# Patient Record
Sex: FEMALE | Race: WHITE | NOT HISPANIC OR LATINO | Employment: OTHER | URBAN - METROPOLITAN AREA
[De-identification: names, ages, dates, MRNs, and addresses within clinical notes are randomized per-mention and may not be internally consistent; named-entity substitution may affect disease eponyms.]

---

## 2020-01-23 ENCOUNTER — OFFICE VISIT (OUTPATIENT)
Dept: GYNECOLOGIC ONCOLOGY | Facility: CLINIC | Age: 45
End: 2020-01-23

## 2020-01-23 ENCOUNTER — TELEPHONE (OUTPATIENT)
Dept: GYNECOLOGIC ONCOLOGY | Facility: CLINIC | Age: 45
End: 2020-01-23

## 2020-01-23 VITALS
SYSTOLIC BLOOD PRESSURE: 140 MMHG | WEIGHT: 213 LBS | TEMPERATURE: 97.3 F | HEIGHT: 63 IN | DIASTOLIC BLOOD PRESSURE: 64 MMHG | HEART RATE: 93 BPM | RESPIRATION RATE: 18 BRPM | BODY MASS INDEX: 37.74 KG/M2

## 2020-01-23 DIAGNOSIS — G47.9 SLEEP DISTURBANCE: ICD-10-CM

## 2020-01-23 DIAGNOSIS — C53.8 MALIGNANT NEOPLASM OF OVERLAPPING SITES OF CERVIX (HCC): ICD-10-CM

## 2020-01-23 DIAGNOSIS — G89.3 CANCER RELATED PAIN: ICD-10-CM

## 2020-01-23 DIAGNOSIS — C54.1 ENDOMETRIAL CANCER (HCC): Primary | ICD-10-CM

## 2020-01-23 DIAGNOSIS — F43.0 ACUTE STRESS REACTION WITH PREDOMINATELY EMOTIONAL DISTURBANCE: ICD-10-CM

## 2020-01-23 PROCEDURE — 88305 TISSUE EXAM BY PATHOLOGIST: CPT | Performed by: OBSTETRICS & GYNECOLOGY

## 2020-01-23 PROCEDURE — 99205 OFFICE O/P NEW HI 60 MIN: CPT | Performed by: OBSTETRICS & GYNECOLOGY

## 2020-01-23 RX ORDER — OXYCODONE HYDROCHLORIDE 5 MG/1
5 TABLET ORAL EVERY 6 HOURS PRN
Qty: 30 TABLET | Refills: 0 | Status: SHIPPED | OUTPATIENT
Start: 2020-01-23 | End: 2020-02-11 | Stop reason: HOSPADM

## 2020-01-23 RX ORDER — LISINOPRIL 5 MG/1
5 TABLET ORAL DAILY
COMMUNITY
End: 2020-02-24
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
## 2020-01-23 NOTE — TELEPHONE ENCOUNTER
"I called to obtain records from [facility] if they have them.     I then called DrBert \"Naima\" at [phone] and requested EMB and cervical biopsy results. They are being faxed asap.   "

## 2020-01-24 ENCOUNTER — TELEPHONE (OUTPATIENT)
Dept: GYNECOLOGIC ONCOLOGY | Facility: CLINIC | Age: 45
End: 2020-01-24

## 2020-01-24 NOTE — TELEPHONE ENCOUNTER
Patient left  stating her and her sister were worried about her blood levels. Her sister works at a lab and vivek her blood this morning and her hemoglobin is low.

## 2020-01-24 NOTE — TELEPHONE ENCOUNTER
I called patient back. She states her hemoglobin is 7.9, she is asymptomatic. She felt worse when it was 8.6. I gave her fall precautions, advised her if she became fatigued, weak, short of breath, had episodes of syncope she should go to the ED for possible transfusion. She needs to continue her iron tablets. I discussed her case with Dr. Rubio and this was the plan.

## 2020-01-25 ENCOUNTER — OFFICE VISIT (OUTPATIENT)
Dept: FAMILY MEDICINE CLINIC | Facility: CLINIC | Age: 45
End: 2020-01-25

## 2020-01-25 ENCOUNTER — TELEPHONE (OUTPATIENT)
Dept: FAMILY MEDICINE CLINIC | Facility: CLINIC | Age: 45
End: 2020-01-25

## 2020-01-25 VITALS
WEIGHT: 213 LBS | SYSTOLIC BLOOD PRESSURE: 124 MMHG | OXYGEN SATURATION: 100 % | DIASTOLIC BLOOD PRESSURE: 72 MMHG | HEART RATE: 97 BPM | HEIGHT: 63 IN | BODY MASS INDEX: 37.74 KG/M2

## 2020-01-25 DIAGNOSIS — R01.1 HEART MURMUR: ICD-10-CM

## 2020-01-25 DIAGNOSIS — N30.01 ACUTE CYSTITIS WITH HEMATURIA: Primary | ICD-10-CM

## 2020-01-25 DIAGNOSIS — I10 ESSENTIAL HYPERTENSION: ICD-10-CM

## 2020-01-25 DIAGNOSIS — Z82.49 FAMILY HISTORY OF CARDIAC DISORDER IN MOTHER: ICD-10-CM

## 2020-01-25 DIAGNOSIS — E11.42 TYPE 2 DIABETES MELLITUS WITH DIABETIC POLYNEUROPATHY, WITH LONG-TERM CURRENT USE OF INSULIN (HCC): ICD-10-CM

## 2020-01-25 DIAGNOSIS — F33.1 MODERATE EPISODE OF RECURRENT MAJOR DEPRESSIVE DISORDER (HCC): ICD-10-CM

## 2020-01-25 DIAGNOSIS — F41.9 ANXIETY: ICD-10-CM

## 2020-01-25 DIAGNOSIS — R30.0 DYSURIA: ICD-10-CM

## 2020-01-25 DIAGNOSIS — Z79.4 TYPE 2 DIABETES MELLITUS WITH DIABETIC POLYNEUROPATHY, WITH LONG-TERM CURRENT USE OF INSULIN (HCC): ICD-10-CM

## 2020-01-25 DIAGNOSIS — Z76.89 ENCOUNTER TO ESTABLISH CARE: Primary | ICD-10-CM

## 2020-01-25 DIAGNOSIS — E55.9 VITAMIN D DEFICIENCY: ICD-10-CM

## 2020-01-25 DIAGNOSIS — C53.8 MALIGNANT NEOPLASM OF OVERLAPPING SITES OF CERVIX (HCC): ICD-10-CM

## 2020-01-25 DIAGNOSIS — Z00.00 PHYSICAL EXAM, ANNUAL: ICD-10-CM

## 2020-01-25 DIAGNOSIS — D50.9 IRON DEFICIENCY ANEMIA, UNSPECIFIED IRON DEFICIENCY ANEMIA TYPE: ICD-10-CM

## 2020-01-25 LAB
BILIRUB BLD-MCNC: NEGATIVE MG/DL
CLARITY, POC: CLEAR
COLOR UR: ABNORMAL
GLUCOSE UR STRIP-MCNC: NEGATIVE MG/DL
KETONES UR QL: NEGATIVE
LEUKOCYTE EST, POC: ABNORMAL
NITRITE UR-MCNC: NEGATIVE MG/ML
PH UR: 6 [PH] (ref 5–8)
PROT UR STRIP-MCNC: ABNORMAL MG/DL
RBC # UR STRIP: ABNORMAL /UL
SP GR UR: 1.02 (ref 1–1.03)
UROBILINOGEN UR QL: ABNORMAL

## 2020-01-25 PROCEDURE — 93000 ELECTROCARDIOGRAM COMPLETE: CPT | Performed by: PHYSICIAN ASSISTANT

## 2020-01-25 PROCEDURE — 81003 URINALYSIS AUTO W/O SCOPE: CPT | Performed by: PHYSICIAN ASSISTANT

## 2020-01-25 PROCEDURE — 99204 OFFICE O/P NEW MOD 45 MIN: CPT | Performed by: PHYSICIAN ASSISTANT

## 2020-01-25 RX ORDER — NEBIVOLOL 5 MG/1
5 TABLET ORAL DAILY
COMMUNITY
End: 2020-02-24

## 2020-01-25 RX ORDER — HYDROXYZINE PAMOATE 25 MG/1
25 CAPSULE ORAL 3 TIMES DAILY PRN
Qty: 30 CAPSULE | Refills: 1 | Status: ON HOLD | OUTPATIENT
Start: 2020-01-25 | End: 2020-03-18 | Stop reason: SINTOL

## 2020-01-25 RX ORDER — LORAZEPAM 0.5 MG/1
0.5 TABLET ORAL EVERY 8 HOURS PRN
Status: ON HOLD | COMMUNITY
End: 2020-02-10

## 2020-01-25 RX ORDER — INSULIN GLARGINE 100 [IU]/ML
40 INJECTION, SOLUTION SUBCUTANEOUS NIGHTLY
COMMUNITY
End: 2020-02-24

## 2020-01-25 RX ORDER — FERROUS SULFATE 325(65) MG
325 TABLET ORAL DAILY
COMMUNITY
End: 2020-02-24

## 2020-01-25 RX ORDER — LORAZEPAM 0.5 MG/1
TABLET ORAL
COMMUNITY
End: 2020-01-25

## 2020-01-25 RX ORDER — PREGABALIN 100 MG/1
100 CAPSULE ORAL 3 TIMES DAILY
COMMUNITY
End: 2020-02-24 | Stop reason: SDUPTHER

## 2020-01-25 RX ORDER — FLASH GLUCOSE SENSOR
KIT MISCELLANEOUS
COMMUNITY
End: 2020-02-07 | Stop reason: SDUPTHER

## 2020-01-25 RX ORDER — CIPROFLOXACIN 500 MG/1
500 TABLET, FILM COATED ORAL 2 TIMES DAILY
Qty: 10 TABLET | Refills: 0 | Status: SHIPPED | OUTPATIENT
Start: 2020-01-25 | End: 2020-01-30

## 2020-01-25 NOTE — PATIENT INSTRUCTIONS
Return to the lab Monday through Friday between 8 am and 4:30 pm.  Must be fasting (black coffee and water only, at least 8 hours of fasting prior to blood work).

## 2020-01-25 NOTE — TELEPHONE ENCOUNTER
----- Message from Tania Angeles PA-C sent at 1/25/2020 12:31 PM EST -----  Please call patient and let her know that I have sent in prescription for cipro to treat her UTI.  Take medication twice a day for 5 days.  Avoid taking antibiotic within 4 hours of iron.  Drink lots of water.

## 2020-01-25 NOTE — PROGRESS NOTES
Chief Complaint   Patient presents with   • Establish Care   • Cervical Cancer     Recently dx    • Urinary Frequency       HPI     Jeana Chung is a 44 y.o. female who presents to [facility].  Patient recently moved to [place] from [place] to be near her family given her recent diagnosis with cervical cancer.  She is established with oncology, Dr. Rubio and has upcoming PET scan this week.  Patient presents for management of diabetes type 2, hypertension, obesity, depression, anxiety, PTSD and anemia.  She has pending appointment with counselor.  Has tried various SSRI/SNRI medications and felt they made her feel worse.  She denies suicidal ideation.  She requests prescription for Ativan, discussed that our office is unable to fill this because it is controlled.  She is okay with prescription for vistaril.  She is taking lyrica 100 mg TID for peripheral neuropathy.  She is going to speak with Dr. Rubio about managing this, if not our office is able to do it.  Dr. Rubio has given her medication for cancer-related pain.  Patient reports that her last hemoglobin AIC was around 7%.  She is wearing Michelle and is taking 50 units of lantus at night, 15 units of humalog TID at meals.  She has had bilateral hallux amputations secondary to diabetes, neuropathy and necrotizing fasciitis.  She is ambulating well and has no issues with gait instability.  Her blood pressure is stable and well-controlled today.  Family history of heart disease.  She reports recent hemoglobin test showing 7.9.  She is asymptomatic.  Taking iron tablets.  No recent blood work at [facility] or within 3 months.    She also reports new onset urinary frequency and pain that is waking her up at night.  Started 2 days ago.  Denies fever, chills or back pain associated with this.  Allergic to bactrim and PCN.        Chief Complaint   Patient presents with   • Establish Care   • Cervical Cancer     Recently dx    • Urinary Frequency        Past Medical History:   Diagnosis Date   • Diabetes (CMS/HCC)    • Necrotizing fasciitis (CMS/HCC)    • PCOS (polycystic ovarian syndrome)    • Sepsis (CMS/HCC)        Past Surgical History:   Procedure Laterality Date   • TOE AMPUTATION Bilateral        Family History   Problem Relation Age of Onset   • Fibroids Mother    • Diabetes type II Mother    • Hypertension Mother    • Heart attack Mother    • Fibroids Sister         PCOS   • Diabetes type II Sister    • Dementia Maternal Grandmother    • Stroke Maternal Grandmother        Social History     Socioeconomic History   • Marital status:      Spouse name: Not on file   • Number of children: 1   • Years of education: Not on file   • Highest education level: Not on file   Tobacco Use   • Smoking status: Former Smoker     Types: Cigarettes   • Smokeless tobacco: Never Used   • Tobacco comment: social   Substance and Sexual Activity   • Alcohol use: Yes     Frequency: Monthly or less     Drinks per session: 1 or 2   • Drug use: Never   • Sexual activity: Not Currently       Allergies   Allergen Reactions   • Bactrim [Sulfamethoxazole-Trimethoprim] Anaphylaxis   • Penicillins Hives       ROS    Review of Systems   Constitutional: Positive for appetite change, chills, fatigue and unexpected weight gain. Negative for activity change, diaphoresis, fever and unexpected weight loss.   HENT: Negative for congestion, dental problem, ear pain, hearing loss, nosebleeds, sinus pressure, sore throat and trouble swallowing.    Eyes: Negative for blurred vision, pain, redness and visual disturbance.   Respiratory: Positive for cough and shortness of breath (with exertion). Negative for apnea, chest tightness and wheezing.    Cardiovascular: Negative for chest pain, palpitations and leg swelling.   Gastrointestinal: Positive for abdominal pain, constipation and nausea. Negative for abdominal distention, anal bleeding, blood in stool, diarrhea, vomiting, GERD and  "indigestion.   Endocrine: Negative for cold intolerance, heat intolerance, polydipsia, polyphagia and polyuria.   Genitourinary: Positive for menstrual problem, pelvic pain, vaginal bleeding and vaginal discharge. Negative for decreased urine volume, difficulty urinating, dysuria, frequency, hematuria, urgency and urinary incontinence.   Musculoskeletal: Positive for arthralgias. Negative for gait problem, joint swelling and bursitis.   Skin: Negative for dry skin, rash, skin lesions and bruise.   Neurological: Positive for weakness and numbness. Negative for dizziness, tremors, seizures, syncope, speech difficulty, light-headedness, headache, memory problem and confusion.   Hematological: Does not bruise/bleed easily.   Psychiatric/Behavioral: Positive for agitation, decreased concentration, sleep disturbance, depressed mood and stress. Negative for behavioral problems, hallucinations and suicidal ideas. The patient is nervous/anxious.        Vitals:    01/25/20 0944   BP: 124/72   Pulse: 97   SpO2: 100%   Weight: 96.6 kg (213 lb)   Height: 160 cm (63\")     Body mass index is 37.73 kg/m².    Current Outpatient Medications on File Prior to Visit   Medication Sig Dispense Refill   • Continuous Blood Gluc Sensor ([employer] CAMMY SENSOR SYSTEM) Every 10 (Ten) Days.     • ferrous sulfate 325 (65 FE) MG tablet Iron (ferrous sulfate) 325 mg (65 mg iron) tablet   Take 1 tablet every day by oral route.     • insulin glargine (LANTUS) 100 UNIT/ML injection Lantus U-100 Insulin 100 unit/mL subcutaneous solution   INJECT 5 UNITS SUBCUTANEOUSLY DAILY USE PREVIOUS DOSE IF DIFFERENT.     • lisinopril (PRINIVIL,ZESTRIL) 5 MG tablet Take 5 mg by mouth Daily.     • LORazepam (ATIVAN) 0.5 MG tablet Take 0.5 mg by mouth Every 8 (Eight) Hours As Needed.     • MULTIPLE VITAMIN PO Multiple Vitamin     • nebivolol (BYSTOLIC) 5 MG tablet Bystolic 5 mg tablet     • Omeprazole (PRILOSEC PO) Take  by mouth.     • oxyCODONE (ROXICODONE) 5 MG " immediate release tablet Take 1 tablet by mouth Every 6 (Six) Hours As Needed for Severe Pain . 30 tablet 0   • pregabalin (LYRICA) 100 MG capsule 3 (Three) Times a Day.     • [DISCONTINUED] insulin lispro (HUMALOG) 100 UNIT/ML injection Humalog U-100 Insulin 100 unit/mL subcutaneous solution     • [DISCONTINUED] LORazepam (ATIVAN) 0.5 MG tablet lorazepam 0.5 mg tablet       No current facility-administered medications on file prior to visit.        Results for orders placed or performed in visit on 01/25/20   POC Urinalysis Dipstick, Automated   Result Value Ref Range    Color Vicki Yellow, Straw, Dark Yellow, Vicki    Clarity, UA Clear Clear    Specific Gravity  1.020 1.005 - 1.030    pH, Urine 6.0 5.0 - 8.0    Leukocytes Moderate (2+) (A) Negative    Nitrite, UA Negative Negative    Protein, POC Trace (A) Negative mg/dL    Glucose, UA Negative Negative, 1000 mg/dL (3+) mg/dL    Ketones, UA Negative Negative    Urobilinogen, UA 1 E.U./dL  (A) Normal    Bilirubin Negative Negative    Blood, UA 3+ (A) Negative         ECG 12 Lead  Date/Time: 1/25/2020 10:30 AM  Performed by: Tania Angeles PA-C  Authorized by: Tania Angeles PA-C   Previous ECG: no previous ECG available  Rhythm: sinus rhythm    Clinical impression: abnormal EKG  Comments: Possible left atrial and ventricle hypertrophy             PE    Physical Exam   Constitutional: Vital signs are normal. She appears well-developed and well-nourished. She is active and cooperative. She does not appear ill. No distress. She is obese.  HENT:   Head: Normocephalic and atraumatic.   Eyes: EOM are normal.   Neck: Normal range of motion.   Cardiovascular: Normal rate and regular rhythm.   Murmur heard.  Pulmonary/Chest: Effort normal and breath sounds normal.   Musculoskeletal: Normal range of motion. She exhibits no edema.   Neurological: She is alert.   Skin: Skin is warm. She is not diaphoretic. No erythema.   Psychiatric: She has a normal mood and  affect. Her speech is normal and behavior is normal. Judgment and thought content normal. She is not actively hallucinating. She is attentive.   Vitals reviewed.      A/P    Jeana was seen today for establish care, cervical cancer and urinary frequency.    Diagnoses and all orders for this visit:    Encounter to establish care    Malignant neoplasm of overlapping sites of cervix (CMS/HCC)  Recently diagnosed.  Pending PET scan.  Established with and followed by oncology, Dr. Rubio.    Essential hypertension  -     ECG 12 Lead  Stable, well-controlled.  Compliant on medications.    Heart murmur  -     Adult Transthoracic Echo Complete W/ Cont if Necessary Per Protocol; Future  -     ECG 12 Lead  Heart murmur appreciated on exam.  Mitral vs. Aortic, will order echocardiogram and ECG.    Family history of cardiac disorder in mother  -     Adult Transthoracic Echo Complete W/ Cont if Necessary Per Protocol; Future  -     ECG 12 Lead    Iron deficiency anemia, unspecified iron deficiency anemia type  -     CBC Auto Differential; Future  -     Iron; Future  -     Ferritin; Future  -     Reticulocytes; Future  -     Vitamin B12; Future  Patient reports recent blood work, hemoglobin, showing anemia with value of 7.9.  She is asymptomatic.  Will get blood work for baseline.  She is taking iron supplements daily.    Type 2 diabetes mellitus with diabetic polyneuropathy, with long-term current use of insulin (CMS/HCC)  -     Hemoglobin A1c; Future  -     Microalbumin / Creatinine Urine Ratio - Urine, Clean Catch; Future  -     Vitamin B12; Future  -     insulin lispro (HUMALOG) 100 UNIT/ML injection; Inject 15 Units under the skin into the appropriate area as directed 3 (Three) Times a Day Before Meals.  States that last hemoglobin AIC was approximately 7 months ago.  Value was in the 7% region.  Taking lantus 50 units nightly.  humalog 15 units TID at meals.  Monitoring with Michelle.  Has had bilateral hallux amputations.   Has peripheral neuropathy that she takes lyrica for.      Moderate episode of recurrent major depressive disorder (CMS/HCC)  Has tried SSRI and SNRI medications and failed, makes her depression worse.  She denies any suicidal ideation.  Does not want referral to psychiatry at this time.  Has appointment with counselor next week.    Anxiety  -     hydrOXYzine pamoate (VISTARIL) 25 MG capsule; Take 1 capsule by mouth 3 (Three) Times a Day As Needed for Itching.  Requests refill on ativan.  Discussed that our office can not manage this for her and she is okay with prescription for vistaril.    Dysuria  -     POC Urinalysis Dipstick, Automated  -     Urine Culture - Urine, Urine, Random Void; Future  Urinalysis shows leukocytes and hematuria.  Will send for culture.  Allergic to bactrim and PCN.  Will send in prescription for cipro.    Physical exam, annual  -     Comprehensive Metabolic Panel; Future  -     TSH Rfx On Abnormal To Free T4; Future  -     Lipid Panel; Future  Return in 4 weeks for APE.  Will order labs to complete prior to appointment.    Vitamin D deficiency  -     Vitamin D 25 Hydroxy; Future         Plan of care reviewed with patient at the conclusion of today's visit. Education was provided regarding diagnosis, management and any prescribed or recommended OTC medications.  Patient verbalizes understanding of and agreement with management plan.    Return in about 4 weeks (around 2/22/2020) for Annual physical.     Tania Angeles PA-C

## 2020-01-25 NOTE — TELEPHONE ENCOUNTER
Spoke with patient regarding UTI and abx sent in. Also explained that her UA was sent out for a culture. The patient verbalized understanding and did not have any further questions at this time.

## 2020-01-27 ENCOUNTER — HOSPITAL ENCOUNTER (EMERGENCY)
Facility: HOSPITAL | Age: 45
Discharge: HOME | End: 2020-01-27
Attending: EMERGENCY MEDICINE | Admitting: EMERGENCY MEDICINE

## 2020-01-27 ENCOUNTER — APPOINTMENT (OUTPATIENT)
Dept: CT IMAGING | Facility: HOSPITAL | Age: 45
End: 2020-01-27

## 2020-01-27 ENCOUNTER — TELEPHONE (OUTPATIENT)
Dept: GYNECOLOGIC ONCOLOGY | Facility: CLINIC | Age: 45
End: 2020-01-27

## 2020-01-27 VITALS
DIASTOLIC BLOOD PRESSURE: 62 MMHG | WEIGHT: 213 LBS | OXYGEN SATURATION: 93 % | SYSTOLIC BLOOD PRESSURE: 108 MMHG | TEMPERATURE: 98.3 F | HEIGHT: 63 IN | RESPIRATION RATE: 18 BRPM | HEART RATE: 105 BPM | BODY MASS INDEX: 37.74 KG/M2

## 2020-01-27 DIAGNOSIS — R73.9 HYPERGLYCEMIA: Primary | ICD-10-CM

## 2020-01-27 DIAGNOSIS — R10.30 LOWER ABDOMINAL PAIN, UNSPECIFIED: ICD-10-CM

## 2020-01-27 DIAGNOSIS — E86.0 DEHYDRATION: ICD-10-CM

## 2020-01-27 LAB
ALBUMIN SERPL-MCNC: 3.2 G/DL (ref 3.5–5.2)
ALBUMIN/GLOB SERPL: 0.7 G/DL
ALP SERPL-CCNC: 112 U/L (ref 39–117)
ALT SERPL W P-5'-P-CCNC: 5 U/L (ref 1–33)
ANION GAP SERPL CALCULATED.3IONS-SCNC: 11 MMOL/L (ref 5–15)
AST SERPL-CCNC: 17 U/L (ref 1–32)
B-HCG UR QL: NEGATIVE
BACTERIA UR QL AUTO: ABNORMAL /HPF
BASOPHILS # BLD AUTO: 0.03 10*3/MM3 (ref 0–0.2)
BASOPHILS NFR BLD AUTO: 0.3 % (ref 0–1.5)
BILIRUB SERPL-MCNC: 0.4 MG/DL (ref 0.2–1.2)
BILIRUB UR QL STRIP: NEGATIVE
BUN BLD-MCNC: 25 MG/DL (ref 6–20)
BUN/CREAT SERPL: 24 (ref 7–25)
CALCIUM SPEC-SCNC: 8.8 MG/DL (ref 8.6–10.5)
CHLORIDE SERPL-SCNC: 104 MMOL/L (ref 98–107)
CLARITY UR: CLEAR
CO2 SERPL-SCNC: 19 MMOL/L (ref 22–29)
COLOR UR: YELLOW
CREAT BLD-MCNC: 1.04 MG/DL (ref 0.57–1)
DEPRECATED RDW RBC AUTO: 53.1 FL (ref 37–54)
EOSINOPHIL # BLD AUTO: 0.19 10*3/MM3 (ref 0–0.4)
EOSINOPHIL NFR BLD AUTO: 1.7 % (ref 0.3–6.2)
ERYTHROCYTE [DISTWIDTH] IN BLOOD BY AUTOMATED COUNT: 16 % (ref 12.3–15.4)
GFR SERPL CREATININE-BSD FRML MDRD: 57 ML/MIN/1.73
GLOBULIN UR ELPH-MCNC: 4.8 GM/DL
GLUCOSE BLD-MCNC: 311 MG/DL (ref 65–99)
GLUCOSE BLDC GLUCOMTR-MCNC: 245 MG/DL (ref 70–130)
GLUCOSE BLDC GLUCOMTR-MCNC: 251 MG/DL (ref 70–130)
GLUCOSE BLDC GLUCOMTR-MCNC: 272 MG/DL (ref 70–130)
GLUCOSE UR STRIP-MCNC: NEGATIVE MG/DL
HCT VFR BLD AUTO: 29.3 % (ref 34–46.6)
HGB BLD-MCNC: 8.7 G/DL (ref 12–15.9)
HGB UR QL STRIP.AUTO: ABNORMAL
HOLD SPECIMEN: NORMAL
HOLD SPECIMEN: NORMAL
HYALINE CASTS UR QL AUTO: ABNORMAL /LPF
IMM GRANULOCYTES # BLD AUTO: 0.15 10*3/MM3 (ref 0–0.05)
IMM GRANULOCYTES NFR BLD AUTO: 1.3 % (ref 0–0.5)
INTERNAL NEGATIVE CONTROL: NEGATIVE
INTERNAL POSITIVE CONTROL: POSITIVE
KETONES UR QL STRIP: NEGATIVE
LEUKOCYTE ESTERASE UR QL STRIP.AUTO: ABNORMAL
LIPASE SERPL-CCNC: 59 U/L (ref 13–60)
LYMPHOCYTES # BLD AUTO: 0.99 10*3/MM3 (ref 0.7–3.1)
LYMPHOCYTES NFR BLD AUTO: 8.6 % (ref 19.6–45.3)
Lab: NORMAL
MCH RBC QN AUTO: 27.2 PG (ref 26.6–33)
MCHC RBC AUTO-ENTMCNC: 29.7 G/DL (ref 31.5–35.7)
MCV RBC AUTO: 91.6 FL (ref 79–97)
MONOCYTES # BLD AUTO: 0.53 10*3/MM3 (ref 0.1–0.9)
MONOCYTES NFR BLD AUTO: 4.6 % (ref 5–12)
NEUTROPHILS # BLD AUTO: 9.62 10*3/MM3 (ref 1.7–7)
NEUTROPHILS NFR BLD AUTO: 83.5 % (ref 42.7–76)
NITRITE UR QL STRIP: NEGATIVE
NRBC BLD AUTO-RTO: 0 /100 WBC (ref 0–0.2)
PH UR STRIP.AUTO: 5.5 [PH] (ref 5–8)
PLATELET # BLD AUTO: 208 10*3/MM3 (ref 140–450)
PMV BLD AUTO: 10.6 FL (ref 6–12)
POTASSIUM BLD-SCNC: 5.6 MMOL/L (ref 3.5–5.2)
PROT SERPL-MCNC: 8 G/DL (ref 6–8.5)
PROT UR QL STRIP: NEGATIVE
RBC # BLD AUTO: 3.2 10*6/MM3 (ref 3.77–5.28)
RBC # UR: ABNORMAL /HPF
REF LAB TEST METHOD: ABNORMAL
SODIUM BLD-SCNC: 134 MMOL/L (ref 136–145)
SP GR UR STRIP: 1.02 (ref 1–1.03)
SQUAMOUS #/AREA URNS HPF: ABNORMAL /HPF
UROBILINOGEN UR QL STRIP: ABNORMAL
WBC NRBC COR # BLD: 11.51 10*3/MM3 (ref 3.4–10.8)
WBC UR QL AUTO: ABNORMAL /HPF
WHOLE BLOOD HOLD SPECIMEN: NORMAL
WHOLE BLOOD HOLD SPECIMEN: NORMAL

## 2020-01-27 PROCEDURE — 81001 URINALYSIS AUTO W/SCOPE: CPT | Performed by: EMERGENCY MEDICINE

## 2020-01-27 PROCEDURE — 96375 TX/PRO/DX INJ NEW DRUG ADDON: CPT

## 2020-01-27 PROCEDURE — 99284 EMERGENCY DEPT VISIT MOD MDM: CPT

## 2020-01-27 PROCEDURE — 85025 COMPLETE CBC W/AUTO DIFF WBC: CPT | Performed by: EMERGENCY MEDICINE

## 2020-01-27 PROCEDURE — 82962 GLUCOSE BLOOD TEST: CPT

## 2020-01-27 PROCEDURE — 81025 URINE PREGNANCY TEST: CPT | Performed by: EMERGENCY MEDICINE

## 2020-01-27 PROCEDURE — 25010000002 MORPHINE PER 10 MG: Performed by: EMERGENCY MEDICINE

## 2020-01-27 PROCEDURE — 25010000002 ONDANSETRON PER 1 MG: Performed by: EMERGENCY MEDICINE

## 2020-01-27 PROCEDURE — 96374 THER/PROPH/DIAG INJ IV PUSH: CPT

## 2020-01-27 PROCEDURE — 74176 CT ABD & PELVIS W/O CONTRAST: CPT

## 2020-01-27 PROCEDURE — 83690 ASSAY OF LIPASE: CPT | Performed by: EMERGENCY MEDICINE

## 2020-01-27 PROCEDURE — 96376 TX/PRO/DX INJ SAME DRUG ADON: CPT

## 2020-01-27 PROCEDURE — 80053 COMPREHEN METABOLIC PANEL: CPT | Performed by: EMERGENCY MEDICINE

## 2020-01-27 PROCEDURE — 63710000001 INSULIN REGULAR HUMAN PER 5 UNITS: Performed by: EMERGENCY MEDICINE

## 2020-01-27 RX ORDER — MORPHINE SULFATE 2 MG/ML
2 INJECTION, SOLUTION INTRAMUSCULAR; INTRAVENOUS ONCE
Status: COMPLETED | OUTPATIENT
Start: 2020-01-27 | End: 2020-01-27

## 2020-01-27 RX ORDER — ONDANSETRON 2 MG/ML
4 INJECTION INTRAMUSCULAR; INTRAVENOUS ONCE
Status: COMPLETED | OUTPATIENT
Start: 2020-01-27 | End: 2020-01-27

## 2020-01-27 RX ORDER — SODIUM CHLORIDE 0.9 % (FLUSH) 0.9 %
10 SYRINGE (ML) INJECTION AS NEEDED
Status: DISCONTINUED | OUTPATIENT
Start: 2020-01-27 | End: 2020-01-27 | Stop reason: HOSPADM

## 2020-01-27 RX ORDER — ONDANSETRON 2 MG/ML
4 INJECTION INTRAMUSCULAR; INTRAVENOUS ONCE
Status: DISCONTINUED | OUTPATIENT
Start: 2020-01-27 | End: 2020-01-27

## 2020-01-27 RX ORDER — ONDANSETRON 4 MG/1
4 TABLET, ORALLY DISINTEGRATING ORAL EVERY 6 HOURS PRN
Qty: 20 TABLET | Refills: 0 | Status: SHIPPED | OUTPATIENT
Start: 2020-01-27 | End: 2020-02-01

## 2020-01-27 RX ADMIN — MORPHINE SULFATE 2 MG: 2 INJECTION, SOLUTION INTRAMUSCULAR; INTRAVENOUS at 05:46

## 2020-01-27 RX ADMIN — INSULIN HUMAN 5 UNITS: 100 INJECTION, SOLUTION PARENTERAL at 06:29

## 2020-01-27 RX ADMIN — MORPHINE SULFATE 2 MG: 2 INJECTION, SOLUTION INTRAMUSCULAR; INTRAVENOUS at 04:29

## 2020-01-27 RX ADMIN — ONDANSETRON 4 MG: 2 INJECTION INTRAMUSCULAR; INTRAVENOUS at 04:05

## 2020-01-27 RX ADMIN — SODIUM CHLORIDE 2000 ML: 9 INJECTION, SOLUTION INTRAVENOUS at 04:05

## 2020-01-27 NOTE — ED PROVIDER NOTES
Subjective   Ms. Jeana Chung is a 44 y.o female presenting to the emergency department with complaints of abdominal pain. She was recently diagnosed with cervical cancer and has not received treatment yet. Her abdominal pain began a couple of hours ago and is located in her lower abdomen, radiating to her pelvic region and back. She states her pain is constant and severe and she took pain medication today but denies relief. Her blood sugar was 70 and she drank apple juice, leading to abdominal cramping and watery diarrhea. She complains of abdominal bloating, nausea, vomiting, vaginal bleeding, and urinary frequency. She denies dysuria. She was diagnosed with a urinary tract infection 2 weeks ago and was prescribed antibiotics. That round of Keflex did not resolve her infection and she is currently taking Cipro. She has a history of diabetes and she confirms her blood sugar has been fairly normal until tonight when it was low. There are no other acute symptoms at this time.       History provided by:  Patient  Abdominal Pain   Pain location:  Suprapubic  Pain quality: bloating and cramping    Pain radiation: pelvic region, back.  Pain severity:  Severe  Onset quality:  Sudden  Duration:  2 hours  Timing:  Constant  Progression:  Worsening  Chronicity:  New  Relieved by:  Nothing  Ineffective treatments: pain medication.  Associated symptoms: diarrhea, nausea, vaginal bleeding and vomiting    Associated symptoms: no dysuria    Vaginal bleeding:     Progression:  Improving      Review of Systems   Gastrointestinal: Positive for abdominal pain, diarrhea, nausea and vomiting.   Genitourinary: Positive for frequency, pelvic pain and vaginal bleeding. Negative for dysuria.   Musculoskeletal: Positive for back pain.   All other systems reviewed and are negative.      Past Medical History:   Diagnosis Date   • Cervical cancer (CMS/HCC)    • Diabetes (CMS/HCC)    • Hypertension    • Necrotizing fasciitis (CMS/HCC)    •  PCOS (polycystic ovarian syndrome)    • Sepsis (CMS/HCC)        Allergies   Allergen Reactions   • Bactrim [Sulfamethoxazole-Trimethoprim] Anaphylaxis   • Penicillins Hives       Past Surgical History:   Procedure Laterality Date   • TOE AMPUTATION Bilateral        Family History   Problem Relation Age of Onset   • Fibroids Mother    • Diabetes type II Mother    • Hypertension Mother    • Heart attack Mother    • Fibroids Sister         PCOS   • Diabetes type II Sister    • Dementia Maternal Grandmother    • Stroke Maternal Grandmother        Social History     Socioeconomic History   • Marital status:      Spouse name: Not on file   • Number of children: 1   • Years of education: Not on file   • Highest education level: Not on file   Tobacco Use   • Smoking status: Former Smoker     Types: Cigarettes   • Smokeless tobacco: Never Used   • Tobacco comment: social   Substance and Sexual Activity   • Alcohol use: Yes     Frequency: Monthly or less     Drinks per session: 1 or 2   • Drug use: Never   • Sexual activity: Not Currently         Objective   Physical Exam   Constitutional: She is oriented to person, place, and time. She appears well-developed and well-nourished. No distress.   HENT:   Head: Normocephalic and atraumatic.   Eyes: Conjunctivae are normal. No scleral icterus.   Neck: Normal range of motion.   Cardiovascular: Normal rate, regular rhythm and normal heart sounds.   No murmur heard.  Pulmonary/Chest: Effort normal and breath sounds normal. No respiratory distress. She has no wheezes.   Abdominal: Soft. There is tenderness.   Mild diffuse abdominal tenderness, no focal or severe area of tenderness. No flank tenderness to percussion   Musculoskeletal: Normal range of motion. She exhibits no edema, tenderness or deformity.   Neurological: She is alert and oriented to person, place, and time.   Skin: Skin is warm and dry. She is not diaphoretic.   Psychiatric: She has a normal mood and affect. Her  behavior is normal.   Nursing note and vitals reviewed.      Procedures         ED Course     Recent Results (from the past 24 hour(s))   Comprehensive Metabolic Panel    Collection Time: 01/27/20  2:41 AM   Result Value Ref Range    Glucose 311 (H) 65 - 99 mg/dL    BUN 25 (H) 6 - 20 mg/dL    Creatinine 1.04 (H) 0.57 - 1.00 mg/dL    Sodium 134 (L) 136 - 145 mmol/L    Potassium 5.6 (H) 3.5 - 5.2 mmol/L    Chloride 104 98 - 107 mmol/L    CO2 19.0 (L) 22.0 - 29.0 mmol/L    Calcium 8.8 8.6 - 10.5 mg/dL    Total Protein 8.0 6.0 - 8.5 g/dL    Albumin 3.20 (L) 3.50 - 5.20 g/dL    ALT (SGPT) 5 1 - 33 U/L    AST (SGOT) 17 1 - 32 U/L    Alkaline Phosphatase 112 39 - 117 U/L    Total Bilirubin 0.4 0.2 - 1.2 mg/dL    eGFR Non African Amer 57 (L) >60 mL/min/1.73    Globulin 4.8 gm/dL    A/G Ratio 0.7 g/dL    BUN/Creatinine Ratio 24.0 7.0 - 25.0    Anion Gap 11.0 5.0 - 15.0 mmol/L   Lipase    Collection Time: 01/27/20  2:41 AM   Result Value Ref Range    Lipase 59 13 - 60 U/L   Light Blue Top    Collection Time: 01/27/20  2:41 AM   Result Value Ref Range    Extra Tube hold for add-on    Green Top (Gel)    Collection Time: 01/27/20  2:41 AM   Result Value Ref Range    Extra Tube Hold for add-ons.    Lavender Top    Collection Time: 01/27/20  2:41 AM   Result Value Ref Range    Extra Tube hold for add-on    Gold Top - SST    Collection Time: 01/27/20  2:41 AM   Result Value Ref Range    Extra Tube Hold for add-ons.    CBC Auto Differential    Collection Time: 01/27/20  2:41 AM   Result Value Ref Range    WBC 11.51 (H) 3.40 - 10.80 10*3/mm3    RBC 3.20 (L) 3.77 - 5.28 10*6/mm3    Hemoglobin 8.7 (L) 12.0 - 15.9 g/dL    Hematocrit 29.3 (L) 34.0 - 46.6 %    MCV 91.6 79.0 - 97.0 fL    MCH 27.2 26.6 - 33.0 pg    MCHC 29.7 (L) 31.5 - 35.7 g/dL    RDW 16.0 (H) 12.3 - 15.4 %    RDW-SD 53.1 37.0 - 54.0 fl    MPV 10.6 6.0 - 12.0 fL    Platelets 208 140 - 450 10*3/mm3    Neutrophil % 83.5 (H) 42.7 - 76.0 %    Lymphocyte % 8.6 (L) 19.6 - 45.3 %     Monocyte % 4.6 (L) 5.0 - 12.0 %    Eosinophil % 1.7 0.3 - 6.2 %    Basophil % 0.3 0.0 - 1.5 %    Immature Grans % 1.3 (H) 0.0 - 0.5 %    Neutrophils, Absolute 9.62 (H) 1.70 - 7.00 10*3/mm3    Lymphocytes, Absolute 0.99 0.70 - 3.10 10*3/mm3    Monocytes, Absolute 0.53 0.10 - 0.90 10*3/mm3    Eosinophils, Absolute 0.19 0.00 - 0.40 10*3/mm3    Basophils, Absolute 0.03 0.00 - 0.20 10*3/mm3    Immature Grans, Absolute 0.15 (H) 0.00 - 0.05 10*3/mm3    nRBC 0.0 0.0 - 0.2 /100 WBC   Urinalysis With Microscopic If Indicated (No Culture) - Urine, Clean Catch    Collection Time: 01/27/20  4:09 AM   Result Value Ref Range    Color, UA Yellow Yellow, Straw    Appearance, UA Clear Clear    pH, UA 5.5 5.0 - 8.0    Specific Gravity, UA 1.019 1.001 - 1.030    Glucose, UA Negative Negative    Ketones, UA Negative Negative    Bilirubin, UA Negative Negative    Blood, UA Moderate (2+) (A) Negative    Protein, UA Negative Negative    Leuk Esterase, UA Small (1+) (A) Negative    Nitrite, UA Negative Negative    Urobilinogen, UA 1.0 E.U./dL 0.2 - 1.0 E.U./dL   Urinalysis, Microscopic Only - Urine, Clean Catch    Collection Time: 01/27/20  4:09 AM   Result Value Ref Range    RBC, UA 21-30 (A) None Seen, 0-2 /HPF    WBC, UA 6-12 (A) None Seen, 0-2 /HPF    Bacteria, UA None Seen None Seen, Trace /HPF    Squamous Epithelial Cells, UA 0-2 None Seen, 0-2 /HPF    Hyaline Casts, UA 0-6 0 - 6 /LPF    Methodology Automated Microscopy    POCT pregnancy, urine    Collection Time: 01/27/20  4:13 AM   Result Value Ref Range    HCG, Urine, QL Negative Negative    Lot Number [id number]     Internal Positive Control Positive     Internal Negative Control Negative    POC Glucose Once    Collection Time: 01/27/20  5:52 AM   Result Value Ref Range    Glucose 272 (H) 70 - 130 mg/dL   POC Glucose Once    Collection Time: 01/27/20  6:28 AM   Result Value Ref Range    Glucose 251 (H) 70 - 130 mg/dL   POC Glucose Once    Collection Time: 01/27/20  7:18 AM    Result Value Ref Range    Glucose 245 (H) 70 - 130 mg/dL     Note: In addition to lab results from this visit, the labs listed above may include labs taken at another facility or during a different encounter within the last 24 hours. Please correlate lab times with ED admission and discharge times for further clarification of the services performed during this visit.    CT Abdomen Pelvis Without Contrast   Final Result   Markedly enlarged uterus. Mild splenomegaly. No acute or inflammatory changes in the abdomen or pelvis. No evidence of colitis.               Signer Name: Salvador Vivas MD    Signed: 1/27/2020 4:40 AM    Workstation Name: [id number]     [facility]        Vitals:    01/27/20 0630 01/27/20 0645 01/27/20 0646 01/27/20 0700   BP: 131/64 125/66 131/64 108/62   BP Location:   Right arm    Patient Position:   Sitting    Pulse:  102 101 105   Resp:   18    Temp:       TempSrc:       SpO2:  92% 96% 93%   Weight:       Height:         Medications   sodium chloride 0.9 % flush 10 mL (has no administration in time range)   sodium chloride 0.9 % flush 10 mL (has no administration in time range)   sodium chloride 0.9 % bolus 2,000 mL (0 mL Intravenous Stopped 1/27/20 0628)   ondansetron (ZOFRAN) injection 4 mg (4 mg Intravenous Given 1/27/20 0405)   morphine injection 2 mg (2 mg Intravenous Given 1/27/20 0429)   morphine injection 2 mg (2 mg Intravenous Given 1/27/20 0546)   insulin regular (humuLIN R,novoLIN R) injection 5 Units (5 Units Intravenous Given 1/27/20 0629)     ECG/EMG Results (last 24 hours)     ** No results found for the last 24 hours. **        No orders to display                       [place] Coma Scale Score: 15                            MDM  Number of Diagnoses or Management Options  Dehydration: new and requires workup  Hyperglycemia: new and requires workup  Lower abdominal pain, unspecified: new and requires workup  Diagnosis management comments: The patient  presents with the complaint of lower abdominal pain.    The patient has a known history of cervical cancer and is currently receiving evaluation by Dr. Guido.  She is scheduled to initiate chemotherapy but has not at this given point.    She has been prescribed oxycodone for treatment of her pain, but this was not controlling her pain which led to the current presentation.    CT scan of the abdomen pelvis shows a large uterus but otherwise no acute abnormalities.  Lab evaluation shows elevated blood sugar level and signs of dehydration.    The patient was given 2 L of IV fluid and insulin here in the ER and the blood sugar was improving.    I discussed admission for the elevated blood sugar and dehydration.  The patient reports that her blood sugar has been primarily well controlled recently.  She feels confident that she can hydrate herself well at home and desires to go home.    Upon final evaluation she reports her pain has dramatically improved.       Amount and/or Complexity of Data Reviewed  Clinical lab tests: ordered and reviewed  Tests in the radiology section of CPT®: ordered and reviewed  Obtain history from someone other than the patient: yes  Review and summarize past medical records: yes  Independent visualization of images, tracings, or specimens: yes        Final diagnoses:   Hyperglycemia   Dehydration   Lower abdominal pain, unspecified       Documentation assistance provided by fiona Campbell.  Information recorded by the fiona was done at my direction and has been verified and validated by me.     Sasha Campbell  01/27/20 4894       Michelle Mathew MD  01/27/20 5406

## 2020-01-27 NOTE — DISCHARGE INSTRUCTIONS
Keep follow-up with Dr. Tolentino as scheduled.    Take Kevin prescribed oxycodone as needed to help with pain.    Take Zofran as needed for nausea.    Rest and drink plenty of fluids and blood sugar frequently.

## 2020-01-27 NOTE — PROGRESS NOTES
I have reviewed the notes, assessments, and/or procedures performed by BENSON Macedo, I concur with her/his documentation of Jeana Chung.

## 2020-01-28 ENCOUNTER — TELEPHONE (OUTPATIENT)
Dept: GYNECOLOGIC ONCOLOGY | Facility: CLINIC | Age: 45
End: 2020-01-28

## 2020-01-28 DIAGNOSIS — C54.1 ENDOMETRIAL CANCER (HCC): Primary | ICD-10-CM

## 2020-01-28 LAB
CYTO UR: NORMAL
LAB AP CASE REPORT: NORMAL
LAB AP CLINICAL INFORMATION: NORMAL
PATH REPORT.FINAL DX SPEC: NORMAL
PATH REPORT.GROSS SPEC: NORMAL

## 2020-01-28 NOTE — TELEPHONE ENCOUNTER
I called patient and notified her of pathology report.  Endometrial cancer.  We will cancel PET/CT for tomorrow.  CT scan of chest abdomen and pelvis was ordered.  I am going to look at her radiation oncology referral and appointment and see if they want to wait to see her until after possible hysterectomy.  Given that the histology is change, hysterectomy would be a potential option.  This would have to be a laparotomy due to the large size of the uterus.  I told patient we would have her re-present to discuss CT images and plan.  She was appreciative.

## 2020-01-29 ENCOUNTER — TELEPHONE (OUTPATIENT)
Dept: ONCOLOGY | Facility: CLINIC | Age: 45
End: 2020-01-29

## 2020-01-29 ENCOUNTER — HOSPITAL ENCOUNTER (OUTPATIENT)
Dept: PET IMAGING | Facility: HOSPITAL | Age: 45
End: 2020-01-29

## 2020-01-29 ENCOUNTER — APPOINTMENT (OUTPATIENT)
Dept: PET IMAGING | Facility: HOSPITAL | Age: 45
End: 2020-01-29

## 2020-01-29 NOTE — TELEPHONE ENCOUNTER
Spoke with patient and assured of schedule changes. She will go to CT tomorrow and notified that RAD ONC visit was cancelled.  Will await call from Dr. Rubio RE: CT results.

## 2020-01-29 NOTE — TELEPHONE ENCOUNTER
Pt wants to be sure she is set up correctly for CT tomorrow morning.  She stated that Dr. Rubio called her yesterday, but said there have been some changes since then and appointments were being reworked.  I attempted to warm transfer, but office staff must have been with patients.  Please call her asap     [phone]

## 2020-01-30 ENCOUNTER — LAB (OUTPATIENT)
Dept: LAB | Facility: HOSPITAL | Age: 45
End: 2020-01-30

## 2020-01-30 ENCOUNTER — HOSPITAL ENCOUNTER (OUTPATIENT)
Dept: CT IMAGING | Facility: HOSPITAL | Age: 45
Discharge: HOME | End: 2020-01-30
Admitting: OBSTETRICS & GYNECOLOGY

## 2020-01-30 DIAGNOSIS — C54.1 ENDOMETRIAL CANCER (HCC): ICD-10-CM

## 2020-01-30 DIAGNOSIS — Z00.00 PHYSICAL EXAM, ANNUAL: ICD-10-CM

## 2020-01-30 DIAGNOSIS — E11.42 TYPE 2 DIABETES MELLITUS WITH DIABETIC POLYNEUROPATHY, WITH LONG-TERM CURRENT USE OF INSULIN (HCC): ICD-10-CM

## 2020-01-30 DIAGNOSIS — E55.9 VITAMIN D DEFICIENCY: ICD-10-CM

## 2020-01-30 DIAGNOSIS — D50.9 IRON DEFICIENCY ANEMIA, UNSPECIFIED IRON DEFICIENCY ANEMIA TYPE: ICD-10-CM

## 2020-01-30 DIAGNOSIS — Z79.4 TYPE 2 DIABETES MELLITUS WITH DIABETIC POLYNEUROPATHY, WITH LONG-TERM CURRENT USE OF INSULIN (HCC): ICD-10-CM

## 2020-01-30 LAB
25(OH)D3 SERPL-MCNC: 9.1 NG/ML (ref 30–100)
ALBUMIN SERPL-MCNC: 3.1 G/DL (ref 3.5–5.2)
ALBUMIN UR-MCNC: 29.8 MG/DL
ALBUMIN/GLOB SERPL: 0.7 G/DL
ALP SERPL-CCNC: 97 U/L (ref 39–117)
ALT SERPL W P-5'-P-CCNC: 6 U/L (ref 1–33)
ANION GAP SERPL CALCULATED.3IONS-SCNC: 12.8 MMOL/L (ref 5–15)
AST SERPL-CCNC: 19 U/L (ref 1–32)
BASOPHILS # BLD AUTO: 0.02 10*3/MM3 (ref 0–0.2)
BASOPHILS NFR BLD AUTO: 0.2 % (ref 0–1.5)
BILIRUB SERPL-MCNC: 0.4 MG/DL (ref 0.2–1.2)
BUN BLD-MCNC: 23 MG/DL (ref 6–20)
BUN/CREAT SERPL: 21.7 (ref 7–25)
CALCIUM SPEC-SCNC: 9 MG/DL (ref 8.6–10.5)
CHLORIDE SERPL-SCNC: 101 MMOL/L (ref 98–107)
CHOLEST SERPL-MCNC: 89 MG/DL (ref 0–200)
CO2 SERPL-SCNC: 20.2 MMOL/L (ref 22–29)
CREAT BLD-MCNC: 1.06 MG/DL (ref 0.57–1)
CREAT UR-MCNC: 113.8 MG/DL
DEPRECATED RDW RBC AUTO: 49.3 FL (ref 37–54)
EOSINOPHIL # BLD AUTO: 0.23 10*3/MM3 (ref 0–0.4)
EOSINOPHIL NFR BLD AUTO: 2.6 % (ref 0.3–6.2)
ERYTHROCYTE [DISTWIDTH] IN BLOOD BY AUTOMATED COUNT: 15.2 % (ref 12.3–15.4)
FERRITIN SERPL-MCNC: 41.3 NG/ML (ref 13–150)
GFR SERPL CREATININE-BSD FRML MDRD: 56 ML/MIN/1.73
GLOBULIN UR ELPH-MCNC: 4.6 GM/DL
GLUCOSE BLD-MCNC: 226 MG/DL (ref 65–99)
HBA1C MFR BLD: 7.1 % (ref 4.8–5.6)
HCT VFR BLD AUTO: 25.8 % (ref 34–46.6)
HDLC SERPL-MCNC: 30 MG/DL (ref 40–60)
HGB BLD-MCNC: 7.9 G/DL (ref 12–15.9)
IMM GRANULOCYTES # BLD AUTO: 0.1 10*3/MM3 (ref 0–0.05)
IMM GRANULOCYTES NFR BLD AUTO: 1.1 % (ref 0–0.5)
IRON 24H UR-MRATE: 24 MCG/DL (ref 37–145)
LDLC SERPL CALC-MCNC: 44 MG/DL (ref 0–100)
LDLC/HDLC SERPL: 1.46 {RATIO}
LYMPHOCYTES # BLD AUTO: 1.15 10*3/MM3 (ref 0.7–3.1)
LYMPHOCYTES NFR BLD AUTO: 13.2 % (ref 19.6–45.3)
MCH RBC QN AUTO: 27.4 PG (ref 26.6–33)
MCHC RBC AUTO-ENTMCNC: 30.6 G/DL (ref 31.5–35.7)
MCV RBC AUTO: 89.6 FL (ref 79–97)
MICROALBUMIN/CREAT UR: 261.9 MG/G
MONOCYTES # BLD AUTO: 0.41 10*3/MM3 (ref 0.1–0.9)
MONOCYTES NFR BLD AUTO: 4.7 % (ref 5–12)
NEUTROPHILS # BLD AUTO: 6.8 10*3/MM3 (ref 1.7–7)
NEUTROPHILS NFR BLD AUTO: 78.2 % (ref 42.7–76)
NRBC BLD AUTO-RTO: 0 /100 WBC (ref 0–0.2)
PLATELET # BLD AUTO: 213 10*3/MM3 (ref 140–450)
PMV BLD AUTO: 10.7 FL (ref 6–12)
POTASSIUM BLD-SCNC: 5 MMOL/L (ref 3.5–5.2)
PROT SERPL-MCNC: 7.7 G/DL (ref 6–8.5)
RBC # BLD AUTO: 2.88 10*6/MM3 (ref 3.77–5.28)
RETICS # AUTO: 0.08 10*6/MM3 (ref 0.02–0.13)
RETICS/RBC NFR AUTO: 2.72 % (ref 0.7–1.9)
SODIUM BLD-SCNC: 134 MMOL/L (ref 136–145)
TRIGL SERPL-MCNC: 76 MG/DL (ref 0–150)
TSH SERPL DL<=0.05 MIU/L-ACNC: 2.18 UIU/ML (ref 0.27–4.2)
VIT B12 BLD-MCNC: 1841 PG/ML (ref 211–946)
VLDLC SERPL-MCNC: 15.2 MG/DL (ref 5–40)
WBC NRBC COR # BLD: 8.71 10*3/MM3 (ref 3.4–10.8)

## 2020-01-30 PROCEDURE — 85025 COMPLETE CBC W/AUTO DIFF WBC: CPT

## 2020-01-30 PROCEDURE — 80061 LIPID PANEL: CPT

## 2020-01-30 PROCEDURE — 83540 ASSAY OF IRON: CPT

## 2020-01-30 PROCEDURE — 82607 VITAMIN B-12: CPT

## 2020-01-30 PROCEDURE — 85045 AUTOMATED RETICULOCYTE COUNT: CPT

## 2020-01-30 PROCEDURE — 74177 CT ABD & PELVIS W/CONTRAST: CPT

## 2020-01-30 PROCEDURE — 36415 COLL VENOUS BLD VENIPUNCTURE: CPT

## 2020-01-30 PROCEDURE — 82570 ASSAY OF URINE CREATININE: CPT

## 2020-01-30 PROCEDURE — 80053 COMPREHEN METABOLIC PANEL: CPT

## 2020-01-30 PROCEDURE — 71260 CT THORAX DX C+: CPT

## 2020-01-30 PROCEDURE — 83036 HEMOGLOBIN GLYCOSYLATED A1C: CPT

## 2020-01-30 PROCEDURE — 82728 ASSAY OF FERRITIN: CPT

## 2020-01-30 PROCEDURE — 82043 UR ALBUMIN QUANTITATIVE: CPT

## 2020-01-30 PROCEDURE — 82306 VITAMIN D 25 HYDROXY: CPT

## 2020-01-30 PROCEDURE — 84443 ASSAY THYROID STIM HORMONE: CPT

## 2020-01-30 PROCEDURE — 25010000002 IOPAMIDOL 61 % SOLUTION: Performed by: OBSTETRICS & GYNECOLOGY

## 2020-01-30 RX ADMIN — BARIUM SULFATE 450 ML: 21 SUSPENSION ORAL at 10:00

## 2020-01-30 RX ADMIN — IOPAMIDOL 95 ML: 612 INJECTION, SOLUTION INTRAVENOUS at 11:10

## 2020-01-31 ENCOUNTER — TELEPHONE (OUTPATIENT)
Dept: FAMILY MEDICINE CLINIC | Facility: CLINIC | Age: 45
End: 2020-01-31

## 2020-01-31 ENCOUNTER — TELEPHONE (OUTPATIENT)
Dept: GYNECOLOGIC ONCOLOGY | Facility: CLINIC | Age: 45
End: 2020-01-31

## 2020-01-31 DIAGNOSIS — E55.9 VITAMIN D DEFICIENCY: Primary | ICD-10-CM

## 2020-01-31 NOTE — TELEPHONE ENCOUNTER
Spoke with patient regarding recent labs.  Hemoglobin AIC is 7.1%.  She reports taking her long-acting insulin infrequently, occasionally skips nights because she has woken up with low sugars.  Currently taking 50 units of lantus when she takes it.  She also reports taking short-acting insulin 15 units prior to each meal.  Checking fasting blood sugar, but not using sliding scale.  Unable to eat most mornings due to nausea.    She is anemic secondary to endometrial cancer.  Will defer to oncology for management.    Very low vitamin D, will send in weekly supplement.

## 2020-01-31 NOTE — TELEPHONE ENCOUNTER
I called patient to discuss CT scan results.  Left VM that there was no metastatic disease.  Stated we would plan on surgical intervention and that I would see her back in the office to discuss that in more detail as we have not had a discussion regarding risks and benefits.      Will plan on surgical intervention with exploratory laparotomy, total abdominal hysterectomy, bilateral salpingo-oophorectomy, lymph node dissection.  Patient will need to do bowel prep prior to surgery due to the extent of disease and inability to exclude intestinal involvement on imaging although there is nothing obvious seen.

## 2020-02-03 ENCOUNTER — OFFICE VISIT (OUTPATIENT)
Dept: GYNECOLOGIC ONCOLOGY | Facility: CLINIC | Age: 45
End: 2020-02-03

## 2020-02-03 VITALS
BODY MASS INDEX: 35.78 KG/M2 | RESPIRATION RATE: 10 BRPM | HEART RATE: 95 BPM | DIASTOLIC BLOOD PRESSURE: 59 MMHG | SYSTOLIC BLOOD PRESSURE: 108 MMHG | OXYGEN SATURATION: 96 % | WEIGHT: 202 LBS

## 2020-02-03 DIAGNOSIS — C54.1 ENDOMETRIAL CANCER (HCC): Primary | ICD-10-CM

## 2020-02-03 PROCEDURE — 99213 OFFICE O/P EST LOW 20 MIN: CPT | Performed by: OBSTETRICS & GYNECOLOGY

## 2020-02-03 RX ORDER — ACETAMINOPHEN 325 MG/1
650 TABLET ORAL
Status: CANCELLED | OUTPATIENT
Start: 2020-02-03

## 2020-02-03 RX ORDER — METRONIDAZOLE 500 MG/1
TABLET ORAL
Qty: 3 TABLET | Refills: 0 | Status: SHIPPED | OUTPATIENT
Start: 2020-02-03 | End: 2020-02-11 | Stop reason: HOSPADM

## 2020-02-03 RX ORDER — BISACODYL 5 MG/1
TABLET, DELAYED RELEASE ORAL
Qty: 4 TABLET | Refills: 0 | Status: SHIPPED | OUTPATIENT
Start: 2020-02-03 | End: 2020-02-11 | Stop reason: HOSPADM

## 2020-02-03 RX ORDER — NEOMYCIN SULFATE 500 MG/1
TABLET ORAL
Qty: 6 TABLET | Refills: 0 | Status: SHIPPED | OUTPATIENT
Start: 2020-02-03 | End: 2020-02-11 | Stop reason: HOSPADM

## 2020-02-03 RX ORDER — ONDANSETRON 4 MG/1
4 TABLET, FILM COATED ORAL EVERY 8 HOURS PRN
Qty: 30 TABLET | Refills: 5 | Status: SHIPPED | OUTPATIENT
Start: 2020-02-03 | End: 2020-02-11 | Stop reason: HOSPADM

## 2020-02-03 NOTE — PATIENT INSTRUCTIONS
Inpatient Surgery Instructions          Jeana Chung  [id number]  1975    SURGEON:  Celine Rubio MD    Surgery Coordinator: Brenda Turner  If you have any questions before or after surgery please call.  [phone]         Appointment    1. You have pre-admission testing (PAT) appointment on 02/07/2020  at 12:00 pm.  You will need to be at hospital registration 10 minutes before that time The registration department is located in the long hallway between the [facility] and [address] [place] [address].If your PAT appointment is on Sunday, please enter through the Emergency Department.     2.  Your surgery has been scheduled on 02/10/2020.  You will need to be at the [address] [place] [address] second floor surgery registration on that day at 10:30 am.    The Day(s) Before Surgery    · Plan to have someone take you home from the hospital.    · Do not use any products that contain nicotine or tobacco, such as cigarettes and e-cigarettes. These can delay healing after surgery. If you need help quitting, ask your health care provider.    ·  Do not take vitamins or aspirin one week before surgery ( if applicable).  On the morning of your surgery, you may take you prescription medications with a sip of water, unless told otherwise by your provider.  Bring them with you to the hospital (Diabetic patient should bring insulin if instructed by the managing physician). If you are taking a blood thinner ( Eliquis, Coumadin, Xarelto, Lovenox, Asprin, Heparin, etc.) please have the provider that manages this instruct you on when to stop taking prior to surgery.     · If you are feeling sick, have a fever or cough and have seen your PCP let our office know 48 hours prior to surgery. It may be subject to rescheduling.     You will need to purchase 4 bottles of gatorade.  2 bottles are for the bowel prep. The other 2 you will need to drink 4 hours prior to  surgery. These need to be drank well before surgery.  For example your arrival time is 10:30 am, please have gatorade drank by 8:30 am. If you show up drinking gatorade it could delay or cancel your surgery.       Eating and drinking restrictions              Follow instructions from your health care provider about eating and drinking, which may include:          No solid food while doing the bowel prep. Broths, jello, popsicles may be ingested.     ·  NO MILK, CREAM, OR ORANGE JUICE.  You may have sips of clear fluids up until two-three hours prior to your arrival to the hospital on the morning of surgery      What happens after the procedure?  · You will be given pain medicine as needed.  · Your blood pressure, heart rate, breathing rate, and blood oxygen level will be monitored until the medicines you were given have worn off.  · You will need to stay in the hospital to recover for one to two days.  · You may have a liquid diet at first. You will most likely return to your usual diet the day after surgery.  · You will still have the urinary catheter in place. It will likely be removed the day after surgery.  · You may have to wear compression stockings. These stockings help to prevent blood clots and reduce swelling in your legs.  · You will be encouraged to walk as soon as possible. You will also use a device or do breathing exercises to keep your lungs clear.  · You may need to use a maxi-pad for vaginal discharge/bleeding.      Post Surgical Care Instructions.     • You may be discharged from surgery with an abdominal binder, this is given to you for your comfort only. You do not need to wear it unless you feel that it helps with discomfort after your surgery.   • You will have a large abdominal incision, this will sometimes have glue or staples. If you have staples a one week appointment will be made to have them removed during a nurse visit. Sutures will dissolve on their own and glue will wear off over  time. Please do not pick the glue.   • Keep incisions clean and dry. Do not use antibacterial washes or ointments on your incisions.   • Small amount of clear or pink tinged drainage from incisions is normal.  • You may have light vaginal bleeding and spotting up to 6 weeks after surgery.  • You will need to continue a bowel regimen after surgery to prevent constipation or bowel obstruction. Take your stool softener as prescribed. If you do not produce a bowel movent in 24 hours after surgery take a laxative ( milk of magnesia or miralax). Narcotics cause constipation, please take them as directed, try alternating ibuprofen and tylenol before taking the narcotic ( oxycodone, hydrocodone, etc.).                        Bowel Prep Instructions                 What supplies do I need to prepare in advance?   Obtain the following supplies at your local pharmacy:   · Ondansetron 4 mg tablet- take one tablet an hour prior to bowel prep.  • 4 Dulcolax (bisacodyl) (laxative tablets - each tablet contains 5 mg of bisacodyl (do not get Dulcolax stool softener).   • One bottle of Miralax® (8.3 ounces or 238 grams each)   • Two bottles of clear liquid (32 ounces each): Gatorade        At 9 am take 4 Dulcolax tablets.      Mix entire bottle of Miralax with both bottles of Gatorade, place in the refrigerator to chill.   • Between 12 and 1 pm, drink one 8-ounce glass of the Miralax/Gatorade solution and continue drinking one 8 ounce glass every 15 minutes thereafter until half the mixture (64 ounces) is gone. Set a timer for every 15 minutes to keep pace.   • Stay near a toilet, as you will have diarrhea.               Colon cleansing tips:   1. Stay near a toilet! You will have diarrhea, which can be quite sudden. This is normal.   2. Try drinking the solution with a straw. It may be easier to tolerate.   3. Rarely, people may have nausea or vomiting with the prep. If this occurs, give yourself a 30 -minute break, rinse your mouth  or brush your teeth, then continue drinking the prep solution.   4. You may have bowel cramps until the stool has flushed from your colon (this may take 2 to 4 hours and sometimes much longer).      Anal skin irritation or a flare of hemorrhoid inflammation may occur. If this happens, treat it with over-the-counter-remedies, such as hydrocortisone cream, baby wipes, Vaseline or TUCKS ( witch hazel)                                Bowel Prep Medications.      Dr. Celine Rubio         METRONIDAZOLE 500 MG    To start the day before surgery:   Take 1 tablet by mouth  at 2 pm, 1 at 3 pm, and 1 at 10 pm.      NEOMYCIN 500 MG    To begin the day before surgery:  Take (2) 500 mg  tabs at 2:00pm, take (2) 500 mg tabs at 3:00pm, and take (2) 500 mg tabs at 10:00pm.      ONDANSETRON 4 MG      Take one tablet by mouth at 1 pm and then every 6 hours as needed for nausea.            If you have any questions concerning directions for the bowel prep please call our office at [phone]
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
## 2020-02-07 ENCOUNTER — APPOINTMENT (OUTPATIENT)
Dept: CARDIOLOGY | Facility: HOSPITAL | Age: 45
End: 2020-02-07

## 2020-02-07 ENCOUNTER — TELEPHONE (OUTPATIENT)
Dept: FAMILY MEDICINE CLINIC | Facility: CLINIC | Age: 45
End: 2020-02-07

## 2020-02-07 ENCOUNTER — APPOINTMENT (OUTPATIENT)
Dept: PREADMISSION TESTING | Facility: HOSPITAL | Age: 45
End: 2020-02-07

## 2020-02-07 ENCOUNTER — HOSPITAL ENCOUNTER (OUTPATIENT)
Dept: GENERAL RADIOLOGY | Facility: HOSPITAL | Age: 45
Discharge: HOME | End: 2020-02-07
Admitting: OBSTETRICS & GYNECOLOGY

## 2020-02-07 VITALS — BODY MASS INDEX: 35.31 KG/M2 | WEIGHT: 199.3 LBS | HEIGHT: 63 IN

## 2020-02-07 DIAGNOSIS — C54.1 ENDOMETRIAL CANCER (HCC): ICD-10-CM

## 2020-02-07 DIAGNOSIS — E11.42 TYPE 2 DIABETES MELLITUS WITH DIABETIC POLYNEUROPATHY, WITH LONG-TERM CURRENT USE OF INSULIN (HCC): Primary | ICD-10-CM

## 2020-02-07 DIAGNOSIS — Z79.4 TYPE 2 DIABETES MELLITUS WITH DIABETIC POLYNEUROPATHY, WITH LONG-TERM CURRENT USE OF INSULIN (HCC): Primary | ICD-10-CM

## 2020-02-07 LAB
ABO GROUP BLD: NORMAL
ALBUMIN SERPL-MCNC: 3.4 G/DL (ref 3.5–5.2)
ALBUMIN/GLOB SERPL: 0.8 G/DL
ALP SERPL-CCNC: 107 U/L (ref 39–117)
ALT SERPL W P-5'-P-CCNC: 8 U/L (ref 1–33)
ANION GAP SERPL CALCULATED.3IONS-SCNC: 11 MMOL/L (ref 5–15)
AST SERPL-CCNC: 18 U/L (ref 1–32)
BASOPHILS # BLD AUTO: 0.04 10*3/MM3 (ref 0–0.2)
BASOPHILS NFR BLD AUTO: 0.5 % (ref 0–1.5)
BILIRUB SERPL-MCNC: 0.6 MG/DL (ref 0.2–1.2)
BLD GP AB SCN SERPL QL: NEGATIVE
BUN BLD-MCNC: 13 MG/DL (ref 6–20)
BUN/CREAT SERPL: 11 (ref 7–25)
CALCIUM SPEC-SCNC: 8.9 MG/DL (ref 8.6–10.5)
CHLORIDE SERPL-SCNC: 101 MMOL/L (ref 98–107)
CO2 SERPL-SCNC: 23 MMOL/L (ref 22–29)
CREAT BLD-MCNC: 1.18 MG/DL (ref 0.57–1)
DEPRECATED RDW RBC AUTO: 48.8 FL (ref 37–54)
EOSINOPHIL # BLD AUTO: 0.25 10*3/MM3 (ref 0–0.4)
EOSINOPHIL NFR BLD AUTO: 3.1 % (ref 0.3–6.2)
ERYTHROCYTE [DISTWIDTH] IN BLOOD BY AUTOMATED COUNT: 14.7 % (ref 12.3–15.4)
GFR SERPL CREATININE-BSD FRML MDRD: 49 ML/MIN/1.73
GLOBULIN UR ELPH-MCNC: 4.4 GM/DL
GLUCOSE BLD-MCNC: 252 MG/DL (ref 65–99)
HBA1C MFR BLD: 7.1 % (ref 4.8–5.6)
HCG INTACT+B SERPL-ACNC: <0.5 MIU/ML
HCT VFR BLD AUTO: 27.9 % (ref 34–46.6)
HGB BLD-MCNC: 8.5 G/DL (ref 12–15.9)
IMM GRANULOCYTES # BLD AUTO: 0.04 10*3/MM3 (ref 0–0.05)
IMM GRANULOCYTES NFR BLD AUTO: 0.5 % (ref 0–0.5)
LYMPHOCYTES # BLD AUTO: 1.02 10*3/MM3 (ref 0.7–3.1)
LYMPHOCYTES NFR BLD AUTO: 12.8 % (ref 19.6–45.3)
MCH RBC QN AUTO: 27.5 PG (ref 26.6–33)
MCHC RBC AUTO-ENTMCNC: 30.5 G/DL (ref 31.5–35.7)
MCV RBC AUTO: 90.3 FL (ref 79–97)
MONOCYTES # BLD AUTO: 0.41 10*3/MM3 (ref 0.1–0.9)
MONOCYTES NFR BLD AUTO: 5.1 % (ref 5–12)
NEUTROPHILS # BLD AUTO: 6.24 10*3/MM3 (ref 1.7–7)
NEUTROPHILS NFR BLD AUTO: 78 % (ref 42.7–76)
NRBC BLD AUTO-RTO: 0 /100 WBC (ref 0–0.2)
PLATELET # BLD AUTO: 220 10*3/MM3 (ref 140–450)
PMV BLD AUTO: 10.6 FL (ref 6–12)
POTASSIUM BLD-SCNC: 4.4 MMOL/L (ref 3.5–5.2)
PROT SERPL-MCNC: 7.8 G/DL (ref 6–8.5)
RBC # BLD AUTO: 3.09 10*6/MM3 (ref 3.77–5.28)
RH BLD: POSITIVE
SODIUM BLD-SCNC: 135 MMOL/L (ref 136–145)
T&S EXPIRATION DATE: NORMAL
WBC NRBC COR # BLD: 8 10*3/MM3 (ref 3.4–10.8)

## 2020-02-07 PROCEDURE — 86923 COMPATIBILITY TEST ELECTRIC: CPT

## 2020-02-07 PROCEDURE — 86900 BLOOD TYPING SEROLOGIC ABO: CPT | Performed by: OBSTETRICS & GYNECOLOGY

## 2020-02-07 PROCEDURE — 84702 CHORIONIC GONADOTROPIN TEST: CPT | Performed by: OBSTETRICS & GYNECOLOGY

## 2020-02-07 PROCEDURE — 85025 COMPLETE CBC W/AUTO DIFF WBC: CPT | Performed by: OBSTETRICS & GYNECOLOGY

## 2020-02-07 PROCEDURE — 80053 COMPREHEN METABOLIC PANEL: CPT | Performed by: OBSTETRICS & GYNECOLOGY

## 2020-02-07 PROCEDURE — 83036 HEMOGLOBIN GLYCOSYLATED A1C: CPT | Performed by: OBSTETRICS & GYNECOLOGY

## 2020-02-07 PROCEDURE — 86901 BLOOD TYPING SEROLOGIC RH(D): CPT | Performed by: OBSTETRICS & GYNECOLOGY

## 2020-02-07 PROCEDURE — 36415 COLL VENOUS BLD VENIPUNCTURE: CPT

## 2020-02-07 PROCEDURE — 71046 X-RAY EXAM CHEST 2 VIEWS: CPT

## 2020-02-07 PROCEDURE — 86850 RBC ANTIBODY SCREEN: CPT | Performed by: OBSTETRICS & GYNECOLOGY

## 2020-02-07 RX ORDER — FLASH GLUCOSE SENSOR
KIT MISCELLANEOUS
Qty: 1 EACH | Refills: 1 | Status: SHIPPED | OUTPATIENT
Start: 2020-02-07 | End: 2020-02-12 | Stop reason: SDUPTHER

## 2020-02-07 NOTE — TELEPHONE ENCOUNTER
SENG(PHARMACIST) CALLED IN TO ASK IF ON THE Continuous Blood Gluc Sensor (FREESTYLE CAMMY SENSOR SYSTEM) IF THE PT COULD HAVE THE  14DY KIT PLEASE ADVISE      CB NUMBER: [phone]  [facility]: [facility]  FAX# [phone]

## 2020-02-07 NOTE — PAT
EKG IN CHART FROM 2/25/2020    PATIENT SENT TO RADIOLOGY FOR CXRAY.    Patient to apply Chlorhexadine wipes  to surgical area (as instructed) the night before procedure and the AM of procedure. Wipes provided.    Blood bank bracelet applied to patient during Pre Admission Testing visit.  Patient instructed not to remove from arm until after procedure and they are discharged from the hospital.  Explained to patient that they may shower and get the bracelet wet, but not to immerse under water for longer periods (bathing, swimming, hand dishwashing, etc).  Patient verbalized understanding.

## 2020-02-07 NOTE — TELEPHONE ENCOUNTER
PT CALLED IN REQUESTING ANOTHER Continuous Blood Gluc Sensor (FREESTYLE CAMMY SENSOR SYSTEM BE SENT INTO THE PHARM PT STATES YOU MAY LEAVE A MESSAGE ON VM      PT CB NUMBER: [phone]   [facility]: [facility]  FAX# [phone]

## 2020-02-10 ENCOUNTER — HOSPITAL ENCOUNTER (INPATIENT)
Facility: HOSPITAL | Age: 45
LOS: 1 days | Discharge: HOME | End: 2020-02-11
Attending: OBSTETRICS & GYNECOLOGY | Admitting: OBSTETRICS & GYNECOLOGY

## 2020-02-10 ENCOUNTER — ANESTHESIA (OUTPATIENT)
Dept: PERIOP | Facility: HOSPITAL | Age: 45
End: 2020-02-10

## 2020-02-10 ENCOUNTER — ANESTHESIA EVENT (OUTPATIENT)
Dept: PERIOP | Facility: HOSPITAL | Age: 45
End: 2020-02-10

## 2020-02-10 DIAGNOSIS — C54.1 ENDOMETRIAL CANCER (HCC): ICD-10-CM

## 2020-02-10 LAB
ABO GROUP BLD: NORMAL
B-HCG UR QL: NEGATIVE
GLUCOSE BLDC GLUCOMTR-MCNC: 164 MG/DL (ref 70–130)
GLUCOSE BLDC GLUCOMTR-MCNC: 269 MG/DL (ref 70–130)
GLUCOSE BLDC GLUCOMTR-MCNC: 320 MG/DL (ref 70–130)
INTERNAL NEGATIVE CONTROL: NEGATIVE
INTERNAL POSITIVE CONTROL: POSITIVE
Lab: NORMAL
RH BLD: POSITIVE

## 2020-02-10 PROCEDURE — 25010000002 HYDROMORPHONE PER 4 MG: Performed by: NURSE ANESTHETIST, CERTIFIED REGISTERED

## 2020-02-10 PROCEDURE — 88342 IMHCHEM/IMCYTCHM 1ST ANTB: CPT | Performed by: OBSTETRICS & GYNECOLOGY

## 2020-02-10 PROCEDURE — 81025 URINE PREGNANCY TEST: CPT | Performed by: OBSTETRICS & GYNECOLOGY

## 2020-02-10 PROCEDURE — 25010000002 FENTANYL CITRATE (PF) 100 MCG/2ML SOLUTION: Performed by: NURSE ANESTHETIST, CERTIFIED REGISTERED

## 2020-02-10 PROCEDURE — 25010000002 ONDANSETRON PER 1 MG: Performed by: NURSE ANESTHETIST, CERTIFIED REGISTERED

## 2020-02-10 PROCEDURE — 88331 PATH CONSLTJ SURG 1 BLK 1SPC: CPT | Performed by: PATHOLOGY

## 2020-02-10 PROCEDURE — 0UT70ZZ RESECTION OF BILATERAL FALLOPIAN TUBES, OPEN APPROACH: ICD-10-PCS | Performed by: OBSTETRICS & GYNECOLOGY

## 2020-02-10 PROCEDURE — 25010000002 GENTAMICIN PER 80 MG: Performed by: OBSTETRICS & GYNECOLOGY

## 2020-02-10 PROCEDURE — 88341 IMHCHEM/IMCYTCHM EA ADD ANTB: CPT | Performed by: OBSTETRICS & GYNECOLOGY

## 2020-02-10 PROCEDURE — 88307 TISSUE EXAM BY PATHOLOGIST: CPT | Performed by: OBSTETRICS & GYNECOLOGY

## 2020-02-10 PROCEDURE — 25010000002 PROPOFOL 10 MG/ML EMULSION: Performed by: NURSE ANESTHETIST, CERTIFIED REGISTERED

## 2020-02-10 PROCEDURE — 63710000001 INSULIN LISPRO (HUMAN) PER 5 UNITS: Performed by: OBSTETRICS & GYNECOLOGY

## 2020-02-10 PROCEDURE — 58960 EXPLORATION OF ABDOMEN: CPT | Performed by: OBSTETRICS & GYNECOLOGY

## 2020-02-10 PROCEDURE — 94799 UNLISTED PULMONARY SVC/PX: CPT

## 2020-02-10 PROCEDURE — 0UT90ZZ RESECTION OF UTERUS, OPEN APPROACH: ICD-10-PCS | Performed by: OBSTETRICS & GYNECOLOGY

## 2020-02-10 PROCEDURE — 25010000002 ONDANSETRON PER 1 MG: Performed by: OBSTETRICS & GYNECOLOGY

## 2020-02-10 PROCEDURE — 25010000002 BUPRENORPHINE PER 0.1 MG: Performed by: ANESTHESIOLOGY

## 2020-02-10 PROCEDURE — 82962 GLUCOSE BLOOD TEST: CPT

## 2020-02-10 PROCEDURE — 25010000002 DEXAMETHASONE SODIUM PHOSPHATE 10 MG/ML SOLUTION: Performed by: ANESTHESIOLOGY

## 2020-02-10 PROCEDURE — 86901 BLOOD TYPING SEROLOGIC RH(D): CPT

## 2020-02-10 PROCEDURE — 88360 TUMOR IMMUNOHISTOCHEM/MANUAL: CPT | Performed by: OBSTETRICS & GYNECOLOGY

## 2020-02-10 PROCEDURE — 25010000002 NEOSTIGMINE 10 MG/10ML SOLUTION 10 ML VIAL: Performed by: NURSE ANESTHETIST, CERTIFIED REGISTERED

## 2020-02-10 PROCEDURE — 25010000002 DEXAMETHASONE SODIUM PHOSPHATE 10 MG/ML SOLUTION: Performed by: NURSE ANESTHETIST, CERTIFIED REGISTERED

## 2020-02-10 PROCEDURE — 86900 BLOOD TYPING SEROLOGIC ABO: CPT

## 2020-02-10 PROCEDURE — 88112 CYTOPATH CELL ENHANCE TECH: CPT | Performed by: OBSTETRICS & GYNECOLOGY

## 2020-02-10 PROCEDURE — 88305 TISSUE EXAM BY PATHOLOGIST: CPT | Performed by: OBSTETRICS & GYNECOLOGY

## 2020-02-10 PROCEDURE — 25010000002 HYDROMORPHONE PER 4 MG: Performed by: OBSTETRICS & GYNECOLOGY

## 2020-02-10 PROCEDURE — 58150 TOTAL HYSTERECTOMY: CPT | Performed by: OBSTETRICS & GYNECOLOGY

## 2020-02-10 PROCEDURE — 0UT20ZZ RESECTION OF BILATERAL OVARIES, OPEN APPROACH: ICD-10-PCS | Performed by: OBSTETRICS & GYNECOLOGY

## 2020-02-10 PROCEDURE — 0DBU0ZZ EXCISION OF OMENTUM, OPEN APPROACH: ICD-10-PCS | Performed by: OBSTETRICS & GYNECOLOGY

## 2020-02-10 RX ORDER — MEPERIDINE HYDROCHLORIDE 25 MG/ML
12.5 INJECTION INTRAMUSCULAR; INTRAVENOUS; SUBCUTANEOUS
Status: DISCONTINUED | OUTPATIENT
Start: 2020-02-10 | End: 2020-02-10 | Stop reason: HOSPADM

## 2020-02-10 RX ORDER — OXYCODONE HYDROCHLORIDE AND ACETAMINOPHEN 5; 325 MG/1; MG/1
1 TABLET ORAL ONCE AS NEEDED
Status: DISCONTINUED | OUTPATIENT
Start: 2020-02-10 | End: 2020-02-10 | Stop reason: HOSPADM

## 2020-02-10 RX ORDER — BUPRENORPHINE HYDROCHLORIDE 0.32 MG/ML
INJECTION INTRAMUSCULAR; INTRAVENOUS
Status: COMPLETED | OUTPATIENT
Start: 2020-02-10 | End: 2020-02-10

## 2020-02-10 RX ORDER — ACETAMINOPHEN 325 MG/1
650 TABLET ORAL
Status: COMPLETED | OUTPATIENT
Start: 2020-02-10 | End: 2020-02-10

## 2020-02-10 RX ORDER — ONDANSETRON 2 MG/ML
4 INJECTION INTRAMUSCULAR; INTRAVENOUS EVERY 6 HOURS PRN
Status: DISCONTINUED | OUTPATIENT
Start: 2020-02-10 | End: 2020-02-11 | Stop reason: HOSPADM

## 2020-02-10 RX ORDER — PROMETHAZINE HYDROCHLORIDE 25 MG/1
25 SUPPOSITORY RECTAL ONCE AS NEEDED
Status: DISCONTINUED | OUTPATIENT
Start: 2020-02-10 | End: 2020-02-10 | Stop reason: HOSPADM

## 2020-02-10 RX ORDER — FENTANYL CITRATE 50 UG/ML
INJECTION, SOLUTION INTRAMUSCULAR; INTRAVENOUS AS NEEDED
Status: DISCONTINUED | OUTPATIENT
Start: 2020-02-10 | End: 2020-02-10 | Stop reason: SURG

## 2020-02-10 RX ORDER — ONDANSETRON 4 MG/1
4 TABLET, FILM COATED ORAL EVERY 6 HOURS PRN
Status: DISCONTINUED | OUTPATIENT
Start: 2020-02-10 | End: 2020-02-11 | Stop reason: HOSPADM

## 2020-02-10 RX ORDER — OXYCODONE HYDROCHLORIDE 5 MG/1
5 TABLET ORAL EVERY 4 HOURS PRN
Status: DISCONTINUED | OUTPATIENT
Start: 2020-02-10 | End: 2020-02-11 | Stop reason: HOSPADM

## 2020-02-10 RX ORDER — ACETAMINOPHEN 325 MG/1
650 TABLET ORAL EVERY 6 HOURS SCHEDULED
Status: DISCONTINUED | OUTPATIENT
Start: 2020-02-10 | End: 2020-02-11 | Stop reason: HOSPADM

## 2020-02-10 RX ORDER — HYDROMORPHONE HYDROCHLORIDE 1 MG/ML
0.5 INJECTION, SOLUTION INTRAMUSCULAR; INTRAVENOUS; SUBCUTANEOUS
Status: DISCONTINUED | OUTPATIENT
Start: 2020-02-10 | End: 2020-02-10 | Stop reason: HOSPADM

## 2020-02-10 RX ORDER — ONDANSETRON 2 MG/ML
4 INJECTION INTRAMUSCULAR; INTRAVENOUS ONCE AS NEEDED
Status: DISCONTINUED | OUTPATIENT
Start: 2020-02-10 | End: 2020-02-10 | Stop reason: HOSPADM

## 2020-02-10 RX ORDER — HYDROMORPHONE HYDROCHLORIDE 1 MG/ML
0.5 INJECTION, SOLUTION INTRAMUSCULAR; INTRAVENOUS; SUBCUTANEOUS
Status: DISCONTINUED | OUTPATIENT
Start: 2020-02-10 | End: 2020-02-11 | Stop reason: HOSPADM

## 2020-02-10 RX ORDER — BUPIVACAINE HYDROCHLORIDE 2.5 MG/ML
INJECTION, SOLUTION EPIDURAL; INFILTRATION; INTRACAUDAL
Status: COMPLETED | OUTPATIENT
Start: 2020-02-10 | End: 2020-02-10

## 2020-02-10 RX ORDER — ACETAMINOPHEN 325 MG/1
650 TABLET ORAL ONCE AS NEEDED
Status: DISCONTINUED | OUTPATIENT
Start: 2020-02-10 | End: 2020-02-10 | Stop reason: HOSPADM

## 2020-02-10 RX ORDER — PROMETHAZINE HYDROCHLORIDE 25 MG/1
25 TABLET ORAL ONCE AS NEEDED
Status: DISCONTINUED | OUTPATIENT
Start: 2020-02-10 | End: 2020-02-10 | Stop reason: HOSPADM

## 2020-02-10 RX ORDER — SODIUM CHLORIDE, SODIUM LACTATE, POTASSIUM CHLORIDE, CALCIUM CHLORIDE 600; 310; 30; 20 MG/100ML; MG/100ML; MG/100ML; MG/100ML
75 INJECTION, SOLUTION INTRAVENOUS CONTINUOUS
Status: DISCONTINUED | OUTPATIENT
Start: 2020-02-10 | End: 2020-02-11 | Stop reason: HOSPADM

## 2020-02-10 RX ORDER — HYDROXYZINE HYDROCHLORIDE 25 MG/1
25 TABLET, FILM COATED ORAL 3 TIMES DAILY PRN
Status: DISCONTINUED | OUTPATIENT
Start: 2020-02-10 | End: 2020-02-11 | Stop reason: HOSPADM

## 2020-02-10 RX ORDER — IPRATROPIUM BROMIDE AND ALBUTEROL SULFATE 2.5; .5 MG/3ML; MG/3ML
3 SOLUTION RESPIRATORY (INHALATION) ONCE AS NEEDED
Status: DISCONTINUED | OUTPATIENT
Start: 2020-02-10 | End: 2020-02-10 | Stop reason: HOSPADM

## 2020-02-10 RX ORDER — FENTANYL CITRATE 50 UG/ML
50 INJECTION, SOLUTION INTRAMUSCULAR; INTRAVENOUS
Status: DISCONTINUED | OUTPATIENT
Start: 2020-02-10 | End: 2020-02-10 | Stop reason: HOSPADM

## 2020-02-10 RX ORDER — NALOXONE HCL 0.4 MG/ML
0.1 VIAL (ML) INJECTION
Status: DISCONTINUED | OUTPATIENT
Start: 2020-02-10 | End: 2020-02-11 | Stop reason: HOSPADM

## 2020-02-10 RX ORDER — FAMOTIDINE 20 MG/1
20 TABLET, FILM COATED ORAL
Status: COMPLETED | OUTPATIENT
Start: 2020-02-10 | End: 2020-02-10

## 2020-02-10 RX ORDER — LIDOCAINE HYDROCHLORIDE 10 MG/ML
INJECTION, SOLUTION EPIDURAL; INFILTRATION; INTRACAUDAL; PERINEURAL AS NEEDED
Status: DISCONTINUED | OUTPATIENT
Start: 2020-02-10 | End: 2020-02-10 | Stop reason: SURG

## 2020-02-10 RX ORDER — GLYCOPYRROLATE 0.2 MG/ML
INJECTION INTRAMUSCULAR; INTRAVENOUS AS NEEDED
Status: DISCONTINUED | OUTPATIENT
Start: 2020-02-10 | End: 2020-02-10 | Stop reason: SURG

## 2020-02-10 RX ORDER — ACETAMINOPHEN 650 MG/1
650 SUPPOSITORY RECTAL ONCE AS NEEDED
Status: DISCONTINUED | OUTPATIENT
Start: 2020-02-10 | End: 2020-02-10 | Stop reason: HOSPADM

## 2020-02-10 RX ORDER — PROPOFOL 10 MG/ML
VIAL (ML) INTRAVENOUS AS NEEDED
Status: DISCONTINUED | OUTPATIENT
Start: 2020-02-10 | End: 2020-02-10 | Stop reason: SURG

## 2020-02-10 RX ORDER — PROMETHAZINE HYDROCHLORIDE 25 MG/ML
6.25 INJECTION, SOLUTION INTRAMUSCULAR; INTRAVENOUS ONCE AS NEEDED
Status: DISCONTINUED | OUTPATIENT
Start: 2020-02-10 | End: 2020-02-10 | Stop reason: HOSPADM

## 2020-02-10 RX ORDER — DEXTROSE MONOHYDRATE 25 G/50ML
25 INJECTION, SOLUTION INTRAVENOUS
Status: DISCONTINUED | OUTPATIENT
Start: 2020-02-10 | End: 2020-02-11 | Stop reason: HOSPADM

## 2020-02-10 RX ORDER — OXYCODONE AND ACETAMINOPHEN 7.5; 325 MG/1; MG/1
2 TABLET ORAL EVERY 4 HOURS PRN
Status: DISCONTINUED | OUTPATIENT
Start: 2020-02-10 | End: 2020-02-10 | Stop reason: HOSPADM

## 2020-02-10 RX ORDER — SIMETHICONE 80 MG
80 TABLET,CHEWABLE ORAL 4 TIMES DAILY PRN
Status: DISCONTINUED | OUTPATIENT
Start: 2020-02-10 | End: 2020-02-11 | Stop reason: HOSPADM

## 2020-02-10 RX ORDER — NEBIVOLOL 5 MG/1
5 TABLET ORAL DAILY
Status: DISCONTINUED | OUTPATIENT
Start: 2020-02-11 | End: 2020-02-11 | Stop reason: HOSPADM

## 2020-02-10 RX ORDER — ROCURONIUM BROMIDE 10 MG/ML
INJECTION, SOLUTION INTRAVENOUS AS NEEDED
Status: DISCONTINUED | OUTPATIENT
Start: 2020-02-10 | End: 2020-02-10 | Stop reason: SURG

## 2020-02-10 RX ORDER — DEXAMETHASONE SODIUM PHOSPHATE 10 MG/ML
INJECTION, SOLUTION INTRAMUSCULAR; INTRAVENOUS
Status: COMPLETED | OUTPATIENT
Start: 2020-02-10 | End: 2020-02-10

## 2020-02-10 RX ORDER — MAGNESIUM HYDROXIDE 1200 MG/15ML
LIQUID ORAL AS NEEDED
Status: DISCONTINUED | OUTPATIENT
Start: 2020-02-10 | End: 2020-02-10 | Stop reason: HOSPADM

## 2020-02-10 RX ORDER — OMEPRAZOLE 20 MG/1
20 CAPSULE, DELAYED RELEASE ORAL DAILY
Status: DISCONTINUED | OUTPATIENT
Start: 2020-02-10 | End: 2020-02-11 | Stop reason: HOSPADM

## 2020-02-10 RX ORDER — IBUPROFEN 600 MG/1
600 TABLET ORAL EVERY 6 HOURS PRN
Status: DISCONTINUED | OUTPATIENT
Start: 2020-02-10 | End: 2020-02-11 | Stop reason: HOSPADM

## 2020-02-10 RX ORDER — DEXAMETHASONE SODIUM PHOSPHATE 10 MG/ML
INJECTION, SOLUTION INTRAMUSCULAR; INTRAVENOUS AS NEEDED
Status: DISCONTINUED | OUTPATIENT
Start: 2020-02-10 | End: 2020-02-10 | Stop reason: SURG

## 2020-02-10 RX ORDER — LIDOCAINE HYDROCHLORIDE 10 MG/ML
0.5 INJECTION, SOLUTION EPIDURAL; INFILTRATION; INTRACAUDAL; PERINEURAL ONCE AS NEEDED
Status: COMPLETED | OUTPATIENT
Start: 2020-02-10 | End: 2020-02-10

## 2020-02-10 RX ORDER — PREGABALIN 100 MG/1
100 CAPSULE ORAL EVERY 8 HOURS SCHEDULED
Status: DISCONTINUED | OUTPATIENT
Start: 2020-02-10 | End: 2020-02-11 | Stop reason: HOSPADM

## 2020-02-10 RX ORDER — NEOSTIGMINE METHYLSULFATE 1 MG/ML
INJECTION, SOLUTION INTRAVENOUS AS NEEDED
Status: DISCONTINUED | OUTPATIENT
Start: 2020-02-10 | End: 2020-02-10 | Stop reason: SURG

## 2020-02-10 RX ORDER — CLINDAMYCIN PHOSPHATE 900 MG/50ML
900 INJECTION INTRAVENOUS ONCE
Status: COMPLETED | OUTPATIENT
Start: 2020-02-10 | End: 2020-02-10

## 2020-02-10 RX ORDER — NICOTINE POLACRILEX 4 MG
15 LOZENGE BUCCAL
Status: DISCONTINUED | OUTPATIENT
Start: 2020-02-10 | End: 2020-02-11 | Stop reason: HOSPADM

## 2020-02-10 RX ORDER — ONDANSETRON 2 MG/ML
INJECTION INTRAMUSCULAR; INTRAVENOUS AS NEEDED
Status: DISCONTINUED | OUTPATIENT
Start: 2020-02-10 | End: 2020-02-10 | Stop reason: SURG

## 2020-02-10 RX ORDER — SODIUM CHLORIDE, SODIUM LACTATE, POTASSIUM CHLORIDE, CALCIUM CHLORIDE 600; 310; 30; 20 MG/100ML; MG/100ML; MG/100ML; MG/100ML
9 INJECTION, SOLUTION INTRAVENOUS CONTINUOUS PRN
Status: DISCONTINUED | OUTPATIENT
Start: 2020-02-10 | End: 2020-02-10 | Stop reason: HOSPADM

## 2020-02-10 RX ADMIN — ACETAMINOPHEN 650 MG: 325 TABLET ORAL at 11:12

## 2020-02-10 RX ADMIN — OXYCODONE HYDROCHLORIDE 5 MG: 5 TABLET ORAL at 21:50

## 2020-02-10 RX ADMIN — CLINDAMYCIN PHOSPHATE 900 MG: 18 INJECTION, SOLUTION INTRAVENOUS at 13:02

## 2020-02-10 RX ADMIN — PROPOFOL 200 MG: 10 INJECTION, EMULSION INTRAVENOUS at 13:09

## 2020-02-10 RX ADMIN — GENTAMICIN SULFATE 200 MG: 40 INJECTION, SOLUTION INTRAMUSCULAR; INTRAVENOUS at 13:23

## 2020-02-10 RX ADMIN — ACETAMINOPHEN 650 MG: 325 TABLET, FILM COATED ORAL at 17:27

## 2020-02-10 RX ADMIN — HYDROMORPHONE HYDROCHLORIDE 0.5 MG: 1 INJECTION, SOLUTION INTRAMUSCULAR; INTRAVENOUS; SUBCUTANEOUS at 18:47

## 2020-02-10 RX ADMIN — LIDOCAINE HYDROCHLORIDE 0.5 ML: 10 INJECTION, SOLUTION EPIDURAL; INFILTRATION; INTRACAUDAL; PERINEURAL at 11:12

## 2020-02-10 RX ADMIN — DEXAMETHASONE SODIUM PHOSPHATE 8 MG: 10 INJECTION INTRAMUSCULAR; INTRAVENOUS at 13:30

## 2020-02-10 RX ADMIN — NEOSTIGMINE METHYLSULFATE 3 MG: 1 INJECTION, SOLUTION INTRAVENOUS at 16:00

## 2020-02-10 RX ADMIN — EPHEDRINE SULFATE 10 MG: 50 INJECTION INTRAMUSCULAR; INTRAVENOUS; SUBCUTANEOUS at 15:13

## 2020-02-10 RX ADMIN — DEXAMETHASONE SODIUM PHOSPHATE 4 MG: 10 INJECTION INTRAMUSCULAR; INTRAVENOUS at 13:15

## 2020-02-10 RX ADMIN — ROCURONIUM BROMIDE 50 MG: 10 INJECTION INTRAVENOUS at 13:09

## 2020-02-10 RX ADMIN — HYDROMORPHONE HYDROCHLORIDE 0.5 MG: 1 INJECTION, SOLUTION INTRAMUSCULAR; INTRAVENOUS; SUBCUTANEOUS at 16:25

## 2020-02-10 RX ADMIN — LIDOCAINE HYDROCHLORIDE 50 MG: 10 INJECTION, SOLUTION EPIDURAL; INFILTRATION; INTRACAUDAL; PERINEURAL at 13:09

## 2020-02-10 RX ADMIN — SODIUM CHLORIDE, POTASSIUM CHLORIDE, SODIUM LACTATE AND CALCIUM CHLORIDE 9 ML/HR: 600; 310; 30; 20 INJECTION, SOLUTION INTRAVENOUS at 11:12

## 2020-02-10 RX ADMIN — PREGABALIN 100 MG: 100 CAPSULE ORAL at 21:40

## 2020-02-10 RX ADMIN — SODIUM CHLORIDE, POTASSIUM CHLORIDE, SODIUM LACTATE AND CALCIUM CHLORIDE 75 ML/HR: 600; 310; 30; 20 INJECTION, SOLUTION INTRAVENOUS at 17:28

## 2020-02-10 RX ADMIN — ROCURONIUM BROMIDE 20 MG: 10 INJECTION INTRAVENOUS at 13:49

## 2020-02-10 RX ADMIN — FAMOTIDINE 20 MG: 20 TABLET ORAL at 11:12

## 2020-02-10 RX ADMIN — EPHEDRINE SULFATE 10 MG: 50 INJECTION INTRAMUSCULAR; INTRAVENOUS; SUBCUTANEOUS at 13:59

## 2020-02-10 RX ADMIN — ONDANSETRON 4 MG: 2 INJECTION INTRAMUSCULAR; INTRAVENOUS at 17:27

## 2020-02-10 RX ADMIN — SODIUM CHLORIDE, POTASSIUM CHLORIDE, SODIUM LACTATE AND CALCIUM CHLORIDE: 600; 310; 30; 20 INJECTION, SOLUTION INTRAVENOUS at 13:57

## 2020-02-10 RX ADMIN — BUPRENORPHINE HYDROCHLORIDE 0.3 MG: 0.32 INJECTION INTRAMUSCULAR; INTRAVENOUS at 13:15

## 2020-02-10 RX ADMIN — GLYCOPYRROLATE 0.4 MG: 0.2 INJECTION, SOLUTION INTRAMUSCULAR; INTRAVENOUS at 16:00

## 2020-02-10 RX ADMIN — PROPOFOL 25 MCG/KG/MIN: 10 INJECTION, EMULSION INTRAVENOUS at 13:19

## 2020-02-10 RX ADMIN — ONDANSETRON 4 MG: 2 INJECTION INTRAMUSCULAR; INTRAVENOUS at 16:00

## 2020-02-10 RX ADMIN — FENTANYL CITRATE 100 MCG: 50 INJECTION, SOLUTION INTRAMUSCULAR; INTRAVENOUS at 13:09

## 2020-02-10 RX ADMIN — BUPIVACAINE HYDROCHLORIDE 60 ML: 2.5 INJECTION, SOLUTION EPIDURAL; INFILTRATION; INTRACAUDAL; PERINEURAL at 13:15

## 2020-02-10 RX ADMIN — ROCURONIUM BROMIDE 10 MG: 10 INJECTION INTRAVENOUS at 14:23

## 2020-02-10 RX ADMIN — INSULIN LISPRO 7 UNITS: 100 INJECTION, SOLUTION INTRAVENOUS; SUBCUTANEOUS at 21:50

## 2020-02-10 NOTE — ANESTHESIA POSTPROCEDURE EVALUATION
Patient: Jeana Chung    Procedure Summary     Date:  02/10/20 [facility] / [facility]:   [facility]    Anesthesia Start:  1302 Anesthesia Stop:      Procedure:  EXPLORATORY LAPAROTOMY, TOTAL ABDOMINAL HYSTERECTOMY, BILATERAL SALPINGO-OOPHORECTOMY WITH STAGING, LYMPH NODE DISSECTION (N/A Abdomen) Diagnosis:       Endometrial cancer (CMS/HCC)      (Endometrial cancer (CMS/HCC) [C54.1])    Surgeon:  Celine Rubio MD Provider:  Mikie Ma MD    Anesthesia Type:  general with block ASA Status:  3          Anesthesia Type: general with block    Vitals  No vitals data found for the desired time range.          Post Anesthesia Care and Evaluation    Patient location during evaluation: PACU  Patient participation: complete - patient participated  Level of consciousness: awake  Pain score: 0  Pain management: adequate  Airway patency: patent  Anesthetic complications: No anesthetic complications  PONV Status: none  Cardiovascular status: acceptable and stable  Respiratory status: nasal cannula, unassisted, acceptable and spontaneous ventilation  Hydration status: acceptable

## 2020-02-10 NOTE — ANESTHESIA PREPROCEDURE EVALUATION
Anesthesia Evaluation     Patient summary reviewed and Nursing notes reviewed   NPO Solid Status: > 8 hours  NPO Liquid Status: > 2 hours           Airway   Mallampati: I  TM distance: >3 FB  Neck ROM: full  No difficulty expected  Dental    (+) edentulous    Pulmonary    (+) pneumonia (CXR NAD now ) resolved , a smoker (2000) Former,   (-) COPD, asthma, shortness of breath, recent URI  Cardiovascular     ECG reviewed  Patient on routine beta blocker    (+) hypertension, valvular problems/murmurs murmur,   (-) past MI, CAD, dysrhythmias    ROS comment: ECG SR Possible left atrial and ventricle hypertrophy    Neuro/Psych  (+) numbness, psychiatric history Depression and Anxiety,     (-) seizures, CVA  GI/Hepatic/Renal/Endo    (+)  GERD,  liver disease (spelnomegaly ), diabetes mellitus (BSL >200 A1C >7 ) type 2 poorly controlled,   (-) no renal disease    Musculoskeletal     (+) back pain,       ROS comment: SP necrotising fasciitis in feet  Abdominal    Substance History      OB/GYN      Comment: PCOS       Other      history of cancer    ROS/Med Hx Other: Oxycodone   HCT 28   A1C >7   Creat 1.2                Anesthesia Plan    ASA 3     general with block   (TAPblocks , Propofol infusion,  Opiate sparing  )  intravenous induction     Anesthetic plan, all risks, benefits, and alternatives have been provided, discussed and informed consent has been obtained with: patient.    Plan discussed with CRNA.

## 2020-02-10 NOTE — ANESTHESIA PROCEDURE NOTES
Peripheral Block      Patient reassessed immediately prior to procedure    Patient location during procedure: OR  Start time: 2/10/2020 1:10 PM  Stop time: 2/10/2020 1:15 PM  Reason for block: at surgeon's request and post-op pain management  Performed by  Anesthesiologist: Mikie Ma MD  Preanesthetic Checklist  Completed: patient identified, site marked, surgical consent, pre-op evaluation, timeout performed, IV checked, risks and benefits discussed and monitors and equipment checked  Prep:  Pt Position: supine  Sterile barriers:cap, gloves, sterile barriers and mask  Prep: ChloraPrep  Patient monitoring: blood pressure monitoring, continuous pulse oximetry and EKG  Procedure  Sedation:yes  Performed under: general  Guidance:ultrasound guided  Images:still images obtained, printed/placed on chart    Laterality:Bilateral  Block Type:TAP  Injection Technique:single-shot  Needle Type:short-bevel and echogenic  Needle Gauge:20 G  Resistance on Injection: none    Medications Used: buprenorphine (BUPRENEX) injection, 0.3 mg  dexamethasone sodium phosphate injection, 4 mg  bupivacaine PF (MARCAINE) 0.25 % injection, 60 mL      Medications  Comment:Block Injection:  LA dose divided between Right and Left block        Post Assessment  Injection Assessment: negative aspiration for heme, incremental injection and no paresthesia on injection  Patient Tolerance:comfortable throughout block  Complications:no  Additional Notes      Under Ultrasound guidance, a BBraun 4inch 360 degree needle was advanced with Normal Saline hydro dissection of tissue.  The Internal Oblique and Transversus Abdominus muscles where visualized.  At or before the aponeurosis of Internal Oblique, local anesthetic spread was visualized in the Transversus Abdominus Plane. Injection was made incrementally with aspiration every 5 mls.  There was no  intravascular injection,  injection pressure was normal, there was no neural injection, and the  procedure was completed without difficulty.  Thank You.

## 2020-02-10 NOTE — INTERVAL H&P NOTE
Pre-Op H&P (See Recent Office Note Attached for Full H&P)    Review of Systems:  General ROS:  no fever, chills, rashes, No change since last office visit  Cardiovascular ROS: no chest pain or dyspnea on exertion  Respiratory ROS: no cough, shortness of breath, or wheezing    Meds:    No current facility-administered medications on file prior to encounter.      Current Outpatient Medications on File Prior to Encounter   Medication Sig Dispense Refill   • bisacodyl (DULCOLAX) 5 MG EC tablet Take 4 tablets at 9 am by mouth the day before surgery. 4 tablet 0   • cholecalciferol (VITAMIN D3) 1.25 MG (21933 UT) capsule Take 1 capsule by mouth 1 (One) Time Per Week. (Patient taking differently: Take 50,000 Units by mouth 1 (One) Time Per Week. SATURDAY) 12 capsule 3   • ferrous sulfate 325 (65 FE) MG tablet Take 325 mg by mouth Daily.     • hydrOXYzine pamoate (VISTARIL) 25 MG capsule Take 1 capsule by mouth 3 (Three) Times a Day As Needed for Itching. 30 capsule 1   • insulin glargine (LANTUS) 100 UNIT/ML injection Inject 40 Units under the skin into the appropriate area as directed Every Night.     • insulin lispro (HUMALOG) 100 UNIT/ML injection Inject 15 Units under the skin into the appropriate area as directed 3 (Three) Times a Day Before Meals. (Patient taking differently: Inject  under the skin into the appropriate area as directed 3 (Three) Times a Day Before Meals. SLIDING SCALE) 13.5 mL 5   • lisinopril (PRINIVIL,ZESTRIL) 5 MG tablet Take 5 mg by mouth Daily.     • metroNIDAZOLE (FLAGYL) 500 MG tablet To start the day before surgery:   Take 1 tablet by mouth at 2 pm, 1 tablet at 3 pm, and 1 tablet  at 10 pm 3 tablet 0   • MULTIPLE VITAMIN PO Multiple Vitamin     • nebivolol (BYSTOLIC) 5 MG tablet Take 5 mg by mouth Daily.     • neomycin (MYCIFRADIN) 500 MG tablet To begin the day before surgery:  Take (2) 500 mg  tabs at 2:00pm, take (2) 500 mg tabs at 3:00pm, and take (2) 500 mg tabs at 10:00pm. 6 tablet 0   •  "Omeprazole (PRILOSEC PO) Take 20 mg by mouth Daily.     • ondansetron (ZOFRAN) 4 MG tablet Take 1 tablet by mouth Every 8 (Eight) Hours As Needed for Nausea or Vomiting. 30 tablet 5   • oxyCODONE (ROXICODONE) 5 MG immediate release tablet Take 1 tablet by mouth Every 6 (Six) Hours As Needed for Severe Pain . 30 tablet 0   • polyethylene glycol (MIRALAX) powder powder Mix entire bottle of miralax with 2 (32 ounce) bottle of gatorade. Chill. Start drinking at 12 pm and finish by 1 pm. 1 each 0   • pregabalin (LYRICA) 100 MG capsule Take 100 mg by mouth 3 (Three) Times a Day.     • [DISCONTINUED] LORazepam (ATIVAN) 0.5 MG tablet Take 0.5 mg by mouth Every 8 (Eight) Hours As Needed for Anxiety.         Vital Signs:  /61 (BP Location: Right arm, Patient Position: Lying)   Pulse 80   Temp 96.9 °F (36.1 °C) (Tympanic)   Resp 18   Ht 158.8 cm (62.5\")   Wt 90.3 kg (199 lb)   LMP  (LMP Unknown)   SpO2 100%   BMI 35.82 kg/m²     Physical Exam:    CV:  S1S2 regular rate and rhythm, no murmur               Resp:  Clear to auscultation; respirations regular, even and unlabored    Results Review:     Lab Results   Component Value Date    WBC 8.00 02/07/2020    HGB 8.5 (L) 02/07/2020    HCT 27.9 (L) 02/07/2020    MCV 90.3 02/07/2020     02/07/2020    NEUTROABS 6.24 02/07/2020    GLUCOSE 252 (H) 02/07/2020    BUN 13 02/07/2020    CREATININE 1.18 (H) 02/07/2020    EGFRIFNONA 49 (L) 02/07/2020     (L) 02/07/2020    K 4.4 02/07/2020     02/07/2020    CO2 23.0 02/07/2020    CALCIUM 8.9 02/07/2020    ALBUMIN 3.40 (L) 02/07/2020    AST 18 02/07/2020    ALT 8 02/07/2020    BILITOT 0.6 02/07/2020        I reviewed the patient's new clinical results.    Cancer Staging (if applicable)  Cancer Patient: _x_ yes __no __unknown; If yes, clinical stage T:__ N:__M:__, stage group or __N/A    Assessment/Plan:    This is a 44 y.o. woman who presents with abdominal pelvic pain and cramping, newly diagnosed endometrial " cancer, and nausea.    Endometrial cancer  CT images were reviewed with patient and her mother.  This is at least a clinical stage II (T2 N0 M0) endometrioid adenocarcinoma.  Although the microscopic description for the pathology notes some papillary architecture, no grade is noted.  This is obviously a locally aggressive cancer.  We discussed the patient will need adjuvant treatment of some nature and that this could be radiation only or could be a combination of chemotherapy plus radiation.  Patient understands that final decisions regarding treatment plan will depend on final pathology report.  They understand that due to the very large size of the uterus and GI symptoms with change in bowel function that there is a possibility of intestinal involvement with the cancer.    Patient was consented for exploratory laparotomy, total abdominal hysterectomy, bilateral salpingo-oophorectomy, debulking/lymph node dissection, possible intestinal resection.      Risks and benefits of surgery were discussed.  This included, but was not limited to, infection and bleeding like when the skin is cut; damage to surrounding structures; and incisional complications.  Risk of DVT was addressed for major surgeries.  Standard of care efforts to minimize these risks were reviewed.  Typical hospital stay and recovery were discussed as well as post-procedure precautions.  Surgical implications of chronic illnesses on recovery and surgical outcome were reviewed.     Pain medication regimen for postoperative care was discussed.  Typical regimen and avoidance of narcotics was discussed.  Patient was educated that other factors, such as existing narcotic use, can impact postoperative pain management.      Risks and benefits of lymph node dissection were further discussed.  This included lymphocyst, hematoma, lymphedema, vascular injury, and nerve injury.  We also discussed risks of intestinal resection such as anastomotic leak and that this  was a life-threatening complication should occur.    Yvette Briceño, APRN  2/10/2020   11:58 AM    I saw and evaluated the patient. I agree with the findings and the plan of care as documented in the note.    Celine Rubio MD  02/10/20  12:51 PM

## 2020-02-10 NOTE — OP NOTE
Subjective     Date of Service:  02/10/20  Time of Service:  4:51 PM    Surgical Staff: Surgeon(s) and Role:     * Celine Rubio MD - Primary     * Earnestine Renteria MD - Resident - Assisting   Additional Staff:    Pre-operative diagnosis(es): Pre-Op Diagnosis Codes:     * Endometrial cancer (CMS/HCC) [C54.1]   Post-operative diagnosis(es): Post-Op Diagnosis Codes:     * Endometrial cancer (CMS/HCC) [C54.1]   Procedure(s): Procedure(s):  EXPLORATORY LAPAROTOMY, TOTAL ABDOMINAL HYSTERECTOMY, BILATERAL SALPINGO-OOPHORECTOMY WITH OPTIMAL  STAGING (R-0), OMENTECTOMY     Antibiotics: gentamycin (Garamycin) and clindamycin ordered on call to OR     Anesthesia: Type: General with Block  ASA:  III     Objective      Operative findings:  At the time of surgery, there is a massively enlarged uterus.  This extended to almost the level of the umbilicus.  There is a right ovarian mass which on frozen section contained malignancy.  The tumor at the uterus ruptured on the field.  There is a nodule at the right paracolic gutter thought to be incarcerated adipose tissue.  Liver was smooth and small.  Spleen was large and mobile.  There was no palpable adenopathy or visible omental disease.  There is trace ascites encountered at the initiation of the procedure.  At the completion of the surgery, no gross residual tumor remained.   Specimens removed: ID Type Source Tests Collected by Time   A (Not marked as sent) : PELVIC WASHINGS FOR CYTOLOGY Body Fluid Pelvis NON-GYNECOLOGIC CYTOLOGY Celine Rubio MD 2/10/2020 1341   B (Not marked as sent) :  Tissue Uterus with Cervix TISSUE PATHOLOGY EXAM Celine Rubio MD 2/10/2020 1446   C (Not marked as sent) : Vaginal Stitch New Margin Tissue Vagina TISSUE PATHOLOGY EXAM Celine Rubio MD 2/10/2020 1449   D (Not marked as sent) : RIGHT TUBE AND OVARY FOR FROZEN Tissue Ovary, Right with Fallopian Tube TISSUE PATHOLOGY EXAM Celine Rubio MD 2/10/2020 1456   E (Not marked as  sent) : RIGHT PARACOLIC GUTTER Tissue Peritoneum TISSUE PATHOLOGY EXAM Celine Rubio MD 2/10/2020 1504   F (Not marked as sent) :  Tissue Ovary, Left with Fallopian Tube TISSUE PATHOLOGY EXAM Celine Rubio MD 2/10/2020 1506   G (Not marked as sent) :  Tissue Omentum TISSUE PATHOLOGY EXAM Celine Rubio MD 2/10/2020 1514   H (Not marked as sent) : RIGHT UTERINE VESSEL Tissue Uterus TISSUE PATHOLOGY EXAM Celine Rubio MD 2/10/2020 1517      Fluid Intake:    Output:   I/O this shift:  In: 1000 [I.V.:1000]  Out: 100 [Blood:100]     Blood products used: No   Drains: Urethral Catheter Silicone 16 Fr. (Active)   Site Assessment Clean 2/10/2020  4:40 PM   Collection Container Standard drainage bag 2/10/2020  4:40 PM   Securement Method Securing device 2/10/2020  4:40 PM      Implant Information: Nothing was implanted during the procedure   Complications: No immediate   Intraoperative consult(s):    Condition: stable   Disposition: to PACU and then admit to  medical - surgical floor       Indications:    Patient is a pleasant 44-year-old woman who was noted to have cancer at the cervix.  On biopsy, this was an endometrial cancer.  Plan was made for surgical resection after obtaining a CT scanning of chest abdomen and pelvis.  Risks benefits were discussed.  Consent was signed and on chart.    Procedure:    After obtaining informed consent, patient was taken to the operating room and underwent general endotracheal anesthesia after patient and site verification.  Regional block was performed by anesthesia team.  Feet were placed in Maged stirrups.  Abdomen, perineum, and vagina prepped and draped in the usual sterile fashion.  Reyna catheter was anchored.  Vertical midline incision was made from the pubic symphysis to the lateral aspect of the umbilicus.  Abdomen was entered without difficulty.  The above findings were noted.  Bookwalter self-retaining retractor was placed.  Moist laparotomy sponges were  used to pack the viscera away from the pelvis.  Uterus was partially everted from the abdomen in order to facilitate exposure.  Utero-ovarian ligaments were isolated from surrounding structures and pedicles were created using right angle clamps, Ace harmonic, and 0 Vicryl ties.  Round ligaments were divided in a similar fashion.  Peritoneum at the anterior posterior leaves of the broad ligaments were divided using Bovie electrocautery.  Bladder flap was developed.  Uterine vessels were isolated from surrounding structures.  Pedicles were created using right angle clamps, Ace robotic, and 0 Vicryl suture.  Working in alternating fashion, straight clamps, Ace harmonic, and 0 Vicryl suture were used to create pedicles until level distal to the cervix was reached.  On the right, tumor was encountered at the area of the cardinal ligament.  Every effort was made to contain tumor as much as possible.  When the cervix was reached, renal pedicle clamps were placed distal to the cervix in an effort to contain tumor.  Greater curved clamps were then placed distal to the renal pedicle clamps and the specimen was divided from the patient.  Tumor was overall well contained.  Angles were secured with 0 Vicryl suture and the suture was clamped and retained.  However, on inspection of the vagina, there appeared to be tumor at the cut aspect of the vagina.  Vagina was freed from tumor manually and the finding of tumor at the cut aspect of the vagina still persisted.  Field was irrigated and aspirated in an effort to remove as much tumor as possible.  Additional vaginal margin was obtained.  This was facilitated using Allis clamps and Ace harmonic.  Specimen was taken to a separate area and marking suture was placed at the new cut margin.  Angles were resecured using 0 Vicryl suture making sure to incorporate the vaginal mucosa.  Vaginal cuff was closed with 0 Vicryl suture in a running locking fashion.  Additional hemostasis was  achieved with Bovie electrocautery.  Attention was turned to the adnexa.  Ureters were identified.  Infundibulopelvic ligament pedicles were created using right angle clamps, Ace harmonic, and 0 Vicryl ties.  The right ovary was suspicious for tumor involvement and this was sent for frozen section with the above results.  While waiting for the frozen section, infracolic omentectomy was performed.  This was facilitated with Bovie electrocautery and Ace harmonic.  Good hemostasis was noted.  Intestines were serially inspected.  No intestinal tumor implants were identified.  Right paracolic gutter had a firm nodule which was resected with Bovie electrocautery.  This was more consistent with incarcerated and calcified fat than tumor.  Specimen was sent for permanent pathology.  Right uterine vessels were re-resected as there is concerned that there was residual tumor in this area.  Ureter was identified.  Vessels were isolated from surrounding structures using Bovie electrocautery.  Right angle clamp, Ace harmonic, and 0 Vicryl suture were used to resecure the pedicle.  Excellent hemostasis was noted.  Surgical field was copiously irrigated, aspirated, and made hemostatic.  Retained sutures were divided.  All laparotomy sponges, instruments, and retractors were removed from the abdominal cavity.  Fascial defect to the left side of the umbilicus was identified.  This was reapproximated using 0 PDS in a simple running stitch.  The remainder of the fascial incision was reapproximated using #1 looped PDS in a bulk closure.  All suprafascial tissue was irrigated, aspirated, and made hemostatic.  Subcutaneous adipose tissue was reapproximated with 2-0 Vicryl in a simple running stitch.  Skin was closed with dissolving subdermal staples.  Glue was placed at the skin.  Patient was taken to the recovery room in good condition.  There are no immediate complications.  All counts were correct.    Celine Rubio,  MD  02/10/20  4:51 PM

## 2020-02-10 NOTE — ANESTHESIA PROCEDURE NOTES
Airway  Urgency: elective    Date/Time: 2/10/2020 1:09 PM  End Time:2/10/2020 1:14 PM  Airway not difficult    General Information and Staff    Patient location during procedure: OR  CRNA: Lux Shannon CRNA    Indications and Patient Condition  Indications for airway management: airway protection    Preoxygenated: yes  MILS not maintained throughout  Mask difficulty assessment: 1 - vent by mask    Final Airway Details  Final airway type: endotracheal airway      Successful airway: ETT  Cuffed: yes   Successful intubation technique: direct laryngoscopy  Endotracheal tube insertion site: oral  Blade: Yocasta  Blade size: 3  ETT size (mm): 7.0  Cormack-Lehane Classification: grade I - full view of glottis  Placement verified by: chest auscultation and capnometry   Measured from: lips  ETT/EBT  to lips (cm): 21  Number of attempts at approach: 1    Additional Comments  Negative epigastric sounds, Breath sound equal bilaterally with symmetric chest rise and fall

## 2020-02-11 ENCOUNTER — APPOINTMENT (OUTPATIENT)
Dept: CARDIOLOGY | Facility: HOSPITAL | Age: 45
End: 2020-02-11

## 2020-02-11 VITALS
WEIGHT: 199 LBS | TEMPERATURE: 97.4 F | HEIGHT: 63 IN | OXYGEN SATURATION: 96 % | RESPIRATION RATE: 16 BRPM | SYSTOLIC BLOOD PRESSURE: 134 MMHG | HEART RATE: 88 BPM | BODY MASS INDEX: 35.26 KG/M2 | DIASTOLIC BLOOD PRESSURE: 61 MMHG

## 2020-02-11 LAB
ABO + RH BLD: NORMAL
ABO + RH BLD: NORMAL
ANION GAP SERPL CALCULATED.3IONS-SCNC: 11 MMOL/L (ref 5–15)
BASOPHILS # BLD AUTO: 0 10*3/MM3 (ref 0–0.2)
BASOPHILS NFR BLD AUTO: 0 % (ref 0–1.5)
BH BB BLOOD EXPIRATION DATE: NORMAL
BH BB BLOOD EXPIRATION DATE: NORMAL
BH BB BLOOD TYPE BARCODE: 6200
BH BB BLOOD TYPE BARCODE: 6200
BH BB DISPENSE STATUS: NORMAL
BH BB DISPENSE STATUS: NORMAL
BH BB PRODUCT CODE: NORMAL
BH BB PRODUCT CODE: NORMAL
BH BB UNIT NUMBER: NORMAL
BH BB UNIT NUMBER: NORMAL
BUN BLD-MCNC: 19 MG/DL (ref 6–20)
BUN/CREAT SERPL: 16 (ref 7–25)
CALCIUM SPEC-SCNC: 8.6 MG/DL (ref 8.6–10.5)
CHLORIDE SERPL-SCNC: 100 MMOL/L (ref 98–107)
CO2 SERPL-SCNC: 20 MMOL/L (ref 22–29)
CREAT BLD-MCNC: 1.19 MG/DL (ref 0.57–1)
DEPRECATED RDW RBC AUTO: 46.9 FL (ref 37–54)
EOSINOPHIL # BLD AUTO: 0 10*3/MM3 (ref 0–0.4)
EOSINOPHIL NFR BLD AUTO: 0 % (ref 0.3–6.2)
ERYTHROCYTE [DISTWIDTH] IN BLOOD BY AUTOMATED COUNT: 14.5 % (ref 12.3–15.4)
GFR SERPL CREATININE-BSD FRML MDRD: 49 ML/MIN/1.73
GLUCOSE BLD-MCNC: 320 MG/DL (ref 65–99)
GLUCOSE BLDC GLUCOMTR-MCNC: 234 MG/DL (ref 70–130)
GLUCOSE BLDC GLUCOMTR-MCNC: 237 MG/DL (ref 70–130)
GLUCOSE BLDC GLUCOMTR-MCNC: 305 MG/DL (ref 70–130)
HCT VFR BLD AUTO: 26.8 % (ref 34–46.6)
HGB BLD-MCNC: 8 G/DL (ref 12–15.9)
IMM GRANULOCYTES # BLD AUTO: 0.03 10*3/MM3 (ref 0–0.05)
IMM GRANULOCYTES NFR BLD AUTO: 0.4 % (ref 0–0.5)
LYMPHOCYTES # BLD AUTO: 0.4 10*3/MM3 (ref 0.7–3.1)
LYMPHOCYTES NFR BLD AUTO: 5.4 % (ref 19.6–45.3)
MCH RBC QN AUTO: 26.4 PG (ref 26.6–33)
MCHC RBC AUTO-ENTMCNC: 29.9 G/DL (ref 31.5–35.7)
MCV RBC AUTO: 88.4 FL (ref 79–97)
MONOCYTES # BLD AUTO: 0.3 10*3/MM3 (ref 0.1–0.9)
MONOCYTES NFR BLD AUTO: 4.1 % (ref 5–12)
NEUTROPHILS # BLD AUTO: 6.63 10*3/MM3 (ref 1.7–7)
NEUTROPHILS NFR BLD AUTO: 90.1 % (ref 42.7–76)
NRBC BLD AUTO-RTO: 0 /100 WBC (ref 0–0.2)
PLATELET # BLD AUTO: 219 10*3/MM3 (ref 140–450)
PMV BLD AUTO: 11.1 FL (ref 6–12)
POTASSIUM BLD-SCNC: 4.9 MMOL/L (ref 3.5–5.2)
RBC # BLD AUTO: 3.03 10*6/MM3 (ref 3.77–5.28)
SODIUM BLD-SCNC: 131 MMOL/L (ref 136–145)
UNIT  ABO: NORMAL
UNIT  ABO: NORMAL
UNIT  RH: NORMAL
UNIT  RH: NORMAL
WBC NRBC COR # BLD: 7.36 10*3/MM3 (ref 3.4–10.8)

## 2020-02-11 PROCEDURE — 85025 COMPLETE CBC W/AUTO DIFF WBC: CPT | Performed by: OBSTETRICS & GYNECOLOGY

## 2020-02-11 PROCEDURE — 63710000001 INSULIN LISPRO (HUMAN) PER 5 UNITS: Performed by: OBSTETRICS & GYNECOLOGY

## 2020-02-11 PROCEDURE — 25010000002 ENOXAPARIN PER 10 MG: Performed by: OBSTETRICS & GYNECOLOGY

## 2020-02-11 PROCEDURE — 80048 BASIC METABOLIC PNL TOTAL CA: CPT | Performed by: OBSTETRICS & GYNECOLOGY

## 2020-02-11 PROCEDURE — 82962 GLUCOSE BLOOD TEST: CPT

## 2020-02-11 PROCEDURE — 63710000001 INSULIN DETEMIR PER 5 UNITS: Performed by: OBSTETRICS & GYNECOLOGY

## 2020-02-11 RX ORDER — DOCUSATE SODIUM 250 MG
250 CAPSULE ORAL 2 TIMES DAILY
Qty: 60 CAPSULE | Refills: 5 | Status: SHIPPED | OUTPATIENT
Start: 2020-02-11 | End: 2021-12-06

## 2020-02-11 RX ORDER — ONDANSETRON 4 MG/1
4 TABLET, FILM COATED ORAL EVERY 6 HOURS PRN
Qty: 15 TABLET | Refills: 1 | Status: SHIPPED | OUTPATIENT
Start: 2020-02-11 | End: 2020-02-11

## 2020-02-11 RX ORDER — DOCUSATE SODIUM 250 MG
250 CAPSULE ORAL 2 TIMES DAILY
Qty: 60 CAPSULE | Refills: 5 | Status: SHIPPED | OUTPATIENT
Start: 2020-02-11 | End: 2020-02-11 | Stop reason: SDUPTHER

## 2020-02-11 RX ORDER — OXYCODONE HYDROCHLORIDE 5 MG/1
5 TABLET ORAL EVERY 4 HOURS PRN
Qty: 20 TABLET | Refills: 0 | Status: SHIPPED | OUTPATIENT
Start: 2020-02-11 | End: 2020-02-20

## 2020-02-11 RX ORDER — ONDANSETRON 4 MG/1
4 TABLET, FILM COATED ORAL EVERY 6 HOURS PRN
Qty: 15 TABLET | Refills: 1 | Status: SHIPPED | OUTPATIENT
Start: 2020-02-11 | End: 2021-12-06

## 2020-02-11 RX ORDER — ACETAMINOPHEN 325 MG/1
650 TABLET ORAL EVERY 6 HOURS SCHEDULED
Qty: 60 TABLET | Refills: 0 | Status: SHIPPED | OUTPATIENT
Start: 2020-02-12 | End: 2021-12-06

## 2020-02-11 RX ORDER — ACETAMINOPHEN 325 MG/1
650 TABLET ORAL EVERY 6 HOURS SCHEDULED
Qty: 60 TABLET | Refills: 0 | Status: SHIPPED | OUTPATIENT
Start: 2020-02-12 | End: 2020-02-11

## 2020-02-11 RX ADMIN — ENOXAPARIN SODIUM 40 MG: 40 INJECTION SUBCUTANEOUS at 08:42

## 2020-02-11 RX ADMIN — INSULIN LISPRO 7 UNITS: 100 INJECTION, SOLUTION INTRAVENOUS; SUBCUTANEOUS at 08:41

## 2020-02-11 RX ADMIN — ACETAMINOPHEN 650 MG: 325 TABLET, FILM COATED ORAL at 06:10

## 2020-02-11 RX ADMIN — ACETAMINOPHEN 650 MG: 325 TABLET, FILM COATED ORAL at 17:27

## 2020-02-11 RX ADMIN — PREGABALIN 100 MG: 100 CAPSULE ORAL at 14:03

## 2020-02-11 RX ADMIN — IBUPROFEN 600 MG: 600 TABLET, FILM COATED ORAL at 10:50

## 2020-02-11 RX ADMIN — ACETAMINOPHEN 650 MG: 325 TABLET, FILM COATED ORAL at 00:02

## 2020-02-11 RX ADMIN — ACETAMINOPHEN 650 MG: 325 TABLET, FILM COATED ORAL at 12:16

## 2020-02-11 RX ADMIN — INSULIN LISPRO 4 UNITS: 100 INJECTION, SOLUTION INTRAVENOUS; SUBCUTANEOUS at 17:27

## 2020-02-11 RX ADMIN — SODIUM CHLORIDE, POTASSIUM CHLORIDE, SODIUM LACTATE AND CALCIUM CHLORIDE 75 ML/HR: 600; 310; 30; 20 INJECTION, SOLUTION INTRAVENOUS at 04:14

## 2020-02-11 RX ADMIN — OXYCODONE HYDROCHLORIDE 5 MG: 5 TABLET ORAL at 16:14

## 2020-02-11 RX ADMIN — INSULIN DETEMIR 10 UNITS: 100 INJECTION, SOLUTION SUBCUTANEOUS at 08:41

## 2020-02-11 RX ADMIN — PREGABALIN 100 MG: 100 CAPSULE ORAL at 06:10

## 2020-02-11 RX ADMIN — INSULIN LISPRO 4 UNITS: 100 INJECTION, SOLUTION INTRAVENOUS; SUBCUTANEOUS at 12:14

## 2020-02-11 RX ADMIN — OMEPRAZOLE 20 MG: 20 CAPSULE, DELAYED RELEASE ORAL at 08:42

## 2020-02-11 RX ADMIN — OXYCODONE HYDROCHLORIDE 5 MG: 5 TABLET ORAL at 04:14

## 2020-02-11 NOTE — PROGRESS NOTES
Malnutrition Severity Assessment    Patient Name:  Jeana Chung  YOB: 1975  MRN: [id number]  Admit Date:  2/10/2020    Patient meets criteria for : Severe Malnutrition      Malnutrition Severity Assessment  Malnutrition Type: Acute Disease or Injury - Related Malnutrition     Malnutrition Type (last 8 hours)      Malnutrition Severity Assessment     Row Name 02/11/20 1234       Malnutrition Severity Assessment    Malnutrition Type  Acute Disease or Injury - Related Malnutrition    Row Name 02/11/20 1234       Insufficient Energy Intake     Insufficient Energy Intake   < or equal to 50% of est. energy requirement for > or equal to 5d)    Row Name 02/11/20 1234       Unintentional Weight Loss     Unintentional Weight Loss   Weight loss greater than 5% in one month 7%/1 month    Row Name 02/11/20 1234       Criteria Met (Must meet criteria for severity in at least 2 of these categories: M Wasting, Fat Loss, Fluid, Secondary Signs, Wt. Status, Intake)    Patient meets criteria for   Severe Malnutrition          Electronically signed by:  Vianey Phelps MS RD/[facility]  02/11/20 12:36 PM     I saw and evaluated the patient. I agree with the findings and the plan of care as documented in the note.    Celine Rubio MD  02/11/20  10:20 PM

## 2020-02-11 NOTE — PROGRESS NOTES
Discharge Planning Assessment  [facility]     Patient Name: Jeana Chung  MRN: [id number]  Today's Date: 2/11/2020    Admit Date: 2/10/2020    Discharge Needs Assessment     Row Name 02/11/20 1154       Living Environment    Lives With  spouse;parent(s)    Name(s) of Who Lives With Patient  Lives in [place] with spouse Porfirio Chung. Staying with Rosa sequeira in [place].    Current Living Arrangements  home/apartment/condo    Primary Care Provided by  self    Provides Primary Care For  no one, unable/limited ability to care for self    Family Caregiver if Needed  parent(s)    Family Caregiver Names  Mother, Rosa Booth and spouse Porfirio Chung    Quality of Family Relationships  supportive;involved;helpful    Able to Return to Prior Arrangements  yes    Living Arrangement Comments  Currently living with mother in [place] in house with 3 steps at entrance       Resource/Environmental Concerns    Resource/Environmental Concerns  none    Transportation Concerns  car, none       Transition Planning    Patient/Family Anticipates Transition to  home    Patient/Family Anticipated Services at Transition  none    Transportation Anticipated  family or friend will provide       Discharge Needs Assessment    Readmission Within the Last 30 Days  no previous admission in last 30 days    Concerns to be Addressed  denies needs/concerns at this time    Equipment Currently Used at Home  glucometer;shower chair;wheelchair    Anticipated Changes Related to Illness  none    Equipment Needed After Discharge  none    Provided post acute provider list?  N/A        Discharge Plan     Row Name 02/11/20 1158       Plan    Plan  Plan is home with mother     Patient/Family in Agreement with Plan  yes    Plan Comments  Met with patient in the room for initial discharge planning. Lives with spouse in [place].  Came to stay with mother in [place] for surgery and follow up.  Will have any Chemo or  radiation scheduled locally.   Her PCP is Tania Angeles.  She has a glucometer, shower chair and wheelchair.  Denies needs at this time.  Following for discharge needs.     Final Discharge Disposition Code  01 - home or self-care        Destination      Coordination has not been started for this encounter.      Durable Medical Equipment      Coordination has not been started for this encounter.      Dialysis/Infusion      Coordination has not been started for this encounter.      Home Medical Care      Coordination has not been started for this encounter.      Therapy      Coordination has not been started for this encounter.      Community Resources      Coordination has not been started for this encounter.          Demographic Summary     Row Name 02/11/20 1153       General Information    Admission Type  inpatient    Arrived From  operating room    Referral Source  admission list    Reason for Consult  discharge planning    Preferred Language  English        Functional Status     Row Name 02/11/20 1153       Functional Status    Usual Activity Tolerance  poor    Current Activity Tolerance  moderate       Functional Status, IADL    Medications  independent    Meal Preparation  independent    Housekeeping  independent    Laundry  independent    Shopping  independent        Psychosocial    No documentation.       Abuse/Neglect    No documentation.       Legal    No documentation.       Substance Abuse    No documentation.       Patient Forms    No documentation.           Chastity Pendleton RN

## 2020-02-11 NOTE — CONSULTS
Clinical Nutrition   Reason For Visit: Identified at risk by screening criteria, MST score 2+, Unintentional weight loss    Patient Name: Jeana Chung  YOB: 1975  MRN: [id number]  Date of Encounter: 02/11/20 12:19 PM  Admission date: 2/10/2020      Nutrition Assessment     Admission Problem List:  Endometrial cancer, stage III  S/p exploratory lap, REGINALDO, BSO, omenectomy (2/10)  KASI/CKD      PMH: She  has a past medical history of Anemia, Anxiety and depression, Back pain, Bronchitis, Diabetes (CMS/HCC), Endometrial cancer (CMS/HCC), GERD (gastroesophageal reflux disease), Glaucoma, Hypertension, Influenza, MRSA (methicillin resistant staph aureus) culture positive, Necrotizing fasciitis (CMS/HCC), PCOS (polycystic ovarian syndrome), PNA (pneumonia), PTSD (post-traumatic stress disorder), Sepsis (CMS/HCC), and Subconjunctival hemorrhage.   PSxH: She  has a past surgical history that includes Eye surgery (Left); Toe amputation (Left, 06/2019); Toe amputation (Right, 2018); and exploratory laparotomy, total abdominal hysterectomy salpingo oophorectomy (N/A, 2/10/2020).        Reported/Observed/Food/Nutrition Related History     Pt reports that she is feeling better today.  Pt states that she has had a poor appetite/intake for the past 2 months secondary to increased fullness/bloating, has been consuming <50% of her usual PO intake for at least the past 3 weeks. Reports that her appetite is better today and that she feels hungry. States that she tolerated solid foods this AM, ate the eggs, no N/V Reports that she is a brittle diabetic. States that she does not tolerate fluid milk, but does tolerate foods made with milk/dairy. Reports that she may go home later today.     Pt provided some food preferences:  Likes: hot tea, iced tea  Dislikes/cannot tolerate: milk    Anthropometrics   Height: 62.5 in  Weight: 199 lb per standing scale (2/7)  BMI: 35.8  BMI classification: Obese Class II: 35-39.9kg/m2      UBW: 213 lb per pt  Weight change: Pt reports that she has lost ~14 lb/7% unintentionally over the past 1 month.    Date Weight (kg) Weight (lbs) Weight Method   2/10/2020 90.266 kg 199 lb -   2/7/2020 90.4 kg 199 lb 4.7 oz Standing scale   2/3/2020 91.627 kg 202 lb -   1/27/2020 96.616 kg 213 lb Stated   1/25/2020 96.616 kg 213 lb -   1/23/2020 96.616 kg 213 lb -   1/14/2020= 213 lb per Care Everywhere    Labs reviewed   Labs reviewed: Yes    Results from last 7 days   Lab Units 02/11/20  0329 02/07/20  1145   SODIUM mmol/L 131* 135*   POTASSIUM mmol/L 4.9 4.4   CHLORIDE mmol/L 100 101   CO2 mmol/L 20.0* 23.0   BUN mg/dL 19 13   CREATININE mg/dL 1.19* 1.18*   GLUCOSE mg/dL 320* 252*   CALCIUM mg/dL 8.6 8.9     Results from last 7 days   Lab Units 02/11/20  0329 02/07/20  1145   WBC 10*3/mm3 7.36 8.00   ALBUMIN g/dL  --  3.40*     Results from last 7 days   Lab Units 02/11/20  0807 02/10/20  2143 02/10/20  1817 02/10/20  1110   GLUCOSE mg/dL 305* 320* 269* 164*     Lab Results   Lab Value Date/Time    HGBA1C 7.10 (H) 02/07/2020 1145    HGBA1C 7.10 (H) 01/30/2020 0954     Medications reviewed   Medications reviewed: Yes  Pertinent: tylenol, insulin  PRN: dilaudid, zofran, oxycodone      Current Nutrition Prescription   PO: GI soft      Evaluation of Received Nutrient/Fluid Intake-PO:  Insufficient data       Nutrition Diagnosis   2/11  Problem Malnutrition - severe acute   Etiology Inadequate oral intake   Signs/Symptoms 7% unintentional wt loss over the past 1 month and <50% of usual PO intake for at least the past 3 weeks       Intervention   Intervention: Follow treatment progress, Care plan reviewed, Interview for preferences, Menu adjusted, Encourage intake   -Will add consistent carbohydrate restriction to pt's diet order  -Food preferences noted and sent to diet office      Goal:   General: Nutrition support treatment  PO: Establish PO       Monitoring/Evaluation:       Monitoring/Evaluation: Per protocol,  PO intake, Pertinent labs, Weight, Symptoms       Will Continue to follow per protocol  Vianey Phelps MS RD/LD CNSC  Time Spent: 30 minutes

## 2020-02-11 NOTE — PROGRESS NOTES
Gynecologic Oncology   Daily Progress Note    Chief Complaint: Postop    Subjective   Patient did well overnight.  She reports her pain is much better than prior to surgery.  Her pain is controlled.  She is not having nausea or emesis.  She is tolerating a clear liquid diet.  Reyna is still in place.  She had one bowel movement overnight.  She is using her incentive spirometer.  No concerns this morning.    Updated ROS is remarkable for abdominal soreness.    Objective   Temp:  [96.9 °F (36.1 °C)-98.4 °F (36.9 °C)] 98.4 °F (36.9 °C)  Heart Rate:  [80-91] 83  Resp:  [16-18] 18  BP: ()/(42-71) 94/50  Vitals:    02/11/20 0441   BP: 94/50   Pulse: 83   Resp: 18   Temp: 98.4 °F (36.9 °C)   SpO2: 94%     I/O last 3 completed shifts:  In: 1000 [I.V.:1000]  Out: 100 [Blood:100]     GENERAL: Alert, well-appearing female in no apparent distress.    CARDIOVASCULAR: Normal rate, regular rhythm, no murmurs, rubs, or gallops.    RESPIRATORY: Clear to auscultation bilaterally, normal respiratory effort  GASTROINTESTINAL:  Soft, appropriately tender, non-distended, no rebound or guarding.  Positive bowel sounds.  Incision c/d/i.  GENITOURINARY: Reyna in place.  SKIN:  Warm, dry, well-perfused.    PSYCHIATRIC: AO x3, with appropriate affect, normal thought processes  EXREMITIES: Symmetric. No peripheral edema.    Lab Results   Component Value Date    WBC 7.36 02/11/2020    HGB 8.0 (L) 02/11/2020    HCT 26.8 (L) 02/11/2020    MCV 88.4 02/11/2020     02/11/2020    NEUTROABS 6.63 02/11/2020    GLUCOSE 320 (H) 02/11/2020    BUN 19 02/11/2020    CREATININE 1.19 (H) 02/11/2020    EGFRIFNONA 49 (L) 02/11/2020     (L) 02/11/2020    K 4.9 02/11/2020     02/11/2020    CO2 20.0 (L) 02/11/2020    CALCIUM 8.6 02/11/2020       Assessment/Plan   Jeana Chung is a 44 y.o. female POD1 s/p exploratory laparotomy, total abdominal hysterectomy, bilateral salpingo-oophorectomy, and omtectectomy.    1.  Post-operative  care  -Routine care: encourage ambulation, IS use, advance diet as tolerated, saline lock IV, bowel regimen, VTE prophylaxis  -Status post TAP blocks in the OR  -Continue p.o. pain medication  -JOEY Reyna    2.  Stage III endometrial cancer  -Final pathology pending    3.  Type 2 diabetes  -Home regimen: Lantus 40 units at night with sliding scale with meals  -Fingerstick range overnight to 269-320  -We will give 10 units of Lantus this morning and resume home Lantus tonight    4.  KASI versus CKD  -Etiology could be secondary to diabetes versus large uterine mass  -Creatinine (2/7) 1.18, today 1.19    5.  Hypertension  -Hold lisinopril continue bystolic    6.  Disposition  -Continue inpatient admission         Earnestine Renteria MD  02/11/20  6:58 AM    Severe malnutrition per nutrition.     Patient feeling well, d/c home if meeting goals.    I saw and evaluated the patient this am. I agree with the findings and the plan of care as documented in the note.    Celine Rubio MD  02/11/20  10:23 PM

## 2020-02-11 NOTE — PLAN OF CARE
Problem: Patient Care Overview  Goal: Plan of Care Review  Outcome: Ongoing (interventions implemented as appropriate)  Flowsheets  Taken 2/11/2020 0249  Progress: improving  Outcome Summary: VSS.  UOP adequate.  IVFs ongoing.  Tolerating po medications for pain.  FSBS with SSI.  Abdominal binder in place. No orders to remove catheter at this time.  Taken 2/10/2020 2000  Plan of Care Reviewed With: patient;family

## 2020-02-12 ENCOUNTER — TELEPHONE (OUTPATIENT)
Dept: NUTRITION | Facility: HOSPITAL | Age: 45
End: 2020-02-12

## 2020-02-12 ENCOUNTER — READMISSION MANAGEMENT (OUTPATIENT)
Dept: CALL CENTER | Facility: HOSPITAL | Age: 45
End: 2020-02-12

## 2020-02-12 ENCOUNTER — TELEPHONE (OUTPATIENT)
Dept: FAMILY MEDICINE CLINIC | Facility: CLINIC | Age: 45
End: 2020-02-12

## 2020-02-12 RX ORDER — FLASH GLUCOSE SENSOR
KIT MISCELLANEOUS
Qty: 1 EACH | Refills: 1 | Status: SHIPPED | OUTPATIENT
Start: 2020-02-12 | End: 2020-03-10 | Stop reason: SDUPTHER

## 2020-02-12 NOTE — OUTREACH NOTE
Prep Survey      Responses   Facility patient discharged from?  [facility]   Is patient eligible?  Yes   Discharge diagnosis  endometrial cancer,  hysterectomy   Does the patient have one of the following disease processes/diagnoses(primary or secondary)?  General Surgery   Does the patient have Home health ordered?  No   Is there a DME ordered?  No   Comments regarding appointments  see AVS   Medication alerts for this patient  eliquis   Prep survey completed?  Yes          Leatha Wiggins RN

## 2020-02-12 NOTE — PROGRESS NOTES
Case Management Discharge Note      Final Note: Home    Provided post acute provider list?: N/A    Destination      No service has been selected for the patient.      Durable Medical Equipment      No service has been selected for the patient.      Dialysis/Infusion      No service has been selected for the patient.      Home Medical Care      No service has been selected for the patient.      Therapy      No service has been selected for the patient.      Community Resources      No service has been selected for the patient.             Final Discharge Disposition Code: 01 - home or self-care

## 2020-02-12 NOTE — DISCHARGE SUMMARY
Gynecologic Oncology   [facility]   Discharge Summary    Date of Admission: 2/10/2020    Date of Discharge: 2/11/2020    Admission Diagnoses:  Endometrial cancer    Discharge Diagnoses: Endometrial Cancer, severe acute malnutrition     Hospital Course:  Jeana Chung is a 44 y.o. female with history of abnormal uterine bleeding and had underwent a ultrasound that showed uterine and cervical masses.  She had a cervical biopsy that was insufficient amount.  CT of abdomen pelvis showed an enlarged uterus with multiple masses present as well as a 4 cm cystic ovary.  She had a repeat cervical biopsy in our clinic 1/23/2020 that showed infiltrating endometrioid adenocarcinoma.      On 2/10/2020 she was admitted and underwent exploratory laparotomy, total abdominal hysterectomy, bilateral salpingo-oophorectomy, omentectomy.  Please refer to dictated operative note for further details.  Post-operatively she did well.  Her diet was advanced.  Nonsurgical medical illnesses were appropriately managed during her hospital stay.  Her POD1 labs showed H/H 8/26.8.  Chemistries were remarkable for creatinine of 1/19 (1.18 on 2/.7/2020).  By POD1 she was tolerating a general diet, voiding spontaneously, having her pain controlled with oral medications, and otherwise meeting criteria for discharge and she was discharged home in stable condition.  Nutrition evaluation determined severe acute malnutrition.    Discharge Medications:     Discharge Medications      New Medications      Instructions Start Date   acetaminophen 325 MG tablet  Commonly known as:  TYLENOL   650 mg, Oral, Every 6 Hours Scheduled   Start Date:  February 12, 2020     apixaban 5 MG tablet tablet  Commonly known as:  ELIQUIS   10 mg, Oral, Daily      docusate sodium 250 MG capsule  Commonly known as:  COLACE   250 mg, Oral, 2 Times Daily         Changes to Medications      Instructions Start Date   cholecalciferol 1.25 MG (09987 UT)  capsule  Commonly known as:  VITAMIN D3  What changed:  additional instructions   50,000 Units, Oral, Weekly      insulin lispro 100 UNIT/ML injection  Commonly known as:  HUMALOG  What changed:    · how much to take  · additional instructions   15 Units, Subcutaneous, 3 Times Daily Before Meals      ondansetron 4 MG tablet  Commonly known as:  ZOFRAN  What changed:  when to take this   4 mg, Oral, Every 6 Hours PRN      oxyCODONE 5 MG immediate release tablet  Commonly known as:  ROXICODONE  What changed:    · when to take this  · reasons to take this   5 mg, Oral, Every 4 Hours PRN         Continue These Medications      Instructions Start Date   BYSTOLIC 5 MG tablet  Generic drug:  nebivolol   5 mg, Oral, Daily      ferrous sulfate 325 (65 FE) MG tablet   325 mg, Oral, Daily      FREESTYLE CAMMY SENSOR SYSTEM   Does not apply, Every 10 Days      hydrOXYzine pamoate 25 MG capsule  Commonly known as:  VISTARIL   25 mg, Oral, 3 Times Daily PRN      LANTUS 100 UNIT/ML injection  Generic drug:  insulin glargine   40 Units, Subcutaneous, Nightly      lisinopril 5 MG tablet  Commonly known as:  PRINIVIL,ZESTRIL   5 mg, Oral, Daily      MULTIPLE VITAMIN PO   Multiple Vitamin      polyethylene glycol powder powder  Commonly known as:  MIRALAX   Mix entire bottle of miralax with 2 (32 ounce) bottle of gatorade. Chill. Start drinking at 12 pm and finish by 1 pm.      pregabalin 100 MG capsule  Commonly known as:  LYRICA   100 mg, Oral, 3 Times Daily      PRILOSEC PO   20 mg, Oral, Daily         Stop These Medications    bisacodyl 5 MG EC tablet  Commonly known as:  DULCOLAX     metroNIDAZOLE 500 MG tablet  Commonly known as:  FLAGYL     neomycin 500 MG tablet  Commonly known as:  MYCIFRADIN            Discharge Instructions:  She was instructed to call or return to medical attention for fever, severe pain, persistent nausea and vomiting, questions regarding medications, concerns regarding her incisions, excessive vaginal  bleeding or discharge, or for any other acute concerns.  She was also instructed not to drive while taking narcotic pain medications, to abide by pelvic rest, avoid tub baths, and to avoid heavy lifting for at least 6 weeks.  Patient was educated regarding constipation prevention.      Condition at Discharge: Stable    Discharge Destination: Home    Results pending at time of discharge: Final surgical pathology     Follow Up: 2 weeks with Dr. Rubio    Electronically Signed by: Earnestine Renteria MD  Date: 2/11/2020      Time:  7:41 PM    I saw and evaluated the patient. I agree with the findings and the plan of care as documented in the note.    Celine Rubio MD  02/11/20  10:17 PM

## 2020-02-12 NOTE — PLAN OF CARE
Problem: Patient Care Overview  Goal: Plan of Care Review  Outcome: Outcome(s) achieved  Flowsheets (Taken 2/11/2020 2017)  Progress: improving  Plan of Care Reviewed With: patient  Outcome Summary: VSS, resident and physican saw pt, discharge pt per order

## 2020-02-12 NOTE — TELEPHONE ENCOUNTER
Pt says the sensor for her Freestyle Michelle was called into [employer] as a 10 day. Pt is needing it to be for the 14 day sensor or [facility] will not refill it for her.

## 2020-02-12 NOTE — TELEPHONE ENCOUNTER
Franci from [facility] in [place] called to request for an alternative prescription to be written for the patient, Jeana Chung. Franci stated that they received a prescription for the patient for the Continuous Blood Gluc Sensor (FREESTYLE CAMMY SENSOR SYSTEM) 10-day supply. Franci stated that the 10-day supply of this sensor system has been discontinued, so she requested for the 14-day supply to be prescribed to the patient.    If there are any questions or concerns please call Franci from [facility] in [place] at [phone].

## 2020-02-13 ENCOUNTER — READMISSION MANAGEMENT (OUTPATIENT)
Dept: CALL CENTER | Facility: HOSPITAL | Age: 45
End: 2020-02-13

## 2020-02-13 ENCOUNTER — TELEPHONE (OUTPATIENT)
Dept: ONCOLOGY | Facility: CLINIC | Age: 45
End: 2020-02-13

## 2020-02-13 ENCOUNTER — TELEPHONE (OUTPATIENT)
Dept: FAMILY MEDICINE CLINIC | Facility: CLINIC | Age: 45
End: 2020-02-13

## 2020-02-13 NOTE — PROGRESS NOTES
Oncology Nutrition Screening    Patient Name:  Jeana Chung  YOB: 1975  MRN: [id number]  Date:  02/13/20  Physician:  Dr. Rubio    Type of Cancer Treatment:   Surgery: exp lap / REGINALDO / BSO / Omentectomy (2/10/20)    Patient Active Problem List   Diagnosis   • Acute stress reaction with predominately emotional disturbance   • Sleep disturbance   • Cancer related pain   • Moderate episode of recurrent major depressive disorder (CMS/HCC)   • Anxiety   • Iron deficiency anemia   • Type 2 diabetes mellitus with diabetic polyneuropathy, with long-term current use of insulin (CMS/HCC)   • Essential hypertension   • Heart murmur   • Family history of cardiac disorder in mother   • Endometrial cancer (CMS/HCC)       Current Outpatient Medications   Medication Sig Dispense Refill   • acetaminophen (TYLENOL) 325 MG tablet Take 2 tablets by mouth Every 6 (Six) Hours. 60 tablet 0   • apixaban (ELIQUIS) 5 MG tablet tablet Take 2 tablets by mouth Daily for 21 days. 42 tablet 0   • cholecalciferol (VITAMIN D3) 1.25 MG (72666 UT) capsule Take 1 capsule by mouth 1 (One) Time Per Week. (Patient taking differently: Take 50,000 Units by mouth 1 (One) Time Per Week. SATURDAY) 12 capsule 3   • Continuous Blood Gluc Sensor ([employer]YLE CAMMY SENSOR SYSTEM) Every 14 (Fourteen) Days. 1 each 1   • docusate sodium (COLACE) 250 MG capsule Take 1 capsule by mouth 2 (Two) Times a Day. 60 capsule 5   • ferrous sulfate 325 (65 FE) MG tablet Take 325 mg by mouth Daily.     • hydrOXYzine pamoate (VISTARIL) 25 MG capsule Take 1 capsule by mouth 3 (Three) Times a Day As Needed for Itching. 30 capsule 1   • insulin glargine (LANTUS) 100 UNIT/ML injection Inject 40 Units under the skin into the appropriate area as directed Every Night.     • insulin lispro (HUMALOG) 100 UNIT/ML injection Inject 15 Units under the skin into the appropriate area as directed 3 (Three) Times a Day Before Meals. (Patient taking differently: Inject   under the skin into the appropriate area as directed 3 (Three) Times a Day Before Meals. SLIDING SCALE) 13.5 mL 5   • lisinopril (PRINIVIL,ZESTRIL) 5 MG tablet Take 5 mg by mouth Daily.     • MULTIPLE VITAMIN PO Multiple Vitamin     • nebivolol (BYSTOLIC) 5 MG tablet Take 5 mg by mouth Daily.     • Omeprazole (PRILOSEC PO) Take 20 mg by mouth Daily.     • ondansetron (ZOFRAN) 4 MG tablet Take 1 tablet by mouth Every 6 (Six) Hours As Needed for Nausea or Vomiting. 15 tablet 1   • oxyCODONE (ROXICODONE) 5 MG immediate release tablet Take 1 tablet by mouth Every 4 (Four) Hours As Needed for Moderate Pain  for up to 9 days. 20 tablet 0   • polyethylene glycol (MIRALAX) powder powder Mix entire bottle of miralax with 2 (32 ounce) bottle of gatorade. Chill. Start drinking at 12 pm and finish by 1 pm. 1 each 0   • pregabalin (LYRICA) 100 MG capsule Take 100 mg by mouth 3 (Three) Times a Day.       No current facility-administered medications for this visit.        Glycemic Risk:   IDDM    Weight:   Height: 62.5 inches  Weight: 199 lbs.  Usual Body Weight: ~199 lbs.   BMI: 35.8  Obese  Weight - patient reports gaining ~8# with bloating but then lost weight due to decreased oral intake and nutritional impact symptoms.    Oral Food Intake:  Special Diet Restrictions: Diabetes    Hydration Status:   How many 8 ounce glass of water of fluid do you drink per day?  Specific amount not assessed at this time.    Enteral Feeding:   n/a    Nutrition Symptoms:   Altered Apetite  Nausea  Constipation    Activity:   Not assessed at this time.     reports that she quit smoking about 20 years ago. Her smoking use included cigarettes. She has never used smokeless tobacco. She reports that she drank alcohol. She reports that she does not use drugs.    Evaluation of Nutritional Risk:   Patient has been identified at severe disease related malnutrition due to diagnosis, treatment, weight loss, decreased oral intake and nutrition impact  symptoms.Spoke with patient via phone call as nutrition follow up.  Patient was seen by inpatient RD at [facility] during her hospitalization for surgery.  Patient reports her appetite has greatly improved and her nausea has resolved since surgery.      Briefly discussed her history of insulin dependent diabetes.  Encouraged her to monitor her blood sugars closely and to adjust her insulin as prescribed.  Also discussed the importance of good nutrition for healing after surgery focusing on adequate calorie, protein, nutrient and fluid intake.  Advised her to be consuming smaller more frequent meals/snacks throughout the day to aid with potential nausea management.  Emphasized the importance of protein and its role in the diet; reviewed high protein foods; and recommended she have a protein source at each meal/snack.  Also emphasized the importance of hydration; reviewed good hydrating fluid options; and recommended she drink at least 64 ounces daily.      Answered her questions and she voiced understanding of information discussed.  Will send written diet materials via email with RD's contact information.  Encouraged her to call with questions.  Will plan to follow up with patient once treatment plan has been established.    Electronically signed by:  Starr Pratt RD  2:46 PM

## 2020-02-13 NOTE — TELEPHONE ENCOUNTER
"Patient called in regards to the following,    \"Pt says the sensor for her Freestyle Michelle [facility] as a [facility] day. Pt is needing it to be for the 14 day sensor or [facility] will not refill it for her. \"  Patient states that she called [facility] and they are statingthey have nothing from the PCP for this refill. Yesterday at [address] made a note that RX had been corrected. Making this encounter to advise of patients call and concern. Thank you .    "

## 2020-02-13 NOTE — OUTREACH NOTE
General Surgery Week 1 Survey      Responses   Facility patient discharged from?  [facility]   Does the patient have one of the following disease processes/diagnoses(primary or secondary)?  General Surgery   Is there a successful TCM telephone encounter documented?  No   Week 1 attempt successful?  Yes   Call start time  1555   Rescheduled  Rescheduled-pt requested   Call end time  1556   Person spoke with today (if not patient) and relationship  spouse, Porfirio Owen RN

## 2020-02-13 NOTE — TELEPHONE ENCOUNTER
Spoke with the patient's pharmacy and verified that they did receive the new RX> Informed the patient of this, she verbalized a good understanding

## 2020-02-13 NOTE — TELEPHONE ENCOUNTER
Patient called she had surgery on Monday and Dr. Rubio wants to see her in 2 weeks please call to get this scheduled.

## 2020-02-15 ENCOUNTER — HOSPITAL ENCOUNTER (EMERGENCY)
Facility: HOSPITAL | Age: 45
Discharge: HOME | End: 2020-02-16
Attending: EMERGENCY MEDICINE | Admitting: EMERGENCY MEDICINE

## 2020-02-15 ENCOUNTER — NURSE TRIAGE (OUTPATIENT)
Dept: CALL CENTER | Facility: HOSPITAL | Age: 45
End: 2020-02-15

## 2020-02-15 DIAGNOSIS — T81.30XA ABDOMINAL WOUND DEHISCENCE, INITIAL ENCOUNTER: Primary | ICD-10-CM

## 2020-02-15 LAB
HOLD SPECIMEN: NORMAL
HOLD SPECIMEN: NORMAL
WHOLE BLOOD HOLD SPECIMEN: NORMAL
WHOLE BLOOD HOLD SPECIMEN: NORMAL

## 2020-02-15 PROCEDURE — 99284 EMERGENCY DEPT VISIT MOD MDM: CPT

## 2020-02-15 RX ORDER — SODIUM CHLORIDE 0.9 % (FLUSH) 0.9 %
10 SYRINGE (ML) INJECTION AS NEEDED
Status: DISCONTINUED | OUTPATIENT
Start: 2020-02-15 | End: 2020-02-16 | Stop reason: HOSPADM

## 2020-02-16 VITALS
SYSTOLIC BLOOD PRESSURE: 124 MMHG | RESPIRATION RATE: 15 BRPM | HEART RATE: 94 BPM | DIASTOLIC BLOOD PRESSURE: 71 MMHG | BODY MASS INDEX: 36.62 KG/M2 | HEIGHT: 62 IN | TEMPERATURE: 99 F | OXYGEN SATURATION: 99 % | WEIGHT: 199 LBS

## 2020-02-16 NOTE — ED PROVIDER NOTES
Subjective   Ms. Jeana Chung is a 44 y.o female presenting to the emergency department with complaints of a post-operation problem. She received a hysterectomy on Monday, 5 days ago, performed by Dr. Rubio, gynecologic surgeon. She initially had skin glue closing the incision but her incision split open this evening. After noticing the dehiscence, she covered it with a sanitary pad and came to the emergency department. She denies abdominal pain, fever, chills, cough, abnormal urinary symptoms, and abnormal bowel symptoms. She is not currently on antibiotics. There are no other acute symptoms at this time.      History provided by:  Patient  Other   Location:  Abdomen  Severity:  Moderate  Onset quality:  Sudden  Timing:  Constant  Progression:  Worsening  Chronicity:  New  Context:  Dehiscence to hysterectomy site  Relieved by:  None tried  Worsened by:  Nothing  Ineffective treatments:  None tried  Associated symptoms: no abdominal pain, no cough and no fever        Review of Systems   Constitutional: Negative for chills and fever.   Respiratory: Negative for cough.    Gastrointestinal: Negative for abdominal pain.        Normal bowel movements   Genitourinary:        Normal urinary symptoms   Skin:        Dehiscence to abdomen   All other systems reviewed and are negative.      Past Medical History:   Diagnosis Date   • Anemia    • Anxiety and depression    • Back pain    • Bronchitis    • Diabetes (CMS/HCC)     DX 4-5 YRS AGO, CHECKS BS 4X/DAY, LAST A1C 7.1%   • Endometrial cancer (CMS/HCC)     STAGE II   • GERD (gastroesophageal reflux disease)    • Glaucoma    • Hypertension    • Influenza    • MRSA (methicillin resistant staph aureus) culture positive     S/P NECROTIZING FASCITIS IN BILATERAL FEET   • Necrotizing fasciitis (CMS/HCC)    • PCOS (polycystic ovarian syndrome)    • PNA (pneumonia)    • PTSD (post-traumatic stress disorder)    • Sepsis (CMS/HCC)    • Subconjunctival hemorrhage        Allergies    Allergen Reactions   • Bactrim [Sulfamethoxazole-Trimethoprim] Anaphylaxis   • Penicillins Hives   • Promethazine Mental Status Change       Past Surgical History:   Procedure Laterality Date   • EXPLORATORY LAPAROTOMY, TOTAL ABDOMINAL HYSTERECTOMY SALPINGO OOPHORECTOMY N/A 2/10/2020    Procedure: EXPLORATORY LAPAROTOMY, TOTAL ABDOMINAL HYSTERECTOMY, BILATERAL SALPINGO-OOPHORECTOMY WITH OPTIMAL  STAGING (R-0), OMENTECTOMY;  Surgeon: Celine Rubio MD;  Location: [facility];  Service: Gynecology Oncology;  Laterality: N/A;   • EYE SURGERY Left     BLOOD VESSEL IN EYE REPAIR   • TOE AMPUTATION Left 2019    big toe - unhealing sore   • TOE AMPUTATION Right     big toe and second toe - Necrotizing fasciitis and MRSA        Family History   Problem Relation Age of Onset   • Fibroids Mother    • Diabetes type II Mother    • Hypertension Mother    • Heart attack Mother    • Fibroids Sister         PCOS   • Diabetes type II Sister    • Dementia Maternal Grandmother    • Stroke Maternal Grandmother        Social History     Socioeconomic History   • Marital status:      Spouse name: Not on file   • Number of children: 1   • Years of education: Not on file   • Highest education level: Not on file   Tobacco Use   • Smoking status: Former Smoker     Types: Cigarettes     Last attempt to quit: 2000     Years since quittin.1   • Smokeless tobacco: Never Used   • Tobacco comment: social MAYBE 1 CIG/DAY   Substance and Sexual Activity   • Alcohol use: Not Currently     Frequency: Monthly or less     Drinks per session: 1 or 2   • Drug use: Never   • Sexual activity: Not Currently         Objective   Physical Exam   Constitutional: She is oriented to person, place, and time. She appears well-developed and well-nourished. No distress.   HENT:   Head: Normocephalic and atraumatic.   Eyes: Conjunctivae are normal. No scleral icterus.   Neck: Normal range of motion.   Cardiovascular: Normal rate, regular rhythm  and normal heart sounds.   No murmur heard.  Pulmonary/Chest: Effort normal and breath sounds normal. No respiratory distress. She has no wheezes.   Abdominal: Soft. There is no tenderness. There is no rebound and no guarding.   Musculoskeletal: She exhibits no deformity.   Neurological: She is alert and oriented to person, place, and time.   Skin: Skin is warm and dry. She is not diaphoretic.   Midline abdominal incision, mostly appears to be healing well. Inferior to the umbilicus, there is a wound dehiscence approximately 5 cm in length without evidence of a foreign body or contamination.   Psychiatric: She has a normal mood and affect. Her behavior is normal.   Nursing note and vitals reviewed.      Wound Care  Date/Time: 2/16/2020 12:08 AM  Performed by: Abrahan Perry MD  Authorized by: Abrahan Perry MD     Consent:     Consent obtained:  Verbal    Consent given by:  Patient  Pre-procedure details:     Preparation: Patient was prepped and draped in usual sterile fashion    Procedure details:     Indications: open wounds      Wound exploration location: abdomen      Wound exploration location comment:  Midline low abdominal wound dehiscence with vertical measurement 5 cm    Wound age (days):  <1    Wound surface area (sq cm):  10  Dressing:     Packing/drain action: new packing      Packing material: 1 inch Kerlix soaked in sterile normal saline.    Dressing applied:  4x4  Comments:      I have applied 1/2 inch Steri-Strips throughout the remainder of the intact newly forming scar tissue.             ED Course  ED Course as of Feb 16 0020   Sat Feb 15, 2020   2315 Paged Dr. Rubio, gynecologic surgeon. -MS    [HV]   2643 Placed a 1/2 inch steri strip above and below the dehiscence. -MS    [HV]   Sun Feb 16, 2020   0014 I spoke with Dr. Rubio, the patient's surgeon.  Case discussed in detail.  I showed her a picture of the wound dehiscence.  We discussed the plan for care.    I thoroughly inspected the  "wound probing the wound to its margins with a sterile Q-tip.  No disruption of the underlying fascia could be found.  I then applied wet-to-dry dressing.    [MS]      ED Course User Index  [HV] Sasha Campbell  [MS] Abrahan Perry MD     Recent Results (from the past 24 hour(s))   Light Blue Top    Collection Time: 02/15/20 10:16 PM   Result Value Ref Range    Extra Tube hold for add-on    Green Top (Gel)    Collection Time: 02/15/20 10:16 PM   Result Value Ref Range    Extra Tube Hold for add-ons.    Lavender Top    Collection Time: 02/15/20 10:16 PM   Result Value Ref Range    Extra Tube hold for add-on    Gold Top - SST    Collection Time: 02/15/20 10:16 PM   Result Value Ref Range    Extra Tube Hold for add-ons.      Note: In addition to lab results from this visit, the labs listed above may include labs taken at another facility or during a different encounter within the last 24 hours. Please correlate lab times with ED admission and discharge times for further clarification of the services performed during this visit.    No orders to display     Vitals:    02/15/20 2206 02/15/20 2230 02/15/20 2300 02/16/20 0000   BP: 131/64 113/69 103/56 124/71   BP Location: Left arm      Patient Position: Sitting      Pulse: 104 96 97 94   Resp: 15      Temp: 99 °F (37.2 °C)      TempSrc: Oral      SpO2: 94% 99% 97% 99%   Weight: 90.3 kg (199 lb)      Height: 157.5 cm (62\")        Medications   sodium chloride 0.9 % flush 10 mL (has no administration in time range)     ECG/EMG Results (last 24 hours)     ** No results found for the last 24 hours. **        No orders to display                                                  MDM    Final diagnoses:   Abdominal wound dehiscence, initial encounter       Documentation assistance provided by fiona Campbell.  Information recorded by the scribchago was done at my direction and has been verified and validated by me.     Sasha Campbell  02/15/20 2987       Abrahan Perry, " MD  02/16/20 0020

## 2020-02-16 NOTE — DISCHARGE INSTRUCTIONS
Reapply wet-to-dry dressing as I have demonstrated, twice per day until follow-up.    If you develop fever, drainage, spreading redness-please return soon as possible.    Do your best to avoid straining your abdominal musculature.    Please review the medications you are supposed to be taking according to prior physician recommendations. I have not changed your home medications during this visit. If your discharge instructions indicate that I have changed your home medications, this is not the case, and you should disregard. If you have any questions about the medication you should be taking at home, please call your physician.

## 2020-02-16 NOTE — TELEPHONE ENCOUNTER
"    Reason for Disposition  • Incision gaping open and < 2 days (48 hours) since wound re-opened    Additional Information  • Negative: Major abdominal surgical incision and wound gaping open with visible internal organs  • Negative: Sounds like a life-threatening emergency to the triager  • Negative: Bleeding from incision and won't stop after 10 minutes of direct pressure  • Negative: Widespread rash and bright red, sunburn-like  • Negative: Severe pain in the incision    Answer Assessment - Initial Assessment Questions  1. SYMPTOM: \"What's the main symptom you're concerned about?\" (e.g., redness, pain, drainage)      Incision near her belly button has opened 1 and 1/2 inches.   2. ONSET: \"When did Incision opening.   start?\"      This afternoon.   3. SURGERY: \"What surgery was performed?\"      Exploratory Laparotomy , total abdominal hysterectomy, bilateral salpingo-oophorectomy, Omentectomy.    4. DATE of SURGERY: \"When was surgery performed?\"       02/10/2020  5. INCISION SITE: \"Where is the incision located?\"       Near belly button on abdomen.   6. REDNESS: \"Is there any redness at the incision site?\" If yes, ask: \"How wide across is the redness?\" (Inches, centimeters)       No redness.   7. PAIN: \"Is there any pain?\" If so, ask: \"How bad is it?\"  (Scale 1-10; or mild, moderate, severe)      No pain.   8. BLEEDING: \"Is there any bleeding?\" If so, ask: \"How much?\" and \"Where?\"      No bleeding.   9. DRAINAGE: \"Is there any drainage from the incision site?\" If yes, ask: \"What color and how much?\" (e.g., red, cloudy, pus; drops, teaspoon)      Unknown   10. FEVER: \"Do you have a fever?\" If so, ask: \"What is your temperature, how was it measured, and when did it start?\"        No fever.   11. OTHER SYMPTOMS: \"Do you have any other symptoms?\" (e.g., shaking chills, weakness, rash elsewhere on body)        No other symptoms.    Protocols used: POST-OP INCISION SYMPTOMS AND QUESTIONS-ADULT-OH      "

## 2020-02-17 ENCOUNTER — READMISSION MANAGEMENT (OUTPATIENT)
Dept: CALL CENTER | Facility: HOSPITAL | Age: 45
End: 2020-02-17

## 2020-02-17 ENCOUNTER — HOSPITAL ENCOUNTER (OUTPATIENT)
Dept: PHYSICAL THERAPY | Facility: HOSPITAL | Age: 45
Setting detail: THERAPIES SERIES
Discharge: HOME | End: 2020-02-17

## 2020-02-17 ENCOUNTER — APPOINTMENT (OUTPATIENT)
Dept: LAB | Facility: HOSPITAL | Age: 45
End: 2020-02-17

## 2020-02-17 ENCOUNTER — TELEPHONE (OUTPATIENT)
Dept: ONCOLOGY | Facility: CLINIC | Age: 45
End: 2020-02-17

## 2020-02-17 DIAGNOSIS — T81.31XD POSTOPERATIVE WOUND DEHISCENCE, SUBSEQUENT ENCOUNTER: Primary | ICD-10-CM

## 2020-02-17 DIAGNOSIS — S31.109D OPEN WOUND OF ABDOMEN, SUBSEQUENT ENCOUNTER: ICD-10-CM

## 2020-02-17 PROCEDURE — 97162 PT EVAL MOD COMPLEX 30 MIN: CPT

## 2020-02-17 PROCEDURE — 87070 CULTURE OTHR SPECIMN AEROBIC: CPT | Performed by: OBSTETRICS & GYNECOLOGY

## 2020-02-17 PROCEDURE — 87075 CULTR BACTERIA EXCEPT BLOOD: CPT | Performed by: OBSTETRICS & GYNECOLOGY

## 2020-02-17 PROCEDURE — 87205 SMEAR GRAM STAIN: CPT | Performed by: OBSTETRICS & GYNECOLOGY

## 2020-02-17 NOTE — TELEPHONE ENCOUNTER
Patient was in ER this weekend because her incision opened.  ER doc talked to Dr. Rubio and she wanted the patient to call first thing this morning to get an appointment this afternoon.  Unable to warm transfer the call.    Patient call back # [phone]

## 2020-02-17 NOTE — OUTREACH NOTE
General Surgery Week 1 Survey      Responses   Facility patient discharged from?  [facility]   Does the patient have one of the following disease processes/diagnoses(primary or secondary)?  General Surgery   Is there a successful TCM telephone encounter documented?  No   Week 1 attempt successful?  Yes   Call start time  1256   Rescheduled  Rescheduled-pt requested   Call end time  1259   Discharge diagnosis  endometrial cancer,  hysterectomy   Is patient permission given to speak with other caregiver?  Yes   List who call center can speak with  mother    Person spoke with today (if not patient) and relationship  mother    Psychosocial issues?  No   Comments  Pt and mother report that the pt's wound has dihisced and she is currently at [facility] wound care clinic, unable to talk currently   Week 1 call completed?  Yes   Wrap up additional comments  unable to answer questions at this time          Peggy Sepulveda RN

## 2020-02-17 NOTE — THERAPY EVALUATION
Outpatient Rehabilitation - Wound/Debridement Initial Eval  [facility]     Patient Name: Jeana Chung  : 1975  MRN: [id number]  Today's Date: 2020                  Admit Date: 2020    Visit Dx:    ICD-10-CM ICD-9-CM   1. Postoperative wound dehiscence, subsequent encounter T81.31XD V58.89     998.32   2. Open wound of abdomen, subsequent encounter S31.109D V58.89     879.2       Patient Active Problem List   Diagnosis   • Acute stress reaction with predominately emotional disturbance   • Sleep disturbance   • Cancer related pain   • Moderate episode of recurrent major depressive disorder (CMS/HCC)   • Anxiety   • Iron deficiency anemia   • Type 2 diabetes mellitus with diabetic polyneuropathy, with long-term current use of insulin (CMS/HCC)   • Essential hypertension   • Heart murmur   • Family history of cardiac disorder in mother   • Endometrial cancer (CMS/HCC)        Past Medical History:   Diagnosis Date   • Anemia    • Anxiety and depression    • Back pain    • Bronchitis    • Diabetes (CMS/HCC)     DX 4-5 YRS AGO, CHECKS BS 4X/DAY, LAST A1C 7.1%   • Endometrial cancer (CMS/HCC)     STAGE II   • GERD (gastroesophageal reflux disease)    • Glaucoma    • Hypertension    • Influenza    • MRSA (methicillin resistant staph aureus) culture positive     S/P NECROTIZING FASCITIS IN BILATERAL FEET   • Necrotizing fasciitis (CMS/HCC)    • PCOS (polycystic ovarian syndrome)    • PNA (pneumonia)    • PTSD (post-traumatic stress disorder)    • Sepsis (CMS/HCC)    • Subconjunctival hemorrhage         Past Surgical History:   Procedure Laterality Date   • EXPLORATORY LAPAROTOMY, TOTAL ABDOMINAL HYSTERECTOMY SALPINGO OOPHORECTOMY N/A 2/10/2020    Procedure: EXPLORATORY LAPAROTOMY, TOTAL ABDOMINAL HYSTERECTOMY, BILATERAL SALPINGO-OOPHORECTOMY WITH OPTIMAL  STAGING (R-0), OMENTECTOMY;  Surgeon: Celine Rubio MD;  Location: [facility];  Service: Gynecology Oncology;  Laterality: N/A;   • EYE  SURGERY Left     BLOOD VESSEL IN EYE REPAIR   • TOE AMPUTATION Left 06/2019    big toe - unhealing sore   • TOE AMPUTATION Right 2018    big toe and second toe - Necrotizing fasciitis and MRSA        Patient History     Row Name 02/17/20 1300             History    Chief Complaint  Ulcer, wound or other skin conditions  -      Date Current Problem(s) Began  02/15/20  -      Brief Description of Current Complaint  Pt is s/p ex-lap, REGINALDO, BSO by Dr. Rubio on 2/10/20 at [facility], discharged home, noticed wound dehiscence on 2/15/20 and presented to ED, no referred for wound care.  -      Patient/Caregiver Goals  Heal wound;Return to prior level of function  -      Patient's Rating of General Health  Good  -      Occupation/sports/leisure activities  Previously worked as  nurse in [place].  Currently staying with family in [place]  -      Patient seeing anyone else for problem(s)?  Celine Rubio MD  -      How has patient tried to help current problem?  Pt only placed saline-moist conform roll, 4x4 gauze, and abd pad over wound since ED visit.  Has not tried to shower.  -         Pain     Pain Location  Abdomen  -      Pain at Present  4  -      Difficulties with ADL's?  Currently on lifting restrictions from abd surgery  -         Fall Risk Assessment    Any falls in the past year:  No  -         Services    Are you currently receiving Home Health services  No  -         Daily Activities    Primary Language  English  -      Barriers to learning  None  -      Pt Participated in POC and Goals  Yes  -        User Key  (r) = Recorded By, (t) = Taken By, (c) = Cosigned By    Initials Name Provider Type    Leidy Millard, PT Physical Therapist          EVALUATION  PT Ortho     Row Name 02/17/20 1300       Subjective Comments    Subjective Comments  Pt states very little drainage from wound, no odor or purulent drainage noted, denies fever.  States she used to work as a nurse in , and also has  personal experience with having wounds.  -       Precautions and Contraindications    Precautions  ABD binder  -       Subjective Pain    Able to rate subjective pain?  yes  -JM    Pre-Treatment Pain Level  4  -JM    Post-Treatment Pain Level  4  -JM       Transfers    Sit-Stand Starke (Transfers)  independent  -JM    Stand-Sit Starke (Transfers)  independent  -    Comment (Transfers)  reclined on stretcher  -       Gait/Stairs Assessment/Training    Starke Level (Gait)  independent  -      User Key  (r) = Recorded By, (t) = Taken By, (c) = Cosigned By    Initials Name Provider Type    Leidy Millard, PT Physical Therapist        LDA Wound     Row Name 02/17/20 1300             Wound 02/17/20 1300 midline abdomen Other (comment)    Wound - Properties Group Date first assessed: 02/17/20  - Time first assessed: 1300  - Orientation: midline  - Location: abdomen  - Primary Wound Type: Other  -JM, dehisced surgical incision  Additional Comments: dehisced abd incision  -JM    Wound Image  Images linked: 2  -JM      Dressing Appearance  intact;dry;moist drainage  -JM      Closure  Adhesive closure strips  -      Base  clean;moist;red;yellow;subcutaneous;other (see comments) internal sutures visible  -JM      Red (%), Wound Tissue Color  95  -JM      Yellow (%), Wound Tissue Color  5  -JM      Periwound  intact;dry;redness slight gaping of inferior incision  -      Periwound Temperature  warm  -      Periwound Skin Turgor  soft  -      Edges  open  -JM      Wound Length (cm)  5.5 cm  -      Wound Width (cm)  3 cm  -      Wound Depth (cm)  2.2 cm  -      Drainage Characteristics/Odor  serosanguineous  -      Drainage Amount  scant  -JM      Care, Wound  irrigated with;sterile normal saline culture swabs obtained  -      Dressing Care, Wound  dressing applied;silver impregnated;hydrofiber;gauze;border dressing opticel  ribbon, 4x4 gauze, mepilex border  -       Periwound Care, Wound  dry periwound area maintained  -        User Key  (r) = Recorded By, (t) = Taken By, (c) = Cosigned By    Initials Name Provider Type    Leidy Millard PT Physical Therapist              Therapy Education     Row Name 02/17/20 1300             Therapy Education    Education Details  Keep dressing dry/intact, change in 2-3 days or PRN if wet or disrupted.  Discussed possible use of wound vac.  -      Given  Bandaging/dressing change  -      Program  New  -      How Provided  Verbal;Demonstration  -      Provided to  Patient;Other (comment) mother  -      Level of Understanding  Verbalized;Teach back education performed  -        User Key  (r) = Recorded By, (t) = Taken By, (c) = Cosigned By    Initials Name Provider Type    Leidy Millard PT Physical Therapist          Recommendation and Plan  PT Assessment/Plan     Row Name 02/17/20 1300          PT Assessment    Functional Limitations  Other (comment);Performance in self-care ADL wound management limitation  -     Impairments  Integumentary integrity;Pain  -     Assessment Comments  Pt presenting with dehisced abdominal incision s/p REGINALDO 2/10/20.  Wound bed clean and red with adipose tissue, no slough or necrotic tissue, no S&S of infection.  Patient would benefit from NPWT/wound vac to promote granulation and assist with wound closure.  Culture swabs obtained this tx and sent to lab per VO from MD.  Will plan to initiate wound vac once insurance approval obtained.  -     Rehab Potential  Good  -     Patient/caregiver participated in establishment of treatment plan and goals  Yes  -     Patient would benefit from skilled therapy intervention  Yes  -        PT Plan    PT Frequency  2x/week;3x/week  -     Predicted Duration of Therapy Intervention (Therapy Eval)  12 visits  -     Planned CPT's?  PT EVAL MOD COMPLELITY: 49113;PT NEG PRESS WOUND TO 50 SQCM 3: 94116;PT YSESENIA DEBRIDE OPEN WOUND UP TO 20  CM: 17281;PT NONSELECT DEBRIDE 15 MIN: 15655;PT SELF CARE/MGMT/TRAIN 15 MIN: 79166  -     Physical Therapy Interventions (Optional Details)  bandaging;patient/family education;wound care  -     PT Plan Comments  home vac order initiated, MD order faxed to office  -       User Key  (r) = Recorded By, (t) = Taken By, (c) = Cosigned By    Initials Name Provider Type    Leidy Millard, PT Physical Therapist            Goals  PT OP Goals     Row Name 02/17/20 1300          PT Short Term Goals    STG 1  Patient and/ or caregiver able to verbalize signs and symptoms of infection.  -     STG 2  Decrease wound dimensions by 50% as evidence of wound closure.  -     STG 3  Patient and/ or caregiver independent with home VAC use and care, including canister changes and dressing / seal management.  -        Long Term Goals    LTG 1  Patient and/ or caregiver independent with clean dressing changes.  -     LTG 2  Decrease wound dimensions by 90% as evidence of wound closure.  -        Time Calculation    PT Goal Re-Cert Due Date  05/17/20  -       User Key  (r) = Recorded By, (t) = Taken By, (c) = Cosigned By    Initials Name Provider Type    Leidy Millard, PT Physical Therapist          Time Calculation: Start Time: 1300  Therapy Charges for Today     Code Description Service Date Service Provider Modifiers Qty    32028012103 HC PT EVAL MOD COMPLEXITY 4 2/17/2020 Leidy Gilliam, PT GP 1                Leidy Gilliam, PT  2/17/2020

## 2020-02-18 ENCOUNTER — READMISSION MANAGEMENT (OUTPATIENT)
Dept: CALL CENTER | Facility: HOSPITAL | Age: 45
End: 2020-02-18

## 2020-02-18 ENCOUNTER — TELEPHONE (OUTPATIENT)
Dept: SOCIAL WORK | Facility: HOSPITAL | Age: 45
End: 2020-02-18

## 2020-02-18 NOTE — TELEPHONE ENCOUNTER
SW called pt to address her recent distress screening.  Pt states that things were going very well until over this past weekend, her incision reopened and she had to be seen in the emergency department immediately.  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  She is living with her parents and they are paying for her COBRA insurance.  Pt is concerned about the financial stressors related to her diagnosis and treatment.  SW provided support to pt and informed her of the possible financial resources that may be available to her.  SW encouraged pt to call with any future needs or concerns.  SW will plan to meet with pt at her first date of infusion.  SW available for ongoing support and resource needs.

## 2020-02-18 NOTE — OUTREACH NOTE
General Surgery Week 1 Survey      Responses   Facility patient discharged from?  [place]   Does the patient have one of the following disease processes/diagnoses(primary or secondary)?  General Surgery   Is there a successful TCM telephone encounter documented?  No   Week 1 attempt successful?  Yes   Call start time  1114   Call end time  1118   Discharge diagnosis  endometrial cancer,  hysterectomy   Meds reviewed with patient/caregiver?  Yes   Is the patient having any side effects they believe may be caused by any medication additions or changes?  No   Does the patient have all medications related to this admission filled (includes all antibiotics, pain medications, etc.)  Yes   Is the patient taking all medications as directed (includes completed medication regime)?  Yes   Does the patient have a follow up appointment scheduled with their surgeon?  Yes [2/26/20]   Has the patient kept scheduled appointments due by today?  Yes   Comments  Pt is visiting the wound care nurses r/t wound dehiscence. Pt may have wound vac placed.   Psychosocial issues?  No   Did the patient receive a copy of their discharge instructions?  Yes   Nursing interventions  Reviewed instructions with patient   What is the patient's perception of their health status since discharge?  New symptoms unrelated to diagnosis [Incision dehiscenced about 2 inches. She followed up in ED on 2/15/20.]   Nursing interventions  Nurse provided patient education   Is the patient /caregiver able to teach back basic post-op care?  Lifting as instructed by MD in discharge instructions, Keep incision areas clean,dry and protected, No tub bath, swimming, or hot tub until instructed by MD, Take showers only when approved by MD-sponge bathe until then, Drive as instructed by MD in discharge instructions   Is the patient/caregiver able to teach back signs and symptoms of incisional infection?  Increased redness, swelling or pain at the incisonal site, Increased  drainage or bleeding, Pus or odor from incision, Fever   Is the patient/caregiver able to teach back steps to recovery at home?  Set small, achievable goals for return to baseline health, Rest and rebuild strength, gradually increase activity, Eat a well-balance diet   If the patient is a current smoker, are they able to teach back resources for cessation?  -- [Nonsmoker]   Is the patient/caregiver able to teach back the hierarchy of who to call/visit for symptoms/problems? PCP, Specialist, Home health nurse, Urgent Care, ED, 911  Yes   Week 1 call completed?  Yes          Haritha Marc RN

## 2020-02-19 ENCOUNTER — TELEPHONE (OUTPATIENT)
Dept: GYNECOLOGIC ONCOLOGY | Facility: CLINIC | Age: 45
End: 2020-02-19

## 2020-02-19 NOTE — TELEPHONE ENCOUNTER
I called patient and we discussed pathology report.  There are no surprises on the pathology report and given that there is right ovarian involvement, this is a stage III cancer.  This was necessitate combined carboplatin and at AUC of 6+ paclitaxel 175 mg/m² IV q. 21 days x 6.  I would anticipate radiation following chemotherapy or vaginal brachytherapy at the time of chemotherapy.  Final radiation plan will be coordinated with radiation team.

## 2020-02-20 LAB
BACTERIA SPEC AEROBE CULT: NORMAL
GRAM STN SPEC: NORMAL
GRAM STN SPEC: NORMAL

## 2020-02-21 ENCOUNTER — HOSPITAL ENCOUNTER (OUTPATIENT)
Dept: PHYSICAL THERAPY | Facility: HOSPITAL | Age: 45
Setting detail: THERAPIES SERIES
Discharge: HOME | End: 2020-02-21

## 2020-02-21 DIAGNOSIS — S31.109D OPEN WOUND OF ABDOMEN, SUBSEQUENT ENCOUNTER: ICD-10-CM

## 2020-02-21 DIAGNOSIS — T81.31XD POSTOPERATIVE WOUND DEHISCENCE, SUBSEQUENT ENCOUNTER: Primary | ICD-10-CM

## 2020-02-21 PROCEDURE — 97606 NEG PRS WND THER DME>50 SQCM: CPT

## 2020-02-21 NOTE — THERAPY WOUND CARE TREATMENT
Outpatient Rehabilitation - Wound/Debridement Treatment Note   [facility]     Patient Name: Jeana Chung  : 1975  MRN: [id number]  Today's Date: 2020                 Admit Date: 2020    Visit Dx:    ICD-10-CM ICD-9-CM   1. Postoperative wound dehiscence, subsequent encounter T81.31XD V58.89     998.32   2. Open wound of abdomen, subsequent encounter S31.109D V58.89     879.2       Patient Active Problem List   Diagnosis   • Acute stress reaction with predominately emotional disturbance   • Sleep disturbance   • Cancer related pain   • Moderate episode of recurrent major depressive disorder (CMS/HCC)   • Anxiety   • Iron deficiency anemia   • Type 2 diabetes mellitus with diabetic polyneuropathy, with long-term current use of insulin (CMS/HCC)   • Essential hypertension   • Heart murmur   • Family history of cardiac disorder in mother   • Endometrial cancer (CMS/HCC)        Past Medical History:   Diagnosis Date   • Anemia    • Anxiety and depression    • Back pain    • Bronchitis    • Diabetes (CMS/HCC)     DX 4-5 YRS AGO, CHECKS BS 4X/DAY, LAST A1C 7.1%   • Endometrial cancer (CMS/HCC)     STAGE II   • GERD (gastroesophageal reflux disease)    • Glaucoma    • Hypertension    • Influenza    • MRSA (methicillin resistant staph aureus) culture positive     S/P NECROTIZING FASCITIS IN BILATERAL FEET   • Necrotizing fasciitis (CMS/HCC)    • PCOS (polycystic ovarian syndrome)    • PNA (pneumonia)    • PTSD (post-traumatic stress disorder)    • Sepsis (CMS/HCC)    • Subconjunctival hemorrhage         Past Surgical History:   Procedure Laterality Date   • EXPLORATORY LAPAROTOMY, TOTAL ABDOMINAL HYSTERECTOMY SALPINGO OOPHORECTOMY N/A 2/10/2020    Procedure: EXPLORATORY LAPAROTOMY, TOTAL ABDOMINAL HYSTERECTOMY, BILATERAL SALPINGO-OOPHORECTOMY WITH OPTIMAL  STAGING (R-0), OMENTECTOMY;  Surgeon: Celine Rubio MD;  Location: [facility];  Service: Gynecology Oncology;  Laterality: N/A;   • EYE  SURGERY Left     BLOOD VESSEL IN EYE REPAIR   • TOE AMPUTATION Left 06/2019    big toe - unhealing sore   • TOE AMPUTATION Right 2018    big toe and second toe - Necrotizing fasciitis and MRSA          EVALUATION  PT Ortho     Row Name 02/21/20 1030       Subjective Comments    Subjective Comments  Pt with moderate c/o pain to abdominal wound  -MF       Precautions and Contraindications    Precautions  ABD binder  -MF       Subjective Pain    Able to rate subjective pain?  yes  -MF    Pre-Treatment Pain Level  4  -MF    Post-Treatment Pain Level  4  -MF       Transfers    Sit-Stand [place] (Transfers)  independent  -MF    Stand-Sit [place] (Transfers)  independent  -MF    Comment (Transfers)  supine on stretcher.   -MF       Gait/Stairs Assessment/Training    [place] Level (Gait)  independent  -MF      User Key  (r) = Recorded By, (t) = Taken By, (c) = Cosigned By    Initials Name Provider Type    Vinny Colindres, PT Physical Therapist          LDA Wound     Row Name 02/21/20 1030             Wound 02/17/20 1300 midline abdomen Other (comment)    Wound - Properties Group Date first assessed: 02/17/20  - Time first assessed: 1300  -JM Orientation: midline  - Location: abdomen  - Primary Wound Type: Other  -JM, dehisced surgical incision  Additional Comments: dehisced abd incision  -JM    Dressing Appearance  intact;dry;moist drainage  -      Closure  Adhesive closure strips strips removed prior to wound vac placement.   -MF      Base  clean;moist;red;yellow;subcutaneous;other (see comments)  -MF      Periwound  intact;dry;redness  -      Periwound Temperature  warm  -      Periwound Skin Turgor  soft  -MF      Edges  open  -      Drainage Characteristics/Odor  serosanguineous  -      Drainage Amount  scant  -MF      Care, Wound  irrigated with;sterile normal saline;negative pressure wound therapy  -      Periwound Care, Wound  dry periwound area maintained  -         NPWT  (Negative Pressure Wound Therapy) 02/21/20 1030 midline abdomen    NPWT (Negative Pressure Wound Therapy) - Properties Group Placement Date: 02/21/20  -MF Placement Time: 1030  -MF Location: midline abdomen  -MF    Therapy Setting  continuous therapy  -MF      Dressing  foam, black osmel  -MF      Pressure Setting  125 mmHg  -MF      Sponges Inserted  2 2 black foam   -MF      Sponges Removed  other (see comments) opticel Ag   -MF      Finger sweep complete  Yes  -MF        User Key  (r) = Recorded By, (t) = Taken By, (c) = Cosigned By    Initials Name Provider Type    Vinny Colindres, PT Physical Therapist    Leidy Millard, PT Physical Therapist            WOUND DEBRIDEMENT                       Recommendation and Plan  PT Assessment/Plan     Row Name 02/21/20 1030          PT Assessment    Functional Limitations  Other (comment);Performance in self-care ADL wound care  -     Impairments  Integumentary integrity;Pain  -     Assessment Comments  PT placed wound vac today with osmel to wound base to help improve granulation and increase healing potential.  PT educated pt on home management of wound vac and pt able to verb understanding / management of dressing / canister changes at home.  Pt will benefit from wound vac to help manage exudate and improve healing potential.   -        PT Plan    PT Frequency  2x/week;3x/week  -     Physical Therapy Interventions (Optional Details)  wound care;patient/family education  -     PT Plan Comments  home wound vac changes 2-3 x/week   -       User Key  (r) = Recorded By, (t) = Taken By, (c) = Cosigned By    Initials Name Provider Type    Vinny Colindres, PT Physical Therapist          Goals  PT OP Goals     Row Name 02/21/20 1030          Time Calculation    PT Goal Re-Cert Due Date  05/17/20  -       User Key  (r) = Recorded By, (t) = Taken By, (c) = Cosigned By    Initials Name Provider Type    Vinny Colindres, PT Physical Therapist           PT Goal Re-Cert Due Date: 05/17/20            Time Calculation: Start Time: 1030  Therapy Charges for Today     Code Description Service Date Service Provider Modifiers Qty    60979341752 HC PT NEG PRESS WOUND OVER 50SQCM DME3 2/21/2020 Vinny Lobato, PT  1                  Vinny Lobato, PT  2/21/2020

## 2020-02-22 LAB — BACTERIA SPEC ANAEROBE CULT: NORMAL

## 2020-02-24 ENCOUNTER — READMISSION MANAGEMENT (OUTPATIENT)
Dept: CALL CENTER | Facility: HOSPITAL | Age: 45
End: 2020-02-24

## 2020-02-24 ENCOUNTER — OFFICE VISIT (OUTPATIENT)
Dept: FAMILY MEDICINE CLINIC | Facility: CLINIC | Age: 45
End: 2020-02-24

## 2020-02-24 ENCOUNTER — HOSPITAL ENCOUNTER (OUTPATIENT)
Dept: PHYSICAL THERAPY | Facility: HOSPITAL | Age: 45
Setting detail: THERAPIES SERIES
Discharge: HOME | End: 2020-02-24

## 2020-02-24 VITALS
HEART RATE: 98 BPM | OXYGEN SATURATION: 98 % | WEIGHT: 193.4 LBS | SYSTOLIC BLOOD PRESSURE: 134 MMHG | BODY MASS INDEX: 35.59 KG/M2 | HEIGHT: 62 IN | DIASTOLIC BLOOD PRESSURE: 76 MMHG

## 2020-02-24 DIAGNOSIS — G62.9 NEUROPATHY: Primary | ICD-10-CM

## 2020-02-24 DIAGNOSIS — K21.9 GASTROESOPHAGEAL REFLUX DISEASE, ESOPHAGITIS PRESENCE NOT SPECIFIED: ICD-10-CM

## 2020-02-24 DIAGNOSIS — S31.109D OPEN WOUND OF ABDOMEN, SUBSEQUENT ENCOUNTER: ICD-10-CM

## 2020-02-24 DIAGNOSIS — Z79.4 TYPE 2 DIABETES MELLITUS WITH DIABETIC POLYNEUROPATHY, WITH LONG-TERM CURRENT USE OF INSULIN (HCC): ICD-10-CM

## 2020-02-24 DIAGNOSIS — G62.9 NEUROPATHY: ICD-10-CM

## 2020-02-24 DIAGNOSIS — S98.111A AMPUTATION OF RIGHT GREAT TOE (HCC): ICD-10-CM

## 2020-02-24 DIAGNOSIS — N18.30 STAGE 3 CHRONIC KIDNEY DISEASE (HCC): ICD-10-CM

## 2020-02-24 DIAGNOSIS — R01.1 HEART MURMUR: ICD-10-CM

## 2020-02-24 DIAGNOSIS — D50.9 IRON DEFICIENCY ANEMIA, UNSPECIFIED IRON DEFICIENCY ANEMIA TYPE: ICD-10-CM

## 2020-02-24 DIAGNOSIS — C54.1 ENDOMETRIAL CANCER (HCC): ICD-10-CM

## 2020-02-24 DIAGNOSIS — S98.112A AMPUTATION OF LEFT GREAT TOE (HCC): ICD-10-CM

## 2020-02-24 DIAGNOSIS — E11.42 TYPE 2 DIABETES MELLITUS WITH DIABETIC POLYNEUROPATHY, WITH LONG-TERM CURRENT USE OF INSULIN (HCC): ICD-10-CM

## 2020-02-24 DIAGNOSIS — T81.31XD POSTOPERATIVE WOUND DEHISCENCE, SUBSEQUENT ENCOUNTER: Primary | ICD-10-CM

## 2020-02-24 DIAGNOSIS — Z23 NEED FOR PNEUMOCOCCAL VACCINE: ICD-10-CM

## 2020-02-24 DIAGNOSIS — Z00.00 PHYSICAL EXAM, ANNUAL: Primary | ICD-10-CM

## 2020-02-24 DIAGNOSIS — I10 ESSENTIAL HYPERTENSION: ICD-10-CM

## 2020-02-24 LAB
LAB AP CASE REPORT: NORMAL
PATH REPORT.FINAL DX SPEC: NORMAL

## 2020-02-24 PROCEDURE — 99396 PREV VISIT EST AGE 40-64: CPT | Performed by: PHYSICIAN ASSISTANT

## 2020-02-24 PROCEDURE — 90471 IMMUNIZATION ADMIN: CPT | Performed by: PHYSICIAN ASSISTANT

## 2020-02-24 PROCEDURE — 97605 NEG PRS WND THER DME<=50SQCM: CPT

## 2020-02-24 PROCEDURE — 90732 PPSV23 VACC 2 YRS+ SUBQ/IM: CPT | Performed by: PHYSICIAN ASSISTANT

## 2020-02-24 RX ORDER — PREGABALIN 100 MG/1
100 CAPSULE ORAL 3 TIMES DAILY
Qty: 90 CAPSULE | Refills: 2 | Status: ON HOLD | OUTPATIENT
Start: 2020-02-24 | End: 2020-03-18

## 2020-02-24 RX ORDER — LISINOPRIL 2.5 MG/1
2.5 TABLET ORAL DAILY
COMMUNITY
End: 2020-03-02

## 2020-02-24 RX ORDER — OMEPRAZOLE 20 MG/1
20 CAPSULE, DELAYED RELEASE ORAL DAILY
Qty: 90 CAPSULE | Refills: 1 | Status: SHIPPED | OUTPATIENT
Start: 2020-02-24

## 2020-02-24 RX ORDER — INSULIN GLARGINE 100 [IU]/ML
40 INJECTION, SOLUTION SUBCUTANEOUS NIGHTLY
Qty: 4 PEN | Refills: 2 | Status: SHIPPED | OUTPATIENT
Start: 2020-02-24 | End: 2021-12-06

## 2020-02-24 NOTE — PROGRESS NOTES
Patient Care Team:  Tania Angeles PA-C as PCP - General (Physician Assistant)  Celine Rubio MD as Consulting Physician (Gynecologic Oncology)  Miri Lorenzo CNM as Midwife (Certified Nurse Midwife)     Chief complaint: Patient is in today for a physical     Jeana Jaki Chung is a 44 y.o. female who presents for her yearly physical exam.     HPI     Review of Systems     History  Past Medical History:   Diagnosis Date   • Anemia    • Anxiety and depression    • Back pain    • Bronchitis    • Diabetes (CMS/HCC)     DX 4-5 YRS AGO, CHECKS BS 4X/DAY, LAST A1C 7.1%   • Endometrial cancer (CMS/HCC)     STAGE II   • GERD (gastroesophageal reflux disease)    • Glaucoma    • Hypertension    • Influenza    • MRSA (methicillin resistant staph aureus) culture positive     S/P NECROTIZING FASCITIS IN BILATERAL FEET   • Necrotizing fasciitis (CMS/HCC)    • PCOS (polycystic ovarian syndrome)    • PNA (pneumonia)    • PTSD (post-traumatic stress disorder)    • Sepsis (CMS/HCC)    • Subconjunctival hemorrhage       Past Surgical History:   Procedure Laterality Date   • EXPLORATORY LAPAROTOMY, TOTAL ABDOMINAL HYSTERECTOMY SALPINGO OOPHORECTOMY N/A 2/10/2020    Procedure: EXPLORATORY LAPAROTOMY, TOTAL ABDOMINAL HYSTERECTOMY, BILATERAL SALPINGO-OOPHORECTOMY WITH OPTIMAL  STAGING (R-0), OMENTECTOMY;  Surgeon: Celine Rubio MD;  Location: [facility];  Service: Gynecology Oncology;  Laterality: N/A;   • EYE SURGERY Left     BLOOD VESSEL IN EYE REPAIR   • TOE AMPUTATION Left 06/2019    big toe - unhealing sore   • TOE AMPUTATION Right 2018    big toe and second toe - Necrotizing fasciitis and MRSA       Allergies   Allergen Reactions   • Bactrim [Sulfamethoxazole-Trimethoprim] Anaphylaxis   • Penicillins Hives   • Promethazine Mental Status Change      Family History   Problem Relation Age of Onset   • Fibroids Mother    • Diabetes type II Mother    • Hypertension Mother    • Heart attack Mother    • Fibroids  Sister         PCOS   • Diabetes type II Sister    • Dementia Maternal Grandmother    • Stroke Maternal Grandmother       Social History     Socioeconomic History   • Marital status:      Spouse name: Not on file   • Number of children: 1   • Years of education: Not on file   • Highest education level: Not on file   Tobacco Use   • Smoking status: Former Smoker     Types: Cigarettes     Last attempt to quit: 2000     Years since quittin.1   • Smokeless tobacco: Never Used   • Tobacco comment: social MAYBE 1 CIG/DAY   Substance and Sexual Activity   • Alcohol use: Not Currently     Frequency: Monthly or less     Drinks per session: 1 or 2   • Drug use: Never   • Sexual activity: Not Currently      Current Outpatient Medications on File Prior to Visit   Medication Sig Dispense Refill   • acetaminophen (TYLENOL) 325 MG tablet Take 2 tablets by mouth Every 6 (Six) Hours. 60 tablet 0   • apixaban (ELIQUIS) 5 MG tablet tablet Take 5 mg by mouth 2 (Two) Times a Day.     • cholecalciferol (VITAMIN D3) 1.25 MG (43643 UT) capsule Take 1 capsule by mouth 1 (One) Time Per Week. (Patient taking differently: Take 50,000 Units by mouth 1 (One) Time Per Week. SATURDAY) 12 capsule 3   • Continuous Blood Gluc Sensor (FREESTYLE CAMMY SENSOR SYSTEM) Every 14 (Fourteen) Days. 1 each 1   • docusate sodium (COLACE) 250 MG capsule Take 1 capsule by mouth 2 (Two) Times a Day. 60 capsule 5   • ferrous sulfate 325 (65 FE) MG tablet Take 325 mg by mouth Daily.     • hydrOXYzine pamoate (VISTARIL) 25 MG capsule Take 1 capsule by mouth 3 (Three) Times a Day As Needed for Itching. 30 capsule 1   • insulin glargine (LANTUS) 100 UNIT/ML injection Inject 40 Units under the skin into the appropriate area as directed Every Night.     • insulin lispro (HUMALOG) 100 UNIT/ML injection Inject 15 Units under the skin into the appropriate area as directed 3 (Three) Times a Day Before Meals. (Patient taking differently: Inject  under the skin  into the appropriate area as directed 3 (Three) Times a Day Before Meals. SLIDING SCALE) 13.5 mL 5   • lisinopril (PRINIVIL,ZESTRIL) 2.5 MG tablet Take 2.5 mg by mouth Daily.     • MULTIPLE VITAMIN PO Multiple Vitamin     • Omeprazole (PRILOSEC PO) Take 20 mg by mouth Daily.     • ondansetron (ZOFRAN) 4 MG tablet Take 1 tablet by mouth Every 6 (Six) Hours As Needed for Nausea or Vomiting. 15 tablet 1   • polyethylene glycol (MIRALAX) powder powder Mix entire bottle of miralax with 2 (32 ounce) bottle of gatorade. Chill. Start drinking at 12 pm and finish by 1 pm. 1 each 0   • pregabalin (LYRICA) 100 MG capsule Take 100 mg by mouth 3 (Three) Times a Day.     • [DISCONTINUED] apixaban (ELIQUIS) 5 MG tablet tablet Take 2 tablets by mouth Daily for 21 days. 42 tablet 0   • [DISCONTINUED] lisinopril (PRINIVIL,ZESTRIL) 5 MG tablet Take 5 mg by mouth Daily.     • [DISCONTINUED] nebivolol (BYSTOLIC) 5 MG tablet Take 5 mg by mouth Daily.       No current facility-administered medications on file prior to visit.        Results for orders placed or performed during the hospital encounter of 02/17/20   Wound Culture - Wound, Abdominal Wall   Result Value Ref Range    Wound Culture Rare Normal Skin Yvette     Gram Stain Rare (1+) WBCs seen     Gram Stain No organisms seen    Anaerobic Culture - Swab, Abdominal Wall   Result Value Ref Range    Anaerobic Culture No anaerobes isolated at 5 days        Health Maintenance   Topic Date Due   • ANNUAL PHYSICAL  04/24/1976   • TDAP/TD VACCINES (1 - Tdap) 04/24/1984   • PNEUMOCOCCAL VACCINE (19-64 MEDIUM RISK) (1 of 1 - PPSV23) 04/24/1992   • DIABETIC FOOT EXAM  01/22/2020   • PAP SMEAR  01/22/2020   • DIABETIC EYE EXAM  01/22/2020   • HEMOGLOBIN A1C  08/07/2020   • URINE MICROALBUMIN  01/30/2021   • INFLUENZA VACCINE  Completed       Immunizations  Td/Tdap(Booster Q 10 yrs):  {Immunizations:20350}  Flu (Yearly):  {Immunizations:04996}  Pneumovax (1 yr after Prevnar):   "{Immunizations:05598}  Extfhgc19 (1 yr after Pneumo):  {Immunizations:45001}  Hep B:  {Immunizations:23661}  Shringrix:  {Shingrix:99810}}     There is no immunization history on file for this patient.      Diabetes:  {Yes/No:02844}   Eye Exam: {Diabetes:52813}   Foot Exam:  {Diabetes:31928}  Obesity Counseling:  {Diabetes:89870}  Hemoglobin A1C   Date Value Ref Range Status   2020 7.10 (H) 4.80 - 5.60 % Final     Microalbumin, Urine   Date Value Ref Range Status   2020 29.8 mg/dL Final       Patient's Body mass index is 35.37 kg/m². BMI is {BMI range:93369}.      Colorectal Screening:  {Addtional Screenin}  Pap:  {Addtional Screenin}  Mammogram:  {Addtional Screenin}  PSA(Over age 50):  {Addtional Screenin}  US Aorta (For male smokers, age 65):  {Addtional Screenin}  CT for Smoker (Age 55-75, 30pk yr):  {Addtional Screenin}  Bone Density/DEXA:  {Addtional Screenin}  Hep C ( 1557-9969):  {Addtional Screenin}      Results for orders placed or performed during the hospital encounter of 20   Wound Culture - Wound, Abdominal Wall   Result Value Ref Range    Wound Culture Rare Normal Skin Yvette     Gram Stain Rare (1+) WBCs seen     Gram Stain No organisms seen    Anaerobic Culture - Swab, Abdominal Wall   Result Value Ref Range    Anaerobic Culture No anaerobes isolated at 5 days             Vitals:    20 1216   BP: 134/76   BP Location: Left arm   Patient Position: Sitting   Cuff Size: Adult   Pulse: 98   SpO2: 98%   Weight: 87.7 kg (193 lb 6.4 oz)   Height: 157.5 cm (62\")       Body mass index is 35.37 kg/m².    Physical Exam        Counseling provided on {counseling provided:61332}.    There are no diagnoses linked to this encounter.   Tania Angeles PA-C   2020   12:53 PM         "

## 2020-02-24 NOTE — THERAPY WOUND CARE TREATMENT
Outpatient Rehabilitation - Wound/Debridement Treatment Note  [facility]     Patient Name: Jeana Chung  : 1975  MRN: [id number]  Today's Date: 2020                 Admit Date: 2020    Visit Dx:    ICD-10-CM ICD-9-CM   1. Postoperative wound dehiscence, subsequent encounter T81.31XD V58.89     998.32   2. Open wound of abdomen, subsequent encounter S31.109D V58.89     879.2       Patient Active Problem List   Diagnosis   • Acute stress reaction with predominately emotional disturbance   • Sleep disturbance   • Cancer related pain   • Moderate episode of recurrent major depressive disorder (CMS/HCC)   • Anxiety   • Iron deficiency anemia   • Type 2 diabetes mellitus with diabetic polyneuropathy, with long-term current use of insulin (CMS/HCC)   • Essential hypertension   • Heart murmur   • Family history of cardiac disorder in mother   • Endometrial cancer (CMS/HCC)   • Stage 3 chronic kidney disease (CMS/HCC)   • Gastroesophageal reflux disease   • Amputation of left great toe (CMS/HCC)   • Amputation of right great toe (CMS/HCC)   • Neuropathy        Past Medical History:   Diagnosis Date   • Anemia    • Anxiety and depression    • Back pain    • Bronchitis    • Diabetes (CMS/HCC)     DX 4-5 YRS AGO, CHECKS BS 4X/DAY, LAST A1C 7.1%   • Endometrial cancer (CMS/HCC)     STAGE II   • GERD (gastroesophageal reflux disease)    • Glaucoma    • Hypertension    • Influenza    • MRSA (methicillin resistant staph aureus) culture positive     S/P NECROTIZING FASCITIS IN BILATERAL FEET   • Necrotizing fasciitis (CMS/HCC)    • PCOS (polycystic ovarian syndrome)    • PNA (pneumonia)    • PTSD (post-traumatic stress disorder)    • Sepsis (CMS/HCC)    • Stage 3 chronic kidney disease (CMS/HCC) 2020   • Subconjunctival hemorrhage         Past Surgical History:   Procedure Laterality Date   • EXPLORATORY LAPAROTOMY, TOTAL ABDOMINAL HYSTERECTOMY SALPINGO OOPHORECTOMY N/A 2/10/2020    Procedure:  EXPLORATORY LAPAROTOMY, TOTAL ABDOMINAL HYSTERECTOMY, BILATERAL SALPINGO-OOPHORECTOMY WITH OPTIMAL  STAGING (R-0), OMENTECTOMY;  Surgeon: Celine Rubio MD;  Location: [facility];  Service: Gynecology Oncology;  Laterality: N/A;   • EYE SURGERY Left     BLOOD VESSEL IN EYE REPAIR   • TOE AMPUTATION Left 06/2019    big toe - unhealing sore   • TOE AMPUTATION Right 2018    big toe and second toe - Necrotizing fasciitis and MRSA          EVALUATION  PT Ortho     Row Name 02/24/20 1015       Subjective Comments    Subjective Comments  Pt with mild c/o burning to incisional line / wound  -MF       Subjective Pain    Able to rate subjective pain?  yes  -MF    Pre-Treatment Pain Level  4  -MF    Post-Treatment Pain Level  4  -MF       Transfers    Sit-Stand [place] (Transfers)  independent  -MF    Stand-Sit [place] (Transfers)  independent  -MF    Comment (Transfers)  supine on stretcher  -MF       Gait/Stairs Assessment/Training    [place] Level (Gait)  independent  -MF      User Key  (r) = Recorded By, (t) = Taken By, (c) = Cosigned By    Initials Name Provider Type     Vinny Lobato, PT Physical Therapist          [facility] Wound     Row Name 02/24/20 1015             Wound 02/17/20 1300 midline abdomen Other (comment)    Wound - Properties Group Date first assessed: 02/17/20  - Time first assessed: 1300  -JM Orientation: midline  - Location: abdomen  - Primary Wound Type: Other  -JM, dehisced surgical incision  Additional Comments: dehisced abd incision  -    Dressing Appearance  intact;dry;moist drainage  -MF      Closure  -- closure strips removed with vac dressing removal  -MF      Base  clean;moist;red;yellow;subcutaneous;other (see comments)  -MF      Periwound  intact;dry;redness  -      Periwound Temperature  warm  -      Periwound Skin Turgor  soft  -MF      Edges  open  -MF      Drainage Characteristics/Odor  serosanguineous  -MF      Drainage Amount  scant  -MF      Care, Wound   irrigated with;sterile normal saline;negative pressure wound therapy  -MF      Periwound Care, Wound  dry periwound area maintained  -         NPWT (Negative Pressure Wound Therapy) 02/21/20 1030 midline abdomen    NPWT (Negative Pressure Wound Therapy) - Properties Group Placement Date: 02/21/20  -MF Placement Time: 1030  -MF Location: midline abdomen  -MF    Therapy Setting  continuous therapy  -MF      Dressing  foam, black osmel  -MF      Pressure Setting  125 mmHg  -MF      Sponges Inserted  2 2 black   -MF      Sponges Removed  2 black   -MF      Finger sweep complete  Yes  -MF        User Key  (r) = Recorded By, (t) = Taken By, (c) = Cosigned By    Initials Name Provider Type    Vinny Colindres, PT Physical Therapist    Leidy Millard, PT Physical Therapist            WOUND DEBRIDEMENT                       Recommendation and Plan  PT Assessment/Plan     Row Name 02/24/20 1015          PT Assessment    Functional Limitations  Other (comment);Performance in self-care ADL wound care  -     Impairments  Integumentary integrity;Pain  -     Assessment Comments  wound vac helping to manage exudate and improve healing potential.  PT will cont with wound vac with osmel to base of wound to help decrease inflammation and bioburden.   -        PT Plan    PT Frequency  2x/week;3x/week  -     Physical Therapy Interventions (Optional Details)  wound care;patient/family education  -     PT Plan Comments  wound vac  -       User Key  (r) = Recorded By, (t) = Taken By, (c) = Cosigned By    Initials Name Provider Type    Vinny Colindres, PT Physical Therapist          Goals  PT OP Goals     Row Name 02/24/20 1015          Time Calculation    PT Goal Re-Cert Due Date  05/17/20  -       User Key  (r) = Recorded By, (t) = Taken By, (c) = Cosigned By    Initials Name Provider Type    Vinny Colindres, PT Physical Therapist          PT Goal Re-Cert Due Date: 05/17/20            Time  Calculation: Start Time: 1015  Therapy Charges for Today     Code Description Service Date Service Provider Modifiers Qty    81933606528 HC PT NEG PRESS WOUND TO 50SQCM DME2 2/24/2020 Vinny Lobato, PT GP 1                  Vinny Lobato, PT  2/24/2020

## 2020-02-24 NOTE — OUTREACH NOTE
General Surgery Week 2 Survey      Responses   Facility patient discharged from?  [facility]   Does the patient have one of the following disease processes/diagnoses(primary or secondary)?  General Surgery   Week 2 attempt successful?  Yes   Call start time  1214   Call end time  1217   Discharge diagnosis  endometrial cancer,  hysterectomy   Meds reviewed with patient/caregiver?  Yes   Is the patient taking all medications as directed (includes completed medication regime)?  Yes   Has the patient kept scheduled appointments due by today?  Yes   Comments  Pt has a wound vac for wound dehiscenced. PCP appt 2/24/20   Home health comments  Pt is inquiring at PCP office on 2/24/20 if she can get home health for wound care. She's unable to drive and has appts every other day.   What is the patient's perception of their health status since discharge?  Improving [Pt's had some hypotension, dizziness, and pain. ]   Nursing interventions  Nurse provided patient education   Is the patient/caregiver able to teach back signs and symptoms of incisional infection?  Pus or odor from incision [Incision looks good]   Week 2 call completed?  Yes   Wrap up additional comments  Pt was at PCP office at time of call. She had end call.           Haritha Marc RN

## 2020-02-24 NOTE — PATIENT INSTRUCTIONS
"Stop lantus.  Start basaglar 40 units nightly.  Increase 2 units every 3 days until fasting blood sugar is between .    The [place] Study book    General Health Maintenance:  -Yearly dermatology exam  -Yearly eye exam if you are diabetic, otherwise every 2 years for patients without diabetes  -Dental exams/cleanings at least twice a year  -Staying current on your required colonoscopy, starts at age 50 unless there are other indications prior  -Regular pap smears (at least every 1-3 years until age 65)  -Yearly pelvic and breast exams  -Yearly mammograms starting at age 40    Vaccines:  -Talk to pharmacist about shingrix shot  -Obtain flu shot when available  -Health department is recommending Hepatitis A vaccine    The largest impact you can have on your own health is getting the proper nutrition, exercise and maintaining an overall healthy body weight.  Proper nutrition involves eating lots of vegetables, fruit, minimal lean meat and avoiding processed foods and limiting refined sugars, carbohydrates and starches (\"the white stuff\").  Drinking at least 8 cups of water daily.  Walking at least 30 minutes a day and if possible, increasing this to a more cardiovascular aerobic exercise.  Maintaining a healthy body weight.      If you smoke or use tobacco products, quitting is extremely important and I am happy to discuss options with you regarding how to do this and what's available to assist you.    If you consume alcohol, limit your alcohol intake to 2 drinks a day for men and 1 drink a day for woman.    It is also important to make sure to keep regular appointments and have all general health maintenance items completed in a timely manner.      Thank you for entrusting me with your care.  It is a pleasure and honor to participate in your health.        "

## 2020-02-24 NOTE — PROGRESS NOTES
Patient Care Team:  Tania Angeles PA-C as PCP - General (Physician Assistant)  Celine Rubio MD as Consulting Physician (Gynecologic Oncology)  Miri Lorenzo CNM as Midwife (Certified Nurse Midwife)     Chief complaint: Patient is in today for a physical     Jeana Jaki Chung is a 44 y.o. female who presents for her yearly physical exam.     Patient is a pleasant 44-year-old white female who presents for routine annual physical exam.  Patient recently underwent complete hysterectomy by Dr. Rubio and was staged with endometrial cancer stage III.  She has an appointment tomorrow to determine when she will be starting chemotherapy and radiation.  She reports that she is doing well overall and was seen by wound care today for abdominal incision dehiscence.  She is monitoring her blood sugars closely and they have been elevated recently.  She is currently taking Lantus 40 units nightly and we discussed increasing this dose until her fasting blood sugar is between 90 and 120 in the mornings.  She is taking Humalog 15 units 3 times a day at meals.  She has kidney dysfunction and I have recommended avoiding oral medications at this point for her diabetes.  She also has upcoming chemotherapy and I want to avoid GLP-1 injectables that may cause additional nausea and vomiting.  Recent hemoglobin AIC was 7.1%.  She has bilateral amputation of big toe with worsening dry skin along right healed incision.  Will refer to podiatry to establish care.  She has ongoing anemia, not taking iron supplements.  She is asymptomatic.  Assume this will improve now that she is not having vaginal bleeding.  She is on eliquis 5 mg BID for blood clot prevention, will discontinue after 21 days post-op.  Avoiding NSAIDs currently and should continue given CKD.  Blood pressure is stable and well-controlled today without medication.  Has had dizziness and hypotension at home.  Discontinue lisinopril 5 mg and bystolic 5 mg  daily.  Reviewed recent labs with patient.  Prescribed vitamin D supplement.  Will hold on echocardiogram until further out.  She will discuss mammogram with Dr. Romo.       Review of Systems   Constitutional: Positive for fatigue. Negative for chills, diaphoresis and fever.   HENT: Negative for congestion, ear pain, hearing loss, postnasal drip, rhinorrhea and sore throat.    Eyes: Negative for blurred vision and pain.   Respiratory: Negative for cough, shortness of breath and wheezing.    Cardiovascular: Negative for chest pain and leg swelling.   Gastrointestinal: Positive for abdominal pain. Negative for blood in stool, constipation, diarrhea, nausea, vomiting and indigestion.   Endocrine: Negative for polyuria.   Genitourinary: Negative for dysuria, flank pain and hematuria.   Musculoskeletal: Negative for arthralgias, gait problem and myalgias.   Skin: Positive for dry skin and skin lesions. Negative for rash.   Neurological: Positive for numbness. Negative for dizziness, weakness and headache.   Psychiatric/Behavioral: Positive for stress. Negative for agitation, behavioral problems, self-injury, sleep disturbance, suicidal ideas and depressed mood. The patient is not nervous/anxious.         History  Past Medical History:   Diagnosis Date   • Anemia    • Anxiety and depression    • Back pain    • Bronchitis    • Diabetes (CMS/HCC)     DX 4-5 YRS AGO, CHECKS BS 4X/DAY, LAST A1C 7.1%   • Endometrial cancer (CMS/HCC)     STAGE II   • GERD (gastroesophageal reflux disease)    • Glaucoma    • Hypertension    • Influenza    • MRSA (methicillin resistant staph aureus) culture positive     S/P NECROTIZING FASCITIS IN BILATERAL FEET   • Necrotizing fasciitis (CMS/HCC)    • PCOS (polycystic ovarian syndrome)    • PNA (pneumonia)    • PTSD (post-traumatic stress disorder)    • Sepsis (CMS/HCC)    • Stage 3 chronic kidney disease (CMS/HCC) 2/24/2020   • Subconjunctival hemorrhage       Past Surgical History:    Procedure Laterality Date   • EXPLORATORY LAPAROTOMY, TOTAL ABDOMINAL HYSTERECTOMY SALPINGO OOPHORECTOMY N/A 2/10/2020    Procedure: EXPLORATORY LAPAROTOMY, TOTAL ABDOMINAL HYSTERECTOMY, BILATERAL SALPINGO-OOPHORECTOMY WITH OPTIMAL  STAGING (R-0), OMENTECTOMY;  Surgeon: Celine Rubio MD;  Location: [facility];  Service: Gynecology Oncology;  Laterality: N/A;   • EYE SURGERY Left     BLOOD VESSEL IN EYE REPAIR   • TOE AMPUTATION Left 2019    big toe - unhealing sore   • TOE AMPUTATION Right 2018    big toe and second toe - Necrotizing fasciitis and MRSA       Allergies   Allergen Reactions   • Bactrim [Sulfamethoxazole-Trimethoprim] Anaphylaxis   • Penicillins Hives   • Promethazine Mental Status Change      Family History   Problem Relation Age of Onset   • Fibroids Mother    • Diabetes type II Mother    • Hypertension Mother    • Heart attack Mother    • Fibroids Sister         PCOS   • Diabetes type II Sister    • Dementia Maternal Grandmother    • Stroke Maternal Grandmother       Social History     Socioeconomic History   • Marital status:      Spouse name: Not on file   • Number of children: 1   • Years of education: Not on file   • Highest education level: Not on file   Tobacco Use   • Smoking status: Former Smoker     Types: Cigarettes     Last attempt to quit: 2000     Years since quittin.1   • Smokeless tobacco: Never Used   • Tobacco comment: social MAYBE 1 CIG/DAY   Substance and Sexual Activity   • Alcohol use: Not Currently     Frequency: Monthly or less     Drinks per session: 1 or 2   • Drug use: Never   • Sexual activity: Not Currently      Current Outpatient Medications on File Prior to Visit   Medication Sig Dispense Refill   • acetaminophen (TYLENOL) 325 MG tablet Take 2 tablets by mouth Every 6 (Six) Hours. 60 tablet 0   • apixaban (ELIQUIS) 5 MG tablet tablet Take 5 mg by mouth 2 (Two) Times a Day.     • cholecalciferol (VITAMIN D3) 1.25 MG (61681 UT) capsule Take 1 capsule  by mouth 1 (One) Time Per Week. (Patient taking differently: Take 50,000 Units by mouth 1 (One) Time Per Week. SATURDAY) 12 capsule 3   • Continuous Blood Gluc Sensor (FREESTYLE CAMMY SENSOR SYSTEM) Every 14 (Fourteen) Days. 1 each 1   • docusate sodium (COLACE) 250 MG capsule Take 1 capsule by mouth 2 (Two) Times a Day. 60 capsule 5   • hydrOXYzine pamoate (VISTARIL) 25 MG capsule Take 1 capsule by mouth 3 (Three) Times a Day As Needed for Itching. 30 capsule 1   • lisinopril (PRINIVIL,ZESTRIL) 2.5 MG tablet Take 2.5 mg by mouth Daily.     • MULTIPLE VITAMIN PO Multiple Vitamin     • ondansetron (ZOFRAN) 4 MG tablet Take 1 tablet by mouth Every 6 (Six) Hours As Needed for Nausea or Vomiting. 15 tablet 1   • polyethylene glycol (MIRALAX) powder powder Mix entire bottle of miralax with 2 (32 ounce) bottle of gatorade. Chill. Start drinking at 12 pm and finish by 1 pm. 1 each 0   • pregabalin (LYRICA) 100 MG capsule Take 100 mg by mouth 3 (Three) Times a Day.     • [DISCONTINUED] apixaban (ELIQUIS) 5 MG tablet tablet Take 2 tablets by mouth Daily for 21 days. 42 tablet 0   • [DISCONTINUED] ferrous sulfate 325 (65 FE) MG tablet Take 325 mg by mouth Daily.     • [DISCONTINUED] insulin glargine (LANTUS) 100 UNIT/ML injection Inject 40 Units under the skin into the appropriate area as directed Every Night.     • [DISCONTINUED] insulin lispro (HUMALOG) 100 UNIT/ML injection Inject 15 Units under the skin into the appropriate area as directed 3 (Three) Times a Day Before Meals. (Patient taking differently: Inject  under the skin into the appropriate area as directed 3 (Three) Times a Day Before Meals. SLIDING SCALE) 13.5 mL 5   • [DISCONTINUED] lisinopril (PRINIVIL,ZESTRIL) 5 MG tablet Take 5 mg by mouth Daily.     • [DISCONTINUED] nebivolol (BYSTOLIC) 5 MG tablet Take 5 mg by mouth Daily.     • [DISCONTINUED] Omeprazole (PRILOSEC PO) Take 20 mg by mouth Daily.       No current facility-administered medications on file  prior to visit.        Results for orders placed or performed during the hospital encounter of 20   Wound Culture - Wound, Abdominal Wall   Result Value Ref Range    Wound Culture Rare Normal Skin Yvette     Gram Stain Rare (1+) WBCs seen     Gram Stain No organisms seen    Anaerobic Culture - Swab, Abdominal Wall   Result Value Ref Range    Anaerobic Culture No anaerobes isolated at 5 days        Health Maintenance   Topic Date Due   • TDAP/TD VACCINES (1 - Tdap) 1984   • PNEUMOCOCCAL VACCINE (19-64 MEDIUM RISK) (1 of 1 - PPSV23) 1992   • MAMMOGRAM  2020   • HEMOGLOBIN A1C  2020   • URINE MICROALBUMIN  2021   • DIABETIC FOOT EXAM  2021   • DIABETIC EYE EXAM  2021   • ANNUAL PHYSICAL  2021   • INFLUENZA VACCINE  Completed   • PAP SMEAR  Discontinued       Immunizations  Td/Tdap(Booster Q 10 yrs):  N/A  Flu (Yearly):  Completed  Pneumovax (1 yr after Prevnar):  Prescribed  [id number] (1 yr after Pneumo):  N/A  Hep B:  Completed  Shringrix:  N/A}   There is no immunization history for the selected administration types on file for this patient.      Diabetes:  Yes   Eye Exam: Complete   Foot Exam:  Complete, referred to podiatry  Obesity Counseling:  Complete  Hemoglobin A1C   Date Value Ref Range Status   2020 7.10 (H) 4.80 - 5.60 % Final     Microalbumin, Urine   Date Value Ref Range Status   2020 29.8 mg/dL Final       Patient's Body mass index is 35.37 kg/m². BMI is above normal parameters. Recommendations include: exercise counseling and nutrition counseling.      Colorectal Screening:  N/A  Pap:  full hysterectomy  Mammogram:  pending order with oncology/gynecology  PSA(Over age 50):  N/A  US Aorta (For male smokers, age 65):  N/A  CT for Smoker (Age 55-75, 30pk yr):  N/A  Bone Density/DEXA:  N/A  Hep C ( 7004-7618):  N/A      Results for orders placed or performed during the hospital encounter of 20   Wound Culture - Wound, Abdominal Wall  "  Result Value Ref Range    Wound Culture Rare Normal Skin Yvette     Gram Stain Rare (1+) WBCs seen     Gram Stain No organisms seen    Anaerobic Culture - Swab, Abdominal Wall   Result Value Ref Range    Anaerobic Culture No anaerobes isolated at 5 days             Vitals:    02/24/20 1216   BP: 134/76   BP Location: Left arm   Patient Position: Sitting   Cuff Size: Adult   Pulse: 98   SpO2: 98%   Weight: 87.7 kg (193 lb 6.4 oz)   Height: 157.5 cm (62\")       Body mass index is 35.37 kg/m².    Physical Exam   Constitutional: She is oriented to person, place, and time. Vital signs are normal. She appears well-developed and well-nourished. She is active and cooperative. She does not appear ill. No distress. She is obese.  HENT:   Head: Normocephalic and atraumatic.   Right Ear: Hearing, tympanic membrane, external ear and ear canal normal.   Left Ear: Hearing, tympanic membrane, external ear and ear canal normal.   Nose: Nose normal. Right sinus exhibits no maxillary sinus tenderness and no frontal sinus tenderness. Left sinus exhibits no maxillary sinus tenderness and no frontal sinus tenderness.   Mouth/Throat: Uvula is midline, oropharynx is clear and moist and mucous membranes are normal.   Eyes: Conjunctivae, EOM and lids are normal.   Neck: Trachea normal, normal range of motion and phonation normal. No thyroid mass and no thyromegaly present.   Cardiovascular: Normal rate, regular rhythm and normal heart sounds.   Pulmonary/Chest: Effort normal and breath sounds normal.   Abdominal: Normal appearance. There is no rigidity and no CVA tenderness.   Musculoskeletal: Normal range of motion. She exhibits no edema.    Diabetic foot exam performed: bilateral amputation of big toes.  Skin Integrity  -  She has callous right foot..  Lymphadenopathy:     She has no cervical adenopathy.        Right cervical: No superficial cervical adenopathy present.       Left cervical: No superficial cervical adenopathy present. "   Neurological: She is alert and oriented to person, place, and time. She has normal strength and normal reflexes. Coordination and gait normal.   CN grossly intact   Skin: Skin is warm and intact. No rash noted. She is not diaphoretic. No cyanosis or erythema. Nails show no clubbing.   Psychiatric: She has a normal mood and affect. Her speech is normal and behavior is normal. Judgment and thought content normal. She is not actively hallucinating. Cognition and memory are normal. She is attentive.   Vitals reviewed.          Current outpatient and discharge medications have been reconciled for the patient.  Reviewed by: Tania Angeles PA-C    Counseling provided on diet and nutrition, exercise, weight management, supplements, mental health concerns, hypertension, diabetes and anxiety.    Jeana was seen today for annual exam.    Diagnoses and all orders for this visit:    Physical exam, annual  PE is unremarkable  Preventative labs reviewed  Full hysterectomy  Mammogram - pending oncology order  Dentist and Ophthalmologist are up-to-date  Dermatologist - no concerning moles or lesions  Flu vaccine completed, will prescribe pneumonia 23 today    Essential hypertension  Has had hypotension and dizziness in the last few days.  Remain off of bystolic.  Monitor at home, consider adding lisinopril 2.5 mg daily.    Heart murmur  Not appreciated on exam today.  May be related to anemia previously.  Okay to hold on echocardiogram until after treatment for cancer.    Type 2 diabetes mellitus with diabetic polyneuropathy, with long-term current use of insulin (CMS/[facility])  -     Insulin Glargine (BASAGLAR KWIKPEN) 100 UNIT/ML injection pen; Inject 40 Units under the skin into the appropriate area as directed Every Night.  -     insulin lispro (HUMALOG) 100 UNIT/ML injection; Inject 15 Units under the skin into the appropriate area as directed 3 (Three) Times a Day Before Meals.  -     Ambulatory Referral to  Podiatry  Recent hemoglobin AIC was 7.1%.  Repeat in 3 months.  Switch from lantus to basaglar.  Avoid oral medications due to CKD and upcoming chemotherapy.  Avoid GLP-1 due to possible addition of nausea and diarrhea.  Continue humalog.  Refer to podiatry for feet.    Stage 3 chronic kidney disease (CMS/HCC)  Avoid NSAIDs.  Drink lots of water.  Repeat in 3 months.    Neuropathy  -     Ambulatory Referral to Podiatry  Taking lyrica 100 mg TID.  Will order vinny, ZAY and shaggy.  Recently had pain management secondary to complete hysterectomy for endometrial cancer.    Amputation of left great toe (CMS/HCC)  -     Ambulatory Referral to Podiatry    Amputation of right great toe (CMS/HCC)  -     Ambulatory Referral to Podiatry    Endometrial cancer (CMS/HCC)  Stage 3.  Recent surgery, recovering well.  Abdominal wound dehiscence, has wound vac and followed by wound care.  Follow-up tomorrow with Dr. Romo regarding chemotherapy and radiation treatment.    Iron deficiency anemia, unspecified iron deficiency anemia type  Expect this to improve now that she is not having any vaginal bleeding.  Asymptomatic.  Not taking iron supplements.  Repeat in 3 months.    Gastroesophageal reflux disease, esophagitis presence not specified  -     omeprazole (PrilOSEC) 20 MG capsule; Take 1 capsule by mouth Daily.  Stable, well-controlled on omeprazole.    Need for pneumococcal vaccine  -     Pneumococcal polysaccharide vaccine 23-valent >= 1yo subcutaneous/IM (PPSV23)       Tania Angeles PA-C   2/24/2020   1:31 PM

## 2020-02-24 NOTE — PROGRESS NOTES
Jeana Chung  [id number]  1975    Reason for Visit: Advanced endometrial cancer, discussion of treatment plan, postoperative evaluation, anxiety related to health, hypotension on diuretic    History of Present Illness:  Patient is a very pleasant 44 y.o. woman who presents for a post operative evaluation status post oratory laparotomy, total abdominal hysterectomy, bilateral salpingo-oophorectomy, with optimal staging (R-0), omentectomy performed on 2/10/2020.  Patient is accompanied by her mother.    Surgery and hospital course were uncomplicated.  Patient presented to the emergency department 2/15/2020 after her wound opened up on postop day 6.  Wound was probed and fascia was intact.  The wound was packed with wet-to-dry dressing.  Patient was seen in wound care on 2/21/2020 and a wound vacuum was placed.  She continues to follow with wound care and saw them today.    Today, patient has many concerns.  She reports that she has decreased appetite and does not eat much.  She is thinking about starting protein shakes.  She is drinking and adequate amount of fluids. She reports increased fatigue as well as emotional lability and notes that she will find herself tearful often during the day.  In addition she reports her fingersticks have been uncontrolled.  Her blood pressure is also been low and she has stopped taking most of her blood pressure medications.  This morning her blood pressure was 80/50 and she felt dizzy and weak.  Her pain is overall well controlled.  She denies any vaginal bleeding or discharge.  She has normal bowel and bladder function.     Past Medical History, Past Surgical History, Social History, Family History have been reviewed and are without significant changes except as mentioned.    Review of Systems   All other systems were reviewed and are negative except as mentioned above.    Medications:  The current medication list was reviewed in the EMR    ALLERGIES:    Allergies  "  Allergen Reactions   • Bactrim [Sulfamethoxazole-Trimethoprim] Anaphylaxis   • Penicillins Hives   • Promethazine Mental Status Change     /59   Pulse 101   Temp 96.2 °F (35.7 °C) (Temporal)   Resp 18   Ht 157.5 cm (62\")   Wt 85.7 kg (189 lb)   LMP  (LMP Unknown)   SpO2 97%   BMI 34.57 kg/m²      Physical Exam  Constitutional:  Patient is a pleasant woman in no acute distress.  Gastrointestinal: Abdomen is soft and appropriately tender.  There is no mass palpated.  There is no rebound or guarding.  Incision is clean, dry and intact and wound vacuum is in place.  Extremities:  Bilateral lower extremities are non-tender.  Gynecologic:External genitalia are free from lesion. On speculum examination, the vaginal cuff was intact and no lesions were appreciated.  On bimanual examination, no fullness was appreciated.  Uterus, cervix and adnexa were absent.  There was no significant tenderness.       PATHOLOGY:  1. UTERUS, HYSTERECTOMY:  Endometrioid carcinoma.  Lymphovascular invasion identified.  See CAP protocol.  2. CERVIX, RESECTION:  Endometrioid carcinoma involving cervical stroma.  Vaginal margin negative for malignancy.   3. OVARY AND FALLOPIAN TUBE, RIGHT SALPINGO-OOPHORECTOMY:  Endometrioid carcinoma involving ovary and fallopian tube.  4. RIGHT PARACOLIC GUTTER, BIOPSY:  Benign soft tissue with heterotopic ossification.   Negative for malignancy.   5. OVARY AND FALLOPIAN TUBE, LEFT SALPINGO-OOPHORECTOMY:  Benign ovary and fallopian tube and fragment of soft tissue with fat necrosis and dystrophic calcifications.   Negative for malignancy.   6. OMENTUM, EXCISION:  Mature adipose tissue consistent with omentum.  Negative for malignancy.   7. \"RIGHT UTERINE VESSEL\", EXCISION:  Fibrovascular and adipose tissue with calcification and acute inflammation.  Negative for malignancy.     ASSESSMENT/PLAN:  Jeana Chung returns for a post-operative evaluation today.    Encounter Diagnoses   Name " Primary?   • Endometrial cancer (CMS/HCC) Yes   • Acute stress reaction with predominately emotional disturbance    • Post-operative state    • Type 2 diabetes mellitus with diabetic polyneuropathy, with long-term current use of insulin (CMS/HCC)    • Neuropathy    • Hypotension due to drugs          Stage IIIA endometrial cancer  -Reviewed NCCN guidelines and recommended chemotherapy and brachytherapy  -She will need combined carboplatin and at AUC of 6+ paclitaxel 175 mg/m² IV q. 21 days x 6 cycles.  We reviewed the inherent side effects of Carboplatin/Paclitaxel chemotherapy, including but not limited to: bone marrow suppression, peripheral neuropathy, fatigue, alopecia, constipation, and less commonly nausea/vomiting, allergic reaction, extravasation.   -Explained that she will see Radiation Oncology and they will further explain vaginal brachytherapy, but that she can complete this simultaneously.  -Reviewed IV access including peripheral IV, PICC, PAC.  Patient interested in PICC.    Emotional distress, anxiety about health  -Patient and patient's mother both upset during today's interview  -Recommended and encouraged seeing a counselor to help cope  -Patient previously took antidepressants, however would have suicidal ideations.  Had been on Ativan and primary care doctor stated they would prefer not to prescribe Ativan.  Rx for ativan sent.     Would dehiscence  -Wound vacuum, management per wound care  -Dr. Rubio reviewed images and chart.  There is no evidence of infection.  Can proceed with chemotherapy as soon as precertification, PICC complete    Comorbidities  -Obesity: complicates all aspects of her care  -DM II: discussed that she will require sliding scale insulin during her pretreatment steroids.  H/O diabetic neuropathy.  -Hypertension: encouraged holding antihypertensives while she continues to be hypotensive      Overall, the patient is very pleased with her care.  I recommended continuation  of post operative precautions as discussed.     She is to follow up for chemotherapy teaching.    Patient was seen and examined with Dr. Renteria,  resident, who performed portions of the examination and documentation for this patient's care under my direct supervision.  I agree with the above documentation and plan.    Celine Rubio MD  02/27/20  8:39 PM

## 2020-02-25 ENCOUNTER — MDT ASSESSMENT (OUTPATIENT)
Dept: ONCOLOGY | Facility: CLINIC | Age: 45
End: 2020-02-25

## 2020-02-26 ENCOUNTER — HOSPITAL ENCOUNTER (OUTPATIENT)
Dept: PHYSICAL THERAPY | Facility: HOSPITAL | Age: 45
Setting detail: THERAPIES SERIES
Discharge: HOME | End: 2020-02-26

## 2020-02-26 ENCOUNTER — OFFICE VISIT (OUTPATIENT)
Dept: GYNECOLOGIC ONCOLOGY | Facility: CLINIC | Age: 45
End: 2020-02-26

## 2020-02-26 VITALS
DIASTOLIC BLOOD PRESSURE: 59 MMHG | WEIGHT: 189 LBS | HEART RATE: 101 BPM | RESPIRATION RATE: 18 BRPM | SYSTOLIC BLOOD PRESSURE: 116 MMHG | BODY MASS INDEX: 34.78 KG/M2 | TEMPERATURE: 96.2 F | HEIGHT: 62 IN | OXYGEN SATURATION: 97 %

## 2020-02-26 DIAGNOSIS — S31.109D OPEN WOUND OF ABDOMEN, SUBSEQUENT ENCOUNTER: ICD-10-CM

## 2020-02-26 DIAGNOSIS — Z79.4 TYPE 2 DIABETES MELLITUS WITH DIABETIC POLYNEUROPATHY, WITH LONG-TERM CURRENT USE OF INSULIN (HCC): ICD-10-CM

## 2020-02-26 DIAGNOSIS — E11.42 TYPE 2 DIABETES MELLITUS WITH DIABETIC POLYNEUROPATHY, WITH LONG-TERM CURRENT USE OF INSULIN (HCC): ICD-10-CM

## 2020-02-26 DIAGNOSIS — T81.31XD POSTOPERATIVE WOUND DEHISCENCE, SUBSEQUENT ENCOUNTER: Primary | ICD-10-CM

## 2020-02-26 DIAGNOSIS — Z98.890 POST-OPERATIVE STATE: ICD-10-CM

## 2020-02-26 DIAGNOSIS — I95.2 HYPOTENSION DUE TO DRUGS: ICD-10-CM

## 2020-02-26 DIAGNOSIS — C54.1 ENDOMETRIAL CANCER (HCC): Primary | ICD-10-CM

## 2020-02-26 DIAGNOSIS — G62.9 NEUROPATHY: ICD-10-CM

## 2020-02-26 DIAGNOSIS — F43.0 ACUTE STRESS REACTION WITH PREDOMINATELY EMOTIONAL DISTURBANCE: ICD-10-CM

## 2020-02-26 PROCEDURE — 99214 OFFICE O/P EST MOD 30 MIN: CPT | Performed by: OBSTETRICS & GYNECOLOGY

## 2020-02-26 PROCEDURE — 97597 DBRDMT OPN WND 1ST 20 CM/<: CPT

## 2020-02-26 PROCEDURE — 97605 NEG PRS WND THER DME<=50SQCM: CPT

## 2020-02-26 RX ORDER — LORAZEPAM 1 MG/1
1 TABLET ORAL EVERY 6 HOURS PRN
Qty: 30 TABLET | Refills: 3 | Status: SHIPPED | OUTPATIENT
Start: 2020-02-26 | End: 2020-02-26 | Stop reason: SDUPTHER

## 2020-02-26 RX ORDER — LORAZEPAM 1 MG/1
1 TABLET ORAL EVERY 6 HOURS PRN
Qty: 30 TABLET | Refills: 3 | Status: SHIPPED | OUTPATIENT
Start: 2020-02-26 | End: 2021-12-06

## 2020-02-26 NOTE — THERAPY WOUND CARE TREATMENT
Outpatient Rehabilitation - Wound/Debridement Treatment Note  [facility]     Patient Name: Jeana Chung  : 1975  MRN: [id number]  Today's Date: 2020                 Admit Date: 2020    Visit Dx:    ICD-10-CM ICD-9-CM   1. Postoperative wound dehiscence, subsequent encounter T81.31XD V58.89     998.32   2. Open wound of abdomen, subsequent encounter S31.109D V58.89     879.2       Patient Active Problem List   Diagnosis   • Acute stress reaction with predominately emotional disturbance   • Sleep disturbance   • Cancer related pain   • Moderate episode of recurrent major depressive disorder (CMS/HCC)   • Anxiety   • Iron deficiency anemia   • Type 2 diabetes mellitus with diabetic polyneuropathy, with long-term current use of insulin (CMS/HCC)   • Essential hypertension   • Heart murmur   • Family history of cardiac disorder in mother   • Endometrial cancer (CMS/HCC)   • Stage 3 chronic kidney disease (CMS/HCC)   • Gastroesophageal reflux disease   • Amputation of left great toe (CMS/HCC)   • Amputation of right great toe (CMS/HCC)   • Neuropathy        Past Medical History:   Diagnosis Date   • Anemia    • Anxiety and depression    • Back pain    • Bronchitis    • Diabetes (CMS/HCC)     DX 4-5 YRS AGO, CHECKS BS 4X/DAY, LAST A1C 7.1%   • Endometrial cancer (CMS/HCC)     STAGE II   • GERD (gastroesophageal reflux disease)    • Glaucoma    • Hypertension    • Influenza    • MRSA (methicillin resistant staph aureus) culture positive     S/P NECROTIZING FASCITIS IN BILATERAL FEET   • Necrotizing fasciitis (CMS/HCC)    • PCOS (polycystic ovarian syndrome)    • PNA (pneumonia)    • PTSD (post-traumatic stress disorder)    • Sepsis (CMS/HCC)    • Stage 3 chronic kidney disease (CMS/HCC) 2020   • Subconjunctival hemorrhage         Past Surgical History:   Procedure Laterality Date   • EXPLORATORY LAPAROTOMY, TOTAL ABDOMINAL HYSTERECTOMY SALPINGO OOPHORECTOMY N/A 2/10/2020    Procedure:  EXPLORATORY LAPAROTOMY, TOTAL ABDOMINAL HYSTERECTOMY, BILATERAL SALPINGO-OOPHORECTOMY WITH OPTIMAL  STAGING (R-0), OMENTECTOMY;  Surgeon: Celine Rubio MD;  Location: [facility];  Service: Gynecology Oncology;  Laterality: N/A;   • EYE SURGERY Left     BLOOD VESSEL IN EYE REPAIR   • TOE AMPUTATION Left 06/2019    big toe - unhealing sore   • TOE AMPUTATION Right 2018    big toe and second toe - Necrotizing fasciitis and MRSA          EVALUATION  PT Ortho     Row Name 02/26/20 1430       Subjective Comments    Subjective Comments  Pt with moderate c/o pain and nausea . minimal appetite.   -MF       Subjective Pain    Able to rate subjective pain?  yes  -MF    Pre-Treatment Pain Level  4  -MF    Post-Treatment Pain Level  4  -MF       Transfers    Sit-Stand [place] (Transfers)  independent  -MF    Stand-Sit [place] (Transfers)  independent  -MF    Comment (Transfers)  supine on stretcher for tx.   -MF       Gait/Stairs Assessment/Training    [place] Level (Gait)  independent  -MF      User Key  (r) = Recorded By, (t) = Taken By, (c) = Cosigned By    Initials Name Provider Type     Vinny Lobato, PT Physical Therapist          LDA Wound     Row Name 02/26/20 1430             Wound 02/17/20 1300 midline abdomen Other (comment)    Wound - Properties Group Date first assessed: 02/17/20  - Time first assessed: 1300  -JM Orientation: midline  - Location: abdomen  - Primary Wound Type: Other  -JM, dehisced surgical incision  Additional Comments: dehisced abd incision  -JM    Wound Image  Images linked: 1  -MF      Dressing Appearance  intact;dry;moist drainage  -MF      Base  clean;moist;red;yellow;subcutaneous;other (see comments)  -MF      Periwound  intact;dry;redness  -      Periwound Temperature  warm  -      Periwound Skin Turgor  soft  -MF      Edges  open  -MF      Wound Length (cm)  (S) 11.5 cm increase in wound size due to dehisced distal portion   -      Wound Width (cm)  2.5  cm  -MF      Wound Depth (cm)  2.2 cm  -MF      Drainage Characteristics/Odor  serosanguineous  -MF      Drainage Amount  small  -MF      Care, Wound  irrigated with;sterile normal saline;debrided;negative pressure wound therapy  -MF      Periwound Care, Wound  dry periwound area maintained;barrier ointment applied;barrier film applied  -MF      Wound Output (mL)  100 canister changed  -MF         NPWT (Negative Pressure Wound Therapy) 02/21/20 1030 midline abdomen    NPWT (Negative Pressure Wound Therapy) - Properties Group Placement Date: 02/21/20  -MF Placement Time: 1030  -MF Location: midline abdomen  -MF    Therapy Setting  continuous therapy  -MF      Dressing  foam, black;other (see comments) osmel  -MF      Pressure Setting  125 mmHg  -MF      Sponges Inserted  1 1 black 2 osmel  -MF      Sponges Removed  2  -MF      Finger sweep complete  Yes  -MF        User Key  (r) = Recorded By, (t) = Taken By, (c) = Cosigned By    Initials Name Provider Type    Vinny Colindres, PT Physical Therapist    Leidy Millard, PT Physical Therapist            WOUND DEBRIDEMENT  Total area of Debridement: ~3cm2  Debridement Site 1  Location- Site 1: midline incision  Selective Debridement- Site 1: Wound Surface <20cmsq  Instruments- Site 1: tweezers  Excised Tissue Description- Site 1: minimum, slough  Bleeding- Site 1: none             Therapy Education     Row Name 02/26/20 1430             Therapy Education    Education Details  reinforced increased nutrition needs.   -MF      Given  Bandaging/dressing change  -MF      Program  Reinforced;New  -MF      How Provided  Verbal;Demonstration  -MF      Provided to  Patient  -MF      Level of Understanding  Verbalized;Teach back education performed  -MF        User Key  (r) = Recorded By, (t) = Taken By, (c) = Cosigned By    Initials Name Provider Type    Vinny Colindres, PT Physical Therapist          Recommendation and Plan  PT Assessment/Plan     Row Name  02/26/20 1430          PT Assessment    Functional Limitations  Other (comment);Performance in self-care ADL wound care  -     Impairments  Integumentary integrity;Pain  -     Assessment Comments  Pt with moderate dehiscence of inferior portion of incisional line noted today with dressing removal.  PT added osmel and foam to wound base to help better manage exudate to lower portion of wound, but also to allow better granulation to wound base.  PT encouraged pt to increase protein and hydration to help improve healing potential as pt has very limited appetite at this time due to nausea.   -        PT Plan    PT Frequency  2x/week;3x/week  -     Physical Therapy Interventions (Optional Details)  wound care;patient/family education  -     PT Plan Comments  wound vac, education, dressing management.   -       User Key  (r) = Recorded By, (t) = Taken By, (c) = Cosigned By    Initials Name Provider Type    Vinny Colindres, PT Physical Therapist          Goals  PT OP Goals     Row Name 02/26/20 1430          Time Calculation    PT Goal Re-Cert Due Date  05/17/20  -       User Key  (r) = Recorded By, (t) = Taken By, (c) = Cosigned By    Initials Name Provider Type    Vinny Colindres, PT Physical Therapist          PT Goal Re-Cert Due Date: 05/17/20            Time Calculation: Start Time: 1430  Therapy Charges for Today     Code Description Service Date Service Provider Modifiers Qty    28958020100 HC PT NEG PRESS WOUND TO 50SQCM DME2 2/26/2020 Vinny Lobato, PT GP 1    52671658201 HC YESSENIA DEBRIDE OPEN WOUND UP TO 20CM 2/26/2020 Vinny Lobato, PT GP 1                  Vinny Lobato, PT  2/26/2020

## 2020-02-27 PROBLEM — I95.2 HYPOTENSION DUE TO DRUGS: Status: ACTIVE | Noted: 2020-02-27

## 2020-02-27 RX ORDER — FAMOTIDINE 10 MG/ML
20 INJECTION, SOLUTION INTRAVENOUS AS NEEDED
Status: CANCELLED | OUTPATIENT
Start: 2020-03-10

## 2020-02-27 RX ORDER — FAMOTIDINE 10 MG/ML
20 INJECTION, SOLUTION INTRAVENOUS ONCE
Status: CANCELLED | OUTPATIENT
Start: 2020-03-10

## 2020-02-27 RX ORDER — SODIUM CHLORIDE 9 MG/ML
250 INJECTION, SOLUTION INTRAVENOUS ONCE
Status: CANCELLED | OUTPATIENT
Start: 2020-03-10

## 2020-02-27 RX ORDER — PALONOSETRON 0.05 MG/ML
0.25 INJECTION, SOLUTION INTRAVENOUS ONCE
Status: CANCELLED | OUTPATIENT
Start: 2020-03-10

## 2020-02-27 RX ORDER — DIPHENHYDRAMINE HYDROCHLORIDE 50 MG/ML
50 INJECTION INTRAMUSCULAR; INTRAVENOUS AS NEEDED
Status: CANCELLED | OUTPATIENT
Start: 2020-03-10

## 2020-02-28 ENCOUNTER — HOSPITAL ENCOUNTER (OUTPATIENT)
Dept: PHYSICAL THERAPY | Facility: HOSPITAL | Age: 45
Setting detail: THERAPIES SERIES
Discharge: HOME | End: 2020-02-28

## 2020-02-28 DIAGNOSIS — T81.31XD POSTOPERATIVE WOUND DEHISCENCE, SUBSEQUENT ENCOUNTER: Primary | ICD-10-CM

## 2020-02-28 DIAGNOSIS — S31.109D OPEN WOUND OF ABDOMEN, SUBSEQUENT ENCOUNTER: ICD-10-CM

## 2020-02-28 PROCEDURE — 97605 NEG PRS WND THER DME<=50SQCM: CPT

## 2020-02-28 NOTE — THERAPY WOUND CARE TREATMENT
Outpatient Rehabilitation - Wound/Debridement Treatment Note  [facility]     Patient Name: Jeana Chung  : 1975  MRN: [id number]  Today's Date: 2020                 Admit Date: 2020    Visit Dx:    ICD-10-CM ICD-9-CM   1. Postoperative wound dehiscence, subsequent encounter T81.31XD V58.89     998.32   2. Open wound of abdomen, subsequent encounter S31.109D V58.89     879.2       Patient Active Problem List   Diagnosis   • Acute stress reaction with predominately emotional disturbance   • Sleep disturbance   • Cancer related pain   • Moderate episode of recurrent major depressive disorder (CMS/HCC)   • Anxiety   • Iron deficiency anemia   • Type 2 diabetes mellitus with diabetic polyneuropathy, with long-term current use of insulin (CMS/HCC)   • Essential hypertension   • Heart murmur   • Family history of cardiac disorder in mother   • Endometrial cancer (CMS/HCC)   • Stage 3 chronic kidney disease (CMS/HCC)   • Gastroesophageal reflux disease   • Amputation of left great toe (CMS/HCC)   • Amputation of right great toe (CMS/HCC)   • Neuropathy   • Hypotension due to drugs        Past Medical History:   Diagnosis Date   • Anemia    • Anxiety and depression    • Back pain    • Bronchitis    • Diabetes (CMS/HCC)     DX 4-5 YRS AGO, CHECKS BS 4X/DAY, LAST A1C 7.1%   • Endometrial cancer (CMS/HCC)     STAGE II   • GERD (gastroesophageal reflux disease)    • Glaucoma    • Hypertension    • Influenza    • MRSA (methicillin resistant staph aureus) culture positive     S/P NECROTIZING FASCITIS IN BILATERAL FEET   • Necrotizing fasciitis (CMS/HCC)    • PCOS (polycystic ovarian syndrome)    • PNA (pneumonia)    • PTSD (post-traumatic stress disorder)    • Sepsis (CMS/HCC)    • Stage 3 chronic kidney disease (CMS/HCC) 2020   • Subconjunctival hemorrhage         Past Surgical History:   Procedure Laterality Date   • EXPLORATORY LAPAROTOMY, TOTAL ABDOMINAL HYSTERECTOMY SALPINGO OOPHORECTOMY  N/A 2/10/2020    Procedure: EXPLORATORY LAPAROTOMY, TOTAL ABDOMINAL HYSTERECTOMY, BILATERAL SALPINGO-OOPHORECTOMY WITH OPTIMAL  STAGING (R-0), OMENTECTOMY;  Surgeon: Celine Rubio MD;  Location: [facility];  Service: Gynecology Oncology;  Laterality: N/A;   • EYE SURGERY Left     BLOOD VESSEL IN EYE REPAIR   • TOE AMPUTATION Left 06/2019    big toe - unhealing sore   • TOE AMPUTATION Right 2018    big toe and second toe - Necrotizing fasciitis and MRSA          EVALUATION  PT Ortho     Row Name 02/28/20 0945       Subjective Comments    Subjective Comments  Pt with decreased c/o pain with tx today.   -MF       Subjective Pain    Able to rate subjective pain?  yes  -MF    Pre-Treatment Pain Level  2  -MF    Post-Treatment Pain Level  2  -MF       Transfers    Sit-Stand [place] (Transfers)  independent  -MF    Stand-Sit [place] (Transfers)  independent  -MF    Comment (Transfers)  supine on stretcher  -MF       Gait/Stairs Assessment/Training    [place] Level (Gait)  independent  -MF      User Key  (r) = Recorded By, (t) = Taken By, (c) = Cosigned By    Initials Name Provider Type     Vinny Lobato, PT Physical Therapist          LDA Wound     Row Name 02/28/20 0945             Wound 02/17/20 1300 midline abdomen Other (comment)    Wound - Properties Group Date first assessed: 02/17/20  - Time first assessed: 1300  -JM Orientation: midline  - Location: abdomen  - Primary Wound Type: Other  -JM, dehisced surgical incision  Additional Comments: dehisced abd incision  -JM    Dressing Appearance  intact;dry;moist drainage  -MF      Base  clean;moist;red;yellow;subcutaneous;other (see comments)  -MF      Periwound  intact;dry;redness  -      Periwound Temperature  warm  -      Periwound Skin Turgor  soft  -MF      Edges  open  -MF      Drainage Characteristics/Odor  serosanguineous  -MF      Drainage Amount  small  -MF      Care, Wound  irrigated with;sterile normal saline;negative  pressure wound therapy  -MF      Periwound Care, Wound  dry periwound area maintained  -      Wound Output (mL)  50  -MF         NPWT (Negative Pressure Wound Therapy) 02/21/20 1030 midline abdomen    NPWT (Negative Pressure Wound Therapy) - Properties Group Placement Date: 02/21/20  -MF Placement Time: 1030  -MF Location: midline abdomen  -MF    Therapy Setting  continuous therapy  -MF      Dressing  foam, black;other (see comments) osmel   -MF      Pressure Setting  125 mmHg  -MF      Sponges Inserted  1 black   -MF      Sponges Removed  1 black  -MF      Finger sweep complete  Yes  -MF        User Key  (r) = Recorded By, (t) = Taken By, (c) = Cosigned By    Initials Name Provider Type    Vinny Colindres, PT Physical Therapist    Leidy Millard, PT Physical Therapist            WOUND DEBRIDEMENT                       Recommendation and Plan  PT Assessment/Plan     Row Name 02/28/20 0945          PT Assessment    Functional Limitations  Other (comment);Performance in self-care ADL wound care  -     Impairments  Integumentary integrity;Pain  -MF     Assessment Comments  Wound bed with better red beefy color today.  PT will cont with wound vac and osmel to help further increase granulation and improve healing potential.   -MF        PT Plan    PT Frequency  2x/week;3x/week  -MF     Physical Therapy Interventions (Optional Details)  wound care;patient/family education  -     PT Plan Comments  wound vac, education, dressing management.   -MF       User Key  (r) = Recorded By, (t) = Taken By, (c) = Cosigned By    Initials Name Provider Type    Vinny Colindres, PT Physical Therapist          Goals  PT OP Goals     Row Name 02/28/20 0945          Time Calculation    PT Goal Re-Cert Due Date  05/17/20  -MF       User Key  (r) = Recorded By, (t) = Taken By, (c) = Cosigned By    Initials Name Provider Type    Vinny Colindres, PT Physical Therapist          PT Goal Re-Cert Due Date: 05/17/20             Time Calculation: Start Time: 0945  Therapy Charges for Today     Code Description Service Date Service Provider Modifiers Qty    21789370248 HC PT NEG PRESS WOUND TO 50SQCM DME2 2/28/2020 Vinny Lobato, PT GP 1                  Vinny Lobato, PT  2/28/2020

## 2020-03-02 ENCOUNTER — TELEPHONE (OUTPATIENT)
Dept: FAMILY MEDICINE CLINIC | Facility: CLINIC | Age: 45
End: 2020-03-02

## 2020-03-02 ENCOUNTER — HOSPITAL ENCOUNTER (OUTPATIENT)
Dept: PHYSICAL THERAPY | Facility: HOSPITAL | Age: 45
Setting detail: THERAPIES SERIES
Discharge: HOME | End: 2020-03-02

## 2020-03-02 ENCOUNTER — READMISSION MANAGEMENT (OUTPATIENT)
Dept: CALL CENTER | Facility: HOSPITAL | Age: 45
End: 2020-03-02

## 2020-03-02 ENCOUNTER — TELEPHONE (OUTPATIENT)
Dept: GYNECOLOGIC ONCOLOGY | Facility: CLINIC | Age: 45
End: 2020-03-02

## 2020-03-02 DIAGNOSIS — T81.31XD POSTOPERATIVE WOUND DEHISCENCE, SUBSEQUENT ENCOUNTER: Primary | ICD-10-CM

## 2020-03-02 DIAGNOSIS — S31.109D OPEN WOUND OF ABDOMEN, SUBSEQUENT ENCOUNTER: ICD-10-CM

## 2020-03-02 PROCEDURE — 97605 NEG PRS WND THER DME<=50SQCM: CPT

## 2020-03-02 NOTE — TELEPHONE ENCOUNTER
I called patient and discussed tumor board from 2/28/2020 recommendations.  We will proceed with combined carboplatin at AUC of 6+ paclitaxel at 175 mg/m² IV q. 21 days x 6 cycles followed by whole pelvic radiation with vaginal brachytherapy.  Patient verbalized understanding of the plan.  She is agreeable to proceed.

## 2020-03-02 NOTE — THERAPY WOUND CARE TREATMENT
Outpatient Rehabilitation - Wound/Debridement Treatment Note  [facility]     Patient Name: Jeana Chung  : 1975  MRN: [id number]  Today's Date: 3/2/2020                 Admit Date: 3/2/2020    Visit Dx:    ICD-10-CM ICD-9-CM   1. Postoperative wound dehiscence, subsequent encounter T81.31XD V58.89     998.32   2. Open wound of abdomen, subsequent encounter S31.109D V58.89     879.2       Patient Active Problem List   Diagnosis   • Acute stress reaction with predominately emotional disturbance   • Sleep disturbance   • Cancer related pain   • Moderate episode of recurrent major depressive disorder (CMS/HCC)   • Anxiety   • Iron deficiency anemia   • Type 2 diabetes mellitus with diabetic polyneuropathy, with long-term current use of insulin (CMS/HCC)   • Essential hypertension   • Heart murmur   • Family history of cardiac disorder in mother   • Endometrial cancer (CMS/HCC)   • Stage 3 chronic kidney disease (CMS/HCC)   • Gastroesophageal reflux disease   • Amputation of left great toe (CMS/HCC)   • Amputation of right great toe (CMS/HCC)   • Neuropathy   • Hypotension due to drugs        Past Medical History:   Diagnosis Date   • Anemia    • Anxiety and depression    • Back pain    • Bronchitis    • Diabetes (CMS/HCC)     DX 4-5 YRS AGO, CHECKS BS 4X/DAY, LAST A1C 7.1%   • Endometrial cancer (CMS/HCC)     STAGE II   • GERD (gastroesophageal reflux disease)    • Glaucoma    • Hypertension    • Influenza    • MRSA (methicillin resistant staph aureus) culture positive     S/P NECROTIZING FASCITIS IN BILATERAL FEET   • Necrotizing fasciitis (CMS/HCC)    • PCOS (polycystic ovarian syndrome)    • PNA (pneumonia)    • PTSD (post-traumatic stress disorder)    • Sepsis (CMS/HCC)    • Stage 3 chronic kidney disease (CMS/HCC) 2020   • Subconjunctival hemorrhage         Past Surgical History:   Procedure Laterality Date   • EXPLORATORY LAPAROTOMY, TOTAL ABDOMINAL HYSTERECTOMY SALPINGO OOPHORECTOMY N/A  2/10/2020    Procedure: EXPLORATORY LAPAROTOMY, TOTAL ABDOMINAL HYSTERECTOMY, BILATERAL SALPINGO-OOPHORECTOMY WITH OPTIMAL  STAGING (R-0), OMENTECTOMY;  Surgeon: Celine Rubio MD;  [facility]: [facility];  Service: Gynecology Oncology;  Laterality: N/A;   • EYE SURGERY Left     BLOOD VESSEL IN EYE REPAIR   • TOE AMPUTATION Left 06/2019    big toe - unhealing sore   • TOE AMPUTATION Right 2018    big toe and second toe - Necrotizing fasciitis and MRSA          EVALUATION  PT Ortho     Row Name 03/02/20 1345       Subjective Comments    Subjective Comments  Pt with no increase in pain noted  -MF       Subjective Pain    Able to rate subjective pain?  yes  -MF    Pre-Treatment Pain Level  2  -MF    Post-Treatment Pain Level  2  -MF       Transfers    Sit-Stand [place] (Transfers)  independent  -MF    Stand-Sit [place] (Transfers)  independent  -MF    Comment (Transfers)  supine on stretcher for tx  -MF       Gait/Stairs Assessment/Training    [place] Level (Gait)  independent  -MF      User Key  (r) = Recorded By, (t) = Taken By, (c) = Cosigned By    Initials Name Provider Type    MF Vinny Lobato, PT Physical Therapist          [facility] Wound     Row Name 03/02/20 1345             Wound 02/17/20 1300 midline abdomen Other (comment)    Wound - Properties Group Date first assessed: 02/17/20  - Time first assessed: 1300  -JM Orientation: midline  - Location: abdomen  - Primary Wound Type: Other  -JM, dehisced surgical incision  Additional Comments: dehisced abd incision  -JM    Dressing Appearance  intact;dry;moist drainage  -MF      Base  clean;moist;red;yellow;subcutaneous;other (see comments)  -MF      Periwound  intact;dry;redness  -      Periwound Temperature  warm  -      Periwound Skin Turgor  soft  -MF      Edges  open  -MF      Wound Length (cm)  11.5 cm  -MF      Wound Width (cm)  2.5 cm  -MF      Wound Depth (cm)  2.2 cm  -MF      Drainage Characteristics/Odor  serosanguineous  -MF       Drainage Amount  small  -MF      Care, Wound  irrigated with;sterile normal saline;negative pressure wound therapy  -MF      Periwound Care, Wound  dry periwound area maintained  -MF      Wound Output (mL)  200 canister changed  -MF         NPWT (Negative Pressure Wound Therapy) 02/21/20 1030 midline abdomen    NPWT (Negative Pressure Wound Therapy) - Properties Group Placement Date: 02/21/20  -MF Placement Time: 1030  -MF Location: midline abdomen  -MF    Therapy Setting  continuous therapy  -MF      Dressing  foam, black;other (see comments) osmel   -MF      Pressure Setting  125 mmHg  -MF      Sponges Inserted  1 1 black   -MF      Sponges Removed  1 1 black   -MF      Finger sweep complete  Yes  -MF        User Key  (r) = Recorded By, (t) = Taken By, (c) = Cosigned By    Initials Name Provider Type    Vinny Colindres, PT Physical Therapist    Leidy Millard, PT Physical Therapist            WOUND DEBRIDEMENT                   Therapy Education     Row Name 03/02/20 2662             Therapy Education    Given  Bandaging/dressing change  -MF      Program  Reinforced  -MF      How Provided  Verbal;Demonstration  -MF      Provided to  Patient  -MF      Level of Understanding  Verbalized;Teach back education performed  -MF        User Key  (r) = Recorded By, (t) = Taken By, (c) = Cosigned By    Initials Name Provider Type    Vinny Colindres, PT Physical Therapist          Recommendation and Plan  PT Assessment/Plan     Row Name 03/02/20 7116          PT Assessment    Functional Limitations  Other (comment);Performance in self-care ADL wound care  -     Impairments  Integumentary integrity;Pain  -MF     Assessment Comments  wound cont to have minimal amount of exudate with no s/s of infection.  Pt noting also improved protein intake to help with wound healing.  PT will cont with wound vac changes 3x/week   -MF        PT Plan    PT Frequency  2x/week;3x/week  -     Physical Therapy  Interventions (Optional Details)  wound care;patient/family education  -     PT Plan Comments  cont with wound vac management and dressing changes 2-3 x/week.   -       User Key  (r) = Recorded By, (t) = Taken By, (c) = Cosigned By    Initials Name Provider Type    Vinny Colindres, PT Physical Therapist          Goals  PT OP Goals     Row Name 03/02/20 1345          Time Calculation    PT Goal Re-Cert Due Date  05/17/20  -       User Key  (r) = Recorded By, (t) = Taken By, (c) = Cosigned By    Initials Name Provider Type    Vinny Colindres, PT Physical Therapist          PT Goal Re-Cert Due Date: 05/17/20            Time Calculation: Start Time: 1345  Therapy Charges for Today     Code Description Service Date Service Provider Modifiers Qty    [id number]  PT NEG PRESS WOUND TO 50SQCM DME2 3/2/2020 Vinny Lobato, PT GP 1                  Vinny Lobato, PT  3/2/2020

## 2020-03-02 NOTE — TELEPHONE ENCOUNTER
Spoke with patient.  She is feeling better today.  Discontinue all blood pressure medications.  Discussed management of diabetes, she will call if she has any questions.

## 2020-03-02 NOTE — OUTREACH NOTE
General Surgery Week 3 Survey      Responses   Facility patient discharged from?  [facility]   Does the patient have one of the following disease processes/diagnoses(primary or secondary)?  General Surgery   Week 3 attempt successful?  Yes   Call start time  1554   Call end time  1557   Discharge diagnosis  endometrial cancer,  hysterectomy   Meds reviewed with patient/caregiver?  Yes   Is the patient taking all medications as directed (includes completed medication regime)?  Yes   Has the patient kept scheduled appointments due by today?  Yes   Comments  Pt has a wound vac for wound dehiscenced. PCP appt 2/24/20   What is the patient's perception of their health status since discharge?  Improving   Nursing interventions  Nurse provided patient education   Is the patient /caregiver able to teach back basic post-op care?  Lifting as instructed by MD in discharge instructions, Keep incision areas clean,dry and protected   Is the patient/caregiver able to teach back signs and symptoms of incisional infection?  Fever, Increased drainage or bleeding   Is the patient/caregiver able to teach back steps to recovery at home?  Eat a well-balance diet, Weigh daily   Week 3 call completed?  Yes   Wrap up additional comments  Pt is a nurse.          Haritha Marc RN

## 2020-03-04 ENCOUNTER — APPOINTMENT (OUTPATIENT)
Dept: PHYSICAL THERAPY | Facility: HOSPITAL | Age: 45
End: 2020-03-04

## 2020-03-05 ENCOUNTER — HOSPITAL ENCOUNTER (OUTPATIENT)
Dept: PHYSICAL THERAPY | Facility: HOSPITAL | Age: 45
Setting detail: THERAPIES SERIES
Discharge: HOME | End: 2020-03-05

## 2020-03-05 DIAGNOSIS — T81.31XD POSTOPERATIVE WOUND DEHISCENCE, SUBSEQUENT ENCOUNTER: Primary | ICD-10-CM

## 2020-03-05 DIAGNOSIS — S31.109D OPEN WOUND OF ABDOMEN, SUBSEQUENT ENCOUNTER: ICD-10-CM

## 2020-03-05 PROCEDURE — 97605 NEG PRS WND THER DME<=50SQCM: CPT | Performed by: PHYSICAL THERAPIST

## 2020-03-05 PROCEDURE — 97597 DBRDMT OPN WND 1ST 20 CM/<: CPT | Performed by: PHYSICAL THERAPIST

## 2020-03-05 NOTE — THERAPY WOUND CARE TREATMENT
Outpatient Rehabilitation - Wound/Debridement Treatment Note  [facility]     Patient Name: Jeana Chung  : 1975  MRN: [id number]  Today's Date: 3/5/2020                 Admit Date: 3/5/2020    Visit Dx:    ICD-10-CM ICD-9-CM   1. Postoperative wound dehiscence, subsequent encounter T81.31XD V58.89     998.32   2. Open wound of abdomen, subsequent encounter S31.109D V58.89     879.2       Patient Active Problem List   Diagnosis   • Acute stress reaction with predominately emotional disturbance   • Sleep disturbance   • Cancer related pain   • Moderate episode of recurrent major depressive disorder (CMS/HCC)   • Anxiety   • Iron deficiency anemia   • Type 2 diabetes mellitus with diabetic polyneuropathy, with long-term current use of insulin (CMS/HCC)   • Essential hypertension   • Heart murmur   • Family history of cardiac disorder in mother   • Endometrial cancer (CMS/HCC)   • Stage 3 chronic kidney disease (CMS/HCC)   • Gastroesophageal reflux disease   • Amputation of left great toe (CMS/HCC)   • Amputation of right great toe (CMS/HCC)   • Neuropathy   • Hypotension due to drugs        Past Medical History:   Diagnosis Date   • Anemia    • Anxiety and depression    • Back pain    • Bronchitis    • Diabetes (CMS/HCC)     DX 4-5 YRS AGO, CHECKS BS 4X/DAY, LAST A1C 7.1%   • Endometrial cancer (CMS/HCC)     STAGE II   • GERD (gastroesophageal reflux disease)    • Glaucoma    • Hypertension    • Influenza    • MRSA (methicillin resistant staph aureus) culture positive     S/P NECROTIZING FASCITIS IN BILATERAL FEET   • Necrotizing fasciitis (CMS/HCC)    • PCOS (polycystic ovarian syndrome)    • PNA (pneumonia)    • PTSD (post-traumatic stress disorder)    • Sepsis (CMS/HCC)    • Stage 3 chronic kidney disease (CMS/HCC) 2020   • Subconjunctival hemorrhage         Past Surgical History:   Procedure Laterality Date   • EXPLORATORY LAPAROTOMY, TOTAL ABDOMINAL HYSTERECTOMY SALPINGO OOPHORECTOMY N/A  2/10/2020    Procedure: EXPLORATORY LAPAROTOMY, TOTAL ABDOMINAL HYSTERECTOMY, BILATERAL SALPINGO-OOPHORECTOMY WITH OPTIMAL  STAGING (R-0), OMENTECTOMY;  Surgeon: Celine Rubio MD;  [facility];  Service: Gynecology Oncology;  Laterality: N/A;   • EYE SURGERY Left     BLOOD VESSEL IN EYE REPAIR   • TOE AMPUTATION Left 06/2019    big toe - unhealing sore   • TOE AMPUTATION Right 2018    big toe and second toe - Necrotizing fasciitis and MRSA          PT Ortho     Row Name 03/05/20 0845       Subjective Comments    Subjective Comments  Pt reports some burninng pain. overall doing ok. expects to start chemo soon  -MW       Subjective Pain    Able to rate subjective pain?  yes  -MW    Pre-Treatment Pain Level  2  -MW    Post-Treatment Pain Level  2  -MW       Transfers    Sit-Stand [place] (Transfers)  independent  -MW    Stand-Sit [place] (Transfers)  independent  -MW    Comment (Transfers)  supine on stretcher for tx  -MW       Gait/Stairs Assessment/Training    [place] Level (Gait)  independent  -MW      User Key  (r) = Recorded By, (t) = Taken By, (c) = Cosigned By    Initials Name Provider Type    Yvette Dubois, PT Physical Therapist          [facility] Wound     Row Name 03/05/20 0845             Wound 02/17/20 1300 midline abdomen Other (comment)    Wound - Properties Group Date first assessed: 02/17/20  - Time first assessed: 1300  -JM Orientation: midline  -JM Location: abdomen  -JM Primary Wound Type: Other  -JM, dehisced surgical incision  Additional Comments: dehisced abd incision  -JM    Dressing Appearance  intact;dry;moist drainage  -MW      Base  clean;moist;red;yellow;subcutaneous;other (see comments) white plastic clip   -MW      Periwound  intact;dry;pink  -MW      Periwound Temperature  warm  -MW      Periwound Skin Turgor  soft  -MW      Edges  open  -MW      Drainage Characteristics/Odor  serosanguineous  -MW      Drainage Amount  small  -MW      Care, Wound  cleansed  with;wound cleanser;debrided  -MW      Dressing Care, Wound  collagen;foam;transparent film NPWT / VAC   -MW      Periwound Care, Wound  cleansed with pH balanced cleanser;dry periwound area maintained;topical treatment applied;barrier film applied No sting, stoma paste at umbilicus, drape   -MW      Wound Output (mL)  75 canister changed   -MW         NPWT (Negative Pressure Wound Therapy) 02/21/20 1030 midline abdomen    NPWT (Negative Pressure Wound Therapy) - Properties Group Placement Date: 02/21/20  - Placement Time: 1030  -MF Location: midline abdomen  -MF    Therapy Setting  continuous therapy  -MW      Dressing  foam, black;transparent dressing;other (see comments) [place]   -MW      Pressure Setting  125 mmHg  -MW      Sponges Inserted  1;other (see comments) 1 black, [place] to line wound   -MW      Sponges Removed  1 black   -MW      Finger sweep complete  Yes  -MW        User Key  (r) = Recorded By, (t) = Taken By, (c) = Cosigned By    Initials Name Provider Type    MF Vinny Lobato, PT Physical Therapist    MW Yvette Ribeiro, PT Physical Therapist    Leidy Millard, PT Physical Therapist            WOUND DEBRIDEMENT  Total area of Debridement: ~3 cm2  Debridement Site 1  Location- Site 1: midline incision  Selective Debridement- Site 1: Wound Surface <20cmsq  Instruments- Site 1: tweezers, #15, scapel  Excised Tissue Description- Site 1: minimum, slough, other (comment)(necortic fascia )  Bleeding- Site 1: none                 Recommendation and Plan  PT Assessment/Plan     Row Name 03/05/20 0845          PT Assessment    Functional Limitations  Other (comment);Performance in self-care ADL wound care  -MW     Impairments  Integumentary integrity;Pain  -MW     Assessment Comments  ABD wound continues to granulate especially along the side walls. Debrided proximal bit of necrotic fascia and distally scant slough. Cont [place] and NPWT to promote granulation formation and wound closure.    -MW     Rehab Potential  Good  -MW     Patient/caregiver participated in establishment of treatment plan and goals  Yes  -MW     Patient would benefit from skilled therapy intervention  Yes  -MW        PT Plan    PT Frequency  2x/week;3x/week  -MW     Physical Therapy Interventions (Optional Details)  wound care;patient/family education  -MW     PT Plan Comments  VAC changes, debridement prn;   -MW       User Key  (r) = Recorded By, (t) = Taken By, (c) = Cosigned By    Initials Name Provider Type    MW Yvette Ribeiro, PT Physical Therapist                   Time Calculation: Start Time: 0845  Therapy Charges for Today     Code Description Service Date Service Provider Modifiers Qty    85955216975  YESSENIA DEBRIDE OPEN WOUND UP TO 20CM 3/5/2020 Yvette Ribeiro, PT GP 1    [id number]  PT NEG PRESS WOUND TO 50SQCM DME2 3/5/2020 Yvette Ribeiro, PT GP 1                  Yvette Ribeiro, PT  3/5/2020

## 2020-03-06 ENCOUNTER — OFFICE VISIT (OUTPATIENT)
Dept: RADIATION ONCOLOGY | Facility: HOSPITAL | Age: 45
End: 2020-03-06

## 2020-03-06 ENCOUNTER — HOSPITAL ENCOUNTER (OUTPATIENT)
Dept: RADIATION ONCOLOGY | Facility: HOSPITAL | Age: 45
Setting detail: RADIATION/ONCOLOGY SERIES
Discharge: HOME | End: 2020-03-06

## 2020-03-06 VITALS
RESPIRATION RATE: 20 BRPM | OXYGEN SATURATION: 97 % | WEIGHT: 194.2 LBS | DIASTOLIC BLOOD PRESSURE: 59 MMHG | TEMPERATURE: 97 F | BODY MASS INDEX: 35.52 KG/M2 | HEART RATE: 104 BPM | SYSTOLIC BLOOD PRESSURE: 117 MMHG

## 2020-03-06 DIAGNOSIS — C54.1 ENDOMETRIAL CANCER (HCC): Primary | ICD-10-CM

## 2020-03-06 NOTE — PROGRESS NOTES
CONSULTATION NOTE      :                                                          1975  DATE OF CONSULTATION:                       3/14/2020   REQUESTING PHYSICIAN:                   Celine Rubio MD  REASON FOR CONSULTATION:           Evaluation and discussion management options following surgery for endometrial cancer         BRIEF HISTORY:  The patient is a very pleasant 44 y.o. female  who presented with abnormal uterine bleeding and found on ultrasound to have uterine and cervical masses.  Biopsy of the cervix was insufficient and CT of abdomen pelvis revealed an enlarged uterus with multiple masses present as well as a 4 cm cystic ovary.  Repeat biopsy revealed infiltrating endometrioid adenocarcinoma.  On 2020 she underwent exploratory laparotomy, total abdominal hysterectomy, bilateral salpingo-oophorectomy and omentectomy for advanced endometrial carcinoma.  Final pathology revealed endometrioid carcinoma with lymphovascular invasion.  The endometrioid carcinoma involve the cervical stroma and the vagina was negative.  Carcinoma involved the right ovary and fallopian tube in the left was negative.  The omentum was negative.  Number was FIGO grade 3 with invasion of over 50% of the myometrium.  The right parametrium was involved.  The operative note indicates the tumor at the uterus ruptured on the field during surgery.  Postoperatively she had wound dehiscence and is receiving wound care treatment through physical therapy.  She is here to discuss postoperative radiation.    Allergies   Allergen Reactions   • Bactrim [Sulfamethoxazole-Trimethoprim] Anaphylaxis   • Penicillins Hives   • Promethazine Mental Status Change       Social History     Socioeconomic History   • Marital status:      Spouse name: Not on file   • Number of children: 1   • Years of education: Not on file   • Highest education level: Not on file   Tobacco Use   • Smoking status: Former Smoker     Types: Cigarettes      Last attempt to quit: 2000     Years since quittin.2   • Smokeless tobacco: Never Used   • Tobacco comment: social MAYBE 1 CIG/DAY   Substance and Sexual Activity   • Alcohol use: Not Currently     Frequency: Monthly or less     Drinks per session: 1 or 2   • Drug use: Never   • Sexual activity: Not Currently       Past Medical History:   Diagnosis Date   • Anemia    • Anxiety and depression    • Back pain    • Bronchitis    • Diabetes (CMS/HCC)     DX 4-5 YRS AGO, CHECKS BS 4X/DAY, LAST A1C 7.1%   • Endometrial cancer (CMS/HCC)     STAGE II   • GERD (gastroesophageal reflux disease)    • Hypertension    • MRSA (methicillin resistant staph aureus) culture positive     S/P NECROTIZING FASCITIS IN BILATERAL FEET   • Necrotizing fasciitis (CMS/HCC)    • PCOS (polycystic ovarian syndrome)    • PTSD (post-traumatic stress disorder)    • Sepsis (CMS/HCC)    • Stage 3 chronic kidney disease (CMS/HCC) 2020   • Subconjunctival hemorrhage        family history includes Dementia in her maternal grandmother; Diabetes type II in her mother and sister; Fibroids in her mother and sister; Heart attack in her mother; Hypertension in her mother; Stroke in her maternal grandmother.     Past Surgical History:   Procedure Laterality Date   • EXPLORATORY LAPAROTOMY, TOTAL ABDOMINAL HYSTERECTOMY SALPINGO OOPHORECTOMY N/A 2/10/2020    Procedure: EXPLORATORY LAPAROTOMY, TOTAL ABDOMINAL HYSTERECTOMY, BILATERAL SALPINGO-OOPHORECTOMY WITH OPTIMAL  STAGING (R-0), OMENTECTOMY;  Surgeon: Celine Rubio MD;  Location: [facility];  Service: Gynecology Oncology;  Laterality: N/A;   • EYE SURGERY Left     BLOOD VESSEL IN EYE REPAIR   • TOE AMPUTATION Left 2019    big toe - unhealing sore   • TOE AMPUTATION Right 2018    big toe and second toe - Necrotizing fasciitis and MRSA         Review of Systems   Constitutional: Positive for appetite change, fatigue and unexpected weight change.   Eyes: Negative.    Respiratory: Negative.     Cardiovascular: Positive for leg swelling.   Gastrointestinal: Positive for constipation.   Endocrine: Negative.    Genitourinary: Negative.     Musculoskeletal: Positive for neck pain.   Skin: Positive for wound (pt has wound vac on abdominal incision).   Neurological: Negative.    Hematological: Negative.    Psychiatric/Behavioral: Positive for depression. The patient is nervous/anxious.            Objective   VITAL SIGNS:   Vitals:    03/06/20 0854   BP: 117/59   Pulse: 104   Resp: 20   Temp: 97 °F (36.1 °C)   SpO2: 97%   Weight: 88.1 kg (194 lb 3.2 oz)   PainSc:   5   PainLoc: Abdomen        No data recorded        Physical Exam   Constitutional: She appears well-developed and well-nourished.   Neck: Neck supple.   Cardiovascular: Regular rhythm.   Patient is tachycardic.   Pulmonary/Chest: Effort normal.   Patient is tachypneic.   Abdominal:   Dressings are dry and intact over wound on abdomen.   Nursing note and vitals reviewed.  The pelvic exam reveals normal external genitalia.  The vaginal cuff is well healing and she has no suspicious masses or lesions.         The following portions of the patient's history were reviewed and updated as appropriate: allergies, current medications, past family history, past medical history, past social history, past surgical history and problem list.    Assessment       The patient is a 44 y.o. female  who on 2/11/2020  underwent exploratory laparotomy, total abdominal hysterectomy, bilateral salpingo-oophorectomy and omentectomy for advanced endometrial carcinoma.  Final pathology revealed endometrioid carcinoma with lymphovascular invasion.  The endometrioid carcinoma involved the cervical stroma and the vagina was negative.  Carcinoma involved the right ovary and fallopian tube in the left was negative.  The omentum was negative.  Number was FIGO grade 3 with invasion of over 50% of the myometrium.  The right parametrium was involved.  The operative note indicates the  tumor at the uterus ruptured on the field during surgery. Postoperatively she had wound dehiscence and is receiving wound care treatment through physical therapy.     RECOMMENDATIONS: The patient's case was presented at our tumor conference.  In addition chemotherapy whole pelvis radiotherapy and vaginal brachytherapy were recommended as well.  The pros and cons, risks and benefits of treatment were discussed with Ms. Chung and informed consent obtained.  Radiotherapy will decrease the risk of local recurrence but does not increase survival.  We will coordinate timing with Dr. Rubio and chemotherapy.  Thank you very much for letting me participate in the care of this very nice patient.  Return for Radiation Treatment/planning.  Diagnoses and all orders for this visit:    Endometrial cancer (CMS/[facility])         Azul Coyle MD

## 2020-03-07 ENCOUNTER — HOSPITAL ENCOUNTER (OUTPATIENT)
Dept: PHYSICAL THERAPY | Facility: HOSPITAL | Age: 45
Setting detail: THERAPIES SERIES
Discharge: HOME | End: 2020-03-07

## 2020-03-07 DIAGNOSIS — T81.31XD POSTOPERATIVE WOUND DEHISCENCE, SUBSEQUENT ENCOUNTER: Primary | ICD-10-CM

## 2020-03-07 DIAGNOSIS — S31.109D OPEN WOUND OF ABDOMEN, SUBSEQUENT ENCOUNTER: ICD-10-CM

## 2020-03-07 PROCEDURE — 97605 NEG PRS WND THER DME<=50SQCM: CPT

## 2020-03-07 PROCEDURE — 97597 DBRDMT OPN WND 1ST 20 CM/<: CPT

## 2020-03-07 NOTE — THERAPY WOUND CARE TREATMENT
Outpatient Rehabilitation - Wound/Debridement Treatment Note  [facility]     Patient Name: Jeana Chung  : 1975  MRN: [id number]  Today's Date: 3/7/2020             External label applied and verified.      Admit Date: 3/7/2020    Visit Dx:    ICD-10-CM ICD-9-CM   1. Postoperative wound dehiscence, subsequent encounter T81.31XD V58.89     998.32   2. Open wound of abdomen, subsequent encounter S31.109D V58.89     879.2       Patient Active Problem List   Diagnosis   • Acute stress reaction with predominately emotional disturbance   • Sleep disturbance   • Cancer related pain   • Moderate episode of recurrent major depressive disorder (CMS/HCC)   • Anxiety   • Iron deficiency anemia   • Type 2 diabetes mellitus with diabetic polyneuropathy, with long-term current use of insulin (CMS/HCC)   • Essential hypertension   • Heart murmur   • Family history of cardiac disorder in mother   • Endometrial cancer (CMS/HCC)   • Stage 3 chronic kidney disease (CMS/HCC)   • Gastroesophageal reflux disease   • Amputation of left great toe (CMS/HCC)   • Amputation of right great toe (CMS/HCC)   • Neuropathy   • Hypotension due to drugs        Past Medical History:   Diagnosis Date   • Anemia    • Anxiety and depression    • Back pain    • Bronchitis    • Diabetes (CMS/HCC)     DX 4-5 YRS AGO, CHECKS BS 4X/DAY, LAST A1C 7.1%   • Endometrial cancer (CMS/HCC)     STAGE II   • GERD (gastroesophageal reflux disease)    • Hypertension    • MRSA (methicillin resistant staph aureus) culture positive     S/P NECROTIZING FASCITIS IN BILATERAL FEET   • Necrotizing fasciitis (CMS/HCC)    • PCOS (polycystic ovarian syndrome)    • PTSD (post-traumatic stress disorder)    • Sepsis (CMS/HCC)    • Stage 3 chronic kidney disease (CMS/HCC) 2020   • Subconjunctival hemorrhage         Past Surgical History:   Procedure Laterality Date   • EXPLORATORY LAPAROTOMY, TOTAL ABDOMINAL HYSTERECTOMY SALPINGO OOPHORECTOMY N/A 2/10/2020     Procedure: EXPLORATORY LAPAROTOMY, TOTAL ABDOMINAL HYSTERECTOMY, BILATERAL SALPINGO-OOPHORECTOMY WITH OPTIMAL  STAGING (R-0), OMENTECTOMY;  Surgeon: Celine Rubio MD;  Location: [facility];  Service: Gynecology Oncology;  Laterality: N/A;   • EYE SURGERY Left     BLOOD VESSEL IN EYE REPAIR   • TOE AMPUTATION Left 06/2019    big toe - unhealing sore   • TOE AMPUTATION Right 2018    big toe and second toe - Necrotizing fasciitis and MRSA          EVALUATION  PT Ortho     Row Name 03/07/20 1345       Subjective Comments    Subjective Comments  No complaints or changes since last tx  -MC       Subjective Pain    Able to rate subjective pain?  yes  -MC    Pre-Treatment Pain Level  0  -MC    Post-Treatment Pain Level  1  -MC       Transfers    Sit-Stand [place] (Transfers)  independent  -MC    Stand-Sit [place] (Transfers)  independent  -MC    Comment (Transfers)  supine on stretcher for tx  -MC       Gait/Stairs Assessment/Training    [place] Level (Gait)  independent  -MC      User Key  (r) = Recorded By, (t) = Taken By, (c) = Cosigned By    Initials Name Provider Type    Mary Kay Flores PT Physical Therapist          LDA Wound     Row Name 03/07/20 1345             Wound 02/17/20 1300 midline abdomen Other (comment)    Wound - Properties Group Date first assessed: 02/17/20  - Time first assessed: 1300  -JM Orientation: midline  -JM Location: abdomen  -JM Primary Wound Type: Other  -JM, dehisced surgical incision  Additional Comments: dehisced abd incision  -JM    Dressing Appearance  intact;dry;moist drainage  -MC      Base  clean;moist;red;yellow;subcutaneous;other (see comments) white plastic clip slid out of wound today  -MC      Periwound  intact;dry;pink  -MC      Periwound Temperature  warm  -      Periwound Skin Turgor  soft  -MC      Edges  open  -MC      Drainage Characteristics/Odor  serosanguineous  -MC      Drainage Amount  small  -MC      Care, Wound  cleansed with;wound  cleanser;debrided;negative pressure wound therapy  -      Dressing Care, Wound  dressing changed vac  -      Periwound Care, Wound  cleansed with pH balanced cleanser;barrier film applied;other (see comments) NoSting, stomapaste, drape border  -      Wound Output (mL)  50  -MC         NPWT (Negative Pressure Wound Therapy) 02/21/20 1030 midline abdomen    NPWT (Negative Pressure Wound Therapy) - Properties Group Placement Date: 02/21/20  -MF Placement Time: 1030  -MF Location: midline abdomen  -MF    Therapy Setting  continuous therapy  -MC      Dressing  foam, black;gauze, antimicrobial  -MC      Pressure Setting  125 mmHg  -MC      Sponges Inserted  1;other (see comments) 2 sorbact, 1 black  -MC      Sponges Removed  1 1 black  -MC      Finger sweep complete  Yes  -MC        User Key  (r) = Recorded By, (t) = Taken By, (c) = Cosigned By    Initials Name Provider Type    Vinny Colindres, PT Physical Therapist    Mary Kay Flores, PT Physical Therapist    Leidy Millard, PT Physical Therapist            WOUND DEBRIDEMENT  Total area of Debridement: 2 cm2  Debridement Site 1  Location- Site 1: midline incision  Selective Debridement- Site 1: Wound Surface <20cmsq  Instruments- Site 1: tweezers  Excised Tissue Description- Site 1: minimum, slough  Bleeding- Site 1: none             Therapy Education     Row Name 03/07/20 5198             Therapy Education    Education Details  Continue with current POC  -MC      Given  Bandaging/dressing change  -MC      Program  Reinforced  -MC      How Provided  Verbal;Demonstration  -MC      Provided to  Patient  -MC      Level of Understanding  Verbalized;Teach back education performed  -MC        User Key  (r) = Recorded By, (t) = Taken By, (c) = Cosigned By    Initials Name Provider Type    Mary Kay Flores, PT Physical Therapist          Recommendation and Plan  PT Assessment/Plan     Row Name 03/07/20 8575          PT Assessment    Functional  Limitations  Other (comment);Performance in self-care ADL wound mgmt  -MC     Impairments  Integumentary integrity;Pain  -MC     Assessment Comments  Pt continues to progress with the current POC. Area is over 95% granulated, but continues to have some depth in the central areas of the inferior and superior portions. Packed lightly with cutimed sorbact to promote wicking of drainage from depth of wound. Will assess response next treatment. Anticipate continued progress with the current POC.  -     Rehab Potential  Good  -     Patient/caregiver participated in establishment of treatment plan and goals  Yes  -     Patient would benefit from skilled therapy intervention  Yes  -MC        PT Plan    PT Frequency  2x/week;3x/week  -     Physical Therapy Interventions (Optional Details)  patient/family education;wound care  -     PT Plan Comments  debridement prn, NPWT  -       User Key  (r) = Recorded By, (t) = Taken By, (c) = Cosigned By    Initials Name Provider Type    Mary Kay Flores, PT Physical Therapist          Goals  PT OP Goals     Row Name 03/07/20 1345          Time Calculation    PT Goal Re-Cert Due Date  05/17/20  -       User Key  (r) = Recorded By, (t) = Taken By, (c) = Cosigned By    Initials Name Provider Type    Mary Kay Flores, PT Physical Therapist          PT Goal Re-Cert Due Date: 05/17/20            Time Calculation: Start Time: 1345  Therapy Charges for Today     Code Description Service Date Service Provider Modifiers Qty    69575018512 HC YESSENIA DEBRIDE OPEN WOUND UP TO 20CM 3/7/2020 Mary Kay Bell, PT GP 1    55216734494 HC PT NEG PRESS WOUND TO 50SQCM DME2 3/7/2020 Mary Kay Bell, PT GP 1                  Mary Kay Bell PT  3/7/2020

## 2020-03-09 ENCOUNTER — LAB (OUTPATIENT)
Dept: LAB | Facility: HOSPITAL | Age: 45
End: 2020-03-09

## 2020-03-09 ENCOUNTER — OFFICE VISIT (OUTPATIENT)
Dept: GYNECOLOGIC ONCOLOGY | Facility: CLINIC | Age: 45
End: 2020-03-09

## 2020-03-09 ENCOUNTER — APPOINTMENT (OUTPATIENT)
Dept: INFUSION THERAPY | Facility: HOSPITAL | Age: 45
End: 2020-03-09

## 2020-03-09 ENCOUNTER — HOSPITAL ENCOUNTER (OUTPATIENT)
Dept: PHYSICAL THERAPY | Facility: HOSPITAL | Age: 45
Setting detail: THERAPIES SERIES
Discharge: HOME | End: 2020-03-09

## 2020-03-09 DIAGNOSIS — T81.31XD POSTOPERATIVE WOUND DEHISCENCE, SUBSEQUENT ENCOUNTER: Primary | ICD-10-CM

## 2020-03-09 DIAGNOSIS — L65.8 ALOPECIA DUE TO CYTOTOXIC DRUG: ICD-10-CM

## 2020-03-09 DIAGNOSIS — C54.1 ENDOMETRIAL CANCER (HCC): Primary | ICD-10-CM

## 2020-03-09 DIAGNOSIS — S31.109D OPEN WOUND OF ABDOMEN, SUBSEQUENT ENCOUNTER: ICD-10-CM

## 2020-03-09 DIAGNOSIS — C54.1 ENDOMETRIAL CANCER (HCC): ICD-10-CM

## 2020-03-09 DIAGNOSIS — T45.1X5A ALOPECIA DUE TO CYTOTOXIC DRUG: ICD-10-CM

## 2020-03-09 DIAGNOSIS — G62.9 NEUROPATHY: ICD-10-CM

## 2020-03-09 DIAGNOSIS — G89.18 POST-OP PAIN: ICD-10-CM

## 2020-03-09 LAB
ALBUMIN SERPL-MCNC: 3.3 G/DL (ref 3.5–5.2)
ALBUMIN/GLOB SERPL: 0.7 G/DL
ALP SERPL-CCNC: 84 U/L (ref 39–117)
ALT SERPL W P-5'-P-CCNC: 7 U/L (ref 1–33)
ANION GAP SERPL CALCULATED.3IONS-SCNC: 10 MMOL/L (ref 5–15)
AST SERPL-CCNC: 16 U/L (ref 1–32)
BILIRUB SERPL-MCNC: 0.3 MG/DL (ref 0.2–1.2)
BUN BLD-MCNC: 21 MG/DL (ref 6–20)
BUN/CREAT SERPL: 25.9 (ref 7–25)
CALCIUM SPEC-SCNC: 9.4 MG/DL (ref 8.6–10.5)
CHLORIDE SERPL-SCNC: 102 MMOL/L (ref 98–107)
CO2 SERPL-SCNC: 24 MMOL/L (ref 22–29)
CREAT BLD-MCNC: 0.81 MG/DL (ref 0.57–1)
ERYTHROCYTE [DISTWIDTH] IN BLOOD BY AUTOMATED COUNT: 16 % (ref 12.3–15.4)
GFR SERPL CREATININE-BSD FRML MDRD: 76 ML/MIN/1.73
GLOBULIN UR ELPH-MCNC: 4.9 GM/DL
GLUCOSE BLD-MCNC: 222 MG/DL (ref 65–99)
HCT VFR BLD AUTO: 25.5 % (ref 34–46.6)
HGB BLD-MCNC: 8.1 G/DL (ref 12–15.9)
LYMPHOCYTES # BLD AUTO: 1.3 10*3/MM3 (ref 0.7–3.1)
LYMPHOCYTES NFR BLD AUTO: 25.8 % (ref 19.6–45.3)
MCH RBC QN AUTO: 24.7 PG (ref 26.6–33)
MCHC RBC AUTO-ENTMCNC: 31.6 G/DL (ref 31.5–35.7)
MCV RBC AUTO: 78 FL (ref 79–97)
MONOCYTES # BLD AUTO: 0.5 10*3/MM3 (ref 0.1–0.9)
MONOCYTES NFR BLD AUTO: 9.8 % (ref 5–12)
NEUTROPHILS # BLD AUTO: 3.3 10*3/MM3 (ref 1.7–7)
NEUTROPHILS NFR BLD AUTO: 64.4 % (ref 42.7–76)
PLATELET # BLD AUTO: 210 10*3/MM3 (ref 140–450)
PMV BLD AUTO: 6.8 FL (ref 6–12)
POTASSIUM BLD-SCNC: 4.1 MMOL/L (ref 3.5–5.2)
PROT SERPL-MCNC: 8.2 G/DL (ref 6–8.5)
RBC # BLD AUTO: 3.27 10*6/MM3 (ref 3.77–5.28)
SODIUM BLD-SCNC: 136 MMOL/L (ref 136–145)
WBC NRBC COR # BLD: 5.2 10*3/MM3 (ref 3.4–10.8)

## 2020-03-09 PROCEDURE — 80053 COMPREHEN METABOLIC PANEL: CPT

## 2020-03-09 PROCEDURE — 99215 OFFICE O/P EST HI 40 MIN: CPT | Performed by: NURSE PRACTITIONER

## 2020-03-09 PROCEDURE — 97605 NEG PRS WND THER DME<=50SQCM: CPT

## 2020-03-09 PROCEDURE — 85025 COMPLETE CBC W/AUTO DIFF WBC: CPT

## 2020-03-09 PROCEDURE — 36415 COLL VENOUS BLD VENIPUNCTURE: CPT

## 2020-03-09 RX ORDER — PROMETHAZINE HYDROCHLORIDE 25 MG/1
25 TABLET ORAL EVERY 6 HOURS PRN
Qty: 30 TABLET | Refills: 5 | Status: SHIPPED | OUTPATIENT
Start: 2020-03-09 | End: 2021-12-06

## 2020-03-09 RX ORDER — LORATADINE 10 MG/1
TABLET ORAL
Qty: 12 TABLET | Refills: 1 | Status: ON HOLD | OUTPATIENT
Start: 2020-03-09 | End: 2020-03-18 | Stop reason: HOSPADM

## 2020-03-09 RX ORDER — ONDANSETRON 8 MG/1
8 TABLET, ORALLY DISINTEGRATING ORAL EVERY 8 HOURS PRN
Qty: 30 TABLET | Refills: 5 | Status: SHIPPED | OUTPATIENT
Start: 2020-03-09 | End: 2021-12-06

## 2020-03-09 RX ORDER — TRAMADOL HYDROCHLORIDE 50 MG/1
50 TABLET ORAL EVERY 6 HOURS PRN
Qty: 30 TABLET | Refills: 0 | Status: SHIPPED | OUTPATIENT
Start: 2020-03-09 | End: 2020-03-23 | Stop reason: HOSPADM

## 2020-03-09 RX ORDER — GABAPENTIN 300 MG/1
300 CAPSULE ORAL 3 TIMES DAILY
Qty: 90 CAPSULE | Refills: 3 | Status: SHIPPED | OUTPATIENT
Start: 2020-03-09 | End: 2020-04-30 | Stop reason: SDUPTHER

## 2020-03-09 RX ORDER — DEXAMETHASONE 4 MG/1
TABLET ORAL
Qty: 11 TABLET | Refills: 5 | Status: SHIPPED | OUTPATIENT
Start: 2020-03-09 | End: 2020-06-17 | Stop reason: SDUPTHER

## 2020-03-09 NOTE — PROGRESS NOTES
CHEMOTHERAPY PREPARATION    Jeana Chung  [id number]  1975    Chief Complaint: Treatment preparation and needs assessment    History of present illness:  Jeana Chung is a 44 y.o. year old female who is here today for chemotherapy preparation and needs assessment. The patient has been diagnosed with Stage IIIA endometrial cancer (right ovarian involvement) and is scheduled to begin treatment with carboplatin at AUC 6 plus paclitaxel 175 mg/m2 IV q21 days. Plan is for 6 cycles. She is accompanied by her  and her mother. She normally lives in [place], but will be staying in [place] with family to go through treatment.  She c/o low abdominal and pelvic pain, rates 6/10. She feels this is ongoing post-op discomfort. Tylenol helps some, but not completely. She experienced a superficial wound separation on 2/15/2020, fascia intact. She is now on a wound vac and seeing PT wound care every other day.     Oncology History:       Endometrial cancer (CMS/HCC)    12/14/2019 Imaging     Presented to ED in [place] due to progressively heavy vaginal bleeding and severe back pain. Ultrasound revealed uterine and cervical masses.      1/2020 Biopsy     ED follow-up with gynecologist in [place], told she likely has cervical cancer. Insufficient tissue on attempted cervical biopsy. Patient moved to [place], [place] to be closer to mother and sister for work-up and treatment.       1/21/2020 Imaging     Gyn evaluation at [facility]. Repeat TVUS showed cervical mass, right adnexal mass, and large uterine fibroid vs mass. Referred to Gyn Oncology      1/23/2020 Initial Diagnosis     Endometrial cancer (CMS/HCC)  Cervical biopsy positive for infiltrating endometrioid adenocarcinoma      1/30/2020 Imaging     CT chest, abdomen, pelvis showed enlarged uterus with multiple masses and 4 cm cystic/solid mass at right ovary. No metastatic disease noted in abdomen or chest.      2/10/2020 Surgery     Exploratory  laparotomy, total abdominal hysterectomy, bilateral salpingo-oophorectomy, with optimal debulking (R=0), and omentectomy.    Pelvic washing cytology positive for malignant cells, consistent with adenocarcinoma. Final pathology showed large grade 3 endometrioid tumor with lymphvascular invasion at the uterus. Cervical stromal involvement, vaginal margin negative. Right tube and ovary involved. Left tube and ovary negative, omentum negative. MSI normal. [id number], Stage IIIA grade 3        3/9/2020 -  Chemotherapy     OP UTERINE PACLitaxel / CARBOplatin (Q21D)           Past Medical History:   Diagnosis Date   • Anemia    • Anxiety and depression    • Back pain    • Bronchitis    • Diabetes (CMS/HCC)     DX 4-5 YRS AGO, CHECKS BS 4X/DAY, LAST A1C 7.1%   • Endometrial cancer (CMS/HCC)     STAGE II   • GERD (gastroesophageal reflux disease)    • Hypertension    • MRSA (methicillin resistant staph aureus) culture positive     S/P NECROTIZING FASCITIS IN BILATERAL FEET   • Necrotizing fasciitis (CMS/HCC)    • PCOS (polycystic ovarian syndrome)    • PTSD (post-traumatic stress disorder)    • Sepsis (CMS/HCC)    • Stage 3 chronic kidney disease (CMS/HCC) 2/24/2020   • Subconjunctival hemorrhage        Past Surgical History:   Procedure Laterality Date   • EXPLORATORY LAPAROTOMY, TOTAL ABDOMINAL HYSTERECTOMY SALPINGO OOPHORECTOMY N/A 2/10/2020    Procedure: EXPLORATORY LAPAROTOMY, TOTAL ABDOMINAL HYSTERECTOMY, BILATERAL SALPINGO-OOPHORECTOMY WITH OPTIMAL  STAGING (R-0), OMENTECTOMY;  Surgeon: Celine Rubio MD;  Location: [facility];  Service: Gynecology Oncology;  Laterality: N/A;   • EYE SURGERY Left     BLOOD VESSEL IN EYE REPAIR   • TOE AMPUTATION Left 06/2019    big toe - unhealing sore   • TOE AMPUTATION Right 2018    big toe and second toe - Necrotizing fasciitis and MRSA        MEDICATIONS: The current medication list was reviewed and reconciled.     Allergies:  is allergic to bactrim  [sulfamethoxazole-trimethoprim]; penicillins; and promethazine.    Family History   Problem Relation Age of Onset   • Fibroids Mother    • Diabetes type II Mother    • Hypertension Mother    • Heart attack Mother    • Fibroids Sister         PCOS   • Diabetes type II Sister    • Dementia Maternal Grandmother    • Stroke Maternal Grandmother          Review of Systems   Constitutional: Positive for fatigue. Negative for fever and unexpected weight change.   HENT: Negative for congestion, hearing loss, sore throat and trouble swallowing.    Eyes: Negative for visual disturbance.   Respiratory: Negative for cough, shortness of breath and wheezing.    Cardiovascular: Negative for chest pain and leg swelling.   Gastrointestinal: Positive for abdominal pain (low, wound vac in place) and constipation. Negative for abdominal distention, diarrhea, nausea and vomiting.   Endocrine: Negative.    Genitourinary: Positive for pelvic pain (post-op soreness).   Musculoskeletal: Negative for arthralgias, back pain and gait problem.   Skin: Negative.    Allergic/Immunologic: Negative.    Neurological: Positive for numbness (baseline diabetic neuropathy). Negative for dizziness, weakness and headaches.   Hematological: Negative for adenopathy. Does not bruise/bleed easily.   Psychiatric/Behavioral: The patient is nervous/anxious.    All other systems reviewed and are negative.      Physical Exam  Vital Signs: /55   Pulse 107   Temp 97.6 °F (36.4 °C)   Resp 18   Wt 88.9 kg (196 lb)   LMP  (LMP Unknown)   SpO2 98%   BMI 35.85 kg/m²   Vitals:    03/09/20 1105   PainSc:   6   PainLoc: Abdomen  Comment: lower abdomen/pelvis           General Appearance:  alert, cooperative, no apparent distress and appears stated age   Neurologic/Psychiatric: A&O x 3, gait steady, appropriate affect   HEENT:  Normocephalic, without obvious abnormality, mucous membranes moist   Lungs:   Clear to auscultation bilaterally; respirations regular,  even, and unlabored bilaterally   Heart:  Regular rate and rhythm, no murmurs appreciated   Extremities: Normal, atraumatic; no clubbing, cyanosis, or edema    Skin: No rashes, lesions, or abnormal coloration noted     ECOG Performance Status: (1) Restricted in Physically Strenuous Activity, Ambulatory & Able to Do Work of Light Nature          NEEDS ASSESSMENTS    Genetics  The patient's new diagnosis and family history have been reviewed for genetic counseling needs. A genetic referral is recommended.     Molecular Testing  Further molecular testing on tumor is indicated. Caris testing to be ordered.    Psychosocial  The patient has completed a PHQ-9 Depression Screening and the Distress Thermometer (DT) today.   PHQ-9 Total Score: 7. PHQ-9 results show 5-9 (Mild Depression). The patient scored their distress today as 5 on a scale of 0-10 with 0 being no distress and 10 being extreme distress.   Problems marked by the patient as being an issue for them within the last week include emotional problems and physical problems.   Results were reviewed along with psychosocial resources offered by our cancer center. Our oncology social worker will be flagged for a DT score of 4 or above, and a same day call will be made for a score of 9 or 10. A mental health referral is recommended at this time. The patient is accepting of a referral to [facility]. This referral was previously made by MD. Patient scheduled for new patient visit with Angelica on 3/12/2020.   Copies of patient's questionnaires will be scanned into EMR for details and further reference.    Barriers to care  A barriers form was also completed by the patient today. We discussed services offered by our facility to help her have adequate access to care. The patient was given the name and card for our Oncology Social Worker, Diane Dyer. Based upon barriers assessment today, the patient will not require a follow-up call from the  to further  "discuss needs.   A copy of the barriers form will also be scanned into EMR for details and further reference.     VAD Assessment  The patient and I discussed planned intervenous chemotherapy as well as other IV treatments that are often needed throughout the course of treatment. These may include, but are not limited to blood transfusions, antibiotics, and IV hydration. The vasculature does appear to be adequate for multiple peripheral IVs throughout their treatment course. Discussed risks and benefits of VADs. The patient would not like to pursue Port-A-Cath insertion prior to initiation of treatment.     Advance Care Planning   The patient and I discussed advanced care planning, \"Conversations that Matter\".   This service was offered, free of charge, for development of advance directives with a certified ACP facilitator.  The patient does not have an up-to-date advanced directive. This document is not on file with our office. The patient is not interested in an appointment with one of our facilitators to create or update their advanced directives.         Palliative Care  The patient and I discussed palliative care services. Palliative care is not the same as Hospice care. This is specialized medical care for people living with serious illness with the goal of improving quality of life for the patient and their family. [facility] has partnered with [facility] to offer our patients outpatient palliative care early along with their treatment to assist in coordination of care, symptom management, pain management, and medical decision making.  Oncology criteria for palliative care referral is met at this time. The patient is not interested in a palliative care consultation.     Additional Referral needs  None  May need to consider dietician involvement or endocrinology referral if trouble managing diabetes thorough treatment. Patient agrees.       CHEMOTHERAPY EDUCATION    Booklets Given: Chemotherapy and " You [x]  Eating Hints [x]    Sexuality/Fertility Books [x]      Chemotherapy/Biotherapy Education Sheets: (list all that apply)  nausea management, acid reflux management, Cancer resourse contacts information, skin and mouth care, vaccination information and wig information                                                                                                                                                                 Chemotherapy Regimen:   Treatment Plans     Name Type Plan dates Plan Provider         Active    OP UTERINE PACLitaxel / CARBOplatin (Q21D) ONCOLOGY TREATMENT  3/3/2020 - Present Celine Rubio MD                    TOPICS EDUCATION PROVIDED COMMENTS   ANEMIA:  role of RBC, cause, s/s, ways to manage, role of transfusion [x]    THROMBOCYTOPENIA:  role of platelet, cause, s/s, ways to prevent bleeding, things to avoid, when to seek help [x]    NEUTROPENIA:  role of WBC, cause, infection precautions, s/s of infection, when to call MD [x] Discussed day 10 stas labs each cycle.    NUTRITION & APPETITE CHANGES:  importance of maintaining healthy diet & weight, ways to manage to improve intake, dietary consult, exercise regimen [x] Reviewed additional dietary details regarding her known insulin-dependent DM   DIARRHEA:  causes, s/s of dehydration, ways to manage, dietary changes, when to call MD []    CONSTIPATION:  causes, ways to manage, dietary changes, when to call MD [x] Patient has baseline constipation, currently using colace BID. Encouraged to add Miralax daily unless bowels get too soft. Keep milk of magnesia at home to use PRN.    NAUSEA & VOMITING:  cause, use of antiemetics, dietary changes, when to call MD [x] Reviewed home antiemetics and how to alternate   MOUTH SORES:  causes, oral care, ways to manage []    ALOPECIA:  cause, ways to manage, resources [x] Cranial prosthesis order and community resources given   INFERTILITY & SEXUALITY:  causes, fertility preservation options,  sexuality changes, ways to manage, importance of birth control [x]    NERVOUS SYSTEM CHANGES:  causes, s/s, neuropathies, cognitive changes, ways to manage [x] Patient has baseline peripheral neuropathy r/t diabetes. She is currently on Lyrica BID, but feels this is not helpful. She desires to go back to gabapentin. Discussed importance of notifying us if neuropathy worsens on treatment.    PAIN:  causes, ways to manage [x] Patient has ongoing post-op pain somewhat related to wound complication and wound vac. Tylenol only somewhat helpful. Will prescribe Ultram now. We reviewed medication instructions, risks, benefits, and potential side effects.    SKIN & NAIL CHANGES:  cause, s/s, ways to manage []    ORGAN TOXICITIES:  cause, s/s, need for diagnostic tests, labs, when to notify MD [x] Risk for renal toxicity with this regimen, importance of adequate daily hydration.    SURVIVORSHIP:  distress, distress assessment, secondary malignancies, early/late effects, follow-up, social issues, social support [x]    HOME CARE:  use of spill kits, storing of PO chemo, how to manage bodily fluids [x]    MISCELLANEOUS:  drug interactions, administration, vesicant, et [x] Patient is s/p toe amputation x2 r/t diabetes and poor wound healing. Continue QOD visits with wound care for management of wound vac. Notify office of any new wounds or problems.        Assessment and Plan:    Diagnoses and all orders for this visit:    Endometrial cancer (CMS/[facility])  -     Ambulatory Referral to Genetics  -     Prosthetic Cranial  -     ondansetron ODT (ZOFRAN-ODT) 8 MG disintegrating tablet; Take 1 tablet by mouth Every 8 (Eight) Hours As Needed for Nausea or Vomiting.  -     promethazine (PHENERGAN) 25 MG tablet; Take 1 tablet by mouth Every 6 (Six) Hours As Needed for Nausea or Vomiting.  -     loratadine (CLARITIN) 10 MG tablet; Take 1 tab the night before chemo then take 1 tab the morning of chemo.  -     dexamethasone (DECADRON) 4 MG  tablet; Take 5 tabs the night before chemo then take 2 tabs in the morning daily on days 2, 3 & 4.  Take with food.  -     traMADol (ULTRAM) 50 MG tablet; Take 1 tablet by mouth Every 6 (Six) Hours As Needed for Moderate Pain .  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    Neuropathy  -     gabapentin (NEURONTIN) 300 MG capsule; Take 1 capsule by mouth 3 (Three) Times a Day.        This was a 60 minute face-to-face visit with 45 minutes spent in  counseling and coordination of care as documented above.   The patient and I have reviewed their new cancer diagnosis and scheduled treatment plan. Needs assessment was completed including genetics, psychosocial needs, barriers to care, VAD evaluation, advanced care planning, and palliative care services. Referrals have been ordered as appropriate based upon our evaluation and patient desires.     Chemotherapy teaching was also completed today as documented above. Adequate time was given to answer all questions to her satisfaction. Patient and family are aware of their care team members and contact information if they have questions or problems throughout the treatment course. Needs assessments and education has been completed. The patient is adequately prepared to begin treatment as scheduled.     We addressed her current pain. I will prescribe Ultram now due to ongoing wound care and related discomfort. We reviewed medication instructions, risks, benefits, and potential side effects.     We also discussed her history of diabetic neuropathy and medical management. She is currently on Lyrica TID, does not feel this is helpful. I will change patient to gabapentin 300 mg TID. We discussed how to slowly wean down Lyrica and change over to gabapentin. We  reviewed medication instructions, risks, benefits, and potential side effects. She v/u.     Continue visits with PT wound care as scheduled.     Pain assessment was performed today as a part of patient’s care. For patients with pain related to surgery, gynecologic malignancy or cancer treatment, the plan is as noted in the assessment/plan.  For patients with pain not related to these issues, they are to seek any further needed care from a more appropriate provider, such as PCP.    Patient screened positive for depression based on a PHQ-9 score of 7 on 3/17/2020. Follow-up recommendations include: Referral to Mental Health specialist previously ordered, patient scheduled for new patient visit with [facility] Quiroz on 3/12/2020.        SYLVIA Sales

## 2020-03-10 ENCOUNTER — DOCUMENTATION (OUTPATIENT)
Dept: SOCIAL WORK | Facility: HOSPITAL | Age: 45
End: 2020-03-10

## 2020-03-10 ENCOUNTER — EDUCATION (OUTPATIENT)
Dept: ONCOLOGY | Facility: HOSPITAL | Age: 45
End: 2020-03-10

## 2020-03-10 ENCOUNTER — HOSPITAL ENCOUNTER (OUTPATIENT)
Dept: ONCOLOGY | Facility: HOSPITAL | Age: 45
Setting detail: INFUSION SERIES
Discharge: HOME | End: 2020-03-10

## 2020-03-10 ENCOUNTER — DOCUMENTATION (OUTPATIENT)
Dept: NUTRITION | Facility: HOSPITAL | Age: 45
End: 2020-03-10

## 2020-03-10 ENCOUNTER — TELEPHONE (OUTPATIENT)
Dept: FAMILY MEDICINE CLINIC | Facility: CLINIC | Age: 45
End: 2020-03-10

## 2020-03-10 VITALS
HEIGHT: 62 IN | TEMPERATURE: 98.2 F | HEART RATE: 102 BPM | WEIGHT: 195 LBS | BODY MASS INDEX: 35.88 KG/M2 | SYSTOLIC BLOOD PRESSURE: 159 MMHG | DIASTOLIC BLOOD PRESSURE: 74 MMHG | RESPIRATION RATE: 16 BRPM

## 2020-03-10 DIAGNOSIS — E11.42 TYPE 2 DIABETES MELLITUS WITH DIABETIC POLYNEUROPATHY, WITH LONG-TERM CURRENT USE OF INSULIN (HCC): Primary | ICD-10-CM

## 2020-03-10 DIAGNOSIS — Z79.4 TYPE 2 DIABETES MELLITUS WITH DIABETIC POLYNEUROPATHY, WITH LONG-TERM CURRENT USE OF INSULIN (HCC): ICD-10-CM

## 2020-03-10 DIAGNOSIS — C54.1 ENDOMETRIAL CANCER (HCC): Primary | ICD-10-CM

## 2020-03-10 DIAGNOSIS — E11.42 TYPE 2 DIABETES MELLITUS WITH DIABETIC POLYNEUROPATHY, WITH LONG-TERM CURRENT USE OF INSULIN (HCC): ICD-10-CM

## 2020-03-10 DIAGNOSIS — Z79.4 TYPE 2 DIABETES MELLITUS WITH DIABETIC POLYNEUROPATHY, WITH LONG-TERM CURRENT USE OF INSULIN (HCC): Primary | ICD-10-CM

## 2020-03-10 DIAGNOSIS — D50.9 IRON DEFICIENCY ANEMIA, UNSPECIFIED IRON DEFICIENCY ANEMIA TYPE: ICD-10-CM

## 2020-03-10 LAB
ABO GROUP BLD: NORMAL
BLD GP AB SCN SERPL QL: NEGATIVE
GLUCOSE BLDC GLUCOMTR-MCNC: 322 MG/DL (ref 70–130)
GLUCOSE BLDC GLUCOMTR-MCNC: 332 MG/DL (ref 70–130)
GLUCOSE BLDC GLUCOMTR-MCNC: 373 MG/DL (ref 70–130)
GLUCOSE BLDC GLUCOMTR-MCNC: 416 MG/DL (ref 70–130)
GLUCOSE BLDC GLUCOMTR-MCNC: 449 MG/DL (ref 70–130)
RH BLD: POSITIVE
T&S EXPIRATION DATE: NORMAL

## 2020-03-10 PROCEDURE — 63710000001 INSULIN LISPRO (HUMAN) PER 5 UNITS: Performed by: NURSE PRACTITIONER

## 2020-03-10 PROCEDURE — 25010000002 PACLITAXEL PER 30 MG: Performed by: OBSTETRICS & GYNECOLOGY

## 2020-03-10 PROCEDURE — 96413 CHEMO IV INFUSION 1 HR: CPT

## 2020-03-10 PROCEDURE — 86850 RBC ANTIBODY SCREEN: CPT | Performed by: NURSE PRACTITIONER

## 2020-03-10 PROCEDURE — 86900 BLOOD TYPING SEROLOGIC ABO: CPT | Performed by: NURSE PRACTITIONER

## 2020-03-10 PROCEDURE — 86900 BLOOD TYPING SEROLOGIC ABO: CPT

## 2020-03-10 PROCEDURE — 96367 TX/PROPH/DG ADDL SEQ IV INF: CPT

## 2020-03-10 PROCEDURE — 86901 BLOOD TYPING SEROLOGIC RH(D): CPT | Performed by: NURSE PRACTITIONER

## 2020-03-10 PROCEDURE — 96417 CHEMO IV INFUS EACH ADDL SEQ: CPT

## 2020-03-10 PROCEDURE — 82962 GLUCOSE BLOOD TEST: CPT

## 2020-03-10 PROCEDURE — 25010000002 PALONOSETRON 0.25 MG/5ML SOLUTION PREFILLED SYRINGE: Performed by: OBSTETRICS & GYNECOLOGY

## 2020-03-10 PROCEDURE — 25010000002 DEXAMETHASONE PER 1 MG: Performed by: OBSTETRICS & GYNECOLOGY

## 2020-03-10 PROCEDURE — 96415 CHEMO IV INFUSION ADDL HR: CPT

## 2020-03-10 PROCEDURE — P9016 RBC LEUKOCYTES REDUCED: HCPCS

## 2020-03-10 PROCEDURE — 25010000002 CARBOPLATIN PER 50 MG: Performed by: OBSTETRICS & GYNECOLOGY

## 2020-03-10 PROCEDURE — 86923 COMPATIBILITY TEST ELECTRIC: CPT

## 2020-03-10 PROCEDURE — 96372 THER/PROPH/DIAG INJ SC/IM: CPT

## 2020-03-10 PROCEDURE — 25010000002 DIPHENHYDRAMINE PER 50 MG: Performed by: OBSTETRICS & GYNECOLOGY

## 2020-03-10 PROCEDURE — 25010000002 FOSAPREPITANT PER 1 MG: Performed by: OBSTETRICS & GYNECOLOGY

## 2020-03-10 PROCEDURE — 36430 TRANSFUSION BLD/BLD COMPNT: CPT

## 2020-03-10 PROCEDURE — 96375 TX/PRO/DX INJ NEW DRUG ADDON: CPT

## 2020-03-10 RX ORDER — PALONOSETRON 0.05 MG/ML
0.25 INJECTION, SOLUTION INTRAVENOUS ONCE
Status: COMPLETED | OUTPATIENT
Start: 2020-03-10 | End: 2020-03-10

## 2020-03-10 RX ORDER — DIPHENHYDRAMINE HCL 25 MG
25 CAPSULE ORAL ONCE
Status: DISCONTINUED | OUTPATIENT
Start: 2020-03-10 | End: 2020-03-11 | Stop reason: HOSPADM

## 2020-03-10 RX ORDER — DIPHENHYDRAMINE HYDROCHLORIDE 50 MG/ML
50 INJECTION INTRAMUSCULAR; INTRAVENOUS AS NEEDED
Status: DISCONTINUED | OUTPATIENT
Start: 2020-03-10 | End: 2020-03-11 | Stop reason: HOSPADM

## 2020-03-10 RX ORDER — FAMOTIDINE 10 MG/ML
20 INJECTION, SOLUTION INTRAVENOUS AS NEEDED
Status: DISCONTINUED | OUTPATIENT
Start: 2020-03-10 | End: 2020-03-11 | Stop reason: HOSPADM

## 2020-03-10 RX ORDER — DIPHENHYDRAMINE HCL 25 MG
25 TABLET ORAL ONCE
Status: CANCELLED | OUTPATIENT
Start: 2020-03-10 | End: 2020-03-10

## 2020-03-10 RX ORDER — ACETAMINOPHEN 325 MG/1
650 TABLET ORAL ONCE
Status: CANCELLED | OUTPATIENT
Start: 2020-03-10 | End: 2020-03-10

## 2020-03-10 RX ORDER — FAMOTIDINE 10 MG/ML
20 INJECTION, SOLUTION INTRAVENOUS ONCE
Status: COMPLETED | OUTPATIENT
Start: 2020-03-10 | End: 2020-03-10

## 2020-03-10 RX ORDER — SODIUM CHLORIDE 9 MG/ML
250 INJECTION, SOLUTION INTRAVENOUS AS NEEDED
Status: DISCONTINUED | OUTPATIENT
Start: 2020-03-10 | End: 2020-03-11 | Stop reason: HOSPADM

## 2020-03-10 RX ORDER — ACETAMINOPHEN 325 MG/1
650 TABLET ORAL ONCE
Status: COMPLETED | OUTPATIENT
Start: 2020-03-10 | End: 2020-03-10

## 2020-03-10 RX ORDER — SODIUM CHLORIDE 9 MG/ML
250 INJECTION, SOLUTION INTRAVENOUS ONCE
Status: COMPLETED | OUTPATIENT
Start: 2020-03-10 | End: 2020-03-10

## 2020-03-10 RX ORDER — SODIUM CHLORIDE 9 MG/ML
250 INJECTION, SOLUTION INTRAVENOUS AS NEEDED
Status: CANCELLED | OUTPATIENT
Start: 2020-03-10

## 2020-03-10 RX ADMIN — SODIUM CHLORIDE 330 MG: 9 INJECTION, SOLUTION INTRAVENOUS at 10:31

## 2020-03-10 RX ADMIN — ACETAMINOPHEN 650 MG: 325 TABLET, FILM COATED ORAL at 13:56

## 2020-03-10 RX ADMIN — INSULIN LISPRO 15 UNITS: 100 INJECTION, SOLUTION INTRAVENOUS; SUBCUTANEOUS at 10:04

## 2020-03-10 RX ADMIN — CARBOPLATIN 880 MG: 10 INJECTION, SOLUTION INTRAVENOUS at 13:43

## 2020-03-10 RX ADMIN — PALONOSETRON 0.25 MG: 0.25 INJECTION, SOLUTION INTRAVENOUS at 09:33

## 2020-03-10 RX ADMIN — DEXAMETHASONE SODIUM PHOSPHATE 20 MG: 4 INJECTION, SOLUTION INTRAMUSCULAR; INTRAVENOUS at 09:37

## 2020-03-10 RX ADMIN — SODIUM CHLORIDE 150 MG: 9 INJECTION, SOLUTION INTRAVENOUS at 09:59

## 2020-03-10 RX ADMIN — SODIUM CHLORIDE 250 ML: 9 INJECTION, SOLUTION INTRAVENOUS at 09:30

## 2020-03-10 RX ADMIN — INSULIN LISPRO 12 UNITS: 100 INJECTION, SOLUTION INTRAVENOUS; SUBCUTANEOUS at 13:35

## 2020-03-10 RX ADMIN — DIPHENHYDRAMINE HYDROCHLORIDE 50 MG: 50 INJECTION INTRAMUSCULAR; INTRAVENOUS at 09:37

## 2020-03-10 RX ADMIN — FAMOTIDINE 20 MG: 10 INJECTION INTRAVENOUS at 09:37

## 2020-03-10 RX ADMIN — INSULIN LISPRO 15 UNITS: 100 INJECTION, SOLUTION INTRAVENOUS; SUBCUTANEOUS at 11:46

## 2020-03-10 NOTE — PROGRESS NOTES
Per communication order: Instructed pt. to monitor home glucose, to continue home insulin, and to F/U with PCP for blood glucose control while on treatment. Pt. verbalized understanding.

## 2020-03-10 NOTE — PROGRESS NOTES
ERIK and MSW student met with pt during her first infusion to provide support and resources, and also to address her recent distress screening.  Pt is living with her parents while she is going through treatment.  Pt  and step-daughter live in [place].  Pt decided to come stay with her parents and receive care here because they are available to help take care of her.  Pt reports that she has not worked for over a year due to a foot injury prior to her cancer diagnosis.  ERIK provided support and informed pt of the role of oncology social work.  Pt has concerns about her medical bills.  She reports that her parents are helping pay her COBRA payments.  She reports that they are helping meet her daily needs.  ERIK provided contact information and will continue to provide support and resources as needed.

## 2020-03-10 NOTE — TELEPHONE ENCOUNTER
Pt called and stated that since she is on chemotherapy and she takes 5 steroid pills.  Her next chemotherapy is not for 21 days.  Her blood sugar won't fall below 300.  The patient was given 12-15 UNITS of Insulin and the numbers still won't go down.    Pt callback: [phone]     Please advise.

## 2020-03-10 NOTE — PLAN OF CARE
Outpatient Infusion • [address] [facility] • [address] • [place] [id number] • [phone]      CHEMOTHERAPY EDUCATION SHEET    NAME:  Jeana Chung      : 1975           DATE: 03/10/20    Booklets Given: Chemotherapy and You []  Eating Hints []    Sexuality/Fertility Books []     Chemotherapy/Biotherapy Education Sheets: (list all that apply)  Carboplatin, Paclitaxel                                                                                                                                                                Chemotherapy Regimen:  Carboplatin IV and Paclitaxel IV on day 1 of 21 day cycle    TOPICS EDUCATION PROVIDED EDUCATION REINFORCED COMMENTS   ANEMIA:  role of RBC, cause, s/s, ways to manage, role of transfusion [] [x] Explained need for routine lab monitoring, possible treatment   THROMBOCYTOPENIA:  role of platelet, cause, s/s, ways to prevent bleeding, things to avoid, when to seek help [] [x] Explained need for routine lab monitoring, possible treatment   NEUTROPENIA:  role of WBC, cause, infection precautions, s/s of infection, when to call MD [] [x] Explained need for routine lab monitoring, to call for fever >100.4   NUTRITION & APPETITE CHANGES:  importance of maintaining healthy diet & weight, ways to manage to improve intake, dietary consult, exercise regimen [] [x] Discussed maintaining healthy diet with adequate fluid intake   DIARRHEA:  causes, s/s of dehydration, ways to manage, dietary changes, when to call MD [] [x] Discussed occurrence, OTC treatment   CONSTIPATION:  causes, ways to manage, dietary changes, when to call MD [] [x] Discussed occurrence, treatment options, when to call provider   NAUSEA & VOMITING:  cause, use of antiemetics, dietary changes, when to call MD [] [x] Explained role of pre-medications, ondansetron on hand   MOUTH SORES:  causes, oral care, ways to manage [x] [] Discussed using salt/soda rinse, to call for magic mouthwash   ALOPECIA:   cause, ways to manage, resources [x] [] Explained timing, patient has wig    INFERTILITY & SEXUALITY:  causes, fertility preservation options, sexuality changes, ways to manage, importance of birth control [] [x] Reinforced possible changes, s/p REGINALDO/BSO   NERVOUS SYSTEM CHANGES:  causes, s/s, neuropathies, cognitive changes, ways to manage [] [x] Discussed neuropathy, patient has experienced in the past   PAIN:  causes, ways to manage [] [x] Reinforced use of OTC pain management???   SKIN & NAIL CHANGES:  cause, s/s, ways to manage [] [x] Discussed possibility, management   ORGAN TOXICITIES:  cause, s/s, need for diagnostic tests, labs, when to notify MD [] [x] Explained lab monitoring, signs to watch for   SURVIVORSHIP:  distress, distress assessment, secondary malignancies, early/late effects, follow-up, social issues, social support [x] [] Discussed available resources through the clinic   HOME CARE:  use of spill kits, storing of PO chemo, how to manage bodily fluids [] [x] Reinforced bodily fluid management   MISCELLANEOUS:  drug interactions, administration, vesicant, et [x] [] Discussed regimen administration and timing     Referrals:      Notes: The aforementioned material was discussed with the patient. All pertinent questions were answered.    Rico Khan PharmD  Pharmacy Resident   3/10/2020  11:20

## 2020-03-10 NOTE — PROGRESS NOTES
"Onc Nutrition     Patient:  Jeana Chung  YOB: 1975    Diagnosis: stage IIIa endometrial cancer  Surgery: exp lap / REGINALDO / BSO / optimal debulking / omentectomy (2/10/20)  Chemotherapy: Carbo / Taxol - every 21 days x 6 cycles  Radiation: plan pending    Weight: 195# - fluctuating between 189-199#  Usual Body Weight: 199#  Nutrition Symptoms: hyperglycemia, constipation    Met with patient during her initial chemotherapy infusion appointment.  She states her appetite has been good and denies nausea.  She does complain of constipation and is going to start Miralax per [facility] recommendations.  She also reports her blood sugars have been elevated and her primary care physician has adjusted her insulin.    Briefly discussed her history of insulin dependent diabetes.  Provided written diet material \"Blood Glucose Management\".  Instructed her to be eating meals / snacks at regular times, to be choosing complex carbohydrates, and to monitor her blood sugars closely especially on days she is taking steroids.    Discussed the importance of good nutrition during her treatment course focusing on adequate calorie, protein, nutrient and fluid intake.  Advised her to be consuming smaller more frequent meals/snacks throughout the day to aid with potential nausea management.  Emphasized the importance of protein and its role in the diet; reviewed high protein foods; and recommended she have a protein source at each meal/snack.  Offered high protein snack ideas for her to try at home.  Also emphasized the importance of hydration; reviewed good hydrating fluid options; and recommended she drink at least 64 ounces daily.  Discussed oral liquid nutritional supplements and their role in the diet - she reports she is drinking ~1 per day but does not remember the name - encouraged her to drink those as needed.    Answered her questions and she voiced understanding of information discussed.  RD's contact information " provided and encouraged to call with questions.  Will monitor as needed during her treatment course.    Starr Pratt, CRISTI  03/10/20

## 2020-03-11 ENCOUNTER — HOSPITAL ENCOUNTER (OUTPATIENT)
Dept: PHYSICAL THERAPY | Facility: HOSPITAL | Age: 45
Setting detail: THERAPIES SERIES
Discharge: HOME | End: 2020-03-11

## 2020-03-11 ENCOUNTER — READMISSION MANAGEMENT (OUTPATIENT)
Dept: CALL CENTER | Facility: HOSPITAL | Age: 45
End: 2020-03-11

## 2020-03-11 DIAGNOSIS — T81.31XD POSTOPERATIVE WOUND DEHISCENCE, SUBSEQUENT ENCOUNTER: Primary | ICD-10-CM

## 2020-03-11 DIAGNOSIS — S31.109D OPEN WOUND OF ABDOMEN, SUBSEQUENT ENCOUNTER: ICD-10-CM

## 2020-03-11 LAB
ABO + RH BLD: NORMAL
BH BB BLOOD EXPIRATION DATE: NORMAL
BH BB BLOOD TYPE BARCODE: 6200
BH BB DISPENSE STATUS: NORMAL
BH BB PRODUCT CODE: NORMAL
BH BB UNIT NUMBER: NORMAL
UNIT  ABO: NORMAL
UNIT  RH: NORMAL

## 2020-03-11 PROCEDURE — 97605 NEG PRS WND THER DME<=50SQCM: CPT

## 2020-03-11 PROCEDURE — 97597 DBRDMT OPN WND 1ST 20 CM/<: CPT

## 2020-03-11 RX ORDER — FLASH GLUCOSE SENSOR
KIT MISCELLANEOUS
Qty: 1 EACH | Refills: 1 | Status: SHIPPED | OUTPATIENT
Start: 2020-03-11 | End: 2022-03-15 | Stop reason: ALTCHOICE

## 2020-03-11 NOTE — OUTREACH NOTE
General Surgery Week 4 Survey      Responses   [facility] patient discharged from?  [place]   Does the patient have one of the following disease processes/diagnoses(primary or secondary)?  General Surgery   Week 4 attempt successful?  Yes   Call start time  1521   Rescheduled  Rescheduled-pt requested [pt just finished infusion tx, requested to be called back tomorrow, 3/12/20]   Call end time  1524   Person spoke with today (if not patient) and relationship  mother           Nany Owen RN

## 2020-03-11 NOTE — TELEPHONE ENCOUNTER
Spoke with patient.  Advised to add basaglar this morning if sugars are still elevated.  Start with 10 units and check sugars, wait an hour.  Add additional 10 units of basaglar if still elevated.  She is on steroids for the next 4 days and may need more long acting insulin due to this.  She is asymptomatic and last sugar reading was in 300s.     Referral placed for Dr. Rommel Hess.  Placed it stat given patient's situation with chemotherapy.

## 2020-03-11 NOTE — THERAPY WOUND CARE TREATMENT
Outpatient Rehabilitation - Wound/Debridement Treatment Note  [facility]     Patient Name: Jeana Chung  : 1975  MRN: [id number]  Today's Date: 3/11/2020             External label applied and verified.        Admit Date: 3/11/2020    Visit Dx:    ICD-10-CM ICD-9-CM   1. Postoperative wound dehiscence, subsequent encounter T81.31XD V58.89     998.32   2. Open wound of abdomen, subsequent encounter S31.109D V58.89     879.2       Patient Active Problem List   Diagnosis   • Acute stress reaction with predominately emotional disturbance   • Sleep disturbance   • Cancer related pain   • Moderate episode of recurrent major depressive disorder (CMS/HCC)   • Anxiety   • Iron deficiency anemia   • Type 2 diabetes mellitus with diabetic polyneuropathy, with long-term current use of insulin (CMS/HCC)   • Essential hypertension   • Heart murmur   • Family history of cardiac disorder in mother   • Endometrial cancer (CMS/HCC)   • Stage 3 chronic kidney disease (CMS/HCC)   • Gastroesophageal reflux disease   • Amputation of left great toe (CMS/HCC)   • Amputation of right great toe (CMS/HCC)   • Neuropathy   • Hypotension due to drugs        Past Medical History:   Diagnosis Date   • Anemia    • Anxiety and depression    • Back pain    • Bronchitis    • Diabetes (CMS/HCC)     DX 4-5 YRS AGO, CHECKS BS 4X/DAY, LAST A1C 7.1%   • Endometrial cancer (CMS/HCC)     STAGE II   • GERD (gastroesophageal reflux disease)    • Hypertension    • MRSA (methicillin resistant staph aureus) culture positive     S/P NECROTIZING FASCITIS IN BILATERAL FEET   • Necrotizing fasciitis (CMS/HCC)    • PCOS (polycystic ovarian syndrome)    • PTSD (post-traumatic stress disorder)    • Sepsis (CMS/HCC)    • Stage 3 chronic kidney disease (CMS/HCC) 2020   • Subconjunctival hemorrhage         Past Surgical History:   Procedure Laterality Date   • EXPLORATORY LAPAROTOMY, TOTAL ABDOMINAL HYSTERECTOMY SALPINGO OOPHORECTOMY N/A  2/10/2020    Procedure: EXPLORATORY LAPAROTOMY, TOTAL ABDOMINAL HYSTERECTOMY, BILATERAL SALPINGO-OOPHORECTOMY WITH OPTIMAL  STAGING (R-0), OMENTECTOMY;  Surgeon: Celine Rubio MD;  [facility];  Service: Gynecology Oncology;  Laterality: N/A;   • EYE SURGERY Left     BLOOD VESSEL IN EYE REPAIR   • TOE AMPUTATION Left 06/2019    big toe - unhealing sore   • TOE AMPUTATION Right 2018    big toe and second toe - Necrotizing fasciitis and MRSA          EVALUATION  PT Ortho     Row Name 03/11/20 1400       Subjective Comments    Subjective Comments  Pt reports her chemo treatment went well, but she has also taken steriods, and is having some side effects, including difficulty managing her blood sugar. Pt has been started on long-acting insulin to try to address this. No complaints/changes about the wound vac, apart from wanting to place the cord on the opposite side.  -       Subjective Pain    Able to rate subjective pain?  yes  -MC    Pre-Treatment Pain Level  0  -MC    Post-Treatment Pain Level  0  -       Transfers    Sit-Stand [place] (Transfers)  independent  -MC    Stand-Sit [place] (Transfers)  independent  -MC    Comment (Transfers)  supine on stretcher for tx  -       Gait/Stairs Assessment/Training    [place] Level (Gait)  independent  -      User Key  (r) = Recorded By, (t) = Taken By, (c) = Cosigned By    Initials Name Provider Type    Mary Kay Flores PT Physical Therapist          [facility] Wound     Row Name 03/11/20 1400             Wound 02/17/20 1300 midline abdomen Other (comment)    Wound - Properties Group Date first assessed: 02/17/20  - Time first assessed: 1300  -JM Orientation: midline  -JM Location: abdomen  -JM Primary Wound Type: Other  -JM, dehisced surgical incision  Additional Comments: dehisced abd incision  -JM    Wound Image  Images linked: 1  -      Dressing Appearance  intact;dry;moist drainage  -      Base   clean;moist;red;yellow;subcutaneous;other (see comments) scant yellow/subQ noted. Increased granulation  -      Periwound  intact;dry;pink  -      Periwound Temperature  warm  -      Periwound Skin Turgor  soft  -      Edges  open  -      Wound Length (cm)  8.7 cm  -      Wound Width (cm)  1.5 cm  -      Wound Depth (cm)  2.1 cm inferior area  -      Drainage Characteristics/Odor  serosanguineous;yellow  -      Drainage Amount  small  -      Care, Wound  cleansed with;wound cleanser;debrided;negative pressure wound therapy  -MC      Dressing Care, Wound  dressing changed vac  -      Periwound Care, Wound  cleansed with pH balanced cleanser;barrier film applied;other (see comments) NoSting, stomapaste, drape border  -      Wound Output (mL)  50  -MC         NPWT (Negative Pressure Wound Therapy) 02/21/20 1030 midline abdomen    NPWT (Negative Pressure Wound Therapy) - Properties Group Placement Date: 02/21/20  - Placement Time: 1030  - Location: midline abdomen  -    Therapy Setting  continuous therapy  -      Dressing  foam, black;gauze, antimicrobial  -      Pressure Setting  125 mmHg  -MC      Sponges Inserted  1;other (see comments) 2 osmel, 1 cutimed, 1 black  -MC      Sponges Removed  1;other (see comments) 1 cutimed, 1 black  -MC      Finger sweep complete  Yes  -        User Key  (r) = Recorded By, (t) = Taken By, (c) = Cosigned By    Initials Name Provider Type    Vinny Colindres, PT Physical Therapist    Mary Kay Flores, PT Physical Therapist    Leidy Millard, PT Physical Therapist            WOUND DEBRIDEMENT  Total area of Debridement: 2 cm2  Debridement Site 1  Location- Site 1: midline incision  Selective Debridement- Site 1: Wound Surface <20cmsq  Instruments- Site 1: tweezers  Excised Tissue Description- Site 1: minimum, slough  Bleeding- Site 1: none             Therapy Education     Row Name 03/11/20 1400             Therapy Education     Education Details  Continue with current POC  -      Given  Bandaging/dressing change  -      Program  Reinforced  -      How Provided  Verbal;Demonstration  -      Provided to  Patient  -      Level of Understanding  Verbalized;Teach back education performed  -        User Key  (r) = Recorded By, (t) = Taken By, (c) = Cosigned By    Initials Name Provider Type    Mary Kay Flores, PT Physical Therapist          Recommendation and Plan  PT Assessment/Plan     Row Name 03/11/20 1400          PT Assessment    Functional Limitations  Other (comment);Performance in self-care ADL wound mgmt  -     Impairments  Integumentary integrity;Pain  -     Assessment Comments  Pt with improved wound measurements since last assessment. Pt with increasing granulation and only scant slough/subQ tissue visible today. Pt appears to be improving with the current POC.  -     Rehab Potential  Good  -     Patient/caregiver participated in establishment of treatment plan and goals  Yes  -     Patient would benefit from skilled therapy intervention  Yes  -MC        PT Plan    PT Frequency  2x/week;3x/week  -     Physical Therapy Interventions (Optional Details)  patient/family education;wound care  -     PT Plan Comments  debridement prn, NPWT  -       User Key  (r) = Recorded By, (t) = Taken By, (c) = Cosigned By    Initials Name Provider Type    Mary Kay Flores, PT Physical Therapist          Goals  PT OP Goals     Row Name 03/11/20 1400          Time Calculation    PT Goal Re-Cert Due Date  05/17/20  -       User Key  (r) = Recorded By, (t) = Taken By, (c) = Cosigned By    Initials Name Provider Type     Mary Kay Bell, PT Physical Therapist          PT Goal Re-Cert Due Date: 05/17/20            Time Calculation: Start Time: 1400  Therapy Charges for Today     Code Description Service Date Service Provider Modifiers Qty    [id number] [facility] DEBRIDE OPEN WOUND UP TO 20CM 3/11/2020 Ray  Mary Kay DIETRICH, PT GP 1    [id number]  PT NEG PRESS WOUND TO 50SQCM DME2 3/11/2020 Mary Kay Bell, PT GP 1                  Mary Kay Bell, PT  3/11/2020

## 2020-03-12 ENCOUNTER — OFFICE VISIT (OUTPATIENT)
Dept: PSYCHIATRY | Facility: CLINIC | Age: 45
End: 2020-03-12

## 2020-03-12 DIAGNOSIS — F43.12 CHRONIC POST-TRAUMATIC STRESS DISORDER (PTSD): Primary | ICD-10-CM

## 2020-03-12 PROCEDURE — 90792 PSYCH DIAG EVAL W/MED SRVCS: CPT | Performed by: NURSE PRACTITIONER
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
## 2020-03-13 ENCOUNTER — HOSPITAL ENCOUNTER (OUTPATIENT)
Dept: PHYSICAL THERAPY | Facility: HOSPITAL | Age: 45
Setting detail: THERAPIES SERIES
Discharge: HOME | End: 2020-03-13

## 2020-03-13 DIAGNOSIS — T81.31XD POSTOPERATIVE WOUND DEHISCENCE, SUBSEQUENT ENCOUNTER: Primary | ICD-10-CM

## 2020-03-13 DIAGNOSIS — S31.109D OPEN WOUND OF ABDOMEN, SUBSEQUENT ENCOUNTER: ICD-10-CM

## 2020-03-13 PROCEDURE — 97597 DBRDMT OPN WND 1ST 20 CM/<: CPT

## 2020-03-13 PROCEDURE — 97605 NEG PRS WND THER DME<=50SQCM: CPT

## 2020-03-13 NOTE — THERAPY WOUND CARE TREATMENT
Outpatient Rehabilitation - Wound/Debridement Treatment Note  [facility]     Patient Name: Jeana Chung  : 1975  MRN: [id number]  Today's Date: 3/13/2020                 Admit Date: 3/13/2020    Visit Dx:    ICD-10-CM ICD-9-CM   1. Postoperative wound dehiscence, subsequent encounter T81.31XD V58.89     998.32   2. Open wound of abdomen, subsequent encounter S31.109D V58.89     879.2       Patient Active Problem List   Diagnosis   • Acute stress reaction with predominately emotional disturbance   • Sleep disturbance   • Cancer related pain   • Moderate episode of recurrent major depressive disorder (CMS/HCC)   • Anxiety   • Iron deficiency anemia   • Type 2 diabetes mellitus with diabetic polyneuropathy, with long-term current use of insulin (CMS/HCC)   • Essential hypertension   • Heart murmur   • Family history of cardiac disorder in mother   • Endometrial cancer (CMS/HCC)   • Stage 3 chronic kidney disease (CMS/HCC)   • Gastroesophageal reflux disease   • Amputation of left great toe (CMS/HCC)   • Amputation of right great toe (CMS/HCC)   • Neuropathy   • Hypotension due to drugs        Past Medical History:   Diagnosis Date   • Anemia    • Anxiety and depression    • Back pain    • Bronchitis    • Diabetes (CMS/HCC)     DX 4-5 YRS AGO, CHECKS BS 4X/DAY, LAST A1C 7.1%   • Endometrial cancer (CMS/HCC)     STAGE II   • GERD (gastroesophageal reflux disease)    • Hypertension    • MRSA (methicillin resistant staph aureus) culture positive     S/P NECROTIZING FASCITIS IN BILATERAL FEET   • Necrotizing fasciitis (CMS/HCC)    • PCOS (polycystic ovarian syndrome)    • PTSD (post-traumatic stress disorder)    • Sepsis (CMS/HCC)    • Stage 3 chronic kidney disease (CMS/HCC) 2020   • Subconjunctival hemorrhage         Past Surgical History:   Procedure Laterality Date   • EXPLORATORY LAPAROTOMY, TOTAL ABDOMINAL HYSTERECTOMY SALPINGO OOPHORECTOMY N/A 2/10/2020    Procedure: EXPLORATORY LAPAROTOMY,  "TOTAL ABDOMINAL HYSTERECTOMY, BILATERAL SALPINGO-OOPHORECTOMY WITH OPTIMAL  STAGING (R-0), OMENTECTOMY;  Surgeon: Celine Rubio MD;  Location: [facility];  Service: Gynecology Oncology;  Laterality: N/A;   • EYE SURGERY Left     BLOOD VESSEL IN EYE REPAIR   • TOE AMPUTATION Left 06/2019    big toe - unhealing sore   • TOE AMPUTATION Right 2018    big toe and second toe - Necrotizing fasciitis and MRSA          EVALUATION  PT Ortho     Row Name 03/13/20 1100       Subjective Comments    Subjective Comments  \"I feel like an old lady.\"  States she is feeling the effects of recent chemo.  -JM       Subjective Pain    Able to rate subjective pain?  yes  -JM    Pre-Treatment Pain Level  8  -JM    Post-Treatment Pain Level  8  -JM    Subjective Pain Comment  generalized pain  -JM       Transfers    Sit-Stand [place] (Transfers)  independent  -JM    Stand-Sit [place] (Transfers)  independent  -JM    Comment (Transfers)  supine on stretcher for tx  -JM       Gait/Stairs Assessment/Training    [place] Level (Gait)  independent  -      User Key  (r) = Recorded By, (t) = Taken By, (c) = Cosigned By    Initials Name Provider Type    Leidy Millard, PT Physical Therapist          LDA Wound     Row Name 03/13/20 1100             Wound 02/17/20 1300 midline abdomen Other (comment)    Wound - Properties Group Date first assessed: 02/17/20  - Time first assessed: 1300  -JM Orientation: midline  - Location: abdomen  - Primary Wound Type: Other  -JM, dehisced surgical incision  Additional Comments: dehisced abd incision  -JM    Wound Image  Images linked: 1  -JM      Dressing Appearance  intact;dry;moist drainage  -      Base  clean;moist;red;yellow;subcutaneous;other (see comments) inf asepct with white tissue, white suture visible  -      Periwound  intact;dry;pink;excoriated;yeast 3 small satellite areas inferiorly  -      Periwound Temperature  warm  -      Periwound Skin Turgor  soft  -  "     Edges  open  -      Drainage Characteristics/Odor  serosanguineous;yellow;tan tan creamy under drape and pooled inferior aspect  -JM      Drainage Amount  small  -JM      Care, Wound  cleansed with;wound cleanser;debrided;negative pressure wound therapy  -      Dressing Care, Wound  dressing changed vac  -JM      Periwound Care, Wound  cleansed with pH balanced cleanser;dry periwound area maintained;barrier film applied no-sting, drape border, interdry ag to inferior aspect  -JM      Wound Output (mL)  75 changed canister  -JM         NPWT (Negative Pressure Wound Therapy) 02/21/20 1030 midline abdomen    NPWT (Negative Pressure Wound Therapy) - Properties Group Placement Date: 02/21/20  - Placement Time: 1030  - Location: midline abdomen  -    Therapy Setting  continuous therapy  -      Dressing  foam, black;gauze, antimicrobial  -JM      Pressure Setting  125 mmHg  -JM      Sponges Inserted  1;other (see comments) 2 osmel, 1 sorbact, 1 black  -JM      Sponges Removed  1;other (see comments) 1 black,1 sorbact  -JM      Finger sweep complete  Yes  -JM        User Key  (r) = Recorded By, (t) = Taken By, (c) = Cosigned By    Initials Name Provider Type    MF Vinny Lobato, PT Physical Therapist    JM Leidy Gilliam, PT Physical Therapist            WOUND DEBRIDEMENT  Total area of Debridement: 2cmsq  Debridement Site 1  Location- Site 1: midline incision  Selective Debridement- Site 1: Wound Surface <20cmsq  Instruments- Site 1: tweezers  Excised Tissue Description- Site 1: minimum, slough  Bleeding- Site 1: none             Therapy Education     Row Name 03/13/20 1100             Therapy Education    Education Details  Use interdry on small inferior satellite areas, change PRN for drainage.  Recommended obtaining nystatin powder  -JM      Given  Bandaging/dressing change  -      Program  Reinforced  -JM      How Provided  Verbal;Demonstration  -JM      Provided to  Patient  -      Level  of Understanding  Verbalized;Teach back education performed  -        User Key  (r) = Recorded By, (t) = Taken By, (c) = Cosigned By    Initials Name Provider Type    Leidy Millard, PT Physical Therapist          Recommendation and Plan  PT Assessment/Plan     Row Name 03/13/20 1100          PT Assessment    Functional Limitations  Other (comment);Performance in self-care ADL wound mgmt  -     Impairments  Integumentary integrity;Pain  -     Assessment Comments  ABD wound granulating, fully granulated superiorly, with skin bridge forming centrally.  Inferior aspect still with white sutures and fibrous tissue in depth, will continue to benefit from osmel and NPWT.  Pt with new periwound excoriation, ?yeast with drainage pooled under drape.  PT tried to reduce amount of drape on intact skin, and added interdry Ag to small satellite areas.  Pt to obtain nystatin powder for use with vac to reduce yeast.  -        PT Plan    PT Frequency  2x/week;3x/week  -     Physical Therapy Interventions (Optional Details)  patient/family education;wound care  -     PT Plan Comments  debridement, NPWT  -       User Key  (r) = Recorded By, (t) = Taken By, (c) = Cosigned By    Initials Name Provider Type    Leidy Millard, PT Physical Therapist          Goals  PT OP Goals     Row Name 03/13/20 1100          Time Calculation    PT Goal Re-Cert Due Date  05/17/20  -       User Key  (r) = Recorded By, (t) = Taken By, (c) = Cosigned By    Initials Name Provider Type    Leidy Millard, PT Physical Therapist          PT Goal Re-Cert Due Date: 05/17/20            Time Calculation: Start Time: 1100  Therapy Charges for Today     Code Description Service Date Service Provider Modifiers Qty    36443692065 HC YESSENIA DEBRIDE OPEN WOUND UP TO 20CM 3/13/2020 Leidy Gilliam, PT GP 1    [id number] HC PT NEG PRESS WOUND TO 50SQCM DME2 3/13/2020 Leidy Gilliam, PT GP 1                  Leidy Gilliam  PT  3/13/2020

## 2020-03-16 ENCOUNTER — READMISSION MANAGEMENT (OUTPATIENT)
Dept: CALL CENTER | Facility: HOSPITAL | Age: 45
End: 2020-03-16

## 2020-03-16 NOTE — OUTREACH NOTE
General Surgery Week 4 Survey      Responses   [facility] patient discharged from?  [facility]   Does the patient have one of the following disease processes/diagnoses(primary or secondary)?  General Surgery   Week 4 attempt successful?  Yes   Call start time  1800   Call end time  1804   Discharge diagnosis  endometrial cancer,  hysterectomy   Medication alerts for this patient  eliquis   Has the patient kept scheduled appointments due by today?  Yes   Comments  Pt has a wound vac for wound dehiscenced. PCP appt 2/24/20   Is the patient still receiving Home Health Services?  N/A   Psychosocial issues?  No   Comments  Still with Wound vac- draining less drainage   What is the patient's perception of their health status since discharge?  Improving   Nursing interventions  Nurse provided patient education   Is the patient/caregiver able to teach back steps to recovery at home?  Eat a well-balance diet, Weigh daily   Is the patient/caregiver able to teach back the hierarchy of who to call/visit for symptoms/problems? PCP, Specialist, Home health nurse, Urgent Care, ED, 911  Yes   Week 4 call completed?  Yes          David Dorsey RN

## 2020-03-17 ENCOUNTER — HOSPITAL ENCOUNTER (INPATIENT)
Facility: HOSPITAL | Age: 45
LOS: 5 days | Discharge: HOME HEALTH | End: 2020-03-23
Attending: EMERGENCY MEDICINE | Admitting: INTERNAL MEDICINE

## 2020-03-17 ENCOUNTER — HOSPITAL ENCOUNTER (OUTPATIENT)
Dept: PHYSICAL THERAPY | Facility: HOSPITAL | Age: 45
Setting detail: THERAPIES SERIES
Discharge: HOME | End: 2020-03-17

## 2020-03-17 ENCOUNTER — APPOINTMENT (OUTPATIENT)
Dept: GENERAL RADIOLOGY | Facility: HOSPITAL | Age: 45
End: 2020-03-17

## 2020-03-17 ENCOUNTER — TELEPHONE (OUTPATIENT)
Dept: GYNECOLOGIC ONCOLOGY | Facility: CLINIC | Age: 45
End: 2020-03-17

## 2020-03-17 VITALS
BODY MASS INDEX: 35.85 KG/M2 | RESPIRATION RATE: 18 BRPM | HEART RATE: 107 BPM | OXYGEN SATURATION: 98 % | DIASTOLIC BLOOD PRESSURE: 55 MMHG | TEMPERATURE: 97.6 F | WEIGHT: 196 LBS | SYSTOLIC BLOOD PRESSURE: 112 MMHG

## 2020-03-17 DIAGNOSIS — K35.80 ACUTE APPENDICITIS, UNSPECIFIED ACUTE APPENDICITIS TYPE: Primary | ICD-10-CM

## 2020-03-17 DIAGNOSIS — R50.9 FEVER, UNSPECIFIED FEVER CAUSE: ICD-10-CM

## 2020-03-17 DIAGNOSIS — S90.426A BLISTER OF TOE, UNSPECIFIED LATERALITY, INITIAL ENCOUNTER: Primary | ICD-10-CM

## 2020-03-17 DIAGNOSIS — S91.301A OPEN WOUND OF RIGHT FOOT, INITIAL ENCOUNTER: ICD-10-CM

## 2020-03-17 DIAGNOSIS — T81.31XD POSTOPERATIVE WOUND DEHISCENCE, SUBSEQUENT ENCOUNTER: Primary | ICD-10-CM

## 2020-03-17 DIAGNOSIS — C54.1 ENDOMETRIAL CANCER (HCC): ICD-10-CM

## 2020-03-17 DIAGNOSIS — E11.42 TYPE 2 DIABETES MELLITUS WITH DIABETIC POLYNEUROPATHY, WITH LONG-TERM CURRENT USE OF INSULIN (HCC): ICD-10-CM

## 2020-03-17 DIAGNOSIS — R10.31 RIGHT LOWER QUADRANT ABDOMINAL PAIN: ICD-10-CM

## 2020-03-17 DIAGNOSIS — Z79.4 TYPE 2 DIABETES MELLITUS WITH DIABETIC POLYNEUROPATHY, WITH LONG-TERM CURRENT USE OF INSULIN (HCC): ICD-10-CM

## 2020-03-17 DIAGNOSIS — S31.109D OPEN WOUND OF ABDOMEN, SUBSEQUENT ENCOUNTER: ICD-10-CM

## 2020-03-17 DIAGNOSIS — K35.80 APPENDICITIS, ACUTE: ICD-10-CM

## 2020-03-17 LAB
ALBUMIN SERPL-MCNC: 3 G/DL (ref 3.5–5.2)
ALBUMIN/GLOB SERPL: 0.8 G/DL
ALP SERPL-CCNC: 49 U/L (ref 39–117)
ALT SERPL W P-5'-P-CCNC: 9 U/L (ref 1–33)
ANION GAP SERPL CALCULATED.3IONS-SCNC: 10 MMOL/L (ref 5–15)
AST SERPL-CCNC: 12 U/L (ref 1–32)
BASOPHILS # BLD AUTO: 0 10*3/MM3 (ref 0–0.2)
BASOPHILS NFR BLD AUTO: 0 % (ref 0–1.5)
BILIRUB SERPL-MCNC: 0.7 MG/DL (ref 0.2–1.2)
BUN BLD-MCNC: 23 MG/DL (ref 6–20)
BUN/CREAT SERPL: 23.5 (ref 7–25)
CALCIUM SPEC-SCNC: 8.5 MG/DL (ref 8.6–10.5)
CHLORIDE SERPL-SCNC: 95 MMOL/L (ref 98–107)
CO2 SERPL-SCNC: 24 MMOL/L (ref 22–29)
CREAT BLD-MCNC: 0.98 MG/DL (ref 0.57–1)
D-LACTATE SERPL-SCNC: 1.6 MMOL/L (ref 0.5–2)
DEPRECATED RDW RBC AUTO: 41.5 FL (ref 37–54)
EOSINOPHIL # BLD AUTO: 0.03 10*3/MM3 (ref 0–0.4)
EOSINOPHIL NFR BLD AUTO: 3.5 % (ref 0.3–6.2)
ERYTHROCYTE [DISTWIDTH] IN BLOOD BY AUTOMATED COUNT: 14.1 % (ref 12.3–15.4)
GFR SERPL CREATININE-BSD FRML MDRD: 61 ML/MIN/1.73
GLOBULIN UR ELPH-MCNC: 3.7 GM/DL
GLUCOSE BLD-MCNC: 241 MG/DL (ref 65–99)
HCT VFR BLD AUTO: 26.5 % (ref 34–46.6)
HGB BLD-MCNC: 8.3 G/DL (ref 12–15.9)
IMM GRANULOCYTES # BLD AUTO: 0.01 10*3/MM3 (ref 0–0.05)
IMM GRANULOCYTES NFR BLD AUTO: 1.2 % (ref 0–0.5)
LIPASE SERPL-CCNC: 43 U/L (ref 13–60)
LYMPHOCYTES # BLD AUTO: 0.53 10*3/MM3 (ref 0.7–3.1)
LYMPHOCYTES NFR BLD AUTO: 62.4 % (ref 19.6–45.3)
MCH RBC QN AUTO: 25.2 PG (ref 26.6–33)
MCHC RBC AUTO-ENTMCNC: 31.3 G/DL (ref 31.5–35.7)
MCV RBC AUTO: 80.5 FL (ref 79–97)
MONOCYTES # BLD AUTO: 0.06 10*3/MM3 (ref 0.1–0.9)
MONOCYTES NFR BLD AUTO: 7.1 % (ref 5–12)
NEUTROPHILS # BLD AUTO: 0.22 10*3/MM3 (ref 1.7–7)
NEUTROPHILS NFR BLD AUTO: 25.8 % (ref 42.7–76)
NRBC BLD AUTO-RTO: 0 /100 WBC (ref 0–0.2)
PLAT MORPH BLD: NORMAL
PLATELET # BLD AUTO: 73 10*3/MM3 (ref 140–450)
PMV BLD AUTO: 11.4 FL (ref 6–12)
POTASSIUM BLD-SCNC: 4.3 MMOL/L (ref 3.5–5.2)
PROT SERPL-MCNC: 6.7 G/DL (ref 6–8.5)
RBC # BLD AUTO: 3.29 10*6/MM3 (ref 3.77–5.28)
RBC MORPH BLD: NORMAL
SODIUM BLD-SCNC: 129 MMOL/L (ref 136–145)
WBC MORPH BLD: NORMAL
WBC NRBC COR # BLD: 0.85 10*3/MM3 (ref 3.4–10.8)

## 2020-03-17 PROCEDURE — 81001 URINALYSIS AUTO W/SCOPE: CPT | Performed by: PHYSICIAN ASSISTANT

## 2020-03-17 PROCEDURE — 85025 COMPLETE CBC W/AUTO DIFF WBC: CPT | Performed by: PHYSICIAN ASSISTANT

## 2020-03-17 PROCEDURE — 83690 ASSAY OF LIPASE: CPT | Performed by: PHYSICIAN ASSISTANT

## 2020-03-17 PROCEDURE — 25010000002 MORPHINE PER 10 MG: Performed by: EMERGENCY MEDICINE

## 2020-03-17 PROCEDURE — 85007 BL SMEAR W/DIFF WBC COUNT: CPT | Performed by: PHYSICIAN ASSISTANT

## 2020-03-17 PROCEDURE — 97605 NEG PRS WND THER DME<=50SQCM: CPT

## 2020-03-17 PROCEDURE — 83605 ASSAY OF LACTIC ACID: CPT | Performed by: PHYSICIAN ASSISTANT

## 2020-03-17 PROCEDURE — 80053 COMPREHEN METABOLIC PANEL: CPT | Performed by: PHYSICIAN ASSISTANT

## 2020-03-17 PROCEDURE — 97164 PT RE-EVAL EST PLAN CARE: CPT

## 2020-03-17 PROCEDURE — 97597 DBRDMT OPN WND 1ST 20 CM/<: CPT

## 2020-03-17 PROCEDURE — 99284 EMERGENCY DEPT VISIT MOD MDM: CPT

## 2020-03-17 PROCEDURE — 71045 X-RAY EXAM CHEST 1 VIEW: CPT

## 2020-03-17 RX ORDER — MORPHINE SULFATE 4 MG/ML
4 INJECTION, SOLUTION INTRAMUSCULAR; INTRAVENOUS ONCE
Status: COMPLETED | OUTPATIENT
Start: 2020-03-17 | End: 2020-03-17

## 2020-03-17 RX ORDER — SODIUM CHLORIDE 0.9 % (FLUSH) 0.9 %
10 SYRINGE (ML) INJECTION AS NEEDED
Status: DISCONTINUED | OUTPATIENT
Start: 2020-03-17 | End: 2020-03-23 | Stop reason: HOSPADM

## 2020-03-17 RX ADMIN — SODIUM CHLORIDE 1000 ML: 9 INJECTION, SOLUTION INTRAVENOUS at 23:00

## 2020-03-17 RX ADMIN — MORPHINE SULFATE 4 MG: 4 INJECTION, SOLUTION INTRAMUSCULAR; INTRAVENOUS at 23:39

## 2020-03-17 NOTE — THERAPY RE-EVALUATION
Outpatient Rehabilitation - Wound/Debridement Re-Eval  [facility]     Patient Name: Jeana Chung  : 1975  MRN: [id number]  Today's Date: 3/17/2020              External label applied and verified.    R foot      Admit Date: 3/17/2020    Visit Dx:    ICD-10-CM ICD-9-CM   1. Postoperative wound dehiscence, subsequent encounter T81.31XD V58.89     998.32   2. Open wound of abdomen, subsequent encounter S31.109D V58.89     879.2   3. Open wound of right foot, initial encounter S91.301A 892.0       Patient Active Problem List   Diagnosis   • Acute stress reaction with predominately emotional disturbance   • Sleep disturbance   • Cancer related pain   • Moderate episode of recurrent major depressive disorder (CMS/HCC)   • Anxiety   • Iron deficiency anemia   • Type 2 diabetes mellitus with diabetic polyneuropathy, with long-term current use of insulin (CMS/HCC)   • Essential hypertension   • Heart murmur   • Family history of cardiac disorder in mother   • Endometrial cancer (CMS/HCC)   • Stage 3 chronic kidney disease (CMS/HCC)   • Gastroesophageal reflux disease   • Amputation of left great toe (CMS/HCC)   • Amputation of right great toe (CMS/HCC)   • Neuropathy   • Hypotension due to drugs        Past Medical History:   Diagnosis Date   • Anemia    • Anxiety and depression    • Back pain    • Bronchitis    • Diabetes (CMS/HCC)     DX 4-5 YRS AGO, CHECKS BS 4X/DAY, LAST A1C 7.1%   • Endometrial cancer (CMS/HCC)     STAGE II   • GERD (gastroesophageal reflux disease)    • Hypertension    • MRSA (methicillin resistant staph aureus) culture positive     S/P NECROTIZING FASCITIS IN BILATERAL FEET   • Necrotizing fasciitis (CMS/HCC)    • PCOS (polycystic ovarian syndrome)    • PTSD (post-traumatic stress disorder)    • Sepsis (CMS/HCC)    • Stage 3 chronic kidney disease (CMS/HCC) 2020   • Subconjunctival hemorrhage         Past Surgical History:   Procedure Laterality Date   • EXPLORATORY  LAPAROTOMY, TOTAL ABDOMINAL HYSTERECTOMY SALPINGO OOPHORECTOMY N/A 2/10/2020    Procedure: EXPLORATORY LAPAROTOMY, TOTAL ABDOMINAL HYSTERECTOMY, BILATERAL SALPINGO-OOPHORECTOMY WITH OPTIMAL  STAGING (R-0), OMENTECTOMY;  Surgeon: Celine Rubio MD;  Location: [facility];  Service: Gynecology Oncology;  Laterality: N/A;   • EYE SURGERY Left     BLOOD VESSEL IN EYE REPAIR   • TOE AMPUTATION Left 06/2019    big toe - unhealing sore   • TOE AMPUTATION Right 2018    big toe and second toe - Necrotizing fasciitis and MRSA        Patient History     Row Name 03/17/20 1430             History    Chief Complaint  Ulcer, wound or other skin conditions  -MC      Brief Description of Current Complaint  Pt reports being up on her feet during the day of her last appointment, and by the time she checked her feet that evening, she had a new blister to the R foot. She has an order from her MD to assess the area.  -      Patient/Caregiver Goals  Heal wound  -MC      How has patient tried to help current problem?  Pt covered the area with aquacell, optifoam, and tape left over from previous wound care.  -         Daily Activities    Primary Language  English  -MC      Are you able to read  Yes  -MC      Are you able to write  Yes  -MC      How does patient learn best?  Listening;Demonstration  -      Teaching needs identified  Management of Condition  -MC      Patient is concerned about/has problems with  Other (comment) wound mgmt  -      Barriers to learning  None  -MC      Pt Participated in POC and Goals  Yes  -MC         Safety    Are you being hurt, hit, or frightened by anyone at home or in your life?  No  -MC      Are you being neglected by a caregiver  No  -MC        User Key  (r) = Recorded By, (t) = Taken By, (c) = Cosigned By    Initials Name Provider Type    Mary Kay Flores PT Physical Therapist          EVALUATION  PT Ortho     Row Name 03/17/20 1430       Subjective Comments    Subjective Comments  Pt  still with fatigue and aches from her recent chemo treatment. Reports some pain in her RLQ, thinks she slept on the vac tubing. Pt bought lotrimin antifungal powder in lieu of nystatin.  -       Subjective Pain    Able to rate subjective pain?  yes  -MC    Pre-Treatment Pain Level  6  -MC    Post-Treatment Pain Level  6  -MC    Subjective Pain Comment  generalized, worse in RLQ  -MC       Transfers    Sit-Stand [place] (Transfers)  independent  -    Stand-Sit [place] (Transfers)  independent  -    Comment (Transfers)  supine on stretcher for tx  -       Gait/Stairs Assessment/Training    [place] Level (Gait)  independent  -      User Key  (r) = Recorded By, (t) = Taken By, (c) = Cosigned By    Initials Name Provider Type    Mary Kay Flores PT Physical Therapist        [facility] Wound     Row Name 03/17/20 1430             Wound 03/17/20 1430 Right medial foot Diabetic Ulcer    Wound - Properties Group Date first assessed: 03/17/20  - Time first assessed: 1430  - Present on Hospital Admission: N  - Side: Right  - Orientation: medial  - Location: foot  - Primary Wound Type: Diabetic ulc  -    Wound Image  Images linked: 1  -      Dressing Appearance  intact;moist drainage  -      Base  clean;moist;pink;red;non-granulating  -      Periwound  intact;moist;pale white;pink  -      Periwound Temperature  cool  -      Periwound Skin Turgor  soft  -      Edges  irregular;open  -      Wound Length (cm)  1.7 cm  -      Wound Width (cm)  1.3 cm  -      Wound Depth (cm)  0.1 cm  -      Drainage Characteristics/Odor  serous  -      Drainage Amount  small  -      Care, Wound  cleansed with;wound cleanser;debrided  -      Dressing Care, Wound  dressing applied;collagen;silver impregnated;low-adherent;foam osmel, mepilex Ag, primafix tape  -      Periwound Care, Wound  cleansed with pH balanced cleanser;dry periwound area maintained  -         Wound 02/17/20 1300  midline abdomen Other (comment)    Wound - Properties Group Date first assessed: 02/17/20  - Time first assessed: 1300  -JM Orientation: midline  - Location: abdomen  - Primary Wound Type: Other  -JM, dehisced surgical incision  Additional Comments: dehisced abd incision  -    Dressing Appearance  intact;dry;moist drainage  -      Base  clean;moist;red;yellow;subcutaneous;other (see comments) piece of white suture came out with previous foam  -      Periwound  intact;dry;pink;excoriated;yeast inferior satellite areas, yeast-like rash  -      Periwound Temperature  warm  -      Periwound Skin Turgor  soft  -      Edges  open  -      Drainage Characteristics/Odor  serosanguineous  -      Drainage Amount  small  -      Care, Wound  cleansed with;wound cleanser;debrided;negative pressure wound therapy  -      Dressing Care, Wound  dressing changed vac  -      Periwound Care, Wound  cleansed with pH balanced cleanser;topical treatment applied;barrier film applied;other (see comments) NoSting/lotrimin crusting x2, stomapaste, drape border  -      Wound Output (mL)  50  -MC         NPWT (Negative Pressure Wound Therapy) 02/21/20 1030 midline abdomen    NPWT (Negative Pressure Wound Therapy) - Properties Group Placement Date: 02/21/20  - Placement Time: 1030  - Location: midline abdomen  -    Therapy Setting  continuous therapy  -      Dressing  foam, black;gauze, antimicrobial  -      Pressure Setting  125 mmHg  -      Sponges Inserted  1;other (see comments) 2 osmel, 1 cutimed, 1 black  -MC      Sponges Removed  1;other (see comments) 1 sorbact, 1 black  -MC      Finger sweep complete  Yes  -        User Key  (r) = Recorded By, (t) = Taken By, (c) = Cosigned By    Initials Name Provider Type    Vinny Colindres, PT Physical Therapist    MC Mary Kay Bell, PT Physical Therapist    Leidy Millard, PT Physical Therapist            WOUND DEBRIDEMENT  Total area of  Debridement: 7 cm2  Debridement Site 1  Location- Site 1: midline incision  Selective Debridement- Site 1: Wound Surface <20cmsq  Instruments- Site 1: tweezers, #15, scapel  Excised Tissue Description- Site 1: minimum, slough  Bleeding- Site 1: none   Debridement Site 2  Location- Site 2: R foot  Selective Debridement- Site 2: Wound Surface <20cmsq  Instruments- Site 2: #15, scapel, tweezers, scissors  Excised Tissue Description- Site 2: maximum, other (comment)(overlying/surrounding callus)  Bleeding- Site 2: none         Therapy Education     Row Name 03/17/20 1430             Therapy Education    Education Details  Continue with interdry as needed. Will request nystatin powder Rx if lotrimin OTC powder does not help inferior yeast/excoriation improve. Dress R foot ulcer every 2-3 days or after every shower with wound cleanser/gauze, osmel/mepilex Ag/primafix tape.  -MC      Given  Bandaging/dressing change  -MC      Program  Progressed;Reinforced  -MC      How Provided  Verbal;Demonstration  -MC      Provided to  Patient  -MC      Level of Understanding  Verbalized;Teach back education performed  -MC        User Key  (r) = Recorded By, (t) = Taken By, (c) = Cosigned By    Initials Name Provider Type    Mary Kay Flores PT Physical Therapist          Recommendation and Plan  PT Assessment/Plan     Row Name 03/17/20 1436          PT Assessment    Functional Limitations  Other (comment);Performance in self-care ADL wound mgmt  -MC     Impairments  Integumentary integrity;Pain  -MC     Assessment Comments  Pt presents with new diabetic ulcer to the R medial foot that developed as a blister after a day of being on her feet. After debridement of extensive callus, the wound is clean, moist, and pink, but non-granulating. Abdominal wound still with periwound yeast. PT added pt's OTC antifgunal powder to the area to attempt to address this. Pt will continue to benefit from skilled PT wound care for NPWT to the  abdomen and debridement/dressings management to the R foot diabetic ulcer.   -     Rehab Potential  Good  -     Patient would benefit from skilled therapy intervention  Yes  -        PT Plan    PT Frequency  2x/week;3x/week  -     Predicted Duration of Therapy Intervention (Therapy Eval)  16 visits  -     Planned CPT's?  PT RE-EVAL: 91675;PT SELF CARE/MGMT/TRAIN 15 MIN: 09174;PT NONSELECT DEBRIDE 15 MIN: 41161;PT YESSENIA DEBRIDE OPEN WOUND UP TO 20 CM: 16182;PT YESSENIA DEBRIDE OPEN WOUND EA ADD 20 CM: 08927  -     Physical Therapy Interventions (Optional Details)  patient/family education;wound care  -     PT Plan Comments  debridement/NPWT to abd, debridement prn/dressings to R foot  -       User Key  (r) = Recorded By, (t) = Taken By, (c) = Cosigned By    Initials Name Provider Type    Mary Kay Flores, PT Physical Therapist            Goals  PT OP Goals     Row Name 03/17/20 1430          PT Short Term Goals    STG 1  Patient and/ or caregiver able to verbalize signs and symptoms of infection.  -     STG 1 Progress  Met  -     STG 2  Decrease abdominal wound dimensions by 50% as evidence of wound closure.  -     STG 2 Progress  Ongoing  -     STG 3  Patient and/ or caregiver independent with home VAC use and care, including canister changes and dressing / seal management.  -     STG 3 Progress  Met  -        Long Term Goals    LTG 1  Patient and/ or caregiver independent with clean dressing changes.  -     LTG 1 Progress  Ongoing  -     LTG 2  Decrease wound dimensions by 90% as evidence of wound closure.  -     LTG 2 Progress  Ongoing  -     LTG 3  Pt will demonstrate 75% reduction in wound area to R foot to indicate healing progress.  -     LTG 3 Progress  New  -        Time Calculation    PT Goal Re-Cert Due Date  06/15/20  -       User Key  (r) = Recorded By, (t) = Taken By, (c) = Cosigned By    Initials Name Provider Type    Mary Kay Flores PT Physical Therapist           Time Calculation: Start Time: 1430  Therapy Charges for Today     Code Description Service Date Service Provider Modifiers Qty    25675210299 HC YESSENIA DEBRIDE OPEN WOUND UP TO 20CM 3/17/2020 Mary Kay Bell, PT GP 1    32309582085 HC PT MARINO-CHA ESTABLISHED PLAN 2 3/17/2020 Mary Kay Bell, PT GP 1    43221651090 HC PT NEG PRESS WOUND TO 50SQCM DME2 3/17/2020 Mary Kay Bell, PT GP 1                Mary Kay Bell, PT  3/17/2020

## 2020-03-17 NOTE — TELEPHONE ENCOUNTER
Spoke with pt on the phone.  She has blister on her toe she would like evaluated by wound care while she is there today.  Orders obtained.  Pt verbalized understanding.

## 2020-03-18 ENCOUNTER — APPOINTMENT (OUTPATIENT)
Dept: CT IMAGING | Facility: HOSPITAL | Age: 45
End: 2020-03-18

## 2020-03-18 PROBLEM — S91.301A OPEN WOUND OF RIGHT FOOT: Status: ACTIVE | Noted: 2020-03-18

## 2020-03-18 PROBLEM — E87.1 HYPONATREMIA: Status: ACTIVE | Noted: 2020-03-18

## 2020-03-18 PROBLEM — D61.818 PANCYTOPENIA: Status: ACTIVE | Noted: 2020-03-18

## 2020-03-18 PROBLEM — D70.9 NEUTROPENIA (HCC): Status: ACTIVE | Noted: 2020-03-18

## 2020-03-18 PROBLEM — K35.80 ACUTE APPENDICITIS: Status: ACTIVE | Noted: 2020-03-18

## 2020-03-18 LAB
ANION GAP SERPL CALCULATED.3IONS-SCNC: 10 MMOL/L (ref 5–15)
APTT PPP: 44.4 SECONDS (ref 24–37)
BACTERIA UR QL AUTO: ABNORMAL /HPF
BASOPHILS # BLD MANUAL: 0 10*3/MM3 (ref 0–0.2)
BASOPHILS NFR BLD AUTO: 0 % (ref 0–1.5)
BILIRUB UR QL STRIP: NEGATIVE
BUN BLD-MCNC: 18 MG/DL (ref 6–20)
BUN/CREAT SERPL: 23.4 (ref 7–25)
CALCIUM SPEC-SCNC: 8.2 MG/DL (ref 8.6–10.5)
CHLORIDE SERPL-SCNC: 98 MMOL/L (ref 98–107)
CLARITY UR: CLEAR
CO2 SERPL-SCNC: 22 MMOL/L (ref 22–29)
COLOR UR: YELLOW
CREAT BLD-MCNC: 0.77 MG/DL (ref 0.57–1)
DEPRECATED RDW RBC AUTO: 41.9 FL (ref 37–54)
EOSINOPHIL # BLD MANUAL: 0.02 10*3/MM3 (ref 0–0.4)
EOSINOPHIL NFR BLD MANUAL: 2 % (ref 0.3–6.2)
ERYTHROCYTE [DISTWIDTH] IN BLOOD BY AUTOMATED COUNT: 14.2 % (ref 12.3–15.4)
GFR SERPL CREATININE-BSD FRML MDRD: 81 ML/MIN/1.73
GLUCOSE BLD-MCNC: 170 MG/DL (ref 65–99)
GLUCOSE BLDC GLUCOMTR-MCNC: 118 MG/DL (ref 70–130)
GLUCOSE BLDC GLUCOMTR-MCNC: 124 MG/DL (ref 70–130)
GLUCOSE BLDC GLUCOMTR-MCNC: 133 MG/DL (ref 70–130)
GLUCOSE BLDC GLUCOMTR-MCNC: 166 MG/DL (ref 70–130)
GLUCOSE UR STRIP-MCNC: ABNORMAL MG/DL
HCT VFR BLD AUTO: 25.4 % (ref 34–46.6)
HGB BLD-MCNC: 7.9 G/DL (ref 12–15.9)
HGB UR QL STRIP.AUTO: NEGATIVE
HYALINE CASTS UR QL AUTO: ABNORMAL /LPF
HYPOCHROMIA BLD QL: ABNORMAL
INR PPP: 1.11 (ref 0.85–1.16)
KETONES UR QL STRIP: NEGATIVE
LEUKOCYTE ESTERASE UR QL STRIP.AUTO: ABNORMAL
LYMPHOCYTES # BLD MANUAL: 0.52 10*3/MM3 (ref 0.7–3.1)
LYMPHOCYTES NFR BLD MANUAL: 6 % (ref 5–12)
LYMPHOCYTES NFR BLD MANUAL: 69 % (ref 19.6–45.3)
MCH RBC QN AUTO: 25.5 PG (ref 26.6–33)
MCHC RBC AUTO-ENTMCNC: 31.1 G/DL (ref 31.5–35.7)
MCV RBC AUTO: 81.9 FL (ref 79–97)
MONOCYTES # BLD AUTO: 0.05 10*3/MM3 (ref 0.1–0.9)
NEUTROPHILS # BLD AUTO: 0.13 10*3/MM3 (ref 1.7–7)
NEUTROPHILS NFR BLD MANUAL: 17 % (ref 42.7–76)
NITRITE UR QL STRIP: NEGATIVE
OVALOCYTES BLD QL SMEAR: ABNORMAL
PH UR STRIP.AUTO: <=5 [PH] (ref 5–8)
PLAT MORPH BLD: NORMAL
PLATELET # BLD AUTO: 77 10*3/MM3 (ref 140–450)
PMV BLD AUTO: 12.7 FL (ref 6–12)
POTASSIUM BLD-SCNC: 3.8 MMOL/L (ref 3.5–5.2)
PROT UR QL STRIP: NEGATIVE
PROTHROMBIN TIME: 14.1 SECONDS (ref 11.5–14)
RBC # BLD AUTO: 3.1 10*6/MM3 (ref 3.77–5.28)
RBC # UR: ABNORMAL /HPF
REF LAB TEST METHOD: ABNORMAL
SMUDGE CELLS BLD QL SMEAR: ABNORMAL
SODIUM BLD-SCNC: 130 MMOL/L (ref 136–145)
SP GR UR STRIP: 1.02 (ref 1–1.03)
SQUAMOUS #/AREA URNS HPF: ABNORMAL /HPF
UROBILINOGEN UR QL STRIP: ABNORMAL
VARIANT LYMPHS NFR BLD MANUAL: 6 % (ref 0–5)
WBC NRBC COR # BLD: 0.76 10*3/MM3 (ref 3.4–10.8)
WBC UR QL AUTO: ABNORMAL /HPF

## 2020-03-18 PROCEDURE — 25010000002 ONDANSETRON PER 1 MG: Performed by: PHYSICIAN ASSISTANT

## 2020-03-18 PROCEDURE — 85730 THROMBOPLASTIN TIME PARTIAL: CPT | Performed by: PHYSICIAN ASSISTANT

## 2020-03-18 PROCEDURE — 25010000002 PIPERACILLIN SOD-TAZOBACTAM PER 1 G: Performed by: PHYSICIAN ASSISTANT

## 2020-03-18 PROCEDURE — 25010000002 FILGRASTIM PER 480 MCG: Performed by: SURGERY

## 2020-03-18 PROCEDURE — 80048 BASIC METABOLIC PNL TOTAL CA: CPT | Performed by: PHYSICIAN ASSISTANT

## 2020-03-18 PROCEDURE — 63710000001 INSULIN LISPRO (HUMAN) PER 5 UNITS: Performed by: PHYSICIAN ASSISTANT

## 2020-03-18 PROCEDURE — 25010000002 MORPHINE PER 10 MG

## 2020-03-18 PROCEDURE — 87040 BLOOD CULTURE FOR BACTERIA: CPT | Performed by: FAMILY MEDICINE

## 2020-03-18 PROCEDURE — 99223 1ST HOSP IP/OBS HIGH 75: CPT | Performed by: FAMILY MEDICINE

## 2020-03-18 PROCEDURE — 25010000002 MORPHINE PER 10 MG: Performed by: FAMILY MEDICINE

## 2020-03-18 PROCEDURE — 74176 CT ABD & PELVIS W/O CONTRAST: CPT

## 2020-03-18 PROCEDURE — 85610 PROTHROMBIN TIME: CPT | Performed by: PHYSICIAN ASSISTANT

## 2020-03-18 PROCEDURE — 85025 COMPLETE CBC W/AUTO DIFF WBC: CPT | Performed by: PHYSICIAN ASSISTANT

## 2020-03-18 PROCEDURE — 25010000002 FILGRASTIM PER 480 MCG: Performed by: OBSTETRICS & GYNECOLOGY

## 2020-03-18 PROCEDURE — 25010000002 PIPERACILLIN SOD-TAZOBACTAM PER 1 G: Performed by: SURGERY

## 2020-03-18 PROCEDURE — 82962 GLUCOSE BLOOD TEST: CPT

## 2020-03-18 PROCEDURE — 99254 IP/OBS CNSLTJ NEW/EST MOD 60: CPT | Performed by: OBSTETRICS & GYNECOLOGY

## 2020-03-18 RX ORDER — DOCUSATE SODIUM 100 MG/1
200 CAPSULE, LIQUID FILLED ORAL 2 TIMES DAILY
Status: DISCONTINUED | OUTPATIENT
Start: 2020-03-18 | End: 2020-03-23 | Stop reason: HOSPADM

## 2020-03-18 RX ORDER — TRAMADOL HYDROCHLORIDE 50 MG/1
50 TABLET ORAL EVERY 6 HOURS PRN
Status: DISCONTINUED | OUTPATIENT
Start: 2020-03-18 | End: 2020-03-23 | Stop reason: HOSPADM

## 2020-03-18 RX ORDER — ONDANSETRON 2 MG/ML
4 INJECTION INTRAMUSCULAR; INTRAVENOUS EVERY 4 HOURS PRN
Status: DISCONTINUED | OUTPATIENT
Start: 2020-03-18 | End: 2020-03-18 | Stop reason: SDUPTHER

## 2020-03-18 RX ORDER — DEXTROSE MONOHYDRATE 25 G/50ML
25 INJECTION, SOLUTION INTRAVENOUS
Status: DISCONTINUED | OUTPATIENT
Start: 2020-03-18 | End: 2020-03-23 | Stop reason: HOSPADM

## 2020-03-18 RX ORDER — SODIUM CHLORIDE 9 MG/ML
75 INJECTION, SOLUTION INTRAVENOUS CONTINUOUS
Status: DISCONTINUED | OUTPATIENT
Start: 2020-03-18 | End: 2020-03-23 | Stop reason: HOSPADM

## 2020-03-18 RX ORDER — LORAZEPAM 1 MG/1
1 TABLET ORAL EVERY 6 HOURS PRN
Status: DISCONTINUED | OUTPATIENT
Start: 2020-03-18 | End: 2020-03-23 | Stop reason: HOSPADM

## 2020-03-18 RX ORDER — MORPHINE SULFATE 2 MG/ML
INJECTION, SOLUTION INTRAMUSCULAR; INTRAVENOUS
Status: COMPLETED
Start: 2020-03-18 | End: 2020-03-18

## 2020-03-18 RX ORDER — NICOTINE POLACRILEX 4 MG
15 LOZENGE BUCCAL
Status: DISCONTINUED | OUTPATIENT
Start: 2020-03-18 | End: 2020-03-23 | Stop reason: HOSPADM

## 2020-03-18 RX ORDER — PANTOPRAZOLE SODIUM 40 MG/1
40 TABLET, DELAYED RELEASE ORAL EVERY MORNING
Status: DISCONTINUED | OUTPATIENT
Start: 2020-03-18 | End: 2020-03-23 | Stop reason: HOSPADM

## 2020-03-18 RX ORDER — SODIUM CHLORIDE 0.9 % (FLUSH) 0.9 %
10 SYRINGE (ML) INJECTION EVERY 12 HOURS SCHEDULED
Status: DISCONTINUED | OUTPATIENT
Start: 2020-03-18 | End: 2020-03-23 | Stop reason: HOSPADM

## 2020-03-18 RX ORDER — HYDROXYZINE HYDROCHLORIDE 25 MG/1
25 TABLET, FILM COATED ORAL 3 TIMES DAILY PRN
Status: DISCONTINUED | OUTPATIENT
Start: 2020-03-18 | End: 2020-03-23 | Stop reason: HOSPADM

## 2020-03-18 RX ORDER — SODIUM CHLORIDE, SODIUM LACTATE, POTASSIUM CHLORIDE, CALCIUM CHLORIDE 600; 310; 30; 20 MG/100ML; MG/100ML; MG/100ML; MG/100ML
125 INJECTION, SOLUTION INTRAVENOUS CONTINUOUS
Status: DISCONTINUED | OUTPATIENT
Start: 2020-03-18 | End: 2020-03-18

## 2020-03-18 RX ORDER — ACETAMINOPHEN 325 MG/1
650 TABLET ORAL EVERY 6 HOURS SCHEDULED
Status: DISCONTINUED | OUTPATIENT
Start: 2020-03-18 | End: 2020-03-23 | Stop reason: HOSPADM

## 2020-03-18 RX ORDER — CHOLECALCIFEROL (VITAMIN D3) 125 MCG
5 CAPSULE ORAL NIGHTLY PRN
Status: DISCONTINUED | OUTPATIENT
Start: 2020-03-18 | End: 2020-03-23 | Stop reason: HOSPADM

## 2020-03-18 RX ORDER — SODIUM CHLORIDE 0.9 % (FLUSH) 0.9 %
10 SYRINGE (ML) INJECTION AS NEEDED
Status: DISCONTINUED | OUTPATIENT
Start: 2020-03-18 | End: 2020-03-23 | Stop reason: HOSPADM

## 2020-03-18 RX ORDER — ONDANSETRON 4 MG/1
4 TABLET, FILM COATED ORAL EVERY 6 HOURS PRN
Status: DISCONTINUED | OUTPATIENT
Start: 2020-03-18 | End: 2020-03-23 | Stop reason: HOSPADM

## 2020-03-18 RX ORDER — MORPHINE SULFATE 2 MG/ML
2 INJECTION, SOLUTION INTRAMUSCULAR; INTRAVENOUS
Status: DISCONTINUED | OUTPATIENT
Start: 2020-03-18 | End: 2020-03-18

## 2020-03-18 RX ORDER — ONDANSETRON 2 MG/ML
4 INJECTION INTRAMUSCULAR; INTRAVENOUS EVERY 6 HOURS PRN
Status: DISCONTINUED | OUTPATIENT
Start: 2020-03-18 | End: 2020-03-23 | Stop reason: HOSPADM

## 2020-03-18 RX ORDER — PREGABALIN 50 MG/1
100 CAPSULE ORAL 3 TIMES DAILY
Status: DISCONTINUED | OUTPATIENT
Start: 2020-03-18 | End: 2020-03-18

## 2020-03-18 RX ORDER — MORPHINE SULFATE 4 MG/ML
3 INJECTION, SOLUTION INTRAMUSCULAR; INTRAVENOUS
Status: DISCONTINUED | OUTPATIENT
Start: 2020-03-18 | End: 2020-03-23 | Stop reason: HOSPADM

## 2020-03-18 RX ORDER — CETIRIZINE HYDROCHLORIDE 10 MG/1
10 TABLET ORAL DAILY
Status: DISCONTINUED | OUTPATIENT
Start: 2020-03-18 | End: 2020-03-23 | Stop reason: HOSPADM

## 2020-03-18 RX ORDER — GABAPENTIN 300 MG/1
300 CAPSULE ORAL 3 TIMES DAILY
Status: DISCONTINUED | OUTPATIENT
Start: 2020-03-18 | End: 2020-03-23 | Stop reason: HOSPADM

## 2020-03-18 RX ADMIN — MORPHINE SULFATE 2 MG: 2 INJECTION, SOLUTION INTRAMUSCULAR; INTRAVENOUS at 02:40

## 2020-03-18 RX ADMIN — MORPHINE SULFATE 3 MG: 4 INJECTION, SOLUTION INTRAMUSCULAR; INTRAVENOUS at 04:50

## 2020-03-18 RX ADMIN — ACETAMINOPHEN 650 MG: 325 TABLET, FILM COATED ORAL at 23:21

## 2020-03-18 RX ADMIN — INSULIN LISPRO 2 UNITS: 100 INJECTION, SOLUTION INTRAVENOUS; SUBCUTANEOUS at 09:06

## 2020-03-18 RX ADMIN — FILGRASTIM 480 MCG: 480 INJECTION, SOLUTION INTRAVENOUS; SUBCUTANEOUS at 04:29

## 2020-03-18 RX ADMIN — TRAMADOL HYDROCHLORIDE 50 MG: 50 TABLET, FILM COATED ORAL at 20:59

## 2020-03-18 RX ADMIN — ACETAMINOPHEN 650 MG: 325 TABLET, FILM COATED ORAL at 17:10

## 2020-03-18 RX ADMIN — DOCUSATE SODIUM 200 MG: 100 CAPSULE, LIQUID FILLED ORAL at 09:05

## 2020-03-18 RX ADMIN — MORPHINE SULFATE 3 MG: 4 INJECTION, SOLUTION INTRAMUSCULAR; INTRAVENOUS at 23:21

## 2020-03-18 RX ADMIN — SODIUM CHLORIDE 100 ML/HR: 9 INJECTION, SOLUTION INTRAVENOUS at 21:10

## 2020-03-18 RX ADMIN — CETIRIZINE HYDROCHLORIDE 10 MG: 10 TABLET, FILM COATED ORAL at 09:05

## 2020-03-18 RX ADMIN — ONDANSETRON 4 MG: 2 INJECTION INTRAMUSCULAR; INTRAVENOUS at 12:19

## 2020-03-18 RX ADMIN — ONDANSETRON 4 MG: 2 INJECTION INTRAMUSCULAR; INTRAVENOUS at 23:22

## 2020-03-18 RX ADMIN — TAZOBACTAM SODIUM AND PIPERACILLIN SODIUM 3.38 G: 375; 3 INJECTION, SOLUTION INTRAVENOUS at 17:10

## 2020-03-18 RX ADMIN — GABAPENTIN 300 MG: 300 CAPSULE ORAL at 17:10

## 2020-03-18 RX ADMIN — ACETAMINOPHEN 650 MG: 325 TABLET, FILM COATED ORAL at 12:14

## 2020-03-18 RX ADMIN — SODIUM CHLORIDE, PRESERVATIVE FREE 10 ML: 5 INJECTION INTRAVENOUS at 09:05

## 2020-03-18 RX ADMIN — GABAPENTIN 300 MG: 300 CAPSULE ORAL at 20:59

## 2020-03-18 RX ADMIN — MORPHINE SULFATE 3 MG: 4 INJECTION, SOLUTION INTRAMUSCULAR; INTRAVENOUS at 17:19

## 2020-03-18 RX ADMIN — SODIUM CHLORIDE 100 ML/HR: 9 INJECTION, SOLUTION INTRAVENOUS at 04:30

## 2020-03-18 RX ADMIN — TAZOBACTAM SODIUM AND PIPERACILLIN SODIUM 4.5 G: 500; 4 INJECTION, SOLUTION INTRAVENOUS at 03:31

## 2020-03-18 RX ADMIN — TAZOBACTAM SODIUM AND PIPERACILLIN SODIUM 3.38 G: 375; 3 INJECTION, SOLUTION INTRAVENOUS at 09:05

## 2020-03-18 RX ADMIN — GABAPENTIN 300 MG: 300 CAPSULE ORAL at 09:05

## 2020-03-18 RX ADMIN — DOCUSATE SODIUM 200 MG: 100 CAPSULE, LIQUID FILLED ORAL at 20:59

## 2020-03-18 RX ADMIN — MORPHINE SULFATE 3 MG: 4 INJECTION, SOLUTION INTRAMUSCULAR; INTRAVENOUS at 09:06

## 2020-03-18 RX ADMIN — FILGRASTIM 480 MCG: 480 INJECTION, SOLUTION INTRAVENOUS; SUBCUTANEOUS at 17:10

## 2020-03-18 RX ADMIN — PREGABALIN 100 MG: 50 CAPSULE ORAL at 09:05

## 2020-03-18 RX ADMIN — SODIUM CHLORIDE, POTASSIUM CHLORIDE, SODIUM LACTATE AND CALCIUM CHLORIDE 125 ML/HR: 600; 310; 30; 20 INJECTION, SOLUTION INTRAVENOUS at 02:40

## 2020-03-18 RX ADMIN — MORPHINE SULFATE 3 MG: 4 INJECTION, SOLUTION INTRAMUSCULAR; INTRAVENOUS at 12:19

## 2020-03-18 NOTE — CONSULTS
General Surgery Consultation Note    Date of Service: 3/18/2020  Jeana Chung  [id number]  1975      Referring Provider: Lakhwinder Mix MD    Location of Consult: ER (epic down unable to dictate/enter or notes/orders)     Reason for Consultation: Abdominal pain       History of Present Illness:  I am seeing, Jeana Chung, in consultation for Lakhwinder Mix MD regarding abdominal pain.  44-year-old lady presents with worsening sharp abdominal pain which is now localized in the right lower quadrant, 8 out of 10 in intensity.  Recent hysterectomy for endometrial cancer by Dr. Celine Tolentino, midline wound VAC in place.  She admits to fever, nausea, abdominal pain, and change in bowel habits.  There are no other significant modifying factors nor associated symptoms.  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    Problem List Items Addressed This Visit        Digestive    * (Principal) Acute appendicitis - Primary      Other Visit Diagnoses     Right lower quadrant abdominal pain        Fever, unspecified fever cause              Past Medical History:   Diagnosis Date   • Anemia    • Anxiety and depression    • Back pain    • Bronchitis    • Diabetes (CMS/HCC)     DX 4-5 YRS AGO, CHECKS BS 4X/DAY, LAST A1C 7.1%   • Endometrial cancer (CMS/HCC)     STAGE II   • GERD (gastroesophageal reflux disease)    • Hypertension    • MRSA (methicillin resistant staph aureus) culture positive     S/P NECROTIZING FASCITIS IN BILATERAL FEET   • Necrotizing fasciitis (CMS/HCC)    • PCOS (polycystic ovarian syndrome)    • PTSD (post-traumatic stress disorder)    • Retinopathy 3/18/2020   • Sepsis (CMS/HCC)    • Stage 3 chronic kidney disease (CMS/HCC) 2/24/2020   • Subconjunctival hemorrhage        Past Surgical History:   • EXPLORATORY LAPAROTOMY, TOTAL ABDOMINAL HYSTERECTOMY SALPINGO OOPHORECTOMY    Procedure: EXPLORATORY LAPAROTOMY, TOTAL ABDOMINAL  HYSTERECTOMY, BILATERAL SALPINGO-OOPHORECTOMY WITH OPTIMAL  STAGING (R-0), OMENTECTOMY;  Surgeon: Celine Rubio MD;  Location: [facility];  Service: Gynecology Oncology;  Laterality: N/A;   • EYE SURGERY    BLOOD VESSEL IN EYE REPAIR   • TOE AMPUTATION    big toe - unhealing sore   • TOE AMPUTATION    big toe and second toe - Necrotizing fasciitis and MRSA        Allergies   Allergen Reactions   • Bactrim [Sulfamethoxazole-Trimethoprim] Anaphylaxis   • Penicillins Hives   • Promethazine Mental Status Change       No current facility-administered medications on file prior to encounter.      Current Outpatient Medications on File Prior to Encounter   Medication Sig Dispense Refill   • cholecalciferol (VITAMIN D3) 1.25 MG (98718 UT) capsule Take 1 capsule by mouth 1 (One) Time Per Week. (Patient taking differently: Take 50,000 Units by mouth 1 (One) Time Per Week. SATURDAY) 12 capsule 3   • dexamethasone (DECADRON) 4 MG tablet Take 5 tabs the night before chemo then take 2 tabs in the morning daily on days 2, 3 & 4.  Take with food. 11 tablet 5   • docusate sodium (COLACE) 250 MG capsule Take 1 capsule by mouth 2 (Two) Times a Day. 60 capsule 5   • gabapentin (NEURONTIN) 300 MG capsule Take 1 capsule by mouth 3 (Three) Times a Day. 90 capsule 3   • Insulin Glargine (BASAGLAR KWIKPEN) 100 UNIT/ML injection pen Inject 40 Units under the skin into the appropriate area as directed Every Night. 4 pen 2   • insulin lispro (HUMALOG) 100 UNIT/ML injection Inject 15 Units under the skin into the appropriate area as directed 3 (Three) Times a Day Before Meals. 13.5 mL 5   • LORazepam (ATIVAN) 1 MG tablet Take 1 tablet by mouth Every 6 (Six) Hours As Needed for Anxiety. 30 tablet 3   • MULTIPLE VITAMIN PO Multiple Vitamin     • ondansetron (ZOFRAN) 4 MG tablet Take 1 tablet by mouth Every 6 (Six) Hours As Needed for Nausea or Vomiting. 15 tablet 1   • ondansetron ODT (ZOFRAN-ODT) 8 MG disintegrating tablet Take 1 tablet by  mouth Every 8 (Eight) Hours As Needed for Nausea or Vomiting. 30 tablet 5   • Polyethylene Glycol 3350 (MIRALAX PO) Take 17 g by mouth Daily.     • promethazine (PHENERGAN) 25 MG tablet Take 1 tablet by mouth Every 6 (Six) Hours As Needed for Nausea or Vomiting. 30 tablet 5   • traMADol (ULTRAM) 50 MG tablet Take 1 tablet by mouth Every 6 (Six) Hours As Needed for Moderate Pain . 30 tablet 0   • acetaminophen (TYLENOL) 325 MG tablet Take 2 tablets by mouth Every 6 (Six) Hours. 60 tablet 0   • Continuous Blood Gluc Sensor (NomaniniE SENSOR SYSTEM) Every 14 (Fourteen) Days. 1 each 1   • omeprazole (PrilOSEC) 20 MG capsule Take 1 capsule by mouth Daily. 90 capsule 1   • [DISCONTINUED] apixaban (ELIQUIS) 5 MG tablet tablet Take 5 mg by mouth 2 (Two) Times a Day.     • [DISCONTINUED] hydrOXYzine pamoate (VISTARIL) 25 MG capsule Take 1 capsule by mouth 3 (Three) Times a Day As Needed for Itching. 30 capsule 1   • [DISCONTINUED] loratadine (CLARITIN) 10 MG tablet Take 1 tab the night before chemo then take 1 tab the morning of chemo. 12 tablet 1   • [DISCONTINUED] polyethylene glycol (MIRALAX) powder powder Mix entire bottle of miralax with 2 (32 ounce) bottle of gatorade. Chill. Start drinking at 12 pm and finish by 1 pm. 1 each 0   • [DISCONTINUED] pregabalin (LYRICA) 100 MG capsule Take 1 capsule by mouth 3 (Three) Times a Day. 90 capsule 2         Current Facility-Administered Medications:   •  acetaminophen (TYLENOL) tablet 650 mg, 650 mg, Oral, Q6H, Enedina Guillen PA-C  •  cetirizine (zyrTEC) tablet 10 mg, 10 mg, Oral, Daily, Enedina Guillen PA-C, 10 mg at 03/18/20 0905  •  dextrose (D50W) 25 g/ 50mL Intravenous Solution 25 g, 25 g, Intravenous, Q15 Min PRN, Enedina Guillen PA-C  •  dextrose (GLUTOSE) oral gel 15 g, 15 g, Oral, Q15 Min PRN, Enedina Guillen PA-C  •  docusate sodium (COLACE) capsule 200 mg, 200 mg, Oral, BID, Enedina Guillen PA-C, 200 mg at 03/18/20 0905  •  filgrastim (NEUPOGEN) injection  480 mcg, 480 mcg, Subcutaneous, Q PM, Celine Rubio MD  •  gabapentin (NEURONTIN) capsule 300 mg, 300 mg, Oral, TID, Selma Salomon DO, 300 mg at 03/18/20 0905  •  glucagon (human recombinant) (GLUCAGEN DIAGNOSTIC) injection 1 mg, 1 mg, Subcutaneous, Q15 Min PRN, Enedina Guillen PA-C  •  hydrOXYzine (ATARAX) tablet 25 mg, 25 mg, Oral, TID PRN, Enedina Guillen PA-C  •  insulin detemir (LEVEMIR) injection 20 Units, 20 Units, Subcutaneous, Nightly, Enedina Guillen PA-C  •  insulin lispro (humaLOG) injection 0-7 Units, 0-7 Units, Subcutaneous, 4x Daily With Meals & Nightly, Enedina Guillen PA-C, 2 Units at 03/18/20 0906  •  LORazepam (ATIVAN) tablet 1 mg, 1 mg, Oral, Q6H PRN, Selma Salomon DO  •  melatonin tablet 5 mg, 5 mg, Oral, Nightly PRN, Enedina Guillen PA-C  •  Morphine sulfate (PF) injection 3 mg, 3 mg, Intravenous, Q2H PRN, Selma Salomon DO, 3 mg at 03/18/20 0906  •  ondansetron (ZOFRAN) tablet 4 mg, 4 mg, Oral, Q6H PRN **OR** ondansetron (ZOFRAN) injection 4 mg, 4 mg, Intravenous, Q6H PRN, Enedina Guillen PA-C  •  pantoprazole (PROTONIX) EC tablet 40 mg, 40 mg, Oral, QAM, Enedina Guillen PA-C  •  piperacillin-tazobactam (ZOSYN) 3.375 g in iso-osmotic dextrose 50 ml (premix), 3.375 g, Intravenous, Q8H, Praful Myers MD, 3.375 g at 03/18/20 0905  •  polyethylene glycol 3350 powder (packet), 17 g, Oral, Daily PRN, Enedina Guillen PA-C  •  [COMPLETED] Insert peripheral IV, , , Once **AND** sodium chloride 0.9 % flush 10 mL, 10 mL, Intravenous, PRN, Carlyle Bryant PA  •  sodium chloride 0.9 % flush 10 mL, 10 mL, Intravenous, Q12H, Enedina Guillen PA-C, 10 mL at 03/18/20 0905  •  sodium chloride 0.9 % flush 10 mL, 10 mL, Intravenous, PRN, Enedina Guillen PA-C  •  sodium chloride 0.9 % infusion, 100 mL/hr, Intravenous, Continuous, Selma Salomon, DO, Last Rate: 100 mL/hr at 03/18/20 0632, 100 mL/hr at 03/18/20 0632  •  traMADol (ULTRAM) tablet 50 mg, 50 mg, Oral, Q6H PRN, Selma Salomon,  DO    Family History   Problem Relation Age of Onset   • Fibroids Mother    • Diabetes type II Mother    • Hypertension Mother    • Heart attack Mother    • Fibroids Sister         PCOS   • Diabetes type II Sister    • Dementia Maternal Grandmother    • Stroke Maternal Grandmother      Social History     Socioeconomic History   • Marital status:      Spouse name: Not on file   • Number of children: 1   • Years of education: Not on file   • Highest education level: Not on file   Tobacco Use   • Smoking status: Former Smoker     Types: Cigarettes     Last attempt to quit: 2000     Years since quittin.2   • Smokeless tobacco: Never Used   • Tobacco comment: social MAYBE 1 CIG/DAY   Substance and Sexual Activity   • Alcohol use: Not Currently     Frequency: Monthly or less     Drinks per session: 1 or 2   • Drug use: Never   • Sexual activity: Not Currently   Social History Narrative    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 Staying in [place] with mother due to Endometrial Cancer.        Review of Systems:  Review of Systems   Constitutional: Positive for appetite change and fever.   HENT: Negative for drooling, facial swelling and nosebleeds.    Eyes: Negative for photophobia and visual disturbance.   Respiratory: Negative for apnea, choking and shortness of breath.    Cardiovascular: Negative for chest pain and leg swelling.   Gastrointestinal: Positive for abdominal pain and nausea. Negative for blood in stool.   Endocrine: Negative for polyphagia and polyuria.   Genitourinary: Negative for difficulty urinating, dysuria, frequency and hematuria.   Musculoskeletal: Negative for arthralgias, gait problem and myalgias.   Skin: Negative for pallor and rash.   Allergic/Immunologic: Positive for immunocompromised state.   Neurological: Negative for seizures, facial asymmetry and light-headedness.   Hematological: Negative for adenopathy.   Psychiatric/Behavioral: Negative for behavioral problems  "and dysphoric mood. The patient is not nervous/anxious.      Otherwise the 12 point review of systems is negative.    /65 (BP Location: Left arm, Patient Position: Lying)   Pulse 108   Temp 99.5 °F (37.5 °C) (Oral)   Resp 18   Ht 157.5 cm (62\")   Wt 87.3 kg (192 lb 6.4 oz)   LMP  (LMP Unknown)   SpO2 94%   BMI 35.19 kg/m²   Body mass index is 35.19 kg/m².    General: Moderate distress  HEENT: PER, no icterus, normal sclerae  Cardiac: regular rhythm, tachycardic,  no audible rubs  Pulmonary: bilateral breath sounds, non labored  Abdominal: Lower midline wound VAC in place, soft, no generalized peritonitis, right mid abdominal/lower quadrant tenderness to deep palpation  Neurologic: awake, alert, no obvious focal deficits  Extremities: warm, no edema  Skin: no obvious rashes nor worrisome lesions seen     CBC  Results from last 7 days   Lab Units 03/18/20  0605   WBC 10*3/mm3 0.76*   HEMOGLOBIN g/dL 7.9*   HEMATOCRIT % 25.4*   PLATELETS 10*3/mm3 77*       CMP  Results from last 7 days   Lab Units 03/18/20  0605 03/17/20  2301   SODIUM mmol/L 130* 129*   POTASSIUM mmol/L 3.8 4.3   CHLORIDE mmol/L 98 95*   CO2 mmol/L 22.0 24.0   BUN mg/dL 18 23*   CREATININE mg/dL 0.77 0.98   CALCIUM mg/dL 8.2* 8.5*   BILIRUBIN mg/dL  --  0.7   ALK PHOS U/L  --  49   ALT (SGPT) U/L  --  9   AST (SGOT) U/L  --  12   GLUCOSE mg/dL 170* 241*       Radiology  Imaging Results (Last 72 Hours)     Procedure Component Value Units Date/Time    CT Abdomen Pelvis Without Contrast [[id number]] Collected:  03/18/20 0112     Updated:  03/18/20 0114    Narrative:       CT Abdomen Pelvis WO    INDICATION:   44-year-old female with fever and lower abdominal pain today. History of endometrial cancer.    TECHNIQUE:   CT of the abdomen and pelvis without IV contrast. Coronal and sagittal reconstructions were obtained.  Radiation dose reduction techniques included automated exposure control or exposure modulation based on body size. Count of " known CT and cardiac nuc  med studies performed in previous 12 months: 3.     COMPARISON:   CT abdomen pelvis 1/30/2020    FINDINGS:  Visualized lung bases are unremarkable.    Abdomen:   The liver is within normal limits. Splenomegaly again noted. The pancreas is within normal limits. The gallbladder is normal. Both adrenal glands are normal. The kidneys are normal. Abdominal aorta normal in course and caliber. Small bowel is  unremarkable without obstruction. The appendix is enlarged, measuring 1.3 cm in diameter. There is periappendiceal inflammatory stranding. The findings appear most consistent with acute appendicitis. No drainable fluid collections. No free  intraperitoneal air. The colon is unremarkable. Ventral lower abdominal wound from recent surgery.    Pelvis:   The urinary bladder is unremarkable.  Hysterectomy. No free pelvic fluid.    No acute osseous abnormality.        Impression:         1. Acute appendicitis. No evidence of perforation or abscess.  2. Postsurgical changes from recent hysterectomy.  3. Splenomegaly.      NOTIFICATION: Critical Value/emergent results were called by telephone at the time of interpretation on 3/18/2020 1:09 AM to BENSON Hughes who verbally acknowledged these results.      Signer Name: Prasanna Zapata MD   Signed: 3/18/2020 1:12 AM   Workstation [facility]: [facility]    [facility]    XR Chest 1 View [[id number]] Collected:  03/18/20 0002     Updated:  03/18/20 0004    Narrative:       CR Chest 1 Vw    INDICATION:   44-year-old female with fever today. History of stage III endometrial cancer. Recent chemotherapy.     COMPARISON:    Chest 2/7/2020    FINDINGS:  Single portable AP view(s) of the chest.  The heart and mediastinal contours are normal. The lungs are clear. No pneumothorax or pleural effusion.      Impression:       No acute cardiopulmonary findings.    Signer Name: Prasanna Zapata MD   Signed: 3/18/2020 12:02 AM   Workstation Name:  [email or web address]    [facility]            Assessment:  Acute appendicitis  Recent laparotomy for endometrioid adenocarcinoma status post hysterectomy, Dr. Tolentino  Recent cycle of chemotherapy with pancytopenia  Diabetes mellitus  Chronic kidney disease stage III    Plan:  (Initial consultation/physical exam was done at 0200 - epic was down and I was unable to dictate/enter notes/enter orders at that time)    I reviewed her CT scan of the abdomen and pelvis which demonstrates inflammation around the appendix consistent with an acute appendicitis.  Her care is complicated by her recent pelvic operation and administration of chemotherapy.  As a result of her chemotherapy she has a pancytopenia with an ANC of less than 1000.  I discussed the case directly with Dr. Tolentino.  We are going to admit her, volume resuscitate her, treat her aggressively with antibiotics, and finally attempt to improve her pancytopenia with colony stimulating factor.  Supportive care for now.  We will follow her closely.  Ideally her white blood cell count improves and we can proceed with appendectomy.  This was discussed at length with the patient and her mother.  All their questions were answered and they understand.    Praful Myers MD  03/18/20  12:03

## 2020-03-18 NOTE — THERAPY WOUND CARE TREATMENT
Outpatient Rehabilitation - Wound/Debridement Treatment Note  [facility]     Patient Name: Jeana Chung  : 1975  MRN: [id number]  Today's Date: 3/18/2020                 Admit Date: 3/9/2020    Visit Dx:    ICD-10-CM ICD-9-CM   1. Postoperative wound dehiscence, subsequent encounter T81.31XD V58.89     998.32   2. Open wound of abdomen, subsequent encounter S31.109D V58.89     879.2       Patient Active Problem List   Diagnosis   • Acute stress reaction with predominately emotional disturbance   • Sleep disturbance   • Cancer related pain   • Moderate episode of recurrent major depressive disorder (CMS/HCC)   • Anxiety   • Iron deficiency anemia   • Type 2 diabetes mellitus with diabetic polyneuropathy, with long-term current use of insulin (CMS/HCC)   • Essential hypertension   • Heart murmur   • Family history of cardiac disorder in mother   • Endometrial cancer (CMS/HCC)   • Stage 3 chronic kidney disease (CMS/HCC)   • Gastroesophageal reflux disease   • Amputation of left great toe (CMS/HCC)   • Amputation of right great toe (CMS/HCC)   • Neuropathy   • Hypotension due to drugs   • Neutropenia (CMS/HCC)   • Acute appendicitis   • Open wound of right foot   • Hyponatremia   • Pancytopenia (CMS/HCC)        Past Medical History:   Diagnosis Date   • Anemia    • Anxiety and depression    • Back pain    • Bronchitis    • Diabetes (CMS/HCC)     DX 4-5 YRS AGO, CHECKS BS 4X/DAY, LAST A1C 7.1%   • Endometrial cancer (CMS/HCC)     STAGE II   • GERD (gastroesophageal reflux disease)    • Hypertension    • MRSA (methicillin resistant staph aureus) culture positive     S/P NECROTIZING FASCITIS IN BILATERAL FEET   • Necrotizing fasciitis (CMS/HCC)    • PCOS (polycystic ovarian syndrome)    • PTSD (post-traumatic stress disorder)    • Retinopathy 3/18/2020   • Sepsis (CMS/HCC)    • Stage 3 chronic kidney disease (CMS/HCC) 2020   • Subconjunctival hemorrhage         Past Surgical History:   Procedure  Laterality Date   • EXPLORATORY LAPAROTOMY, TOTAL ABDOMINAL HYSTERECTOMY SALPINGO OOPHORECTOMY N/A 2/10/2020    Procedure: EXPLORATORY LAPAROTOMY, TOTAL ABDOMINAL HYSTERECTOMY, BILATERAL SALPINGO-OOPHORECTOMY WITH OPTIMAL  STAGING (R-0), OMENTECTOMY;  Surgeon: Celine Rubio MD;  [facility];  Service: Gynecology Oncology;  Laterality: N/A;   • EYE SURGERY Left     BLOOD VESSEL IN EYE REPAIR   • TOE AMPUTATION Left 06/2019    big toe - unhealing sore   • TOE AMPUTATION Right 2018    big toe and second toe - Necrotizing fasciitis and MRSA          EVALUATION      LDA Wound     Row Name               Wound 02/17/20 1300 midline abdomen Other (comment)    Wound - Properties Group Date first assessed: 02/17/20  - Time first assessed: 1300  -JM Orientation: midline  -JM Location: abdomen  -JM Primary Wound Type: Other  -JM, dehisced surgical incision  Additional Comments: dehisced abd incision  -JM       NPWT (Negative Pressure Wound Therapy) 02/21/20 1030 midline abdomen    NPWT (Negative Pressure Wound Therapy) - Properties Group Placement Date: 02/21/20  - Placement Time: 1030  -MF Location: midline abdomen  -      User Key  (r) = Recorded By, (t) = Taken By, (c) = Cosigned By    Initials Name Provider Type    Vinny Colindres, PT Physical Therapist    Leidy Millard, PT Physical Therapist          See flowsheet for wound details.           Vinny Lobato, PT  3/18/2020

## 2020-03-18 NOTE — H&P
"    [facility] Medicine Services  HISTORY AND PHYSICAL    Patient Name: Jeana Chung  : 1975  MRN: [id number]  Primary Care Physician: Tania Angeles PA-C  Date of admission: 3/17/2020      Subjective   Subjective     Chief Complaint:  RLQ pain with fever     HPI:  Jeana Chung is a 44 y.o. 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  Patient underwent a total abdominal hysterectomy, bilateral salpingo-oophorectomy and omentectomy on 20 by Dr. Rubio. Shortly after the surgery the patient reports wound dehiscence and was and is being followed by wound care. She currently has a wound vac in place. The patient started her first round of chemo last Tuesday 3/10. She reports increased fatigue, generalized weakness, nausea, mild RLQ pain, and worsened peripheral neuropathy. Additionally, the patient admits to a wound on her right foot. She went in to see wound care today regarding the foot ulcer and could barely get dressed due to lack of energy. When she returned home she reports chilling and a fever of 101.3. As well as, increased RLQ pain. She rates the pain a 10/10 at its worst and describes it as \"burning and stabbing.\" She spoke with her gynecologic oncologist, Dr. Rubio over the phone and was instructed to go to the hospital for evaluation. She denies cough, SOB, CP. No vomiting or diarrhea. No urinary symptoms. No dizziness, headache or syncope. She does admit to increased anxiety and depression and take Ativan occasionally. Remote tobacco use. No alcohol or drug use.     While in the ED, patient with a low-grade fever of 99.4, tachycardic with a labile blood pressure.  Labs revealed WBC 0.85, Neutrophils 0.22, hemoglobin 8.3, hematocrit 26.5, glucose 341, BUN 23, " sodium 129, albumin 3.  UA unremarkable.  Chest x-ray negative.  CT of abdomen pelvis demonstrates acute appendicitis with no evidence of perforation or abscess and splenomegaly.  The patient was started on vancomycin and Zosyn in the ED and given 1 L of fluids and 4 mg of morphine.  She will be admitted to the hospitalist service for further medical management.    Review of Systems   Constitutional: Positive for chills, fatigue and fever (101.3). Negative for appetite change and diaphoresis.   HENT: Negative for congestion, sore throat and trouble swallowing.    Eyes: Negative for pain and visual disturbance.   Respiratory: Negative for cough, shortness of breath and wheezing.    Cardiovascular: Negative for chest pain, palpitations and leg swelling.   Gastrointestinal: Positive for abdominal pain (RLQ), constipation and nausea. Negative for blood in stool, diarrhea and vomiting.   Genitourinary: Negative for difficulty urinating and dysuria.   Musculoskeletal: Negative for back pain and gait problem.   Skin: Positive for wound (right foot and lower abdomen ). Negative for rash.   Neurological: Positive for weakness (generalized ) and numbness (bilateral feet). Negative for dizziness, syncope, speech difficulty and headaches.   Hematological: Negative for adenopathy. Does not bruise/bleed easily.   Psychiatric/Behavioral: Negative for agitation and confusion.      All other systems reviewed and are negative.     Personal History     Past Medical History:   Diagnosis Date   • Anemia    • Anxiety and depression    • Back pain    • Bronchitis    • Diabetes (CMS/HCC)     DX 4-5 YRS AGO, CHECKS BS 4X/DAY, LAST A1C 7.1%   • Endometrial cancer (CMS/HCC)     STAGE II   • GERD (gastroesophageal reflux disease)    • Hypertension    • MRSA (methicillin resistant staph aureus) culture positive     S/P NECROTIZING FASCITIS IN BILATERAL FEET   • Necrotizing fasciitis (CMS/HCC)    • PCOS (polycystic ovarian syndrome)    • PTSD  (post-traumatic stress disorder)    • Sepsis (CMS/HCC)    • Stage 3 chronic kidney disease (CMS/HCC) 2/24/2020   • Subconjunctival hemorrhage        Past Surgical History:   Procedure Laterality Date   • EXPLORATORY LAPAROTOMY, TOTAL ABDOMINAL HYSTERECTOMY SALPINGO OOPHORECTOMY N/A 2/10/2020    Procedure: EXPLORATORY LAPAROTOMY, TOTAL ABDOMINAL HYSTERECTOMY, BILATERAL SALPINGO-OOPHORECTOMY WITH OPTIMAL  STAGING (R-0), OMENTECTOMY;  Surgeon: Celine Rubio MD;  Location: [facility];  Service: Gynecology Oncology;  Laterality: N/A;   • EYE SURGERY Left     BLOOD VESSEL IN EYE REPAIR   • TOE AMPUTATION Left 06/2019    big toe - unhealing sore   • TOE AMPUTATION Right 2018    big toe and second toe - Necrotizing fasciitis and MRSA        Family History: family history includes Dementia in her maternal grandmother; Diabetes type II in her mother and sister; Fibroids in her mother and sister; Heart attack in her mother; Hypertension in her mother; Stroke in her maternal grandmother. Otherwise pertinent FHx was reviewed and unremarkable.     Social History:  reports that she quit smoking about 20 years ago. Her smoking use included cigarettes. She has never used smokeless tobacco. She reports that she drank alcohol. She reports that she does not use drugs.  Social History     Social History Narrative   • Not on file       Medications:  Available home medication information reviewed.  Medications Prior to Admission   Medication Sig Dispense Refill Last Dose   • cholecalciferol (VITAMIN D3) 1.25 MG (11560 UT) capsule Take 1 capsule by mouth 1 (One) Time Per Week. (Patient taking differently: Take 50,000 Units by mouth 1 (One) Time Per Week. SATURDAY) 12 capsule 3 Taking   • dexamethasone (DECADRON) 4 MG tablet Take 5 tabs the night before chemo then take 2 tabs in the morning daily on days 2, 3 & 4.  Take with food. 11 tablet 5    • docusate sodium (COLACE) 250 MG capsule Take 1 capsule by mouth 2 (Two) Times a Day. 60  capsule 5 Taking   • gabapentin (NEURONTIN) 300 MG capsule Take 1 capsule by mouth 3 (Three) Times a Day. 90 capsule 3    • Insulin Glargine (BASAGLAR KWIKPEN) 100 UNIT/ML injection pen Inject 40 Units under the skin into the appropriate area as directed Every Night. 4 pen 2 Taking   • insulin lispro (HUMALOG) 100 UNIT/ML injection Inject 15 Units under the skin into the appropriate area as directed 3 (Three) Times a Day Before Meals. 13.5 mL 5 Taking   • LORazepam (ATIVAN) 1 MG tablet Take 1 tablet by mouth Every 6 (Six) Hours As Needed for Anxiety. 30 tablet 3    • MULTIPLE VITAMIN PO Multiple Vitamin   Taking   • ondansetron (ZOFRAN) 4 MG tablet Take 1 tablet by mouth Every 6 (Six) Hours As Needed for Nausea or Vomiting. 15 tablet 1 Taking   • ondansetron ODT (ZOFRAN-ODT) 8 MG disintegrating tablet Take 1 tablet by mouth Every 8 (Eight) Hours As Needed for Nausea or Vomiting. 30 tablet 5    • Polyethylene Glycol 3350 (MIRALAX PO) Take 17 g by mouth Daily.      • promethazine (PHENERGAN) 25 MG tablet Take 1 tablet by mouth Every 6 (Six) Hours As Needed for Nausea or Vomiting. 30 tablet 5    • traMADol (ULTRAM) 50 MG tablet Take 1 tablet by mouth Every 6 (Six) Hours As Needed for Moderate Pain . 30 tablet 0    • acetaminophen (TYLENOL) 325 MG tablet Take 2 tablets by mouth Every 6 (Six) Hours. 60 tablet 0 Taking   • Continuous Blood Gluc Sensor (FREESTYLE CAMMY SENSOR SYSTEM) Every 14 (Fourteen) Days. 1 each 1    • omeprazole (PrilOSEC) 20 MG capsule Take 1 capsule by mouth Daily. 90 capsule 1 Taking       Allergies   Allergen Reactions   • Bactrim [Sulfamethoxazole-Trimethoprim] Anaphylaxis   • Penicillins Hives   • Promethazine Mental Status Change       Objective   Objective     Vital Signs:   Temp:  [99.4 °F (37.4 °C)] 99.4 °F (37.4 °C)  Heart Rate:  [109-124] 112  Resp:  [16] 16  BP: (102-145)/(55-79) 135/78        Physical Exam   Constitutional: Awake, alert, lying in bed   Eyes: PERRLA, sclerae anicteric, no  conjunctival injection  HENT: NCAT, mucous membranes moist  Neck: Supple, no thyromegaly, no lymphadenopathy, trachea midline  Respiratory: Clear to auscultation bilaterally, nonlabored respirations   Cardiovascular: RR, tachycardic , no murmurs appreciated, palpable pedal pulses bilaterally  Gastrointestinal: Positive bowel sounds, soft, RLQ exquisitely tender to palpation, + voluntary guarding, negative peritoneal signs, positive psoas sign   Musculoskeletal: No bilateral ankle edema, no clubbing or cyanosis to extremities  Psychiatric: Appropriate affect, cooperative  Neurologic: Oriented x 3, strength symmetric in all extremities, Cranial Nerves grossly intact to confrontation, speech clear  Skin: open ulcer over right great toe incision, suprapubic incision covered with wound vac     Results Reviewed:  I have personally reviewed current lab and radiology data.    Results from last 7 days   Lab Units 03/17/20  2301   WBC 10*3/mm3 0.85*   HEMOGLOBIN g/dL 8.3*   HEMATOCRIT % 26.5*   PLATELETS 10*3/mm3 73*     Results from last 7 days   Lab Units 03/17/20  2301   SODIUM mmol/L 129*   POTASSIUM mmol/L 4.3   CHLORIDE mmol/L 95*   CO2 mmol/L 24.0   BUN mg/dL 23*   CREATININE mg/dL 0.98   GLUCOSE mg/dL 241*   CALCIUM mg/dL 8.5*   ALT (SGPT) U/L 9   AST (SGOT) U/L 12   LACTATE mmol/L 1.6     Estimated Creatinine Clearance: 73.7 mL/min (by C-G formula based on SCr of 0.98 mg/dL).  Brief Urine Lab Results  (Last result in the past 365 days)      Color   Clarity   Blood   Leuk Est   Nitrite   Protein   CREAT   Urine HCG        03/17/20 2345 Yellow Clear Negative Small (1+) Negative Negative             Imaging Results (Last 24 Hours)     Procedure Component Value Units Date/Time    CT Abdomen Pelvis Without Contrast [[id number]] Collected:  03/18/20 0112     Updated:  03/18/20 0114    Narrative:       CT Abdomen Pelvis WO    INDICATION:   44-year-old female with fever and lower abdominal pain today. History of  endometrial cancer.    TECHNIQUE:   CT of the abdomen and pelvis without IV contrast. Coronal and sagittal reconstructions were obtained.  Radiation dose reduction techniques included automated exposure control or exposure modulation based on body size. Count of known CT and cardiac nuc  med studies performed in previous 12 months: 3.     COMPARISON:   CT abdomen pelvis 1/30/2020    FINDINGS:  Visualized lung bases are unremarkable.    Abdomen:   The liver is within normal limits. Splenomegaly again noted. The pancreas is within normal limits. The gallbladder is normal. Both adrenal glands are normal. The kidneys are normal. Abdominal aorta normal in course and caliber. Small bowel is  unremarkable without obstruction. The appendix is enlarged, measuring 1.3 cm in diameter. There is periappendiceal inflammatory stranding. The findings appear most consistent with acute appendicitis. No drainable fluid collections. No free  intraperitoneal air. The colon is unremarkable. Ventral lower abdominal wound from recent surgery.    Pelvis:   The urinary bladder is unremarkable.  Hysterectomy. No free pelvic fluid.    No acute osseous abnormality.        Impression:         1. Acute appendicitis. No evidence of perforation or abscess.  2. Postsurgical changes from recent hysterectomy.  3. Splenomegaly.      NOTIFICATION: Critical Value/emergent results were called by telephone at the time of interpretation on 3/18/2020 1:09 AM to BENSON Hughes who verbally acknowledged these results.      Signer Name: Prasanna Zapata MD   Signed: 3/18/2020 1:12 AM   Workstation Name: [email or web address]-    [facility]    XR Chest 1 View [[id number]] Collected:  03/18/20 0002     Updated:  03/18/20 0004    Narrative:       CR Chest 1 Vw    INDICATION:   44-year-old female with fever today. History of stage III endometrial cancer. Recent chemotherapy.     COMPARISON:    Chest 2/7/2020    FINDINGS:  Single portable AP view(s) of  the chest.  The heart and mediastinal contours are normal. The lungs are clear. No pneumothorax or pleural effusion.      Impression:       No acute cardiopulmonary findings.    Signer Name: Prasanna Zapata MD   Signed: 3/18/2020 12:02 AM   Workstation Name: [email or web address]-    [facility]             Assessment/Plan   Assessment & Plan     Active Hospital Problems    Diagnosis POA   • **Acute appendicitis [K35.80] Yes   • Neutropenia (CMS/HCC) [D70.9] Yes   • Open wound of right foot [S91.301A] Yes   • Hyponatremia [E87.1] Yes   • Stage 3 chronic kidney disease (CMS/HCC) [N18.3] Yes   • Endometrial cancer (CMS/HCC) [C54.1] Yes     Added automatically from request for surgery [id number]     • Iron deficiency anemia [D50.9] Yes   • Type 2 diabetes mellitus with diabetic polyneuropathy, with long-term current use of insulin (CMS/HCC) [E11.42, Z79.4] Not Applicable   • Essential hypertension [I10] Yes     Ms. Chung is a 44 y.o. 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     Acute appendicitis   - CT A/P demonstrates acute appendicitis with no evidence of perforation or abscess  - consult general surgery, Dr. Myers is aware of the patient   - keep npo   - consult infectious disease   - blood cultures x 2   - start patient on Zosyn   - IVF, pain control     Neutropenia/Pancytopenia   - receiving chemo   - 2 doses of Neupogen ordered  - monitor CBC      Hyponatremia  - corrected sodium 131  - NS @ 100 cc/hr, monitor chemistry     Endometrial cancer   Wound dehiscence   - s/p exploratory laparotomy, total abdominal hysterectomy, bilateral salpingo-oophorectomy and omentectomy on 2/11/20   - she is scheduled to receive Carbo / Taxol every 21 days x 6 cycles  - consult gynecologic oncology, followed by Dr. Rubio  - wound vac  in place     Right foot ulcer   - consult WOC   - neurovascular checks, fall precautions     T2DM with peripheral neuropathy  - s/p bilateral great toe amputations   - hold home hypoglycemics  - Levemir 20 units nightly and low-dose SSI  - continue Gabapentin and Lyrica    HTN  - has not needed home antihypertensives since surgery   - blood pressure stable     CKD 3  - creatinine at baseline, monitor chemistry     Anemia   - hemoglobin stable from prior, monitor CBC  - secondary to KIKA and chemotherapy     DVT prophylaxis:  SCDs    CODE STATUS:    Code Status and Medical Interventions:   Ordered at: 03/18/20 0403     Level Of Support Discussed With:    Patient     Code Status:    CPR     Medical Interventions (Level of Support Prior to Arrest):    Full     Admission Status:  I believe this patient meets INPATIENT status due to appendicitis and pancytopenia.  I feel patient’s risk for adverse outcomes and need for care warrant INPATIENT evaluation and I predict the patient’s care encounter to likely last beyond 2 midnights.    Electronically signed by Enedina Guillen PA-C, 03/18/20, 3:06 AM.      Attending   Admission Attestation       I have seen and examined the patient, performing an independent face-to-face diagnostic evaluation with plan of care reviewed and developed with the advanced practice clinician (APC).      Brief Summary Statement:   Jeana Chung is a 44 y.o. female with PMHx of HTN, T2DM, neuropathy, retinopathy CKD, iron def anemia, retinopathy and recent diagnosis of stage III, PCOS, PTSD, and endometrial cancer. 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 The patient presented to [facility] with right lower quadrant pain and fever.  Patient underwent total abdominal hysterectomy with bilateral salpingo-oophorectomy and omentectomy on 2/11/2020 by Dr. Rubio.  She had complications of  wound dehiscence and has required a wound VAC.  She states that she had her first round of chemotherapy on Tuesday, 3/10/2020.  After chemotherapy she noted generalized weakness fatigue and nausea and worsening peripheral neuropathy with right lower quadrant pain.  She states that the right lower quadrant pain became severe and she developed chills and a fever and contacted her GYN office who advised her to come to the ED for further evaluation.  Upon arrival to the ED patient was noted to have a temp of 99.4 and was tachycardic.  CBC noted that her WBC was 0.85, neutrophils 0.22, hemoglobin 8.3, platelets 73.  She also was noted to have a sodium of 129 and albumin of 3.  CT of the abdomen and pelvis noted acute appendicitis with no evidence of perforation or abscess.  The decision was made to admit patient for further evaluation and treatment.  She was started on vancomycin and Zosyn.  Dr. Myers and Dr. Rubio were both consulted and will see patient in the a.m.      Remainder of detailed HPI is as noted by APC and has been reviewed and/or edited by me for completeness.    Attending Physical Exam:  Constitutional: Awake, alert  Eyes: PERRLA, sclerae anicteric, no conjunctival injection  HENT: NCAT, mucous membranes moist  Neck: Supple, no thyromegaly, no lymphadenopathy, trachea midline  Respiratory: Clear to auscultation bilaterally, nonlabored respirations   Cardiovascular: RRR, no murmurs, rubs, or gallops, palpable pedal pulses bilaterally  Gastrointestinal: Positive bowel sounds, soft, right lower quadrant tenderness and mild guarding, nondistended  Musculoskeletal: No bilateral ankle edema, no clubbing or cyanosis to extremities  Psychiatric: Appropriate affect, cooperative  Neurologic: Oriented x 3, strength symmetric in all extremities, Cranial Nerves grossly intact to confrontation, speech clear  Skin: ulceration on right foot.       Brief Assessment/Plan :  See detailed assessment and plan developed  with APC which I have reviewed and/or edited for completeness.    Electronically signed by Selma Salomon DO, 03/18/20, 4:50 AM.

## 2020-03-18 NOTE — ED PROVIDER NOTES
Subjective   Jeana Chung is a 44 y.o. female who presents to the ED with complaints of a fever today. She reports a temperature of 101.3 degrees today. She advises that she has stage 3 endometrial cancer. On 2/11/2020 she underwent exploratory laparotomy, total abdominal hysterectomy, bilateral salpingo-oophorectomy and omentectomy for advanced endometrial carcinoma. Postoperatively she had wound dehiscence and is receiving wound care treatment through physical therapy. She reports that she had a her first chemo session was 1 week ago and she is scheduled to receive Carbo / Taxol every 21 days x 6 cycles. Today, she was seen by wound care and afterwards she felt generally weak and fatigued. She also reports right lower abdominal pain and constipation. However, she denies any nausea, vomiting, diarrhea, shortness of breath, cough, chest pain, or dysuria.  She reports her to call Dr. Rubio, oncologist, who advised her to come to the ED for further evaluation. The patient advises that she has not taken any antipyretics. However, she has been taking Miralax for her constipation. She has a history of endometrial cancer, PCOS, neuropathy, HTN, DM, CKD, anemia, GERD, and sepsis. Her past surgical history includes a total hysterectomy, bilateral toe amputations, and left eye surgery. Her PCP is Dr. Angeles. There are no other acute complaints at this time.      History provided by:  Patient  Fever   Max temp prior to arrival:  101.3  Severity:  Moderate  Onset quality:  Sudden  Duration:  1 day  Timing:  Constant  Progression:  Improving  Chronicity:  New  Relieved by:  None tried  Worsened by:  Nothing  Ineffective treatments:  None tried  Associated symptoms: no chest pain, no cough, no diarrhea, no dysuria, no nausea and no vomiting    Risk factors: immunosuppression        Review of Systems   Constitutional: Positive for fatigue and fever.   Respiratory: Negative for cough and shortness of breath.     Cardiovascular: Negative for chest pain.   Gastrointestinal: Positive for abdominal pain (RLQ) and constipation. Negative for diarrhea, nausea and vomiting.   Genitourinary: Negative for dysuria.   Neurological: Positive for weakness (generalized).   All other systems reviewed and are negative.      Past Medical History:   Diagnosis Date   • Anemia    • Anxiety and depression    • Back pain    • Bronchitis    • Diabetes (CMS/HCC)     DX 4-5 YRS AGO, CHECKS BS 4X/DAY, LAST A1C 7.1%   • Endometrial cancer (CMS/HCC)     STAGE II   • GERD (gastroesophageal reflux disease)    • Hypertension    • MRSA (methicillin resistant staph aureus) culture positive     S/P NECROTIZING FASCITIS IN BILATERAL FEET   • Necrotizing fasciitis (CMS/HCC)    • PCOS (polycystic ovarian syndrome)    • PTSD (post-traumatic stress disorder)    • Sepsis (CMS/HCC)    • Stage 3 chronic kidney disease (CMS/HCC) 2/24/2020   • Subconjunctival hemorrhage        Allergies   Allergen Reactions   • Bactrim [Sulfamethoxazole-Trimethoprim] Anaphylaxis   • Penicillins Hives   • Promethazine Mental Status Change       Past Surgical History:   Procedure Laterality Date   • EXPLORATORY LAPAROTOMY, TOTAL ABDOMINAL HYSTERECTOMY SALPINGO OOPHORECTOMY N/A 2/10/2020    Procedure: EXPLORATORY LAPAROTOMY, TOTAL ABDOMINAL HYSTERECTOMY, BILATERAL SALPINGO-OOPHORECTOMY WITH OPTIMAL  STAGING (R-0), OMENTECTOMY;  Surgeon: Celine Rubio MD;  Location: [facility];  Service: Gynecology Oncology;  Laterality: N/A;   • EYE SURGERY Left     BLOOD VESSEL IN EYE REPAIR   • TOE AMPUTATION Left 06/2019    big toe - unhealing sore   • TOE AMPUTATION Right 2018    big toe and second toe - Necrotizing fasciitis and MRSA        Family History   Problem Relation Age of Onset   • Fibroids Mother    • Diabetes type II Mother    • Hypertension Mother    • Heart attack Mother    • Fibroids Sister         PCOS   • Diabetes type II Sister    • Dementia Maternal Grandmother    • Stroke  Maternal Grandmother        Social History     Socioeconomic History   • Marital status:      Spouse name: Not on file   • Number of children: 1   • Years of education: Not on file   • Highest education level: Not on file   Tobacco Use   • Smoking status: Former Smoker     Types: Cigarettes     Last attempt to quit: 2000     Years since quittin.2   • Smokeless tobacco: Never Used   • Tobacco comment: social MAYBE 1 CIG/DAY   Substance and Sexual Activity   • Alcohol use: Not Currently     Frequency: Monthly or less     Drinks per session: 1 or 2   • Drug use: Never   • Sexual activity: Not Currently         Objective   Physical Exam   Constitutional: She is oriented to person, place, and time. She appears well-developed and well-nourished.   HENT:   Head: Normocephalic and atraumatic.   Nose: Nose normal.   Eyes: Conjunctivae are normal. No scleral icterus.   Neck: Normal range of motion. Neck supple.   Cardiovascular: Regular rhythm and normal heart sounds. Tachycardia present.   No murmur heard.  Tachycardic.   Pulmonary/Chest: Effort normal and breath sounds normal. No respiratory distress.   Clear to auscultation.   Abdominal: Soft. She exhibits no distension. There is tenderness in the right lower quadrant.   Right lower quadrant tenderness to palpation.   Musculoskeletal: Normal range of motion.   Right toe amputation. Right foot covered with bandage applied by wound care earlier today. No signs of infections surrounding the bandage. Wound vac in place, healing well, no surrounding erythema or signs of infection.   Neurological: She is alert and oriented to person, place, and time.   Skin: Skin is warm and dry. No erythema.   Right toe amputation. Right foot covered with bandage applied by wound care earlier today. No signs of infections surrounding the bandage. Wound vac in place, healing well, no surrounding erythema or signs of infection.   Psychiatric: She has a normal mood and affect. Her  behavior is normal.   Nursing note and vitals reviewed.      Procedures         ED Course  ED Course as of Mar 18 0348   Tue Mar 17, 2020   2252 Patient is s/p EXPLORATORY LAPAROTOMY, TOTAL ABDOMINAL HYSTERECTOMY, BILATERAL SALPINGO-OOPHORECTOMY WITH OPTIMAL  STAGING (R-0), OMENTECTOMY on 2/10/2020    [JG]   Wed Mar 18, 2020   0011 Sodium(!): 129 [JG]   0346 Patient presents to the emergency room with complaints of right lower quadrant abdominal pain, fever and general malaise.  Patient s/p exploratory laparotomy, total abdominal hysterectomy, bilateral salpingo-oophorectomy and omentectomy for advanced endometrial carcinoma.  Patient also with wound dehiscence on abdominal incision with wound VAC in place.  WBC of 0.85 and hyponatremic with sodium of 129, patient responded well to IV fluids and pain medication while in the emergency department.  CT scan of the abdomen and pelvis revealed acute appendicitis.General Surgeon Dr. Myers was initially consulted and suggested patient likely did not have an acute appendicitis but he would review imaging and instructed to contact the patient's gynecology oncologist Dr. Joanne Rubio was contacted and shared she would review the case and discuss it with Dr. Myers. She stated she would order Neupogen for the patient's low WBC and admit to hospitalist.  Patient received 1 dose of Zosyn while in the ED, hospitalist was consulted and agreeable to accept patient to their service.  Patient and family agreeable to plan of care and hospital admission. Following disposition, I was contacted again by Dr. Rubio who spoke with Dr. Myers who was in agreement of the diagnosis of acute appendicitis and would see the patient.  At time of admission, patient was resting comfortably in bed and in no acute distress, vital signs stable.    [JG]      ED Course User Index  [JG] Carlyle Bryant, PA     Recent Results (from the past 24 hour(s))   Comprehensive Metabolic Panel     Collection Time: 03/17/20 11:01 PM   Result Value Ref Range    Glucose 241 (H) 65 - 99 mg/dL    BUN 23 (H) 6 - 20 mg/dL    Creatinine 0.98 0.57 - 1.00 mg/dL    Sodium 129 (L) 136 - 145 mmol/L    Potassium 4.3 3.5 - 5.2 mmol/L    Chloride 95 (L) 98 - 107 mmol/L    CO2 24.0 22.0 - 29.0 mmol/L    Calcium 8.5 (L) 8.6 - 10.5 mg/dL    Total Protein 6.7 6.0 - 8.5 g/dL    Albumin 3.00 (L) 3.50 - 5.20 g/dL    ALT (SGPT) 9 1 - 33 U/L    AST (SGOT) 12 1 - 32 U/L    Alkaline Phosphatase 49 39 - 117 U/L    Total Bilirubin 0.7 0.2 - 1.2 mg/dL    eGFR Non African Amer 61 >60 mL/min/1.73    Globulin 3.7 gm/dL    A/G Ratio 0.8 g/dL    BUN/Creatinine Ratio 23.5 7.0 - 25.0    Anion Gap 10.0 5.0 - 15.0 mmol/L   CBC Auto Differential    Collection Time: 03/17/20 11:01 PM   Result Value Ref Range    WBC 0.85 (C) 3.40 - 10.80 10*3/mm3    RBC 3.29 (L) 3.77 - 5.28 10*6/mm3    Hemoglobin 8.3 (L) 12.0 - 15.9 g/dL    Hematocrit 26.5 (L) 34.0 - 46.6 %    MCV 80.5 79.0 - 97.0 fL    MCH 25.2 (L) 26.6 - 33.0 pg    MCHC 31.3 (L) 31.5 - 35.7 g/dL    RDW 14.1 12.3 - 15.4 %    RDW-SD 41.5 37.0 - 54.0 fl    MPV 11.4 6.0 - 12.0 fL    Platelets 73 (L) 140 - 450 10*3/mm3    Neutrophil % 25.8 (L) 42.7 - 76.0 %    Lymphocyte % 62.4 (H) 19.6 - 45.3 %    Monocyte % 7.1 5.0 - 12.0 %    Eosinophil % 3.5 0.3 - 6.2 %    Basophil % 0.0 0.0 - 1.5 %    Immature Grans % 1.2 (H) 0.0 - 0.5 %    Neutrophils, Absolute 0.22 (L) 1.70 - 7.00 10*3/mm3    Lymphocytes, Absolute 0.53 (L) 0.70 - 3.10 10*3/mm3    Monocytes, Absolute 0.06 (L) 0.10 - 0.90 10*3/mm3    Eosinophils, Absolute 0.03 0.00 - 0.40 10*3/mm3    Basophils, Absolute 0.00 0.00 - 0.20 10*3/mm3    Immature Grans, Absolute 0.01 0.00 - 0.05 10*3/mm3    nRBC 0.0 0.0 - 0.2 /100 WBC   Lactic Acid, Plasma    Collection Time: 03/17/20 11:01 PM   Result Value Ref Range    Lactate 1.6 0.5 - 2.0 mmol/L   Lipase    Collection Time: 03/17/20 11:01 PM   Result Value Ref Range    Lipase 43 13 - 60 U/L   Scan Slide    Collection  Time: 03/17/20 11:01 PM   Result Value Ref Range    RBC Morphology Normal Normal    WBC Morphology Normal Normal    Platelet Morphology Normal Normal   Urinalysis With Microscopic If Indicated (No Culture) - Urine, Clean Catch    Collection Time: 03/17/20 11:45 PM   Result Value Ref Range    Color, UA Yellow Yellow, Straw    Appearance, UA Clear Clear    pH, UA <=5.0 5.0 - 8.0    Specific Gravity, UA 1.021 1.001 - 1.030    Glucose,  mg/dL (2+) (A) Negative    Ketones, UA Negative Negative    Bilirubin, UA Negative Negative    Blood, UA Negative Negative    Protein, UA Negative Negative    Leuk Esterase, UA Small (1+) (A) Negative    Nitrite, UA Negative Negative    Urobilinogen, UA 1.0 E.U./dL 0.2 - 1.0 E.U./dL   Urinalysis, Microscopic Only - Urine, Clean Catch    Collection Time: 03/17/20 11:45 PM   Result Value Ref Range    RBC, UA 0-2 None Seen, 0-2 /HPF    WBC, UA 3-5 (A) None Seen, 0-2 /HPF    Bacteria, UA None Seen None Seen, Trace /HPF    Squamous Epithelial Cells, UA 0-2 None Seen, 0-2 /HPF    Hyaline Casts, UA 0-6 0 - 6 /LPF    Methodology Automated Microscopy      Note: In addition to lab results from this visit, the labs listed above may include labs taken at another facility or during a different encounter within the last 24 hours. Please correlate lab times with ED admission and discharge times for further clarification of the services performed during this visit.    CT Abdomen Pelvis Without Contrast   Final Result      1. Acute appendicitis. No evidence of perforation or abscess.   2. Postsurgical changes from recent hysterectomy.   3. Splenomegaly.         NOTIFICATION: Critical Value/emergent results were called by telephone at the time of interpretation on 3/18/2020 1:09 AM to BENSON Hughes who verbally acknowledged these results.         Signer Name: Prasanna Zapata MD    Signed: 3/18/2020 1:12 AM    Workstation Name: [id number]     [facility]      XR Chest 1  View   Final Result   No acute cardiopulmonary findings.      Signer Name: Prasanna Zapata MD    Signed: 3/18/2020 12:02 AM    Workstation Name: [email or web address]     [facility]        Vitals:    03/18/20 0200 03/18/20 0215 03/18/20 0230 03/18/20 0245   BP: 119/73 128/74 109/63 133/75   Pulse: 110 113 112 112   Resp:       Temp:       TempSrc:       SpO2: 95% 98% 98% 98%   Weight:       Height:         Medications   sodium chloride 0.9 % flush 10 mL (has no administration in time range)   filgrastim (NEUPOGEN) injection 480 mcg (has no administration in time range)   filgrastim (NEUPOGEN) injection 480 mcg (has no administration in time range)   lactated ringers infusion (125 mL/hr Intravenous New Bag 3/18/20 0240)   morphine injection 2 mg (2 mg Intravenous Given 3/18/20 0240)   ondansetron (ZOFRAN) injection 4 mg (has no administration in time range)   piperacillin-tazobactam (ZOSYN) 3.375 g in iso-osmotic dextrose 50 ml (premix) (has no administration in time range)   sodium chloride 0.9 % bolus 1,000 mL (0 mL Intravenous Stopped 3/18/20 0140)   Morphine sulfate (PF) injection 4 mg (4 mg Intravenous Given 3/17/20 2339)   piperacillin-tazobactam (ZOSYN) 4.5 g in iso-osmotic dextrose 100 mL IVPB (premix) (4.5 g Intravenous New Bag 3/18/20 0331)     ECG/EMG Results (last 24 hours)     ** No results found for the last 24 hours. **        No orders to display                   MDM  Number of Diagnoses or Management Options  Acute appendicitis, unspecified acute appendicitis type: new and requires workup  Fever, unspecified fever cause: new and requires workup  Right lower quadrant abdominal pain: new and requires workup     Amount and/or Complexity of Data Reviewed  Clinical lab tests: reviewed  Tests in the radiology section of CPT®: reviewed  Decide to obtain previous medical records or to obtain history from someone other than the patient: yes    Risk of Complications, Morbidity, and/or  Mortality  Presenting problems: high  Diagnostic procedures: moderate  Management options: moderate    Patient Progress  Patient progress: stable      Final diagnoses:   Acute appendicitis, unspecified acute appendicitis type   Right lower quadrant abdominal pain   Fever, unspecified fever cause       Documentation assistance provided by fiona Ray.  Information recorded by the scribe was done at my direction and has been verified and validated by me.     Юлия Ray  03/17/20 1946       Carlyle Bryant PA  03/18/20 [id number]

## 2020-03-18 NOTE — CONSULTS
Jeana Chung  [id number]  1975      Reason for visit: Fever, fatigue, malaise, abdominal pain, acute appendicitis on imaging    History of present illness:  The patient is a 44 y.o. 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     She reports feeling unwell for the past 2 days.  She overall just felt fatigued and weak.  She had intermittent abdominal pain that she thought was muscular associated with the wound VAC.  Nausea started yesterday afternoon.  Pain worsened.  She continue to feel unwell and took her temperature and was febrile to 101.3 at home.  She called and was instructed to come in for evaluation.  Work-up demonstrated acute appendicitis.  She was started on antibiotics.  She continues to report abdominal pain, nausea.  She denies further fevers, chills, chest pain, shortness of breath    OBGYN History:  She is a G 0 P 0.  She does not use HRT.       Oncologic History:     Endometrial cancer (CMS/HCC)    12/14/2019 Imaging     Presented to ED in [place] due to progressively heavy vaginal bleeding and severe back pain. Ultrasound revealed uterine and cervical masses.      1/2020 Biopsy     ED follow-up with gynecologist in [place], told she likely has cervical cancer. Insufficient tissue on attempted cervical biopsy. Patient moved to [place], [place] to be closer to mother and sister for work-up and treatment.       1/21/2020 Imaging     Gyn evaluation at [facility]. Repeat TVUS showed cervical mass, right adnexal mass, and large uterine fibroid vs mass. Referred to Gyn Oncology      1/23/2020 Initial Diagnosis     Endometrial cancer (CMS/HCC)  Cervical biopsy positive for infiltrating endometrioid adenocarcinoma      1/30/2020 Imaging     CT chest, abdomen, pelvis showed enlarged uterus with  multiple masses and 4 cm cystic/solid mass at right ovary. No metastatic disease noted in abdomen or chest.      2/10/2020 Surgery     Exploratory laparotomy, total abdominal hysterectomy, bilateral salpingo-oophorectomy, with optimal debulking (R=0), and omentectomy.    Pelvic washing cytology positive for malignant cells, consistent with adenocarcinoma. Final pathology showed large grade 3 endometrioid tumor with lymphvascular invasion at the uterus. Cervical stromal involvement, vaginal margin negative. Right tube and ovary involved. Left tube and ovary negative, omentum negative. MSI normal. TH3tMeU1, Stage IIIA grade 3        3/9/2020 -  Chemotherapy     OP UTERINE PACLitaxel / CARBOplatin (Q21D)             Past Medical History:   Diagnosis Date   • Anemia    • Anxiety and depression    • Back pain    • Bronchitis    • Diabetes (CMS/HCC)     DX 4-5 YRS AGO, CHECKS BS 4X/DAY, LAST A1C 7.1%   • Endometrial cancer (CMS/HCC)     STAGE II   • GERD (gastroesophageal reflux disease)    • Hypertension    • MRSA (methicillin resistant staph aureus) culture positive     S/P NECROTIZING FASCITIS IN BILATERAL FEET   • Necrotizing fasciitis (CMS/HCC)    • PCOS (polycystic ovarian syndrome)    • PTSD (post-traumatic stress disorder)    • Retinopathy 3/18/2020   • Sepsis (CMS/HCC)    • Stage 3 chronic kidney disease (CMS/HCC) 2/24/2020   • Subconjunctival hemorrhage        Past Surgical History:   Procedure Laterality Date   • EXPLORATORY LAPAROTOMY, TOTAL ABDOMINAL HYSTERECTOMY SALPINGO OOPHORECTOMY N/A 2/10/2020    Procedure: EXPLORATORY LAPAROTOMY, TOTAL ABDOMINAL HYSTERECTOMY, BILATERAL SALPINGO-OOPHORECTOMY WITH OPTIMAL  STAGING (R-0), OMENTECTOMY;  Surgeon: Celine Rubio MD;  Location: [facility];  Service: Gynecology Oncology;  Laterality: N/A;   • EYE SURGERY Left     BLOOD VESSEL IN EYE REPAIR   • TOE AMPUTATION Left 06/2019    big toe - unhealing sore   • TOE AMPUTATION Right 2018    big toe and second toe -  Necrotizing fasciitis and MRSA        MEDICATIONS: The current medication list was reviewed with the patient and updated in the EMR this date per the Medical Assistant. Medication dosages and frequencies were confirmed to be accurate.      Allergies:  is allergic to bactrim [sulfamethoxazole-trimethoprim]; penicillins; and promethazine.    Social History:   Social History     Socioeconomic History   • Marital status:      Spouse name: Not on file   • Number of children: 1   • Years of education: Not on file   • Highest education level: Not on file   Tobacco Use   • Smoking status: Former Smoker     Types: Cigarettes     Last attempt to quit: 2000     Years since quittin.2   • Smokeless tobacco: Never Used   • Tobacco comment: social MAYBE 1 CIG/DAY   Substance and Sexual Activity   • Alcohol use: Not Currently     Frequency: Monthly or less     Drinks per session: 1 or 2   • Drug use: Never   • Sexual activity: Not Currently   Social History Narrative    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 Staying in [place] with mother due to Endometrial Cancer.        Family History:    Family History   Problem Relation Age of Onset   • Fibroids Mother    • Diabetes type II Mother    • Hypertension Mother    • Heart attack Mother    • Fibroids Sister         PCOS   • Diabetes type II Sister    • Dementia Maternal Grandmother    • Stroke Maternal Grandmother        Health Maintenance:    Health Maintenance   Topic Date Due   • TDAP/TD VACCINES (1 - Tdap) 1984   • HEPATITIS C SCREENING  2020   • MAMMOGRAM  2020   • HEMOGLOBIN A1C  2020   • URINE MICROALBUMIN  2021   • PNEUMOCOCCAL VACCINE (19-64 HIGHEST RISK) (2 of 3 - PCV13) 2021   • DIABETIC FOOT EXAM  2021   • DIABETIC EYE EXAM  2021   • ANNUAL PHYSICAL  2021   • INFLUENZA VACCINE  Completed   • PAP SMEAR  Discontinued       Review of Systems   Constitutional: Positive for appetite change, fatigue  and fever. Negative for chills and unexpected weight change.   HENT: Negative for ear discharge, ear pain, hearing loss, mouth sores, nosebleeds, sinus pressure, sinus pain, sore throat, tinnitus, trouble swallowing and voice change.    Eyes: Negative for itching and visual disturbance.   Respiratory: Negative for cough, shortness of breath and wheezing.    Cardiovascular: Negative for chest pain, palpitations and leg swelling.   Gastrointestinal: Positive for abdominal pain and nausea. Negative for blood in stool, constipation, diarrhea and vomiting.   Genitourinary: Negative for dyspareunia, dysuria, enuresis, flank pain, frequency, urgency, vaginal bleeding and vaginal discharge.   Musculoskeletal: Negative for arthralgias, gait problem and myalgias.   Skin: Negative for color change, rash and wound.   Neurological: Negative for dizziness, seizures, weakness, numbness and headaches.   Hematological: Does not bruise/bleed easily.   Psychiatric/Behavioral: Negative for confusion, dysphoric mood and sleep disturbance. The patient is not nervous/anxious.        Physical Exam    Vitals:    03/18/20 0600 03/18/20 0630 03/18/20 0633 03/18/20 0732   BP:    101/53   BP Location:    Left arm   Patient Position:    Lying   Pulse: 107 108 107 108   Resp:    18   Temp:    98.8 °F (37.1 °C)   TempSrc:    Oral   SpO2: 94% 92% 91% 94%   Weight:       Height:         PHQ-9 Total Score:    Body mass index is 35.19 kg/m².    Wt Readings from Last 3 Encounters:   03/18/20 87.3 kg (192 lb 6.4 oz)   03/10/20 88.5 kg (195 lb)   03/09/20 88.9 kg (196 lb)         GENERAL: Alert, well-appearing female appearing her stated age who is in no apparent distress.   HEENT: Sclera anicteric. Head normocephalic, atraumatic. Mucus membranes moist.   NECK: Trachea midline, supple, without masses.  No thyromegaly.   BREASTS: Deferred  CARDIOVASCULAR: Normal rate, regular rhythm, no murmurs, rubs, or gallops.  No peripheral edema.  RESPIRATORY: Clear  to auscultation bilaterally, normal respiratory effort  BACK:  No CVA tenderness, no vertebral tenderness on palpation  GASTROINTESTINAL:  Abdomen is soft, diffusely tender, worse in right lower quadrant.  Rebound and guarding.  Peritoneal signs. No HSM.  Wound VAC in place with nonerythematous borders.  SKIN:  Warm, dry, well-perfused.  All visible areas intact.  No rashes, lesions, ulcers.  PSYCHIATRIC: AO x3, with appropriate affect, normal thought processes.  NEUROLOGIC: No focal deficits.  Moves extremities well.  MUSCULOSKELETAL: Normal gait and station.   EXTREMITIES:   No cyanosis, clubbing, symmetric.  Bilateral greater toe amputated.  LYMPHATICS:  No cervical or inguinal adenopathy noted.     PELVIC exam: Deferred    ECOG PS 1    PROCEDURES: None    Diagnostic Data:      Ct Abdomen Pelvis Without Contrast    Result Date: 3/18/2020  CT Abdomen Pelvis WO INDICATION: 44-year-old female with fever and lower abdominal pain today. History of endometrial cancer. TECHNIQUE: CT of the abdomen and pelvis without IV contrast. Coronal and sagittal reconstructions were obtained.  Radiation dose reduction techniques included automated exposure control or exposure modulation based on body size. Count of known CT and cardiac nuc med studies performed in previous 12 months: 3. COMPARISON: CT abdomen pelvis 1/30/2020 FINDINGS: Visualized lung bases are unremarkable. Abdomen: The liver is within normal limits. Splenomegaly again noted. The pancreas is within normal limits. The gallbladder is normal. Both adrenal glands are normal. The kidneys are normal. Abdominal aorta normal in course and caliber. Small bowel is unremarkable without obstruction. The appendix is enlarged, measuring 1.3 cm in diameter. There is periappendiceal inflammatory stranding. The findings appear most consistent with acute appendicitis. No drainable fluid collections. No free intraperitoneal air. The colon is unremarkable. Ventral lower abdominal  wound from recent surgery. Pelvis: The urinary bladder is unremarkable.  Hysterectomy. No free pelvic fluid. No acute osseous abnormality.     1. Acute appendicitis. No evidence of perforation or abscess. 2. Postsurgical changes from recent hysterectomy. 3. Splenomegaly. NOTIFICATION: Critical Value/emergent results were called by telephone at the time of interpretation on 3/18/2020 1:09 AM to BENSON Hughes who verbally acknowledged these results. Signer Name: Prasanna Zapata MD  Signed: 3/18/2020 1:12 AM  Workstation Name: [facility]  [facility]    Xr Chest 1 View    Result Date: 3/18/2020  CR Chest 1 Vw INDICATION: 44-year-old female with fever today. History of stage III endometrial cancer. Recent chemotherapy. COMPARISON:  Chest 2/7/2020 FINDINGS: Single portable AP view(s) of the chest.  The heart and mediastinal contours are normal. The lungs are clear. No pneumothorax or pleural effusion.     No acute cardiopulmonary findings. Signer Name: Prasanna Zapata MD  Signed: 3/18/2020 12:02 AM  Workstation Name: [facility]  [facility]      Lab Results   Component Value Date    WBC 0.76 (C) 03/18/2020    HGB 7.9 (L) 03/18/2020    HCT 25.4 (L) 03/18/2020    MCV 81.9 03/18/2020    PLT 77 (L) 03/18/2020    NEUTROABS 0.13 (L) 03/18/2020    GLUCOSE 170 (H) 03/18/2020    BUN 18 03/18/2020    CREATININE 0.77 03/18/2020    EGFRIFNONA 81 03/18/2020     (L) 03/18/2020    K 3.8 03/18/2020    CL 98 03/18/2020    CO2 22.0 03/18/2020    CALCIUM 8.2 (L) 03/18/2020    ALBUMIN 3.00 (L) 03/17/2020    AST 12 03/17/2020    ALT 9 03/17/2020    BILITOT 0.7 03/17/2020     No results found for:         Assessment/Plan   This is a 44 y.o. woman with stage IIIa endometrial cancer, status post exploratory laparotomy, total abdominal hysterectomy, bilateral salpingo-oophorectomy, omentectomy on 2/10/2020, complicated by wound dehiscence now with wound VAC in place.  She underwent  first cycle of chemo 3/10/2020 with carboplatin and paclitaxel.  She is admitted with acute appendicitis.    Acute appendicitis  -Surgery consulted, ID consulted  -Blood cultures pending  -Continue Zosyn  -Plan for conservative management at this time    Pancytopenia secondary to recent chemo  -Started on Neupogen  -Continue to trend labs  -Neutropenic precautions    Diabetes with diabetic neuropathy and retinopathy  -Trend fingersticks, continue insulin  -Continue home gabapentin    Wound dehiscence  -Continue wound VAC    Disposition  -Continue inpatient management    Electronically Signed by: Vilma Burleson MD  Date: 3/18/2020           I saw and evaluated the patient. I agree with the findings and the plan of care as documented in the note.  I personally reviewed the CT images in about 1:30 in the morning.  There is a prominent appendix and periappendiceal stranding consistent with appendicitis.  I agree with the plan for conservative management until WBC/ANC improved.  I discussed the case with Dr. Kyle Myers and Dr. Gastelum.    Celine Rubio MD  03/18/20  4:56 PM

## 2020-03-18 NOTE — CONSULTS
Consult received for diabetes education. Patient is in neutropenic precautions, attempted to reach patient by phone. Unable to reach patient by phone. Will continue to follow patient for educational needs.

## 2020-03-18 NOTE — PAYOR COMM NOTE
"Attention [facility]  #[id number]    Velma Waters RN CM  Phone [phone]  Fax [phone]      Jeana Chung (44 y.o. Female)     Date of Birth Social Security Number Address Home Phone MRN    1975  [address] [place] [address] [phone] [id number]    Holiness Marital Status          None        Admission Date Admission Type Admitting Provider Attending Provider Department, Room/Bed    3/17/20 Emergency Lakhwinder Mix MD Kalantar, Masoud, MD [address], S569/1    Discharge Date Discharge Disposition Discharge Destination                       Attending Provider:  Lakhwinder Mix MD    Allergies:  Bactrim [Sulfamethoxazole-trimethoprim], Penicillins, Promethazine    Isolation:  None   Infection:  None   Code Status:  CPR    Ht:  157.5 cm (62\")   Wt:  87.3 kg (192 lb 6.4 oz)    Admission Cmt:  None   Principal Problem:  Acute appendicitis [K35.80]                 Active Insurance as of 3/17/2020     Primary Coverage     Payor Plan Insurance Group Employer/Plan Group    ANTHEM BLUE CROSS ANTHEM BLUE CROSS BLUE SHIELD PPO [id number]     Payor Plan Address Payor Plan Phone Number Payor Plan Fax Number Effective Dates    [address] [phone]  2020 - None Entered    [place] [address]       Subscriber Name Subscriber Birth Date Member ID       JEANA CHUNG 1975 [id number]                 Emergency Contacts      (Rel.) Home Phone Work Phone Mobile Phone    WADE CHUNG (Spouse) [phone] -- [phone]    Rosa Sloan (Mother) [phone] -- [phone]               History & Physical      Selma Salomon DO at 20 0306              [facility] Medicine Services  HISTORY AND PHYSICAL    Patient Name: Jeana Chung  : 1975  MRN: [id number]  Primary Care Physician: Tania Angeles PA-C  Date of admission: 3/17/2020      Subjective   Subjective     Chief " "Complaint:  RLQ pain with fever     HPI:  Jeana Chung is a 44 y.o. 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  Patient underwent a total abdominal hysterectomy, bilateral salpingo-oophorectomy and omentectomy on 2/11/20 by Dr. Rubio. Shortly after the surgery the patient reports wound dehiscence and was and is being followed by wound care. She currently has a wound vac in place. The patient started her first round of chemo last Tuesday 3/10. She reports increased fatigue, generalized weakness, nausea, mild RLQ pain, and worsened peripheral neuropathy. Additionally, the patient admits to a wound on her right foot. She went in to see wound care today regarding the foot ulcer and could barely get dressed due to lack of energy. When she returned home she reports chilling and a fever of 101.3. As well as, increased RLQ pain. She rates the pain a 10/10 at its worst and describes it as \"burning and stabbing.\" She spoke with her gynecologic oncologist, Dr. Rubio over the phone and was instructed to go to the hospital for evaluation. She denies cough, SOB, CP. No vomiting or diarrhea. No urinary symptoms. No dizziness, headache or syncope. She does admit to increased anxiety and depression and take Ativan occasionally. Remote tobacco use. No alcohol or drug use.     While in the ED, patient with a low-grade fever of 99.4, tachycardic with a labile blood pressure.  Labs revealed WBC 0.85, Neutrophils 0.22, hemoglobin 8.3, hematocrit 26.5, glucose 341, BUN 23, sodium 129, albumin 3.  UA unremarkable.  Chest x-ray negative.  CT of abdomen pelvis demonstrates acute appendicitis with no evidence of perforation or abscess and splenomegaly.  The patient was started on vancomycin and Zosyn in the ED and given 1 L of fluids and 4 mg " of morphine.  She will be admitted to the hospitalist service for further medical management.    Review of Systems   Constitutional: Positive for chills, fatigue and fever (101.3). Negative for appetite change and diaphoresis.   HENT: Negative for congestion, sore throat and trouble swallowing.    Eyes: Negative for pain and visual disturbance.   Respiratory: Negative for cough, shortness of breath and wheezing.    Cardiovascular: Negative for chest pain, palpitations and leg swelling.   Gastrointestinal: Positive for abdominal pain (RLQ), constipation and nausea. Negative for blood in stool, diarrhea and vomiting.   Genitourinary: Negative for difficulty urinating and dysuria.   Musculoskeletal: Negative for back pain and gait problem.   Skin: Positive for wound (right foot and lower abdomen ). Negative for rash.   Neurological: Positive for weakness (generalized ) and numbness (bilateral feet). Negative for dizziness, syncope, speech difficulty and headaches.   Hematological: Negative for adenopathy. Does not bruise/bleed easily.   Psychiatric/Behavioral: Negative for agitation and confusion.      All other systems reviewed and are negative.     Personal History     Past Medical History:   Diagnosis Date   • Anemia    • Anxiety and depression    • Back pain    • Bronchitis    • Diabetes (CMS/HCC)     DX 4-5 YRS AGO, CHECKS BS 4X/DAY, LAST A1C 7.1%   • Endometrial cancer (CMS/HCC)     STAGE II   • GERD (gastroesophageal reflux disease)    • Hypertension    • MRSA (methicillin resistant staph aureus) culture positive     S/P NECROTIZING FASCITIS IN BILATERAL FEET   • Necrotizing fasciitis (CMS/HCC)    • PCOS (polycystic ovarian syndrome)    • PTSD (post-traumatic stress disorder)    • Sepsis (CMS/HCC)    • Stage 3 chronic kidney disease (CMS/HCC) 2/24/2020   • Subconjunctival hemorrhage        Past Surgical History:   Procedure Laterality Date   • EXPLORATORY LAPAROTOMY, TOTAL ABDOMINAL HYSTERECTOMY SALPINGO  OOPHORECTOMY N/A 2/10/2020    Procedure: EXPLORATORY LAPAROTOMY, TOTAL ABDOMINAL HYSTERECTOMY, BILATERAL SALPINGO-OOPHORECTOMY WITH OPTIMAL  STAGING (R-0), OMENTECTOMY;  Surgeon: Celine Rubio MD;  [facility];  Service: Gynecology Oncology;  Laterality: N/A;   • EYE SURGERY Left     BLOOD VESSEL IN EYE REPAIR   • TOE AMPUTATION Left 06/2019    big toe - unhealing sore   • TOE AMPUTATION Right 2018    big toe and second toe - Necrotizing fasciitis and MRSA        Family History: family history includes Dementia in her maternal grandmother; Diabetes type II in her mother and sister; Fibroids in her mother and sister; Heart attack in her mother; Hypertension in her mother; Stroke in her maternal grandmother. Otherwise pertinent FHx was reviewed and unremarkable.     Social History:  reports that she quit smoking about 20 years ago. Her smoking use included cigarettes. She has never used smokeless tobacco. She reports that she drank alcohol. She reports that she does not use drugs.  Social History     Social History Narrative   • Not on file       Medications:  Available home medication information reviewed.  Medications Prior to Admission   Medication Sig Dispense Refill Last Dose   • cholecalciferol (VITAMIN D3) 1.25 MG (13092 UT) capsule Take 1 capsule by mouth 1 (One) Time Per Week. (Patient taking differently: Take 50,000 Units by mouth 1 (One) Time Per Week. SATURDAY) 12 capsule 3 Taking   • dexamethasone (DECADRON) 4 MG tablet Take 5 tabs the night before chemo then take 2 tabs in the morning daily on days 2, 3 & 4.  Take with food. 11 tablet 5    • docusate sodium (COLACE) 250 MG capsule Take 1 capsule by mouth 2 (Two) Times a Day. 60 capsule 5 Taking   • gabapentin (NEURONTIN) 300 MG capsule Take 1 capsule by mouth 3 (Three) Times a Day. 90 capsule 3    • Insulin Glargine (BASAGLAR KWIKPEN) 100 UNIT/ML injection pen Inject 40 Units under the skin into the appropriate area as directed Every Night.  4 pen 2 Taking   • insulin lispro (HUMALOG) 100 UNIT/ML injection Inject 15 Units under the skin into the appropriate area as directed 3 (Three) Times a Day Before Meals. 13.5 mL 5 Taking   • LORazepam (ATIVAN) 1 MG tablet Take 1 tablet by mouth Every 6 (Six) Hours As Needed for Anxiety. 30 tablet 3    • MULTIPLE VITAMIN PO Multiple Vitamin   Taking   • ondansetron (ZOFRAN) 4 MG tablet Take 1 tablet by mouth Every 6 (Six) Hours As Needed for Nausea or Vomiting. 15 tablet 1 Taking   • ondansetron ODT (ZOFRAN-ODT) 8 MG disintegrating tablet Take 1 tablet by mouth Every 8 (Eight) Hours As Needed for Nausea or Vomiting. 30 tablet 5    • Polyethylene Glycol 3350 (MIRALAX PO) Take 17 g by mouth Daily.      • promethazine (PHENERGAN) 25 MG tablet Take 1 tablet by mouth Every 6 (Six) Hours As Needed for Nausea or Vomiting. 30 tablet 5    • traMADol (ULTRAM) 50 MG tablet Take 1 tablet by mouth Every 6 (Six) Hours As Needed for Moderate Pain . 30 tablet 0    • acetaminophen (TYLENOL) 325 MG tablet Take 2 tablets by mouth Every 6 (Six) Hours. 60 tablet 0 Taking   • Continuous Blood Gluc Sensor (FREESTYLE CAMMY SENSOR SYSTEM) Every 14 (Fourteen) Days. 1 each 1    • omeprazole (PrilOSEC) 20 MG capsule Take 1 capsule by mouth Daily. 90 capsule 1 Taking       Allergies   Allergen Reactions   • Bactrim [Sulfamethoxazole-Trimethoprim] Anaphylaxis   • Penicillins Hives   • Promethazine Mental Status Change       Objective   Objective     Vital Signs:   Temp:  [99.4 °F (37.4 °C)] 99.4 °F (37.4 °C)  Heart Rate:  [109-124] 112  Resp:  [16] 16  BP: (102-145)/(55-79) 135/78        Physical Exam   Constitutional: Awake, alert, lying in bed   Eyes: PERRLA, sclerae anicteric, no conjunctival injection  HENT: NCAT, mucous membranes moist  Neck: Supple, no thyromegaly, no lymphadenopathy, trachea midline  Respiratory: Clear to auscultation bilaterally, nonlabored respirations   Cardiovascular: RR, tachycardic , no murmurs appreciated,  palpable pedal pulses bilaterally  Gastrointestinal: Positive bowel sounds, soft, RLQ exquisitely tender to palpation, + voluntary guarding, negative peritoneal signs, positive psoas sign   Musculoskeletal: No bilateral ankle edema, no clubbing or cyanosis to extremities  Psychiatric: Appropriate affect, cooperative  Neurologic: Oriented x 3, strength symmetric in all extremities, Cranial Nerves grossly intact to confrontation, speech clear  Skin: open ulcer over right great toe incision, suprapubic incision covered with wound vac     Results Reviewed:  I have personally reviewed current lab and radiology data.    Results from last 7 days   Lab Units 03/17/20  2301   WBC 10*3/mm3 0.85*   HEMOGLOBIN g/dL 8.3*   HEMATOCRIT % 26.5*   PLATELETS 10*3/mm3 73*     Results from last 7 days   Lab Units 03/17/20  2301   SODIUM mmol/L 129*   POTASSIUM mmol/L 4.3   CHLORIDE mmol/L 95*   CO2 mmol/L 24.0   BUN mg/dL 23*   CREATININE mg/dL 0.98   GLUCOSE mg/dL 241*   CALCIUM mg/dL 8.5*   ALT (SGPT) U/L 9   AST (SGOT) U/L 12   LACTATE mmol/L 1.6     Estimated Creatinine Clearance: 73.7 mL/min (by C-G formula based on SCr of 0.98 mg/dL).  Brief Urine Lab Results  (Last result in the past 365 days)      Color   Clarity   Blood   Leuk Est   Nitrite   Protein   CREAT   Urine HCG        03/17/20 2345 Yellow Clear Negative Small (1+) Negative Negative             Imaging Results (Last 24 Hours)     Procedure Component Value Units Date/Time    CT Abdomen Pelvis Without Contrast [[id number]] Collected:  03/18/20 0112     Updated:  03/18/20 0114    Narrative:       CT Abdomen Pelvis WO    INDICATION:   44-year-old female with fever and lower abdominal pain today. History of endometrial cancer.    TECHNIQUE:   CT of the abdomen and pelvis without IV contrast. Coronal and sagittal reconstructions were obtained.  Radiation dose reduction techniques included automated exposure control or exposure modulation based on body size. Count of known CT  and cardiac nuc  med studies performed in previous 12 months: 3.     COMPARISON:   CT abdomen pelvis 1/30/2020    FINDINGS:  Visualized lung bases are unremarkable.    Abdomen:   The liver is within normal limits. Splenomegaly again noted. The pancreas is within normal limits. The gallbladder is normal. Both adrenal glands are normal. The kidneys are normal. Abdominal aorta normal in course and caliber. Small bowel is  unremarkable without obstruction. The appendix is enlarged, measuring 1.3 cm in diameter. There is periappendiceal inflammatory stranding. The findings appear most consistent with acute appendicitis. No drainable fluid collections. No free  intraperitoneal air. The colon is unremarkable. Ventral lower abdominal wound from recent surgery.    Pelvis:   The urinary bladder is unremarkable.  Hysterectomy. No free pelvic fluid.    No acute osseous abnormality.        Impression:         1. Acute appendicitis. No evidence of perforation or abscess.  2. Postsurgical changes from recent hysterectomy.  3. Splenomegaly.      NOTIFICATION: Critical Value/emergent results were called by telephone at the time of interpretation on 3/18/2020 1:09 AM to BENSON Hughes who verbally acknowledged these results.      Signer Name: Prasanna Zapata MD   Signed: 3/18/2020 1:12 AM   Workstation Name: [employer]    [facility]    XR Chest 1 View [[id number]] Collected:  03/18/20 0002     Updated:  03/18/20 0004    Narrative:       CR Chest 1 Vw    INDICATION:   44-year-old female with fever today. History of stage III endometrial cancer. Recent chemotherapy.     COMPARISON:    Chest 2/7/2020    FINDINGS:  Single portable AP view(s) of the chest.  The heart and mediastinal contours are normal. The lungs are clear. No pneumothorax or pleural effusion.      Impression:       No acute cardiopulmonary findings.    Signer Name: Prasanna Zapata MD   Signed: 3/18/2020 12:02 AM   Workstation Name: [employer]     [facility]             Assessment/Plan   Assessment & Plan     Active Hospital Problems    Diagnosis POA   • **Acute appendicitis [K35.80] Yes   • Neutropenia (CMS/HCC) [D70.9] Yes   • Open wound of right foot [S91.301A] Yes   • Hyponatremia [E87.1] Yes   • Stage 3 chronic kidney disease (CMS/HCC) [N18.3] Yes   • Endometrial cancer (CMS/HCC) [C54.1] Yes     Added automatically from request for surgery [id number]     • Iron deficiency anemia [D50.9] Yes   • Type 2 diabetes mellitus with diabetic polyneuropathy, with long-term current use of insulin (CMS/[facility]) [E11.42, Z79.4] Not Applicable   • Essential hypertension [I10] Yes     Ms. Chung is a 44 y.o. 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     Acute appendicitis   - CT A/P demonstrates acute appendicitis with no evidence of perforation or abscess  - consult general surgery, Dr. Myers is aware of the patient   - keep npo   - consult infectious disease   - blood cultures x 2   - start patient on Zosyn   - IVF, pain control     Neutropenia/Pancytopenia   - receiving chemo   - 2 doses of Neupogen ordered  - monitor CBC      Hyponatremia  - corrected sodium 131  - NS @ 100 cc/hr, monitor chemistry     Endometrial cancer   Wound dehiscence   - s/p exploratory laparotomy, total abdominal hysterectomy, bilateral salpingo-oophorectomy and omentectomy on 2/11/20   - she is scheduled to receive Carbo / Taxol every 21 days x 6 cycles  - consult gynecologic oncology, followed by Dr. Rubio  - wound vac in place     Right foot ulcer   - consult WOC   - neurovascular checks, fall precautions     T2DM with peripheral neuropathy  - s/p bilateral great toe amputations   - hold home hypoglycemics  - Levemir 20 units nightly and low-dose SSI  - continue Gabapentin and  Lyrica    HTN  - has not needed home antihypertensives since surgery   - blood pressure stable     CKD 3  - creatinine at baseline, monitor chemistry     Anemia   - hemoglobin stable from prior, monitor CBC  - secondary to KIKA and chemotherapy     DVT prophylaxis:  SCDs    CODE STATUS:    Code Status and Medical Interventions:   Ordered at: 03/18/20 0403     Level Of Support Discussed With:    Patient     Code Status:    CPR     Medical Interventions (Level of Support Prior to Arrest):    Full     Admission Status:  I believe this patient meets INPATIENT status due to appendicitis and pancytopenia.  I feel patient’s risk for adverse outcomes and need for care warrant INPATIENT evaluation and I predict the patient’s care encounter to likely last beyond 2 midnights.    Electronically signed by Enedina Guillen PA-C, 03/18/20, 3:06 AM.      Attending   Admission Attestation       I have seen and examined the patient, performing an independent face-to-face diagnostic evaluation with plan of care reviewed and developed with the advanced practice clinician (APC).      Brief Summary Statement:   Jeana Chung is a 44 y.o. female with PMHx of HTN, T2DM, neuropathy, retinopathy CKD, iron def anemia, retinopathy and recent diagnosis of stage III, PCOS, PTSD, and endometrial cancer. 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 The patient presented to [facility] with right lower quadrant pain and fever.  Patient underwent total abdominal hysterectomy with bilateral salpingo-oophorectomy and omentectomy on 2/11/2020 by Dr. Rubio.  She had complications of wound dehiscence and has required a wound VAC.  She states that she had her first round of chemotherapy on Tuesday, 3/10/2020.  After chemotherapy she noted generalized weakness fatigue and nausea and worsening peripheral neuropathy with right lower quadrant pain.   She states that the right lower quadrant pain became severe and she developed chills and a fever and contacted her GYN office who advised her to come to the ED for further evaluation.  Upon arrival to the ED patient was noted to have a temp of 99.4 and was tachycardic.  CBC noted that her WBC was 0.85, neutrophils 0.22, hemoglobin 8.3, platelets 73.  She also was noted to have a sodium of 129 and albumin of 3.  CT of the abdomen and pelvis noted acute appendicitis with no evidence of perforation or abscess.  The decision was made to admit patient for further evaluation and treatment.  She was started on vancomycin and Zosyn.  Dr. Myers and Dr. Rubio were both consulted and will see patient in the a.m.      Remainder of detailed HPI is as noted by APC and has been reviewed and/or edited by me for completeness.    Attending Physical Exam:  Constitutional: Awake, alert  Eyes: PERRLA, sclerae anicteric, no conjunctival injection  HENT: NCAT, mucous membranes moist  Neck: Supple, no thyromegaly, no lymphadenopathy, trachea midline  Respiratory: Clear to auscultation bilaterally, nonlabored respirations   Cardiovascular: RRR, no murmurs, rubs, or gallops, palpable pedal pulses bilaterally  Gastrointestinal: Positive bowel sounds, soft, right lower quadrant tenderness and mild guarding, nondistended  Musculoskeletal: No bilateral ankle edema, no clubbing or cyanosis to extremities  Psychiatric: Appropriate affect, cooperative  Neurologic: Oriented x 3, strength symmetric in all extremities, Cranial Nerves grossly intact to confrontation, speech clear  Skin: ulceration on right foot.       Brief Assessment/Plan :  See detailed assessment and plan developed with APC which I have reviewed and/or edited for completeness.    Electronically signed by Selma Salomon DO, 03/18/20, 4:50 AM.                  Electronically signed by Selma Salomon DO at 03/18/20 8039          Emergency Department Notes      Carlyle Bryant PA  at 03/17/20 2223     Attestation signed by Checo Perea DO at 03/18/20 0829          For this patient encounter, I reviewed the NP or PA documentation, treatment plan, and medical decision making. Checo Perea DO 3/18/2020 08:29                  Subjective   Jeana Chung is a 44 y.o. female who presents to the ED with complaints of a fever today. She reports a temperature of 101.3 degrees today. She advises that she has stage 3 endometrial cancer. On 2/11/2020 she underwent exploratory laparotomy, total abdominal hysterectomy, bilateral salpingo-oophorectomy and omentectomy for advanced endometrial carcinoma. Postoperatively she had wound dehiscence and is receiving wound care treatment through physical therapy. She reports that she had a her first chemo session was 1 week ago and she is scheduled to receive Carbo / Taxol every 21 days x 6 cycles. Today, she was seen by wound care and afterwards she felt generally weak and fatigued. She also reports right lower abdominal pain and constipation. However, she denies any nausea, vomiting, diarrhea, shortness of breath, cough, chest pain, or dysuria.  She reports her to call Dr. Rubio, oncologist, who advised her to come to the ED for further evaluation. The patient advises that she has not taken any antipyretics. However, she has been taking Miralax for her constipation. She has a history of endometrial cancer, PCOS, neuropathy, HTN, DM, CKD, anemia, GERD, and sepsis. Her past surgical history includes a total hysterectomy, bilateral toe amputations, and left eye surgery. Her PCP is Dr. Angeles. There are no other acute complaints at this time.      History provided by:  Patient  Fever   Max temp prior to arrival:  101.3  Severity:  Moderate  Onset quality:  Sudden  Duration:  1 day  Timing:  Constant  Progression:  Improving  Chronicity:  New  Relieved by:  None tried  Worsened by:  Nothing  Ineffective treatments:  None tried  Associated symptoms: no  chest pain, no cough, no diarrhea, no dysuria, no nausea and no vomiting    Risk factors: immunosuppression        Review of Systems   Constitutional: Positive for fatigue and fever.   Respiratory: Negative for cough and shortness of breath.    Cardiovascular: Negative for chest pain.   Gastrointestinal: Positive for abdominal pain (RLQ) and constipation. Negative for diarrhea, nausea and vomiting.   Genitourinary: Negative for dysuria.   Neurological: Positive for weakness (generalized).   All other systems reviewed and are negative.      Past Medical History:   Diagnosis Date   • Anemia    • Anxiety and depression    • Back pain    • Bronchitis    • Diabetes (CMS/HCC)     DX 4-5 YRS AGO, CHECKS BS 4X/DAY, LAST A1C 7.1%   • Endometrial cancer (CMS/HCC)     STAGE II   • GERD (gastroesophageal reflux disease)    • Hypertension    • MRSA (methicillin resistant staph aureus) culture positive     S/P NECROTIZING FASCITIS IN BILATERAL FEET   • Necrotizing fasciitis (CMS/HCC)    • PCOS (polycystic ovarian syndrome)    • PTSD (post-traumatic stress disorder)    • Sepsis (CMS/HCC)    • Stage 3 chronic kidney disease (CMS/HCC) 2/24/2020   • Subconjunctival hemorrhage        Allergies   Allergen Reactions   • Bactrim [Sulfamethoxazole-Trimethoprim] Anaphylaxis   • Penicillins Hives   • Promethazine Mental Status Change       Past Surgical History:   Procedure Laterality Date   • EXPLORATORY LAPAROTOMY, TOTAL ABDOMINAL HYSTERECTOMY SALPINGO OOPHORECTOMY N/A 2/10/2020    Procedure: EXPLORATORY LAPAROTOMY, TOTAL ABDOMINAL HYSTERECTOMY, BILATERAL SALPINGO-OOPHORECTOMY WITH OPTIMAL  STAGING (R-0), OMENTECTOMY;  Surgeon: Celine Rubio MD;  Location: [facility];  Service: Gynecology Oncology;  Laterality: N/A;   • EYE SURGERY Left     BLOOD VESSEL IN EYE REPAIR   • TOE AMPUTATION Left 06/2019    big toe - unhealing sore   • TOE AMPUTATION Right 2018    big toe and second toe - Necrotizing fasciitis and MRSA        Family History    Problem Relation Age of Onset   • Fibroids Mother    • Diabetes type II Mother    • Hypertension Mother    • Heart attack Mother    • Fibroids Sister         PCOS   • Diabetes type II Sister    • Dementia Maternal Grandmother    • Stroke Maternal Grandmother        Social History     Socioeconomic History   • Marital status:      Spouse name: Not on file   • Number of children: 1   • Years of education: Not on file   • Highest education level: Not on file   Tobacco Use   • Smoking status: Former Smoker     Types: Cigarettes     Last attempt to quit: 2000     Years since quittin.2   • Smokeless tobacco: Never Used   • Tobacco comment: social MAYBE 1 CIG/DAY   Substance and Sexual Activity   • Alcohol use: Not Currently     Frequency: Monthly or less     Drinks per session: 1 or 2   • Drug use: Never   • Sexual activity: Not Currently         Objective   Physical Exam   Constitutional: She is oriented to person, place, and time. She appears well-developed and well-nourished.   HENT:   Head: Normocephalic and atraumatic.   Nose: Nose normal.   Eyes: Conjunctivae are normal. No scleral icterus.   Neck: Normal range of motion. Neck supple.   Cardiovascular: Regular rhythm and normal heart sounds. Tachycardia present.   No murmur heard.  Tachycardic.   Pulmonary/Chest: Effort normal and breath sounds normal. No respiratory distress.   Clear to auscultation.   Abdominal: Soft. She exhibits no distension. There is tenderness in the right lower quadrant.   Right lower quadrant tenderness to palpation.   Musculoskeletal: Normal range of motion.   Right toe amputation. Right foot covered with bandage applied by wound care earlier today. No signs of infections surrounding the bandage. Wound vac in place, healing well, no surrounding erythema or signs of infection.   Neurological: She is alert and oriented to person, place, and time.   Skin: Skin is warm and dry. No erythema.   Right toe amputation. Right foot  covered with bandage applied by wound care earlier today. No signs of infections surrounding the bandage. Wound vac in place, healing well, no surrounding erythema or signs of infection.   Psychiatric: She has a normal mood and affect. Her behavior is normal.   Nursing note and vitals reviewed.      Procedures        ED Course  ED Course as of Mar 18 0348   Tue Mar 17, 2020   6141 Patient is s/p EXPLORATORY LAPAROTOMY, TOTAL ABDOMINAL HYSTERECTOMY, BILATERAL SALPINGO-OOPHORECTOMY WITH OPTIMAL  STAGING (R-0), OMENTECTOMY on 2/10/2020    [JG]   Wed Mar 18, 2020   0011 Sodium(!): 129 [JG]   0346 Patient presents to the emergency room with complaints of right lower quadrant abdominal pain, fever and general malaise.  Patient s/p exploratory laparotomy, total abdominal hysterectomy, bilateral salpingo-oophorectomy and omentectomy for advanced endometrial carcinoma.  Patient also with wound dehiscence on abdominal incision with wound VAC in place.  WBC of 0.85 and hyponatremic with sodium of 129, patient responded well to IV fluids and pain medication while in the emergency department.  CT scan of the abdomen and pelvis revealed acute appendicitis.General Surgeon Dr. Myers was initially consulted and suggested patient likely did not have an acute appendicitis but he would review imaging and instructed to contact the patient's gynecology oncologist Dr. Joanne Rubio was contacted and shared she would review the case and discuss it with Dr. Myers. She stated she would order Neupogen for the patient's low WBC and admit to hospitalist.  Patient received 1 dose of Zosyn while in the ED, hospitalist was consulted and agreeable to accept patient to their service.  Patient and family agreeable to plan of care and hospital admission. Following disposition, I was contacted again by Dr. Rubio who spoke with Dr. Myers who was in agreement of the diagnosis of acute appendicitis and would see the patient.  At time of  admission, patient was resting comfortably in bed and in no acute distress, vital signs stable.    [JG]      ED Course User Index  [JG] Carlyle Bryant, PA     Recent Results (from the past 24 hour(s))   Comprehensive Metabolic Panel    Collection Time: 03/17/20 11:01 PM   Result Value Ref Range    Glucose 241 (H) 65 - 99 mg/dL    BUN 23 (H) 6 - 20 mg/dL    Creatinine 0.98 0.57 - 1.00 mg/dL    Sodium 129 (L) 136 - 145 mmol/L    Potassium 4.3 3.5 - 5.2 mmol/L    Chloride 95 (L) 98 - 107 mmol/L    CO2 24.0 22.0 - 29.0 mmol/L    Calcium 8.5 (L) 8.6 - 10.5 mg/dL    Total Protein 6.7 6.0 - 8.5 g/dL    Albumin 3.00 (L) 3.50 - 5.20 g/dL    ALT (SGPT) 9 1 - 33 U/L    AST (SGOT) 12 1 - 32 U/L    Alkaline Phosphatase 49 39 - 117 U/L    Total Bilirubin 0.7 0.2 - 1.2 mg/dL    eGFR Non African Amer 61 >60 mL/min/1.73    Globulin 3.7 gm/dL    A/G Ratio 0.8 g/dL    BUN/Creatinine Ratio 23.5 7.0 - 25.0    Anion Gap 10.0 5.0 - 15.0 mmol/L   CBC Auto Differential    Collection Time: 03/17/20 11:01 PM   Result Value Ref Range    WBC 0.85 (C) 3.40 - 10.80 10*3/mm3    RBC 3.29 (L) 3.77 - 5.28 10*6/mm3    Hemoglobin 8.3 (L) 12.0 - 15.9 g/dL    Hematocrit 26.5 (L) 34.0 - 46.6 %    MCV 80.5 79.0 - 97.0 fL    MCH 25.2 (L) 26.6 - 33.0 pg    MCHC 31.3 (L) 31.5 - 35.7 g/dL    RDW 14.1 12.3 - 15.4 %    RDW-SD 41.5 37.0 - 54.0 fl    MPV 11.4 6.0 - 12.0 fL    Platelets 73 (L) 140 - 450 10*3/mm3    Neutrophil % 25.8 (L) 42.7 - 76.0 %    Lymphocyte % 62.4 (H) 19.6 - 45.3 %    Monocyte % 7.1 5.0 - 12.0 %    Eosinophil % 3.5 0.3 - 6.2 %    Basophil % 0.0 0.0 - 1.5 %    Immature Grans % 1.2 (H) 0.0 - 0.5 %    Neutrophils, Absolute 0.22 (L) 1.70 - 7.00 10*3/mm3    Lymphocytes, Absolute 0.53 (L) 0.70 - 3.10 10*3/mm3    Monocytes, Absolute 0.06 (L) 0.10 - 0.90 10*3/mm3    Eosinophils, Absolute 0.03 0.00 - 0.40 10*3/mm3    Basophils, Absolute 0.00 0.00 - 0.20 10*3/mm3    Immature Grans, Absolute 0.01 0.00 - 0.05 10*3/mm3    nRBC 0.0 0.0 - 0.2 /100 WBC    Lactic Acid, Plasma    Collection Time: 03/17/20 11:01 PM   Result Value Ref Range    Lactate 1.6 0.5 - 2.0 mmol/L   Lipase    Collection Time: 03/17/20 11:01 PM   Result Value Ref Range    Lipase 43 13 - 60 U/L   Scan Slide    Collection Time: 03/17/20 11:01 PM   Result Value Ref Range    RBC Morphology Normal Normal    WBC Morphology Normal Normal    Platelet Morphology Normal Normal   Urinalysis With Microscopic If Indicated (No Culture) - Urine, Clean Catch    Collection Time: 03/17/20 11:45 PM   Result Value Ref Range    Color, UA Yellow Yellow, Straw    Appearance, UA Clear Clear    pH, UA <=5.0 5.0 - 8.0    Specific Gravity, UA 1.021 1.001 - 1.030    Glucose,  mg/dL (2+) (A) Negative    Ketones, UA Negative Negative    Bilirubin, UA Negative Negative    Blood, UA Negative Negative    Protein, UA Negative Negative    Leuk Esterase, UA Small (1+) (A) Negative    Nitrite, UA Negative Negative    Urobilinogen, UA 1.0 E.U./dL 0.2 - 1.0 E.U./dL   Urinalysis, Microscopic Only - Urine, Clean Catch    Collection Time: 03/17/20 11:45 PM   Result Value Ref Range    RBC, UA 0-2 None Seen, 0-2 /HPF    WBC, UA 3-5 (A) None Seen, 0-2 /HPF    Bacteria, UA None Seen None Seen, Trace /HPF    Squamous Epithelial Cells, UA 0-2 None Seen, 0-2 /HPF    Hyaline Casts, UA 0-6 0 - 6 /LPF    Methodology Automated Microscopy      Note: In addition to lab results from this visit, the labs listed above may include labs taken at another facility or during a different encounter within the last 24 hours. Please correlate lab times with ED admission and discharge times for further clarification of the services performed during this visit.    CT Abdomen Pelvis Without Contrast   Final Result      1. Acute appendicitis. No evidence of perforation or abscess.   2. Postsurgical changes from recent hysterectomy.   3. Splenomegaly.         NOTIFICATION: Critical Value/emergent results were called by telephone at the time of interpretation  on 3/18/2020 1:09 AM to BENSON Hughes who verbally acknowledged these results.         Signer Name: Prasanna Zapata MD    Signed: 3/18/2020 1:12 AM    Workstation Name: [facility]     Radiology [facility]      XR Chest 1 View   Final Result   No acute cardiopulmonary findings.      Signer Name: Prasanna Zapata MD    Signed: 3/18/2020 12:02 AM    Workstation Name: [facility]     [facility]        Vitals:    03/18/20 0200 03/18/20 0215 03/18/20 0230 03/18/20 0245   BP: 119/73 128/74 109/63 133/75   Pulse: 110 113 112 112   Resp:       Temp:       TempSrc:       SpO2: 95% 98% 98% 98%   Weight:       Height:         Medications   sodium chloride 0.9 % flush 10 mL (has no administration in time range)   filgrastim (NEUPOGEN) injection 480 mcg (has no administration in time range)   filgrastim (NEUPOGEN) injection 480 mcg (has no administration in time range)   lactated ringers infusion (125 mL/hr Intravenous New Bag 3/18/20 0240)   morphine injection 2 mg (2 mg Intravenous Given 3/18/20 0240)   ondansetron (ZOFRAN) injection 4 mg (has no administration in time range)   piperacillin-tazobactam (ZOSYN) 3.375 g in iso-osmotic dextrose 50 ml (premix) (has no administration in time range)   sodium chloride 0.9 % bolus 1,000 mL (0 mL Intravenous Stopped 3/18/20 0140)   Morphine sulfate (PF) injection 4 mg (4 mg Intravenous Given 3/17/20 5179)   piperacillin-tazobactam (ZOSYN) 4.5 g in iso-osmotic dextrose 100 mL IVPB (premix) (4.5 g Intravenous New Bag 3/18/20 7011)     ECG/EMG Results (last 24 hours)     ** No results found for the last 24 hours. **        No orders to display                   MDM  Number of Diagnoses or Management Options  Acute appendicitis, unspecified acute appendicitis type: new and requires workup  Fever, unspecified fever cause: new and requires workup  Right lower quadrant abdominal pain: new and requires workup     Amount and/or Complexity of Data  Reviewed  Clinical lab tests: reviewed  Tests in the radiology section of CPT®:  reviewed  Decide to obtain previous medical records or to obtain history from someone other than the patient: yes    Risk of Complications, Morbidity, and/or Mortality  Presenting problems: high  Diagnostic procedures: moderate  Management options: moderate    Patient Progress  Patient progress: stable      Final diagnoses:   Acute appendicitis, unspecified acute appendicitis type   Right lower quadrant abdominal pain   Fever, unspecified fever cause       Documentation assistance provided by scribchago Ray.  Information recorded by the scribe was done at my direction and has been verified and validated by me.     Юлия Ray  03/17/20 2244       Carlyle Bryant PA  03/18/20 0349      Electronically signed by Checo Perea DO at 03/18/20 0827       Vital Signs (last day)     Date/Time   Temp   Temp src   Pulse   Resp   BP   Patient Position   SpO2    03/18/20 1120   99.5 (37.5)   Oral   108   18   102/65   Lying   --    03/18/20 0732   98.8 (37.1)   Oral   108   18   101/53   Lying   94    03/18/20 0633   --   --   107   --   --   --   91    03/18/20 0630   --   --   108   --   --   --   92    03/18/20 0600   --   --   107   --   --   --   94    03/18/20 0530   --   --   --   --   --   --   96    03/18/20 0500   --   --   105   --   --   --   96    03/18/20 0443   99 (37.2)   Oral   110   18   102/63   Sitting   98    03/18/20 0430   --   --   --   --   --   --   100    03/18/20 0300   --   --   110   --   --   --   98    03/18/20 0245   --   --   112   --   133/75   --   98    03/18/20 0230   --   --   112   --   109/63   --   98    03/18/20 0215   --   --   113   --   128/74   --   98    03/18/20 0200   --   --   110   --   119/73   --   95    03/18/20 0145   --   --   112   --   135/78   --   98    03/18/20 0130   --   --   109   --   116/68   --   95    03/18/20 0115   --   --   111   --   110/58   --   97    03/18/20 0100    --   --   112   --   102/55   --   97    03/18/20 0030   --   --   112   --   110/59   --   95    03/18/20 0015   --   --   112   --   104/64   --   98    03/18/20 0000   --   --   --   --   130/63   --   98    03/17/20 2348   --   --   116   --   133/68   --   99    03/17/20 2150   99.4 (37.4)   Oral   (!) 124   16   145/79   Sitting   99                Facility-Administered Medications as of 3/18/2020   Medication Dose Route Frequency Provider Last Rate Last Dose   • acetaminophen (TYLENOL) tablet 650 mg  650 mg Oral Q6H Enedina Guillen PA-C   650 mg at 03/18/20 1214   • cetirizine (zyrTEC) tablet 10 mg  10 mg Oral Daily Enedina Guillen PA-C   10 mg at 03/18/20 0905   • dextrose (D50W) 25 g/ 50mL Intravenous Solution 25 g  25 g Intravenous Q15 Min PRN Enedina Guillen PA-C       • dextrose (GLUTOSE) oral gel 15 g  15 g Oral Q15 Min PRN Enedina Guillen PA-C       • docusate sodium (COLACE) capsule 200 mg  200 mg Oral BID Enedina Guillen PA-C   200 mg at 03/18/20 0905   • filgrastim (NEUPOGEN) injection 480 mcg  480 mcg Subcutaneous Q PM Celine Rubio MD       • [COMPLETED] filgrastim (NEUPOGEN) injection 480 mcg  480 mcg Subcutaneous Once Praful Myers MD   480 mcg at 03/18/20 0429   • gabapentin (NEURONTIN) capsule 300 mg  300 mg Oral TID Selma Salomon DO   300 mg at 03/18/20 0905   • glucagon (human recombinant) (GLUCAGEN DIAGNOSTIC) injection 1 mg  1 mg Subcutaneous Q15 Min PRN Enedina Guillen PA-C       • hydrOXYzine (ATARAX) tablet 25 mg  25 mg Oral TID PRN Enedina Guillen PA-C       • insulin detemir (LEVEMIR) injection 20 Units  20 Units Subcutaneous Nightly Enedina Guillen PA-C       • insulin lispro (humaLOG) injection 0-7 Units  0-7 Units Subcutaneous 4x Daily With Meals & Nightly Enedina Guillen PA-C   2 Units at 03/18/20 0906   • LORazepam (ATIVAN) tablet 1 mg  1 mg Oral Q6H PRN Selma Salomon DO       • melatonin tablet 5 mg  5 mg Oral Nightly PRN Enedina Guillen PA-C       •  Morphine sulfate (PF) injection 3 mg  3 mg Intravenous Q2H PRN Selma Salomon DO   3 mg at 03/18/20 1219   • [COMPLETED] Morphine sulfate (PF) injection 4 mg  4 mg Intravenous Once Checo Perea DO   4 mg at 03/17/20 2339   • ondansetron (ZOFRAN) tablet 4 mg  4 mg Oral Q6H PRN Enedina Guillen PA-C        Or   • ondansetron (ZOFRAN) injection 4 mg  4 mg Intravenous Q6H PRN Enedina Guillen PA-C   4 mg at 03/18/20 1219   • pantoprazole (PROTONIX) EC tablet 40 mg  40 mg Oral QAM Enedina Guillen PA-C       • piperacillin-tazobactam (ZOSYN) 3.375 g in iso-osmotic dextrose 50 ml (premix)  3.375 g Intravenous Q8H Praful Myers MD   Stopped at 03/18/20 1300   • [COMPLETED] piperacillin-tazobactam (ZOSYN) 4.5 g in iso-osmotic dextrose 100 mL IVPB (premix)  4.5 g Intravenous Once Carlyle Bryant PA   Stopped at 03/18/20 0443   • polyethylene glycol 3350 powder (packet)  17 g Oral Daily PRN Enedina Guillen PA-C       • [COMPLETED] sodium chloride 0.9 % bolus 1,000 mL  1,000 mL Intravenous Once Carlyle Bryant PA   Stopped at 03/18/20 0140   • sodium chloride 0.9 % flush 10 mL  10 mL Intravenous PRN Carlyle Bryant PA       • sodium chloride 0.9 % flush 10 mL  10 mL Intravenous Q12H Enedina Guillen PA-C   10 mL at 03/18/20 0905   • sodium chloride 0.9 % flush 10 mL  10 mL Intravenous PRN Enedina Guillen PA-C       • sodium chloride 0.9 % infusion  100 mL/hr Intravenous Continuous Selma Salomon  mL/hr at 03/18/20 0632 100 mL/hr at 03/18/20 0632   • traMADol (ULTRAM) tablet 50 mg  50 mg Oral Q6H PRN Selma Salomon DO           Lab Results (last 24 hours)     Procedure Component Value Units Date/Time    POC Glucose Once [[id number]]  (Abnormal) Collected:  03/18/20 1122    Specimen:  Blood Updated:  03/18/20 1125     Glucose 133 mg/dL     CBC Auto Differential [[id number]]  (Abnormal) Collected:  03/18/20 0605    Specimen:  Blood Updated:  03/18/20 0804     WBC 0.76 10*3/mm3      RBC 3.10 10*6/mm3       Hemoglobin 7.9 g/dL      Hematocrit 25.4 %      MCV 81.9 fL      MCH 25.5 pg      MCHC 31.1 g/dL      RDW 14.2 %      RDW-SD 41.9 fl      MPV 12.7 fL      Platelets 77 10*3/mm3     Manual Differential [[id number]]  (Abnormal) Collected:  03/18/20 0605    Specimen:  Blood Updated:  03/18/20 0804     Neutrophil % 17.0 %      Lymphocyte % 69.0 %      Monocyte % 6.0 %      Eosinophil % 2.0 %      Basophil % 0.0 %      Atypical Lymphocyte % 6.0 %      Neutrophils Absolute 0.13 10*3/mm3      Lymphocytes Absolute 0.52 10*3/mm3      Monocytes Absolute 0.05 10*3/mm3      Eosinophils Absolute 0.02 10*3/mm3      Basophils Absolute 0.00 10*3/mm3      Hypochromia Slight/1+     Ovalocytes Slight/1+     Smudge Cells Slight/1+     Platelet Morphology Normal    POC Glucose Once [[id number]]  (Abnormal) Collected:  03/18/20 0733    Specimen:  Blood Updated:  03/18/20 0735     Glucose 166 mg/dL     Basic Metabolic Panel [[id number]]  (Abnormal) Collected:  03/18/20 0605    Specimen:  Blood Updated:  03/18/20 0659     Glucose 170 mg/dL      BUN 18 mg/dL      Creatinine 0.77 mg/dL      Sodium 130 mmol/L      Potassium 3.8 mmol/L      Chloride 98 mmol/L      CO2 22.0 mmol/L      Calcium 8.2 mg/dL      eGFR Non African Amer 81 mL/min/1.73      BUN/Creatinine Ratio 23.4     Anion Gap 10.0 mmol/L     Narrative:       GFR Normal >60  Chronic Kidney Disease <60  Kidney Failure <15      Protime-INR [[id number]]  (Abnormal) Collected:  03/18/20 0605    Specimen:  Blood Updated:  03/18/20 0644     Protime 14.1 Seconds      INR 1.11    aPTT [[id number]]  (Abnormal) Collected:  03/18/20 0605    Specimen:  Blood Updated:  03/18/20 0644     PTT 44.4 seconds     Narrative:       PTT = The equivalent PTT values for the therapeutic range of heparin levels at 0.3 to 0.5 U/ml are 55 to 70 seconds.    Blood Culture - Blood, Arm, Left [[id number]] Collected:  03/18/20 0322    Specimen:  Blood from Arm, Left Updated:  03/18/20 0522    Blood Culture - Blood, Arm,  Right [[id number]] Collected:  03/18/20 0322    Specimen:  Blood from Arm, Right Updated:  03/18/20 0522    Urinalysis, Microscopic Only - Urine, Clean Catch [[id number]]  (Abnormal) Collected:  03/17/20 2345    Specimen:  Urine, Clean Catch Updated:  03/18/20 0024     RBC, UA 0-2 /HPF      WBC, UA 3-5 /HPF      Bacteria, UA None Seen /HPF      Squamous Epithelial Cells, UA 0-2 /HPF      Hyaline Casts, UA 0-6 /LPF      Methodology Automated Microscopy    Urinalysis With Microscopic If Indicated (No Culture) - Urine, Clean Catch [[id number]]  (Abnormal) Collected:  03/17/20 2345    Specimen:  Urine, Clean Catch Updated:  03/18/20 0024     Color, UA Yellow     Appearance, UA Clear     pH, UA <=5.0     Specific Gravity, UA 1.021     Glucose,  mg/dL (2+)     Ketones, UA Negative     Bilirubin, UA Negative     Blood, UA Negative     Protein, UA Negative     Leuk Esterase, UA Small (1+)     Nitrite, UA Negative     Urobilinogen, UA 1.0 E.U./dL    CBC & Differential [[id number]] Collected:  03/17/20 2301    Specimen:  Blood Updated:  03/17/20 2348    Narrative:       The following orders were created for panel order CBC & Differential.  Procedure                               Abnormality         Status                     ---------                               -----------         ------                     CBC Auto Differential[[id number]]        Abnormal            Final result                 Please view results for these tests on the individual orders.    CBC Auto Differential [[id number]]  (Abnormal) Collected:  03/17/20 2301    Specimen:  Blood Updated:  03/17/20 2348     WBC 0.85 10*3/mm3      RBC 3.29 10*6/mm3      Hemoglobin 8.3 g/dL      Hematocrit 26.5 %      MCV 80.5 fL      MCH 25.2 pg      MCHC 31.3 g/dL      RDW 14.1 %      RDW-SD 41.5 fl      MPV 11.4 fL      Platelets 73 10*3/mm3      Neutrophil % 25.8 %      Lymphocyte % 62.4 %      Monocyte % 7.1 %      Eosinophil % 3.5 %      Basophil % 0.0 %       Immature Grans % 1.2 %      Neutrophils, Absolute 0.22 10*3/mm3      Lymphocytes, Absolute 0.53 10*3/mm3      Monocytes, Absolute 0.06 10*3/mm3      Eosinophils, Absolute 0.03 10*3/mm3      Basophils, Absolute 0.00 10*3/mm3      Immature Grans, Absolute 0.01 10*3/mm3      nRBC 0.0 /100 WBC     Narrative:       Appended report. These results have been appended to a previously verified report.    Scan Slide [[id number]]  (Normal) Collected:  03/17/20 2301    Specimen:  Blood Updated:  03/17/20 2348     RBC Morphology Normal     WBC Morphology Normal     Platelet Morphology Normal    Comprehensive Metabolic Panel [[id number]]  (Abnormal) Collected:  03/17/20 2301    Specimen:  Blood Updated:  03/17/20 2332     Glucose 241 mg/dL      BUN 23 mg/dL      Creatinine 0.98 mg/dL      Sodium 129 mmol/L      Potassium 4.3 mmol/L      Chloride 95 mmol/L      CO2 24.0 mmol/L      Calcium 8.5 mg/dL      Total Protein 6.7 g/dL      Albumin 3.00 g/dL      ALT (SGPT) 9 U/L      AST (SGOT) 12 U/L      Alkaline Phosphatase 49 U/L      Total Bilirubin 0.7 mg/dL      eGFR Non African Amer 61 mL/min/1.73      Globulin 3.7 gm/dL      A/G Ratio 0.8 g/dL      BUN/Creatinine Ratio 23.5     Anion Gap 10.0 mmol/L     Narrative:       GFR Normal >60  Chronic Kidney Disease <60  Kidney Failure <15      Lipase [[id number]]  (Normal) Collected:  03/17/20 2301    Specimen:  Blood Updated:  03/17/20 2332     Lipase 43 U/L     Lactic Acid, Plasma [[id number]]  (Normal) Collected:  03/17/20 2301    Specimen:  Blood Updated:  03/17/20 2330     Lactate 1.6 mmol/L      Comment: Falsely depressed results may occur on samples drawn from patients receiving N-Acetylcysteine (NAC) or Metamizole.           Imaging Results (Last 24 Hours)     Procedure Component Value Units Date/Time    CT Abdomen Pelvis Without Contrast [[id number]] Collected:  03/18/20 0112     Updated:  03/18/20 0114    Narrative:       CT Abdomen Pelvis WO    INDICATION:   44-year-old female with  fever and lower abdominal pain today. History of endometrial cancer.    TECHNIQUE:   CT of the abdomen and pelvis without IV contrast. Coronal and sagittal reconstructions were obtained.  Radiation dose reduction techniques included automated exposure control or exposure modulation based on body size. Count of known CT and cardiac nuc  med studies performed in previous 12 months: 3.     COMPARISON:   CT abdomen pelvis 1/30/2020    FINDINGS:  Visualized lung bases are unremarkable.    Abdomen:   The liver is within normal limits. Splenomegaly again noted. The pancreas is within normal limits. The gallbladder is normal. Both adrenal glands are normal. The kidneys are normal. Abdominal aorta normal in course and caliber. Small bowel is  unremarkable without obstruction. The appendix is enlarged, measuring 1.3 cm in diameter. There is periappendiceal inflammatory stranding. The findings appear most consistent with acute appendicitis. No drainable fluid collections. No free  intraperitoneal air. The colon is unremarkable. Ventral lower abdominal wound from recent surgery.    Pelvis:   The urinary bladder is unremarkable.  Hysterectomy. No free pelvic fluid.    No acute osseous abnormality.        Impression:         1. Acute appendicitis. No evidence of perforation or abscess.  2. Postsurgical changes from recent hysterectomy.  3. Splenomegaly.      NOTIFICATION: Critical Value/emergent results were called by telephone at the time of interpretation on 3/18/2020 1:09 AM to BENSON Hughes who verbally acknowledged these results.      Signer Name: Prasanna Zapata MD   Signed: 3/18/2020 1:12 AM   Workstation Name: [email or web address]    [facility]    XR Chest 1 View [[id number]] Collected:  03/18/20 0002     Updated:  03/18/20 0004    Narrative:       CR Chest 1 Vw    INDICATION:   44-year-old female with fever today. History of stage III endometrial cancer. Recent chemotherapy.     COMPARISON:    Chest  2/7/2020    FINDINGS:  Single portable AP view(s) of the chest.  The heart and mediastinal contours are normal. The lungs are clear. No pneumothorax or pleural effusion.      Impression:       No acute cardiopulmonary findings.    Signer Name: Prasanna Zapata MD   Signed: 3/18/2020 12:02 AM   Workstation Name: [email or web address]    [facility]        Physician Progress Notes (last 24 hours) (Notes from 03/17/20 1505 through 03/18/20 1505)    No notes of this type exist for this encounter.            Consult Notes (last 24 hours) (Notes from 03/17/20 1505 through 03/18/20 1505)      Praful Myers MD at 03/18/20 1202      Consult Orders    1. Inpatient General Surgery Consult [[id number]] ordered by Enedina Guillen PA-C at 03/18/20 0403                General Surgery Consultation Note    Date of Service: 3/18/2020  Jeana Chung  [id number]  1975      Referring Provider: Lakhwinder Mix MD    Location of Consult: ER (epic down unable to dictate/enter or notes/orders)     Reason for Consultation: Abdominal pain       History of Present Illness:  I am seeing, Jeana Chung, in consultation for Lakhwinder Mix MD regarding abdominal pain.  44-year-old lady presents with worsening sharp abdominal pain which is now localized in the right lower quadrant, 8 out of 10 in intensity.  Recent hysterectomy for endometrial cancer by Dr. Celine Tolentino, midline wound VAC in place.  She admits to fever, nausea, abdominal pain, and change in bowel habits.  There are no other significant modifying factors nor associated symptoms.  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    Problem List Items Addressed This Visit        Digestive    * (Principal) Acute appendicitis - Primary      Other Visit Diagnoses     Right lower quadrant abdominal pain        Fever, unspecified fever cause              Past Medical History:   Diagnosis Date   •  Anemia    • Anxiety and depression    • Back pain    • Bronchitis    • Diabetes (CMS/HCC)     DX 4-5 YRS AGO, CHECKS BS 4X/DAY, LAST A1C 7.1%   • Endometrial cancer (CMS/HCC)     STAGE II   • GERD (gastroesophageal reflux disease)    • Hypertension    • MRSA (methicillin resistant staph aureus) culture positive     S/P NECROTIZING FASCITIS IN BILATERAL FEET   • Necrotizing fasciitis (CMS/HCC)    • PCOS (polycystic ovarian syndrome)    • PTSD (post-traumatic stress disorder)    • Retinopathy 3/18/2020   • Sepsis (CMS/HCC)    • Stage 3 chronic kidney disease (CMS/HCC) 2/24/2020   • Subconjunctival hemorrhage        Past Surgical History:   • EXPLORATORY LAPAROTOMY, TOTAL ABDOMINAL HYSTERECTOMY SALPINGO OOPHORECTOMY    Procedure: EXPLORATORY LAPAROTOMY, TOTAL ABDOMINAL HYSTERECTOMY, BILATERAL SALPINGO-OOPHORECTOMY WITH OPTIMAL  STAGING (R-0), OMENTECTOMY;  Surgeon: Celine Rubio MD;  Location: [facility];  Service: Gynecology Oncology;  Laterality: N/A;   • EYE SURGERY    BLOOD VESSEL IN EYE REPAIR   • TOE AMPUTATION    big toe - unhealing sore   • TOE AMPUTATION    big toe and second toe - Necrotizing fasciitis and MRSA        Allergies   Allergen Reactions   • Bactrim [Sulfamethoxazole-Trimethoprim] Anaphylaxis   • Penicillins Hives   • Promethazine Mental Status Change       No current facility-administered medications on file prior to encounter.      Current Outpatient Medications on File Prior to Encounter   Medication Sig Dispense Refill   • cholecalciferol (VITAMIN D3) 1.25 MG (93807 UT) capsule Take 1 capsule by mouth 1 (One) Time Per Week. (Patient taking differently: Take 50,000 Units by mouth 1 (One) Time Per Week. SATURDAY) 12 capsule 3   • dexamethasone (DECADRON) 4 MG tablet Take 5 tabs the night before chemo then take 2 tabs in the morning daily on days 2, 3 & 4.  Take with food. 11 tablet 5   • docusate sodium (COLACE) 250 MG capsule Take 1 capsule by mouth 2 (Two) Times a Day. 60 capsule 5   •  gabapentin (NEURONTIN) 300 MG capsule Take 1 capsule by mouth 3 (Three) Times a Day. 90 capsule 3   • Insulin Glargine (BASAGLAR KWIKPEN) 100 UNIT/ML injection pen Inject 40 Units under the skin into the appropriate area as directed Every Night. 4 pen 2   • insulin lispro (HUMALOG) 100 UNIT/ML injection Inject 15 Units under the skin into the appropriate area as directed 3 (Three) Times a Day Before Meals. 13.5 mL 5   • LORazepam (ATIVAN) 1 MG tablet Take 1 tablet by mouth Every 6 (Six) Hours As Needed for Anxiety. 30 tablet 3   • MULTIPLE VITAMIN PO Multiple Vitamin     • ondansetron (ZOFRAN) 4 MG tablet Take 1 tablet by mouth Every 6 (Six) Hours As Needed for Nausea or Vomiting. 15 tablet 1   • ondansetron ODT (ZOFRAN-ODT) 8 MG disintegrating tablet Take 1 tablet by mouth Every 8 (Eight) Hours As Needed for Nausea or Vomiting. 30 tablet 5   • Polyethylene Glycol 3350 (MIRALAX PO) Take 17 g by mouth Daily.     • promethazine (PHENERGAN) 25 MG tablet Take 1 tablet by mouth Every 6 (Six) Hours As Needed for Nausea or Vomiting. 30 tablet 5   • traMADol (ULTRAM) 50 MG tablet Take 1 tablet by mouth Every 6 (Six) Hours As Needed for Moderate Pain . 30 tablet 0   • acetaminophen (TYLENOL) 325 MG tablet Take 2 tablets by mouth Every 6 (Six) Hours. 60 tablet 0   • Continuous Blood Gluc Sensor (FREESTYLE CAMMY SENSOR SYSTEM) Every 14 (Fourteen) Days. 1 each 1   • omeprazole (PrilOSEC) 20 MG capsule Take 1 capsule by mouth Daily. 90 capsule 1   • [DISCONTINUED] apixaban (ELIQUIS) 5 MG tablet tablet Take 5 mg by mouth 2 (Two) Times a Day.     • [DISCONTINUED] hydrOXYzine pamoate (VISTARIL) 25 MG capsule Take 1 capsule by mouth 3 (Three) Times a Day As Needed for Itching. 30 capsule 1   • [DISCONTINUED] loratadine (CLARITIN) 10 MG tablet Take 1 tab the night before chemo then take 1 tab the morning of chemo. 12 tablet 1   • [DISCONTINUED] polyethylene glycol (MIRALAX) powder powder Mix entire bottle of miralax with 2 (32 ounce)  bottle of gatorade. Chill. Start drinking at 12 pm and finish by 1 pm. 1 each 0   • [DISCONTINUED] pregabalin (LYRICA) 100 MG capsule Take 1 capsule by mouth 3 (Three) Times a Day. 90 capsule 2         Current Facility-Administered Medications:   •  acetaminophen (TYLENOL) tablet 650 mg, 650 mg, Oral, Q6H, Enedina Guillen PA-C  •  cetirizine (zyrTEC) tablet 10 mg, 10 mg, Oral, Daily, Enedina Guillen PA-C, 10 mg at 03/18/20 0905  •  dextrose (D50W) 25 g/ 50mL Intravenous Solution 25 g, 25 g, Intravenous, Q15 Min PRN, Enedina Guillen PA-C  •  dextrose (GLUTOSE) oral gel 15 g, 15 g, Oral, Q15 Min PRN, Enedina Guillen PA-C  •  docusate sodium (COLACE) capsule 200 mg, 200 mg, Oral, BID, Enedina Guillen PA-C, 200 mg at 03/18/20 0905  •  filgrastim (NEUPOGEN) injection 480 mcg, 480 mcg, Subcutaneous, Q PM, Celine Rubio MD  •  gabapentin (NEURONTIN) capsule 300 mg, 300 mg, Oral, TID, Selma Salomon DO, 300 mg at 03/18/20 0905  •  glucagon (human recombinant) (GLUCAGEN DIAGNOSTIC) injection 1 mg, 1 mg, Subcutaneous, Q15 Min PRN, Enedina Guillen PA-C  •  hydrOXYzine (ATARAX) tablet 25 mg, 25 mg, Oral, TID PRN, Enedina Guillen PA-C  •  insulin detemir (LEVEMIR) injection 20 Units, 20 Units, Subcutaneous, Nightly, Enedina Guillen PA-C  •  insulin lispro (humaLOG) injection 0-7 Units, 0-7 Units, Subcutaneous, 4x Daily With Meals & Nightly, Enedina Guillen PA-C, 2 Units at 03/18/20 0906  •  LORazepam (ATIVAN) tablet 1 mg, 1 mg, Oral, Q6H PRN, Selma Salomon DO  •  melatonin tablet 5 mg, 5 mg, Oral, Nightly PRN, Enedina Guillen PA-C  •  Morphine sulfate (PF) injection 3 mg, 3 mg, Intravenous, Q2H PRN, Selma Salomon DO, 3 mg at 03/18/20 0906  •  ondansetron (ZOFRAN) tablet 4 mg, 4 mg, Oral, Q6H PRN **OR** ondansetron (ZOFRAN) injection 4 mg, 4 mg, Intravenous, Q6H PRN, Enedina Guillen PA-C  •  pantoprazole (PROTONIX) EC tablet 40 mg, 40 mg, Oral, QAM, Enedina Guillen PA-C  •  piperacillin-tazobactam (ZOSYN) 3.375  g in iso-osmotic dextrose 50 ml (premix), 3.375 g, Intravenous, Q8H, Praful Myers MD, 3.375 g at 20 09  •  polyethylene glycol 3350 powder (packet), 17 g, Oral, Daily PRN, Enedina Guillen PA-C  •  [COMPLETED] Insert peripheral IV, , , Once **AND** sodium chloride 0.9 % flush 10 mL, 10 mL, Intravenous, PRN, Carlyle Bryant PA  •  sodium chloride 0.9 % flush 10 mL, 10 mL, Intravenous, Q12H, Enedina Guillen PA-C, 10 mL at 20 09  •  sodium chloride 0.9 % flush 10 mL, 10 mL, Intravenous, PRN, Enedina Guillen PA-C  •  sodium chloride 0.9 % infusion, 100 mL/hr, Intravenous, Continuous, Selma Salomon DO, Last Rate: 100 mL/hr at 20 0632, 100 mL/hr at 20 0632  •  traMADol (ULTRAM) tablet 50 mg, 50 mg, Oral, Q6H PRN, Selma Salomon DO    Family History   Problem Relation Age of Onset   • Fibroids Mother    • Diabetes type II Mother    • Hypertension Mother    • Heart attack Mother    • Fibroids Sister         PCOS   • Diabetes type II Sister    • Dementia Maternal Grandmother    • Stroke Maternal Grandmother      Social History     Socioeconomic History   • Marital status:      Spouse name: Not on file   • Number of children: 1   • Years of education: Not on file   • Highest education level: Not on file   Tobacco Use   • Smoking status: Former Smoker     Types: Cigarettes     Last attempt to quit: 2000     Years since quittin.2   • Smokeless tobacco: Never Used   • Tobacco comment: social MAYBE 1 CIG/DAY   Substance and Sexual Activity   • Alcohol use: Not Currently     Frequency: Monthly or less     Drinks per session: 1 or 2   • Drug use: Never   • Sexual activity: Not Currently   Social History Narrative    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 Staying in [place] with mother due to Endometrial Cancer.        Review of Systems:  Review of Systems   Constitutional: Positive for appetite change and fever.   HENT: Negative for drooling, facial swelling and  "nosebleeds.    Eyes: Negative for photophobia and visual disturbance.   Respiratory: Negative for apnea, choking and shortness of breath.    Cardiovascular: Negative for chest pain and leg swelling.   Gastrointestinal: Positive for abdominal pain and nausea. Negative for blood in stool.   Endocrine: Negative for polyphagia and polyuria.   Genitourinary: Negative for difficulty urinating, dysuria, frequency and hematuria.   Musculoskeletal: Negative for arthralgias, gait problem and myalgias.   Skin: Negative for pallor and rash.   Allergic/Immunologic: Positive for immunocompromised state.   Neurological: Negative for seizures, facial asymmetry and light-headedness.   Hematological: Negative for adenopathy.   Psychiatric/Behavioral: Negative for behavioral problems and dysphoric mood. The patient is not nervous/anxious.      Otherwise the 12 point review of systems is negative.    /65 (BP Location: Left arm, Patient Position: Lying)   Pulse 108   Temp 99.5 °F (37.5 °C) (Oral)   Resp 18   Ht 157.5 cm (62\")   Wt 87.3 kg (192 lb 6.4 oz)   LMP  (LMP Unknown)   SpO2 94%   BMI 35.19 kg/m²    Body mass index is 35.19 kg/m².    General: Moderate distress  HEENT: PER, no icterus, normal sclerae  Cardiac: regular rhythm, tachycardic,  no audible rubs  Pulmonary: bilateral breath sounds, non labored  Abdominal: Lower midline wound VAC in place, soft, no generalized peritonitis, right mid abdominal/lower quadrant tenderness to deep palpation  Neurologic: awake, alert, no obvious focal deficits  Extremities: warm, no edema  Skin: no obvious rashes nor worrisome lesions seen     CBC  Results from last 7 days   Lab Units 03/18/20  0605   WBC 10*3/mm3 0.76*   HEMOGLOBIN g/dL 7.9*   HEMATOCRIT % 25.4*   PLATELETS 10*3/mm3 77*       CMP  Results from last 7 days   Lab Units 03/18/20  0605 03/17/20  2301   SODIUM mmol/L 130* 129*   POTASSIUM mmol/L 3.8 4.3   CHLORIDE mmol/L 98 95*   CO2 mmol/L 22.0 24.0   BUN mg/dL 18 " 23*   CREATININE mg/dL 0.77 0.98   CALCIUM mg/dL 8.2* 8.5*   BILIRUBIN mg/dL  --  0.7   ALK PHOS U/L  --  49   ALT (SGPT) U/L  --  9   AST (SGOT) U/L  --  12   GLUCOSE mg/dL 170* 241*       Radiology  Imaging Results (Last 72 Hours)     Procedure Component Value Units Date/Time    CT Abdomen Pelvis Without Contrast [[id number]] Collected:  03/18/20 0112     Updated:  03/18/20 0114    Narrative:       CT Abdomen Pelvis WO    INDICATION:   44-year-old female with fever and lower abdominal pain today. History of endometrial cancer.    TECHNIQUE:   CT of the abdomen and pelvis without IV contrast. Coronal and sagittal reconstructions were obtained.  Radiation dose reduction techniques included automated exposure control or exposure modulation based on body size. Count of known CT and cardiac nuc  med studies performed in previous 12 months: 3.     COMPARISON:   CT abdomen pelvis 1/30/2020    FINDINGS:  Visualized lung bases are unremarkable.    Abdomen:   The liver is within normal limits. Splenomegaly again noted. The pancreas is within normal limits. The gallbladder is normal. Both adrenal glands are normal. The kidneys are normal. Abdominal aorta normal in course and caliber. Small bowel is  unremarkable without obstruction. The appendix is enlarged, measuring 1.3 cm in diameter. There is periappendiceal inflammatory stranding. The findings appear most consistent with acute appendicitis. No drainable fluid collections. No free  intraperitoneal air. The colon is unremarkable. Ventral lower abdominal wound from recent surgery.    Pelvis:   The urinary bladder is unremarkable.  Hysterectomy. No free pelvic fluid.    No acute osseous abnormality.        Impression:         1. Acute appendicitis. No evidence of perforation or abscess.  2. Postsurgical changes from recent hysterectomy.  3. Splenomegaly.      NOTIFICATION: Critical Value/emergent results were called by telephone at the time of interpretation on 3/18/2020  1:09 AM to BENSON Hughes who verbally acknowledged these results.      Signer Name: Prasanna Zapata MD   Signed: 3/18/2020 1:12 AM   Workstation Name: [id number]    XR Chest 1 View [[id number]] Collected:  03/18/20 0002     Updated:  03/18/20 0004    Narrative:       CR Chest 1 Vw    INDICATION:   44-year-old female with fever today. History of stage III endometrial cancer. Recent chemotherapy.     COMPARISON:    Chest 2/7/2020    FINDINGS:  Single portable AP view(s) of the chest.  The heart and mediastinal contours are normal. The lungs are clear. No pneumothorax or pleural effusion.      Impression:       No acute cardiopulmonary findings.    Signer Name: Prasanna Zapata MD   Signed: 3/18/2020 12:02 AM   Workstation Name: [id number]    [facility]iology [facility]            Assessment:  Acute appendicitis  Recent laparotomy for endometrioid adenocarcinoma status post hysterectomy, Dr. Tolentino  Recent cycle of chemotherapy with pancytopenia  Diabetes mellitus  Chronic kidney disease stage III    Plan:  (Initial consultation/physical exam was done at 0200 - epic was down and I was unable to dictate/enter notes/enter orders at that time)    I reviewed her CT scan of the abdomen and pelvis which demonstrates inflammation around the appendix consistent with an acute appendicitis.  Her care is complicated by her recent pelvic operation and administration of chemotherapy.  As a result of her chemotherapy she has a pancytopenia with an ANC of less than 1000.  I discussed the case directly with Dr. Tolentino.  We are going to admit her, volume resuscitate her, treat her aggressively with antibiotics, and finally attempt to improve her pancytopenia with colony stimulating factor.  Supportive care for now.  We will follow her closely.  Ideally her white blood cell count improves and we can proceed with appendectomy.  This was discussed at length with the patient and her  mother.  All their questions were answered and they understand.    Praful Myers MD  03/18/20  12:03      Electronically signed by Praful Myers MD at 03/18/20 1214     Vilma Burleson MD at 03/18/20 1041          Jeana Chung  [id number]  1975      Reason for visit: Fever, fatigue, malaise, abdominal pain    History of present illness:  The patient is a 44 y.o. 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     She reports feeling unwell for the past 2 days.  She overall just felt fatigued and weak.  She had intermittent abdominal pain that she thought was muscular associated with the wound VAC.  Nausea started yesterday afternoon.  Pain worsened.  She continue to feel unwell and took her temperature and was febrile to 101.3 at home.  She called and was instructed to come in for evaluation.  Work-up demonstrated acute appendicitis.  She was started on antibiotics.  She continues to report abdominal pain, nausea.  She denies further fevers, chills, chest pain, shortness of breath    OBGYN History:  She is a G 0 P 0.  She does not use HRT.       Oncologic History:     Endometrial cancer (CMS/HCC)    12/14/2019 Imaging     Presented to ED in [place] due to progressively heavy vaginal bleeding and severe back pain. Ultrasound revealed uterine and cervical masses.      1/2020 Biopsy     ED follow-up with gynecologist in [place], told she likely has cervical cancer. Insufficient tissue on attempted cervical biopsy. Patient moved to [place], [place] to be closer to mother and sister for work-up and treatment.       1/21/2020 Imaging     Gyn evaluation at [facility]. Repeat TVUS showed cervical mass, right adnexal mass, and large uterine fibroid vs mass. Referred to Gyn Oncology      1/23/2020  Initial Diagnosis     Endometrial cancer (CMS/HCC)  Cervical biopsy positive for infiltrating endometrioid adenocarcinoma      1/30/2020 Imaging     CT chest, abdomen, pelvis showed enlarged uterus with multiple masses and 4 cm cystic/solid mass at right ovary. No metastatic disease noted in abdomen or chest.      2/10/2020 Surgery     Exploratory laparotomy, total abdominal hysterectomy, bilateral salpingo-oophorectomy, with optimal debulking (R=0), and omentectomy.    Pelvic washing cytology positive for malignant cells, consistent with adenocarcinoma. Final pathology showed large grade 3 endometrioid tumor with lymphvascular invasion at the uterus. Cervical stromal involvement, vaginal margin negative. Right tube and ovary involved. Left tube and ovary negative, omentum negative. MSI normal. [id number], Stage IIIA grade 3        3/9/2020 -  Chemotherapy     OP UTERINE PACLitaxel / CARBOplatin (Q21D)             Past Medical History:   Diagnosis Date   • Anemia    • Anxiety and depression    • Back pain    • Bronchitis    • Diabetes (CMS/HCC)     DX 4-5 YRS AGO, CHECKS BS 4X/DAY, LAST A1C 7.1%   • Endometrial cancer (CMS/HCC)     STAGE II   • GERD (gastroesophageal reflux disease)    • Hypertension    • MRSA (methicillin resistant staph aureus) culture positive     S/P NECROTIZING FASCITIS IN BILATERAL FEET   • Necrotizing fasciitis (CMS/HCC)    • PCOS (polycystic ovarian syndrome)    • PTSD (post-traumatic stress disorder)    • Retinopathy 3/18/2020   • Sepsis (CMS/HCC)    • Stage 3 chronic kidney disease (CMS/HCC) 2/24/2020   • Subconjunctival hemorrhage        Past Surgical History:   Procedure Laterality Date   • EXPLORATORY LAPAROTOMY, TOTAL ABDOMINAL HYSTERECTOMY SALPINGO OOPHORECTOMY N/A 2/10/2020    Procedure: EXPLORATORY LAPAROTOMY, TOTAL ABDOMINAL HYSTERECTOMY, BILATERAL SALPINGO-OOPHORECTOMY WITH OPTIMAL  STAGING (R-0), OMENTECTOMY;  Surgeon: Celine Rubio MD;  Location: [facility];  Service:  Gynecology Oncology;  Laterality: N/A;   • EYE SURGERY Left     BLOOD VESSEL IN EYE REPAIR   • TOE AMPUTATION Left 2019    big toe - unhealing sore   • TOE AMPUTATION Right 2018    big toe and second toe - Necrotizing fasciitis and MRSA        MEDICATIONS: The current medication list was reviewed with the patient and updated in the EMR this date per the Medical Assistant. Medication dosages and frequencies were confirmed to be accurate.      Allergies:  is allergic to bactrim [sulfamethoxazole-trimethoprim]; penicillins; and promethazine.    Social History:   Social History     Socioeconomic History   • Marital status:      Spouse name: Not on file   • Number of children: 1   • Years of education: Not on file   • Highest education level: Not on file   Tobacco Use   • Smoking status: Former Smoker     Types: Cigarettes     Last attempt to quit: 2000     Years since quittin.2   • Smokeless tobacco: Never Used   • Tobacco comment: social MAYBE 1 CIG/DAY   Substance and Sexual Activity   • Alcohol use: Not Currently     Frequency: Monthly or less     Drinks per session: 1 or 2   • Drug use: Never   • Sexual activity: Not Currently   Social History Narrative    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 Staying in [place] with mother due to Endometrial Cancer.        Family History:    Family History   Problem Relation Age of Onset   • Fibroids Mother    • Diabetes type II Mother    • Hypertension Mother    • Heart attack Mother    • Fibroids Sister         PCOS   • Diabetes type II Sister    • Dementia Maternal Grandmother    • Stroke Maternal Grandmother        Health Maintenance:    Health Maintenance   Topic Date Due   • TDAP/TD VACCINES (1 - Tdap) 1984   • HEPATITIS C SCREENING  2020   • MAMMOGRAM  2020   • HEMOGLOBIN A1C  2020   • URINE MICROALBUMIN  2021   • PNEUMOCOCCAL VACCINE (19-64 HIGHEST RISK) (2 of 3 - PCV13) 2021   • DIABETIC FOOT EXAM   02/24/2021   • DIABETIC EYE EXAM  02/24/2021   • ANNUAL PHYSICAL  02/25/2021   • INFLUENZA VACCINE  Completed   • PAP SMEAR  Discontinued       Review of Systems   Constitutional: Positive for appetite change, fatigue and fever. Negative for chills and unexpected weight change.   HENT: Negative for ear discharge, ear pain, hearing loss, mouth sores, nosebleeds, sinus pressure, sinus pain, sore throat, tinnitus, trouble swallowing and voice change.    Eyes: Negative for itching and visual disturbance.   Respiratory: Negative for cough, shortness of breath and wheezing.    Cardiovascular: Negative for chest pain, palpitations and leg swelling.   Gastrointestinal: Positive for abdominal pain and nausea. Negative for blood in stool, constipation, diarrhea and vomiting.   Genitourinary: Negative for dyspareunia, dysuria, enuresis, flank pain, frequency, urgency, vaginal bleeding and vaginal discharge.   Musculoskeletal: Negative for arthralgias, gait problem and myalgias.   Skin: Negative for color change, rash and wound.   Neurological: Negative for dizziness, seizures, weakness, numbness and headaches.   Hematological: Does not bruise/bleed easily.   Psychiatric/Behavioral: Negative for confusion, dysphoric mood and sleep disturbance. The patient is not nervous/anxious.        Physical Exam    Vitals:    03/18/20 0600 03/18/20 0630 03/18/20 0633 03/18/20 0732   BP:    101/53   BP Location:    Left arm   Patient Position:    Lying   Pulse: 107 108 107 108   Resp:    18   Temp:    98.8 °F (37.1 °C)   TempSrc:    Oral   SpO2: 94% 92% 91% 94%   Weight:       Height:         PHQ-9 Total Score:    Body mass index is 35.19 kg/m².    Wt Readings from Last 3 Encounters:   03/18/20 87.3 kg (192 lb 6.4 oz)   03/10/20 88.5 kg (195 lb)   03/09/20 88.9 kg (196 lb)         GENERAL: Alert, well-appearing female appearing her stated age who is in no apparent distress.   HEENT: Sclera anicteric. Head normocephalic, atraumatic. Mucus  membranes moist.   NECK: Trachea midline, supple, without masses.  No thyromegaly.   BREASTS: Deferred  CARDIOVASCULAR: Normal rate, regular rhythm, no murmurs, rubs, or gallops.  No peripheral edema.  RESPIRATORY: Clear to auscultation bilaterally, normal respiratory effort  BACK:  No CVA tenderness, no vertebral tenderness on palpation  GASTROINTESTINAL:  Abdomen is soft, diffusely tender, worse in right lower quadrant.  Rebound and guarding.  Peritoneal signs. No HSM.  Wound VAC in place with nonerythematous borders.  SKIN:  Warm, dry, well-perfused.  All visible areas intact.  No rashes, lesions, ulcers.  PSYCHIATRIC: AO x3, with appropriate affect, normal thought processes.  NEUROLOGIC: No focal deficits.  Moves extremities well.  MUSCULOSKELETAL: Normal gait and station.   EXTREMITIES:   No cyanosis, clubbing, symmetric.  Bilateral greater toe amputated.  LYMPHATICS:  No cervical or inguinal adenopathy noted.     PELVIC exam: Deferred    ECOG PS 1    PROCEDURES: None    Diagnostic Data:      Ct Abdomen Pelvis Without Contrast    Result Date: 3/18/2020  CT Abdomen Pelvis WO INDICATION: 44-year-old female with fever and lower abdominal pain today. History of endometrial cancer. TECHNIQUE: CT of the abdomen and pelvis without IV contrast. Coronal and sagittal reconstructions were obtained.  Radiation dose reduction techniques included automated exposure control or exposure modulation based on body size. Count of known CT and cardiac nuc med studies performed in previous 12 months: 3. COMPARISON: CT abdomen pelvis 1/30/2020 FINDINGS: Visualized lung bases are unremarkable. Abdomen: The liver is within normal limits. Splenomegaly again noted. The pancreas is within normal limits. The gallbladder is normal. Both adrenal glands are normal. The kidneys are normal. Abdominal aorta normal in course and caliber. Small bowel is unremarkable without obstruction. The appendix is enlarged, measuring 1.3 cm in diameter. There  is periappendiceal inflammatory stranding. The findings appear most consistent with acute appendicitis. No drainable fluid collections. No free intraperitoneal air. The colon is unremarkable. Ventral lower abdominal wound from recent surgery. Pelvis: The urinary bladder is unremarkable.  Hysterectomy. No free pelvic fluid. No acute osseous abnormality.     1. Acute appendicitis. No evidence of perforation or abscess. 2. Postsurgical changes from recent hysterectomy. 3. Splenomegaly. NOTIFICATION: Critical Value/emergent results were called by telephone at the time of interpretation on 3/18/2020 1:09 AM to BENSON Hughes who verbally acknowledged these results. Signer Name: Prasanna Zapata MD  Signed: 3/18/2020 1:12 AM  Workstation Name: [facility]  [facility]    Xr Chest 1 View    Result Date: 3/18/2020  CR Chest 1 Vw INDICATION: 44-year-old female with fever today. History of stage III endometrial cancer. Recent chemotherapy. COMPARISON:  Chest 2/7/2020 FINDINGS: Single portable AP view(s) of the chest.  The heart and mediastinal contours are normal. The lungs are clear. No pneumothorax or pleural effusion.     No acute cardiopulmonary findings. Signer Name: Prasanna Zapata MD  Signed: 3/18/2020 12:02 AM  Workstation Name: [id number]  [facility]      Lab Results   Component Value Date    WBC 0.76 (C) 03/18/2020    HGB 7.9 (L) 03/18/2020    HCT 25.4 (L) 03/18/2020    MCV 81.9 03/18/2020    PLT 77 (L) 03/18/2020    NEUTROABS 0.13 (L) 03/18/2020    GLUCOSE 170 (H) 03/18/2020    BUN 18 03/18/2020    CREATININE 0.77 03/18/2020    EGFRIFNONA 81 03/18/2020     (L) 03/18/2020    K 3.8 03/18/2020    CL 98 03/18/2020    CO2 22.0 03/18/2020    CALCIUM 8.2 (L) 03/18/2020    ALBUMIN 3.00 (L) 03/17/2020    AST 12 03/17/2020    ALT 9 03/17/2020    BILITOT 0.7 03/17/2020     No results found for:         Assessment/Plan   This is a 44 y.o. woman with stage IIIa  endometrial cancer, status post exploratory laparotomy, total abdominal hysterectomy, bilateral salpingo-oophorectomy, omentectomy on 2/10/2020, complicated by wound dehiscence now with wound VAC in place.  She underwent first cycle of chemo 3/10/2020 with carboplatin and paclitaxel.  She is admitted with acute appendicitis.    Acute appendicitis  -Surgery consulted, ID consulted  -Blood cultures pending  -Continue Zosyn  -Plan for conservative management at this time    Pancytopenia secondary to recent chemo  -Started on Neupogen  -Continue to trend labs  -Neutropenic precautions    Diabetes with diabetic neuropathy and retinopathy  -Trend fingersticks, continue insulin  -Continue home gabapentin    Wound dehiscence  -Continue wound VAC    Disposition  -Continue inpatient management    Electronically Signed by: Vilma Burleson MD  Date: 3/18/2020         Electronically signed by Vilma Burleson MD at 03/18/20 1252     Libby Hoffman APRN at 03/18/20 0559      Consult Orders    1. Inpatient Infectious Diseases Consult [[id number]] ordered by Enedina Guillen PA-C at 03/18/20 0403                INFECTIOUS DISEASE CONSULT/INITIAL HOSPITAL VISIT    Jeana Chung  1975  [id number]    Date of Consult: 3/18/2020    Admission Date: 3/17/2020      Requesting Provider: No ref. provider found  Evaluating Physician: SYLVIA Enrique    Reason for Consultation:  Acute appendicitis     History of present illness:    Patient is a 44 y.o. female with endometrial cancer on chemotherapy,( last on 3/10)  CKD, T2DM,, seen today for an abdominal wound infection.  She underwent a REGINALDO, bilateral salpingo-oophrectomy, omentectomy 2/11/20, with postoperative wound dehiscence for which she has been followed by PT wound care.  She was seen in PT wound care yesterday for a new foot ulcer, and once home developed a fever of 101.3, with fatigue and weakness and right lower quadrant pain.  She was instructed to present to  the ED.  An abdominal CT revealed acute appendicitis, no perforation or abscess, splenomegaly. CXR without acute cardiopulmonary findings. Tmax of 99.4, pulse of 124, .  Admitting labs with pancytopenia, normal lactate.  Blood cultures pending .     Past Medical History:   Diagnosis Date   • Anemia    • Anxiety and depression    • Back pain    • Bronchitis    • Diabetes (CMS/HCC)     DX 4-5 YRS AGO, CHECKS BS 4X/DAY, LAST A1C 7.1%   • Endometrial cancer (CMS/HCC)     STAGE II   • GERD (gastroesophageal reflux disease)    • Hypertension    • MRSA (methicillin resistant staph aureus) culture positive     S/P NECROTIZING FASCITIS IN BILATERAL FEET   • Necrotizing fasciitis (CMS/HCC)    • PCOS (polycystic ovarian syndrome)    • PTSD (post-traumatic stress disorder)    • Retinopathy 3/18/2020   • Sepsis (CMS/HCC)    • Stage 3 chronic kidney disease (CMS/HCC) 2/24/2020   • Subconjunctival hemorrhage        Past Surgical History:   Procedure Laterality Date   • EXPLORATORY LAPAROTOMY, TOTAL ABDOMINAL HYSTERECTOMY SALPINGO OOPHORECTOMY N/A 2/10/2020    Procedure: EXPLORATORY LAPAROTOMY, TOTAL ABDOMINAL HYSTERECTOMY, BILATERAL SALPINGO-OOPHORECTOMY WITH OPTIMAL  STAGING (R-0), OMENTECTOMY;  Surgeon: Celine Rubio MD;  Location: [facility];  Service: Gynecology Oncology;  Laterality: N/A;   • EYE SURGERY Left     BLOOD VESSEL IN EYE REPAIR   • TOE AMPUTATION Left 06/2019    big toe - unhealing sore   • TOE AMPUTATION Right 2018    big toe and second toe - Necrotizing fasciitis and MRSA        Family History   Problem Relation Age of Onset   • Fibroids Mother    • Diabetes type II Mother    • Hypertension Mother    • Heart attack Mother    • Fibroids Sister         PCOS   • Diabetes type II Sister    • Dementia Maternal Grandmother    • Stroke Maternal Grandmother        Social History     Socioeconomic History   • Marital status:      Spouse name: Not on file   • Number of children: 1   • Years of education:  Not on file   • Highest education level: Not on file   Tobacco Use   • Smoking status: Former Smoker     Types: Cigarettes     Last attempt to quit: 2000     Years since quittin.2   • Smokeless tobacco: Never Used   • Tobacco comment: social MAYBE 1 CIG/DAY   Substance and Sexual Activity   • Alcohol use: Not Currently     Frequency: Monthly or less     Drinks per session: 1 or 2   • Drug use: Never   • Sexual activity: Not Currently   Social History Narrative    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 Staying in [place] with mother due to Endometrial Cancer.        Allergies   Allergen Reactions   • Bactrim [Sulfamethoxazole-Trimethoprim] Anaphylaxis   • Penicillins Hives   • Promethazine Mental Status Change         Medication:    Current Facility-Administered Medications:   •  acetaminophen (TYLENOL) tablet 650 mg, 650 mg, Oral, Q6H, Enedina Guillen PA-C  •  cetirizine (zyrTEC) tablet 10 mg, 10 mg, Oral, Daily, Enedina Guillen PA-C  •  dextrose (D50W) 25 g/ 50mL Intravenous Solution 25 g, 25 g, Intravenous, Q15 Min PRN, Enedina Guillen PA-C  •  dextrose (GLUTOSE) oral gel 15 g, 15 g, Oral, Q15 Min PRN, Enedina Guillen PA-C  •  docusate sodium (COLACE) capsule 200 mg, 200 mg, Oral, BID, Enedina Guillen PA-C  •  filgrastim (NEUPOGEN) injection 480 mcg, 480 mcg, Subcutaneous, Q PM, Celine Rubio MD  •  gabapentin (NEURONTIN) capsule 300 mg, 300 mg, Oral, TID, Selma Salomon, DO  •  glucagon (human recombinant) (GLUCAGEN DIAGNOSTIC) injection 1 mg, 1 mg, Subcutaneous, Q15 Min PRN, Enedina Guillen PA-C  •  hydrOXYzine (ATARAX) tablet 25 mg, 25 mg, Oral, TID PRN, Enedina Guillen PA-C  •  insulin detemir (LEVEMIR) injection 20 Units, 20 Units, Subcutaneous, Nightly, Enedina Guillen PA-C  •  insulin lispro (humaLOG) injection 0-7 Units, 0-7 Units, Subcutaneous, 4x Daily With Meals & Nightly, Enedina Guillen PA-C  •  LORazepam (ATIVAN) tablet 1 mg, 1 mg, Oral, Q6H PRN, Selma Salomon, DO  •   melatonin tablet 5 mg, 5 mg, Oral, Nightly PRN, Enedina Guillen PA-C  •  Morphine sulfate (PF) injection 3 mg, 3 mg, Intravenous, Q2H PRN, Selma Salomon DO, 3 mg at 03/18/20 0450  •  ondansetron (ZOFRAN) tablet 4 mg, 4 mg, Oral, Q6H PRN **OR** ondansetron (ZOFRAN) injection 4 mg, 4 mg, Intravenous, Q6H PRN, Enedina Guillen PA-C  •  pantoprazole (PROTONIX) EC tablet 40 mg, 40 mg, Oral, QAM, Enedina Guillen PA-C  •  piperacillin-tazobactam (ZOSYN) 3.375 g in iso-osmotic dextrose 50 ml (premix), 3.375 g, Intravenous, Q8H, Praful Myers MD  •  polyethylene glycol 3350 powder (packet), 17 g, Oral, Daily PRN, Enedina Guillen PA-C  •  pregabalin (LYRICA) capsule 100 mg, 100 mg, Oral, TID, Selma Salomon DO  •  Insert peripheral IV, , , Once **AND** sodium chloride 0.9 % flush 10 mL, 10 mL, Intravenous, PRN, Carlyle Bryant PA  •  sodium chloride 0.9 % flush 10 mL, 10 mL, Intravenous, Q12H, Enedina Guillen PA-C  •  sodium chloride 0.9 % flush 10 mL, 10 mL, Intravenous, PRN, Enedina Guillen PA-C  •  sodium chloride 0.9 % infusion, 100 mL/hr, Intravenous, Continuous, Selma Salomon DO, Last Rate: 100 mL/hr at 03/18/20 0430, 100 mL/hr at 03/18/20 0430  •  traMADol (ULTRAM) tablet 50 mg, 50 mg, Oral, Q6H PRN, Selma Salomon DO    Antibiotics:  Anti-Infectives (From admission, onward)    Ordered     Dose/Rate Route Frequency Start Stop    03/18/20 0323  piperacillin-tazobactam (ZOSYN) 3.375 g in iso-osmotic dextrose 50 ml (premix)     Ordering Provider:  Praful Myers MD    3.375 g  over 4 Hours Intravenous Every 8 Hours 03/18/20 1000 03/23/20 0959    03/18/20 0147  piperacillin-tazobactam (ZOSYN) 4.5 g in iso-osmotic dextrose 100 mL IVPB (premix)     Ordering Provider:  Carlyle Bryant PA    4.5 g Intravenous Once 03/18/20 0149 03/18/20 0331            Review of Systems:  Constitutional--+  Fever, chills or sweats.  Appetite poor  and no malaise. + fatigue.  HEENT-- No new vision, hearing or throat  complaints.  No epistaxis or oral sores.  Denies odynophagia or dysphagia. No headache, photophobia or neck stiffness.  CV-- No chest pain, palpitation or syncope  Resp-- No SOB/cough/Hemoptysis  GI- No nausea, vomiting, or diarrhea. + RLQ pain.  Midline abdominal incision with vac in place   -- No dysuria, hematuria, or flank pain.  Denies hesitancy, urgency or flank pain.  Lymph- no swollen lymph nodes in neck/axilla or groin.   Heme- No active bruising or bleeding; no Hx of DVT or PE.  MS-- no swelling or pain in the bones or joints of arms/legs.  No new back pain.  Neuro-- No acute focal weakness or numbness in the arms or legs.  No seizures.  Skin--No rashes   Abdominal wound with vac, right foot ulcer      Physical Exam:   Vital Signs  Temp (24hrs), Av.2 °F (37.3 °C), Min:99 °F (37.2 °C), Max:99.4 °F (37.4 °C)    Temp  Min: 99 °F (37.2 °C)  Max: 99.4 °F (37.4 °C)  BP  Min: 102/63  Max: 145/79  Pulse  Min: 105  Max: 124  Resp  Min: 16  Max: 18  SpO2  Min: 95 %  Max: 99 %    GENERAL: Awake and alert, in no acute distress.   HEENT: Normocephalic, atraumatic.  No conjunctival injection. No icterus. Oropharynx clear without evidence of thrush or exudate. No evidence of peridontal disease.    HEART: RRR; 1/6 murmur, rubs, gallops.   LUNGS: Clear to auscultation bilaterally without wheezing, rales, rhonchi. Normal respiratory effort. Nonlabored. No dullness.  ABDOMEN: Soft, mildly tender, nondistended vac in place  Positive bowel sounds. No rebound or guarding. NO mass or HSM.  EXT:  No cyanosis, clubbing or edema. No cord.  :  Without Reyna catheter.  MSK:  No joint effusions   SKIN: Warm and dry  NEURO: Oriented to PPT.  PSYCHIATRIC: Normal insight and judgement. Cooperative with PE  Right foot ulcer, photo noted, with wound approx 3cm x 2cm, x 0.5 cm deep with slight maceration around edges, callous, granulation  Photo of abdominal wound, with granulation no surrounding erythema.     Laboratory  Data    Results from last 7 days   Lab Units 03/17/20  2301   WBC 10*3/mm3 0.85*   HEMOGLOBIN g/dL 8.3*   HEMATOCRIT % 26.5*   PLATELETS 10*3/mm3 73*     Results from last 7 days   Lab Units 03/17/20  2301   SODIUM mmol/L 129*   POTASSIUM mmol/L 4.3   CHLORIDE mmol/L 95*   CO2 mmol/L 24.0   BUN mg/dL 23*   CREATININE mg/dL 0.98   GLUCOSE mg/dL 241*   CALCIUM mg/dL 8.5*     Results from last 7 days   Lab Units 03/17/20  2301   ALK PHOS U/L 49   BILIRUBIN mg/dL 0.7   ALT (SGPT) U/L 9   AST (SGOT) U/L 12             Results from last 7 days   Lab Units 03/17/20  2301   LACTATE mmol/L 1.6             Estimated Creatinine Clearance: 73.6 mL/min (by C-G formula based on SCr of 0.98 mg/dL).      Microbiology:  3/17:  BC pending       Radiology:  Imaging Results (Last 72 Hours)     Procedure Component Value Units Date/Time    CT Abdomen Pelvis Without Contrast [[id number]] Collected:  03/18/20 0112     Updated:  03/18/20 0114    Narrative:       CT Abdomen Pelvis WO    INDICATION:   44-year-old female with fever and lower abdominal pain today. History of endometrial cancer.    TECHNIQUE:   CT of the abdomen and pelvis without IV contrast. Coronal and sagittal reconstructions were obtained.  Radiation dose reduction techniques included automated exposure control or exposure modulation based on body size. Count of known CT and cardiac nuc  med studies performed in previous 12 months: 3.     COMPARISON:   CT abdomen pelvis 1/30/2020    FINDINGS:  Visualized lung bases are unremarkable.    Abdomen:   The liver is within normal limits. Splenomegaly again noted. The pancreas is within normal limits. The gallbladder is normal. Both adrenal glands are normal. The kidneys are normal. Abdominal aorta normal in course and caliber. Small bowel is  unremarkable without obstruction. The appendix is enlarged, measuring 1.3 cm in diameter. There is periappendiceal inflammatory stranding. The findings appear most consistent with acute  appendicitis. No drainable fluid collections. No free  intraperitoneal air. The colon is unremarkable. Ventral lower abdominal wound from recent surgery.    Pelvis:   The urinary bladder is unremarkable.  Hysterectomy. No free pelvic fluid.    No acute osseous abnormality.        Impression:         1. Acute appendicitis. No evidence of perforation or abscess.  2. Postsurgical changes from recent hysterectomy.  3. Splenomegaly.      NOTIFICATION: Critical Value/emergent results were called by telephone at the time of interpretation on 3/18/2020 1:09 AM to BENSON Hughes who verbally acknowledged these results.      Signer Name: Prasanna Zapata MD   Signed: 3/18/2020 1:12 AM   [facility]station [facility]: [facility]    [facility]    XR Chest 1 View [[id number]] Collected:  20 0002     Updated:  20 0004    Narrative:       CR Chest 1 Vw    INDICATION:   44-year-old female with fever today. History of stage III endometrial cancer. Recent chemotherapy.     COMPARISON:    Chest 2020    FINDINGS:  Single portable AP view(s) of the chest.  The heart and mediastinal contours are normal. The lungs are clear. No pneumothorax or pleural effusion.      Impression:       No acute cardiopulmonary findings.    Signer Name: Prasanna Zapata MD   Signed: 3/18/2020 12:02 AM   [facility]station Name: [facility]    [facility]            Impression:   1.  Sepsis, with fever, tachycardia, hypotension, neutropenia, known focus of infection  2.  Acute appendicitis  3.  Neutropenia/pancytopenia  4.  Endometrial cancer, on chemotherapy  5.  Right foot ulcer  6.  Abdominal wall wound dehiscence  7.  T2 Diabetes mellitus     PLAN/RECOMMENDATIONS:   Thank you for asking us to see Jeana Chung, I recommend the followin.  Blood cultures pending   2.  Continue Zosyn   3.  Continue Wound care    Dr. Talley has obtained the history, performed the physical exam and formulated the above  treatment plan.        SYLVIA Enrique  3/18/2020  05:59                  Electronically signed by Libby Hoffman APRN at 03/18/20 0604

## 2020-03-18 NOTE — PROGRESS NOTES
Patient seen and examined at the bedside earlier today.  Patient was resting in bed in no acute distress and pain was controlled.  Denied fever or chills.  Had no nausea vomiting.  Surgery is on board but not doing surgery at this point secondary to leukopenia which is secondary to recent chemotherapy.  Gynecology oncology is on board.  Also infectious disease on board.  Most likely surgery when leukopenia has resolved.  -Continue current care  -Pain control  -Labs in a.m.  -Medicine will follow.

## 2020-03-18 NOTE — PROGRESS NOTES
Clinical Nutrition   Reason For Visit: Identified at risk by screening criteria, MST score 2+, Unintentional weight loss    Patient Name: Jeana Chung  YOB: 1975  MRN: [id number]  Date of Encounter: 03/18/20 12:34  Admission date: 3/17/2020      Nutrition Assessment     Admission Problem List:  Acute appendicitis  Neutropenia  Hyponatremia  Wound dehiscence r/t recent surgery (2/11/20), wound vac  Nausea  Fatigue  RLQ pain  Sepsis      Applicable PMH:  HTN  T2DM  CKD stage 3  Iron deficiency anemia  Endometrial cancer - currently undergoing chemotherapy (first round 3/10)  GERD  PCOS  PTSD  S/p amputation of right big and second toes (2018)  S/p amputation of left big toe (2019)  S/p hysterectomy, bilateral salpingo-oophorectomy and omentectomy (2/11/20)      Applicable medical tests/procedures since admission:      Reported/Observed/Food/Nutrition Related History   RD unable to enter room due to current hospital policy (RD would need to wear mask into room 2/2 neutropenia precautions). RD unable to reach patient via phone at this time. Unable to obtain information regarding recent PO intake and weight hx. Will order oral nutrition supplement drinks to provide additional calories and protein when PO diet initiated. No food allergies listed in EMR.    Anthropometrics   Height: 62 in  Weight: 192 lbs (bed scale weight 3/18 per [facility] doc)  BMI: 35.2  BMI classification: Obese Class II: 35-39.9kg/m2   IBW: 110 lbs    UBW: 213 lbs (1/25/20 MDOV per EMR)  Weight change: Based on EMR weight hx and current bed scale weight, patient has lost 21 lbs (9.9%) within the past 1.5-2 months. RD will verify as able.    Labs reviewed   Labs reviewed: Yes    Medications reviewed   Medications reviewed: Yes  Pertinent: colace, insulin, protonix, antibiotic  GTT: IVF @ 100 ml/hr    Current Nutrition Prescription   PO: NPO Diet     Evaluation of Received Nutrient/Fluid Intake: insufficient data    Nutrition Diagnosis      Problem Inadequate oral intake   Etiology Clinical condition, diet order   Signs/Symptoms NPO at this time       Intervention   Intervention: Follow treatment progress, Care plan reviewed, Await begin PO    Goal:   General: Nutrition support treatment  PO: Initiate diet as medically appropriate    Monitoring/Evaluation:       Monitoring/Evaluation: Per protocol, I&O, Pertinent labs, Weight, GI status, Symptoms  Will Continue to follow per protocol  Geneva Beebe RD  Time Spent: 15 min

## 2020-03-18 NOTE — PLAN OF CARE
Problem: Patient Care Overview  Goal: Plan of Care Review  Flowsheets (Taken 3/18/2020 1141)  Progress: no change  Plan of Care Reviewed With: patient; other (see comments) (Chio GALLARDO)  Note:   WOC nurse consulted for abdominal Wound VAC and right foot wound. Per pt, she has been working with out-patient PT Wound Care for both wounds; last visit was yesterday, March 17; has appt tomorrow. Puja in PT Wound Care notified of pt's admission; order forwarded. Wound VAC in place at this time; right foot dressing intact. PT Wound Care will assume care of these wounds;WOCN will s/o. Please contact WOC nurse as needed for concerns.       none

## 2020-03-18 NOTE — CONSULTS
INFECTIOUS DISEASE CONSULT/INITIAL HOSPITAL VISIT    Jeana Chung  1975  [id number]    Date of Consult: 3/18/2020    Admission Date: 3/17/2020      Requesting Provider: No ref. provider found  Evaluating Physician: Abrahan Talley MD    Reason for Consultation:  Acute appendicitis     History of present illness:    3/18/2020: Patient is a 44 y.o. female with endometrial cancer on chemotherapy,( last on 3/10)  CKD, T2DM,, seen today for an abdominal wound infection.  She underwent a REGINALDO, bilateral salpingo-oophrectomy, omentectomy 2/11/20, with postoperative wound dehiscence for which she has been followed by PT wound care.  She was seen in PT wound care yesterday for a new foot ulcer, and once home developed a fever of 101.3, with fatigue and weakness and right lower quadrant pain.  She was instructed to present to the ED.  An abdominal CT revealed acute appendicitis, no perforation or abscess, splenomegaly. CXR without acute cardiopulmonary findings. Tmax of 99.4, pulse of 124, .  Admitting labs with pancytopenia, normal lactate.  Blood cultures pending .     Past Medical History:   Diagnosis Date   • Anemia    • Anxiety and depression    • Back pain    • Bronchitis    • Diabetes (CMS/HCC)     DX 4-5 YRS AGO, CHECKS BS 4X/DAY, LAST A1C 7.1%   • Endometrial cancer (CMS/HCC)     STAGE II   • GERD (gastroesophageal reflux disease)    • Hypertension    • MRSA (methicillin resistant staph aureus) culture positive     S/P NECROTIZING FASCITIS IN BILATERAL FEET   • Necrotizing fasciitis (CMS/HCC)    • PCOS (polycystic ovarian syndrome)    • PTSD (post-traumatic stress disorder)    • Retinopathy 3/18/2020   • Sepsis (CMS/HCC)    • Stage 3 chronic kidney disease (CMS/HCC) 2/24/2020   • Subconjunctival hemorrhage        Past Surgical History:   Procedure Laterality Date   • EXPLORATORY LAPAROTOMY, TOTAL ABDOMINAL HYSTERECTOMY SALPINGO OOPHORECTOMY N/A 2/10/2020    Procedure: EXPLORATORY LAPAROTOMY,  TOTAL ABDOMINAL HYSTERECTOMY, BILATERAL SALPINGO-OOPHORECTOMY WITH OPTIMAL  STAGING (R-0), OMENTECTOMY;  Surgeon: Celine Rubio MD;  Location: [facility];  Service: Gynecology Oncology;  Laterality: N/A;   • EYE SURGERY Left     BLOOD VESSEL IN EYE REPAIR   • TOE AMPUTATION Left 2019    big toe - unhealing sore   • TOE AMPUTATION Right 2018    big toe and second toe - Necrotizing fasciitis and MRSA        Family History   Problem Relation Age of Onset   • Fibroids Mother    • Diabetes type II Mother    • Hypertension Mother    • Heart attack Mother    • Fibroids Sister         PCOS   • Diabetes type II Sister    • Dementia Maternal Grandmother    • Stroke Maternal Grandmother        Social History     Socioeconomic History   • Marital status:      Spouse name: Not on file   • Number of children: 1   • Years of education: Not on file   • Highest education level: Not on file   Tobacco Use   • Smoking status: Former Smoker     Types: Cigarettes     Last attempt to quit: 2000     Years since quittin.2   • Smokeless tobacco: Never Used   • Tobacco comment: social MAYBE 1 CIG/DAY   Substance and Sexual Activity   • Alcohol use: Not Currently     Frequency: Monthly or less     Drinks per session: 1 or 2   • Drug use: Never   • Sexual activity: Not Currently   Social History Narrative    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 Staying in [place] with mother due to Endometrial Cancer.        Allergies   Allergen Reactions   • Bactrim [Sulfamethoxazole-Trimethoprim] Anaphylaxis   • Penicillins Hives   • Promethazine Mental Status Change         Medication:    Current Facility-Administered Medications:   •  acetaminophen (TYLENOL) tablet 650 mg, 650 mg, Oral, Q6H, Enedina Guillen PA-C, 650 mg at 20 1710  •  cetirizine (zyrTEC) tablet 10 mg, 10 mg, Oral, Daily, Enedina Guillen PA-C, 10 mg at 20 0905  •  dextrose (D50W) 25 g/ 50mL Intravenous Solution 25 g, 25 g, Intravenous, Q15  Min PRN, Enedina Guillen PA-C  •  dextrose (GLUTOSE) oral gel 15 g, 15 g, Oral, Q15 Min PRN, Enedina Guillen PA-C  •  docusate sodium (COLACE) capsule 200 mg, 200 mg, Oral, BID, Enedina Guillen PA-C, 200 mg at 03/18/20 0905  •  filgrastim (NEUPOGEN) injection 480 mcg, 480 mcg, Subcutaneous, Q PM, Celine Rubio MD, 480 mcg at 03/18/20 1710  •  gabapentin (NEURONTIN) capsule 300 mg, 300 mg, Oral, TID, Selma Salomon, DO, 300 mg at 03/18/20 1710  •  glucagon (human recombinant) (GLUCAGEN DIAGNOSTIC) injection 1 mg, 1 mg, Subcutaneous, Q15 Min PRN, Enedina Guillen PA-C  •  hydrOXYzine (ATARAX) tablet 25 mg, 25 mg, Oral, TID PRN, Enedina Guillen PA-C  •  insulin detemir (LEVEMIR) injection 20 Units, 20 Units, Subcutaneous, Nightly, Enedina Guillen PA-C  •  insulin lispro (humaLOG) injection 0-7 Units, 0-7 Units, Subcutaneous, 4x Daily With Meals & Nightly, Enedina Guillen PA-C, 2 Units at 03/18/20 0906  •  LORazepam (ATIVAN) tablet 1 mg, 1 mg, Oral, Q6H PRN, Selma Salomon, DO  •  melatonin tablet 5 mg, 5 mg, Oral, Nightly PRN, Enedina Guillen PA-C  •  Morphine sulfate (PF) injection 3 mg, 3 mg, Intravenous, Q2H PRN, Selma Salomon, DO, 3 mg at 03/18/20 1719  •  ondansetron (ZOFRAN) tablet 4 mg, 4 mg, Oral, Q6H PRN **OR** ondansetron (ZOFRAN) injection 4 mg, 4 mg, Intravenous, Q6H PRN, Enedina Guillen PA-C, 4 mg at 03/18/20 1219  •  pantoprazole (PROTONIX) EC tablet 40 mg, 40 mg, Oral, QAM, Enedina Guillen PA-C  •  piperacillin-tazobactam (ZOSYN) 3.375 g in iso-osmotic dextrose 50 ml (premix), 3.375 g, Intravenous, Q8H, Praful Myers MD, Last Rate: 0 mL/hr at 03/18/20 1300, 3.375 g at 03/18/20 1710  •  polyethylene glycol 3350 powder (packet), 17 g, Oral, Daily PRN, Enedina Guillen PA-C  •  [COMPLETED] Insert peripheral IV, , , Once **AND** sodium chloride 0.9 % flush 10 mL, 10 mL, Intravenous, PRN, Carlyle Bryant PA  •  sodium chloride 0.9 % flush 10 mL, 10 mL, Intravenous, Q12H, Enedina Guillen,  GRACIA, 10 mL at 20 0905  •  sodium chloride 0.9 % flush 10 mL, 10 mL, Intravenous, PRN, Enedina Guillen PA-C  •  sodium chloride 0.9 % infusion, 100 mL/hr, Intravenous, Continuous, Selma Salomon DO, Last Rate: 100 mL/hr at 20 0632, 100 mL/hr at 20 0632  •  traMADol (ULTRAM) tablet 50 mg, 50 mg, Oral, Q6H PRN, Selma Salomon DO    Antibiotics:  Anti-Infectives (From admission, onward)    Ordered     Dose/Rate Route Frequency Start Stop    20 0323  piperacillin-tazobactam (ZOSYN) 3.375 g in iso-osmotic dextrose 50 ml (premix)  Review   Ordering Provider:  Praful Myers MD    3.375 g  over 4 Hours Intravenous Every 8 Hours 20 1000 20 0959    20 0147  piperacillin-tazobactam (ZOSYN) 4.5 g in iso-osmotic dextrose 100 mL IVPB (premix)     Ordering Provider:  Carlyle Bryant PA    4.5 g Intravenous Once 20 0149 20 0443            Review of Systems:  Constitutional--+  Fever, chills or sweats.  Appetite poor  and no malaise. + fatigue.  HEENT-- No new vision, hearing or throat complaints.  No epistaxis or oral sores.  Denies odynophagia or dysphagia. No headache, photophobia or neck stiffness.  CV-- No chest pain, palpitation or syncope  Resp-- No SOB/cough/Hemoptysis  GI- No nausea, vomiting, or diarrhea. + RLQ pain.  Midline abdominal incision with vac in place   -- No dysuria, hematuria, or flank pain.  Denies hesitancy, urgency or flank pain.  Lymph- no swollen lymph nodes in neck/axilla or groin.   Heme- No active bruising or bleeding; no Hx of DVT or PE.  MS-- no swelling or pain in the bones or joints of arms/legs.  No new back pain.  Neuro-- No acute focal weakness or numbness in the arms or legs.  No seizures.  Skin--No rashes   Abdominal wound with vac, right foot ulcer      Physical Exam:   Vital Signs  Temp (24hrs), Av.2 °F (37.3 °C), Min:98.8 °F (37.1 °C), Max:99.5 °F (37.5 °C)    Temp  Min: 98.8 °F (37.1 °C)  Max: 99.5 °F (37.5 °C)  BP   Min: 101/53  Max: 145/79  Pulse  Min: 101  Max: 124  Resp  Min: 16  Max: 18  SpO2  Min: 91 %  Max: 100 %    GENERAL: Awake and alert, in no acute distress.   HEENT: Normocephalic, atraumatic.  No conjunctival injection. No icterus. Oropharynx clear without evidence of thrush or exudate. No evidence of peridontal disease.    HEART: RRR; 1/6 murmur, rubs, gallops.   LUNGS: Clear to auscultation bilaterally without wheezing, rales, rhonchi. Normal respiratory effort. Nonlabored. No dullness.  ABDOMEN: Soft, mildly tender, nondistended vac in place  Positive bowel sounds. No rebound or guarding. NO mass or HSM.  EXT:  No cyanosis, clubbing or edema. No cord.  :  Without Reyna catheter.  MSK:  No joint effusions   SKIN: Warm and dry  NEURO: Oriented to PPT.  PSYCHIATRIC: Normal insight and judgement. Cooperative with PE  Right foot ulcer, photo noted, with wound approx 3cm x 2cm, x 0.5 cm deep with slight maceration around edges, callous, granulation  Photo of abdominal wound, with granulation no surrounding erythema.     Laboratory Data    Results from last 7 days   Lab Units 03/18/20  0605 03/17/20  2301   WBC 10*3/mm3 0.76* 0.85*   HEMOGLOBIN g/dL 7.9* 8.3*   HEMATOCRIT % 25.4* 26.5*   PLATELETS 10*3/mm3 77* 73*     Results from last 7 days   Lab Units 03/18/20  0605   SODIUM mmol/L 130*   POTASSIUM mmol/L 3.8   CHLORIDE mmol/L 98   CO2 mmol/L 22.0   BUN mg/dL 18   CREATININE mg/dL 0.77   GLUCOSE mg/dL 170*   CALCIUM mg/dL 8.2*     Results from last 7 days   Lab Units 03/17/20  2301   ALK PHOS U/L 49   BILIRUBIN mg/dL 0.7   ALT (SGPT) U/L 9   AST (SGOT) U/L 12             Results from last 7 days   Lab Units 03/17/20  2301   LACTATE mmol/L 1.6             Estimated Creatinine Clearance: 93.7 mL/min (by C-G formula based on SCr of 0.77 mg/dL).      Microbiology:  3/17:  BC pending       Radiology:  Imaging Results (Last 72 Hours)     Procedure Component Value Units Date/Time    CT Abdomen Pelvis Without Contrast  [[id number]] Collected:  03/18/20 0112     Updated:  03/18/20 0114    Narrative:       CT Abdomen Pelvis WO    INDICATION:   44-year-old female with fever and lower abdominal pain today. History of endometrial cancer.    TECHNIQUE:   CT of the abdomen and pelvis without IV contrast. Coronal and sagittal reconstructions were obtained.  Radiation dose reduction techniques included automated exposure control or exposure modulation based on body size. Count of known CT and cardiac nuc  med studies performed in previous 12 months: 3.     COMPARISON:   CT abdomen pelvis 1/30/2020    FINDINGS:  Visualized lung bases are unremarkable.    Abdomen:   The liver is within normal limits. Splenomegaly again noted. The pancreas is within normal limits. The gallbladder is normal. Both adrenal glands are normal. The kidneys are normal. Abdominal aorta normal in course and caliber. Small bowel is  unremarkable without obstruction. The appendix is enlarged, measuring 1.3 cm in diameter. There is periappendiceal inflammatory stranding. The findings appear most consistent with acute appendicitis. No drainable fluid collections. No free  intraperitoneal air. The colon is unremarkable. Ventral lower abdominal wound from recent surgery.    Pelvis:   The urinary bladder is unremarkable.  Hysterectomy. No free pelvic fluid.    No acute osseous abnormality.        Impression:         1. Acute appendicitis. No evidence of perforation or abscess.  2. Postsurgical changes from recent hysterectomy.  3. Splenomegaly.      NOTIFICATION: Critical Value/emergent results were called by telephone at the time of interpretation on 3/18/2020 1:09 AM to BENSON Hughes who verbally acknowledged these results.      Signer Name: Prasanna Zapata MD   Signed: 3/18/2020 1:12 AM   Workstation Name: [email or web address]-    [facility]    XR Chest 1 View [[id number]] Collected:  03/18/20 0002     Updated:  03/18/20 0004    Narrative:       CR Chest 1  Vw    INDICATION:   44-year-old female with fever today. History of stage III endometrial cancer. Recent chemotherapy.     COMPARISON:    Chest 2020    FINDINGS:  Single portable AP view(s) of the chest.  The heart and mediastinal contours are normal. The lungs are clear. No pneumothorax or pleural effusion.      Impression:       No acute cardiopulmonary findings.    Signer Name: Prasanna Zapata MD   Signed: 3/18/2020 12:02 AM   Workstation Name: [employer]-    [facility]        I read her CT scan    Impression:   1.  Sepsis- with fever, tachycardia, hypotension, neutropenia, known focus of infection  2.  Acute appendicitis-she has acute appendicitis without perforation.  We will plan to treat her with antibiotic therapy and avoid surgical intervention at the present time since she is clinically stable and since she is absolutely neutropenic which is a relative contraindication to surgical intervention at the present time.  3.  Neutropenia/pancytopenia-she is on Neupogen therapy.  4.  Endometrial cancer, on chemotherapy  5.  Right foot ulcer  6.  Abdominal wall wound dehiscence  7.  T2 Diabetes mellitus  8.  Neutropenic fever    PLAN/RECOMMENDATIONS:   Thank you for asking us to see Jeana Chung, I recommend the followin.  Blood cultures pending   2.  Continue Zosyn   3.  Continue Wound care  4.  Continue Neupogen    Dr. Talley has obtained the history, performed the physical exam and formulated the above treatment plan.        I discussed her complex situation with Dr. Myers and Dr. Tolentino today.    Abrahan Talley MD  3/18/2020  18:20

## 2020-03-18 NOTE — PROGRESS NOTES
Discharge Planning Assessment  [facility]     Patient Name: Jeana Chung  MRN: [id number]  Today's Date: 3/18/2020    Admit Date: 3/17/2020    Discharge Needs Assessment     Row Name 03/18/20 1216       Living Environment    Lives With  parent(s)    Name(s) of Who Lives With Patient  Rosa Diaz(mother)    Current Living Arrangements  home/apartment/condo    Primary Care Provided by  self    Provides Primary Care For  no one    Family Caregiver if Needed  spouse;parent(s)    Quality of Family Relationships  helpful;supportive;involved    Able to Return to Prior Arrangements  yes    Living Arrangement Comments  Spoke with pt in room with permission regarding discharge plan.  Pt reports she is currently staying with her mother in [place] [address]) since she was diagnosed with stage 3 endometrial cancer and had surgery Feb 2020 and started chemo.  She is  to spouse who resides in [place] with 17 daughter.        Transition Planning    Patient/Family Anticipates Transition to  home with family    Patient/Family Anticipated Services at Transition      Transportation Anticipated  family or friend will provide       Discharge Needs Assessment    Readmission Within the Last 30 Days  no previous admission in last 30 days    Concerns to be Addressed  discharge planning    Equipment Currently Used at Home  bath bench    Equipment Needed After Discharge  bath bench    Discharge Coordination/Progress  Pt has [employer] with no recent changes in insurance. Pt reports she has prescription coverage and uses [facility] in [place]. Plan is home with parents.  Pt currently denies discharge needs. CM will cont to follow        Discharge Plan     Row Name 03/18/20 1223       Plan    Plan  discharge plan    Plan Comments  Plan is home with parents.  Pt currently denies discharge needs. CM will cont to follow    Final Discharge Disposition Code  01 - home or self-care         Destination      Coordination has not been started for this encounter.      Durable Medical Equipment      Coordination has not been started for this encounter.      Dialysis/Infusion      Coordination has not been started for this encounter.      Home Medical Care      Coordination has not been started for this encounter.      Therapy      Coordination has not been started for this encounter.      Community Resources      Coordination has not been started for this encounter.        Expected Discharge Date and Time     Expected Discharge Date Expected Discharge Time    Mar 20, 2020         Demographic Summary     Row Name 03/18/20 1213       General Information    General Information Comments  Pt's PCP is Tania Angeles       Contact Information    Permission Granted to Share Info With      Contact Information Obtained for      Contact Information Comments  Rosa Booth(mother): [phone] or Porfirio Chung(spouse): [phone]        Functional Status     Row Name 03/18/20 1215       Functional Status    Functional Status Comments  Pt is indpendent of ADLs        Psychosocial    No documentation.       Abuse/Neglect    No documentation.       Legal    No documentation.       Substance Abuse    No documentation.       Patient Forms    No documentation.           Debbi Cam RN

## 2020-03-19 ENCOUNTER — DOCUMENTATION (OUTPATIENT)
Dept: PHYSICAL THERAPY | Facility: HOSPITAL | Age: 45
End: 2020-03-19

## 2020-03-19 ENCOUNTER — ANESTHESIA EVENT (OUTPATIENT)
Dept: PERIOP | Facility: HOSPITAL | Age: 45
End: 2020-03-19

## 2020-03-19 ENCOUNTER — HOSPITAL ENCOUNTER (OUTPATIENT)
Facility: HOSPITAL | Age: 45
Setting detail: SURGERY ADMIT
End: 2020-03-19
Attending: SURGERY | Admitting: SURGERY

## 2020-03-19 LAB
ABO GROUP BLD: NORMAL
ANION GAP SERPL CALCULATED.3IONS-SCNC: 10 MMOL/L (ref 5–15)
BASOPHILS # BLD MANUAL: 0.01 10*3/MM3 (ref 0–0.2)
BASOPHILS NFR BLD AUTO: 1 % (ref 0–1.5)
BLD GP AB SCN SERPL QL: NEGATIVE
BUN BLD-MCNC: 14 MG/DL (ref 6–20)
BUN/CREAT SERPL: 18.4 (ref 7–25)
CALCIUM SPEC-SCNC: 8 MG/DL (ref 8.6–10.5)
CHLORIDE SERPL-SCNC: 100 MMOL/L (ref 98–107)
CO2 SERPL-SCNC: 23 MMOL/L (ref 22–29)
CREAT BLD-MCNC: 0.76 MG/DL (ref 0.57–1)
DEPRECATED RDW RBC AUTO: 42.5 FL (ref 37–54)
EOSINOPHIL # BLD MANUAL: 0.01 10*3/MM3 (ref 0–0.4)
EOSINOPHIL NFR BLD MANUAL: 1 % (ref 0.3–6.2)
ERYTHROCYTE [DISTWIDTH] IN BLOOD BY AUTOMATED COUNT: 14.2 % (ref 12.3–15.4)
GFR SERPL CREATININE-BSD FRML MDRD: 82 ML/MIN/1.73
GLUCOSE BLD-MCNC: 97 MG/DL (ref 65–99)
GLUCOSE BLDC GLUCOMTR-MCNC: 112 MG/DL (ref 70–130)
GLUCOSE BLDC GLUCOMTR-MCNC: 116 MG/DL (ref 70–130)
GLUCOSE BLDC GLUCOMTR-MCNC: 124 MG/DL (ref 70–130)
GLUCOSE BLDC GLUCOMTR-MCNC: 130 MG/DL (ref 70–130)
HCT VFR BLD AUTO: 24.1 % (ref 34–46.6)
HGB BLD-MCNC: 7.3 G/DL (ref 12–15.9)
LYMPHOCYTES # BLD MANUAL: 0.49 10*3/MM3 (ref 0.7–3.1)
LYMPHOCYTES NFR BLD MANUAL: 25 % (ref 5–12)
LYMPHOCYTES NFR BLD MANUAL: 66 % (ref 19.6–45.3)
MAGNESIUM SERPL-MCNC: 1.4 MG/DL (ref 1.6–2.6)
MCH RBC QN AUTO: 25 PG (ref 26.6–33)
MCHC RBC AUTO-ENTMCNC: 30.3 G/DL (ref 31.5–35.7)
MCV RBC AUTO: 82.5 FL (ref 79–97)
METAMYELOCYTES NFR BLD MANUAL: 1 % (ref 0–0)
MONOCYTES # BLD AUTO: 0.19 10*3/MM3 (ref 0.1–0.9)
NEUTROPHILS # BLD AUTO: 0.01 10*3/MM3 (ref 1.7–7)
NEUTROPHILS NFR BLD MANUAL: 1 % (ref 42.7–76)
OVALOCYTES BLD QL SMEAR: ABNORMAL
PHOSPHATE SERPL-MCNC: 3.1 MG/DL (ref 2.5–4.5)
PLAT MORPH BLD: NORMAL
PLATELET # BLD AUTO: 65 10*3/MM3 (ref 140–450)
PMV BLD AUTO: 12.2 FL (ref 6–12)
POTASSIUM BLD-SCNC: 4 MMOL/L (ref 3.5–5.2)
RBC # BLD AUTO: 2.92 10*6/MM3 (ref 3.77–5.28)
RH BLD: POSITIVE
SMUDGE CELLS BLD QL SMEAR: ABNORMAL
SODIUM BLD-SCNC: 133 MMOL/L (ref 136–145)
T&S EXPIRATION DATE: NORMAL
VARIANT LYMPHS NFR BLD MANUAL: 5 % (ref 0–5)
WBC NRBC COR # BLD: 0.74 10*3/MM3 (ref 3.4–10.8)

## 2020-03-19 PROCEDURE — 86901 BLOOD TYPING SEROLOGIC RH(D): CPT | Performed by: OBSTETRICS & GYNECOLOGY

## 2020-03-19 PROCEDURE — 25010000002 MAGNESIUM SULFATE 2 GM/50ML SOLUTION: Performed by: INTERNAL MEDICINE

## 2020-03-19 PROCEDURE — 84100 ASSAY OF PHOSPHORUS: CPT | Performed by: INTERNAL MEDICINE

## 2020-03-19 PROCEDURE — P9016 RBC LEUKOCYTES REDUCED: HCPCS

## 2020-03-19 PROCEDURE — 86923 COMPATIBILITY TEST ELECTRIC: CPT

## 2020-03-19 PROCEDURE — 85007 BL SMEAR W/DIFF WBC COUNT: CPT | Performed by: INTERNAL MEDICINE

## 2020-03-19 PROCEDURE — 86850 RBC ANTIBODY SCREEN: CPT | Performed by: OBSTETRICS & GYNECOLOGY

## 2020-03-19 PROCEDURE — 25010000002 MORPHINE PER 10 MG: Performed by: FAMILY MEDICINE

## 2020-03-19 PROCEDURE — 80048 BASIC METABOLIC PNL TOTAL CA: CPT | Performed by: INTERNAL MEDICINE

## 2020-03-19 PROCEDURE — 86900 BLOOD TYPING SEROLOGIC ABO: CPT | Performed by: OBSTETRICS & GYNECOLOGY

## 2020-03-19 PROCEDURE — 83735 ASSAY OF MAGNESIUM: CPT | Performed by: INTERNAL MEDICINE

## 2020-03-19 PROCEDURE — 97163 PT EVAL HIGH COMPLEX 45 MIN: CPT

## 2020-03-19 PROCEDURE — G0108 DIAB MANAGE TRN  PER INDIV: HCPCS | Performed by: REGISTERED NURSE

## 2020-03-19 PROCEDURE — 99232 SBSQ HOSP IP/OBS MODERATE 35: CPT | Performed by: INTERNAL MEDICINE

## 2020-03-19 PROCEDURE — 25010000002 PIPERACILLIN SOD-TAZOBACTAM PER 1 G: Performed by: SURGERY

## 2020-03-19 PROCEDURE — 25010000002 FILGRASTIM PER 480 MCG: Performed by: OBSTETRICS & GYNECOLOGY

## 2020-03-19 PROCEDURE — 36430 TRANSFUSION BLD/BLD COMPNT: CPT

## 2020-03-19 PROCEDURE — 82962 GLUCOSE BLOOD TEST: CPT

## 2020-03-19 PROCEDURE — 85025 COMPLETE CBC W/AUTO DIFF WBC: CPT | Performed by: INTERNAL MEDICINE

## 2020-03-19 PROCEDURE — 25010000002 ONDANSETRON PER 1 MG: Performed by: PHYSICIAN ASSISTANT

## 2020-03-19 PROCEDURE — 97605 NEG PRS WND THER DME<=50SQCM: CPT

## 2020-03-19 PROCEDURE — 25010000002 PIPERACILLIN SOD-TAZOBACTAM PER 1 G: Performed by: INTERNAL MEDICINE

## 2020-03-19 PROCEDURE — 86900 BLOOD TYPING SEROLOGIC ABO: CPT

## 2020-03-19 PROCEDURE — 99232 SBSQ HOSP IP/OBS MODERATE 35: CPT | Performed by: OBSTETRICS & GYNECOLOGY

## 2020-03-19 RX ORDER — SODIUM CHLORIDE, SODIUM LACTATE, POTASSIUM CHLORIDE, CALCIUM CHLORIDE 600; 310; 30; 20 MG/100ML; MG/100ML; MG/100ML; MG/100ML
9 INJECTION, SOLUTION INTRAVENOUS CONTINUOUS
Status: CANCELLED | OUTPATIENT
Start: 2020-03-19

## 2020-03-19 RX ORDER — MAGNESIUM SULFATE HEPTAHYDRATE 40 MG/ML
2 INJECTION, SOLUTION INTRAVENOUS AS NEEDED
Status: DISCONTINUED | OUTPATIENT
Start: 2020-03-19 | End: 2020-03-23 | Stop reason: HOSPADM

## 2020-03-19 RX ORDER — LIDOCAINE HYDROCHLORIDE 10 MG/ML
0.5 INJECTION, SOLUTION EPIDURAL; INFILTRATION; INTRACAUDAL; PERINEURAL ONCE AS NEEDED
Status: CANCELLED | OUTPATIENT
Start: 2020-03-19

## 2020-03-19 RX ORDER — SODIUM CHLORIDE 0.9 % (FLUSH) 0.9 %
10 SYRINGE (ML) INJECTION AS NEEDED
Status: CANCELLED | OUTPATIENT
Start: 2020-03-19

## 2020-03-19 RX ORDER — MAGNESIUM SULFATE HEPTAHYDRATE 40 MG/ML
4 INJECTION, SOLUTION INTRAVENOUS AS NEEDED
Status: DISCONTINUED | OUTPATIENT
Start: 2020-03-19 | End: 2020-03-23 | Stop reason: HOSPADM

## 2020-03-19 RX ORDER — SODIUM CHLORIDE 0.9 % (FLUSH) 0.9 %
10 SYRINGE (ML) INJECTION EVERY 12 HOURS SCHEDULED
Status: CANCELLED | OUTPATIENT
Start: 2020-03-19

## 2020-03-19 RX ADMIN — TAZOBACTAM SODIUM AND PIPERACILLIN SODIUM 3.38 G: 375; 3 INJECTION, SOLUTION INTRAVENOUS at 09:05

## 2020-03-19 RX ADMIN — ACETAMINOPHEN 650 MG: 325 TABLET, FILM COATED ORAL at 18:00

## 2020-03-19 RX ADMIN — MORPHINE SULFATE 3 MG: 4 INJECTION, SOLUTION INTRAMUSCULAR; INTRAVENOUS at 23:47

## 2020-03-19 RX ADMIN — MORPHINE SULFATE 3 MG: 4 INJECTION, SOLUTION INTRAMUSCULAR; INTRAVENOUS at 21:30

## 2020-03-19 RX ADMIN — DOCUSATE SODIUM 200 MG: 100 CAPSULE, LIQUID FILLED ORAL at 21:30

## 2020-03-19 RX ADMIN — MAGNESIUM SULFATE 2 G: 2 INJECTION INTRAVENOUS at 11:46

## 2020-03-19 RX ADMIN — PANTOPRAZOLE SODIUM 40 MG: 40 TABLET, DELAYED RELEASE ORAL at 05:43

## 2020-03-19 RX ADMIN — ACETAMINOPHEN 650 MG: 325 TABLET, FILM COATED ORAL at 11:46

## 2020-03-19 RX ADMIN — ACETAMINOPHEN 650 MG: 325 TABLET, FILM COATED ORAL at 05:43

## 2020-03-19 RX ADMIN — FILGRASTIM 480 MCG: 480 INJECTION, SOLUTION INTRAVENOUS; SUBCUTANEOUS at 18:20

## 2020-03-19 RX ADMIN — DOCUSATE SODIUM 200 MG: 100 CAPSULE, LIQUID FILLED ORAL at 08:59

## 2020-03-19 RX ADMIN — CETIRIZINE HYDROCHLORIDE 10 MG: 10 TABLET, FILM COATED ORAL at 08:59

## 2020-03-19 RX ADMIN — GABAPENTIN 300 MG: 300 CAPSULE ORAL at 16:56

## 2020-03-19 RX ADMIN — MORPHINE SULFATE 3 MG: 4 INJECTION, SOLUTION INTRAMUSCULAR; INTRAVENOUS at 05:44

## 2020-03-19 RX ADMIN — MORPHINE SULFATE 3 MG: 4 INJECTION, SOLUTION INTRAMUSCULAR; INTRAVENOUS at 18:00

## 2020-03-19 RX ADMIN — ONDANSETRON 4 MG: 2 INJECTION INTRAMUSCULAR; INTRAVENOUS at 08:59

## 2020-03-19 RX ADMIN — MORPHINE SULFATE 3 MG: 4 INJECTION, SOLUTION INTRAMUSCULAR; INTRAVENOUS at 11:57

## 2020-03-19 RX ADMIN — GABAPENTIN 300 MG: 300 CAPSULE ORAL at 21:30

## 2020-03-19 RX ADMIN — ACETAMINOPHEN 650 MG: 325 TABLET, FILM COATED ORAL at 23:47

## 2020-03-19 RX ADMIN — TAZOBACTAM SODIUM AND PIPERACILLIN SODIUM 3.38 G: 375; 3 INJECTION, SOLUTION INTRAVENOUS at 21:53

## 2020-03-19 RX ADMIN — TAZOBACTAM SODIUM AND PIPERACILLIN SODIUM 3.38 G: 375; 3 INJECTION, SOLUTION INTRAVENOUS at 01:20

## 2020-03-19 RX ADMIN — GABAPENTIN 300 MG: 300 CAPSULE ORAL at 08:59

## 2020-03-19 NOTE — PLAN OF CARE
Problem: Patient Care Overview  Goal: Plan of Care Review  Outcome: Ongoing (interventions implemented as appropriate)  Flowsheets (Taken 3/19/2020 8594)  Progress: no change  Plan of Care Reviewed With: patient  Note:   Pt tachy most of the day; c/o abd pain relieved by prn morphine; nausea this am, relieved with prn med; has had low grade fever at times today; pt receiving 1 unit prbc's currently; will c/m

## 2020-03-19 NOTE — PLAN OF CARE
Problem: Patient Care Overview  Goal: Plan of Care Review  Outcome: Ongoing (interventions implemented as appropriate)  Flowsheets (Taken 3/19/2020 0900)  Plan of Care Reviewed With: patient  Outcome Summary: Pt with complex open wound to abdomen and R great toe amputation site.  Pt will benefit from wound vac to abdomen to help increase healing potential and decrease bioburden and wound management to great toe amputation site to improve healing potential

## 2020-03-19 NOTE — PROGRESS NOTES
"General Surgery Daily Progress Note    Subjective:  Feeling better    Objective:  /48 (BP Location: Right arm, Patient Position: Sitting)   Pulse 97   Temp 99.3 °F (37.4 °C) (Oral)   Resp 18   Ht 157.5 cm (62\")   Wt 87.3 kg (192 lb 6.4 oz)   LMP  (LMP Unknown)   SpO2 99%   BMI 35.19 kg/m²       General Appearance: Mild distress  Eyes: Anicteric  Neck: Trachea midline   Cardiovascular:  RRR without murmur nor rub  Lungs:  Bilateral respirations unlabored   Abdomen:  Soft, mild tenderness right sided  Extremities:  No cyanosis or edema   Skin:  No obvious rashes   Neurologic: awake and conversant       Imaging Results (Last 24 Hours)     ** No results found for the last 24 hours. **          CBC:  Results from last 7 days   Lab Units 03/19/20  0502   WBC 10*3/mm3 0.74*   HEMOGLOBIN g/dL 7.3*   HEMATOCRIT % 24.1*   PLATELETS 10*3/mm3 65*       CMP:  Results from last 7 days   Lab Units 03/19/20  0502  03/17/20  2301   SODIUM mmol/L 133*   < > 129*   POTASSIUM mmol/L 4.0   < > 4.3   CHLORIDE mmol/L 100   < > 95*   CO2 mmol/L 23.0   < > 24.0   BUN mg/dL 14   < > 23*   CREATININE mg/dL 0.76   < > 0.98   CALCIUM mg/dL 8.0*   < > 8.5*   BILIRUBIN mg/dL  --   --  0.7   ALK PHOS U/L  --   --  49   ALT (SGPT) U/L  --   --  9   AST (SGOT) U/L  --   --  12   GLUCOSE mg/dL 97   < > 241*    < > = values in this interval not displayed.         Assessment:  Acute appendicitis  Pancytopenia    Plan:   May have popsicles and sips of water.  ANC lower.  Follow labs. Clinically stable.    Praful Myers MD - 3/19/2020, 12:39        "

## 2020-03-19 NOTE — CONSULTS
"Patient gave permission for bedside visit. Mother in room as well as 2 staff nurses. Patient states that she is feeling confident managing her DM. She shared that she has been stable with her AIC \"around 7%\" She is in the process of establishing care with Dr Uribe endocrinology, but her appointment was today. She did give me permission to contact the office regarding her missed appointment, of which I did as requested. She discussed how blood sugars were elevated during her first chemo treatment, due to the steroids prior to the infusion. Did discuss the need for continued monitoring and insulin as needed for coverage. Encouraged healthy nutrition when allowed. She shared that she does watch her intake of processed carbohydrates which does elevate her glucose quickly. She did ask what the recommended carb intake per meals for her, and I was able to give the ADA recommended guideline of 45-60 carbs per meal. She denies any further questions or concerns at this time, but was encouraged to contact us, if we can be of any support. Thank you for this referral.   "

## 2020-03-19 NOTE — PAYOR COMM NOTE
"Velma Waters RN   Phone [phone]  Fax [phone]      Jeana Chung (44 y.o. Female)     Date of Birth Social Security Number Address Home Phone MRN    1975  [address] [place] [address] [phone] [id number]    [employer] Marital Status          None        Admission Date Admission Type Admitting Provider Attending Provider Department, Room/Bed    3/17/20 Emergency Lakhwinder Mix MD Kalantar, Masoud, MD [address], S569/1    Discharge Date Discharge Disposition Discharge Destination                       Attending Provider:  Lakhwinder Mix MD    Allergies:  Bactrim [Sulfamethoxazole-trimethoprim], Penicillins, Promethazine    Isolation:  None   Infection:  None   Code Status:  CPR    Ht:  157.5 cm (62\")   Wt:  87.3 kg (192 lb 6.4 oz)    Admission Cmt:  None   Principal Problem:  Acute appendicitis [K35.80]                 Active Insurance as of 3/17/2020     Primary Coverage     Payor Plan Insurance Group Employer/Plan Group    [employer] PPO [id number]     Payor Plan Address Payor Plan Phone Number Payor Plan Fax Number Effective Dates    [id number] [phone]  1/1/2020 - None Entered    [place] [address]       Subscriber Name Subscriber Birth Date Member ID       JEANA CHUNG 1975 [id number]                 Emergency Contacts      (Rel.) Home Phone Work Phone Mobile Phone    МАРИНА WADE (Spouse) [phone] -- [phone]    Rosa Sloan (Mother) [phone] -- [phone]            Vital Signs (last day)     Date/Time   Temp   Temp src   Pulse   Resp   BP   Patient Position   SpO2    03/19/20 0900   --   --   97   --   107/48   Sitting   99    03/19/20 0821   99.3 (37.4)   Oral   --   --   --   --   --    03/19/20 0730   --   --   --   --   91/53   Sitting   --    03/19/20 0725   100.2 (37.9)   Oral   99   18   (!) 87/48   Sitting   98    03/19/20 0724   --   -- "   100   18   (!) 85/47   Sitting   98    03/19/20 0723   --   --   100   --   --   --   97    03/19/20 0722   --   --   101   --   --   --   98    03/19/20 0721   --   --   100   --   --   --   97 03/19/20 0720   --   --   101   --   --   --   97 03/19/20 0719   --   --   100   --   --   --   97    03/19/20 0718   --   --   100   --   --   --   98    03/19/20 0717   --   --   102   --   --   --   98    03/19/20 0716   --   --   100   --   --   --   97    03/19/20 0715   --   --   102   --   --   --   98    03/19/20 0714   --   --   102   --   --   --   97 03/19/20 0713   --   --   100   --   --   --   97 03/19/20 0712   --   --   101   --   --   --   97 03/19/20 0711   --   --   101   --   --   --   98    03/19/20 0710   --   --   101   --   --   --   97    03/19/20 0709   --   --   101   --   --   --   97 03/19/20 0708   --   --   101   --   --   --   97 03/19/20 0707   --   --   100   --   --   --   97    03/19/20 0706   --   --   101   --   --   --   97    03/19/20 0705   --   --   115   --   --   --   97    03/19/20 0704   --   --   --   --   --   --   97    03/19/20 0703   --   --   103   --   --   --   97    03/19/20 0702   --   --   --   --   --   --   97    03/19/20 0701   --   --   107   --   --   --   97    03/19/20 0700   --   --   105   --   --   --   97    03/19/20 0504   --   --   109   --   --   --   98    03/19/20 0304   --   --   102   --   --   --   99    03/19/20 0250   98.7 (37.1)   Oral   102   --   --   --   --    03/19/20 0119   (!) 100.7 (38.2)   Oral   --   --   --   --   --    03/19/20 0104   --   --   102   --   --   --   98    03/19/20 0028   100.2 (37.9)   Oral   108   --   --   --   98    03/18/20 2307   100 (37.8)   Oral   114   20   118/66   Lying   99    03/18/20 2101   --   --   106   --   --   --   100    03/18/20 1902   98.8 (37.1)   Oral   103   18   109/62   Lying   100    03/18/20 1629   99.1 (37.3)   Oral   101   18   105/65   Lying   99    03/18/20 1120    99.5 (37.5)   Oral   108   18   102/65   Lying   --    03/18/20 0732   98.8 (37.1)   Oral   108   18   101/53   Lying   94    03/18/20 0633   --   --   107   --   --   --   91    03/18/20 0630   --   --   108   --   --   --   92    03/18/20 0600   --   --   107   --   --   --   94    03/18/20 0530   --   --   --   --   --   --   96    03/18/20 0500   --   --   105   --   --   --   96    03/18/20 0443   99 (37.2)   Oral   110   18   102/63   Sitting   98    03/18/20 0430   --   --   --   --   --   --   100    03/18/20 0300   --   --   110   --   --   --   98    03/18/20 0245   --   --   112   --   133/75   --   98    03/18/20 0230   --   --   112   --   109/63   --   98    03/18/20 0215   --   --   113   --   128/74   --   98    03/18/20 0200   --   --   110   --   119/73   --   95    03/18/20 0145   --   --   112   --   135/78   --   98    03/18/20 0130   --   --   109   --   116/68   --   95    03/18/20 0115   --   --   111   --   110/58   --   97    03/18/20 0100   --   --   112   --   102/55   --   97    03/18/20 0030   --   --   112   --   110/59   --   95    03/18/20 0015   --   --   112   --   104/64   --   98    03/18/20 0000   --   --   --   --   130/63   --   98              Current Facility-Administered Medications   Medication Dose Route Frequency Provider Last Rate Last Dose   • acetaminophen (TYLENOL) tablet 650 mg  650 mg Oral Q6H Enedina Guillen PA-C   650 mg at 03/19/20 1146   • cetirizine (zyrTEC) tablet 10 mg  10 mg Oral Daily Enedina Guillen PA-C   10 mg at 03/19/20 0859   • dextrose (D50W) 25 g/ 50mL Intravenous Solution 25 g  25 g Intravenous Q15 Min PRN Enedina Guillen PA-C       • dextrose (GLUTOSE) oral gel 15 g  15 g Oral Q15 Min PRN Enedina Guillen PA-C       • docusate sodium (COLACE) capsule 200 mg  200 mg Oral BID Enedina Guillen PA-C   200 mg at 03/19/20 0859   • filgrastim (NEUPOGEN) injection 480 mcg  480 mcg Subcutaneous Q PM Celine Rubio MD   480 mcg at 03/18/20 1710   •  gabapentin (NEURONTIN) capsule 300 mg  300 mg Oral TID Selma Salomon, DO   300 mg at 03/19/20 0859   • glucagon (human recombinant) (GLUCAGEN DIAGNOSTIC) injection 1 mg  1 mg Subcutaneous Q15 Min PRN Enedina Guillen PA-C       • hydrOXYzine (ATARAX) tablet 25 mg  25 mg Oral TID PRN Enedina Guillen PA-C       • insulin detemir (LEVEMIR) injection 20 Units  20 Units Subcutaneous Nightly Enedina Guillen PA-C       • insulin lispro (humaLOG) injection 0-7 Units  0-7 Units Subcutaneous 4x Daily With Meals & Nightly Enedina Guillen PA-C   2 Units at 03/18/20 0906   • LORazepam (ATIVAN) tablet 1 mg  1 mg Oral Q6H PRN Selma Salomon, DO       • Magnesium Sulfate 2 gram Bolus, followed by 8 gram infusion (total Mg dose 10 grams)- Mg less than or equal to 1mg/dL  2 g Intravenous PRN Lakhwinder Mix MD        Or   • Magnesium Sulfate 2 gram / 50mL Infusion (GIVE X 3 BAGS TO EQUAL 6GM TOTAL DOSE) - Mg 1.1 - 1.5 mg/dl  2 g Intravenous PRN Lakhwinder Mix MD 25 mL/hr at 03/19/20 1146 2 g at 03/19/20 1146    Or   • Magnesium Sulfate 4 gram infusion- Mg 1.6-1.9 mg/dL  4 g Intravenous PRN Lakhwinder Mix MD       • melatonin tablet 5 mg  5 mg Oral Nightly PRN Enedina Guillen PA-C       • Morphine sulfate (PF) injection 3 mg  3 mg Intravenous Q2H PRN Selma Salomon, DO   3 mg at 03/19/20 1157   • ondansetron (ZOFRAN) tablet 4 mg  4 mg Oral Q6H PRN Enedina Guillen PA-C        Or   • ondansetron (ZOFRAN) injection 4 mg  4 mg Intravenous Q6H PRN Enedina Guillen PA-C   4 mg at 03/19/20 0859   • pantoprazole (PROTONIX) EC tablet 40 mg  40 mg Oral QAM Enedina Guillen PA-C   40 mg at 03/19/20 0543   • piperacillin-tazobactam (ZOSYN) 3.375 g in iso-osmotic dextrose 50 ml (premix)  3.375 g Intravenous Q8H Praful Myers MD 0 mL/hr at 03/18/20 1300 3.375 g at 03/19/20 0905   • polyethylene glycol 3350 powder (packet)  17 g Oral Daily PRN Enedina Guillen PA-C       • sodium chloride 0.9 % flush 10 mL  10 mL Intravenous PRN  Carlyle Bryant PA       • sodium chloride 0.9 % flush 10 mL  10 mL Intravenous Q12H Enedina Guillen PA-C   10 mL at 03/18/20 0905   • sodium chloride 0.9 % flush 10 mL  10 mL Intravenous PRN Enedina Guillen PA-C       • sodium chloride 0.9 % infusion  100 mL/hr Intravenous Continuous Aime, Selma J,  mL/hr at 03/18/20 2110 100 mL/hr at 03/18/20 2110   • traMADol (ULTRAM) tablet 50 mg  50 mg Oral Q6H PRN Aime, Selma J, DO   50 mg at 03/18/20 2059     Lab Results (last 24 hours)     Procedure Component Value Units Date/Time    POC Glucose Once [[id number]]  (Normal) Collected:  03/19/20 1153    Specimen:  Blood Updated:  03/19/20 1156     Glucose 112 mg/dL     Manual Differential [[id number]]  (Abnormal) Collected:  03/19/20 0502    Specimen:  Blood Updated:  03/19/20 0848     Neutrophil % 1.0 %      Lymphocyte % 66.0 %      Monocyte % 25.0 %      Eosinophil % 1.0 %      Basophil % 1.0 %      Metamyelocyte % 1.0 %      Atypical Lymphocyte % 5.0 %      Neutrophils Absolute 0.01 10*3/mm3      Lymphocytes Absolute 0.49 10*3/mm3      Monocytes Absolute 0.19 10*3/mm3      Eosinophils Absolute 0.01 10*3/mm3      Basophils Absolute 0.01 10*3/mm3      Ovalocytes Slight/1+     Smudge Cells Slight/1+     Platelet Morphology Normal    CBC & Differential [[id number]] Collected:  03/19/20 0502    Specimen:  Blood Updated:  03/19/20 0848    Narrative:       The following orders were created for panel order CBC & Differential.  Procedure                               Abnormality         Status                     ---------                               -----------         ------                     CBC Auto Differential[[id number]]        Abnormal            Final result                 Please view results for these tests on the individual orders.    CBC Auto Differential [[id number]]  (Abnormal) Collected:  03/19/20 0502    Specimen:  Blood Updated:  03/19/20 0848     WBC 0.74 10*3/mm3      RBC 2.92 10*6/mm3      Hemoglobin 7.3  g/dL      Hematocrit 24.1 %      MCV 82.5 fL      MCH 25.0 pg      MCHC 30.3 g/dL      RDW 14.2 %      RDW-SD 42.5 fl      MPV 12.2 fL      Platelets 65 10*3/mm3     POC Glucose Once [[id number]]  (Normal) Collected:  03/19/20 0720    Specimen:  Blood Updated:  03/19/20 0722     Glucose 124 mg/dL     Basic Metabolic Panel [[id number]]  (Abnormal) Collected:  03/19/20 0502    Specimen:  Blood Updated:  03/19/20 0651     Glucose 97 mg/dL      BUN 14 mg/dL      Creatinine 0.76 mg/dL      Sodium 133 mmol/L      Potassium 4.0 mmol/L      Chloride 100 mmol/L      CO2 23.0 mmol/L      Calcium 8.0 mg/dL      eGFR Non African Amer 82 mL/min/1.73      BUN/Creatinine Ratio 18.4     Anion Gap 10.0 mmol/L     Narrative:       GFR Normal >60  Chronic Kidney Disease <60  Kidney Failure <15      Magnesium [[id number]]  (Abnormal) Collected:  03/19/20 0502    Specimen:  Blood Updated:  03/19/20 0650     Magnesium 1.4 mg/dL     Phosphorus [[id number]]  (Normal) Collected:  03/19/20 0502    Specimen:  Blood Updated:  03/19/20 0650     Phosphorus 3.1 mg/dL     Blood Culture - Blood, Arm, Left [[id number]] Collected:  03/18/20 0322    Specimen:  Blood from Arm, Left Updated:  03/19/20 0530     Blood Culture No growth at 24 hours    Blood Culture - Blood, Arm, Right [[id number]] Collected:  03/18/20 0322    Specimen:  Blood from Arm, Right Updated:  03/19/20 0530     Blood Culture No growth at 24 hours    POC Glucose Once [[id number]]  (Normal) Collected:  03/18/20 1959    Specimen:  Blood Updated:  03/18/20 2000     Glucose 124 mg/dL     POC Glucose Once [[id number]]  (Normal) Collected:  03/18/20 1630    Specimen:  Blood Updated:  03/18/20 1632     Glucose 118 mg/dL         Imaging Results (Last 24 Hours)     ** No results found for the last 24 hours. **           Physician Progress Notes (last 24 hours) (Notes from 03/18/20 1349 through 03/19/20 1349)      Praful Myers MD at 03/19/20 1239          General Surgery Daily Progress  "Note    Subjective:  Feeling better    Objective:  /48 (BP Location: Right arm, Patient Position: Sitting)   Pulse 97   Temp 99.3 °F (37.4 °C) (Oral)   Resp 18   Ht 157.5 cm (62\")   Wt 87.3 kg (192 lb 6.4 oz)   LMP  (LMP Unknown)   SpO2 99%   BMI 35.19 kg/m²        General Appearance: Mild distress  Eyes: Anicteric  Neck: Trachea midline   Cardiovascular:  RRR without murmur nor rub  Lungs:  Bilateral respirations unlabored   Abdomen:  Soft, mild tenderness right sided  Extremities:  No cyanosis or edema   Skin:  No obvious rashes   Neurologic: awake and conversant       Imaging Results (Last 24 Hours)     ** No results found for the last 24 hours. **          CBC:  Results from last 7 days   Lab Units 03/19/20  0502   WBC 10*3/mm3 0.74*   HEMOGLOBIN g/dL 7.3*   HEMATOCRIT % 24.1*   PLATELETS 10*3/mm3 65*       CMP:  Results from last 7 days   Lab Units 03/19/20  0502  03/17/20  2301   SODIUM mmol/L 133*   < > 129*   POTASSIUM mmol/L 4.0   < > 4.3   CHLORIDE mmol/L 100   < > 95*   CO2 mmol/L 23.0   < > 24.0   BUN mg/dL 14   < > 23*   CREATININE mg/dL 0.76   < > 0.98   CALCIUM mg/dL 8.0*   < > 8.5*   BILIRUBIN mg/dL  --   --  0.7   ALK PHOS U/L  --   --  49   ALT (SGPT) U/L  --   --  9   AST (SGOT) U/L  --   --  12   GLUCOSE mg/dL 97   < > 241*    < > = values in this interval not displayed.         Assessment:  Acute appendicitis  Pancytopenia    Plan:   May have popsicles and sips of water.  ANC lower.  Follow labs. Clinically stable.    Praful Myers MD - 3/19/2020, 12:39          Electronically signed by Praful Myers MD at 03/19/20 1241     Celine Rubio MD at 03/19/20 0811          Gynecologic Oncology   Daily Progress Note    Chief Complaint: Malaise, abdominal pain    Subjective   Patient did well overnight.  Her pain is controlled.  No nausea, vomiting.  She is currently n.p.o.  Abdominal pain is slightly improved.  Had a fever overnight but overall felt well.    Updated " ROS is remarkable for no chills, chest pain, shortness of breath      Objective   Temp:  [98.7 °F (37.1 °C)-100.7 °F (38.2 °C)] 99.3 °F (37.4 °C)  Heart Rate:  [] 99  Resp:  [18-20] 18  BP: ()/(47-66) 91/53  Vitals:    03/19/20 0821   BP:    Pulse:    Resp:    Temp: 99.3 °F (37.4 °C)   SpO2:      I/O last 3 completed shifts:  In: 100 [IV Piggyback:100]  Out: -      GENERAL: Alert, well-appearing female in no apparent distress.    CARDIOVASCULAR: Normal rate, regular rhythm, no murmurs, rubs, or gallops.    RESPIRATORY: Clear to auscultation bilaterally, normal respiratory effort  GASTROINTESTINAL:  Soft, non-distended, no rebound or guarding.  Right lower quadrant tenderness to palpation.   GENITOURINARY: Deferred  SKIN:  Warm, dry, well-perfused.    PSYCHIATRIC: AO x3, with appropriate affect, normal thought processes  EXREMITIES: Symmetric. No peripheral edema.    Lab Results   Component Value Date    WBC 0.74 (C) 03/19/2020    HGB 7.3 (L) 03/19/2020    HCT 24.1 (L) 03/19/2020    MCV 82.5 03/19/2020    PLT 65 (L) 03/19/2020    NEUTROABS 0.13 (L) 03/18/2020    GLUCOSE 97 03/19/2020    BUN 14 03/19/2020    CREATININE 0.76 03/19/2020    EGFRIFNONA 82 03/19/2020     (L) 03/19/2020    K 4.0 03/19/2020     03/19/2020    CO2 23.0 03/19/2020    MG 1.4 (L) 03/19/2020    CALCIUM 8.0 (L) 03/19/2020         Assessment/Plan   Jeana Chung is a 44 y.o. with stage IIIa endometrial cancer, status post exploratory laparotomy, total abdominal hysterectomy, bilateral salpingo-oophorectomy, omentectomy on 2/10/2020, complicated by wound dehiscence now with wound VAC in place.  She underwent first cycle of chemo 3/10/2020 with carboplatin and paclitaxel.  She is admitted with acute appendicitis.     Acute appendicitis  -Surgery consulted, ID consulted  -Blood cultures pending  -Continue Zosyn  -Currently n.p.o.     Pancytopenia secondary to recent chemo  -Started on Neupogen  -Continue to trend  labs  -Neutropenic precautions  -Transfuse 1 unit packed red blood cells 3/19/2020     Diabetes with diabetic neuropathy and retinopathy, chemotherapy exacerbation of pre-existing neuropathy  -Trend fingersticks, continue insulin  -Continue home gabapentin     Wound dehiscence  -Continue wound VAC     Disposition  -Continue inpatient management        Vilma Burleson MD  03/19/20  08:41    I saw and evaluated the patient. I agree with the findings and the plan of care as documented in the note.  Transfuse 1 unit packed red blood cell as patient is hypotensive and there is a tentative surgical plan.  Will defer transfusion of platelets to Dr. Myers as this would be best utilized exactly at the time of surgery if necessary.    Celine Rubio MD  03/19/20  1:07 PM      Electronically signed by Celine Rubio MD at 03/19/20 1310     Libby Hoffman APRN at 03/19/20 0643          INFECTIOUS DISEASE PROGRESS NOTE   Jeana Chung  1975  [id number]      Admission Date: 3/17/2020      Requesting Provider: No ref. provider found  Evaluating Physician: SYLVIA Enrique    Reason for Consultation:  Acute appendicitis     History of present illness:  3/18/20:    3/18/2020: Patient is a 44 y.o. female with endometrial cancer on chemotherapy,( last on 3/10)  CKD, T2DM,, seen today for an abdominal wound infection.  She underwent a REGINALDO, bilateral salpingo-oophrectomy, omentectomy 2/11/20, with postoperative wound dehiscence for which she has been followed by PT wound care.  She was seen in PT wound care yesterday for a new foot ulcer, and once home developed a fever of 101.3, with fatigue and weakness and right lower quadrant pain.  She was instructed to present to the ED.  An abdominal CT revealed acute appendicitis, no perforation or abscess, splenomegaly. CXR without acute cardiopulmonary findings. Tmax of 99.4, pulse of 124, .  Admitting labs with pancytopenia, normal lactate.  Blood  cultures pending .   3/19/20:  Tmax of 100.7 last pm.  She continues to have RLQ discomfort.  No nausea.  PT to change vac today.      Past Medical History:   Diagnosis Date   • Anemia    • Anxiety and depression    • Back pain    • Bronchitis    • Diabetes (CMS/HCC)     DX 4-5 YRS AGO, CHECKS BS 4X/DAY, LAST A1C 7.1%   • Endometrial cancer (CMS/HCC)     STAGE II   • GERD (gastroesophageal reflux disease)    • Hypertension    • MRSA (methicillin resistant staph aureus) culture positive     S/P NECROTIZING FASCITIS IN BILATERAL FEET   • Necrotizing fasciitis (CMS/HCC)    • PCOS (polycystic ovarian syndrome)    • PTSD (post-traumatic stress disorder)    • Retinopathy 3/18/2020   • Sepsis (CMS/HCC)    • Stage 3 chronic kidney disease (CMS/HCC) 2/24/2020   • Subconjunctival hemorrhage        Past Surgical History:   Procedure Laterality Date   • EXPLORATORY LAPAROTOMY, TOTAL ABDOMINAL HYSTERECTOMY SALPINGO OOPHORECTOMY N/A 2/10/2020    Procedure: EXPLORATORY LAPAROTOMY, TOTAL ABDOMINAL HYSTERECTOMY, BILATERAL SALPINGO-OOPHORECTOMY WITH OPTIMAL  STAGING (R-0), OMENTECTOMY;  Surgeon: Celine Rubio MD;  Location: [facility];  Service: Gynecology Oncology;  Laterality: N/A;   • EYE SURGERY Left     BLOOD VESSEL IN EYE REPAIR   • TOE AMPUTATION Left 06/2019    big toe - unhealing sore   • TOE AMPUTATION Right 2018    big toe and second toe - Necrotizing fasciitis and MRSA        Family History   Problem Relation Age of Onset   • Fibroids Mother    • Diabetes type II Mother    • Hypertension Mother    • Heart attack Mother    • Fibroids Sister         PCOS   • Diabetes type II Sister    • Dementia Maternal Grandmother    • Stroke Maternal Grandmother        Social History     Socioeconomic History   • Marital status:      Spouse name: Not on file   • Number of children: 1   • Years of education: Not on file   • Highest education level: Not on file   Tobacco Use   • Smoking status: Former Smoker     Types:  Cigarettes     Last attempt to quit: 2000     Years since quittin.2   • Smokeless tobacco: Never Used   • Tobacco comment: social MAYBE 1 CIG/DAY   Substance and Sexual Activity   • Alcohol use: Not Currently     Frequency: Monthly or less     Drinks per session: 1 or 2   • Drug use: Never   • Sexual activity: Not Currently   Social History Narrative    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 Staying in [place] with mother due to Endometrial Cancer.        Allergies   Allergen Reactions   • Bactrim [Sulfamethoxazole-Trimethoprim] Anaphylaxis   • Penicillins Hives   • Promethazine Mental Status Change         Medication:    Current Facility-Administered Medications:   •  acetaminophen (TYLENOL) tablet 650 mg, 650 mg, Oral, Q6H, Enedina Guillen PA-C, 650 mg at 20 0543  •  cetirizine (zyrTEC) tablet 10 mg, 10 mg, Oral, Daily, Enedina Guillen PA-C, 10 mg at 20 0905  •  dextrose (D50W) 25 g/ 50mL Intravenous Solution 25 g, 25 g, Intravenous, Q15 Min PRN, Enedina Guillen PA-C  •  dextrose (GLUTOSE) oral gel 15 g, 15 g, Oral, Q15 Min PRN, Enedina Guillen PA-C  •  docusate sodium (COLACE) capsule 200 mg, 200 mg, Oral, BID, Enedina Guillen PA-C, 200 mg at 20  •  filgrastim (NEUPOGEN) injection 480 mcg, 480 mcg, Subcutaneous, Q PM, Celine Rubio MD, 480 mcg at 20  •  gabapentin (NEURONTIN) capsule 300 mg, 300 mg, Oral, TID, Selma Salomon DO, 300 mg at 20  •  glucagon (human recombinant) (GLUCAGEN DIAGNOSTIC) injection 1 mg, 1 mg, Subcutaneous, Q15 Min PRN, Enedina Guillen PA-C  •  hydrOXYzine (ATARAX) tablet 25 mg, 25 mg, Oral, TID PRN, Enedina Guillen PA-C  •  insulin detemir (LEVEMIR) injection 20 Units, 20 Units, Subcutaneous, Nightly, Enedina Guillen PA-C  •  insulin lispro (humaLOG) injection 0-7 Units, 0-7 Units, Subcutaneous, 4x Daily With Meals & Nightly, Enedina Guillen PA-C, 2 Units at 20 0906  •  LORazepam (ATIVAN) tablet 1 mg, 1  mg, Oral, Q6H PRN, Selma Salomon DO  •  melatonin tablet 5 mg, 5 mg, Oral, Nightly PRN, Enedina Guillen PA-C  •  Morphine sulfate (PF) injection 3 mg, 3 mg, Intravenous, Q2H PRN, Selma Salomon, DO, 3 mg at 03/19/20 0544  •  ondansetron (ZOFRAN) tablet 4 mg, 4 mg, Oral, Q6H PRN **OR** ondansetron (ZOFRAN) injection 4 mg, 4 mg, Intravenous, Q6H PRN, Enedina Guillen PA-C, 4 mg at 03/18/20 2322  •  pantoprazole (PROTONIX) EC tablet 40 mg, 40 mg, Oral, QAM, Enedina Guillen PA-C, 40 mg at 03/19/20 0543  •  piperacillin-tazobactam (ZOSYN) 3.375 g in iso-osmotic dextrose 50 ml (premix), 3.375 g, Intravenous, Q8H, Praful Myers MD, Last Rate: 0 mL/hr at 03/18/20 1300, 3.375 g at 03/19/20 0120  •  polyethylene glycol 3350 powder (packet), 17 g, Oral, Daily PRN, Enedina Guillen PA-C  •  [COMPLETED] Insert peripheral IV, , , Once **AND** sodium chloride 0.9 % flush 10 mL, 10 mL, Intravenous, PRN, Carlyle Bryant PA  •  sodium chloride 0.9 % flush 10 mL, 10 mL, Intravenous, Q12H, Enedina Guillen PA-C, 10 mL at 03/18/20 0905  •  sodium chloride 0.9 % flush 10 mL, 10 mL, Intravenous, PRN, Enedina Guillen PA-C  •  sodium chloride 0.9 % infusion, 100 mL/hr, Intravenous, Continuous, Selma Salomon DO, Last Rate: 100 mL/hr at 03/18/20 2110, 100 mL/hr at 03/18/20 2110  •  traMADol (ULTRAM) tablet 50 mg, 50 mg, Oral, Q6H PRN, Selma Salomon DO, 50 mg at 03/18/20 2059    Antibiotics:  Anti-Infectives (From admission, onward)    Ordered     Dose/Rate Route Frequency Start Stop    03/18/20 0323  piperacillin-tazobactam (ZOSYN) 3.375 g in iso-osmotic dextrose 50 ml (premix)  Review   Ordering Provider:  Praful Myers MD    3.375 g  over 4 Hours Intravenous Every 8 Hours 03/18/20 1000 03/23/20 0959    03/18/20 0147  piperacillin-tazobactam (ZOSYN) 4.5 g in iso-osmotic dextrose 100 mL IVPB (premix)     Ordering Provider:  Carlyle Bryant PA    4.5 g Intravenous Once 03/18/20 0149 03/18/20 0443                   Physical Exam:   Vital Signs  Temp (24hrs), Av.5 °F (37.5 °C), Min:98.7 °F (37.1 °C), Max:100.7 °F (38.2 °C)    Temp  Min: 98.7 °F (37.1 °C)  Max: 100.7 °F (38.2 °C)  BP  Min: 101/53  Max: 118/66  Pulse  Min: 101  Max: 114  Resp  Min: 18  Max: 20  SpO2  Min: 94 %  Max: 100 %    GENERAL: Awake and alert, in no acute distress.   HEENT: Normocephalic, atraumatic.  No conjunctival injection. No icterus. Oropharynx clear without evidence of thrush or exudate. No evidence of peridontal disease.    HEART: RRR; 1/6 murmur, rubs, gallops.   LUNGS: Clear to auscultation bilaterally without wheezing, rales, rhonchi. Normal respiratory effort. Nonlabored. No dullness.  ABDOMEN: Soft, mildly tender RLQ  nondistended, vac in place  Positive bowel sounds. No rebound or guarding. NO mass or HSM.  EXT:  No cyanosis, clubbing or edema. No cord.  :  Without Reyna catheter.  MSK:  No joint effusions   SKIN: Warm and dry  NEURO: Oriented to PPT.  PSYCHIATRIC: Normal insight and judgement. Cooperative with PE  Right foot ulcer, photo noted, with wound approx 3cm x 2cm, x 0.5 cm deep with slight maceration around edges, callous, granulation  Photo of abdominal wound, with granulation no surrounding erythema.     Laboratory Data    Results from last 7 days   Lab Units 20  0502 20  0605 20  2301   WBC 10*3/mm3 0.74* 0.76* 0.85*   HEMOGLOBIN g/dL 7.3* 7.9* 8.3*   HEMATOCRIT % 24.1* 25.4* 26.5*   PLATELETS 10*3/mm3 65* 77* 73*     Results from last 7 days   Lab Units 20  0605   SODIUM mmol/L 130*   POTASSIUM mmol/L 3.8   CHLORIDE mmol/L 98   CO2 mmol/L 22.0   BUN mg/dL 18   CREATININE mg/dL 0.77   GLUCOSE mg/dL 170*   CALCIUM mg/dL 8.2*     Results from last 7 days   Lab Units 20  2301   ALK PHOS U/L 49   BILIRUBIN mg/dL 0.7   ALT (SGPT) U/L 9   AST (SGOT) U/L 12             Results from last 7 days   Lab Units 20  2301   LACTATE mmol/L 1.6             Estimated Creatinine Clearance:  93.7 mL/min (by C-G formula based on SCr of 0.77 mg/dL).      Microbiology:  3/17:  BC  No growth        Radiology:  Imaging Results (Last 72 Hours)     Procedure Component Value Units Date/Time    CT Abdomen Pelvis Without Contrast [[id number]] Collected:  03/18/20 0112     Updated:  03/18/20 0114    Narrative:       CT Abdomen Pelvis WO    INDICATION:   44-year-old female with fever and lower abdominal pain today. History of endometrial cancer.    TECHNIQUE:   CT of the abdomen and pelvis without IV contrast. Coronal and sagittal reconstructions were obtained.  Radiation dose reduction techniques included automated exposure control or exposure modulation based on body size. Count of known CT and cardiac nuc  med studies performed in previous 12 months: 3.     COMPARISON:   CT abdomen pelvis 1/30/2020    FINDINGS:  Visualized lung bases are unremarkable.    Abdomen:   The liver is within normal limits. Splenomegaly again noted. The pancreas is within normal limits. The gallbladder is normal. Both adrenal glands are normal. The kidneys are normal. Abdominal aorta normal in course and caliber. Small bowel is  unremarkable without obstruction. The appendix is enlarged, measuring 1.3 cm in diameter. There is periappendiceal inflammatory stranding. The findings appear most consistent with acute appendicitis. No drainable fluid collections. No free  intraperitoneal air. The colon is unremarkable. Ventral lower abdominal wound from recent surgery.    Pelvis:   The urinary bladder is unremarkable.  Hysterectomy. No free pelvic fluid.    No acute osseous abnormality.        Impression:         1. Acute appendicitis. No evidence of perforation or abscess.  2. Postsurgical changes from recent hysterectomy.  3. Splenomegaly.      NOTIFICATION: Critical Value/emergent results were called by telephone at the time of interpretation on 3/18/2020 1:09 AM to BENSON Hughes who verbally acknowledged these results.      Signer Name:  Prasanna Zapata MD   Signed: 3/18/2020 1:12 AM   Workstation [facility]: [facility]    [facility]    XR Chest 1 View [[id number]] Collected:  03/18/20 0002     Updated:  03/18/20 0004    Narrative:       CR Chest 1 Vw    INDICATION:   44-year-old female with fever today. History of stage III endometrial cancer. Recent chemotherapy.     COMPARISON:    Chest 2/7/2020    FINDINGS:  Single portable AP view(s) of the chest.  The heart and mediastinal contours are normal. The lungs are clear. No pneumothorax or pleural effusion.      Impression:       No acute cardiopulmonary findings.    Signer Name: Prasanna Zapata MD   Signed: 3/18/2020 12:02 AM   Workstation Name: [facility]    [facility]        I read her CT scan    Impression:   1.  Sepsis- with fever, tachycardia, hypotension, neutropenia, known focus of infection  2.  Acute appendicitis-she has acute appendicitis without perforation.  We will plan to treat her with antibiotic therapy and avoid surgical intervention at the present time since she is clinically stable and since she is absolutely neutropenic which is a relative contraindication to surgical intervention at the present time.  3.  Neutropenia/pancytopenia-she is on Neupogen therapy.  4.  Endometrial cancer, on chemotherapy  5.  Right foot ulcer  6.  Abdominal wall wound dehiscence  7.  T2 Diabetes mellitus  8.  Neutropenic fever    PLAN/RECOMMENDATIONS:     1.  Blood cultures ngtd  2.  Continue Zosyn   3.  Continue Wound care  4.  Continue Neupogen    Dr. Talley has obtained the history, performed the physical exam and formulated the above treatment plan.          SYLVIA Ernique  3/19/2020  06:43                  Electronically signed by Libby Hoffman APRN at 03/19/20 0654     Lakhwinder Mix MD at 03/18/20 1724        Patient seen and examined at the bedside earlier today.  Patient was resting in bed in no acute distress and pain was  controlled.  Denied fever or chills.  Had no nausea vomiting.  Surgery is on board but not doing surgery at this point secondary to leukopenia which is secondary to recent chemotherapy.  Gynecology oncology is on board.  Also infectious disease on board.  Most likely surgery when leukopenia has resolved.  -Continue current care  -Pain control  -Labs in a.m.  -Medicine will follow.    Electronically signed by Lakhwinder Mix MD at 03/18/20 1728       Consult Notes (last 24 hours) (Notes from 03/18/20 1349 through 03/19/20 1349)    No notes of this type exist for this encounter.

## 2020-03-19 NOTE — PROGRESS NOTES
INFECTIOUS DISEASE PROGRESS NOTE   Jeana Chung  1975  [id number]      Admission Date: 3/17/2020      Requesting Provider: No ref. provider found  Evaluating Physician: Abrahan Talley MD    Reason for Consultation:  Acute appendicitis     History of present illness:  3/18/20:    3/18/2020: Patient is a 44 y.o. female with endometrial cancer on chemotherapy,( last on 3/10)  CKD, T2DM,, seen today for an abdominal wound infection.  She underwent a REGINALDO, bilateral salpingo-oophrectomy, omentectomy 2/11/20, with postoperative wound dehiscence for which she has been followed by PT wound care.  She was seen in PT wound care yesterday for a new foot ulcer, and once home developed a fever of 101.3, with fatigue and weakness and right lower quadrant pain.  She was instructed to present to the ED.  An abdominal CT revealed acute appendicitis, no perforation or abscess, splenomegaly. CXR without acute cardiopulmonary findings. Tmax of 99.4, pulse of 124, .  Admitting labs with pancytopenia, normal lactate.  Blood cultures pending .   3/19/20:  Tmax of 100.7 last pm.  She continues to have RLQ discomfort.  No nausea.  PT to change vac today.      Past Medical History:   Diagnosis Date   • Anemia    • Anxiety and depression    • Back pain    • Bronchitis    • Diabetes (CMS/HCC)     DX 4-5 YRS AGO, CHECKS BS 4X/DAY, LAST A1C 7.1%   • Endometrial cancer (CMS/HCC)     STAGE II   • GERD (gastroesophageal reflux disease)    • Hypertension    • MRSA (methicillin resistant staph aureus) culture positive     S/P NECROTIZING FASCITIS IN BILATERAL FEET   • Necrotizing fasciitis (CMS/HCC)    • PCOS (polycystic ovarian syndrome)    • PTSD (post-traumatic stress disorder)    • Retinopathy 3/18/2020   • Sepsis (CMS/HCC)    • Stage 3 chronic kidney disease (CMS/HCC) 2/24/2020   • Subconjunctival hemorrhage        Past Surgical History:   Procedure Laterality Date   • EXPLORATORY LAPAROTOMY, TOTAL ABDOMINAL HYSTERECTOMY  SALPINGO OOPHORECTOMY N/A 2/10/2020    Procedure: EXPLORATORY LAPAROTOMY, TOTAL ABDOMINAL HYSTERECTOMY, BILATERAL SALPINGO-OOPHORECTOMY WITH OPTIMAL  STAGING (R-0), OMENTECTOMY;  Surgeon: Celine Rubio MD;  Location: [facility];  Service: Gynecology Oncology;  Laterality: N/A;   • EYE SURGERY Left     BLOOD VESSEL IN EYE REPAIR   • TOE AMPUTATION Left 2019    big toe - unhealing sore   • TOE AMPUTATION Right 2018    big toe and second toe - Necrotizing fasciitis and MRSA        Family History   Problem Relation Age of Onset   • Fibroids Mother    • Diabetes type II Mother    • Hypertension Mother    • Heart attack Mother    • Fibroids Sister         PCOS   • Diabetes type II Sister    • Dementia Maternal Grandmother    • Stroke Maternal Grandmother        Social History     Socioeconomic History   • Marital status:      Spouse name: Not on file   • Number of children: 1   • Years of education: Not on file   • Highest education level: Not on file   Tobacco Use   • Smoking status: Former Smoker     Types: Cigarettes     Last attempt to quit: 2000     Years since quittin.2   • Smokeless tobacco: Never Used   • Tobacco comment: social MAYBE 1 CIG/DAY   Substance and Sexual Activity   • Alcohol use: Not Currently     Frequency: Monthly or less     Drinks per session: 1 or 2   • Drug use: Never   • Sexual activity: Not Currently   Social History Narrative    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 Staying in [place] with mother due to Endometrial Cancer.        Allergies   Allergen Reactions   • Bactrim [Sulfamethoxazole-Trimethoprim] Anaphylaxis   • Penicillins Hives   • Promethazine Mental Status Change         Medication:    Current Facility-Administered Medications:   •  acetaminophen (TYLENOL) tablet 650 mg, 650 mg, Oral, Q6H, Enedina Guillen PA-C, 650 mg at 20 1800  •  cetirizine (zyrTEC) tablet 10 mg, 10 mg, Oral, Daily, Enedina Guillen PA-C, 10 mg at 20 0859  •   dextrose (D50W) 25 g/ 50mL Intravenous Solution 25 g, 25 g, Intravenous, Q15 Min PRN, Enedina Guillen PA-C  •  dextrose (GLUTOSE) oral gel 15 g, 15 g, Oral, Q15 Min PRN, Enedina Guillen PA-C  •  docusate sodium (COLACE) capsule 200 mg, 200 mg, Oral, BID, Enedina Guillen PA-C, 200 mg at 03/19/20 0859  •  filgrastim (NEUPOGEN) injection 480 mcg, 480 mcg, Subcutaneous, Q PM, Celine Rubio MD, 480 mcg at 03/19/20 1820  •  gabapentin (NEURONTIN) capsule 300 mg, 300 mg, Oral, TID, Selma Salomon DO, 300 mg at 03/19/20 1656  •  glucagon (human recombinant) (GLUCAGEN DIAGNOSTIC) injection 1 mg, 1 mg, Subcutaneous, Q15 Min PRN, Enedina Guillen PA-C  •  hydrOXYzine (ATARAX) tablet 25 mg, 25 mg, Oral, TID PRN, Enedina Guillen PA-C  •  insulin detemir (LEVEMIR) injection 20 Units, 20 Units, Subcutaneous, Nightly, Enedina Guillen PA-C  •  insulin lispro (humaLOG) injection 0-7 Units, 0-7 Units, Subcutaneous, 4x Daily With Meals & Nightly, Enedina Guillen PA-C, 2 Units at 03/18/20 0906  •  LORazepam (ATIVAN) tablet 1 mg, 1 mg, Oral, Q6H PRN, Selma Salomon DO  •  Magnesium Sulfate 2 gram Bolus, followed by 8 gram infusion (total Mg dose 10 grams)- Mg less than or equal to 1mg/dL, 2 g, Intravenous, PRN **OR** Magnesium Sulfate 2 gram / 50mL Infusion (GIVE X 3 BAGS TO EQUAL 6GM TOTAL DOSE) - Mg 1.1 - 1.5 mg/dl, 2 g, Intravenous, PRN, Last Rate: 25 mL/hr at 03/19/20 1146, 2 g at 03/19/20 1146 **OR** Magnesium Sulfate 4 gram infusion- Mg 1.6-1.9 mg/dL, 4 g, Intravenous, PRN, Lakhwinder Mix MD  •  melatonin tablet 5 mg, 5 mg, Oral, Nightly PRN, Enedina Guillen PA-C  •  Morphine sulfate (PF) injection 3 mg, 3 mg, Intravenous, Q2H PRN, Selma Salomon DO, 3 mg at 03/19/20 1800  •  ondansetron (ZOFRAN) tablet 4 mg, 4 mg, Oral, Q6H PRN **OR** ondansetron (ZOFRAN) injection 4 mg, 4 mg, Intravenous, Q6H PRN, Enedina Guillen PA-C, 4 mg at 03/19/20 0859  •  pantoprazole (PROTONIX) EC tablet 40 mg, 40 mg, Oral, Wilmer SAVAGE,  GRACIA Mcconnell, 40 mg at 20 0543  •  piperacillin-tazobactam (ZOSYN) 3.375 g in iso-osmotic dextrose 50 ml (premix), 3.375 g, Intravenous, Q8H, Abrahan Talley MD  •  polyethylene glycol 3350 powder (packet), 17 g, Oral, Daily PRN, Enedina Guillen PA-C  •  [COMPLETED] Insert peripheral IV, , , Once **AND** sodium chloride 0.9 % flush 10 mL, 10 mL, Intravenous, PRN, Carlyle Bryant PA  •  sodium chloride 0.9 % flush 10 mL, 10 mL, Intravenous, Q12H, Enedina Guillen PA-C, 10 mL at 20 0905  •  sodium chloride 0.9 % flush 10 mL, 10 mL, Intravenous, PRN, Enedina Guillen PA-C  •  sodium chloride 0.9 % infusion, 100 mL/hr, Intravenous, Continuous, Selma Salomon DO, Last Rate: 100 mL/hr at 20, 100 mL/hr at 20  •  traMADol (ULTRAM) tablet 50 mg, 50 mg, Oral, Q6H PRN, Selma Salomon DO, 50 mg at 20    Antibiotics:  Anti-Infectives (From admission, onward)    Ordered     Dose/Rate Route Frequency Start Stop    20 1753  piperacillin-tazobactam (ZOSYN) 3.375 g in iso-osmotic dextrose 50 ml (premix)     Ordering Provider:  Abrahan Talley MD    3.375 g  over 4 Hours Intravenous Every 8 Hours 20 2200 20 1359    20 0147  piperacillin-tazobactam (ZOSYN) 4.5 g in iso-osmotic dextrose 100 mL IVPB (premix)     Ordering Provider:  Carlyle Bryant PA    4.5 g Intravenous Once 20 0149 20 0443                  Physical Exam:   Vital Signs  Temp (24hrs), Av.5 °F (37.5 °C), Min:98.3 °F (36.8 °C), Max:100.7 °F (38.2 °C)    Temp  Min: 98.3 °F (36.8 °C)  Max: 100.7 °F (38.2 °C)  BP  Min: 85/47  Max: 131/77  Pulse  Min: 97  Max: 115  Resp  Min: 18  Max: 20  SpO2  Min: 95 %  Max: 100 %    GENERAL: Awake and alert, in no acute distress.   HEENT: Normocephalic, atraumatic.  No conjunctival injection. No icterus. Oropharynx clear without evidence of thrush or exudate. No evidence of peridontal disease.    HEART: RRR; 1/6 murmur, rubs, gallops.   LUNGS: Clear  to auscultation bilaterally without wheezing, rales, rhonchi. Normal respiratory effort. Nonlabored. No dullness.  ABDOMEN: Soft, mildly tender RLQ  nondistended, vac in place  Positive bowel sounds. No rebound or guarding. NO mass or HSM.  EXT:  No cyanosis, clubbing or edema. No cord.  :  Without Reyna catheter.  MSK:  No joint effusions   SKIN: Warm and dry  NEURO: Oriented to PPT.  PSYCHIATRIC: Normal insight and judgement. Cooperative with PE  Right foot ulcer, photo noted, with wound approx 3cm x 2cm, x 0.5 cm deep with slight maceration around edges, callous, granulation  Photo of abdominal wound, with granulation no surrounding erythema.     Laboratory Data    Results from last 7 days   Lab Units 03/19/20  0502 03/18/20  0605 03/17/20  2301   WBC 10*3/mm3 0.74* 0.76* 0.85*   HEMOGLOBIN g/dL 7.3* 7.9* 8.3*   HEMATOCRIT % 24.1* 25.4* 26.5*   PLATELETS 10*3/mm3 65* 77* 73*     Results from last 7 days   Lab Units 03/19/20  0502   SODIUM mmol/L 133*   POTASSIUM mmol/L 4.0   CHLORIDE mmol/L 100   CO2 mmol/L 23.0   BUN mg/dL 14   CREATININE mg/dL 0.76   GLUCOSE mg/dL 97   CALCIUM mg/dL 8.0*     Results from last 7 days   Lab Units 03/17/20  2301   ALK PHOS U/L 49   BILIRUBIN mg/dL 0.7   ALT (SGPT) U/L 9   AST (SGOT) U/L 12             Results from last 7 days   Lab Units 03/17/20  2301   LACTATE mmol/L 1.6             Estimated Creatinine Clearance: 94.9 mL/min (by C-G formula based on SCr of 0.76 mg/dL).      Microbiology:  3/17:  BC  No growth        Radiology:  Imaging Results (Last 72 Hours)     Procedure Component Value Units Date/Time    CT Abdomen Pelvis Without Contrast [[id number]] Collected:  03/18/20 0112     Updated:  03/18/20 0114    Narrative:       CT Abdomen Pelvis WO    INDICATION:   44-year-old female with fever and lower abdominal pain today. History of endometrial cancer.    TECHNIQUE:   CT of the abdomen and pelvis without IV contrast. Coronal and sagittal reconstructions were obtained.   Radiation dose reduction techniques included automated exposure control or exposure modulation based on body size. Count of known CT and cardiac nuc  med studies performed in previous 12 months: 3.     COMPARISON:   CT abdomen pelvis 1/30/2020    FINDINGS:  Visualized lung bases are unremarkable.    Abdomen:   The liver is within normal limits. Splenomegaly again noted. The pancreas is within normal limits. The gallbladder is normal. Both adrenal glands are normal. The kidneys are normal. Abdominal aorta normal in course and caliber. Small bowel is  unremarkable without obstruction. The appendix is enlarged, measuring 1.3 cm in diameter. There is periappendiceal inflammatory stranding. The findings appear most consistent with acute appendicitis. No drainable fluid collections. No free  intraperitoneal air. The colon is unremarkable. Ventral lower abdominal wound from recent surgery.    Pelvis:   The urinary bladder is unremarkable.  Hysterectomy. No free pelvic fluid.    No acute osseous abnormality.        Impression:         1. Acute appendicitis. No evidence of perforation or abscess.  2. Postsurgical changes from recent hysterectomy.  3. Splenomegaly.      NOTIFICATION: Critical Value/emergent results were called by telephone at the time of interpretation on 3/18/2020 1:09 AM to BENSON Hughes who verbally acknowledged these results.      Signer Name: Prasanna Zapata MD   Signed: 3/18/2020 1:12 AM   Workstation [facility]: [facility]    [facility] Chest 1 View [[id number]] Collected:  03/18/20 0002     Updated:  03/18/20 0004    Narrative:       CR Chest 1 Vw    INDICATION:   44-year-old female with fever today. History of stage III endometrial cancer. Recent chemotherapy.     COMPARISON:    Chest 2/7/2020    FINDINGS:  Single portable AP view(s) of the chest.  The heart and mediastinal contours are normal. The lungs are clear. No pneumothorax or pleural effusion.      Impression:        No acute cardiopulmonary findings.    Signer Name: Prasanna Zapata MD   Signed: 3/18/2020 12:02 AM   Workstation Name: [email or web address]    [facility] [place]        I read her CT scan    Impression:   1.  Sepsis- with fever, tachycardia, hypotension, neutropenia, known focus of infection  2.  Acute appendicitis-she has acute appendicitis without perforation.  We may be able to proceed with surgical intervention soon.    3.  Neutropenia/pancytopenia-she is on Neupogen therapy.  I suspect her leukopenia/neutropenia will be improved in the morning.  4.  Endometrial cancer, on chemotherapy  5.  Right foot ulcer  6.  Abdominal wall wound dehiscence  7.  T2 Diabetes mellitus  8.  Neutropenic fever    PLAN/RECOMMENDATIONS:   1.  Blood cultures ngtd  2.  Continue Zosyn   3.  Continue Wound care  4.  Continue Neupogen  5.  Surgical intervention soon    Dr. Talley has obtained the history, performed the physical exam and formulated the above treatment plan.          Abrahan Talley MD  3/19/2020  19:57

## 2020-03-19 NOTE — PROGRESS NOTES
Nutrition Services    Patient Name:  Jeana Chung  YOB: 1975  MRN: [id number]  Admit Date:  3/17/2020    Called room phone today to complete nutrition assessment. Patient able to answer phone and RD able to hear patient, but patient unable hear RD. Phone does not seem to be working - RD informed RN.    Electronically signed by:  Geneva Beebe RD  03/19/20 10:51

## 2020-03-19 NOTE — THERAPY EVALUATION
Acute Care - Wound/Debridement Initial Evaluation  [facility]     Patient Name: Jeana Chugn  : 1975  MRN: [id number]  Today's Date: 3/19/2020                Admit Date: 3/17/2020    Visit Dx:    ICD-10-CM ICD-9-CM   1. Acute appendicitis, unspecified acute appendicitis type K35.80 540.9   2. Right lower quadrant abdominal pain R10.31 789.03   3. Fever, unspecified fever cause R50.9 780.60       Patient Active Problem List   Diagnosis   • Acute stress reaction with predominately emotional disturbance   • Sleep disturbance   • Cancer related pain   • Moderate episode of recurrent major depressive disorder (CMS/HCC)   • Anxiety   • Iron deficiency anemia   • Type 2 diabetes mellitus with diabetic polyneuropathy, with long-term current use of insulin (CMS/HCC)   • Essential hypertension   • Heart murmur   • Family history of cardiac disorder in mother   • Endometrial cancer (CMS/HCC)   • Stage 3 chronic kidney disease (CMS/HCC)   • Gastroesophageal reflux disease   • Amputation of left great toe (CMS/HCC)   • Amputation of right great toe (CMS/HCC)   • Neuropathy   • Hypotension due to drugs   • Neutropenia (CMS/HCC)   • Acute appendicitis   • Open wound of right foot   • Hyponatremia   • Pancytopenia (CMS/HCC)        Past Medical History:   Diagnosis Date   • Anemia    • Anxiety and depression    • Back pain    • Bronchitis    • Diabetes (CMS/HCC)     DX 4-5 YRS AGO, CHECKS BS 4X/DAY, LAST A1C 7.1%   • Endometrial cancer (CMS/HCC)     STAGE II   • GERD (gastroesophageal reflux disease)    • Hypertension    • MRSA (methicillin resistant staph aureus) culture positive     S/P NECROTIZING FASCITIS IN BILATERAL FEET   • Necrotizing fasciitis (CMS/HCC)    • PCOS (polycystic ovarian syndrome)    • PTSD (post-traumatic stress disorder)    • Retinopathy 3/18/2020   • Sepsis (CMS/HCC)    • Stage 3 chronic kidney disease (CMS/HCC) 2020   • Subconjunctival hemorrhage         Past Surgical History:    Procedure Laterality Date   • EXPLORATORY LAPAROTOMY, TOTAL ABDOMINAL HYSTERECTOMY SALPINGO OOPHORECTOMY N/A 2/10/2020    Procedure: EXPLORATORY LAPAROTOMY, TOTAL ABDOMINAL HYSTERECTOMY, BILATERAL SALPINGO-OOPHORECTOMY WITH OPTIMAL  STAGING (R-0), OMENTECTOMY;  Surgeon: Celine Rubio MD;  Location: [facility];  Service: Gynecology Oncology;  Laterality: N/A;   • EYE SURGERY Left     BLOOD VESSEL IN EYE REPAIR   • TOE AMPUTATION Left 06/2019    big toe - unhealing sore   • TOE AMPUTATION Right 2018    big toe and second toe - Necrotizing fasciitis and MRSA            Wound 02/17/20 1300 midline abdomen Other (comment) (Active)   Dressing Appearance dry;intact 3/19/2020  9:00 AM   Closure SAIRA 3/19/2020  6:00 AM   Base moist;pink;red;slough 3/19/2020  9:00 AM   Periwound intact;dry;pink;excoriated;yeast 3/19/2020  9:00 AM   Periwound Temperature warm 3/19/2020  9:00 AM   Periwound Skin Turgor soft 3/19/2020  9:00 AM   Edges open 3/19/2020  9:00 AM   Wound Length (cm) 8 cm 3/19/2020  9:00 AM   Wound Width (cm) 1.5 cm 3/19/2020  9:00 AM   Wound Depth (cm) 1.3 cm 3/19/2020  9:00 AM   Drainage Characteristics/Odor serosanguineous 3/19/2020  9:00 AM   Drainage Amount small 3/19/2020  9:00 AM   Care, Wound irrigated with;sterile normal saline;negative pressure wound therapy 3/19/2020  9:00 AM   Wound Output (mL) 150 3/19/2020  9:00 AM       Wound 03/17/20 1430 Right medial foot Diabetic Ulcer (Active)   Dressing Appearance dry;intact 3/19/2020  9:00 AM   Closure SAIRA 3/19/2020  6:00 AM   Base moist;pink 3/19/2020  9:00 AM   Periwound intact;moist;pale white;pink 3/19/2020  9:00 AM   Periwound Temperature warm 3/19/2020  9:00 AM   Periwound Skin Turgor soft 3/19/2020  9:00 AM   Edges irregular;open 3/19/2020  9:00 AM   Wound Length (cm) 1.5 cm 3/19/2020  9:00 AM   Wound Width (cm) 1.3 cm 3/19/2020  9:00 AM   Wound Depth (cm) 0.1 cm 3/19/2020  9:00 AM   Drainage Characteristics/Odor serous 3/19/2020  9:00 AM   Drainage  Amount scant 3/19/2020  9:00 AM   Care, Wound irrigated with;sterile normal saline 3/19/2020  9:00 AM   Dressing Care, Wound foam;low-adherent 3/19/2020  9:00 AM       NPWT (Negative Pressure Wound Therapy) 02/21/20 1030 midline abdomen (Active)   Therapy Setting continuous therapy 3/19/2020  9:00 AM   Dressing foam, black 3/19/2020  9:00 AM   Contact Layer other (see comments) 3/19/2020  9:00 AM   Pressure Setting 125 mmHg 3/19/2020  9:00 AM   Sponges Inserted 1;other (see comments) 3/19/2020  9:00 AM   Sponges Removed 1;other (see comments) 3/19/2020  9:00 AM   Finger sweep complete Yes 3/19/2020  9:00 AM         WOUND DEBRIDEMENT                        PT ASSESSMENT (last 12 hours)      Physical Therapy Evaluation     Row Name 03/19/20 0900          PT Evaluation Time/Intention    Subjective Information  complains of;weakness;fatigue;pain  -     Document Type  evaluation;therapy note (daily note);wound care  -     Mode of Treatment  individual therapy;physical therapy  -     Row Name 03/19/20 0900          General Information    Patient Profile Reviewed?  yes  -     Patient Observations  alert;cooperative;agree to therapy  -     Pertinent History of Current Functional Problem  Pt with open wound to abdomen, now admitted with acute appendicitis.   -     Risks Reviewed  patient:;increased discomfort  -     Benefits Reviewed  patient:;improve skin integrity  -     Barriers to Rehab  medically complex;previous functional deficit;physical barrier  -     Row Name 03/19/20 0900          Pain Assessment    Additional Documentation  Pain Scale: FACES Pre/Post-Treatment (Group)  -     Row Name 03/19/20 0900          Pain Scale: Numbers Pre/Post-Treatment    Pain Location  abdomen  -     Pain Intervention(s)  Repositioned  -     Row Name 03/19/20 0900          Pain Scale: FACES Pre/Post-Treatment    Pain: FACES Scale, Pretreatment  2-->hurts little bit  -     Pain: FACES Scale, Post-Treatment   2-->hurts little bit  -MF     Row Name 03/19/20 0900          Wound 03/17/20 1430 Right medial foot Diabetic Ulcer    Wound - Properties Group Date first assessed: 03/17/20  - Time first assessed: 1430  - Present on Hospital Admission: N  - Side: Right  - Orientation: medial  - Location: foot  - Primary Wound Type: Diabetic ulc  -    Dressing Appearance  dry;intact  -MF     Base  moist;pink  -MF     Periwound  intact;moist;pale white;pink  -MF     Periwound Temperature  warm  -MF     Periwound Skin Turgor  soft  -MF     Edges  irregular;open  -MF     Wound Length (cm)  1.5 cm  -MF     Wound Width (cm)  1.3 cm  -MF     Wound Depth (cm)  0.1 cm  -MF     Drainage Characteristics/Odor  serous  -MF     Drainage Amount  scant  -MF     Care, Wound  irrigated with;sterile normal saline  -MF     Dressing Care, Wound  foam;low-adherent osmel with xeroform and optifoam to cover  -MF     Row Name 03/19/20 0900          Wound 02/17/20 1300 midline abdomen Other (comment)    Wound - Properties Group Date first assessed: 02/17/20  - Time first assessed: 1300  - Orientation: midline  - Location: abdomen  - Primary Wound Type: Other  -JM, dehisced surgical incision  Additional Comments: dehisced abd incision  -    Dressing Appearance  dry;intact  -MF     Base  moist;pink;red;slough  -MF     Periwound  intact;dry;pink;excoriated;yeast  -MF     Periwound Temperature  warm  -MF     Periwound Skin Turgor  soft  -MF     Edges  open  -MF     Wound Length (cm)  8 cm  -MF     Wound Width (cm)  1.5 cm  -MF     Wound Depth (cm)  1.3 cm  -MF     Drainage Characteristics/Odor  serosanguineous  -MF     Drainage Amount  small  -MF     Care, Wound  irrigated with;sterile normal saline;negative pressure wound therapy  -MF     Wound Output (mL)  150 Pt converted from home vac to hospital wound vac.   -MF     Row Name 03/19/20 0900          NPWT (Negative Pressure Wound Therapy) 02/21/20 1030 midline abdomen    NPWT (Negative  Pressure Wound Therapy) - Properties Group Placement Date: 02/21/20  -MF Placement Time: 1030  -MF Location: midline abdomen  -MF    Therapy Setting  continuous therapy  -MF     Dressing  foam, black  -MF     Contact Layer  other (see comments) sorbact  -MF     Pressure Setting  125 mmHg  -MF     Sponges Inserted  1;other (see comments) 1 black 1sorbact  -MF     Sponges Removed  1;other (see comments) 1 black 1 sorbact  -MF     Finger sweep complete  Yes  -MF     Row Name 03/19/20 0900          Coping    Observed Emotional State  accepting;calm;cooperative  -MF     Verbalized Emotional State  acceptance  -MF     Row Name 03/19/20 0900          Plan of Care Review    Plan of Care Reviewed With  patient  -MF     Outcome Summary  Pt with complex open wound to abdomen and R great toe amputation site.  Pt will benefit from wound vac to abdomen to help increase healing potential and decrease bioburden and wound management to great toe amputation site to improve healing potential   -       User Key  (r) = Recorded By, (t) = Taken By, (c) = Cosigned By    Initials Name Provider Type    Vinny Colindres, PT Physical Therapist    Mary Kay Flores, PT Physical Therapist    Leidy Millard, PT Physical Therapist            Recommendation and Plan     Plan of Care Reviewed With: patient           Outcome Summary: Pt with complex open wound to abdomen and R great toe amputation site.  Pt will benefit from wound vac to abdomen to help increase healing potential and decrease bioburden and wound management to great toe amputation site to improve healing potential   Plan of Care Reviewed With: patient            Time Calculation  PT Charges     Row Name 03/19/20 0900             Time Calculation    Start Time  0900  -      PT Goal Re-Cert Due Date  06/15/20  -        User Key  (r) = Recorded By, (t) = Taken By, (c) = Cosigned By    Initials Name Provider Type    Vinny Colindres, PT Physical Therapist             Therapy Charges for Today     Code Description Service Date Service Provider Modifiers Qty    23871980965 HC PT EVAL HIGH COMPLEXITY 4 3/19/2020 Vinny Lobato, PT GP 1    30666072454 HC PT NEG PRESS WOUND TO 50SQCM DME3 3/19/2020 Vinny Lobato, PT GP 1            PT G-Codes  AM-PAC 6 Clicks Score (PT): 24       Vinny Lobato, PT  3/19/2020

## 2020-03-19 NOTE — PROGRESS NOTES
Gynecologic Oncology   Daily Progress Note    Chief Complaint: Malaise, abdominal pain    Subjective   Patient did well overnight.  Her pain is controlled.  No nausea, vomiting.  She is currently n.p.o.  Abdominal pain is slightly improved.  Had a fever overnight but overall felt well.    Updated ROS is remarkable for no chills, chest pain, shortness of breath      Objective   Temp:  [98.7 °F (37.1 °C)-100.7 °F (38.2 °C)] 99.3 °F (37.4 °C)  Heart Rate:  [] 99  Resp:  [18-20] 18  BP: ()/(47-66) 91/53  Vitals:    03/19/20 0821   BP:    Pulse:    Resp:    Temp: 99.3 °F (37.4 °C)   SpO2:      I/O last 3 completed shifts:  In: 100 [IV Piggyback:100]  Out: -      GENERAL: Alert, well-appearing female in no apparent distress.    CARDIOVASCULAR: Normal rate, regular rhythm, no murmurs, rubs, or gallops.    RESPIRATORY: Clear to auscultation bilaterally, normal respiratory effort  GASTROINTESTINAL:  Soft, non-distended, no rebound or guarding.  Right lower quadrant tenderness to palpation.   GENITOURINARY: Deferred  SKIN:  Warm, dry, well-perfused.    PSYCHIATRIC: AO x3, with appropriate affect, normal thought processes  EXREMITIES: Symmetric. No peripheral edema.    Lab Results   Component Value Date    WBC 0.74 (C) 03/19/2020    HGB 7.3 (L) 03/19/2020    HCT 24.1 (L) 03/19/2020    MCV 82.5 03/19/2020    PLT 65 (L) 03/19/2020    NEUTROABS 0.13 (L) 03/18/2020    GLUCOSE 97 03/19/2020    BUN 14 03/19/2020    CREATININE 0.76 03/19/2020    EGFRIFNONA 82 03/19/2020     (L) 03/19/2020    K 4.0 03/19/2020     03/19/2020    CO2 23.0 03/19/2020    MG 1.4 (L) 03/19/2020    CALCIUM 8.0 (L) 03/19/2020         Assessment/Plan   Jeana Chung is a 44 y.o. with stage IIIa endometrial cancer, status post exploratory laparotomy, total abdominal hysterectomy, bilateral salpingo-oophorectomy, omentectomy on 2/10/2020, complicated by wound dehiscence now with wound VAC in place.  She underwent first cycle of  chemo 3/10/2020 with carboplatin and paclitaxel.  She is admitted with acute appendicitis.     Acute appendicitis  -Surgery consulted, ID consulted  -Blood cultures pending  -Continue Zosyn  -Currently n.p.o.     Pancytopenia secondary to recent chemo  -Started on Neupogen  -Continue to trend labs  -Neutropenic precautions  -Transfuse 1 unit packed red blood cells 3/19/2020     Diabetes with diabetic neuropathy and retinopathy, chemotherapy exacerbation of pre-existing neuropathy  -Trend fingersticks, continue insulin  -Continue home gabapentin     Wound dehiscence  -Continue wound VAC     Disposition  -Continue inpatient management         Vilma Burleson MD  03/19/20  08:41    I saw and evaluated the patient. I agree with the findings and the plan of care as documented in the note.  Transfuse 1 unit packed red blood cell as patient is hypotensive and there is a tentative surgical plan.  Will defer transfusion of platelets to Dr. Myers as this would be best utilized exactly at the time of surgery if necessary.    Celine Rubio MD  03/19/20  1:07 PM

## 2020-03-19 NOTE — THERAPY DISCHARGE NOTE
Outpatient Rehabilitation - Wound/Debridement D/C Summary        Patient Name: Jeana Chung  : 1975  MRN: [id number]  Today's Date: 3/19/2020                  Admit Date: (Not on file)    Visit Dx:  No diagnosis found.    Patient Active Problem List   Diagnosis   • Acute stress reaction with predominately emotional disturbance   • Sleep disturbance   • Cancer related pain   • Moderate episode of recurrent major depressive disorder (CMS/HCC)   • Anxiety   • Iron deficiency anemia   • Type 2 diabetes mellitus with diabetic polyneuropathy, with long-term current use of insulin (CMS/HCC)   • Essential hypertension   • Heart murmur   • Family history of cardiac disorder in mother   • Endometrial cancer (CMS/HCC)   • Stage 3 chronic kidney disease (CMS/HCC)   • Gastroesophageal reflux disease   • Amputation of left great toe (CMS/HCC)   • Amputation of right great toe (CMS/HCC)   • Neuropathy   • Hypotension due to drugs   • Neutropenia (CMS/HCC)   • Acute appendicitis   • Open wound of right foot   • Hyponatremia   • Pancytopenia (CMS/HCC)        Past Medical History:   Diagnosis Date   • Anemia    • Anxiety and depression    • Back pain    • Bronchitis    • Diabetes (CMS/HCC)     DX 4-5 YRS AGO, CHECKS BS 4X/DAY, LAST A1C 7.1%   • Endometrial cancer (CMS/HCC)     STAGE II   • GERD (gastroesophageal reflux disease)    • Hypertension    • MRSA (methicillin resistant staph aureus) culture positive     S/P NECROTIZING FASCITIS IN BILATERAL FEET   • Necrotizing fasciitis (CMS/HCC)    • PCOS (polycystic ovarian syndrome)    • PTSD (post-traumatic stress disorder)    • Retinopathy 3/18/2020   • Sepsis (CMS/HCC)    • Stage 3 chronic kidney disease (CMS/HCC) 2020   • Subconjunctival hemorrhage         Past Surgical History:   Procedure Laterality Date   • EXPLORATORY LAPAROTOMY, TOTAL ABDOMINAL HYSTERECTOMY SALPINGO OOPHORECTOMY N/A 2/10/2020    Procedure: EXPLORATORY LAPAROTOMY, TOTAL ABDOMINAL  HYSTERECTOMY, BILATERAL SALPINGO-OOPHORECTOMY WITH OPTIMAL  STAGING (R-0), OMENTECTOMY;  Surgeon: Celine Rubio MD;  Location: [facility];  Service: Gynecology Oncology;  Laterality: N/A;   • EYE SURGERY Left     BLOOD VESSEL IN EYE REPAIR   • TOE AMPUTATION Left 06/2019    big toe - unhealing sore   • TOE AMPUTATION Right 2018    big toe and second toe - Necrotizing fasciitis and MRSA          EVALUATION                WOUND DEBRIDEMENT                            Recommendation and Plan      Goals  PT OP Goals     Row Name 03/19/20 0815          PT Short Term Goals    STG 1  Patient and/ or caregiver able to verbalize signs and symptoms of infection.  -     STG 1 Progress  Met  -     STG 2  Decrease abdominal wound dimensions by 50% as evidence of wound closure.  -     STG 2 Progress  Not Met  -     STG 3  Patient and/ or caregiver independent with home VAC use and care, including canister changes and dressing / seal management.  -     STG 3 Progress  Met  -        Long Term Goals    LTG 1  Patient and/ or caregiver independent with clean dressing changes.  -     LTG 1 Progress  Not Met  -     LTG 2  Decrease wound dimensions by 90% as evidence of wound closure.  -     LTG 2 Progress  Not Met  -     LTG 3  Pt will demonstrate 75% reduction in wound area to R foot to indicate healing progress.  -     LTG 3 Progress  Not Met  -       User Key  (r) = Recorded By, (t) = Taken By, (c) = Cosigned By    Initials Name Provider Type    Mary Kay Flores, PT Physical Therapist          Time Calculation:              OP Discharge Summary     Row Name 03/19/20 0815             OP PT Discharge Summary    Date of Discharge  03/18/20  -      Reason for Discharge  Transfer to other facility/level of care Inpatient hospital admission. Will require a new MD order to continue OP services after discharge.  -        User Key  (r) = Recorded By, (t) = Taken By, (c) = Cosigned By    Initials Name  Provider Type    Mary Kay Flores, PT Physical Therapist          Mary Kay Bell, PT  3/19/2020

## 2020-03-19 NOTE — PROGRESS NOTES
Clinical Nutrition   Reason For Visit: Identified at risk by screening criteria, MST score 2+, Unintentional weight loss    Patient Name: Jeana Chung  YOB: 1975  MRN: [id number]  Date of Encounter: 03/19/20 14:22  Admission date: 3/17/2020      Nutrition Assessment     Admission Problem List:  Acute appendicitis  Neutropenia  Hyponatremia  Wound dehiscence r/t recent surgery (2/11/20), wound vac  Nausea  Fatigue  RLQ pain  Sepsis      Applicable PMH:  HTN  T2DM  CKD stage 3  Iron deficiency anemia  Endometrial cancer - currently undergoing chemotherapy (first round 3/10)  GERD  PCOS  PTSD  S/p amputation of right big and second toes (2018)  S/p amputation of left big toe (2019)  S/p hysterectomy, bilateral salpingo-oophorectomy and omentectomy (2/11/20)      Applicable medical tests/procedures since admission:      Reported/Observed/Food/Nutrition Related History   RD spoke with patient via phone. Patient reports she had unintentionally lost weight from almost 200 lbs (mid-January) down to in the 180s as of her hysterectomy (2/11); however, patient began eating better/normal after surgery and regained weight to current weight. Last solid food intake Tuesday evening (3/17). Requests chocolate Premier Protein 2x daily when appropriate PO diet initiated. Denies food allergies and difficulty chewing/swallowing.    Anthropometrics   Height: 62 in  Weight: 192 lbs (bed scale weight 3/18 per [facility] doc)  BMI: 35.2  BMI classification: Obese Class II: 35-39.9kg/m2   IBW: 110 lbs    UBW: Upper 190s (last weighed this mid-January 2020 per patient)  Weight change: Patient reports she lost weight from almost 200 lbs down to in the 180s from mid-January to 2/11 which was followed by weight gain to current weight.    Labs reviewed   Labs reviewed: Yes    Medications reviewed   Medications reviewed: Yes  Pertinent: colace, insulin, protonix, antibiotic  GTT: IVF @ 100 ml/hr    Current Nutrition Prescription    PO: NPO Diet NPO Except: Ice Chips, Sips With Meds     Evaluation of Received Nutrient/Fluid Intake: insufficient data    Nutrition Diagnosis     3/18  Problem Inadequate oral intake   Etiology Clinical condition, diet order   Signs/Symptoms NPO at this time; last solid food intake Tuesday evening (3/17)   Status: ongoing    Intervention   Intervention: Follow treatment progress, Care plan reviewed, Interview for preferences, Await begin PO     RD will order chocolate Premier Protein 2x daily when appropriate PO diet initiated.    Goal:   General: Nutrition support treatment  PO: Initiate diet as medically appropriate    Monitoring/Evaluation:       Monitoring/Evaluation: Per protocol, I&O, Pertinent labs, Weight, GI status, Symptoms  Will Continue to follow per protocol  Geneva Beebe, RD  Time Spent: 20 min

## 2020-03-19 NOTE — PROGRESS NOTES
[facility] Medicine Services  PROGRESS NOTE    Patient Name: Jeana Chung  : 1975  MRN: [id number]    Date of Admission: 3/17/2020  Primary Care Physician: Tania Angeles PA-C    Subjective   Subjective     CC:  Abdominal pain, fever    HPI:  Resting in bed in no acute distress and overall feels okay.  Denies any fever or chills.  No chest pain, palpitation, shortness of breath.  No nausea, vomiting, diarrhea.  No cough or headache.    Review of Systems      Objective   Objective     Vital Signs:   Temp:  [98.7 °F (37.1 °C)-100.7 °F (38.2 °C)] 99.3 °F (37.4 °C)  Heart Rate:  [] 97  Resp:  [18-20] 18  BP: ()/(47-66) 107/48        Physical Exam:  Constitutional: No acute distress  HENT: NCAT, mucous membranes moist  Respiratory: Clear to auscultation bilaterally, respiratory effort normal   Cardiovascular: RRR, no murmurs, rubs, or gallops.  Gastrointestinal: Abdomen is obese.  Positive sluggish bowel sounds, soft, tender on deep palpation over right lower quadrant, nondistended  Musculoskeletal: Obesity.  No bilateral ankle edema  Psychiatric: Appropriate affect, cooperative  Neurologic: Oriented x 3, strength symmetric in all extremities, Cranial Nerves grossly intact to confrontation, speech clear  Skin: No rashes    Results Reviewed:  Results from last 7 days   Lab Units 20  0502 20  0620  2301   WBC 10*3/mm3 0.74* 0.76* 0.85*   HEMOGLOBIN g/dL 7.3* 7.9* 8.3*   HEMATOCRIT % 24.1* 25.4* 26.5*   PLATELETS 10*3/mm3 65* 77* 73*   INR   --  1.11  --      Results from last 7 days   Lab Units 20  0502 20  0605 20  2301   SODIUM mmol/L 133* 130* 129*   POTASSIUM mmol/L 4.0 3.8 4.3   CHLORIDE mmol/L 100 98 95*   CO2 mmol/L 23.0 22.0 24.0   BUN mg/dL 14 18 23*   CREATININE mg/dL 0.76 0.77 0.98   GLUCOSE mg/dL 97 170* 241*   CALCIUM mg/dL 8.0* 8.2* 8.5*   ALT (SGPT) U/L  --   --  9   AST (SGOT) U/L  --   --  12      Estimated Creatinine Clearance: 94.9 mL/min (by C-G formula based on SCr of 0.76 mg/dL).    Microbiology Results Abnormal     Procedure Component Value - Date/Time    Blood Culture - Blood, Arm, Left [[id number]] Collected:  03/18/20 0322    Lab Status:  Preliminary result Specimen:  Blood from Arm, Left Updated:  03/19/20 0530     Blood Culture No growth at 24 hours    Blood Culture - Blood, Arm, Right [[id number]] Collected:  03/18/20 0322    Lab Status:  Preliminary result Specimen:  Blood from Arm, Right Updated:  03/19/20 0530     Blood Culture No growth at 24 hours          Imaging Results (Last 24 Hours)     ** No results found for the last 24 hours. **               I have reviewed the medications:  Scheduled Meds:  acetaminophen 650 mg Oral Q6H   cetirizine 10 mg Oral Daily   docusate sodium 200 mg Oral BID   filgrastim (NEUPOGEN) injection 480 mcg Subcutaneous Q PM   gabapentin 300 mg Oral TID   insulin detemir 20 Units Subcutaneous Nightly   insulin lispro 0-7 Units Subcutaneous 4x Daily With Meals & Nightly   pantoprazole 40 mg Oral QAM   piperacillin-tazobactam 3.375 g Intravenous Q8H   sodium chloride 10 mL Intravenous Q12H     Continuous Infusions:  sodium chloride 100 mL/hr Last Rate: 100 mL/hr (03/18/20 2110)     PRN Meds:.dextrose  •  dextrose  •  glucagon (human recombinant)  •  hydrOXYzine  •  LORazepam  •  magnesium sulfate **OR** magnesium sulfate **OR** magnesium sulfate  •  melatonin  •  Morphine  •  ondansetron **OR** ondansetron  •  polyethylene glycol  •  [COMPLETED] Insert peripheral IV **AND** sodium chloride  •  sodium chloride  •  traMADol    Assessment/Plan   Assessment & Plan     Active Hospital Problems    Diagnosis  POA   • **Acute appendicitis [K35.80]  Yes   • Neutropenia (CMS/HCC) [D70.9]  Yes   • Open wound of right foot [S91.301A]  Yes   • Hyponatremia [E87.1]  Yes   • Pancytopenia (CMS/HCC) [D61.818]  Unknown   • Stage 3 chronic kidney disease (CMS/HCC) [N18.3]  Yes   •  Endometrial cancer (CMS/[facility]) [C54.1]  Yes   • Iron deficiency anemia [D50.9]  Yes   • Type 2 diabetes mellitus with diabetic polyneuropathy, with long-term current use of insulin (CMS/[facility]) [E11.42, Z79.4]  Not Applicable   • Essential hypertension [I10]  Yes      Resolved Hospital Problems   No resolved problems to display.        Brief Hospital Course to date:  Jeana Chung is a 44 y.o. female with past medical history significant for hypertension, diabetes mellitus, chronic kidney disease, anemia, endometrial cancer and currently patient is on chemotherapy.  Patient was admitted to the hospital on 3/17/2020 because of abdominal pain and fever.  CT scan showed evidence of acute pancreatitis.  Surgery has seen and evaluated patient.  However, patient is leukopenic secondary to chemotherapy and surgery will not be considered at this point.    *Abdominal pain and fever secondary to acute pancreatitis.  Currently patient is on antibiotic and surgery will be postponed until patient's leukopenia resolves.    *Endometrial cancer, patient is on chemotherapy.    *Pancytopenia secondary to above.  Patient has received Neupogen.    *Diabetes mellitus on insulin sliding scale    PLAN:  - continue current care  - labs in am  - replace electrolytes          DVT Prophylaxis:  SCD    Disposition: I expect the patient to be discharged.    CODE STATUS:   Code Status and Medical Interventions:   Ordered at: 03/18/20 0403     Level Of Support Discussed With:    Patient     Code Status:    CPR     Medical Interventions (Level of Support Prior to Arrest):    Full         Electronically signed by Lakhwinder Mix MD, 03/19/20, 15:28.

## 2020-03-19 NOTE — PLAN OF CARE
Problem: Patient Care Overview  Goal: Plan of Care Review  Outcome: Ongoing (interventions implemented as appropriate)  Flowsheets (Taken 3/19/2020 0518)  Plan of Care Reviewed With: patient  Outcome Summary: Patient alert and oriented, Tele- NSR/NST, midline incision from previous surgery Laparotomy for endometroid adenocarcinoma/hysterectomy, recently received chemotherapy, on neutropenic precautions, independent, 2L NC when needed at night, Room air during the day, amputated right great toe and left great toe, complaints of abdominal pain and abdominal discomfort, IV antibiotics ongoing  if WBC improve proceed to appendectomy. Will continue to monitor.

## 2020-03-20 ENCOUNTER — ANESTHESIA (OUTPATIENT)
Dept: PERIOP | Facility: HOSPITAL | Age: 45
End: 2020-03-20

## 2020-03-20 LAB
ABO + RH BLD: NORMAL
ANION GAP SERPL CALCULATED.3IONS-SCNC: 13 MMOL/L (ref 5–15)
BASOPHILS # BLD AUTO: 0 10*3/MM3 (ref 0–0.2)
BASOPHILS NFR BLD AUTO: 0 % (ref 0–1.5)
BH BB BLOOD EXPIRATION DATE: NORMAL
BH BB BLOOD TYPE BARCODE: 6200
BH BB DISPENSE STATUS: NORMAL
BH BB PRODUCT CODE: NORMAL
BH BB UNIT NUMBER: NORMAL
BUN BLD-MCNC: 11 MG/DL (ref 6–20)
BUN/CREAT SERPL: 15.1 (ref 7–25)
CALCIUM SPEC-SCNC: 7.9 MG/DL (ref 8.6–10.5)
CHLORIDE SERPL-SCNC: 99 MMOL/L (ref 98–107)
CO2 SERPL-SCNC: 22 MMOL/L (ref 22–29)
CREAT BLD-MCNC: 0.73 MG/DL (ref 0.57–1)
DEPRECATED RDW RBC AUTO: 43.4 FL (ref 37–54)
EOSINOPHIL # BLD AUTO: 0.01 10*3/MM3 (ref 0–0.4)
EOSINOPHIL NFR BLD AUTO: 1 % (ref 0.3–6.2)
ERYTHROCYTE [DISTWIDTH] IN BLOOD BY AUTOMATED COUNT: 14 % (ref 12.3–15.4)
GFR SERPL CREATININE-BSD FRML MDRD: 86 ML/MIN/1.73
GLUCOSE BLD-MCNC: 103 MG/DL (ref 65–99)
GLUCOSE BLDC GLUCOMTR-MCNC: 103 MG/DL (ref 70–130)
GLUCOSE BLDC GLUCOMTR-MCNC: 106 MG/DL (ref 70–130)
GLUCOSE BLDC GLUCOMTR-MCNC: 154 MG/DL (ref 70–130)
HCT VFR BLD AUTO: 24.2 % (ref 34–46.6)
HGB BLD-MCNC: 7.4 G/DL (ref 12–15.9)
IMM GRANULOCYTES # BLD AUTO: 0 10*3/MM3 (ref 0–0.05)
IMM GRANULOCYTES NFR BLD AUTO: 0 % (ref 0–0.5)
LYMPHOCYTES # BLD AUTO: 0.54 10*3/MM3 (ref 0.7–3.1)
LYMPHOCYTES NFR BLD AUTO: 56.3 % (ref 19.6–45.3)
MAGNESIUM SERPL-MCNC: 1.6 MG/DL (ref 1.6–2.6)
MCH RBC QN AUTO: 25.7 PG (ref 26.6–33)
MCHC RBC AUTO-ENTMCNC: 30.6 G/DL (ref 31.5–35.7)
MCV RBC AUTO: 84 FL (ref 79–97)
MONOCYTES # BLD AUTO: 0.27 10*3/MM3 (ref 0.1–0.9)
MONOCYTES NFR BLD AUTO: 28.1 % (ref 5–12)
NEUTROPHILS # BLD AUTO: 0.14 10*3/MM3 (ref 1.7–7)
NEUTROPHILS NFR BLD AUTO: 14.6 % (ref 42.7–76)
NRBC BLD AUTO-RTO: 0 /100 WBC (ref 0–0.2)
PHOSPHATE SERPL-MCNC: 2.5 MG/DL (ref 2.5–4.5)
PLATELET # BLD AUTO: 65 10*3/MM3 (ref 140–450)
PMV BLD AUTO: 12.5 FL (ref 6–12)
POTASSIUM BLD-SCNC: 3.9 MMOL/L (ref 3.5–5.2)
RBC # BLD AUTO: 2.88 10*6/MM3 (ref 3.77–5.28)
SODIUM BLD-SCNC: 134 MMOL/L (ref 136–145)
UNIT  ABO: NORMAL
UNIT  RH: NORMAL
WBC NRBC COR # BLD: 0.96 10*3/MM3 (ref 3.4–10.8)

## 2020-03-20 PROCEDURE — 99231 SBSQ HOSP IP/OBS SF/LOW 25: CPT | Performed by: OBSTETRICS & GYNECOLOGY

## 2020-03-20 PROCEDURE — 80048 BASIC METABOLIC PNL TOTAL CA: CPT | Performed by: INTERNAL MEDICINE

## 2020-03-20 PROCEDURE — 99232 SBSQ HOSP IP/OBS MODERATE 35: CPT | Performed by: INTERNAL MEDICINE

## 2020-03-20 PROCEDURE — 84100 ASSAY OF PHOSPHORUS: CPT | Performed by: INTERNAL MEDICINE

## 2020-03-20 PROCEDURE — 85025 COMPLETE CBC W/AUTO DIFF WBC: CPT | Performed by: INTERNAL MEDICINE

## 2020-03-20 PROCEDURE — 25010000002 MORPHINE PER 10 MG: Performed by: FAMILY MEDICINE

## 2020-03-20 PROCEDURE — 97605 NEG PRS WND THER DME<=50SQCM: CPT

## 2020-03-20 PROCEDURE — 85007 BL SMEAR W/DIFF WBC COUNT: CPT | Performed by: INTERNAL MEDICINE

## 2020-03-20 PROCEDURE — 63710000001 INSULIN DETEMIR PER 5 UNITS: Performed by: PHYSICIAN ASSISTANT

## 2020-03-20 PROCEDURE — 82962 GLUCOSE BLOOD TEST: CPT

## 2020-03-20 PROCEDURE — 25010000002 PIPERACILLIN SOD-TAZOBACTAM PER 1 G: Performed by: INTERNAL MEDICINE

## 2020-03-20 PROCEDURE — 25010000002 FILGRASTIM PER 480 MCG: Performed by: OBSTETRICS & GYNECOLOGY

## 2020-03-20 PROCEDURE — 83735 ASSAY OF MAGNESIUM: CPT | Performed by: INTERNAL MEDICINE

## 2020-03-20 RX ADMIN — CETIRIZINE HYDROCHLORIDE 10 MG: 10 TABLET, FILM COATED ORAL at 09:42

## 2020-03-20 RX ADMIN — FILGRASTIM 480 MCG: 480 INJECTION, SOLUTION INTRAVENOUS; SUBCUTANEOUS at 18:04

## 2020-03-20 RX ADMIN — GABAPENTIN 300 MG: 300 CAPSULE ORAL at 09:42

## 2020-03-20 RX ADMIN — TAZOBACTAM SODIUM AND PIPERACILLIN SODIUM 3.38 G: 375; 3 INJECTION, SOLUTION INTRAVENOUS at 22:29

## 2020-03-20 RX ADMIN — GABAPENTIN 300 MG: 300 CAPSULE ORAL at 22:28

## 2020-03-20 RX ADMIN — PANTOPRAZOLE SODIUM 40 MG: 40 TABLET, DELAYED RELEASE ORAL at 05:39

## 2020-03-20 RX ADMIN — MORPHINE SULFATE 3 MG: 4 INJECTION, SOLUTION INTRAMUSCULAR; INTRAVENOUS at 11:09

## 2020-03-20 RX ADMIN — ACETAMINOPHEN 650 MG: 325 TABLET, FILM COATED ORAL at 05:39

## 2020-03-20 RX ADMIN — ACETAMINOPHEN 650 MG: 325 TABLET, FILM COATED ORAL at 18:10

## 2020-03-20 RX ADMIN — ACETAMINOPHEN 650 MG: 325 TABLET, FILM COATED ORAL at 14:40

## 2020-03-20 RX ADMIN — GABAPENTIN 300 MG: 300 CAPSULE ORAL at 18:04

## 2020-03-20 RX ADMIN — MORPHINE SULFATE 3 MG: 4 INJECTION, SOLUTION INTRAMUSCULAR; INTRAVENOUS at 22:30

## 2020-03-20 RX ADMIN — DOCUSATE SODIUM 200 MG: 100 CAPSULE, LIQUID FILLED ORAL at 22:28

## 2020-03-20 RX ADMIN — MORPHINE SULFATE 3 MG: 4 INJECTION, SOLUTION INTRAMUSCULAR; INTRAVENOUS at 03:42

## 2020-03-20 RX ADMIN — MORPHINE SULFATE 3 MG: 4 INJECTION, SOLUTION INTRAMUSCULAR; INTRAVENOUS at 05:39

## 2020-03-20 RX ADMIN — DOCUSATE SODIUM 200 MG: 100 CAPSULE, LIQUID FILLED ORAL at 09:42

## 2020-03-20 RX ADMIN — TAZOBACTAM SODIUM AND PIPERACILLIN SODIUM 3.38 G: 375; 3 INJECTION, SOLUTION INTRAVENOUS at 05:39

## 2020-03-20 RX ADMIN — TAZOBACTAM SODIUM AND PIPERACILLIN SODIUM 3.38 G: 375; 3 INJECTION, SOLUTION INTRAVENOUS at 14:40

## 2020-03-20 RX ADMIN — MORPHINE SULFATE 3 MG: 4 INJECTION, SOLUTION INTRAMUSCULAR; INTRAVENOUS at 18:10

## 2020-03-20 RX ADMIN — MORPHINE SULFATE 3 MG: 4 INJECTION, SOLUTION INTRAMUSCULAR; INTRAVENOUS at 14:40

## 2020-03-20 RX ADMIN — INSULIN DETEMIR 20 UNITS: 100 INJECTION, SOLUTION SUBCUTANEOUS at 22:37

## 2020-03-20 NOTE — PROGRESS NOTES
Continued Stay Note  [facility]     Patient Name: Jeana Chung  MRN: [id number]  Today's Date: 3/20/2020    Admit Date: 3/17/2020    Discharge Plan     Row Name 03/20/20 1527       Plan    Plan  discharge plan    Patient/Family in Agreement with Plan  yes    Plan Comments  Spoke with pt and pt's mother in room regarding discharge plan.  Pt NPO after MN and possibly having surgery tomorrow.  CM will follow up Monday and assist with any discharge needs. Plan is home with mother at discharge.     Final Discharge Disposition Code  01 - home or self-care        Discharge Codes    No documentation.       Expected Discharge Date and Time     Expected Discharge Date Expected Discharge Time    Mar 22, 2020             Debbi Cam RN

## 2020-03-20 NOTE — PLAN OF CARE
Problem: Patient Care Overview  Goal: Plan of Care Review  Outcome: Ongoing (interventions implemented as appropriate)  Flowsheets  Taken 3/20/2020 0619  Progress: no change  Outcome Summary: VSS, pt rested throughout the shift, had multiple c/o pain, prn medications given. Elevated temp at begining of the shift, none since. Been NPO since midnight. Will continue to monitor.  Taken 3/19/2020 2000  Plan of Care Reviewed With: patient

## 2020-03-20 NOTE — PROGRESS NOTES
"General Surgery Daily Progress Note    Subjective:  Feeling better overall.  Still with pain.    Objective:  /53 (BP Location: Right arm, Patient Position: Lying)   Pulse 99   Temp 98.8 °F (37.1 °C) (Oral)   Resp 20   Ht 157.5 cm (62\")   Wt 87.3 kg (192 lb 6.4 oz)   LMP  (LMP Unknown)   SpO2 98%   BMI 35.19 kg/m²       General Appearance: Mild distress  Eyes: Anicteric  Neck: Trachea midline   Cardiovascular:  RRR without murmur nor rub  Lungs:  Bilateral respirations unlabored   Abdomen:  Soft, tender right lower quadrant, no generalized peritonitis  Extremities:  No cyanosis or edema   Skin:  No obvious rashes   Neurologic: awake and conversant       Imaging Results (Last 24 Hours)     ** No results found for the last 24 hours. **          CBC:  Results from last 7 days   Lab Units 03/20/20  0449   WBC 10*3/mm3 0.96*   HEMOGLOBIN g/dL 7.4*   HEMATOCRIT % 24.2*   PLATELETS 10*3/mm3 65*       CMP:  Results from last 7 days   Lab Units 03/20/20 0449  03/17/20  2301   SODIUM mmol/L 134*   < > 129*   POTASSIUM mmol/L 3.9   < > 4.3   CHLORIDE mmol/L 99   < > 95*   CO2 mmol/L 22.0   < > 24.0   BUN mg/dL 11   < > 23*   CREATININE mg/dL 0.73   < > 0.98   CALCIUM mg/dL 7.9*   < > 8.5*   BILIRUBIN mg/dL  --   --  0.7   ALK PHOS U/L  --   --  49   ALT (SGPT) U/L  --   --  9   AST (SGOT) U/L  --   --  12   GLUCOSE mg/dL 103*   < > 241*    < > = values in this interval not displayed.         Assessment:  Acute appendicitis  Neutropenic secondary to chemotherapy    Plan:   N.p.o. after midnight.  Her white blood cell count and ANC are trending up.  I am going to order a 12 pack of platelets for the operating room.  Repeat labs in the morning.  Planning for laparoscopic possible open appendectomy.    Praful Myers MD - 3/20/2020, 13:23        "

## 2020-03-20 NOTE — PLAN OF CARE
Problem: Patient Care Overview  Goal: Plan of Care Review  Outcome: Ongoing (interventions implemented as appropriate)  Flowsheets  Taken 3/20/2020 0935 by Mariposa Briggs RN  Plan of Care Reviewed With: patient;mother  Taken 3/20/2020 0900 by Vinny Lobato, PT  Outcome Summary: wound vac intact with no issues noted. PT to change wound vac in 1-2 days.

## 2020-03-20 NOTE — THERAPY WOUND CARE TREATMENT
Acute Care - Wound/Debridement Treatment Note  [facility]     Patient Name: Jeana Chung  : 1975  MRN: [id number]  Today's Date: 3/20/2020                  Admit Date: 3/17/2020    Visit Dx:    ICD-10-CM ICD-9-CM   1. Acute appendicitis, unspecified acute appendicitis type K35.80 540.9   2. Right lower quadrant abdominal pain R10.31 789.03   3. Fever, unspecified fever cause R50.9 780.60       Patient Active Problem List   Diagnosis   • Acute stress reaction with predominately emotional disturbance   • Sleep disturbance   • Cancer related pain   • Moderate episode of recurrent major depressive disorder (CMS/HCC)   • Anxiety   • Iron deficiency anemia   • Type 2 diabetes mellitus with diabetic polyneuropathy, with long-term current use of insulin (CMS/HCC)   • Essential hypertension   • Heart murmur   • Family history of cardiac disorder in mother   • Endometrial cancer (CMS/HCC)   • Stage 3 chronic kidney disease (CMS/HCC)   • Gastroesophageal reflux disease   • Amputation of left great toe (CMS/HCC)   • Amputation of right great toe (CMS/HCC)   • Neuropathy   • Hypotension due to drugs   • Neutropenia (CMS/HCC)   • Acute appendicitis   • Open wound of right foot   • Hyponatremia   • Pancytopenia (CMS/HCC)           Wound 20 1300 midline abdomen Other (comment) (Active)   Dressing Appearance dry;intact 3/20/2020  9:35 AM   Closure SAIRA 3/20/2020  9:35 AM   Base moist;pink;red;slough 3/20/2020  6:00 AM   Periwound intact;dry;pink;excoriated;yeast 3/20/2020  6:00 AM   Periwound Temperature warm 3/20/2020  6:00 AM   Periwound Skin Turgor soft 3/20/2020  6:00 AM   Edges open 3/20/2020  6:00 AM   Drainage Characteristics/Odor serosanguineous 3/19/2020  8:00 PM   Drainage Amount small 3/19/2020  8:00 PM   Care, Wound negative pressure wound therapy 3/19/2020  8:00 PM       Wound 20 1430 Right medial foot Diabetic Ulcer (Active)   Dressing Appearance dry;intact 3/20/2020  9:35 AM   Closure SAIRA  3/20/2020  9:35 AM   Base moist;pink 3/20/2020  6:00 AM   Periwound intact;moist;pale white;pink 3/20/2020  6:00 AM   Periwound Temperature warm 3/20/2020  6:00 AM   Periwound Skin Turgor soft 3/20/2020  6:00 AM   Edges irregular;open 3/20/2020  6:00 AM   Drainage Characteristics/Odor serous 3/19/2020  8:00 PM   Drainage Amount scant 3/19/2020  8:00 PM   Dressing Care, Wound foam;low-adherent 3/19/2020  8:00 PM       NPWT (Negative Pressure Wound Therapy) 02/21/20 1030 midline abdomen (Active)   Therapy Setting continuous therapy 3/20/2020  9:35 AM   Dressing foam, black 3/20/2020  6:00 AM   Pressure Setting 125 mmHg 3/20/2020  9:00 AM         WOUND DEBRIDEMENT                  Therapy Treatment    Rehabilitation Treatment Summary     Row Name 03/20/20 0900             Treatment Time/Intention    Discipline  physical therapist  -      Document Type  therapy note (daily note);wound care  -MF      Subjective Information  complains of;weakness;fatigue  -MF      Mode of Treatment  individual therapy;physical therapy  -MF      Recorded by [] Vinny Lobato, PT 03/20/20 1344      Row Name 03/20/20 0900             Positioning and Restraints    Pre-Treatment Position  in bed  -      Post Treatment Position  bed  -MF      In Bed  supine;call light within reach;with family/caregiver  -MF      Recorded by [MF] Vinny Lobato, PT 03/20/20 1344      Row Name 03/20/20 0900             Pain Scale: Numbers Pre/Post-Treatment    Pain Location  abdomen  -      Pain Intervention(s)  Repositioned  -MF      Recorded by [MF] Vinny Lobato, PT 03/20/20 1344      Row Name 03/20/20 0900             Pain Scale: FACES Pre/Post-Treatment    Pain: FACES Scale, Pretreatment  2-->hurts little bit  -MF      Pain: FACES Scale, Post-Treatment  2-->hurts little bit  -MF      Recorded by [MF] Vinny Lobato, PT 03/20/20 1344      Row Name 03/20/20 0900             Wound 03/17/20 1430 Right medial foot Diabetic Ulcer    Wound  - Properties Group Date first assessed: 03/17/20 [MC] Time first assessed: 1430 [MC] Present on Hospital Admission: N [MC] Side: Right [MC] Orientation: medial [MC] Location: foot [MC] Primary Wound Type: Diabetic ulc [] Recorded by:  [] Mary Kay Bell, PT 03/17/20 1552    Dressing Appearance  dry;intact  -MF      Recorded by [] Vinny Lobato, PT 03/20/20 1344      Row Name 03/20/20 0900             Wound 02/17/20 1300 midline abdomen Other (comment)    Wound - Properties Group Date first assessed: 02/17/20 [JM] Time first assessed: 1300 [JM] Orientation: midline [JM] Location: abdomen [JM] Primary Wound Type: Other [JM], dehisced surgical incision  Additional Comments: dehisced abd incision [JM] Recorded by:  [] Leidy Gilliam, PT 02/17/20 1336    Dressing Appearance  dry;intact  -MF      Recorded by [] Vinny Lobato, PT 03/20/20 1344      Row Name 03/20/20 0900             NPWT (Negative Pressure Wound Therapy) 02/21/20 1030 midline abdomen    NPWT (Negative Pressure Wound Therapy) - Properties Group Placement Date: 02/21/20 [MF] Placement Time: 1030 [MF] Location: midline abdomen [MF] Recorded by:  [] Vinny Lobato, PT 02/21/20 1159    Therapy Setting  continuous therapy  -      Pressure Setting  125 mmHg  -MF      Recorded by [] Vinny Lobato, PT 03/20/20 1344      Row Name 03/20/20 0900             Coping    Observed Emotional State  accepting;calm;cooperative  -      Verbalized Emotional State  acceptance  -MF      Recorded by [] Vinny Lobato, PT 03/20/20 1344      Row Name 03/20/20 0900             Plan of Care Review    Plan of Care Reviewed With  patient  -      Outcome Summary  wound vac intact with no issues noted. PT to change wound vac in 1-2 days.   -MF      Recorded by [] Vinny Lobato, PT 03/20/20 1344        User Key  (r) = Recorded By, (t) = Taken By, (c) = Cosigned By    Initials Name Effective Dates Discipline    Vinny Colindres  R, PT 06/19/15 -  PT    Mary Kay Flores, PT 04/03/18 -  PT    Leidy Millard, PT 06/19/15 -  PT                PT Recommendation and Plan                  Outcome Summary: wound vac intact with no issues noted. PT to change wound vac in 1-2 days.   Plan of Care Reviewed With: patient            Time Calculation  PT Charges     Row Name 03/20/20 0900             Time Calculation    Start Time  0900  -      PT Goal Re-Cert Due Date  06/15/20  -        User Key  (r) = Recorded By, (t) = Taken By, (c) = Cosigned By    Initials Name Provider Type    Vinny Colindres, PT Physical Therapist           Therapy Charges for Today     Code Description Service Date Service Provider Modifiers Qty    93637151261 HC PT EVAL HIGH COMPLEXITY 4 3/19/2020 Vinny Lobaot, PT GP 1    22156022581 HC PT NEG PRESS WOUND TO 50SQCM DME3 3/19/2020 Vinny Lobato, PT GP 1    38097143046 HC PT NEG PRESS WOUND TO 50SQCM DME1 3/20/2020 Vinny Lobato, PT  1            PT G-Codes  AM-PAC 6 Clicks Score (PT): 24        Vinny Lobato, PT  3/20/2020

## 2020-03-20 NOTE — PROGRESS NOTES
[facility] Medicine Services  PROGRESS NOTE    Patient Name: Jeana Chung  : 1975  MRN: [id number]    Date of Admission: 3/17/2020  Primary Care Physician: Tania Angeles PA-C    Subjective   Subjective     CC:  Abdominal pain, fever    HPI:  Resting in bed in no acute distress and overall feels okay.  Denies any fever but has had occasions of chills.No chest pain, palpitation, shortness of breath.  No nausea, vomiting, diarrhea but tells me her abdominal pain and tenderness is worse today.    Review of Systems      Objective   Objective     Vital Signs:   Temp:  [98.3 °F (36.8 °C)-100.3 °F (37.9 °C)] 98.8 °F (37.1 °C)  Heart Rate:  [] 99  Resp:  [18-20] 20  BP: (104-131)/(53-77) 106/53        Physical Exam:  Constitutional: No acute distress  HENT: NCAT, mucous membranes moist  Respiratory: Clear to auscultation bilaterally, respiratory effort normal   Cardiovascular: RRR, no murmurs, rubs, or gallops.  Gastrointestinal: Abdomen is obese.  Positive sluggish bowel sounds, soft, tender on deep palpation over right lower quadrant, nondistended  Musculoskeletal: Obesity.  No bilateral ankle edema  Psychiatric: Appropriate affect, cooperative  Neurologic: Oriented x 3, strength symmetric in all extremities, Cranial Nerves grossly intact to confrontation, speech clear  Skin: No rashes    Results Reviewed:  Results from last 7 days   Lab Units 20  05020  0605   WBC 10*3/mm3 0.96* 0.74* 0.76*   HEMOGLOBIN g/dL 7.4* 7.3* 7.9*   HEMATOCRIT % 24.2* 24.1* 25.4*   PLATELETS 10*3/mm3 65* 65* 77*   INR   --   --  1.11     Results from last 7 days   Lab Units 20  0502 20  0605 20  2301   SODIUM mmol/L 134* 133* 130* 129*   POTASSIUM mmol/L 3.9 4.0 3.8 4.3   CHLORIDE mmol/L 99 100 98 95*   CO2 mmol/L 22.0 23.0 22.0 24.0   BUN mg/dL 11 14 18 23*   CREATININE mg/dL 0.73 0.76 0.77 0.98   GLUCOSE mg/dL 103* 97 170*  241*   CALCIUM mg/dL 7.9* 8.0* 8.2* 8.5*   ALT (SGPT) U/L  --   --   --  9   AST (SGOT) U/L  --   --   --  12     Estimated Creatinine Clearance: 98.8 mL/min (by C-G formula based on SCr of 0.73 mg/dL).    Microbiology Results Abnormal     Procedure Component Value - Date/Time    Blood Culture - Blood, Arm, Left [[id number]] Collected:  03/18/20 0322    Lab Status:  Preliminary result Specimen:  Blood from Arm, Left Updated:  03/20/20 0530     Blood Culture No growth at 2 days    Blood Culture - Blood, Arm, Right [[id number]] Collected:  03/18/20 0322    Lab Status:  Preliminary result Specimen:  Blood from Arm, Right Updated:  03/20/20 0530     Blood Culture No growth at 2 days          Imaging Results (Last 24 Hours)     ** No results found for the last 24 hours. **               I have reviewed the medications:  Scheduled Meds:    acetaminophen 650 mg Oral Q6H   cetirizine 10 mg Oral Daily   docusate sodium 200 mg Oral BID   filgrastim (NEUPOGEN) injection 480 mcg Subcutaneous Q PM   gabapentin 300 mg Oral TID   insulin detemir 20 Units Subcutaneous Nightly   insulin lispro 0-7 Units Subcutaneous 4x Daily With Meals & Nightly   pantoprazole 40 mg Oral QAM   piperacillin-tazobactam 3.375 g Intravenous Q8H   sodium chloride 10 mL Intravenous Q12H     Continuous Infusions:    sodium chloride 100 mL/hr Last Rate: 100 mL/hr (03/18/20 2110)     PRN Meds:.dextrose  •  dextrose  •  glucagon (human recombinant)  •  hydrOXYzine  •  LORazepam  •  magnesium sulfate **OR** magnesium sulfate **OR** magnesium sulfate  •  melatonin  •  Morphine  •  ondansetron **OR** ondansetron  •  polyethylene glycol  •  [COMPLETED] Insert peripheral IV **AND** sodium chloride  •  sodium chloride  •  traMADol    Assessment/Plan   Assessment & Plan     Active Hospital Problems    Diagnosis  POA   • **Acute appendicitis [K35.80]  Yes   • Neutropenia (CMS/HCC) [D70.9]  Yes   • Open wound of right foot [S91.301A]  Yes   • Hyponatremia [E87.1]  Yes    • Pancytopenia (CMS/HCC) [D61.818]  Unknown   • Stage 3 chronic kidney disease (CMS/HCC) [N18.3]  Yes   • Endometrial cancer (CMS/HCC) [C54.1]  Yes   • Iron deficiency anemia [D50.9]  Yes   • Type 2 diabetes mellitus with diabetic polyneuropathy, with long-term current use of insulin (CMS/HCC) [E11.42, Z79.4]  Not Applicable   • Essential hypertension [I10]  Yes      Resolved Hospital Problems   No resolved problems to display.        Brief Hospital Course to date:  Jeana Chung is a 44 y.o. female with past medical history significant for hypertension, diabetes mellitus, chronic kidney disease, anemia, endometrial cancer and currently patient is on chemotherapy.  Patient was admitted to the hospital on 3/17/2020 because of abdominal pain and fever.  CT scan showed evidence of acute pancreatitis.  Surgery has seen and evaluated patient.  However, patient is leukopenic secondary to chemotherapy and surgery will not be considered at this point.    *Abdominal pain and fever secondary to acute appendicitis.  Currently patient is on antibiotic.  General surgery following or considering appendectomy tomorrow.    *Endometrial cancer, patient is on chemotherapy.    *Pancytopenia secondary to above.  Patient has received Neupogen, WBC slightly increased.    *Diabetes mellitus on insulin sliding scale    PLAN:  - continue current care  - labs in am  -Appendectomy per General surgery          DVT Prophylaxis:  SCD    Disposition: I expect the patient to be discharged.    CODE STATUS:   Code Status and Medical Interventions:   Ordered at: 03/18/20 0403     Level Of Support Discussed With:    Patient     Code Status:    CPR     Medical Interventions (Level of Support Prior to Arrest):    Full         Electronically signed by Lakhwinder Mix MD, 03/20/20, 14:13.

## 2020-03-20 NOTE — PROGRESS NOTES
Gynecologic Oncology   Daily Progress Note    Chief Complaint: Malaise, abdominal pain    Subjective   Patient did well overnight.  Her pain is controlled.  No nausea, vomiting.  She is currently n.p.o.  Abdominal pain continues and woke her up several times overnight.     Updated ROS is remarkable for no fevers, chills, chest pain, shortness of breath      Objective   Temp:  [98.3 °F (36.8 °C)-100.3 °F (37.9 °C)] 99.6 °F (37.6 °C)  Heart Rate:  [] 99  Resp:  [18] 18  BP: ()/(47-77) 114/64  Vitals:    03/20/20 0500   BP:    Pulse: 99   Resp:    Temp:    SpO2: 98%     I/O last 3 completed shifts:  In: -   Out: 300 [Urine:150]     GENERAL: Alert, well-appearing female in no apparent distress.    CARDIOVASCULAR: Normal rate, regular rhythm, no murmurs, rubs, or gallops.    RESPIRATORY: Clear to auscultation bilaterally, normal respiratory effort  GASTROINTESTINAL:  Soft, non-distended, no rebound or guarding. Wound vac in place with non erythematous borders. Right lower quadrant tenderness to palpation.   GENITOURINARY: Deferred  SKIN:  Warm, dry, well-perfused.    PSYCHIATRIC: AO x3, with appropriate affect, normal thought processes  EXREMITIES: Symmetric. No peripheral edema.    Lab Results   Component Value Date    WBC 0.96 (C) 03/20/2020    HGB 7.4 (L) 03/20/2020    HCT 24.2 (L) 03/20/2020    MCV 84.0 03/20/2020    PLT 65 (L) 03/20/2020    NEUTROABS 0.14 (L) 03/20/2020    GLUCOSE 103 (H) 03/20/2020    BUN 11 03/20/2020    CREATININE 0.73 03/20/2020    EGFRIFNONA 86 03/20/2020     (L) 03/20/2020    K 3.9 03/20/2020    CL 99 03/20/2020    CO2 22.0 03/20/2020    MG 1.6 03/20/2020    CALCIUM 7.9 (L) 03/20/2020         Assessment/Plan   Jeana Chung is a 44 y.o. with stage IIIa endometrial cancer, status post exploratory laparotomy, total abdominal hysterectomy, bilateral salpingo-oophorectomy, omentectomy on 2/10/2020, complicated by wound dehiscence now with wound VAC in place.  She underwent  first cycle of chemo 3/10/2020 with carboplatin and paclitaxel.  She is admitted with acute appendicitis.     Acute appendicitis  -Surgery consulted, ID consulted  -Blood cultures no growth to date  -Continue Zosyn  -Currently n.p.o. for possible surgical intervention      Pancytopenia secondary to recent chemo  -Started on Neupogen  -Continue to trend labs  -Neutropenic precautions  -Transfuse 1 unit packed red blood cells 3/19/2020     Diabetes with diabetic neuropathy and retinopathy, chemotherapy exacerbation of pre-existing neuropathy  -Trend fingersticks, continue insulin  -Continue home gabapentin     Wound dehiscence  -Continue wound VAC     Disposition  -Continue inpatient management         Vilam Burleson MD  03/20/20  06:52    I saw and evaluated the patient. I agree with the findings and the plan of care as documented in the note.  Continues to be pancytopenic.  I communicated with Dr. Myers regarding the patient's appendicitis.  He is going to reevaluate and possibly do surgery on 3/21/2020.  He and I agree that careful consideration regarding risks and benefits of intervention are to be considered.  I will not round on the patient this weekend but will be available by phone of [phone].    Celine Rubio MD  03/20/20  1:31 PM

## 2020-03-21 LAB
BASOPHILS # BLD AUTO: 0.02 10*3/MM3 (ref 0–0.2)
BASOPHILS NFR BLD AUTO: 1 % (ref 0–1.5)
DACRYOCYTES BLD QL SMEAR: NORMAL
DEPRECATED RDW RBC AUTO: 43.7 FL (ref 37–54)
ELLIPTOCYTES BLD QL SMEAR: NORMAL
EOSINOPHIL # BLD AUTO: 0.01 10*3/MM3 (ref 0–0.4)
EOSINOPHIL NFR BLD AUTO: 0.5 % (ref 0.3–6.2)
ERYTHROCYTE [DISTWIDTH] IN BLOOD BY AUTOMATED COUNT: 13.9 % (ref 12.3–15.4)
GLUCOSE BLDC GLUCOMTR-MCNC: 125 MG/DL (ref 70–130)
GLUCOSE BLDC GLUCOMTR-MCNC: 277 MG/DL (ref 70–130)
GLUCOSE BLDC GLUCOMTR-MCNC: 77 MG/DL (ref 70–130)
GLUCOSE BLDC GLUCOMTR-MCNC: 93 MG/DL (ref 70–130)
HCT VFR BLD AUTO: 24.4 % (ref 34–46.6)
HGB BLD-MCNC: 7.3 G/DL (ref 12–15.9)
IMM GRANULOCYTES # BLD AUTO: 0.09 10*3/MM3 (ref 0–0.05)
IMM GRANULOCYTES NFR BLD AUTO: 4.6 % (ref 0–0.5)
INR PPP: 1.14 (ref 0.85–1.16)
LYMPHOCYTES # BLD AUTO: 0.48 10*3/MM3 (ref 0.7–3.1)
LYMPHOCYTES NFR BLD AUTO: 24.6 % (ref 19.6–45.3)
MAGNESIUM SERPL-MCNC: 1.5 MG/DL (ref 1.6–2.6)
MCH RBC QN AUTO: 25.8 PG (ref 26.6–33)
MCHC RBC AUTO-ENTMCNC: 29.9 G/DL (ref 31.5–35.7)
MCV RBC AUTO: 86.2 FL (ref 79–97)
MONOCYTES # BLD AUTO: 0.36 10*3/MM3 (ref 0.1–0.9)
MONOCYTES NFR BLD AUTO: 18.5 % (ref 5–12)
NEUTROPHILS # BLD AUTO: 0.99 10*3/MM3 (ref 1.7–7)
NEUTROPHILS NFR BLD AUTO: 50.8 % (ref 42.7–76)
NRBC BLD AUTO-RTO: 0 /100 WBC (ref 0–0.2)
PLAT MORPH BLD: NORMAL
PLATELET # BLD AUTO: 47 10*3/MM3 (ref 140–450)
PMV BLD AUTO: 11.9 FL (ref 6–12)
PROTHROMBIN TIME: 14.4 SECONDS (ref 11.5–14)
RBC # BLD AUTO: 2.83 10*6/MM3 (ref 3.77–5.28)
WBC MORPH BLD: NORMAL
WBC NRBC COR # BLD: 1.95 10*3/MM3 (ref 3.4–10.8)

## 2020-03-21 PROCEDURE — 85007 BL SMEAR W/DIFF WBC COUNT: CPT | Performed by: INTERNAL MEDICINE

## 2020-03-21 PROCEDURE — 36430 TRANSFUSION BLD/BLD COMPNT: CPT

## 2020-03-21 PROCEDURE — 25010000002 DEXAMETHASONE PER 1 MG: Performed by: NURSE ANESTHETIST, CERTIFIED REGISTERED

## 2020-03-21 PROCEDURE — 25010000002 ONDANSETRON PER 1 MG: Performed by: PHYSICIAN ASSISTANT

## 2020-03-21 PROCEDURE — 25010000002 PROPOFOL 10 MG/ML EMULSION: Performed by: NURSE ANESTHETIST, CERTIFIED REGISTERED

## 2020-03-21 PROCEDURE — 83735 ASSAY OF MAGNESIUM: CPT | Performed by: INTERNAL MEDICINE

## 2020-03-21 PROCEDURE — P9037 PLATE PHERES LEUKOREDU IRRAD: HCPCS

## 2020-03-21 PROCEDURE — 25010000002 FENTANYL CITRATE (PF) 100 MCG/2ML SOLUTION: Performed by: NURSE ANESTHETIST, CERTIFIED REGISTERED

## 2020-03-21 PROCEDURE — 25010000002 PIPERACILLIN SOD-TAZOBACTAM PER 1 G: Performed by: INTERNAL MEDICINE

## 2020-03-21 PROCEDURE — 25010000002 NEOSTIGMINE 10 MG/10ML SOLUTION: Performed by: NURSE ANESTHETIST, CERTIFIED REGISTERED

## 2020-03-21 PROCEDURE — 85610 PROTHROMBIN TIME: CPT | Performed by: INTERNAL MEDICINE

## 2020-03-21 PROCEDURE — P9035 PLATELET PHERES LEUKOREDUCED: HCPCS

## 2020-03-21 PROCEDURE — 86900 BLOOD TYPING SEROLOGIC ABO: CPT

## 2020-03-21 PROCEDURE — P9016 RBC LEUKOCYTES REDUCED: HCPCS

## 2020-03-21 PROCEDURE — 88304 TISSUE EXAM BY PATHOLOGIST: CPT | Performed by: SURGERY

## 2020-03-21 PROCEDURE — 63710000001 INSULIN LISPRO (HUMAN) PER 5 UNITS: Performed by: SURGERY

## 2020-03-21 PROCEDURE — 97597 DBRDMT OPN WND 1ST 20 CM/<: CPT | Performed by: PHYSICAL THERAPIST

## 2020-03-21 PROCEDURE — 25010000002 MORPHINE PER 10 MG: Performed by: SURGERY

## 2020-03-21 PROCEDURE — 25010000002 MORPHINE PER 10 MG: Performed by: FAMILY MEDICINE

## 2020-03-21 PROCEDURE — 99232 SBSQ HOSP IP/OBS MODERATE 35: CPT | Performed by: INTERNAL MEDICINE

## 2020-03-21 PROCEDURE — 25010000002 MAGNESIUM SULFATE 2 GM/50ML SOLUTION: Performed by: SURGERY

## 2020-03-21 PROCEDURE — 25010000002 PIPERACILLIN SOD-TAZOBACTAM PER 1 G: Performed by: SURGERY

## 2020-03-21 PROCEDURE — 63710000001 INSULIN DETEMIR PER 5 UNITS: Performed by: SURGERY

## 2020-03-21 PROCEDURE — 25010000002 FILGRASTIM PER 480 MCG: Performed by: SURGERY

## 2020-03-21 PROCEDURE — 85025 COMPLETE CBC W/AUTO DIFF WBC: CPT | Performed by: INTERNAL MEDICINE

## 2020-03-21 PROCEDURE — 0DTJ4ZZ RESECTION OF APPENDIX, PERCUTANEOUS ENDOSCOPIC APPROACH: ICD-10-PCS | Performed by: SURGERY

## 2020-03-21 PROCEDURE — 25010000002 MAGNESIUM SULFATE 2 GM/50ML SOLUTION: Performed by: INTERNAL MEDICINE

## 2020-03-21 PROCEDURE — 25010000002 HYDROMORPHONE PER 4 MG: Performed by: NURSE ANESTHETIST, CERTIFIED REGISTERED

## 2020-03-21 PROCEDURE — 82962 GLUCOSE BLOOD TEST: CPT

## 2020-03-21 PROCEDURE — 25010000002 PHENYLEPHRINE PER 1 ML: Performed by: NURSE ANESTHETIST, CERTIFIED REGISTERED

## 2020-03-21 DEVICE — ENDOPATH ECHELON VASCULAR  RELOADS, WHITE, 35MM
Type: IMPLANTABLE DEVICE | Site: ABDOMEN | Status: FUNCTIONAL
Brand: ECHELON ENDOPATH

## 2020-03-21 RX ORDER — ROCURONIUM BROMIDE 10 MG/ML
INJECTION, SOLUTION INTRAVENOUS AS NEEDED
Status: DISCONTINUED | OUTPATIENT
Start: 2020-03-21 | End: 2020-03-21 | Stop reason: SURG

## 2020-03-21 RX ORDER — FENTANYL CITRATE 50 UG/ML
INJECTION, SOLUTION INTRAMUSCULAR; INTRAVENOUS AS NEEDED
Status: DISCONTINUED | OUTPATIENT
Start: 2020-03-21 | End: 2020-03-21 | Stop reason: SURG

## 2020-03-21 RX ORDER — NEOSTIGMINE METHYLSULFATE 1 MG/ML
INJECTION, SOLUTION INTRAVENOUS AS NEEDED
Status: DISCONTINUED | OUTPATIENT
Start: 2020-03-21 | End: 2020-03-21 | Stop reason: SURG

## 2020-03-21 RX ORDER — SODIUM CHLORIDE 9 MG/ML
INJECTION, SOLUTION INTRAVENOUS AS NEEDED
Status: DISCONTINUED | OUTPATIENT
Start: 2020-03-21 | End: 2020-03-23 | Stop reason: HOSPADM

## 2020-03-21 RX ORDER — PROPOFOL 10 MG/ML
VIAL (ML) INTRAVENOUS AS NEEDED
Status: DISCONTINUED | OUTPATIENT
Start: 2020-03-21 | End: 2020-03-21 | Stop reason: SURG

## 2020-03-21 RX ORDER — MAGNESIUM HYDROXIDE 1200 MG/15ML
LIQUID ORAL AS NEEDED
Status: DISCONTINUED | OUTPATIENT
Start: 2020-03-21 | End: 2020-03-21 | Stop reason: HOSPADM

## 2020-03-21 RX ORDER — FENTANYL CITRATE 50 UG/ML
50 INJECTION, SOLUTION INTRAMUSCULAR; INTRAVENOUS
Status: DISCONTINUED | OUTPATIENT
Start: 2020-03-21 | End: 2020-03-21 | Stop reason: HOSPADM

## 2020-03-21 RX ORDER — LIDOCAINE HYDROCHLORIDE 10 MG/ML
INJECTION, SOLUTION EPIDURAL; INFILTRATION; INTRACAUDAL; PERINEURAL AS NEEDED
Status: DISCONTINUED | OUTPATIENT
Start: 2020-03-21 | End: 2020-03-21 | Stop reason: SURG

## 2020-03-21 RX ORDER — BUPIVACAINE HYDROCHLORIDE AND EPINEPHRINE 5; 5 MG/ML; UG/ML
INJECTION, SOLUTION PERINEURAL AS NEEDED
Status: DISCONTINUED | OUTPATIENT
Start: 2020-03-21 | End: 2020-03-21 | Stop reason: HOSPADM

## 2020-03-21 RX ORDER — HYDROMORPHONE HYDROCHLORIDE 1 MG/ML
0.5 INJECTION, SOLUTION INTRAMUSCULAR; INTRAVENOUS; SUBCUTANEOUS
Status: COMPLETED | OUTPATIENT
Start: 2020-03-21 | End: 2020-03-21

## 2020-03-21 RX ORDER — DEXAMETHASONE SODIUM PHOSPHATE 4 MG/ML
INJECTION, SOLUTION INTRA-ARTICULAR; INTRALESIONAL; INTRAMUSCULAR; INTRAVENOUS; SOFT TISSUE AS NEEDED
Status: DISCONTINUED | OUTPATIENT
Start: 2020-03-21 | End: 2020-03-21 | Stop reason: SURG

## 2020-03-21 RX ORDER — GLYCOPYRROLATE 0.2 MG/ML
INJECTION INTRAMUSCULAR; INTRAVENOUS AS NEEDED
Status: DISCONTINUED | OUTPATIENT
Start: 2020-03-21 | End: 2020-03-21 | Stop reason: SURG

## 2020-03-21 RX ORDER — ONDANSETRON 2 MG/ML
4 INJECTION INTRAMUSCULAR; INTRAVENOUS ONCE AS NEEDED
Status: DISCONTINUED | OUTPATIENT
Start: 2020-03-21 | End: 2020-03-21 | Stop reason: HOSPADM

## 2020-03-21 RX ADMIN — ONDANSETRON 4 MG: 2 INJECTION INTRAMUSCULAR; INTRAVENOUS at 04:34

## 2020-03-21 RX ADMIN — SODIUM CHLORIDE: 9 INJECTION, SOLUTION INTRAVENOUS at 12:06

## 2020-03-21 RX ADMIN — MORPHINE SULFATE 3 MG: 4 INJECTION, SOLUTION INTRAMUSCULAR; INTRAVENOUS at 01:08

## 2020-03-21 RX ADMIN — SODIUM CHLORIDE 100 ML/HR: 9 INJECTION, SOLUTION INTRAVENOUS at 10:42

## 2020-03-21 RX ADMIN — PHENYLEPHRINE HYDROCHLORIDE 100 MCG: 10 INJECTION, SOLUTION INTRAMUSCULAR; INTRAVENOUS; SUBCUTANEOUS at 12:23

## 2020-03-21 RX ADMIN — ACETAMINOPHEN 650 MG: 325 TABLET, FILM COATED ORAL at 18:26

## 2020-03-21 RX ADMIN — LIDOCAINE HYDROCHLORIDE 50 MG: 10 INJECTION, SOLUTION EPIDURAL; INFILTRATION; INTRACAUDAL; PERINEURAL at 12:11

## 2020-03-21 RX ADMIN — DEXAMETHASONE SODIUM PHOSPHATE 8 MG: 4 INJECTION, SOLUTION INTRAMUSCULAR; INTRAVENOUS at 12:11

## 2020-03-21 RX ADMIN — NEOSTIGMINE 4 MG: 1 INJECTION INTRAVENOUS at 13:39

## 2020-03-21 RX ADMIN — ROCURONIUM BROMIDE 40 MG: 10 SOLUTION INTRAVENOUS at 12:11

## 2020-03-21 RX ADMIN — HYDROMORPHONE HYDROCHLORIDE 0.5 MG: 1 INJECTION, SOLUTION INTRAMUSCULAR; INTRAVENOUS; SUBCUTANEOUS at 14:15

## 2020-03-21 RX ADMIN — INSULIN LISPRO 4 UNITS: 100 INJECTION, SOLUTION INTRAVENOUS; SUBCUTANEOUS at 22:57

## 2020-03-21 RX ADMIN — PHENYLEPHRINE HYDROCHLORIDE 100 MCG: 10 INJECTION, SOLUTION INTRAMUSCULAR; INTRAVENOUS; SUBCUTANEOUS at 12:16

## 2020-03-21 RX ADMIN — HYDROMORPHONE HYDROCHLORIDE 0.5 MG: 1 INJECTION, SOLUTION INTRAMUSCULAR; INTRAVENOUS; SUBCUTANEOUS at 14:05

## 2020-03-21 RX ADMIN — PHENYLEPHRINE HYDROCHLORIDE 100 MCG: 10 INJECTION, SOLUTION INTRAMUSCULAR; INTRAVENOUS; SUBCUTANEOUS at 13:06

## 2020-03-21 RX ADMIN — PROPOFOL 200 MG: 10 INJECTION, EMULSION INTRAVENOUS at 12:11

## 2020-03-21 RX ADMIN — TAZOBACTAM SODIUM AND PIPERACILLIN SODIUM 3.38 G: 375; 3 INJECTION, SOLUTION INTRAVENOUS at 22:57

## 2020-03-21 RX ADMIN — HYDROMORPHONE HYDROCHLORIDE 0.5 MG: 1 INJECTION, SOLUTION INTRAMUSCULAR; INTRAVENOUS; SUBCUTANEOUS at 14:10

## 2020-03-21 RX ADMIN — SODIUM CHLORIDE, PRESERVATIVE FREE 10 ML: 5 INJECTION INTRAVENOUS at 09:38

## 2020-03-21 RX ADMIN — GABAPENTIN 300 MG: 300 CAPSULE ORAL at 18:26

## 2020-03-21 RX ADMIN — MAGNESIUM SULFATE 2 G: 2 INJECTION INTRAVENOUS at 10:41

## 2020-03-21 RX ADMIN — MORPHINE SULFATE 3 MG: 4 INJECTION, SOLUTION INTRAMUSCULAR; INTRAVENOUS at 09:37

## 2020-03-21 RX ADMIN — TAZOBACTAM SODIUM AND PIPERACILLIN SODIUM 3.38 G: 375; 3 INJECTION, SOLUTION INTRAVENOUS at 07:00

## 2020-03-21 RX ADMIN — MORPHINE SULFATE 3 MG: 4 INJECTION, SOLUTION INTRAMUSCULAR; INTRAVENOUS at 05:55

## 2020-03-21 RX ADMIN — FENTANYL CITRATE 50 MCG: 50 INJECTION, SOLUTION INTRAMUSCULAR; INTRAVENOUS at 12:11

## 2020-03-21 RX ADMIN — PHENYLEPHRINE HYDROCHLORIDE 50 MCG: 10 INJECTION, SOLUTION INTRAMUSCULAR; INTRAVENOUS; SUBCUTANEOUS at 12:42

## 2020-03-21 RX ADMIN — MORPHINE SULFATE 3 MG: 4 INJECTION, SOLUTION INTRAMUSCULAR; INTRAVENOUS at 18:25

## 2020-03-21 RX ADMIN — PHENYLEPHRINE HYDROCHLORIDE 100 MCG: 10 INJECTION, SOLUTION INTRAMUSCULAR; INTRAVENOUS; SUBCUTANEOUS at 12:35

## 2020-03-21 RX ADMIN — GABAPENTIN 300 MG: 300 CAPSULE ORAL at 22:58

## 2020-03-21 RX ADMIN — CETIRIZINE HYDROCHLORIDE 10 MG: 10 TABLET, FILM COATED ORAL at 09:37

## 2020-03-21 RX ADMIN — MAGNESIUM SULFATE 2 G: 2 INJECTION INTRAVENOUS at 15:52

## 2020-03-21 RX ADMIN — GLYCOPYRROLATE 0.4 MG: 0.2 INJECTION INTRAMUSCULAR; INTRAVENOUS at 13:39

## 2020-03-21 RX ADMIN — HYDROMORPHONE HYDROCHLORIDE 0.5 MG: 1 INJECTION, SOLUTION INTRAMUSCULAR; INTRAVENOUS; SUBCUTANEOUS at 14:20

## 2020-03-21 RX ADMIN — PHENYLEPHRINE HYDROCHLORIDE 100 MCG: 10 INJECTION, SOLUTION INTRAMUSCULAR; INTRAVENOUS; SUBCUTANEOUS at 12:19

## 2020-03-21 RX ADMIN — ONDANSETRON 4 MG: 2 INJECTION INTRAMUSCULAR; INTRAVENOUS at 12:11

## 2020-03-21 RX ADMIN — INSULIN DETEMIR 20 UNITS: 100 INJECTION, SOLUTION SUBCUTANEOUS at 23:17

## 2020-03-21 RX ADMIN — ACETAMINOPHEN 650 MG: 325 TABLET, FILM COATED ORAL at 07:00

## 2020-03-21 RX ADMIN — MAGNESIUM SULFATE 2 G: 2 INJECTION INTRAVENOUS at 18:25

## 2020-03-21 RX ADMIN — FILGRASTIM 480 MCG: 480 INJECTION, SOLUTION INTRAVENOUS; SUBCUTANEOUS at 18:25

## 2020-03-21 RX ADMIN — FENTANYL CITRATE 50 MCG: 50 INJECTION, SOLUTION INTRAMUSCULAR; INTRAVENOUS at 13:23

## 2020-03-21 RX ADMIN — GABAPENTIN 300 MG: 300 CAPSULE ORAL at 09:37

## 2020-03-21 RX ADMIN — PANTOPRAZOLE SODIUM 40 MG: 40 TABLET, DELAYED RELEASE ORAL at 07:00

## 2020-03-21 RX ADMIN — MORPHINE SULFATE 3 MG: 4 INJECTION, SOLUTION INTRAMUSCULAR; INTRAVENOUS at 22:58

## 2020-03-21 RX ADMIN — PHENYLEPHRINE HYDROCHLORIDE 50 MCG: 10 INJECTION, SOLUTION INTRAMUSCULAR; INTRAVENOUS; SUBCUTANEOUS at 12:57

## 2020-03-21 NOTE — PLAN OF CARE
Problem: Patient Care Overview  Goal: Plan of Care Review  Outcome: Ongoing (interventions implemented as appropriate)  Flowsheets  Taken 3/20/2020 0619  Progress: no change  Taken 3/21/2020 0618  Plan of Care Reviewed With: patient  Outcome Summary: VSS, pt in pain multiple times throughout the night. prns given, will continue to monitor

## 2020-03-21 NOTE — PLAN OF CARE
Problem: Patient Care Overview  Goal: Plan of Care Review  Flowsheets  Taken 3/21/2020 1802  Progress: improving  Outcome Summary: PT reports pain improved after appy. VSS. MG replaced per protocol. Wound vac removed, wound care per PT wound.  Taken 3/21/2020 2790  Plan of Care Reviewed With: patient;mother

## 2020-03-21 NOTE — THERAPY WOUND CARE TREATMENT
Acute Care - Wound/Debridement Re-Evaluation  [facility]     Patient Name: Jeana Chung  : 1975  MRN: [id number]  Today's Date: 3/21/2020                Admit Date: 3/17/2020    Visit Dx:    ICD-10-CM ICD-9-CM   1. Acute appendicitis, unspecified acute appendicitis type K35.80 540.9   2. Right lower quadrant abdominal pain R10.31 789.03   3. Fever, unspecified fever cause R50.9 780.60   4. Appendicitis, acute K35.80 540.9       Patient Active Problem List   Diagnosis   • Acute stress reaction with predominately emotional disturbance   • Sleep disturbance   • Cancer related pain   • Moderate episode of recurrent major depressive disorder (CMS/HCC)   • Anxiety   • Iron deficiency anemia   • Type 2 diabetes mellitus with diabetic polyneuropathy, with long-term current use of insulin (CMS/HCC)   • Essential hypertension   • Heart murmur   • Family history of cardiac disorder in mother   • Endometrial cancer (CMS/HCC)   • Stage 3 chronic kidney disease (CMS/HCC)   • Gastroesophageal reflux disease   • Amputation of left great toe (CMS/HCC)   • Amputation of right great toe (CMS/HCC)   • Neuropathy   • Hypotension due to drugs   • Neutropenia (CMS/HCC)   • Acute appendicitis   • Open wound of right foot   • Hyponatremia   • Pancytopenia (CMS/HCC)        Past Medical History:   Diagnosis Date   • Anemia    • Anxiety and depression    • Back pain    • Bronchitis    • Diabetes (CMS/HCC)     DX 4-5 YRS AGO, CHECKS BS 4X/DAY, LAST A1C 7.1%   • Endometrial cancer (CMS/HCC)     STAGE II   • GERD (gastroesophageal reflux disease)    • Hypertension    • MRSA (methicillin resistant staph aureus) culture positive 2018    S/P NECROTIZING FASCITIS IN BILATERAL FEET   • Necrotizing fasciitis (CMS/HCC)    • PCOS (polycystic ovarian syndrome)    • PTSD (post-traumatic stress disorder)    • Retinopathy 3/18/2020   • Sepsis (CMS/HCC)    • Stage 3 chronic kidney disease (CMS/HCC) 2020   • Subconjunctival hemorrhage          Past Surgical History:   Procedure Laterality Date   • EXPLORATORY LAPAROTOMY, TOTAL ABDOMINAL HYSTERECTOMY SALPINGO OOPHORECTOMY N/A 2/10/2020    Procedure: EXPLORATORY LAPAROTOMY, TOTAL ABDOMINAL HYSTERECTOMY, BILATERAL SALPINGO-OOPHORECTOMY WITH OPTIMAL  STAGING (R-0), OMENTECTOMY;  Surgeon: Celine Rubio MD;  Location: [facility];  Service: Gynecology Oncology;  Laterality: N/A;   • EYE SURGERY Left     BLOOD VESSEL IN EYE REPAIR   • TOE AMPUTATION Left 06/2019    big toe - unhealing sore   • TOE AMPUTATION Right 2018    big toe and second toe - Necrotizing fasciitis and MRSA                 LDA Wound     Row Name 03/21/20 1510             Wound 03/17/20 1430 Right medial foot Diabetic Ulcer    Wound - Properties Group Date first assessed: 03/17/20  - Time first assessed: 1430  -MC Present on Hospital Admission: N  -MC Side: Right  -MC Orientation: medial  -MC Location: foot  -MC Primary Wound Type: Diabetic ulc  -MC       Wound 02/17/20 1300 midline abdomen Other (comment)    Wound - Properties Group Date first assessed: 02/17/20  -JM Time first assessed: 1300  -JM Orientation: midline  -JM Location: abdomen  -JM Primary Wound Type: Other  -JM, dehisced surgical incision  Additional Comments: dehisced abd incision  -JM    Wound Image  Images linked: 1  -MW         Wound Bilateral upper;medial;lateral abdomen Incision    Wound - Properties Group Side: Bilateral  -RB Orientation: upper;medial;lateral  -RB Location: abdomen  -RB Primary Wound Type: Incision  -RB Additional Comments: 3 port sites  -RB       Wound 02/10/20 midline abdomen Incision    Wound - Properties Group Date first assessed: 02/10/20  -KD Present on Hospital Admission: N  -KD Orientation: midline  -KD Location: abdomen  -KD Primary Wound Type: Incision  -KD       NPWT (Negative Pressure Wound Therapy) 02/21/20 1030 midline abdomen    NPWT (Negative Pressure Wound Therapy) - Properties Group Placement Date: 02/21/20  -MF Placement  Time: 1030  -MF Location: midline abdomen  -      User Key  (r) = Recorded By, (t) = Taken By, (c) = Cosigned By    Initials Name Provider Type    Vinny Colindres, PT Physical Therapist    Yvette Dubois, PT Physical Therapist    Mary Kay Flores, PT Physical Therapist    Leidy Millard, PT Physical Therapist    Be Godwin, RN Registered Nurse    Bushra Adair RN Registered Nurse        Wound 02/10/20 midline abdomen Incision (Active)   Dressing Appearance dry;intact 3/21/2020  2:00 PM       Wound 02/17/20 1300 midline abdomen Other (comment) (Active)   Wound Image   3/21/2020  3:10 PM   Dressing Appearance open to air 3/21/2020  3:50 PM   Closure SAIRA 3/21/2020  3:50 PM   Base dressing in place, unable to visualize 3/21/2020  3:50 PM   Red (%), Wound Tissue Color 95 3/21/2020  3:10 PM   Yellow (%), Wound Tissue Color 5 3/21/2020  3:10 PM   Periwound intact;dry 3/21/2020  3:10 PM   Periwound Temperature warm 3/21/2020  3:10 PM   Periwound Skin Turgor soft 3/21/2020  3:10 PM   Edges open;irregular 3/21/2020  3:10 PM   Wound Length (cm) 9.8 cm 3/21/2020  3:10 PM   Wound Width (cm) 1.8 cm 3/21/2020  3:10 PM   Wound Depth (cm) 1.5 cm 3/21/2020  3:10 PM   Drainage Characteristics/Odor serosanguineous 3/21/2020  3:10 PM   Drainage Amount small 3/21/2020  3:10 PM   Care, Wound cleansed with;wound cleanser 3/21/2020  3:10 PM   Dressing Care, Wound foam 3/21/2020  3:50 PM   Periwound Care, Wound cleansed with pH balanced cleanser;dry periwound area maintained 3/21/2020  3:10 PM       Wound 03/17/20 1430 Right medial foot Diabetic Ulcer (Active)   Wound Image   3/21/2020  3:10 PM   Dressing Appearance dressing loose;moist drainage;dried drainage 3/21/2020  3:10 PM   Closure SAIRA 3/21/2020  8:00 AM   Base moist;pink;red;subcutaneous;granulating 3/21/2020  3:10 PM   Red (%), Wound Tissue Color 100 3/21/2020  3:10 PM   Periwound intact;macerated;moist;pale white 3/21/2020  3:10 PM    Periwound Temperature warm 3/21/2020  3:10 PM   Periwound Skin Turgor soft 3/21/2020  3:10 PM   Edges open;irregular;other (see comments) 3/21/2020  3:10 PM   Wound Length (cm) 1.9 cm 3/21/2020  3:10 PM   Wound Width (cm) 1.2 cm 3/21/2020  3:10 PM   Wound Depth (cm) 0.2 cm 3/21/2020  3:10 PM   Tunneling [Depth (cm)/Location] 0 3/21/2020  3:10 PM   Undermining [Depth (cm)/Location] 0 3/21/2020  3:10 PM   Drainage Characteristics/Odor sanguineous 3/21/2020  3:10 PM   Drainage Amount small 3/21/2020  3:10 PM   Care, Wound cleansed with;wound cleanser;debrided 3/21/2020  3:10 PM   Dressing Care, Wound dressing changed;collagen;silver impregnated;low-adherent;foam;other (see comments) 3/21/2020  3:10 PM   Periwound Care, Wound cleansed with pH balanced cleanser;dry periwound area maintained 3/21/2020  3:10 PM       Wound Bilateral upper;medial;lateral abdomen Incision (Active)   Dressing Appearance dry;intact;no drainage 3/21/2020  3:50 PM   Closure Liquid skin adhesive 3/21/2020  3:50 PM   Periwound intact 3/21/2020  3:50 PM       NPWT (Negative Pressure Wound Therapy) 02/21/20 1030 midline abdomen (Active)   Therapy Setting vacuum off 3/21/2020  3:10 PM   Dressing foam, black 3/21/2020  4:00 AM         WOUND DEBRIDEMENT  Total area of Debridement: ~2 cm2   Debridement Site 1  Location- Site 1: Rt foot   Selective Debridement- Site 1: Wound Surface <20cmsq  Instruments- Site 1: tweezers  Excised Tissue Description- Site 1: moderate, slough  Bleeding- Site 1: seeping, scant                  PT ASSESSMENT (last 12 hours)      Physical Therapy Evaluation     Row Name             Wound 03/17/20 1430 Right medial foot Diabetic Ulcer    Wound - Properties Group Date first assessed: 03/17/20  - Time first assessed: 1430  - Present on Hospital Admission: N  - Side: Right  - Orientation: medial  - Location: foot  - Primary Wound Type: Diabetic ulc  -    Row Name             Wound 02/17/20 1300 midline abdomen  Other (comment)    Wound - Properties Group Date first assessed: 02/17/20  -JM Time first assessed: 1300  -JM Orientation: midline  -JM Location: abdomen  -JM Primary Wound Type: Other  -JM, dehisced surgical incision  Additional Comments: dehisced abd incision  -JM    Row Name             Wound Bilateral upper;medial;lateral abdomen Incision    Wound - Properties Group Side: Bilateral  -RB Orientation: upper;medial;lateral  -RB Location: abdomen  -RB Primary Wound Type: Incision  -RB Additional Comments: 3 port sites  -RB    Row Name             Wound 02/10/20 midline abdomen Incision    Wound - Properties Group Date first assessed: 02/10/20  -KD Present on Hospital Admission: N  -KD Orientation: midline  -KD Location: abdomen  -KD Primary Wound Type: Incision  -KD    Row Name             NPWT (Negative Pressure Wound Therapy) 02/21/20 1030 midline abdomen    NPWT (Negative Pressure Wound Therapy) - Properties Group Placement Date: 02/21/20  - Placement Time: 1030  -MF Location: midline abdomen  -    Row Name 03/21/20 1510          Physical Therapy Goals    Wound Care Goal Selection (PT)  wound care, PT goal 1;wound care, PT goal 2  -MW     Additional Documentation  Wound Care Goal Selection (PT) (Row)  -     Row Name 03/21/20 1510          Wound Care Goal 1 (PT)    Wound Care Goal 1 (PT)  Rt foot wound dimensions to decrease at least 20%; no s/s of infx.   -MW     Time Frame (Wound Care Goal 1, PT)  long term goal (LTG);10 days  -MW     Row Name 03/21/20 1510          Wound Care Goal 2 (PT)    Wound Care Goal 2 (PT)  Midline abdominal incision dimensions to decrease at least 10%; no s/s of infx.   -MW     Time Frame (Wound Care Goal 2, PT)  long term goal (LTG);10 days  -MW       User Key  (r) = Recorded By, (t) = Taken By, (c) = Cosigned By    Initials Name Provider Type    Vinny Colindres, PT Physical Therapist    Yvette Dubois, PT Physical Therapist    Mary Kay Flores, PT Physical  Therapist    Leidy Millard, PT Physical Therapist    Be Godwin, RN Registered Nurse    Bushra Adair, RN Registered Nurse            Recommendation and Plan  Planned Therapy Interventions (PT Eval): wound care, patient/family education  Plan of Care Reviewed With: patient, family   Progress: no change       Progress: no change  Outcome Summary: PT wound care: pt recently returned from appendectomy. Abdominal VAC removed in OR. Updated dressing to Danita (collagen matrix with silver) to promote granulation formation, space filled with Opticell AG, calcium alginate for moist bacteriostatic healing envrionment. This dressing can remain in place for several days to allow the body time to heal undisturbed. RT foot dressing also changed to Danita and Mepilex AG foam to promote moist wound healing; xeroform was causing periwound maceration. Debrided nonviable tissue from wound surface; wound had min bloody drainage; unsure if due to wt bearing or position changes, etc. Will monitor Rt foot wound drainage. PT wound care will recheck Sun; expect to change both dressings Mon/ Tues depending on drainage.   Plan of Care Reviewed With: patient, family            Time Calculation  PT Charges     Row Name 03/21/20 1620             Time Calculation    Start Time  1510  -MW        User Key  (r) = Recorded By, (t) = Taken By, (c) = Cosigned By    Initials Name Provider Type    Yvette Dubois, PT Physical Therapist            Therapy Charges for Today     Code Description Service Date Service Provider Modifiers Qty    37416892244 HC YESSENIA DEBRIDE OPEN WOUND UP TO 20CM 3/21/2020 Yvette Ribeiro, PT GP 1            PT G-Codes  AM-PAC 6 Clicks Score (PT): 24       Yvette Ribeiro PT  3/21/2020

## 2020-03-21 NOTE — PROGRESS NOTES
[facility]  PROGRESS NOTE    Patient Name: Jeana Chung  : 1975  MRN: [id number]    Date of Admission: 3/17/2020  Primary Care Physician: Tania Angeles PA-C    Subjective   Subjective     CC:  Abdominal pain, fever    HPI:  Resting in bed in no acute distress but c/o abdominal pain.  Denies any fever or chills.No chest pain, palpitation, shortness of breath.  No nausea, vomiting, diarrhea .    Review of Systems      Objective   Objective     Vital Signs:   Temp:  [98.4 °F (36.9 °C)-98.7 °F (37.1 °C)] 98.4 °F (36.9 °C)  Heart Rate:  [] 84  Resp:  [16] 16  BP: ()/(55-57) 100/57        Physical Exam:  Constitutional: No acute distress  HENT: NCAT, mucous membranes moist  Respiratory: Clear to auscultation bilaterally, respiratory effort normal   Cardiovascular: RRR, no murmurs, rubs, or gallops.  Gastrointestinal: Abdomen is obese.  Positive sluggish bowel sounds, soft, tender on deep palpation over right lower quadrant, nondistended  Musculoskeletal: Obesity.  No bilateral ankle edema  Psychiatric: Appropriate affect, cooperative  Neurologic: Oriented x 3, strength symmetric in all extremities, Cranial Nerves grossly intact to confrontation, speech clear  Skin: No rashes    Results Reviewed:  Results from last 7 days   Lab Units 20  0531 20  0449 20  0502 20  0605   WBC 10*3/mm3 1.95* 0.96* 0.74* 0.76*   HEMOGLOBIN g/dL 7.3* 7.4* 7.3* 7.9*   HEMATOCRIT % 24.4* 24.2* 24.1* 25.4*   PLATELETS 10*3/mm3 47* 65* 65* 77*   INR  1.14  --   --  1.11     Results from last 7 days   Lab Units 20  0449 20  0502 20  0605 20  2301   SODIUM mmol/L 134* 133* 130* 129*   POTASSIUM mmol/L 3.9 4.0 3.8 4.3   CHLORIDE mmol/L 99 100 98 95*   CO2 mmol/L 22.0 23.0 22.0 24.0   BUN mg/dL 11 14 18 23*   CREATININE mg/dL 0.73 0.76 0.77 0.98   GLUCOSE mg/dL 103* 97 170* 241*   CALCIUM mg/dL 7.9* 8.0* 8.2* 8.5*   ALT  (SGPT) U/L  --   --   --  9   AST (SGOT) U/L  --   --   --  12     Estimated Creatinine Clearance: 98.8 mL/min (by C-G formula based on SCr of 0.73 mg/dL).    Microbiology Results Abnormal     Procedure Component Value - Date/Time    Blood Culture - Blood, Arm, Left [[id number]] Collected:  03/18/20 0322    Lab Status:  Preliminary result Specimen:  Blood from Arm, Left Updated:  03/21/20 0530     Blood Culture No growth at 3 days    Blood Culture - Blood, Arm, Right [[id number]] Collected:  03/18/20 0322    Lab Status:  Preliminary result Specimen:  Blood from Arm, Right Updated:  03/21/20 0530     Blood Culture No growth at 3 days          Imaging Results (Last 24 Hours)     ** No results found for the last 24 hours. **               I have reviewed the medications:  Scheduled Meds:    [MAR Hold] acetaminophen 650 mg Oral Q6H   [MAR Hold] cetirizine 10 mg Oral Daily   [MAR Hold] docusate sodium 200 mg Oral BID   [MAR Hold] filgrastim (NEUPOGEN) injection 480 mcg Subcutaneous Q PM   gabapentin 300 mg Oral TID   [MAR Hold] insulin detemir 20 Units Subcutaneous Nightly   [MAR Hold] insulin lispro 0-7 Units Subcutaneous 4x Daily With Meals & Nightly   [MAR Hold] pantoprazole 40 mg Oral QAM   [MAR Hold] piperacillin-tazobactam 3.375 g Intravenous Q8H   [MAR Hold] sodium chloride 10 mL Intravenous Q12H     Continuous Infusions:    sodium chloride 100 mL/hr Last Rate: 100 mL/hr (03/21/20 1042)     PRN Meds:.bupivacaine-EPINEPHrine  •  [MAR Hold] dextrose  •  [MAR Hold] dextrose  •  [MAR Hold] glucagon (human recombinant)  •  [MAR Hold] hydrOXYzine  •  [MAR Hold] LORazepam  •  [MAR Hold] magnesium sulfate **OR** [MAR Hold] magnesium sulfate **OR** [MAR Hold] magnesium sulfate  •  [MAR Hold] melatonin  •  [MAR Hold] Morphine  •  [MAR Hold] ondansetron **OR** [MAR Hold] ondansetron  •  [MAR Hold] polyethylene glycol  •  [COMPLETED] Insert peripheral IV **AND** [MAR Hold] sodium chloride  •  [MAR Hold] sodium chloride  •   sodium chloride  •  sterile water  •  [MAR Hold] traMADol    Assessment/Plan   Assessment & Plan     Active Hospital Problems    Diagnosis  POA   • **Acute appendicitis [K35.80]  Yes   • Neutropenia (CMS/HCC) [D70.9]  Yes   • Open wound of right foot [S91.301A]  Yes   • Hyponatremia [E87.1]  Yes   • Pancytopenia (CMS/HCC) [D61.818]  Unknown   • Stage 3 chronic kidney disease (CMS/HCC) [N18.3]  Yes   • Endometrial cancer (CMS/HCC) [C54.1]  Yes   • Iron deficiency anemia [D50.9]  Yes   • Type 2 diabetes mellitus with diabetic polyneuropathy, with long-term current use of insulin (CMS/HCC) [E11.42, Z79.4]  Not Applicable   • Essential hypertension [I10]  Yes      Resolved Hospital Problems   No resolved problems to display.        Brief Hospital Course to date:  Jeana Chung is a 44 y.o. female with past medical history significant for hypertension, diabetes mellitus, chronic kidney disease, anemia, endometrial cancer and currently patient is on chemotherapy.  Patient was admitted to the hospital on 3/17/2020 because of abdominal pain and fever.  CT scan showed evidence of acute pancreatitis.  Surgery has seen and evaluated patient.  However, patient is leukopenic secondary to chemotherapy and surgery will not be considered at this point.    *Abdominal pain and fever secondary to acute appendicitis.  Currently patient is on antibiotic.  General surgery following or considering appendectomy tomorrow.    *Endometrial cancer, patient is on chemotherapy.    *Pancytopenia secondary to above.  Patient has received Neupogen, WBC slightly increased.    *Diabetes mellitus on insulin sliding scale    PLAN:  - continue current care  -Appendectomy per General surgery, possibly today          DVT Prophylaxis:  SCD    Disposition: I expect the patient to be discharged.    CODE STATUS:   Code Status and Medical Interventions:   Ordered at: 03/18/20 0403     Level Of Support Discussed With:    Patient     Code Status:    CPR      Medical Interventions (Level of Support Prior to Arrest):    Full         Electronically signed by Lakhwinder Mix MD, 03/21/20, 13:10.

## 2020-03-21 NOTE — PLAN OF CARE
Problem: Patient Care Overview  Goal: Plan of Care Review  Outcome: Ongoing (interventions implemented as appropriate)  Flowsheets (Taken 3/21/2020 1510)  Progress: no change  Plan of Care Reviewed With: patient;family  Outcome Summary: PT wound care: pt recently returned from appendectomy. Abdominal VAC removed in OR. Updated dressing to Danita (collagen matrix with silver) to promote granulation formation, space filled with Opticell AG, calcium alginate for moist bacteriostatic healing envrionment. This dressing can remain in place for several days to allow the body time to heal undisturbed. RT foot dressing also changed to Danita and Mepilex AG foam to promote moist wound healing; xeroform was causing periwound maceration. Debrided nonviable tissue from wound surface; wound had min bloody drainage; unsure if due to wt bearing or position changes, etc. Will monitor Rt foot wound drainage. PT wound care will recheck Sun; expect to change both dressings Mon/ Tues depending on drainage.

## 2020-03-21 NOTE — OP NOTE
General Surgery Operative Note    Jeana Chung  [id number]  1975    Date of Surgery:  3/21/2020 13:53    Pre-op Diagnosis: Acute appendicitis            Neutropenia secondary to chemotherapy            Endometrioid adenocarcinoma    Post-op Diagnosis: Acute appendicitis, ruptured    Procedure: Laparoscopic appendectomy    Surgeon: Praful Myers MD    Assistant: None    Anesthesia: General    Fluids: 1900 mL crystalloid   12 pack of platelets   1 unit of red cells    Estimated Blood Loss: Less than 25 mL    Urine Voided: Not recorded    Specimens:  Appendix                  Drains: 19 Chinese Virgil drain right lower quadrant    Findings: Acute appendicitis with a small periappendiceal abscess    Complications: None apparent    History: 44-year-old lady with endometrial cancer who underwent chemotherapy and is now pancytopenic presented with acute appendicitis to the emergency room.  She was hydrated and treated with IV antibiotics along with GM-CSF awaiting rebound of her ANC to 1K.        I rehashed the risks and benefits of appendectomy, including but not limited to: bleeding, infection, injury to adjacent viscera (small bowel, large bowel, the right ureter, the bladder, etc.), cecal stump leak, postoperative abscess formation, an open operation in general, need for re-intervention, and medical issues from a cardiopulmonary and deep venous thrombosis standpoint.  They understood these risks and wished to proceed.    Procedure:      After informed consent the patient was taken to the operating room.   They were placed in the supine position on the operating table, general anesthesia administered, the abdomen was then prepped and draped in the standard sterile fashion.  An Ioban was placed on the skin.  A timeout was performed.       The abdomen was entered through a Ann Marie trocar cutdown at the level of the right upper quadrant.  Bilateral Vicryl fascial sutures placed and the blunt-tipped  Ann Marie trocar placed intra-abdominally, then the abdomen was insufflated.  The patient was placed in the headdown position and rolled slightly onto the left-hand side.  A 11 mm trocar was placed in the epigastric area through the rectus muscle on the right under direct visualization along with a 5 mm trocar under direct visualization in the left mid abdomen.  There were adhesions noted in the right lower quadrant and pelvis.  The cecum was medially rotated demonstrating an acutely inflamed friable distal.  When this was bluntly freed from the sidewall a small abscess cavity was opened and this was suctioned and irrigated free.  I then created a window in the mesoappendix at its base and a vascular load was fired across the appendiceal base without issue.  The mesoappendix was taken with 2 further vascular loads.  There was a small amount of bleeding from the staple line which was controlled with hemoclips.  The appendix was then placed in an Endo Catch bag and withdrawn from the abdomen.  I then reinspected the cecal stump staple line and mesoappendiceal staple line, they were both in good order.  The right lower quadrant and pelvis were irrigated.  I placed a 19 Occitan round Virgil drain through the right abdomen, separate stab incision, and placed this along the right pelvic gutter.  All trocars were removed under direct visualization.  The 12 mm fascial defect was closed with 0 Vicryl.  All skin incisions were closed with 4-0 Monocryl, Mastisol, Steri-Strips, Telfa, and Tegaderm.       All lap and needle counts were correct at the end of the procedure ×2.  The patient was then transferred to the recovery room in stable condition.    Praful Myers MD     Date: 3/21/2020  Time: 13:53

## 2020-03-21 NOTE — ANESTHESIA POSTPROCEDURE EVALUATION
Patient: Jeana Chung    Procedure Summary     Date:  03/21/20 Room / [facility]:   [facility]    Anesthesia Start:  1206 Anesthesia Stop:  1402    Procedure:  APPENDECTOMY LAPAROSCOPIC (N/A Abdomen) Diagnosis:      Surgeon:  Praful Myers MD Provider:  Anastacio Liang MD    Anesthesia Type:  general ASA Status:  3          Anesthesia Type: general    Vitals  Vitals Value Taken Time   BP 94/52 3/21/2020  2:00 PM   Temp 97.5 °F (36.4 °C) 3/21/2020  2:00 PM   Pulse 92 3/21/2020  2:02 PM   Resp 14 3/21/2020  2:00 PM   SpO2 91 % 3/21/2020  2:02 PM   Vitals shown include unvalidated device data.        Post Anesthesia Care and Evaluation    Patient location during evaluation: PACU  Patient participation: complete - patient participated  Level of consciousness: awake and alert  Pain score: 0  Pain management: adequate  Airway patency: patent  Anesthetic complications: No anesthetic complications  PONV Status: none  Cardiovascular status: hemodynamically stable and acceptable  Respiratory status: nonlabored ventilation, acceptable and nasal cannula  Hydration status: acceptable

## 2020-03-21 NOTE — ANESTHESIA PREPROCEDURE EVALUATION
Anesthesia Evaluation     Patient summary reviewed and Nursing notes reviewed   no history of anesthetic complications:  NPO Solid Status: > 8 hours  NPO Liquid Status: > 2 hours           Airway   Mallampati: II  TM distance: >3 FB  Neck ROM: full  No difficulty expected  Dental - normal exam     Pulmonary - negative pulmonary ROS and normal exam    breath sounds clear to auscultation  Cardiovascular - normal exam    Rhythm: regular  Rate: normal    (+) hypertension,       Neuro/Psych- negative ROS  GI/Hepatic/Renal/Endo    (+) obesity,  GERD,  diabetes mellitus type 2,     ROS Comment: Acute appendicitis    Musculoskeletal     (+) back pain,   Abdominal    Substance History      OB/GYN          Other   blood dyscrasia (Pancytopenia d/t chemotherapy),   history of cancer (Endometrial ca s/p hysterectomy w/ subsequent would dehiscence)                    Anesthesia Plan    ASA 3     general     intravenous induction     Anesthetic plan, all risks, benefits, and alternatives have been provided, discussed and informed consent has been obtained with: patient.    Plan discussed with CRNA.

## 2020-03-21 NOTE — ANESTHESIA PROCEDURE NOTES
Airway  Urgency: elective    Date/Time: 3/21/2020 12:13 PM  Airway not difficult    General Information and Staff    Patient location during procedure: OR  CRNA: Grey Marroquin CRNA    Indications and Patient Condition  Indications for airway management: airway protection    Preoxygenated: yes  MILS not maintained throughout  Mask difficulty assessment: 2 - vent by mask + OA or adjuvant +/- NMBA    Final Airway Details  Final airway type: endotracheal airway      Successful airway: ETT  Cuffed: yes   Successful intubation technique: direct laryngoscopy  Facilitating devices/methods: intubating stylet  Endotracheal tube insertion site: oral  Blade: Yocasta  Blade size: 3  ETT size (mm): 7.0  Cormack-Lehane Classification: grade I - full view of glottis  Placement verified by: chest auscultation and capnometry   Measured from: lips  ETT/EBT  to lips (cm): 20  Number of attempts at approach: 1  Assessment: lips, teeth, and gum same as pre-op and atraumatic intubation    Additional Comments  Negative epigastric sounds, Breath sound equal bilaterally with symmetric chest rise and fall

## 2020-03-21 NOTE — PROGRESS NOTES
INFECTIOUS DISEASE PROGRESS NOTE   Jeana Chung  1975  [id number]      Admission Date: 3/17/2020      Requesting Provider: No ref. provider found  Evaluating Physician: Abrahan Talley MD    Reason for Consultation:  Acute appendicitis     History of present illness:  3/18/20:    3/18/2020: Patient is a 44 y.o. female with endometrial cancer on chemotherapy,( last on 3/10)  CKD, T2DM,, seen today for an abdominal wound infection.  She underwent a REGINALDO, bilateral salpingo-oophrectomy, omentectomy 2/11/20, with postoperative wound dehiscence for which she has been followed by PT wound care.  She was seen in PT wound care yesterday for a new foot ulcer, and once home developed a fever of 101.3, with fatigue and weakness and right lower quadrant pain.  She was instructed to present to the ED.  An abdominal CT revealed acute appendicitis, no perforation or abscess, splenomegaly. CXR without acute cardiopulmonary findings. Tmax of 99.4, pulse of 124, .  Admitting labs with pancytopenia, normal lactate.  Blood cultures pending .   3/19/20:  Tmax of 100.7 last pm.  She continues to have RLQ discomfort.  No nausea.  PT to change vac today.   3/20/2020: She feels better today.  She has decreased abdominal pain.  She has been afebrile today but had a temperature of up to 100.3 yesterday.    Past Medical History:   Diagnosis Date   • Anemia    • Anxiety and depression    • Back pain    • Bronchitis    • Diabetes (CMS/HCC)     DX 4-5 YRS AGO, CHECKS BS 4X/DAY, LAST A1C 7.1%   • Endometrial cancer (CMS/HCC)     STAGE II   • GERD (gastroesophageal reflux disease)    • Hypertension    • MRSA (methicillin resistant staph aureus) culture positive     S/P NECROTIZING FASCITIS IN BILATERAL FEET   • Necrotizing fasciitis (CMS/HCC)    • PCOS (polycystic ovarian syndrome)    • PTSD (post-traumatic stress disorder)    • Retinopathy 3/18/2020   • Sepsis (CMS/HCC)    • Stage 3 chronic kidney disease (CMS/HCC) 2/24/2020    • Subconjunctival hemorrhage        Past Surgical History:   Procedure Laterality Date   • EXPLORATORY LAPAROTOMY, TOTAL ABDOMINAL HYSTERECTOMY SALPINGO OOPHORECTOMY N/A 2/10/2020    Procedure: EXPLORATORY LAPAROTOMY, TOTAL ABDOMINAL HYSTERECTOMY, BILATERAL SALPINGO-OOPHORECTOMY WITH OPTIMAL  STAGING (R-0), OMENTECTOMY;  Surgeon: Celine Rubio MD;  Location: [facility];  Service: Gynecology Oncology;  Laterality: N/A;   • EYE SURGERY Left     BLOOD VESSEL IN EYE REPAIR   • TOE AMPUTATION Left 2019    big toe - unhealing sore   • TOE AMPUTATION Right 2018    big toe and second toe - Necrotizing fasciitis and MRSA        Family History   Problem Relation Age of Onset   • Fibroids Mother    • Diabetes type II Mother    • Hypertension Mother    • Heart attack Mother    • Fibroids Sister         PCOS   • Diabetes type II Sister    • Dementia Maternal Grandmother    • Stroke Maternal Grandmother        Social History     Socioeconomic History   • Marital status:      Spouse name: Not on file   • Number of children: 1   • Years of education: Not on file   • Highest education level: Not on file   Tobacco Use   • Smoking status: Former Smoker     Types: Cigarettes     Last attempt to quit: 2000     Years since quittin.2   • Smokeless tobacco: Never Used   • Tobacco comment: social MAYBE 1 CIG/DAY   Substance and Sexual Activity   • Alcohol use: Not Currently     Frequency: Monthly or less     Drinks per session: 1 or 2   • Drug use: Never   • Sexual activity: Not Currently   Social History Narrative    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 Staying in [place] with mother due to Endometrial Cancer.        Allergies   Allergen Reactions   • Bactrim [Sulfamethoxazole-Trimethoprim] Anaphylaxis   • Penicillins Hives   • Promethazine Mental Status Change         Medication:    Current Facility-Administered Medications:   •  acetaminophen (TYLENOL) tablet 650 mg, 650 mg, Oral, Q6H, Wilmer  GRACIA Mcconnell, 650 mg at 03/20/20 1810  •  cetirizine (zyrTEC) tablet 10 mg, 10 mg, Oral, Daily, Enedina Guillen PA-C, 10 mg at 03/20/20 0942  •  dextrose (D50W) 25 g/ 50mL Intravenous Solution 25 g, 25 g, Intravenous, Q15 Min PRN, Enedina Guillen PA-C  •  dextrose (GLUTOSE) oral gel 15 g, 15 g, Oral, Q15 Min PRN, Enedina Guillen PA-C  •  docusate sodium (COLACE) capsule 200 mg, 200 mg, Oral, BID, Enedina Guillen PA-C, 200 mg at 03/20/20 0942  •  filgrastim (NEUPOGEN) injection 480 mcg, 480 mcg, Subcutaneous, Q PM, Celine Rubio MD, 480 mcg at 03/20/20 1804  •  gabapentin (NEURONTIN) capsule 300 mg, 300 mg, Oral, TID, Selma Salomon DO, 300 mg at 03/20/20 1804  •  glucagon (human recombinant) (GLUCAGEN DIAGNOSTIC) injection 1 mg, 1 mg, Subcutaneous, Q15 Min PRN, Enedina Guillen PA-C  •  hydrOXYzine (ATARAX) tablet 25 mg, 25 mg, Oral, TID PRN, Enedina Guillen PA-C  •  insulin detemir (LEVEMIR) injection 20 Units, 20 Units, Subcutaneous, Nightly, Enedina Guillen PA-C  •  insulin lispro (humaLOG) injection 0-7 Units, 0-7 Units, Subcutaneous, 4x Daily With Meals & Nightly, Enedina Guillen PA-C, 2 Units at 03/18/20 0906  •  LORazepam (ATIVAN) tablet 1 mg, 1 mg, Oral, Q6H PRN, Selma Salomon DO  •  Magnesium Sulfate 2 gram Bolus, followed by 8 gram infusion (total Mg dose 10 grams)- Mg less than or equal to 1mg/dL, 2 g, Intravenous, PRN **OR** Magnesium Sulfate 2 gram / 50mL Infusion (GIVE X 3 BAGS TO EQUAL 6GM TOTAL DOSE) - Mg 1.1 - 1.5 mg/dl, 2 g, Intravenous, PRN, Last Rate: 25 mL/hr at 03/19/20 1146, 2 g at 03/19/20 1146 **OR** Magnesium Sulfate 4 gram infusion- Mg 1.6-1.9 mg/dL, 4 g, Intravenous, PRN, Lakhwinder Mix MD  •  melatonin tablet 5 mg, 5 mg, Oral, Nightly PRN, Enedina Guillen, PAOlivaC  •  Morphine sulfate (PF) injection 3 mg, 3 mg, Intravenous, Q2H PRN, Selma Salomon, DO, 3 mg at 03/20/20 1810  •  ondansetron (ZOFRAN) tablet 4 mg, 4 mg, Oral, Q6H PRN **OR** ondansetron (ZOFRAN) injection 4 mg, 4  mg, Intravenous, Q6H PRN, Enedina Guillen PA-C, 4 mg at 20 0859  •  pantoprazole (PROTONIX) EC tablet 40 mg, 40 mg, Oral, QAM, Enedina Guillen PA-C, 40 mg at 20 0539  •  piperacillin-tazobactam (ZOSYN) 3.375 g in iso-osmotic dextrose 50 ml (premix), 3.375 g, Intravenous, Q8H, Abrahan Talley MD, 3.375 g at 20 1440  •  polyethylene glycol 3350 powder (packet), 17 g, Oral, Daily PRN, Enedina Guillen PA-C  •  [COMPLETED] Insert peripheral IV, , , Once **AND** sodium chloride 0.9 % flush 10 mL, 10 mL, Intravenous, PRN, Carlyle Bryant PA  •  sodium chloride 0.9 % flush 10 mL, 10 mL, Intravenous, Q12H, Enedina Guillen PA-C, 10 mL at 20 0905  •  sodium chloride 0.9 % flush 10 mL, 10 mL, Intravenous, PRN, Enedina Guillen PA-C  •  sodium chloride 0.9 % infusion, 100 mL/hr, Intravenous, Continuous, Selma Salomon DO, Last Rate: 100 mL/hr at 20, 100 mL/hr at 20  •  traMADol (ULTRAM) tablet 50 mg, 50 mg, Oral, Q6H PRN, Selma Salomon DO, 50 mg at 20    Antibiotics:  Anti-Infectives (From admission, onward)    Ordered     Dose/Rate Route Frequency Start Stop    20 1753  piperacillin-tazobactam (ZOSYN) 3.375 g in iso-osmotic dextrose 50 ml (premix)  Review   Ordering Provider:  Abrahan Talley MD    3.375 g  over 4 Hours Intravenous Every 8 Hours 20 2200 20 1359    20 0147  piperacillin-tazobactam (ZOSYN) 4.5 g in iso-osmotic dextrose 100 mL IVPB (premix)     Ordering Provider:  Carlyle Bryant PA    4.5 g Intravenous Once 20 0149 20 0443                  Physical Exam:   Vital Signs  Temp (24hrs), Av °F (37.2 °C), Min:98.7 °F (37.1 °C), Max:99.6 °F (37.6 °C)    Temp  Min: 98.7 °F (37.1 °C)  Max: 99.6 °F (37.6 °C)  BP  Min: 99/56  Max: 106/53  Pulse  Min: 86  Max: 106  Resp  Min: 16  Max: 20  SpO2  Min: 96 %  Max: 98 %    GENERAL: Awake and alert, in no acute distress.   HEENT: Normocephalic, atraumatic.  No conjunctival  injection. No icterus. Oropharynx clear without evidence of thrush or exudate.   HEART: RRR; 1/6 murmur, rubs, gallops.   LUNGS: Clear to auscultation bilaterally without wheezing, rales, rhonchi. Normal respiratory effort. Nonlabored. No dullness.  ABDOMEN: Decreased right lower quadrant tenderness.    EXT:  No cyanosis, clubbing or edema. No cord.  :  Without Reyna catheter.  MSK:  No joint effusions   SKIN: Warm and dry  NEURO: Oriented to PPT.  PSYCHIATRIC: Normal insight and judgement. Cooperative with PE  Right foot ulcer, photo noted, with wound approx 3cm x 2cm, x 0.5 cm deep with slight maceration around edges, callous, granulation  Photo of abdominal wound, with granulation no surrounding erythema.     Laboratory Data    Results from last 7 days   Lab Units 03/20/20  0449 03/19/20  0502 03/18/20  0605   WBC 10*3/mm3 0.96* 0.74* 0.76*   HEMOGLOBIN g/dL 7.4* 7.3* 7.9*   HEMATOCRIT % 24.2* 24.1* 25.4*   PLATELETS 10*3/mm3 65* 65* 77*     Results from last 7 days   Lab Units 03/20/20  0449   SODIUM mmol/L 134*   POTASSIUM mmol/L 3.9   CHLORIDE mmol/L 99   CO2 mmol/L 22.0   BUN mg/dL 11   CREATININE mg/dL 0.73   GLUCOSE mg/dL 103*   CALCIUM mg/dL 7.9*     Results from last 7 days   Lab Units 03/17/20  2301   ALK PHOS U/L 49   BILIRUBIN mg/dL 0.7   ALT (SGPT) U/L 9   AST (SGOT) U/L 12             Results from last 7 days   Lab Units 03/17/20  2301   LACTATE mmol/L 1.6             Estimated Creatinine Clearance: 98.8 mL/min (by C-G formula based on SCr of 0.73 mg/dL).      Microbiology:  3/17:  BC  No growth        Radiology:  Imaging Results (Last 72 Hours)     Procedure Component Value Units Date/Time    CT Abdomen Pelvis Without Contrast [[id number]] Collected:  03/18/20 0112     Updated:  03/18/20 0114    Narrative:       CT Abdomen Pelvis WO    INDICATION:   44-year-old female with fever and lower abdominal pain today. History of endometrial cancer.    TECHNIQUE:   CT of the abdomen and pelvis without IV  contrast. Coronal and sagittal reconstructions were obtained.  Radiation dose reduction techniques included automated exposure control or exposure modulation based on body size. Count of known CT and cardiac nuc  med studies performed in previous 12 months: 3.     COMPARISON:   CT abdomen pelvis 1/30/2020    FINDINGS:  Visualized lung bases are unremarkable.    Abdomen:   The liver is within normal limits. Splenomegaly again noted. The pancreas is within normal limits. The gallbladder is normal. Both adrenal glands are normal. The kidneys are normal. Abdominal aorta normal in course and caliber. Small bowel is  unremarkable without obstruction. The appendix is enlarged, measuring 1.3 cm in diameter. There is periappendiceal inflammatory stranding. The findings appear most consistent with acute appendicitis. No drainable fluid collections. No free  intraperitoneal air. The colon is unremarkable. Ventral lower abdominal wound from recent surgery.    Pelvis:   The urinary bladder is unremarkable.  Hysterectomy. No free pelvic fluid.    No acute osseous abnormality.        Impression:         1. Acute appendicitis. No evidence of perforation or abscess.  2. Postsurgical changes from recent hysterectomy.  3. Splenomegaly.      NOTIFICATION: Critical Value/emergent results were called by telephone at the time of interpretation on 3/18/2020 1:09 AM to BENSON Hughes who verbally acknowledged these results.      Signer Name: Prasanna Zapata MD   Signed: 3/18/2020 1:12 AM   Workstation Name: [email or web address]-    [facility] Chest 1 View [[id number]] Collected:  03/18/20 0002     Updated:  03/18/20 0004    Narrative:       CR Chest 1 Vw    INDICATION:   44-year-old female with fever today. History of stage III endometrial cancer. Recent chemotherapy.     COMPARISON:    Chest 2/7/2020    FINDINGS:  Single portable AP view(s) of the chest.  The heart and mediastinal contours are normal. The lungs are  clear. No pneumothorax or pleural effusion.      Impression:       No acute cardiopulmonary findings.    Signer Name: Prasanna Zapata MD   Signed: 3/18/2020 12:02 AM   Workstation Name: [email or web address]-    [facility]        I read her CT scan    Impression:   1.  Sepsis- with fever, tachycardia, hypotension, neutropenia, known focus of infection  2.  Acute appendicitis-she has acute appendicitis without perforation.  We may be able to proceed with surgical intervention soon.    3.  Neutropenia/pancytopenia- improving.    4.  Endometrial cancer, on chemotherapy  5.  Right foot ulcer  6.  Abdominal wall wound dehiscence  7.  T2 Diabetes mellitus  8.  Neutropenic fever    PLAN/RECOMMENDATIONS:   1.  Surgical intervention soon  2.  Continue Zosyn   3.  Continue Wound care  4.  Continue Neupogen         Abrahan Talley MD  3/20/2020  20:07

## 2020-03-21 NOTE — PROGRESS NOTES
INFECTIOUS DISEASE PROGRESS NOTE   Jeana Chung  1975  [id number]      Admission Date: 3/17/2020      Requesting Provider: No ref. provider found  Evaluating Physician: Abrahan Talley MD    Reason for Consultation:  Acute appendicitis     History of present illness:  3/18/20:    3/18/2020: Patient is a 44 y.o. female with endometrial cancer on chemotherapy,( last on 3/10)  CKD, T2DM,, seen today for an abdominal wound infection.  She underwent a REGINALDO, bilateral salpingo-oophrectomy, omentectomy 2/11/20, with postoperative wound dehiscence for which she has been followed by PT wound care.  She was seen in PT wound care yesterday for a new foot ulcer, and once home developed a fever of 101.3, with fatigue and weakness and right lower quadrant pain.  She was instructed to present to the ED.  An abdominal CT revealed acute appendicitis, no perforation or abscess, splenomegaly. CXR without acute cardiopulmonary findings. Tmax of 99.4, pulse of 124, .  Admitting labs with pancytopenia, normal lactate.  Blood cultures pending .   3/19/20:  Tmax of 100.7 last pm.  She continues to have RLQ discomfort.  No nausea.  PT to change vac today.   3/20/2020: She feels better today.  She has decreased abdominal pain.  She has been afebrile today but had a temperature of up to 100.3 yesterday.  3/21/2020: She underwent appendectomy today.  She has just come back from the emergency room as I saw her.  She feels better.  She has remained afebrile.    Past Medical History:   Diagnosis Date   • Anemia    • Anxiety and depression    • Back pain    • Bronchitis    • Diabetes (CMS/HCC)     DX 4-5 YRS AGO, CHECKS BS 4X/DAY, LAST A1C 7.1%   • Endometrial cancer (CMS/HCC)     STAGE II   • GERD (gastroesophageal reflux disease)    • Hypertension    • MRSA (methicillin resistant staph aureus) culture positive 2018    S/P NECROTIZING FASCITIS IN BILATERAL FEET   • Necrotizing fasciitis (CMS/HCC)    • PCOS (polycystic  ovarian syndrome)    • PTSD (post-traumatic stress disorder)    • Retinopathy 3/18/2020   • Sepsis (CMS/HCC)    • Stage 3 chronic kidney disease (CMS/HCC) 2020   • Subconjunctival hemorrhage        Past Surgical History:   Procedure Laterality Date   • EXPLORATORY LAPAROTOMY, TOTAL ABDOMINAL HYSTERECTOMY SALPINGO OOPHORECTOMY N/A 2/10/2020    Procedure: EXPLORATORY LAPAROTOMY, TOTAL ABDOMINAL HYSTERECTOMY, BILATERAL SALPINGO-OOPHORECTOMY WITH OPTIMAL  STAGING (R-0), OMENTECTOMY;  Surgeon: Celine Rubio MD;  Location: [facility];  Service: Gynecology Oncology;  Laterality: N/A;   • EYE SURGERY Left     BLOOD VESSEL IN EYE REPAIR   • TOE AMPUTATION Left 2019    big toe - unhealing sore   • TOE AMPUTATION Right 2018    big toe and second toe - Necrotizing fasciitis and MRSA        Family History   Problem Relation Age of Onset   • Fibroids Mother    • Diabetes type II Mother    • Hypertension Mother    • Heart attack Mother    • Fibroids Sister         PCOS   • Diabetes type II Sister    • Dementia Maternal Grandmother    • Stroke Maternal Grandmother        Social History     Socioeconomic History   • Marital status:      Spouse name: Not on file   • Number of children: 1   • Years of education: Not on file   • Highest education level: Not on file   Tobacco Use   • Smoking status: Former Smoker     Types: Cigarettes     Last attempt to quit: 2000     Years since quittin.2   • Smokeless tobacco: Never Used   • Tobacco comment: social MAYBE 1 CIG/DAY   Substance and Sexual Activity   • Alcohol use: Not Currently     Frequency: Monthly or less     Drinks per session: 1 or 2   • Drug use: Never   • Sexual activity: Not Currently   Social History Narrative    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 Staying in [place] with mother due to Endometrial Cancer.        Allergies   Allergen Reactions   • Bactrim [Sulfamethoxazole-Trimethoprim] Anaphylaxis   • Penicillins Hives   • Promethazine  Mental Status Change         Medication:    Current Facility-Administered Medications:   •  acetaminophen (TYLENOL) tablet 650 mg, 650 mg, Oral, Q6H, Praful Myers MD, Stopped at 03/21/20 1151  •  cetirizine (zyrTEC) tablet 10 mg, 10 mg, Oral, Daily, Praful Myers MD, 10 mg at 03/21/20 0937  •  dextrose (D50W) 25 g/ 50mL Intravenous Solution 25 g, 25 g, Intravenous, Q15 Min PRN, Praful Myers MD  •  dextrose (GLUTOSE) oral gel 15 g, 15 g, Oral, Q15 Min PRN, Praful Myers MD  •  docusate sodium (COLACE) capsule 200 mg, 200 mg, Oral, BID, Praful Myers MD, Stopped at 03/21/20 0935  •  filgrastim (NEUPOGEN) injection 480 mcg, 480 mcg, Subcutaneous, Q PM, Praful Myers MD, 480 mcg at 03/20/20 1804  •  gabapentin (NEURONTIN) capsule 300 mg, 300 mg, Oral, TID, Praful Myers MD, 300 mg at 03/21/20 0937  •  glucagon (human recombinant) (GLUCAGEN DIAGNOSTIC) injection 1 mg, 1 mg, Subcutaneous, Q15 Min PRN, Praful Myers MD  •  hydrOXYzine (ATARAX) tablet 25 mg, 25 mg, Oral, TID PRN, Praful Myers MD  •  insulin detemir (LEVEMIR) injection 20 Units, 20 Units, Subcutaneous, Nightly, Praful Myers MD, 20 Units at 03/20/20 2237  •  insulin lispro (humaLOG) injection 0-7 Units, 0-7 Units, Subcutaneous, 4x Daily With Meals & Nightly, Praful Myers MD, Stopped at 03/21/20 0935  •  LORazepam (ATIVAN) tablet 1 mg, 1 mg, Oral, Q6H PRN, Praful Myers MD  •  Magnesium Sulfate 2 gram Bolus, followed by 8 gram infusion (total Mg dose 10 grams)- Mg less than or equal to 1mg/dL, 2 g, Intravenous, PRN **OR** Magnesium Sulfate 2 gram / 50mL Infusion (GIVE X 3 BAGS TO EQUAL 6GM TOTAL DOSE) - Mg 1.1 - 1.5 mg/dl, 2 g, Intravenous, PRN, Last Rate: 25 mL/hr at 03/21/20 1552, 2 g at 03/21/20 1552 **OR** Magnesium Sulfate 4 gram infusion- Mg 1.6-1.9 mg/dL, 4 g, Intravenous, PRN, Praful Myers MD  •  melatonin tablet 5 mg,  5 mg, Oral, Nightly PRN, Praful Myers MD  •  Morphine sulfate (PF) injection 3 mg, 3 mg, Intravenous, Q2H PRN, Praful Myers MD, 3 mg at 03/21/20 0937  •  ondansetron (ZOFRAN) tablet 4 mg, 4 mg, Oral, Q6H PRN **OR** ondansetron (ZOFRAN) injection 4 mg, 4 mg, Intravenous, Q6H PRN, Praful Myers MD, 4 mg at 03/21/20 1211  •  pantoprazole (PROTONIX) EC tablet 40 mg, 40 mg, Oral, QAM, Praful Myers MD, 40 mg at 03/21/20 0700  •  piperacillin-tazobactam (ZOSYN) 3.375 g in iso-osmotic dextrose 50 ml (premix), 3.375 g, Intravenous, Q8H, Praful Myers MD, 3.375 g at 03/21/20 0700  •  polyethylene glycol 3350 powder (packet), 17 g, Oral, Daily PRN, Praful Myers MD  •  [COMPLETED] Insert peripheral IV, , , Once **AND** sodium chloride 0.9 % flush 10 mL, 10 mL, Intravenous, PRN, Praful Myers MD  •  sodium chloride 0.9 % flush 10 mL, 10 mL, Intravenous, Q12H, Praful Myers MD, 10 mL at 03/21/20 0938  •  sodium chloride 0.9 % flush 10 mL, 10 mL, Intravenous, PRN, Praful Myers MD  •  sodium chloride 0.9 % infusion, 100 mL/hr, Intravenous, Continuous, Praful Myers MD, Stopped at 03/21/20 1343  •  sodium chloride 0.9 % solution, , , PRN, Praful Myers MD, 1,000 mL at 03/21/20 1236  •  traMADol (ULTRAM) tablet 50 mg, 50 mg, Oral, Q6H PRN, Praful Myers MD, 50 mg at 03/18/20 2059    Antibiotics:  Anti-Infectives (From admission, onward)    Ordered     Dose/Rate Route Frequency Start Stop    03/19/20 1753  piperacillin-tazobactam (ZOSYN) 3.375 g in iso-osmotic dextrose 50 ml (premix)     Milo Anderson [facility] reviewed the order on 03/21/20 9460.   Ordering Provider:  Praful Myers MD    3.375 g  over 4 Hours Intravenous Every 8 Hours 03/19/20 2200 03/23/20 1359    03/18/20 0147  piperacillin-tazobactam (ZOSYN) 4.5 g in iso-osmotic dextrose 100 mL IVPB (premix)     Ordering Provider:  Carlyle Bryant, PA     4.5 g Intravenous Once 20 0149 20 0443                  Physical Exam:   Vital Signs  Temp (24hrs), Av.8 °F (36.6 °C), Min:97.1 °F (36.2 °C), Max:98.7 °F (37.1 °C)    Temp  Min: 97.1 °F (36.2 °C)  Max: 98.7 °F (37.1 °C)  BP  Min: 94/52  Max: 133/77  Pulse  Min: 81  Max: 100  Resp  Min: 14  Max: 16  SpO2  Min: 92 %  Max: 99 %    GENERAL: He is drowsy after anesthesia  HEENT: Normocephalic, atraumatic.  No conjunctival injection. No icterus. Oropharynx clear without evidence of thrush or exudate.   ABDOMEN: Decreased right lower quadrant tenderness.    EXT:  No cyanosis, clubbing or edema. No cord.  :  Without Reyna catheter.  MSK:  No joint effusions   SKIN: Warm and dry  NEURO: Drowsy but arousable.  She moves all 4 extremities.Laboratory Data    Results from last 7 days   Lab Units 20  0531 20  0449 20  0502   WBC 10*3/mm3 1.95* 0.96* 0.74*   HEMOGLOBIN g/dL 7.3* 7.4* 7.3*   HEMATOCRIT % 24.4* 24.2* 24.1*   PLATELETS 10*3/mm3 47* 65* 65*     Results from last 7 days   Lab Units 20  0449   SODIUM mmol/L 134*   POTASSIUM mmol/L 3.9   CHLORIDE mmol/L 99   CO2 mmol/L 22.0   BUN mg/dL 11   CREATININE mg/dL 0.73   GLUCOSE mg/dL 103*   CALCIUM mg/dL 7.9*     Results from last 7 days   Lab Units 20  2301   ALK PHOS U/L 49   BILIRUBIN mg/dL 0.7   ALT (SGPT) U/L 9   AST (SGOT) U/L 12             Results from last 7 days   Lab Units 20  2301   LACTATE mmol/L 1.6             Estimated Creatinine Clearance: 98.8 mL/min (by C-G formula based on SCr of 0.73 mg/dL).      Microbiology:  3/17:  BC  No growth        Radiology:  Imaging Results (Last 72 Hours)     ** No results found for the last 72 hours. **        I read her CT scan    Impression:   1.  Sepsis- with fever, tachycardia, hypotension, neutropenia, known focus of infection  2.  Acute appendicitis-she has acute appendicitis and underwent appendectomy on 3/21  3.  Neutropenia/pancytopenia- improving.    4.  Endometrial  cancer, on chemotherapy  5.  Right foot ulcer  6.  Abdominal wall wound dehiscence  7.  T2 Diabetes mellitus  8.  Neutropenic fever    PLAN/RECOMMENDATIONS:   1.  Surgical intervention-done  2.  Continue Zosyn   3.  Continue Wound care  4.  Continue Neupogen         Abrahan Talley MD  3/21/2020  18:09

## 2020-03-22 LAB
ABO + RH BLD: NORMAL
BASOPHILS # BLD AUTO: 0.07 10*3/MM3 (ref 0–0.2)
BASOPHILS NFR BLD AUTO: 2.2 % (ref 0–1.5)
BH BB BLOOD EXPIRATION DATE: NORMAL
BH BB BLOOD TYPE BARCODE: 6200
BH BB BLOOD TYPE BARCODE: 6200
BH BB BLOOD TYPE BARCODE: 7300
BH BB DISPENSE STATUS: NORMAL
BH BB PRODUCT CODE: NORMAL
BH BB UNIT NUMBER: NORMAL
DEPRECATED RDW RBC AUTO: 44.7 FL (ref 37–54)
EOSINOPHIL # BLD AUTO: 0 10*3/MM3 (ref 0–0.4)
EOSINOPHIL NFR BLD AUTO: 0 % (ref 0.3–6.2)
ERYTHROCYTE [DISTWIDTH] IN BLOOD BY AUTOMATED COUNT: 14.3 % (ref 12.3–15.4)
GLUCOSE BLDC GLUCOMTR-MCNC: 184 MG/DL (ref 70–130)
GLUCOSE BLDC GLUCOMTR-MCNC: 230 MG/DL (ref 70–130)
GLUCOSE BLDC GLUCOMTR-MCNC: 260 MG/DL (ref 70–130)
GLUCOSE BLDC GLUCOMTR-MCNC: 261 MG/DL (ref 70–130)
HCT VFR BLD AUTO: 28.3 % (ref 34–46.6)
HGB BLD-MCNC: 8.7 G/DL (ref 12–15.9)
IMM GRANULOCYTES # BLD AUTO: 0.05 10*3/MM3 (ref 0–0.05)
IMM GRANULOCYTES NFR BLD AUTO: 1.5 % (ref 0–0.5)
LYMPHOCYTES # BLD AUTO: 0.33 10*3/MM3 (ref 0.7–3.1)
LYMPHOCYTES NFR BLD AUTO: 10.2 % (ref 19.6–45.3)
MAGNESIUM SERPL-MCNC: 2.2 MG/DL (ref 1.6–2.6)
MCH RBC QN AUTO: 26.5 PG (ref 26.6–33)
MCHC RBC AUTO-ENTMCNC: 30.7 G/DL (ref 31.5–35.7)
MCV RBC AUTO: 86.3 FL (ref 79–97)
MONOCYTES # BLD AUTO: 0.35 10*3/MM3 (ref 0.1–0.9)
MONOCYTES NFR BLD AUTO: 10.8 % (ref 5–12)
NEUTROPHILS # BLD AUTO: 2.44 10*3/MM3 (ref 1.7–7)
NEUTROPHILS NFR BLD AUTO: 75.3 % (ref 42.7–76)
NRBC BLD AUTO-RTO: 0 /100 WBC (ref 0–0.2)
PLAT MORPH BLD: NORMAL
PLATELET # BLD AUTO: 74 10*3/MM3 (ref 140–450)
PMV BLD AUTO: 11.3 FL (ref 6–12)
RBC # BLD AUTO: 3.28 10*6/MM3 (ref 3.77–5.28)
RBC MORPH BLD: NORMAL
TOXIC GRANULATION: NORMAL
UNIT  ABO: NORMAL
UNIT  RH: NORMAL
WBC NRBC COR # BLD: 3.24 10*3/MM3 (ref 3.4–10.8)

## 2020-03-22 PROCEDURE — 05HY33Z INSERTION OF INFUSION DEVICE INTO UPPER VEIN, PERCUTANEOUS APPROACH: ICD-10-PCS | Performed by: SURGERY

## 2020-03-22 PROCEDURE — 83735 ASSAY OF MAGNESIUM: CPT | Performed by: INTERNAL MEDICINE

## 2020-03-22 PROCEDURE — 25010000002 PIPERACILLIN SOD-TAZOBACTAM PER 1 G: Performed by: SURGERY

## 2020-03-22 PROCEDURE — 82962 GLUCOSE BLOOD TEST: CPT

## 2020-03-22 PROCEDURE — 25010000002 FILGRASTIM PER 480 MCG: Performed by: SURGERY

## 2020-03-22 PROCEDURE — 63710000001 INSULIN DETEMIR PER 5 UNITS: Performed by: SURGERY

## 2020-03-22 PROCEDURE — 25010000002 MORPHINE PER 10 MG: Performed by: SURGERY

## 2020-03-22 PROCEDURE — 97597 DBRDMT OPN WND 1ST 20 CM/<: CPT | Performed by: PHYSICAL THERAPIST

## 2020-03-22 PROCEDURE — C1751 CATH, INF, PER/CENT/MIDLINE: HCPCS

## 2020-03-22 PROCEDURE — 99232 SBSQ HOSP IP/OBS MODERATE 35: CPT | Performed by: INTERNAL MEDICINE

## 2020-03-22 PROCEDURE — 63710000001 INSULIN LISPRO (HUMAN) PER 5 UNITS: Performed by: SURGERY

## 2020-03-22 PROCEDURE — 85007 BL SMEAR W/DIFF WBC COUNT: CPT | Performed by: SURGERY

## 2020-03-22 PROCEDURE — C1894 INTRO/SHEATH, NON-LASER: HCPCS

## 2020-03-22 PROCEDURE — 25010000002 PIPERACILLIN SOD-TAZOBACTAM PER 1 G: Performed by: INTERNAL MEDICINE

## 2020-03-22 PROCEDURE — 85025 COMPLETE CBC W/AUTO DIFF WBC: CPT | Performed by: SURGERY

## 2020-03-22 RX ORDER — SODIUM CHLORIDE 0.9 % (FLUSH) 0.9 %
20 SYRINGE (ML) INJECTION AS NEEDED
Status: DISCONTINUED | OUTPATIENT
Start: 2020-03-22 | End: 2020-03-23 | Stop reason: HOSPADM

## 2020-03-22 RX ORDER — SODIUM CHLORIDE 0.9 % (FLUSH) 0.9 %
10 SYRINGE (ML) INJECTION EVERY 12 HOURS SCHEDULED
Status: DISCONTINUED | OUTPATIENT
Start: 2020-03-22 | End: 2020-03-23 | Stop reason: HOSPADM

## 2020-03-22 RX ORDER — SODIUM CHLORIDE 0.9 % (FLUSH) 0.9 %
10 SYRINGE (ML) INJECTION AS NEEDED
Status: DISCONTINUED | OUTPATIENT
Start: 2020-03-22 | End: 2020-03-23 | Stop reason: HOSPADM

## 2020-03-22 RX ORDER — HYDROCODONE BITARTRATE AND ACETAMINOPHEN 5; 325 MG/1; MG/1
1 TABLET ORAL EVERY 6 HOURS PRN
Status: DISCONTINUED | OUTPATIENT
Start: 2020-03-22 | End: 2020-03-23 | Stop reason: HOSPADM

## 2020-03-22 RX ADMIN — GABAPENTIN 300 MG: 300 CAPSULE ORAL at 21:16

## 2020-03-22 RX ADMIN — HYDROCODONE BITARTRATE AND ACETAMINOPHEN 1 TABLET: 5; 325 TABLET ORAL at 22:47

## 2020-03-22 RX ADMIN — DOCUSATE SODIUM 200 MG: 100 CAPSULE, LIQUID FILLED ORAL at 09:29

## 2020-03-22 RX ADMIN — ACETAMINOPHEN 650 MG: 325 TABLET, FILM COATED ORAL at 06:05

## 2020-03-22 RX ADMIN — INSULIN LISPRO 4 UNITS: 100 INJECTION, SOLUTION INTRAVENOUS; SUBCUTANEOUS at 12:41

## 2020-03-22 RX ADMIN — CETIRIZINE HYDROCHLORIDE 10 MG: 10 TABLET, FILM COATED ORAL at 09:29

## 2020-03-22 RX ADMIN — MORPHINE SULFATE 3 MG: 4 INJECTION, SOLUTION INTRAMUSCULAR; INTRAVENOUS at 03:16

## 2020-03-22 RX ADMIN — INSULIN LISPRO 4 UNITS: 100 INJECTION, SOLUTION INTRAVENOUS; SUBCUTANEOUS at 17:01

## 2020-03-22 RX ADMIN — MORPHINE SULFATE 3 MG: 4 INJECTION, SOLUTION INTRAMUSCULAR; INTRAVENOUS at 06:05

## 2020-03-22 RX ADMIN — MORPHINE SULFATE 3 MG: 4 INJECTION, SOLUTION INTRAMUSCULAR; INTRAVENOUS at 14:07

## 2020-03-22 RX ADMIN — FILGRASTIM 480 MCG: 480 INJECTION, SOLUTION INTRAVENOUS; SUBCUTANEOUS at 16:41

## 2020-03-22 RX ADMIN — HYDROCODONE BITARTRATE AND ACETAMINOPHEN 1 TABLET: 5; 325 TABLET ORAL at 16:35

## 2020-03-22 RX ADMIN — TAZOBACTAM SODIUM AND PIPERACILLIN SODIUM 3.38 G: 375; 3 INJECTION, SOLUTION INTRAVENOUS at 06:05

## 2020-03-22 RX ADMIN — SODIUM CHLORIDE 75 ML/HR: 9 INJECTION, SOLUTION INTRAVENOUS at 16:41

## 2020-03-22 RX ADMIN — INSULIN DETEMIR 20 UNITS: 100 INJECTION, SOLUTION SUBCUTANEOUS at 21:18

## 2020-03-22 RX ADMIN — INSULIN LISPRO 4 UNITS: 100 INJECTION, SOLUTION INTRAVENOUS; SUBCUTANEOUS at 09:30

## 2020-03-22 RX ADMIN — INSULIN LISPRO 2 UNITS: 100 INJECTION, SOLUTION INTRAVENOUS; SUBCUTANEOUS at 21:16

## 2020-03-22 RX ADMIN — MORPHINE SULFATE 3 MG: 4 INJECTION, SOLUTION INTRAMUSCULAR; INTRAVENOUS at 09:29

## 2020-03-22 RX ADMIN — ACETAMINOPHEN 650 MG: 325 TABLET, FILM COATED ORAL at 17:01

## 2020-03-22 RX ADMIN — SODIUM CHLORIDE 100 ML/HR: 9 INJECTION, SOLUTION INTRAVENOUS at 06:06

## 2020-03-22 RX ADMIN — TAZOBACTAM SODIUM AND PIPERACILLIN SODIUM 3.38 G: 375; 3 INJECTION, SOLUTION INTRAVENOUS at 21:16

## 2020-03-22 RX ADMIN — GABAPENTIN 300 MG: 300 CAPSULE ORAL at 16:35

## 2020-03-22 RX ADMIN — GABAPENTIN 300 MG: 300 CAPSULE ORAL at 09:29

## 2020-03-22 RX ADMIN — ACETAMINOPHEN 650 MG: 325 TABLET, FILM COATED ORAL at 12:41

## 2020-03-22 RX ADMIN — DOCUSATE SODIUM 200 MG: 100 CAPSULE, LIQUID FILLED ORAL at 21:16

## 2020-03-22 RX ADMIN — PANTOPRAZOLE SODIUM 40 MG: 40 TABLET, DELAYED RELEASE ORAL at 06:05

## 2020-03-22 RX ADMIN — TAZOBACTAM SODIUM AND PIPERACILLIN SODIUM 3.38 G: 375; 3 INJECTION, SOLUTION INTRAVENOUS at 14:07

## 2020-03-22 NOTE — PLAN OF CARE
Problem: Patient Care Overview  Goal: Plan of Care Review  Outcome: Ongoing (interventions implemented as appropriate)  Flowsheets  Taken 3/21/2020 1802 by Jennifer Hughes, RN  Progress: improving  Taken 3/21/2020 2000 by Chelsy Huynh, RN  Plan of Care Reviewed With: patient  Taken 3/22/2020 0546 by Chelsy Huynh, RN  Outcome Summary: VSS, pt reports pain is much better after surgery, prns given twice. No c/o n/v, will continue to monitor.

## 2020-03-22 NOTE — PROGRESS NOTES
"General Surgery Post op Follow Up Note    Subjective:   Feeling better.  Hungry.    /70 (BP Location: Right arm, Patient Position: Lying)   Pulse 83   Temp 97.6 °F (36.4 °C) (Oral)   Resp 16   Ht 157.5 cm (62\")   Wt 87.3 kg (192 lb 6.4 oz)   LMP  (LMP Unknown)   SpO2 97%   BMI 35.19 kg/m²     General Appearance:  in no acute distress  Abdomen: incision is clean and dry, x3.    CBC  Results from last 7 days   Lab Units 03/21/20  0531   WBC 10*3/mm3 1.95*   HEMOGLOBIN g/dL 7.3*   HEMATOCRIT % 24.4*   PLATELETS 10*3/mm3 47*       CMP/BMP  Results from last 7 days   Lab Units 03/20/20  0449  03/17/20  2301   SODIUM mmol/L 134*   < > 129*   POTASSIUM mmol/L 3.9   < > 4.3   CHLORIDE mmol/L 99   < > 95*   CO2 mmol/L 22.0   < > 24.0   BUN mg/dL 11   < > 23*   CREATININE mg/dL 0.73   < > 0.98   CALCIUM mg/dL 7.9*   < > 8.5*   BILIRUBIN mg/dL  --   --  0.7   ALK PHOS U/L  --   --  49   ALT (SGPT) U/L  --   --  9   AST (SGOT) U/L  --   --  12   GLUCOSE mg/dL 103*   < > 241*    < > = values in this interval not displayed.         ASSESSMENT/PLAN:    Advance diet.  Repeat labs in the morning.    Praful Myers MD  3/22/2020  10:14  "

## 2020-03-22 NOTE — PLAN OF CARE
Problem: Patient Care Overview  Goal: Plan of Care Review  Outcome: Ongoing (interventions implemented as appropriate)  Flowsheets (Taken 3/22/2020 8944)  Progress: declining  Plan of Care Reviewed With: patient;mother  Outcome Summary: PT wound care: midline abdominal incision with increased drainage; proximal aspect has become wider, unsure if pt has abdominal swelling s/p appy yesterday. Modified dressing to be more vapor permiable (covaderm vs optifoam). If draiange increase persists overnight, expect it may be best to resume VAC prior to d/c home. PT wound care to reassess in morning.

## 2020-03-22 NOTE — PROGRESS NOTES
[facility] Medicine Services  PROGRESS NOTE    Patient Name: Jeana Chung  : 1975  MRN: [id number]    Date of Admission: 3/17/2020  Primary Care Physician: Tania Angeles PA-C    Subjective   Subjective     CC:  Acute appendicitis    HPI:  Patient doing well.   Pain controlled.  No acute issues.   Not eating extremely well but will try today     Review of Systems  Gen- No fevers, chills  CV- No chest pain, palpitations  Resp- No cough, dyspnea  GI- No N/V/D, abd pain         Objective   Objective     Vital Signs:   Temp:  [97.1 °F (36.2 °C)-98.4 °F (36.9 °C)] 97.6 °F (36.4 °C)  Heart Rate:  [80-91] 85  Resp:  [14-16] 16  BP: ()/(52-77) 117/70        Physical Exam:  GEN- no acute distress noted, resting in bed, awake  HEENT- atraumatic, normocephlic, eomi  NECK- supple, trachea midline, no masses  RESP: ctab, normal effort  CV: no murmurs, s1/s2, rrr  GI: appropriate post-op tenderness, drain in place with serosanginous fluid  MSK: no edema noted, spontaneous movement of all extremities  NEURO: alert, oriented, no focal deficits  SKIN: no rashes  PSYCH: appropriate mood and affect       Results Reviewed:  Results from last 7 days   Lab Units 20  0531 20  0449 20  0502 20  0605   WBC 10*3/mm3 1.95* 0.96* 0.74* 0.76*   HEMOGLOBIN g/dL 7.3* 7.4* 7.3* 7.9*   HEMATOCRIT % 24.4* 24.2* 24.1* 25.4*   PLATELETS 10*3/mm3 47* 65* 65* 77*   INR  1.14  --   --  1.11     Results from last 7 days   Lab Units 20  0449 20  0502 20  0605 20  2301   SODIUM mmol/L 134* 133* 130* 129*   POTASSIUM mmol/L 3.9 4.0 3.8 4.3   CHLORIDE mmol/L 99 100 98 95*   CO2 mmol/L 22.0 23.0 22.0 24.0   BUN mg/dL 11 14 18 23*   CREATININE mg/dL 0.73 0.76 0.77 0.98   GLUCOSE mg/dL 103* 97 170* 241*   CALCIUM mg/dL 7.9* 8.0* 8.2* 8.5*   ALT (SGPT) U/L  --   --   --  9   AST (SGOT) U/L  --   --   --  12     Estimated Creatinine Clearance: 98.8 mL/min (by  C-G formula based on SCr of 0.73 mg/dL).    Microbiology Results Abnormal     Procedure Component Value - Date/Time    Blood Culture - Blood, Arm, Left [[id number]] Collected:  03/18/20 0322    Lab Status:  Preliminary result Specimen:  Blood from Arm, Left Updated:  03/22/20 0530     Blood Culture No growth at 4 days    Blood Culture - Blood, Arm, Right [[id number]] Collected:  03/18/20 0322    Lab Status:  Preliminary result Specimen:  Blood from Arm, Right Updated:  03/22/20 0530     Blood Culture No growth at 4 days          Imaging Results (Last 24 Hours)     ** No results found for the last 24 hours. **               I have reviewed the medications:  Scheduled Meds:  acetaminophen 650 mg Oral Q6H   cetirizine 10 mg Oral Daily   docusate sodium 200 mg Oral BID   filgrastim (NEUPOGEN) injection 480 mcg Subcutaneous Q PM   gabapentin 300 mg Oral TID   insulin detemir 20 Units Subcutaneous Nightly   insulin lispro 0-7 Units Subcutaneous 4x Daily With Meals & Nightly   pantoprazole 40 mg Oral QAM   piperacillin-tazobactam 3.375 g Intravenous Q8H   sodium chloride 10 mL Intravenous Q12H     Continuous Infusions:  sodium chloride 100 mL/hr Last Rate: 100 mL/hr (03/22/20 0606)     PRN Meds:.dextrose  •  dextrose  •  glucagon (human recombinant)  •  hydrOXYzine  •  LORazepam  •  magnesium sulfate **OR** magnesium sulfate **OR** magnesium sulfate  •  melatonin  •  Morphine  •  ondansetron **OR** ondansetron  •  polyethylene glycol  •  [COMPLETED] Insert peripheral IV **AND** sodium chloride  •  sodium chloride  •  sodium chloride  •  traMADol    Assessment/Plan   Assessment & Plan     Active Hospital Problems    Diagnosis  POA   • **Acute appendicitis [K35.80]  Yes   • Neutropenia (CMS/HCC) [D70.9]  Yes   • Open wound of right foot [S91.301A]  Yes   • Hyponatremia [E87.1]  Yes   • Pancytopenia (CMS/HCC) [D61.818]  Unknown   • Stage 3 chronic kidney disease (CMS/HCC) [N18.3]  Yes   • Endometrial cancer (CMS/HCC) [C54.1]   Yes   • Iron deficiency anemia [D50.9]  Yes   • Type 2 diabetes mellitus with diabetic polyneuropathy, with long-term current use of insulin (CMS/HCC) [E11.42, Z79.4]  Not Applicable   • Essential hypertension [I10]  Yes      Resolved Hospital Problems   No resolved problems to display.        Brief Hospital Course to date:  Jeana Chung is a 44 y.o. female with history of endometrial cancer on chemotherapy (last dose 3/10), CKD, T2DM, recent REGINALDO, BSO and omentectomy 2/11/20 with post-operative wound dehiscence. Patient was initially directed to the ED after being seen in [facility] with new foot ulcer. Found also to have fever, fatigue, weakness and RLQ abdominal pain. CT scan in hospital workup revealed acute appencdicitis. Patient admitted to hospitalist service. Surgery also following and underwent appendectomy 3/21 with Dr Myers. ID is also following for assistance with abx.         Sepsis   Neutropenic fever   Acute appendicitis  Neutropenia/pancytopenia in setting of known endometrial cancer on chemotherapy  ---continue IV zosyn, doing well post operatively  ---post-op care per general surgery  ---continue to monitor labs daily, currently getting neupogen for neutropenia     Right foot ulcer  ---abx as above, ID/WOC dollow    T2DM  ---SSI/titrate as needed- may need to uptitrate today given now eating somewhat    DVT Prophylaxis:  Mech/scds given pancytopenia    Disposition: I expect the patient to be discharged pending further improvement     CODE STATUS:   Code Status and Medical Interventions:   Ordered at: 03/18/20 0403     Level Of Support Discussed With:    Patient     Code Status:    CPR     Medical Interventions (Level of Support Prior to Arrest):    Full         Electronically signed by Kelle Russell MD, 03/22/20, 08:41.       declines

## 2020-03-22 NOTE — PROGRESS NOTES
INFECTIOUS DISEASE PROGRESS NOTE   Jeana Chung  1975  [id number]      Admission Date: 3/17/2020      Requesting Provider: No ref. provider found  Evaluating Physician: Abrahan Talley MD    Reason for Consultation:  Acute appendicitis     History of present illness:  3/18/20:    3/18/2020: Patient is a 44 y.o. female with endometrial cancer on chemotherapy,( last on 3/10)  CKD, T2DM,, seen today for an abdominal wound infection.  She underwent a REGINALDO, bilateral salpingo-oophrectomy, omentectomy 2/11/20, with postoperative wound dehiscence for which she has been followed by PT wound care.  She was seen in PT wound care yesterday for a new foot ulcer, and once home developed a fever of 101.3, with fatigue and weakness and right lower quadrant pain.  She was instructed to present to the ED.  An abdominal CT revealed acute appendicitis, no perforation or abscess, splenomegaly. CXR without acute cardiopulmonary findings. Tmax of 99.4, pulse of 124, .  Admitting labs with pancytopenia, normal lactate.  Blood cultures pending .   3/19/20:  Tmax of 100.7 last pm.  She continues to have RLQ discomfort.  No nausea.  PT to change vac today.   3/20/2020: She feels better today.  She has decreased abdominal pain.  She has been afebrile today but had a temperature of up to 100.3 yesterday.  3/21/2020: She underwent appendectomy today.  She has just come back from the emergency room as I saw her.  She feels better.  She has remained afebrile.  3/22/2020: She feels much better today.  She has decreased abdominal pain.  Her absolute leukopenia has resolved.  She denies nausea and vomiting.    Past Medical History:   Diagnosis Date   • Anemia    • Anxiety and depression    • Back pain    • Bronchitis    • Diabetes (CMS/HCC)     DX 4-5 YRS AGO, CHECKS BS 4X/DAY, LAST A1C 7.1%   • Endometrial cancer (CMS/HCC)     STAGE II   • GERD (gastroesophageal reflux disease)    • Hypertension    • MRSA (methicillin  resistant staph aureus) culture positive 2018    S/P NECROTIZING FASCITIS IN BILATERAL FEET   • Necrotizing fasciitis (CMS/HCC)    • PCOS (polycystic ovarian syndrome)    • PTSD (post-traumatic stress disorder)    • Retinopathy 3/18/2020   • Sepsis (CMS/HCC)    • Stage 3 chronic kidney disease (CMS/HCC) 2020   • Subconjunctival hemorrhage        Past Surgical History:   Procedure Laterality Date   • EXPLORATORY LAPAROTOMY, TOTAL ABDOMINAL HYSTERECTOMY SALPINGO OOPHORECTOMY N/A 2/10/2020    Procedure: EXPLORATORY LAPAROTOMY, TOTAL ABDOMINAL HYSTERECTOMY, BILATERAL SALPINGO-OOPHORECTOMY WITH OPTIMAL  STAGING (R-0), OMENTECTOMY;  Surgeon: Celine Rubio MD;  Location: [facility];  Service: Gynecology Oncology;  Laterality: N/A;   • EYE SURGERY Left     BLOOD VESSEL IN EYE REPAIR   • TOE AMPUTATION Left 2019    big toe - unhealing sore   • TOE AMPUTATION Right 2018    big toe and second toe - Necrotizing fasciitis and MRSA        Family History   Problem Relation Age of Onset   • Fibroids Mother    • Diabetes type II Mother    • Hypertension Mother    • Heart attack Mother    • Fibroids Sister         PCOS   • Diabetes type II Sister    • Dementia Maternal Grandmother    • Stroke Maternal Grandmother        Social History     Socioeconomic History   • Marital status:      Spouse name: Not on file   • Number of children: 1   • Years of education: Not on file   • Highest education level: Not on file   Tobacco Use   • Smoking status: Former Smoker     Types: Cigarettes     Last attempt to quit: 2000     Years since quittin.2   • Smokeless tobacco: Never Used   • Tobacco comment: social MAYBE 1 CIG/DAY   Substance and Sexual Activity   • Alcohol use: Not Currently     Frequency: Monthly or less     Drinks per session: 1 or 2   • Drug use: Never   • Sexual activity: Not Currently   Social History Narrative    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 Staying in [place] with mother due to  Endometrial Cancer.        Allergies   Allergen Reactions   • Bactrim [Sulfamethoxazole-Trimethoprim] Anaphylaxis   • Penicillins Hives   • Promethazine Mental Status Change         Medication:    Current Facility-Administered Medications:   •  acetaminophen (TYLENOL) tablet 650 mg, 650 mg, Oral, Q6H, Praful Myers MD, 650 mg at 03/22/20 1701  •  cetirizine (zyrTEC) tablet 10 mg, 10 mg, Oral, Daily, Praful Myers MD, 10 mg at 03/22/20 0929  •  dextrose (D50W) 25 g/ 50mL Intravenous Solution 25 g, 25 g, Intravenous, Q15 Min PRN, Praful Myers MD  •  dextrose (GLUTOSE) oral gel 15 g, 15 g, Oral, Q15 Min PRN, Praful Myers MD  •  docusate sodium (COLACE) capsule 200 mg, 200 mg, Oral, BID, Praful Myers MD, 200 mg at 03/22/20 0929  •  filgrastim (NEUPOGEN) injection 480 mcg, 480 mcg, Subcutaneous, Q PM, Praful Myers MD, 480 mcg at 03/22/20 1641  •  gabapentin (NEURONTIN) capsule 300 mg, 300 mg, Oral, TID, Praful Myers MD, 300 mg at 03/22/20 1635  •  glucagon (human recombinant) (GLUCAGEN DIAGNOSTIC) injection 1 mg, 1 mg, Subcutaneous, Q15 Min PRN, Praful Myers MD  •  HYDROcodone-acetaminophen (NORCO) 5-325 MG per tablet 1 tablet, 1 tablet, Oral, Q6H PRN, Praful Myers MD, 1 tablet at 03/22/20 1635  •  hydrOXYzine (ATARAX) tablet 25 mg, 25 mg, Oral, TID PRN, Praful Myers MD  •  insulin detemir (LEVEMIR) injection 20 Units, 20 Units, Subcutaneous, Nightly, Praful Myers MD, 20 Units at 03/21/20 2317  •  insulin lispro (humaLOG) injection 0-7 Units, 0-7 Units, Subcutaneous, 4x Daily With Meals & Nightly, Praful Myers MD, 4 Units at 03/22/20 2291  •  LORazepam (ATIVAN) tablet 1 mg, 1 mg, Oral, Q6H PRN, Praful Myers MD  •  Magnesium Sulfate 2 gram Bolus, followed by 8 gram infusion (total Mg dose 10 grams)- Mg less than or equal to 1mg/dL, 2 g, Intravenous, PRN **OR** Magnesium Sulfate 2  gram / 50mL Infusion (GIVE X 3 BAGS TO EQUAL 6GM TOTAL DOSE) - Mg 1.1 - 1.5 mg/dl, 2 g, Intravenous, PRN, Last Rate: 25 mL/hr at 03/21/20 1825, 2 g at 03/21/20 1825 **OR** Magnesium Sulfate 4 gram infusion- Mg 1.6-1.9 mg/dL, 4 g, Intravenous, PRN, Praful Myers MD  •  melatonin tablet 5 mg, 5 mg, Oral, Nightly PRN, Praful Myers MD  •  Morphine sulfate (PF) injection 3 mg, 3 mg, Intravenous, Q2H PRN, Praful Myers MD, 3 mg at 03/22/20 1407  •  ondansetron (ZOFRAN) tablet 4 mg, 4 mg, Oral, Q6H PRN **OR** ondansetron (ZOFRAN) injection 4 mg, 4 mg, Intravenous, Q6H PRN, Praful Myers MD, 4 mg at 03/21/20 1211  •  pantoprazole (PROTONIX) EC tablet 40 mg, 40 mg, Oral, QAM, Praful Myers MD, 40 mg at 03/22/20 0605  •  piperacillin-tazobactam (ZOSYN) 3.375 g in iso-osmotic dextrose 50 ml (premix), 3.375 g, Intravenous, Q8H, Abrahan Talley MD, 3.375 g at 03/22/20 1407  •  polyethylene glycol 3350 powder (packet), 17 g, Oral, Daily PRN, Praful Myers MD  •  [COMPLETED] Insert peripheral IV, , , Once **AND** sodium chloride 0.9 % flush 10 mL, 10 mL, Intravenous, PRN, Praful Myers MD  •  sodium chloride 0.9 % flush 10 mL, 10 mL, Intravenous, Q12H, Praful Myers MD, 10 mL at 03/21/20 0938  •  sodium chloride 0.9 % flush 10 mL, 10 mL, Intravenous, PRN, Praful Myers MD  •  sodium chloride 0.9 % flush 10 mL, 10 mL, Intravenous, Q12H, Abrahan Talley MD  •  sodium chloride 0.9 % flush 10 mL, 10 mL, Intravenous, PRN, Abrahan Talley MD  •  sodium chloride 0.9 % flush 20 mL, 20 mL, Intravenous, PRN, Abrahan Talley MD  •  sodium chloride 0.9 % infusion, 75 mL/hr, Intravenous, Continuous, Praful Myers MD, Last Rate: 75 mL/hr at 03/22/20 1641, 75 mL/hr at 03/22/20 1641  •  sodium chloride 0.9 % solution, , , PRN, Praful Myers MD, 1,000 mL at 03/21/20 1236  •  traMADol (ULTRAM) tablet 50 mg, 50 mg, Oral, Q6H  Lucia CANO Richard David, MD, 50 mg at 20    Antibiotics:  Anti-Infectives (From admission, onward)    Ordered     Dose/Rate Route Frequency Start Stop    20 1753  piperacillin-tazobactam (ZOSYN) 3.375 g in iso-osmotic dextrose 50 ml (premix)     Abrahan Talley MD reviewed the order on 20 1203.   Ordering Provider:  Abrahan Talley MD    3.375 g  over 4 Hours Intravenous Every 8 Hours 20 2200 20 0559    20 0147  piperacillin-tazobactam (ZOSYN) 4.5 g in iso-osmotic dextrose 100 mL IVPB (premix)     Ordering Provider:  Carlyle Bryant PA    4.5 g Intravenous Once 20 0149 20 0443                  Physical Exam:   Vital Signs  Temp (24hrs), Av.7 °F (36.5 °C), Min:97.3 °F (36.3 °C), Max:98.1 °F (36.7 °C)    Temp  Min: 97.3 °F (36.3 °C)  Max: 98.1 °F (36.7 °C)  BP  Min: 103/63  Max: 117/70  Pulse  Min: 80  Max: 91  Resp  Min: 16  Max: 17  No data recorded    GENERAL: She is alert and responsive.    HEENT: Normocephalic, atraumatic.  No conjunctival injection. No icterus. Oropharynx clear without evidence of thrush or exudate.   ABDOMEN: Mild to moderate abdominal distention.  EXT:  No cyanosis, clubbing or edema. No cord.  :  Without Reyna catheter.  MSK:  No joint effusions   SKIN: Warm and dry  NEURO: She is alert and responsive..  She moves all 4 extremities        .Laboratory Data    Results from last 7 days   Lab Units 20  0410 20  0531 20  0449   WBC 10*3/mm3 3.24* 1.95* 0.96*   HEMOGLOBIN g/dL 8.7* 7.3* 7.4*   HEMATOCRIT % 28.3* 24.4* 24.2*   PLATELETS 10*3/mm3 74* 47* 65*     Results from last 7 days   Lab Units 20  0449   SODIUM mmol/L 134*   POTASSIUM mmol/L 3.9   CHLORIDE mmol/L 99   CO2 mmol/L 22.0   BUN mg/dL 11   CREATININE mg/dL 0.73   GLUCOSE mg/dL 103*   CALCIUM mg/dL 7.9*     Results from last 7 days   Lab Units 20  2301   ALK PHOS U/L 49   BILIRUBIN mg/dL 0.7   ALT (SGPT) U/L 9   AST (SGOT) U/L 12              Results from last 7 days   Lab Units 03/17/20  2301   LACTATE mmol/L 1.6             Estimated Creatinine Clearance: 98.8 mL/min (by C-G formula based on SCr of 0.73 mg/dL).      Microbiology:  3/17:  BC  No growth        Radiology:  Imaging Results (Last 72 Hours)     ** No results found for the last 72 hours. **        I read her CT scan    Impression:   1.  Sepsis- with fever, tachycardia, hypotension, neutropenia, known focus of infection  2.  Acute appendicitis-she has acute appendicitis and underwent appendectomy on 3/21.  She is clinically improving.  I discussed her complex situation with Dr. Myers today.  She did have evidence of significant appendicitis with early perforation in the setting of severe immunocompromise.  We think that she should receive approximately 1 week of outpatient intravenous antibiotic therapy.  3.  Neutropenia/pancytopenia- improving.    4.  Endometrial cancer, on chemotherapy  5.  Right foot ulcer  6.  Abdominal wall wound dehiscence  7.  T2 Diabetes mellitus  8.  Neutropenic fever    PLAN/RECOMMENDATIONS:   1.  PICC line placement  2.  Continue Zosyn   3.  Continue Wound care-Dr. Myers indicates that he will discharge her off of a wound VAC.      I discussed her complex situation in detail with Dr. Myers today.    Abrahan Talley MD  3/22/2020  19:14

## 2020-03-22 NOTE — THERAPY WOUND CARE TREATMENT
Acute Care - Wound/Debridement Treatment Note  [facility]     Patient Name: Jeana Chung  : 1975  MRN: [id number]  Today's Date: 3/22/2020                  Admit Date: 3/17/2020    Visit Dx:    ICD-10-CM ICD-9-CM   1. Acute appendicitis, unspecified acute appendicitis type K35.80 540.9   2. Right lower quadrant abdominal pain R10.31 789.03   3. Fever, unspecified fever cause R50.9 780.60   4. Appendicitis, acute K35.80 540.9       Patient Active Problem List   Diagnosis   • Acute stress reaction with predominately emotional disturbance   • Sleep disturbance   • Cancer related pain   • Moderate episode of recurrent major depressive disorder (CMS/HCC)   • Anxiety   • Iron deficiency anemia   • Type 2 diabetes mellitus with diabetic polyneuropathy, with long-term current use of insulin (CMS/HCC)   • Essential hypertension   • Heart murmur   • Family history of cardiac disorder in mother   • Endometrial cancer (CMS/HCC)   • Stage 3 chronic kidney disease (CMS/HCC)   • Gastroesophageal reflux disease   • Amputation of left great toe (CMS/HCC)   • Amputation of right great toe (CMS/HCC)   • Neuropathy   • Hypotension due to drugs   • Neutropenia (CMS/HCC)   • Acute appendicitis   • Open wound of right foot   • Hyponatremia   • Pancytopenia (CMS/HCC)         LDA Wound     Row Name               Wound 20 1430 Right medial foot Diabetic Ulcer    Wound - Properties Group Date first assessed: 20  - Time first assessed: 1430  -MC Present on Hospital Admission: N  -MC Side: Right  - Orientation: medial  -MC Location: foot  -MC Primary Wound Type: Diabetic ulc  -MC       Wound 20 1300 midline abdomen Other (comment)    Wound - Properties Group Date first assessed: 20  - Time first assessed: 1300  -JM Orientation: midline  -JM Location: abdomen  -JM Primary Wound Type: Other  -JM, dehisced surgical incision  Additional Comments: dehisced abd incision  -JM       Wound Bilateral  upper;medial;lateral abdomen Incision    Wound - Properties Group Side: Bilateral  -RB Orientation: upper;medial;lateral  -RB Location: abdomen  -RB Primary Wound Type: Incision  -RB Additional Comments: 3 port sites  -RB       Wound 02/10/20 midline abdomen Incision    Wound - Properties Group Date first assessed: 02/10/20  -KD Present on Hospital Admission: N  -KD Orientation: midline  -KD Location: abdomen  -KD Primary Wound Type: Incision  -KD       NPWT (Negative Pressure Wound Therapy) 02/21/20 1030 midline abdomen    NPWT (Negative Pressure Wound Therapy) - Properties Group Placement Date: 02/21/20  -MF Placement Time: 1030  -MF Location: midline abdomen  -MF      User Key  (r) = Recorded By, (t) = Taken By, (c) = Cosigned By    Initials Name Provider Type    MF Vinny Lobato, PT Physical Therapist    Mary Kay Flores, PT Physical Therapist    Leidy Millard, PT Physical Therapist    Be Godwin, RN Registered Nurse    RB Bushra Jensen RN Registered Nurse        Wound 02/10/20 midline abdomen Incision (Active)   Dressing Appearance dry;intact 3/22/2020  8:00 AM   Closure SAIRA 3/22/2020  8:00 AM   Base dressing in place, unable to visualize 3/22/2020  8:00 AM       Wound 02/17/20 1300 midline abdomen Other (comment) (Active)   Dressing Appearance intact;moist drainage;copious drainage 3/22/2020  3:55 PM   Closure SAIRA 3/22/2020  8:00 AM   Base moist;pink;subcutaneous;white;slough 3/22/2020  3:55 PM   Periwound intact;dry 3/22/2020  3:55 PM   Periwound Temperature warm 3/22/2020  3:55 PM   Periwound Skin Turgor soft 3/22/2020  3:55 PM   Edges open;irregular 3/22/2020  3:55 PM   Drainage Characteristics/Odor yellow 3/22/2020  3:55 PM   Drainage Amount moderate;large 3/22/2020  3:55 PM   Care, Wound irrigated with;sterile normal saline;debrided 3/22/2020  3:55 PM   Dressing Care, Wound dressing changed;collagen;silver impregnated;calcium alginate;other (see comments);gauze, dry  3/22/2020  3:55 PM   Periwound Care, Wound cleansed with pH balanced cleanser;dry periwound area maintained 3/22/2020  3:55 PM       Wound 03/17/20 1430 Right medial foot Diabetic Ulcer (Active)   Dressing Appearance dry;intact 3/22/2020  8:00 AM   Closure SAIRA 3/22/2020  8:00 AM   Base dressing in place, unable to visualize 3/22/2020  8:00 AM   Periwound intact;moist;pale white;pink 3/22/2020  6:00 AM   Periwound Temperature warm 3/22/2020  6:00 AM   Periwound Skin Turgor soft 3/22/2020  6:00 AM   Edges irregular;open 3/22/2020  6:00 AM   Drainage Characteristics/Odor sanguineous 3/21/2020  8:00 PM   Drainage Amount none 3/22/2020  8:00 AM       Wound Bilateral upper;medial;lateral abdomen Incision (Active)   Dressing Appearance dried drainage 3/22/2020  8:00 AM   Closure SAIRA 3/22/2020  8:00 AM   Periwound intact 3/22/2020  8:00 AM       NPWT (Negative Pressure Wound Therapy) 02/21/20 1030 midline abdomen (Active)   Therapy Setting vacuum off 3/22/2020  8:00 AM         WOUND DEBRIDEMENT  Total area of Debridement: ~4 cm2  Debridement Site 1  Location- Site 1: midline abdominal incision   Selective Debridement- Site 1: Wound Surface <20cmsq  Instruments- Site 1: tweezers, scissors  Excised Tissue Description- Site 1: minimum, slough  Bleeding- Site 1: none            Therapy Treatment    Rehabilitation Treatment Summary     Row Name 03/22/20 7274             Treatment Time/Intention    Discipline  physical therapist  -MW      Document Type  re-evaluation;wound care;therapy note (daily note)  -MW      Subjective Information  -- pt states Dr Myers doesn't like the foam; too occlusive   -MW      Mode of Treatment  physical therapy;individual therapy  -MW      Patient/Family Observations  mom at bedside   -MW      Comment  Pt had PICC placed   -MW      Recorded by [MW] Yvette Ribeiro, PT 03/22/20 1641      Row Name 03/22/20 6161             Cognitive Assessment/Intervention- PT/OT    Affect/Mental Status (Cognitive)   WFL  -MW      Orientation Status (Cognition)  oriented x 4  -MW      Follows Commands (Cognition)  WFL  -MW      Recorded by [MW] Yvette Ribeiro, PT 03/22/20 1641      Row Name 03/22/20 1555             Positioning and Restraints    Pre-Treatment Position  in bed  -MW      Post Treatment Position  bed  -MW      In Bed  notified nsg;sitting;call light within reach;encouraged to call for assist;with family/caregiver  -MW      Recorded by [MW] Yvette Ribeiro, PT 03/22/20 1641      Row Name 03/22/20 1555             Pain Scale: Numbers Pre/Post-Treatment    Pain Location  abdomen  -MW      Recorded by [MW] Yvette Ribeiro, PT 03/22/20 1641      Row Name 03/22/20 1555             Pain Scale: FACES Pre/Post-Treatment    Pain: FACES Scale, Pretreatment  2-->hurts little bit  -MW      Pain: FACES Scale, Post-Treatment  2-->hurts little bit  -MW      Recorded by [MW] Yvette Ribeiro, PT 03/22/20 1641      Row Name                Wound 03/17/20 1430 Right medial foot Diabetic Ulcer    Wound - Properties Group Date first assessed: 03/17/20 [MC] Time first assessed: 1430 [MC] Present on Hospital Admission: N [MC] Side: Right [MC] Orientation: medial [MC] Location: foot [MC] Primary Wound Type: Diabetic ulc [] Recorded by:  [MC] Mary Kay Bell, PT 03/17/20 1552    Row Name 03/22/20 1555             Wound 02/17/20 1300 midline abdomen Other (comment)    Wound - Properties Group Date first assessed: 02/17/20 [JM] Time first assessed: 1300 [JM] Orientation: midline [JM] Location: abdomen [JM] Primary Wound Type: Other [JM], dehisced surgical incision  Additional Comments: dehisced abd incision [JM] Recorded by:  [JM] Leidy Gilliam, PT 02/17/20 1336    Dressing Appearance  intact;moist drainage;copious drainage  -MW      Base  moist;pink;subcutaneous;white;slough  -MW      Periwound  intact;dry  -MW      Periwound Temperature  warm  -MW      Periwound Skin Turgor  soft  -MW      Edges  open;irregular  -MW       Drainage Characteristics/Odor  yellow  -MW      Drainage Amount  moderate;large  -MW      Care, Wound  irrigated with;sterile normal saline;debrided  -MW      Dressing Care, Wound  dressing changed;collagen;silver impregnated;calcium alginate;other (see comments);gauze, dry Danita, [employer], qwick, covaderm x 2   -MW      Periwound Care, Wound  cleansed with pH balanced cleanser;dry periwound area maintained  -MW      Recorded by [MW] Yvette Ribeiro, PT 03/22/20 1641      Row Name                Wound Bilateral upper;medial;lateral abdomen Incision    Wound - Properties Group Side: Bilateral [RB] Orientation: upper;medial;lateral [RB] Location: abdomen [RB] Primary Wound Type: Incision [RB] Additional Comments: 3 port sites [RB] Recorded by:  [RB] Bushra Jensen, RN 03/21/20 1249    Row Name                Wound 02/10/20 midline abdomen Incision    Wound - Properties Group Date first assessed: 02/10/20 [KD] Present on Hospital Admission: N [KD] Orientation: midline [KD] Location: abdomen [KD] Primary Wound Type: Incision [KD] Recorded by:  [KD] Be Morales, RN 02/10/20 1354    Row Name                NPWT (Negative Pressure Wound Therapy) 02/21/20 1030 midline abdomen    NPWT (Negative Pressure Wound Therapy) - Properties Group Placement Date: 02/21/20 [MF] Placement Time: 1030 [MF] Location: midline abdomen [MF] Recorded by:  [MF] Vinny Lobato, PT 02/21/20 1159    Row Name 03/22/20 1555             Coping    Observed Emotional State  accepting;pleasant;cooperative  -MW      Verbalized Emotional State  acceptance  -MW      Recorded by [MW] Yvette Ribeiro, PT 03/22/20 1641      Row Name 03/22/20 1555             Plan of Care Review    Plan of Care Reviewed With  patient;mother  -MW      Progress  declining  -MW      Outcome Summary  PT wound care: midline abdominal incision with increased drainage; proximal aspect has become wider, unsure if pt has abdominal swelling s/p appy yesterday. Modified  dressing to be more vapor permiable (covaderm vs optifoam). If draiange increase persists overnight, expect it may be best to resume VAC prior to d/c home. PT wound care to reassess in morning.   -MW      Recorded by [MW] Yvette Ribeiro, PT 03/22/20 1641        User Key  (r) = Recorded By, (t) = Taken By, (c) = Cosigned By    Initials Name Effective Dates Discipline     Vinny Lobato, PT 06/19/15 -  PT    Yvette Dubois, PT 02/05/19 -  PT    Mary Kay Flores, PT 04/03/18 -  PT    Leidy Millard, PT 06/19/15 -  PT    Be Godwin RN 06/18/19 -  Nurse    RB Bushra Jensen RN 07/02/19 -  Nurse                PT Recommendation and Plan  Planned Therapy Interventions (PT Eval): wound care, patient/family education        Progress: declining      Progress: declining  Outcome Summary: PT wound care: midline abdominal incision with increased drainage; proximal aspect has become wider, unsure if pt has abdominal swelling s/p appy yesterday. Modified dressing to be more vapor permiable (covaderm vs optifoam). If draiange increase persists overnight, expect it may be best to resume VAC prior to d/c home. PT wound care to reassess in morning.   Plan of Care Reviewed With: patient, mother            Time Calculation  PT Charges     Row Name 03/22/20 1642             Time Calculation    Start Time  1555  -MW        User Key  (r) = Recorded By, (t) = Taken By, (c) = Cosigned By    Initials Name Provider Type    Yvette Dubois, PT Physical Therapist           Therapy Charges for Today     Code Description Service Date Service Provider Modifiers Qty    91548618326 HC YESSENIA DEBRIDE OPEN WOUND UP TO 20CM 3/21/2020 Yvette Ribeiro, PT GP 1    27812429923 HC YESSENIA DEBRIDE OPEN WOUND UP TO 20CM 3/22/2020 Yvette Ribeiro, PT GP 1            PT G-Codes  AM-PAC 6 Clicks Score (PT): 24        Yvette Ribeiro PT  3/22/2020

## 2020-03-23 ENCOUNTER — READMISSION MANAGEMENT (OUTPATIENT)
Dept: CALL CENTER | Facility: HOSPITAL | Age: 45
End: 2020-03-23

## 2020-03-23 VITALS
HEIGHT: 62 IN | BODY MASS INDEX: 35.41 KG/M2 | HEART RATE: 79 BPM | OXYGEN SATURATION: 97 % | RESPIRATION RATE: 18 BRPM | TEMPERATURE: 97.9 F | DIASTOLIC BLOOD PRESSURE: 65 MMHG | WEIGHT: 192.4 LBS | SYSTOLIC BLOOD PRESSURE: 108 MMHG

## 2020-03-23 LAB
BACTERIA SPEC AEROBE CULT: NORMAL
BACTERIA SPEC AEROBE CULT: NORMAL
DEPRECATED RDW RBC AUTO: 43.3 FL (ref 37–54)
ERYTHROCYTE [DISTWIDTH] IN BLOOD BY AUTOMATED COUNT: 14.4 % (ref 12.3–15.4)
GLUCOSE BLDC GLUCOMTR-MCNC: 90 MG/DL (ref 70–130)
GLUCOSE BLDC GLUCOMTR-MCNC: 94 MG/DL (ref 70–130)
HCT VFR BLD AUTO: 26.7 % (ref 34–46.6)
HGB BLD-MCNC: 8.2 G/DL (ref 12–15.9)
MCH RBC QN AUTO: 26.1 PG (ref 26.6–33)
MCHC RBC AUTO-ENTMCNC: 30.7 G/DL (ref 31.5–35.7)
MCV RBC AUTO: 85 FL (ref 79–97)
PLATELET # BLD AUTO: 74 10*3/MM3 (ref 140–450)
PMV BLD AUTO: 11.7 FL (ref 6–12)
RBC # BLD AUTO: 3.14 10*6/MM3 (ref 3.77–5.28)
WBC NRBC COR # BLD: 5.64 10*3/MM3 (ref 3.4–10.8)

## 2020-03-23 PROCEDURE — 99232 SBSQ HOSP IP/OBS MODERATE 35: CPT | Performed by: OBSTETRICS & GYNECOLOGY

## 2020-03-23 PROCEDURE — 97597 DBRDMT OPN WND 1ST 20 CM/<: CPT

## 2020-03-23 PROCEDURE — 85027 COMPLETE CBC AUTOMATED: CPT | Performed by: SURGERY

## 2020-03-23 PROCEDURE — 25010000002 PIPERACILLIN SOD-TAZOBACTAM PER 1 G: Performed by: INTERNAL MEDICINE

## 2020-03-23 PROCEDURE — 99239 HOSP IP/OBS DSCHRG MGMT >30: CPT | Performed by: INTERNAL MEDICINE

## 2020-03-23 PROCEDURE — 82962 GLUCOSE BLOOD TEST: CPT

## 2020-03-23 PROCEDURE — 25010000002 MORPHINE PER 10 MG: Performed by: SURGERY

## 2020-03-23 RX ORDER — HYDROCODONE BITARTRATE AND ACETAMINOPHEN 5; 325 MG/1; MG/1
1 TABLET ORAL EVERY 4 HOURS PRN
Qty: 18 TABLET | Refills: 0 | Status: SHIPPED | OUTPATIENT
Start: 2020-03-23 | End: 2020-03-26

## 2020-03-23 RX ADMIN — MORPHINE SULFATE 3 MG: 4 INJECTION, SOLUTION INTRAMUSCULAR; INTRAVENOUS at 09:06

## 2020-03-23 RX ADMIN — HYDROCODONE BITARTRATE AND ACETAMINOPHEN 1 TABLET: 5; 325 TABLET ORAL at 11:59

## 2020-03-23 RX ADMIN — GABAPENTIN 300 MG: 300 CAPSULE ORAL at 09:06

## 2020-03-23 RX ADMIN — TAZOBACTAM SODIUM AND PIPERACILLIN SODIUM 3.38 G: 375; 3 INJECTION, SOLUTION INTRAVENOUS at 13:25

## 2020-03-23 RX ADMIN — HYDROCODONE BITARTRATE AND ACETAMINOPHEN 1 TABLET: 5; 325 TABLET ORAL at 06:05

## 2020-03-23 RX ADMIN — ACETAMINOPHEN 650 MG: 325 TABLET, FILM COATED ORAL at 11:59

## 2020-03-23 RX ADMIN — PANTOPRAZOLE SODIUM 40 MG: 40 TABLET, DELAYED RELEASE ORAL at 06:05

## 2020-03-23 RX ADMIN — POLYETHYLENE GLYCOL 3350 17 G: 17 POWDER, FOR SOLUTION ORAL at 09:06

## 2020-03-23 RX ADMIN — TAZOBACTAM SODIUM AND PIPERACILLIN SODIUM 3.38 G: 375; 3 INJECTION, SOLUTION INTRAVENOUS at 06:05

## 2020-03-23 RX ADMIN — MORPHINE SULFATE 3 MG: 4 INJECTION, SOLUTION INTRAMUSCULAR; INTRAVENOUS at 01:04

## 2020-03-23 RX ADMIN — CETIRIZINE HYDROCHLORIDE 10 MG: 10 TABLET, FILM COATED ORAL at 09:06

## 2020-03-23 RX ADMIN — DOCUSATE SODIUM 200 MG: 100 CAPSULE, LIQUID FILLED ORAL at 09:06

## 2020-03-23 NOTE — OUTREACH NOTE
Prep Survey      Responses   [facility] patient discharged from?  [place]   Is LACE score < 7 ?  No   Eligibility  [facility]   Date of Admission  03/17/20   Date of Discharge  03/23/20   Discharge Disposition  Home-Health Care Sv   Discharge diagnosis  Lap appy, endometrial CA on chemo    Does the patient have one of the following disease processes/diagnoses(primary or secondary)?  General Surgery   Does the patient have Home health ordered?  Yes   What is the Home health agency?   [facility]- 1 time visit for labs    Is there a DME ordered?  No   Medication alerts for this patient  outpatient IV abx at [facility]   Prep survey completed?  Yes          Chrissy Wiggins RN

## 2020-03-23 NOTE — DISCHARGE INSTR - APPOINTMENTS
3/31/2020 10am telehealth appointment with Dr Abrahan Talley  Outpatient IV antibiotics provided by [facility] at [facility]

## 2020-03-23 NOTE — NURSING NOTE
Instructed patient on self administration of IV antibiotics via PICC line for home.  Patient returned demonstration with proficiency.  Reviewed side effects, PICC line care, probiotics,and 24hrs. RN availability.  Patient to f/u with Dr. IRINA Talley by Telehealth on 03/31/20 at 10AM.  Patient will get labs per [facility] on Monday 03/30/20 including CBC with diff, CMP, ESR, CRP.  Any questions please contact [facility] nurse at [phone].

## 2020-03-23 NOTE — THERAPY WOUND CARE TREATMENT
Acute Care - Wound/Debridement Treatment Note  [facility]     Patient Name: Jeana Chung  : 1975  MRN: [id number]  Today's Date: 3/23/2020                  Admit Date: 3/17/2020    Visit Dx:    ICD-10-CM ICD-9-CM   1. Acute appendicitis, unspecified acute appendicitis type K35.80 540.9   2. Right lower quadrant abdominal pain R10.31 789.03   3. Fever, unspecified fever cause R50.9 780.60   4. Appendicitis, acute K35.80 540.9       Patient Active Problem List   Diagnosis   • Acute stress reaction with predominately emotional disturbance   • Sleep disturbance   • Cancer related pain   • Moderate episode of recurrent major depressive disorder (CMS/HCC)   • Anxiety   • Iron deficiency anemia   • Type 2 diabetes mellitus with diabetic polyneuropathy, with long-term current use of insulin (CMS/HCC)   • Essential hypertension   • Heart murmur   • Family history of cardiac disorder in mother   • Endometrial cancer (CMS/HCC)   • Stage 3 chronic kidney disease (CMS/HCC)   • Gastroesophageal reflux disease   • Amputation of left great toe (CMS/HCC)   • Amputation of right great toe (CMS/HCC)   • Neuropathy   • Hypotension due to drugs   • Neutropenia (CMS/HCC)   • Acute appendicitis   • Open wound of right foot   • Hyponatremia   • Pancytopenia (CMS/HCC)         LDA Wound     Row Name               Wound 20 1430 Right medial foot Diabetic Ulcer    Wound - Properties Group Date first assessed: 20  - Time first assessed: 1430  -MC Present on Hospital Admission: N  -MC Side: Right  - Orientation: medial  -MC Location: foot  -MC Primary Wound Type: Diabetic ulc  -MC       Wound 20 1300 midline abdomen Other (comment)    Wound - Properties Group Date first assessed: 20  - Time first assessed: 1300  -JM Orientation: midline  -JM Location: abdomen  -JM Primary Wound Type: Other  -JM, dehisced surgical incision  Additional Comments: dehisced abd incision  -JM       Wound Bilateral  upper;medial;lateral abdomen Incision    Wound - Properties Group Side: Bilateral  -RB Orientation: upper;medial;lateral  -RB Location: abdomen  -RB Primary Wound Type: Incision  -RB Additional Comments: 3 port sites  -RB       Wound 02/10/20 midline abdomen Incision    Wound - Properties Group Date first assessed: 02/10/20  -KD Present on Hospital Admission: N  -KD Orientation: midline  -KD Location: abdomen  -KD Primary Wound Type: Incision  -KD       NPWT (Negative Pressure Wound Therapy) 02/21/20 1030 midline abdomen    NPWT (Negative Pressure Wound Therapy) - Properties Group Placement Date: 02/21/20  -MF Placement Time: 1030  -MF Location: midline abdomen  -MF      User Key  (r) = Recorded By, (t) = Taken By, (c) = Cosigned By    Initials Name Provider Type    MF Vinny Lobato, PT Physical Therapist    Mary Kay Flores, PT Physical Therapist    Leidy Millard, PT Physical Therapist    Be Godwin, RN Registered Nurse    RB Bushra Jensen RN Registered Nurse        Wound 02/10/20 midline abdomen Incision (Active)   Dressing Appearance dry;intact 3/23/2020  6:00 AM   Closure SAIRA 3/22/2020  8:00 PM   Base dressing in place, unable to visualize 3/22/2020  8:00 PM       Wound 02/17/20 1300 midline abdomen Other (comment) (Active)   Dressing Appearance open to air 3/23/2020 11:00 AM   Closure SAIRA 3/23/2020  6:00 AM   Base moist;pink;red;slough;yellow 3/23/2020 11:00 AM   Periwound intact;dry 3/23/2020 11:00 AM   Periwound Temperature warm 3/23/2020 11:00 AM   Periwound Skin Turgor soft 3/23/2020 11:00 AM   Edges open 3/23/2020 11:00 AM   Drainage Characteristics/Odor serosanguineous;creamy 3/23/2020 11:00 AM   Drainage Amount moderate 3/23/2020 11:00 AM   Care, Wound irrigated with;sterile normal saline;debrided 3/23/2020 11:00 AM   Dressing Care, Wound foam;low-adherent;silver impregnated 3/23/2020 11:00 AM   Periwound Care, Wound cleansed with pH balanced cleanser 3/23/2020 11:00 AM        Wound 03/17/20 1430 Right medial foot Diabetic Ulcer (Active)   Dressing Appearance dry;intact 3/23/2020  8:00 AM   Closure SAIRA 3/23/2020  6:00 AM   Base dressing in place, unable to visualize 3/23/2020  8:00 AM   Periwound intact;moist;pale white;pink 3/22/2020  8:00 PM   Periwound Temperature warm 3/22/2020  8:00 PM   Periwound Skin Turgor soft 3/22/2020  8:00 PM   Edges irregular;open 3/22/2020  8:00 PM   Drainage Characteristics/Odor sanguineous 3/22/2020  8:00 PM   Drainage Amount scant 3/22/2020  8:00 PM       Wound Bilateral upper;medial;lateral abdomen Incision (Active)   Dressing Appearance dried drainage 3/23/2020  8:00 AM   Closure SAIRA 3/23/2020  6:00 AM   Periwound intact 3/23/2020  6:00 AM       NPWT (Negative Pressure Wound Therapy) 02/21/20 1030 midline abdomen (Active)   Therapy Setting vacuum off 3/23/2020 11:00 AM         WOUND DEBRIDEMENT  Total area of Debridement: ~3cm2  Debridement Site 1  Location- Site 1: midline abdominal incision   Selective Debridement- Site 1: Wound Surface <20cmsq  Instruments- Site 1: tweezers, scissors  Excised Tissue Description- Site 1: minimum, slough  Bleeding- Site 1: none            Therapy Treatment    Rehabilitation Treatment Summary     Row Name 03/23/20 1100             Treatment Time/Intention    Discipline  physical therapist  -      Document Type  therapy note (daily note);wound care  -      Subjective Information  complains of;weakness;fatigue;pain  -      Mode of Treatment  physical therapy;individual therapy  -      Recorded by [MF] Vinny Lobato, PT 03/23/20 1147      Row Name 03/23/20 1100             Positioning and Restraints    Pre-Treatment Position  in bed  -      Post Treatment Position  bed  -MF      In Bed  supine;call light within reach  -      Recorded by [MF] Vinny Lobato, PT 03/23/20 1147      Row Name 03/23/20 1100             Pain Scale: Numbers Pre/Post-Treatment    Pain Location  abdomen  -      Pain  Intervention(s)  Repositioned  -MF      Recorded by [MF] Vinny Lobato, PT 03/23/20 1147      Row Name 03/23/20 1100             Pain Scale: FACES Pre/Post-Treatment    Pain: FACES Scale, Pretreatment  4-->hurts little more  -MF      Pain: FACES Scale, Post-Treatment  4-->hurts little more  -MF      Recorded by [MF] Vinny Lobato, PT 03/23/20 1147      Row Name                Wound 03/17/20 1430 Right medial foot Diabetic Ulcer    Wound - Properties Group Date first assessed: 03/17/20 [MC] Time first assessed: 1430 [MC] Present on Hospital Admission: N [MC] Side: Right [MC] Orientation: medial [MC] Location: foot [MC] Primary Wound Type: Diabetic ulc [MC] Recorded by:  [] Mary Kay Bell, PT 03/17/20 1552    Row Name 03/23/20 1100             Wound 02/17/20 1300 midline abdomen Other (comment)    Wound - Properties Group Date first assessed: 02/17/20 [JM] Time first assessed: 1300 [JM] Orientation: midline [JM] Location: abdomen [JM] Primary Wound Type: Other [JM], dehisced surgical incision  Additional Comments: dehisced abd incision [JM] Recorded by:  [JM] Leidy Gilliam, PT 02/17/20 1336    Dressing Appearance  open to air MD removed prior to PT entering  -MF      Base  moist;pink;red;slough;yellow  -MF      Periwound  intact;dry  -MF      Periwound Temperature  warm  -MF      Periwound Skin Turgor  soft  -MF      Edges  open  -MF      Drainage Characteristics/Odor  serosanguineous;creamy  -MF      Drainage Amount  moderate  -MF      Care, Wound  irrigated with;sterile normal saline;debrided  -MF      Dressing Care, Wound  foam;low-adherent;silver impregnated HFBc to pack, dry with Ag foam to cover and primafix tape  -MF      Periwound Care, Wound  cleansed with pH balanced cleanser  -MF      Recorded by [MF] Vinny Lobato, PT 03/23/20 1147      Row Name                Wound Bilateral upper;medial;lateral abdomen Incision    Wound - Properties Group Side: Bilateral [RB] Orientation:  upper;medial;lateral [RB] Location: abdomen [RB] Primary Wound Type: Incision [RB] Additional Comments: 3 port sites [RB] Recorded by:  [RB] Bushra Jensen RN 03/21/20 1249    Row Name                Wound 02/10/20 midline abdomen Incision    Wound - Properties Group Date first assessed: 02/10/20 [KD] Present on Hospital Admission: N [KD] Orientation: midline [KD] Location: abdomen [KD] Primary Wound Type: Incision [KD] Recorded by:  [KD] Be Morales RN 02/10/20 1354    Row Name 03/23/20 1100             NPWT (Negative Pressure Wound Therapy) 02/21/20 1030 midline abdomen    NPWT (Negative Pressure Wound Therapy) - Properties Group Placement Date: 02/21/20 [MF] Placement Time: 1030 [MF] Location: midline abdomen [MF] Recorded by:  [MF] Vinny Lobato, PT 02/21/20 1159    Therapy Setting  vacuum off  -      Recorded by [] Vinny Lobato, PT 03/23/20 1147      Row Name 03/23/20 1100             Coping    Observed Emotional State  accepting;calm;cooperative  -MF      Verbalized Emotional State  acceptance  -MF      Recorded by [] Vinny Lobato, PT 03/23/20 1147      Row Name 03/23/20 1100             Plan of Care Review    Plan of Care Reviewed With  patient  -MF      Outcome Summary  Pt to d/c home today with home dressing management.  PT educated pt on dressing changes / cleaning to improve healing potential.  Pt able to verb home dressing changes without issues.   -MF      Recorded by [] Vinny Lobato, PT 03/23/20 1147        User Key  (r) = Recorded By, (t) = Taken By, (c) = Cosigned By    Initials Name Effective Dates Discipline     Vinny Lobato, PT 06/19/15 -  PT    Mary Kay Flores, PT 04/03/18 -  PT    Leidy Millard, PT 06/19/15 -  PT    Be Godwin RN 06/18/19 -  Nurse    Bushra Adair RN 07/02/19 -  Nurse                PT Recommendation and Plan  Planned Therapy Interventions (PT Eval): wound care, patient/family education                Outcome Summary: Pt to d/c home today with home dressing management.  PT educated pt on dressing changes / cleaning to improve healing potential.  Pt able to verb home dressing changes without issues.   Plan of Care Reviewed With: patient            Time Calculation  PT Charges     Row Name 03/23/20 1100             Time Calculation    Start Time  1100  -        User Key  (r) = Recorded By, (t) = Taken By, (c) = Cosigned By    Initials Name Provider Type     Vinny Lobato, PT Physical Therapist           Therapy Charges for Today     Code Description Service Date Service Provider Modifiers Qty    03055219305 HC YESSENIA DEBRIDE OPEN WOUND UP TO 20CM 3/23/2020 Vinny Lobato, PT GP 1            PT G-Codes  AM-PAC 6 Clicks Score (PT): 24        Vinny Lobato, PT  3/23/2020

## 2020-03-23 NOTE — PROGRESS NOTES
INFECTIOUS DISEASE PROGRESS NOTE   Jeana Chung  1975  [id number]      Admission Date: 3/17/2020      Requesting Provider: No ref. provider found  Evaluating Physician: Abrahan Talley MD    Reason for Consultation:  Acute appendicitis     History of present illness:  3/18/20:    3/18/2020: Patient is a 44 y.o. female with endometrial cancer on chemotherapy,( last on 3/10)  CKD, T2DM,, seen today for an abdominal wound infection.  She underwent a REGINALDO, bilateral salpingo-oophrectomy, omentectomy 2/11/20, with postoperative wound dehiscence for which she has been followed by PT wound care.  She was seen in PT wound care yesterday for a new foot ulcer, and once home developed a fever of 101.3, with fatigue and weakness and right lower quadrant pain.  She was instructed to present to the ED.  An abdominal CT revealed acute appendicitis, no perforation or abscess, splenomegaly. CXR without acute cardiopulmonary findings. Tmax of 99.4, pulse of 124, .  Admitting labs with pancytopenia, normal lactate.  Blood cultures pending .   3/19/20:  Tmax of 100.7 last pm.  She continues to have RLQ discomfort.  No nausea.  PT to change vac today.   3/20/2020: She feels better today.  She has decreased abdominal pain.  She has been afebrile today but had a temperature of up to 100.3 yesterday.  3/21/2020: She underwent appendectomy today.  She has just come back from the emergency room as I saw her.  She feels better.  She has remained afebrile.  3/22/2020: She feels much better today.  She has decreased abdominal pain.  Her absolute leukopenia has resolved.  She denies nausea and vomiting.  3/23/20:  She remains afebrile. She feels much better but still with abdominal soreness.  No nausea.  She is passing gas.  She remains afebrile.     Past Medical History:   Diagnosis Date   • Anemia    • Anxiety and depression    • Back pain    • Bronchitis    • Diabetes (CMS/HCC)     DX 4-5 YRS AGO, CHECKS BS 4X/DAY, LAST  A1C 7.1%   • Endometrial cancer (CMS/HCC)     STAGE II   • GERD (gastroesophageal reflux disease)    • Hypertension    • MRSA (methicillin resistant staph aureus) culture positive 2018    S/P NECROTIZING FASCITIS IN BILATERAL FEET   • Necrotizing fasciitis (CMS/HCC)    • PCOS (polycystic ovarian syndrome)    • PTSD (post-traumatic stress disorder)    • Retinopathy 3/18/2020   • Sepsis (CMS/HCC)    • Stage 3 chronic kidney disease (CMS/HCC) 2020   • Subconjunctival hemorrhage        Past Surgical History:   Procedure Laterality Date   • APPENDECTOMY N/A 3/21/2020    Procedure: APPENDECTOMY LAPAROSCOPIC;  Surgeon: Praful Myers MD;  Location:  [facility];  Service: General;  Laterality: N/A;   • EXPLORATORY LAPAROTOMY, TOTAL ABDOMINAL HYSTERECTOMY SALPINGO OOPHORECTOMY N/A 2/10/2020    Procedure: EXPLORATORY LAPAROTOMY, TOTAL ABDOMINAL HYSTERECTOMY, BILATERAL SALPINGO-OOPHORECTOMY WITH OPTIMAL  STAGING (R-0), OMENTECTOMY;  Surgeon: Celine Rubio MD;  Location:  [facility] OR;  Service: Gynecology Oncology;  Laterality: N/A;   • EYE SURGERY Left     BLOOD VESSEL IN EYE REPAIR   • TOE AMPUTATION Left 2019    big toe - unhealing sore   • TOE AMPUTATION Right 2018    big toe and second toe - Necrotizing fasciitis and MRSA        Family History   Problem Relation Age of Onset   • Fibroids Mother    • Diabetes type II Mother    • Hypertension Mother    • Heart attack Mother    • Fibroids Sister         PCOS   • Diabetes type II Sister    • Dementia Maternal Grandmother    • Stroke Maternal Grandmother        Social History     Socioeconomic History   • Marital status:      Spouse name: Not on file   • Number of children: 1   • Years of education: Not on file   • Highest education level: Not on file   Tobacco Use   • Smoking status: Former Smoker     Types: Cigarettes     Last attempt to quit: 2000     Years since quittin.2   • Smokeless tobacco: Never Used   • Tobacco comment: social MAYBE 1  CIG/DAY   Substance and Sexual Activity   • Alcohol use: Not Currently     Frequency: Monthly or less     Drinks per session: 1 or 2   • Drug use: Never   • Sexual activity: Not Currently   Social History Narrative    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 Staying in [place] with mother due to Endometrial Cancer.        Allergies   Allergen Reactions   • Bactrim [Sulfamethoxazole-Trimethoprim] Anaphylaxis   • Penicillins Hives   • Promethazine Mental Status Change         Medication:  No current facility-administered medications for this encounter.     Current Outpatient Medications:   •  cholecalciferol (VITAMIN D3) 1.25 MG (34940 UT) capsule, Take 1 capsule by mouth 1 (One) Time Per Week. (Patient taking differently: Take 50,000 Units by mouth 1 (One) Time Per Week. SATURDAY), Disp: 12 capsule, Rfl: 3  •  dexamethasone (DECADRON) 4 MG tablet, Take 5 tabs the night before chemo then take 2 tabs in the morning daily on days 2, 3 & 4.  Take with food., Disp: 11 tablet, Rfl: 5  •  docusate sodium (COLACE) 250 MG capsule, Take 1 capsule by mouth 2 (Two) Times a Day., Disp: 60 capsule, Rfl: 5  •  gabapentin (NEURONTIN) 300 MG capsule, Take 1 capsule by mouth 3 (Three) Times a Day., Disp: 90 capsule, Rfl: 3  •  Insulin Glargine (BASAGLAR KWIKPEN) 100 UNIT/ML injection pen, Inject 40 Units under the skin into the appropriate area as directed Every Night., Disp: 4 pen, Rfl: 2  •  insulin lispro (HUMALOG) 100 UNIT/ML injection, Inject 15 Units under the skin into the appropriate area as directed 3 (Three) Times a Day Before Meals., Disp: 13.5 mL, Rfl: 5  •  LORazepam (ATIVAN) 1 MG tablet, Take 1 tablet by mouth Every 6 (Six) Hours As Needed for Anxiety., Disp: 30 tablet, Rfl: 3  •  MULTIPLE VITAMIN PO, Multiple Vitamin, Disp: , Rfl:   •  ondansetron (ZOFRAN) 4 MG tablet, Take 1 tablet by mouth Every 6 (Six) Hours As Needed for Nausea or Vomiting., Disp: 15 tablet, Rfl: 1  •  ondansetron ODT (ZOFRAN-ODT) 8 MG  disintegrating tablet, Take 1 tablet by mouth Every 8 (Eight) Hours As Needed for Nausea or Vomiting., Disp: 30 tablet, Rfl: 5  •  Polyethylene Glycol 3350 (MIRALAX PO), Take 17 g by mouth Daily., Disp: , Rfl:   •  promethazine (PHENERGAN) 25 MG tablet, Take 1 tablet by mouth Every 6 (Six) Hours As Needed for Nausea or Vomiting., Disp: 30 tablet, Rfl: 5  •  acetaminophen (TYLENOL) 325 MG tablet, Take 2 tablets by mouth Every 6 (Six) Hours., Disp: 60 tablet, Rfl: 0  •  Continuous Blood Gluc Sensor (FREESTYLE CAMMY SENSOR SYSTEM), Every 14 (Fourteen) Days., Disp: 1 each, Rfl: 1  •  HYDROcodone-acetaminophen (NORCO) 5-325 MG per tablet, Take 1 tablet by mouth Every 4 (Four) Hours As Needed for Moderate Pain  for up to 3 days., Disp: 18 tablet, Rfl: 0  •  omeprazole (PrilOSEC) 20 MG capsule, Take 1 capsule by mouth Daily., Disp: 90 capsule, Rfl: 1  •  piperacillin-tazobactam (ZOSYN) 3-0.375 GM/50ML IVPB, Infuse 50 mL into a venous catheter Every 8 (Eight) Hours for 9 doses. Indications: Infection of the Skin and/or Soft Tissue, Disp: 450 mL, Rfl: 0    Antibiotics:  Anti-Infectives (From admission, onward)    Ordered     Dose/Rate Route Frequency Start Stop    20 1248  piperacillin-tazobactam (ZOSYN) 3-0.375 GM/50ML IVPB     Ordering Provider:  Kelle Russell MD    3.375 g  over 4 Hours Intravenous Every 8 Hours 20 0000 20 2359    20 0147  piperacillin-tazobactam (ZOSYN) 4.5 g in iso-osmotic dextrose 100 mL IVPB (premix)     Ordering Provider:  Carlyle Bryant PA    4.5 g Intravenous Once 20 0149 20 0443                  Physical Exam:   Vital Signs  Temp (24hrs), Av.9 °F (36.6 °C), Min:97.9 °F (36.6 °C), Max:97.9 °F (36.6 °C)    Temp  Min: 97.9 °F (36.6 °C)  Max: 97.9 °F (36.6 °C)  BP  Min: 108/65  Max: 108/65  Pulse  Min: 79  Max: 79  Resp  Min: 18  Max: 18  No data recorded    GENERAL: She is alert and responsive.    HEENT: Normocephalic, atraumatic.  No conjunctival  injection. No icterus. Oropharynx clear without evidence of thrush or exudate.   ABDOMEN: Mild to moderate abdominal distention. Dressing in place with lian drain in place   EXT:  No cyanosis, clubbing or edema. No cord.  :  Without Reyna catheter.  MSK:  No joint effusions   SKIN: Warm and dry  NEURO: She is alert and responsive..  She moves all 4 extremities  Right PICC with old blood under occlusive dressing.         .Laboratory Data    Results from last 7 days   Lab Units 03/23/20  0435 03/22/20  0410 03/21/20  0531   WBC 10*3/mm3 5.64 3.24* 1.95*   HEMOGLOBIN g/dL 8.2* 8.7* 7.3*   HEMATOCRIT % 26.7* 28.3* 24.4*   PLATELETS 10*3/mm3 74* 74* 47*     Results from last 7 days   Lab Units 03/20/20  0449   SODIUM mmol/L 134*   POTASSIUM mmol/L 3.9   CHLORIDE mmol/L 99   CO2 mmol/L 22.0   BUN mg/dL 11   CREATININE mg/dL 0.73   GLUCOSE mg/dL 103*   CALCIUM mg/dL 7.9*     Results from last 7 days   Lab Units 03/17/20  2301   ALK PHOS U/L 49   BILIRUBIN mg/dL 0.7   ALT (SGPT) U/L 9   AST (SGOT) U/L 12             Results from last 7 days   Lab Units 03/17/20  2301   LACTATE mmol/L 1.6             Estimated Creatinine Clearance: 98.8 mL/min (by C-G formula based on SCr of 0.73 mg/dL).      Microbiology:  3/17:  BC  No growth        Radiology:  Imaging Results (Last 72 Hours)     ** No results found for the last 72 hours. **            Impression:   1.  Sepsis-better  2.  Acute appendicitis-she has acute appendicitis and underwent appendectomy on 3/21.  She is clinically improving.  I discussed her complex situation with Dr. Myers today.  She did have evidence of significant appendicitis with early perforation in the setting of severe immunocompromise.  We think that she should receive approximately 1 week of outpatient intravenous antibiotic therapy.  I will attempt to arrange her discharge today.  3.  Neutropenia/pancytopenia- improvED  4.  Endometrial cancer, on chemotherapy  5.  Right foot ulcer  6.  Abdominal wall  wound dehiscence  7.  T2 Diabetes mellitus  8.  Neutropenic fever    PLAN/RECOMMENDATIONS:   1.  Continue Zosyn   2.  Continue Wound care-Dr. Myers indicates that he will discharge her off of a wound VAC.  3.  PICC line dressing change  4.  Discharge to home today.    Dr. Talley has obtained the history, performed the physical exam and formulated the above treatment plan.     I discussed her disposition in detail with Dr. Myers and with Dr. Tolentino.  We would like to discharge her as soon as feasible due to the COVID19 outbreak.  She is an immunocompromised host and would carry a high mortality risk if she develops COVID19 infection.  We will plan to give her at least 1 more week of intravenous antibiotic therapy and I will plan to perform a telehealth visit early next week.  We will have home health draw laboratory studies on her on Monday.    I discussed her complex situation in detail with case management today.  I coordinated her care.  I spent over 45 minutes on her care today.  Over 50% of the time was spent in conference and care coordination.    OUTPATIENT IV ANTIBIOTICS:  1.  Zosyn 4.5g IV q 8hrs-- we will provide  X 1 week  2. Her sister, RN, will remove her PICC line at home when antibiotics completed  3. We will arrange tele-health visit as outpatient on 3/31.  4.  Home health will need to draw lab work on Monday 3/30-CBC and CMP.      Abrahan Talley MD  3/23/2020  21:01

## 2020-03-23 NOTE — PROGRESS NOTES
"General Surgery Daily Progress Note    Subjective:  Feeling better overall.    Objective:  /65 (BP Location: Left arm, Patient Position: Lying)   Pulse 79   Temp 97.9 °F (36.6 °C) (Oral)   Resp 18   Ht 157.5 cm (62\")   Wt 87.3 kg (192 lb 6.4 oz)   LMP  (LMP Unknown)   SpO2 97%   BMI 35.19 kg/m²       General Appearance: No apparent distress  Eyes: Anicteric  Neck: Trachea midline   Cardiovascular:  RRR without murmur nor rub  Lungs:  Bilateral respirations unlabored   Abdomen:  Soft, mild tenderness, the laparoscopic incisions are healing well.  Extremities:  No cyanosis or edema   Skin:  No obvious rashes   Neurologic: awake and conversant       Imaging Results (Last 24 Hours)     ** No results found for the last 24 hours. **          CBC:  Results from last 7 days   Lab Units 03/23/20  0435   WBC 10*3/mm3 5.64   HEMOGLOBIN g/dL 8.2*   HEMATOCRIT % 26.7*   PLATELETS 10*3/mm3 74*       CMP:  Results from last 7 days   Lab Units 03/20/20  0449  03/17/20  2301   SODIUM mmol/L 134*   < > 129*   POTASSIUM mmol/L 3.9   < > 4.3   CHLORIDE mmol/L 99   < > 95*   CO2 mmol/L 22.0   < > 24.0   BUN mg/dL 11   < > 23*   CREATININE mg/dL 0.73   < > 0.98   CALCIUM mg/dL 7.9*   < > 8.5*   BILIRUBIN mg/dL  --   --  0.7   ALK PHOS U/L  --   --  49   ALT (SGPT) U/L  --   --  9   AST (SGOT) U/L  --   --  12   GLUCOSE mg/dL 103*   < > 241*    < > = values in this interval not displayed.         Assessment:  Acute appendicitis with abscess formation  Recent chemotherapy with subsequent pancytopenia  Endometrial adenocarcinoma    Plan:   Advance diet as tolerated.  She has a PICC line in place for IV antibiotics.  I discussed the patient directly with Dr. Talley, ID.  Her drain is benign.  I would leave that in place until 10 days postoperatively to ensure that she is eating and this area is without leak or abscess formation.  She is a nurse herself.  She understands the local care for the drain.  She is also willing to " pull this out herself.  Once the drain is out all she would need to do is cover this with a dry gauze and allow this to heal down.  She may sponge bathe for now with the drain and PICC line in place.  I also discussed the patient directly with Dr. Tolentino, I will defer the local wound care to her regarding her lower midline incision.    I am going to schedule her for a follow-up in 3 weeks.  I would prefer this be done via telemedicine as opposed to a direct patient interaction.  She understands.    Agree with discharge home.    Praful Myers MD - 3/23/2020, 10:55

## 2020-03-23 NOTE — DISCHARGE SUMMARY
[facility] Medicine Services  DISCHARGE SUMMARY    Patient Name: Jeana Chung  : 1975  MRN: [id number]    Date of Admission: 3/17/2020 10:07 PM  Date of Discharge:  3/23/2020  Primary Care Physician: Tania Angeles PA-C    Consults     Date and Time Order Name Status Description    3/18/2020 0427 Inpatient General Surgery Consult Completed     3/18/2020 0427 Inpatient Infectious Diseases Consult Completed           Hospital Course     Presenting Problem:   Acute appendicitis, unspecified acute appendicitis type [K35.80]    Active Hospital Problems    Diagnosis  POA   • **Acute appendicitis [K35.80]  Yes   • Neutropenia (CMS/HCC) [D70.9]  Yes   • Open wound of right foot [S91.301A]  Yes   • Hyponatremia [E87.1]  Yes   • Pancytopenia (CMS/HCC) [D61.818]  Unknown   • Stage 3 chronic kidney disease (CMS/HCC) [N18.3]  Yes   • Endometrial cancer (CMS/HCC) [C54.1]  Yes   • Iron deficiency anemia [D50.9]  Yes   • Type 2 diabetes mellitus with diabetic polyneuropathy, with long-term current use of insulin (CMS/HCC) [E11.42, Z79.4]  Not Applicable   • Essential hypertension [I10]  Yes      Resolved Hospital Problems   No resolved problems to display.          Hospital Course:  Jeana Chung is a 44 y.o. female with history of endometrial cancer on chemotherapy (last dose 3/10), CKD, T2DM, recent REGINALDO, BSO and omentectomy 20 with post-operative wound dehiscence. Patient was initially directed to the ED after being seen in [facility] clinic with new foot ulcer. Found also to have fever, fatigue, weakness and RLQ abdominal pain. CT scan in hospital workup revealed acute appencdicitis. Patient admitted to hospitalist service. Surgery also following and underwent appendectomy 3/21 with Dr Myers. ID is also following for assistance with abx. Patient did well post-operatively and noted to be discharged in stable condition 3/23.           Sepsis   Neutropenic fever  Detail Level: Generalized   Acute appendicitis  Neutropenia/pancytopenia in setting of known endometrial cancer on chemotherapy  ---Hospital course as above. Patient was continued on IV zosyn, doing well post operatively, patient will continue IV zosyn upon discharge via PICC line and home infusions with home health. Patient will be seen closely in follow up as above.  ---post-op care per general surgery, patient will have close f/u with Dr Myers as above.   ---patient will have labs checked Monday 3/30 with HH.     Right foot ulcer  ---abx as above, ID/WOC followed during admission. Patient will be followed by HH at time of discharge     T2DM  ---patient to continue her home regimen at time of discharge without changes    Endometrial cancer  ----follow up with Dr Rubio as scheduled       Discharge Follow Up Recommendations for outpatient labs/diagnostics:  PCP 1-2 weeks  [facility], Dr De La Rosa, [facility] HH will f/u in 1 week- on Monday 3/30 for labs, patient will then be seen by Dr De La Rosa via telemedicine shortly thereafter  Dr Myers, 3 weeks  Dr Rubio, as scheduled    Day of Discharge     HPI:   Patient doing well.  Pain controlled  Ready for discharge.     Review of Systems  Gen- No fevers, chills  CV- No chest pain, palpitations  Resp- No cough, dyspnea  GI- No N/V/D, abd pain        Vital Signs:   Temp:  [97.3 °F (36.3 °C)-97.9 °F (36.6 °C)] 97.9 °F (36.6 °C)  Heart Rate:  [79-84] 79  Resp:  [17-18] 18  BP: (103-108)/(63-65) 108/65     Physical Exam:    GEN- no acute distress noted, resting in bed, awake  HEENT- atraumatic, normocephlic, eomi  NECK- supple, trachea midline, no masses  RESP: ctab, normal effort  CV: no murmurs, s1/s2, rrr  GI: appropriate post-op tenderness, drain in place with serosanginous fluid  MSK: no edema noted, spontaneous movement of all extremities  NEURO: alert, oriented, no focal deficits  SKIN: no rashes  PSYCH: appropriate mood and affect   Pertinent  and/or Most Recent Results     Results from last  Detail Level: Zone 7 days   Lab Units 03/23/20  0435 03/22/20  0410 03/21/20  0531 03/20/20  0449 03/19/20  0502 03/18/20  0605 03/17/20  2301   WBC 10*3/mm3 5.64 3.24* 1.95* 0.96* 0.74* 0.76* 0.85*   HEMOGLOBIN g/dL 8.2* 8.7* 7.3* 7.4* 7.3* 7.9* 8.3*   HEMATOCRIT % 26.7* 28.3* 24.4* 24.2* 24.1* 25.4* 26.5*   PLATELETS 10*3/mm3 74* 74* 47* 65* 65* 77* 73*   SODIUM mmol/L  --   --   --  134* 133* 130* 129*   POTASSIUM mmol/L  --   --   --  3.9 4.0 3.8 4.3   CHLORIDE mmol/L  --   --   --  99 100 98 95*   CO2 mmol/L  --   --   --  22.0 23.0 22.0 24.0   BUN mg/dL  --   --   --  11 14 18 23*   CREATININE mg/dL  --   --   --  0.73 0.76 0.77 0.98   GLUCOSE mg/dL  --   --   --  103* 97 170* 241*   CALCIUM mg/dL  --   --   --  7.9* 8.0* 8.2* 8.5*     Results from last 7 days   Lab Units 03/21/20  0531 03/18/20  0605 03/17/20  2301   BILIRUBIN mg/dL  --   --  0.7   ALK PHOS U/L  --   --  49   ALT (SGPT) U/L  --   --  9   AST (SGOT) U/L  --   --  12   PROTIME Seconds 14.4* 14.1*  --    INR  1.14 1.11  --    APTT seconds  --  44.4*  --            Invalid input(s): TG, LDLCALC, LDLREALC  Results from last 7 days   Lab Units 03/17/20  2301   LACTATE mmol/L 1.6       Brief Urine Lab Results  (Last result in the past 365 days)      Color   Clarity   Blood   Leuk Est   Nitrite   Protein   CREAT   Urine HCG        03/17/20 2345 Yellow Clear Negative Small (1+) Negative Negative               Microbiology Results Abnormal     Procedure Component Value - Date/Time    Blood Culture - Blood, Arm, Left [[id number]] Collected:  03/18/20 0322    Lab Status:  Final result Specimen:  Blood from Arm, Left Updated:  03/23/20 0530     Blood Culture No growth at 5 days    Blood Culture - Blood, Arm, Right [[id number]] Collected:  03/18/20 0322    Lab Status:  Final result Specimen:  Blood from Arm, Right Updated:  03/23/20 0530     Blood Culture No growth at 5 days          Imaging Results (All)     Procedure Component Value Units Date/Time    CT Abdomen Pelvis  Without Contrast [[id number]] Collected:  03/18/20 0112     Updated:  03/18/20 0114    Narrative:       CT Abdomen Pelvis WO    INDICATION:   44-year-old female with fever and lower abdominal pain today. History of endometrial cancer.    TECHNIQUE:   CT of the abdomen and pelvis without IV contrast. Coronal and sagittal reconstructions were obtained.  Radiation dose reduction techniques included automated exposure control or exposure modulation based on body size. Count of known CT and cardiac nuc  med studies performed in previous 12 months: 3.     COMPARISON:   CT abdomen pelvis 1/30/2020    FINDINGS:  Visualized lung bases are unremarkable.    Abdomen:   The liver is within normal limits. Splenomegaly again noted. The pancreas is within normal limits. The gallbladder is normal. Both adrenal glands are normal. The kidneys are normal. Abdominal aorta normal in course and caliber. Small bowel is  unremarkable without obstruction. The appendix is enlarged, measuring 1.3 cm in diameter. There is periappendiceal inflammatory stranding. The findings appear most consistent with acute appendicitis. No drainable fluid collections. No free  intraperitoneal air. The colon is unremarkable. Ventral lower abdominal wound from recent surgery.    Pelvis:   The urinary bladder is unremarkable.  Hysterectomy. No free pelvic fluid.    No acute osseous abnormality.        Impression:         1. Acute appendicitis. No evidence of perforation or abscess.  2. Postsurgical changes from recent hysterectomy.  3. Splenomegaly.      NOTIFICATION: Critical Value/emergent results were called by telephone at the time of interpretation on 3/18/2020 1:09 AM to BENSON Hughes who verbally acknowledged these results.      Signer Name: Prasanna Zapata MD   Signed: 3/18/2020 1:12 AM   Workstation Name: [email or web address]-    [facility]    XR Chest 1 View [[id number]] Collected:  03/18/20 0002     Updated:  03/18/20 0004    Narrative:        CR Chest 1 Vw    INDICATION:   44-year-old female with fever today. History of stage III endometrial cancer. Recent chemotherapy.     COMPARISON:    Chest 2/7/2020    FINDINGS:  Single portable AP view(s) of the chest.  The heart and mediastinal contours are normal. The lungs are clear. No pneumothorax or pleural effusion.      Impression:       No acute cardiopulmonary findings.    Signer Name: Prasanna Zapata MD   Signed: 3/18/2020 12:02 AM   Workstation Name: [email or web address]-    [facility] [place]                         Plan for Follow-up of Pending Labs/Results: Patient will be followed closely with [facility], Surgery, Dr Rubio   Order Current Status    Tissue Pathology Exam In process        Discharge Details        Discharge Medications      New Medications      Instructions Start Date   HYDROcodone-acetaminophen 5-325 MG per tablet  Commonly known as:  NORCO   1 tablet, Oral, Every 4 Hours PRN      piperacillin-tazobactam 3-0.375 GM/50ML IVPB  Commonly known as:  ZOSYN   3.375 g, Intravenous, Every 8 Hours         Changes to Medications      Instructions Start Date   cholecalciferol 1.25 MG (09400 UT) capsule  Commonly known as:  VITAMIN D3  What changed:  additional instructions   50,000 Units, Oral, Weekly         Continue These Medications      Instructions Start Date   acetaminophen 325 MG tablet  Commonly known as:  TYLENOL   650 mg, Oral, Every 6 Hours Scheduled      dexamethasone 4 MG tablet  Commonly known as:  DECADRON   Take 5 tabs the night before chemo then take 2 tabs in the morning daily on days 2, 3 & 4.  Take with food.      docusate sodium 250 MG capsule  Commonly known as:  COLACE   250 mg, Oral, 2 Times Daily      FreeStyle Michelle Sensor System   Does not apply, Every 14 Days      gabapentin 300 MG capsule  Commonly known as:  NEURONTIN   300 mg, Oral, 3 Times Daily      Insulin Glargine 100 UNIT/ML injection pen  Commonly known as:  BASAGLAR KWIKPEN   40 Units, Subcutaneous,  "Nightly      insulin lispro 100 UNIT/ML injection  Commonly known as:  HumaLOG   15 Units, Subcutaneous, 3 Times Daily Before Meals      LORazepam 1 MG tablet  Commonly known as:  Ativan   1 mg, Oral, Every 6 Hours PRN      MIRALAX PO   17 g, Oral, Daily      MULTIPLE VITAMIN PO   Multiple Vitamin      omeprazole 20 MG capsule  Commonly known as:  PrilOSEC   20 mg, Oral, Daily      ondansetron 4 MG tablet  Commonly known as:  ZOFRAN   4 mg, Oral, Every 6 Hours PRN      ondansetron ODT 8 MG disintegrating tablet  Commonly known as:  ZOFRAN-ODT   8 mg, Oral, Every 8 Hours PRN      promethazine 25 MG tablet  Commonly known as:  PHENERGAN   25 mg, Oral, Every 6 Hours PRN         Stop These Medications    traMADol 50 MG tablet  Commonly known as:  ULTRAM            Allergies   Allergen Reactions   • Bactrim [Sulfamethoxazole-Trimethoprim] Anaphylaxis   • Penicillins Hives   • Promethazine Mental Status Change         Discharge Disposition:  Home or Self Care    Diet:  Hospital:  Diet Order   Procedures   • Diet Full Liquid; Consistent Carbohydrate       Activity:  As tolerated    Restrictions or Other Recommendations:  Per Dr Myers- \"leave drain in place until 10 days post-operatively to ensure that she is eating and this area is without leak or abscess formation. Once the drain is out all she would need to do is cover this with a dry gauze and allow this to heal down.  She may sponge bathe for now with the drain and PICC line in place.\"       CODE STATUS:    Code Status and Medical Interventions:   Ordered at: 03/18/20 0403     Level Of Support Discussed With:    Patient     Code Status:    CPR     Medical Interventions (Level of Support Prior to Arrest):    Full       Future Appointments   Date Time Provider Department Center   3/30/2020  1:45 PM Mary Kay Bell PT [facility] [place] [facility] [place]   4/1/2020  9:30 AM Celine Rubio MD [facility] [place]   4/1/2020 10:30 AM CHAIR 20 [facility] [place] [facility] [place]   4/2/2020  1:00 PM Mane, " Leidy DIETRICH, PT [facility] [place] [facility] [place]   4/9/2020  2:00 PM Tania Angeles PA-C [facility] None       Additional Instructions for the Follow-ups that You Need to Schedule     Discharge Follow-up with PCP   As directed       Currently Documented PCP:    Tania Angeles PA-C    PCP Phone Number:    [phone]     Follow Up Details:  1-2 weeks         Discharge Follow-up with Specified Provider: Dr Myers; 3 Weeks   As directed      To:  Dr Myers    Follow Up:  3 Weeks         Discharge Follow-up with Specified Provider: Dr De La Rosa, via telemedicine   As directed      To:  Dr De La Rosa, via telemedicine         Discharge Follow-up with Specified Provider: Dr peace; 2 Weeks   As directed      To:  Dr peace    Follow Up:  2 Weeks               Time Spent on Discharge:  45 minutes    Electronically signed by Kelle Russell MD, 03/23/20, 12:49 PM.

## 2020-03-23 NOTE — PROGRESS NOTES
Case Management Discharge Note      Final Note: Plan is home with family and outpatient IV anitbiotics arranged with [facility] through [facility].  [facility] will provide supplies, IV antibioitics and teaching.  Pt aware of her appointments and telehealth appointment with Dr THALIA Talley on 3/31 at 10am.  Pt reports she a sister that is also a nurse and will help her.   Family will transport pt home.  Pt denies further discharge needs. Her sister will pull her P[facility] line per [facility]'s isntructions at the completion of her antibiotics in 1 week.          Destination      No service has been selected for the patient.      Durable Medical Equipment      No service has been selected for the patient.      Dialysis/Infusion      No service has been selected for the patient.      Home Medical Care      No service has been selected for the patient.      Therapy      No service has been selected for the patient.      Community Resources      No service has been selected for the patient.             Final Discharge Disposition Code: 01 - home or self-care

## 2020-03-23 NOTE — PLAN OF CARE
Problem: Patient Care Overview  Goal: Plan of Care Review  Outcome: Ongoing (interventions implemented as appropriate)  Flowsheets  Taken 3/22/2020 2000  Plan of Care Reviewed With: patient  Taken 3/23/2020 0652  Outcome Summary: VSS, pts c/o pain were handled with PRN medication. good out put, will continue to monitor

## 2020-03-23 NOTE — PLAN OF CARE
Problem: Patient Care Overview  Goal: Plan of Care Review  Outcome: Ongoing (interventions implemented as appropriate)  Flowsheets (Taken 3/23/2020 1100)  Plan of Care Reviewed With: patient  Outcome Summary: Pt to d/c home today with home dressing management.  PT educated pt on dressing changes / cleaning to improve healing potential.  Pt able to verb home dressing changes without issues.

## 2020-03-23 NOTE — PAYOR COMM NOTE
"Velma Waters RN CM  Phone [phone]  Fax [phone]      Jeana Chung (44 y.o. Female)     Date of Birth Social Security Number Address Home Phone MRN    1975  [address] [place] [address] [phone] [id number]    Jain Marital Status          None        Admission Date Admission Type Admitting Provider Attending Provider Department, Room/Bed    3/17/20 Emergency Kelle Russell MD Hunter, Sarah M, MD [address], S569/1    Discharge Date Discharge Disposition Discharge Destination         Home or Self Care              Attending Provider:  Kelle Russell MD    Allergies:  Bactrim [Sulfamethoxazole-trimethoprim], Penicillins, Promethazine    Isolation:  None   Infection:  None   Code Status:  CPR    Ht:  157.5 cm (62\")   Wt:  87.3 kg (192 lb 6.4 oz)    Admission Cmt:  None   Principal Problem:  Acute appendicitis [K35.80]                 Active Insurance as of 3/17/2020     Primary Coverage     Payor Plan Insurance Group Employer/Plan Group    [employer] BLUE [employer]OSS [employer] BLUE [employer]OSS BLUE [employer]IELD PPO [id number]     Payor Plan Address Payor Plan Phone Number Payor Plan Fax Number Effective Dates    [address] [phone]  1/1/2020 - None Entered    [place] [address]       Subscriber Name Subscriber Birth Date Member ID       JEANA CHUNG 1975 [id number]                 Emergency Contacts      (Rel.) Home Phone Work Phone Mobile Phone    STEFANIWADE ANN (Spouse) [phone] -- [phone]    Rosa Sloan (Mother) [phone] -- [phone]               Physician Progress Notes (last 48 hours) (Notes from 03/21/20 1358 through 03/23/20 1358)      Celine Rubio MD at 03/23/20 1104          Gynecologic Oncology   Daily Progress Note    Chief Complaint: pancytopenia, s/p Appendectomy, endometrial cancer    Subjective   Feeling better, doing well post-op    Updated ROS is remarkable for no fevers, " chills.      Objective   Temp:  [97.3 °F (36.3 °C)-97.9 °F (36.6 °C)] 97.9 °F (36.6 °C)  Heart Rate:  [79-84] 79  Resp:  [17-18] 18  BP: (103-108)/(63-65) 108/65  Vitals:    03/23/20 0807   BP: 108/65   Pulse: 79   Resp: 18   Temp: 97.9 °F (36.6 °C)   SpO2:      I/O last 3 completed shifts:  In: 595 [P.O.:360; I.V.:235]  Out: 1630 [Urine:800; Drains:830]     GENERAL: Alert, well-appearing female in no apparent distress.    CARDIOVASCULAR: deferred    RESPIRATORY: deferred  GASTROINTESTINAL:  Soft, non-distended, no rebound or guarding. Wound at midline probed with qtip.  10 cm deep, fascia intact.  Drainage noted.  Wound vac removed at OR.    GENITOURINARY: Deferred  SKIN:  Warm, dry, well-perfused.    PSYCHIATRIC: AO x3, with appropriate affect, normal thought processes  EXREMITIES: Symmetric. No peripheral edema.    Lab Results   Component Value Date    WBC 5.64 03/23/2020    HGB 8.2 (L) 03/23/2020    HCT 26.7 (L) 03/23/2020    MCV 85.0 03/23/2020    PLT 74 (L) 03/23/2020    NEUTROABS 2.44 03/22/2020    GLUCOSE 103 (H) 03/20/2020    BUN 11 03/20/2020    CREATININE 0.73 03/20/2020    EGFRIFNONA 86 03/20/2020     (L) 03/20/2020    K 3.9 03/20/2020    CL 99 03/20/2020    CO2 22.0 03/20/2020    MG 2.2 03/22/2020    CALCIUM 7.9 (L) 03/20/2020         Assessment/Plan   Jeana Chung is a 44 y.o. with stage IIIa endometrial cancer, status post exploratory laparotomy, total abdominal hysterectomy, bilateral salpingo-oophorectomy, omentectomy on 2/10/2020, complicated by wound dehiscence.  She underwent first cycle of chemo 3/10/2020 with carboplatin and paclitaxel.  She is admitted with acute appendicitis and is now POD #2 s/p appendectomy     Acute appendicitis  -s/p Appendectomy 3/21      Pancytopenia secondary to recent chemo  -improving   -ANC normalized s/p GSCF  -H/H better s/p total 2 u PRBC this admit  -Still with TCP.  Fall precautions reviewed.  -Will get labs 3/30 with      Diabetes with diabetic  "neuropathy and retinopathy, chemotherapy exacerbation of pre-existing neuropathy  -Trend fingersticks, continue insulin  -Continue home gabapentin     Wound dehiscence  -I called Vinny with wound care to discuss.  Will need packing to get to base of wound.     Disposition  -d/c home        Celine Rubio MD  03/23/20  11:04        Electronically signed by Celine Rubio MD at 03/23/20 1111     Praful Myers MD at 03/23/20 1055          General Surgery Daily Progress Note    Subjective:  Feeling better overall.    Objective:  /65 (BP Location: Left arm, Patient Position: Lying)   Pulse 79   Temp 97.9 °F (36.6 °C) (Oral)   Resp 18   Ht 157.5 cm (62\")   Wt 87.3 kg (192 lb 6.4 oz)   LMP  (LMP Unknown)   SpO2 97%   BMI 35.19 kg/m²        General Appearance: No apparent distress  Eyes: Anicteric  Neck: Trachea midline   Cardiovascular:  RRR without murmur nor rub  Lungs:  Bilateral respirations unlabored   Abdomen:  Soft, mild tenderness, the laparoscopic incisions are healing well.  Extremities:  No cyanosis or edema   Skin:  No obvious rashes   Neurologic: awake and conversant       Imaging Results (Last 24 Hours)     ** No results found for the last 24 hours. **          CBC:  Results from last 7 days   Lab Units 03/23/20  0435   WBC 10*3/mm3 5.64   HEMOGLOBIN g/dL 8.2*   HEMATOCRIT % 26.7*   PLATELETS 10*3/mm3 74*       CMP:  Results from last 7 days   Lab Units 03/20/20  0449  03/17/20  2301   SODIUM mmol/L 134*   < > 129*   POTASSIUM mmol/L 3.9   < > 4.3   CHLORIDE mmol/L 99   < > 95*   CO2 mmol/L 22.0   < > 24.0   BUN mg/dL 11   < > 23*   CREATININE mg/dL 0.73   < > 0.98   CALCIUM mg/dL 7.9*   < > 8.5*   BILIRUBIN mg/dL  --   --  0.7   ALK PHOS U/L  --   --  49   ALT (SGPT) U/L  --   --  9   AST (SGOT) U/L  --   --  12   GLUCOSE mg/dL 103*   < > 241*    < > = values in this interval not displayed.         Assessment:  Acute appendicitis with abscess formation  Recent chemotherapy " with subsequent pancytopenia  Endometrial adenocarcinoma    Plan:   Advance diet as tolerated.  She has a PICC line in place for IV antibiotics.  I discussed the patient directly with DIANNA Kahn.  Her drain is benign.  I would leave that in place until 10 days postoperatively to ensure that she is eating and this area is without leak or abscess formation.  She is a nurse herself.  She understands the local care for the drain.  She is also willing to pull this out herself.  Once the drain is out all she would need to do is cover this with a dry gauze and allow this to heal down.  She may sponge bathe for now with the drain and PICC line in place.  I also discussed the patient directly with Dr. Tolentino, I will defer the local wound care to her regarding her lower midline incision.    I am going to schedule her for a follow-up in 3 weeks.  I would prefer this be done via telemedicine as opposed to a direct patient interaction.  She understands.    Agree with discharge home.    Praful Myers MD - 3/23/2020, 10:55          Electronically signed by Praful Myers MD at 03/23/20 1059     Libby Hoffman APRN at 03/23/20 0629          INFECTIOUS DISEASE PROGRESS NOTE   Jeana Chung  1975  [id number]      Admission Date: 3/17/2020      Requesting Provider: No ref. provider found  Evaluating Physician: SYLVIA Enrique    Reason for Consultation:  Acute appendicitis     History of present illness:  3/18/20:    3/18/2020: Patient is a 44 y.o. female with endometrial cancer on chemotherapy,( last on 3/10)  CKD, T2DM,, seen today for an abdominal wound infection.  She underwent a REGINALDO, bilateral salpingo-oophrectomy, omentectomy 2/11/20, with postoperative wound dehiscence for which she has been followed by PT wound care.  She was seen in PT wound care yesterday for a new foot ulcer, and once home developed a fever of 101.3, with fatigue and weakness and right lower quadrant pain.   She was instructed to present to the ED.  An abdominal CT revealed acute appendicitis, no perforation or abscess, splenomegaly. CXR without acute cardiopulmonary findings. Tmax of 99.4, pulse of 124, .  Admitting labs with pancytopenia, normal lactate.  Blood cultures pending .   3/19/20:  Tmax of 100.7 last pm.  She continues to have RLQ discomfort.  No nausea.  PT to change vac today.   3/20/2020: She feels better today.  She has decreased abdominal pain.  She has been afebrile today but had a temperature of up to 100.3 yesterday.  3/21/2020: She underwent appendectomy today.  She has just come back from the emergency room as I saw her.  She feels better.  She has remained afebrile.  3/22/2020: She feels much better today.  She has decreased abdominal pain.  Her absolute leukopenia has resolved.  She denies nausea and vomiting.  3/23/20:  She remains afebrile. She feels much better but still with abdominal soreness.  No nausea.  She is passing gas.  She remains afebrile.     Past Medical History:   Diagnosis Date   • Anemia    • Anxiety and depression    • Back pain    • Bronchitis    • Diabetes (CMS/HCC)     DX 4-5 YRS AGO, CHECKS BS 4X/DAY, LAST A1C 7.1%   • Endometrial cancer (CMS/HCC)     STAGE II   • GERD (gastroesophageal reflux disease)    • Hypertension    • MRSA (methicillin resistant staph aureus) culture positive 2018    S/P NECROTIZING FASCITIS IN BILATERAL FEET   • Necrotizing fasciitis (CMS/HCC)    • PCOS (polycystic ovarian syndrome)    • PTSD (post-traumatic stress disorder)    • Retinopathy 3/18/2020   • Sepsis (CMS/HCC)    • Stage 3 chronic kidney disease (CMS/HCC) 2/24/2020   • Subconjunctival hemorrhage        Past Surgical History:   Procedure Laterality Date   • EXPLORATORY LAPAROTOMY, TOTAL ABDOMINAL HYSTERECTOMY SALPINGO OOPHORECTOMY N/A 2/10/2020    Procedure: EXPLORATORY LAPAROTOMY, TOTAL ABDOMINAL HYSTERECTOMY, BILATERAL SALPINGO-OOPHORECTOMY WITH OPTIMAL  STAGING (R-0),  OMENTECTOMY;  Surgeon: Celine Rubio MD;  [facility];  Service: Gynecology Oncology;  Laterality: N/A;   • EYE SURGERY Left     BLOOD VESSEL IN EYE REPAIR   • TOE AMPUTATION Left 2019    big toe - unhealing sore   • TOE AMPUTATION Right 2018    big toe and second toe - Necrotizing fasciitis and MRSA        Family History   Problem Relation Age of Onset   • Fibroids Mother    • Diabetes type II Mother    • Hypertension Mother    • Heart attack Mother    • Fibroids Sister         PCOS   • Diabetes type II Sister    • Dementia Maternal Grandmother    • Stroke Maternal Grandmother        Social History     Socioeconomic History   • Marital status:      Spouse name: Not on file   • Number of children: 1   • Years of education: Not on file   • Highest education level: Not on file   Tobacco Use   • Smoking status: Former Smoker     Types: Cigarettes     Last attempt to quit: 2000     Years since quittin.2   • Smokeless tobacco: Never Used   • Tobacco comment: social MAYBE 1 CIG/DAY   Substance and Sexual Activity   • Alcohol use: Not Currently     Frequency: Monthly or less     Drinks per session: 1 or 2   • Drug use: Never   • Sexual activity: Not Currently   Social History Narrative    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 Staying in [place] with mother due to Endometrial Cancer.        Allergies   Allergen Reactions   • Bactrim [Sulfamethoxazole-Trimethoprim] Anaphylaxis   • Penicillins Hives   • Promethazine Mental Status Change         Medication:    Current Facility-Administered Medications:   •  acetaminophen (TYLENOL) tablet 650 mg, 650 mg, Oral, Q6H, Praful Myers MD, 650 mg at 20 1701  •  cetirizine (zyrTEC) tablet 10 mg, 10 mg, Oral, Daily, Praful Myers MD, 10 mg at 20 0929  •  dextrose (D50W) 25 g/ 50mL Intravenous Solution 25 g, 25 g, Intravenous, Q15 Min PRN, Praful Myers MD  •  dextrose (GLUTOSE) oral gel 15 g, 15 g,  Oral, Q15 Min PRN, Praful Myers MD  •  docusate sodium (COLACE) capsule 200 mg, 200 mg, Oral, BID, Praful Myers MD, 200 mg at 03/22/20 2116  •  filgrastim (NEUPOGEN) injection 480 mcg, 480 mcg, Subcutaneous, Q PM, Praful Myers MD, 480 mcg at 03/22/20 1641  •  gabapentin (NEURONTIN) capsule 300 mg, 300 mg, Oral, TID, Praful Myers MD, 300 mg at 03/22/20 2116  •  glucagon (human recombinant) (GLUCAGEN DIAGNOSTIC) injection 1 mg, 1 mg, Subcutaneous, Q15 Min PRN, Praful Myers MD  •  HYDROcodone-acetaminophen (NORCO) 5-325 MG per tablet 1 tablet, 1 tablet, Oral, Q6H PRN, Praful Myers MD, 1 tablet at 03/23/20 0605  •  hydrOXYzine (ATARAX) tablet 25 mg, 25 mg, Oral, TID PRN, Praful Myers MD  •  insulin detemir (LEVEMIR) injection 20 Units, 20 Units, Subcutaneous, Nightly, Praful Myers MD, 20 Units at 03/22/20 2118  •  insulin lispro (humaLOG) injection 0-7 Units, 0-7 Units, Subcutaneous, 4x Daily With Meals & Nightly, Praful Myers MD, 2 Units at 03/22/20 2116  •  LORazepam (ATIVAN) tablet 1 mg, 1 mg, Oral, Q6H PRN, Praful Myers MD  •  Magnesium Sulfate 2 gram Bolus, followed by 8 gram infusion (total Mg dose 10 grams)- Mg less than or equal to 1mg/dL, 2 g, Intravenous, PRN **OR** Magnesium Sulfate 2 gram / 50mL Infusion (GIVE X 3 BAGS TO EQUAL 6GM TOTAL DOSE) - Mg 1.1 - 1.5 mg/dl, 2 g, Intravenous, PRN, Last Rate: 25 mL/hr at 03/21/20 1825, 2 g at 03/21/20 1825 **OR** Magnesium Sulfate 4 gram infusion- Mg 1.6-1.9 mg/dL, 4 g, Intravenous, PRN, Praful Myers MD  •  melatonin tablet 5 mg, 5 mg, Oral, Nightly PRN, Praful Myers MD  •  Morphine sulfate (PF) injection 3 mg, 3 mg, Intravenous, Q2H PRN, Praful Myers MD, 3 mg at 03/23/20 0104  •  ondansetron (ZOFRAN) tablet 4 mg, 4 mg, Oral, Q6H PRN **OR** ondansetron (ZOFRAN) injection 4 mg, 4 mg, Intravenous, Q6H PRN, Praful Myers,  MD, 4 mg at 03/21/20 1211  •  pantoprazole (PROTONIX) EC tablet 40 mg, 40 mg, Oral, QAM, Praful Myers MD, 40 mg at 03/23/20 0605  •  piperacillin-tazobactam (ZOSYN) 3.375 g in iso-osmotic dextrose 50 ml (premix), 3.375 g, Intravenous, Q8H, Abrahan Talley MD, 3.375 g at 03/23/20 0605  •  polyethylene glycol 3350 powder (packet), 17 g, Oral, Daily PRN, Praful Myers MD  •  [COMPLETED] Insert peripheral IV, , , Once **AND** sodium chloride 0.9 % flush 10 mL, 10 mL, Intravenous, PRN, Praful Myers MD  •  sodium chloride 0.9 % flush 10 mL, 10 mL, Intravenous, Q12H, Praful Myers MD, 10 mL at 03/21/20 0938  •  sodium chloride 0.9 % flush 10 mL, 10 mL, Intravenous, PRN, Praful Myers MD  •  sodium chloride 0.9 % flush 10 mL, 10 mL, Intravenous, Q12H, Abrahan Talley MD  •  sodium chloride 0.9 % flush 10 mL, 10 mL, Intravenous, PRN, Abrahan Talley MD  •  sodium chloride 0.9 % flush 20 mL, 20 mL, Intravenous, PRN, Abrahan Talley MD  •  sodium chloride 0.9 % infusion, 75 mL/hr, Intravenous, Continuous, Praful Myers MD, Last Rate: 75 mL/hr at 03/22/20 1641, 75 mL/hr at 03/22/20 1641  •  sodium chloride 0.9 % solution, , , PRN, Praful Myers MD, 1,000 mL at 03/21/20 1236  •  traMADol (ULTRAM) tablet 50 mg, 50 mg, Oral, Q6H PRN, Praful Myers MD, 50 mg at 03/18/20 2059    Antibiotics:  Anti-Infectives (From admission, onward)    Ordered     Dose/Rate Route Frequency Start Stop    03/19/20 1753  piperacillin-tazobactam (ZOSYN) 3.375 g in iso-osmotic dextrose 50 ml (premix)     Abrahan Talley MD reviewed the order on 03/22/20 1203.   Ordering Provider:  Abrahan Talley MD    3.375 g  over 4 Hours Intravenous Every 8 Hours 03/19/20 2200 03/26/20 0559    03/18/20 0147  piperacillin-tazobactam (ZOSYN) 4.5 g in iso-osmotic dextrose 100 mL IVPB (premix)     Ordering Provider:  Carlyle Bryant PA    4.5 g Intravenous Once 03/18/20  0149 20 0443                  Physical Exam:   Vital Signs  Temp (24hrs), Av.3 °F (36.3 °C), Min:97.3 °F (36.3 °C), Max:97.3 °F (36.3 °C)    Temp  Min: 97.3 °F (36.3 °C)  Max: 97.3 °F (36.3 °C)  BP  Min: 103/63  Max: 117/70  Pulse  Min: 80  Max: 85  Resp  Min: 16  Max: 17  No data recorded    GENERAL: She is alert and responsive.    HEENT: Normocephalic, atraumatic.  No conjunctival injection. No icterus. Oropharynx clear without evidence of thrush or exudate.   ABDOMEN: Mild to moderate abdominal distention. Dressing in place with lian drain in place   EXT:  No cyanosis, clubbing or edema. No cord.  :  Without Reyna catheter.  MSK:  No joint effusions   SKIN: Warm and dry  NEURO: She is alert and responsive..  She moves all 4 extremities  Right PICC with old blood under occlusive dressing.         .Laboratory Data    Results from last 7 days   Lab Units 20  0435 20  0410 20  0531   WBC 10*3/mm3 5.64 3.24* 1.95*   HEMOGLOBIN g/dL 8.2* 8.7* 7.3*   HEMATOCRIT % 26.7* 28.3* 24.4*   PLATELETS 10*3/mm3 74* 74* 47*     Results from last 7 days   Lab Units 20  0449   SODIUM mmol/L 134*   POTASSIUM mmol/L 3.9   CHLORIDE mmol/L 99   CO2 mmol/L 22.0   BUN mg/dL 11   CREATININE mg/dL 0.73   GLUCOSE mg/dL 103*   CALCIUM mg/dL 7.9*     Results from last 7 days   Lab Units 20  2301   ALK PHOS U/L 49   BILIRUBIN mg/dL 0.7   ALT (SGPT) U/L 9   AST (SGOT) U/L 12             Results from last 7 days   Lab Units 20  2301   LACTATE mmol/L 1.6             Estimated Creatinine Clearance: 98.8 mL/min (by C-G formula based on SCr of 0.73 mg/dL).      Microbiology:  3/17:  BC  No growth        Radiology:  Imaging Results (Last 72 Hours)     ** No results found for the last 72 hours. **            Impression:   1.  Sepsis- with fever, tachycardia, hypotension, neutropenia, known focus of infection, improved   2.  Acute appendicitis-she has acute appendicitis and underwent appendectomy on 3/21.   She is clinically improving.  I discussed her complex situation with Dr. Myers today.  She did have evidence of significant appendicitis with early perforation in the setting of severe immunocompromise.  We think that she should receive approximately 1 week of outpatient intravenous antibiotic therapy.  3.  Neutropenia/pancytopenia- improving.    4.  Endometrial cancer, on chemotherapy  5.  Right foot ulcer  6.  Abdominal wall wound dehiscence  7.  T2 Diabetes mellitus  8.  Neutropenic fever    PLAN/RECOMMENDATIONS:   1.  Continue Zosyn   2.  Continue Wound care-Dr. Myers indicates that he will discharge her off of a wound VAC.      Dr. Talley has obtained the history, performed the physical exam and formulated the above treatment plan.     OUTPATIENT IV ANTIBIOTICS:  1.  Zosyn 4.5g IV q 8hrs-- we will provide  X 1 week  2. Her sister, RN, will remove her PICC line at home when antibiotics completed  3. We will arrange tele-health visit as outpatien      SYLVIA Enrique  3/23/2020  06:29                  Electronically signed by Libby Hoffman APRN at 03/23/20 1002     Abrahan Talley MD at 03/22/20 1914          INFECTIOUS DISEASE PROGRESS NOTE   Jeana Chung  1975  [id number]      Admission Date: 3/17/2020      Requesting Provider: No ref. provider found  Evaluating Physician: Abrahan Talley MD    Reason for Consultation:  Acute appendicitis     History of present illness:  3/18/20:    3/18/2020: Patient is a 44 y.o. female with endometrial cancer on chemotherapy,( last on 3/10)  CKD, T2DM,, seen today for an abdominal wound infection.  She underwent a REGINALDO, bilateral salpingo-oophrectomy, omentectomy 2/11/20, with postoperative wound dehiscence for which she has been followed by PT wound care.  She was seen in PT wound care yesterday for a new foot ulcer, and once home developed a fever of 101.3, with fatigue and weakness and right lower quadrant pain.  She was instructed to  present to the ED.  An abdominal CT revealed acute appendicitis, no perforation or abscess, splenomegaly. CXR without acute cardiopulmonary findings. Tmax of 99.4, pulse of 124, .  Admitting labs with pancytopenia, normal lactate.  Blood cultures pending .   3/19/20:  Tmax of 100.7 last pm.  She continues to have RLQ discomfort.  No nausea.  PT to change vac today.   3/20/2020: She feels better today.  She has decreased abdominal pain.  She has been afebrile today but had a temperature of up to 100.3 yesterday.  3/21/2020: She underwent appendectomy today.  She has just come back from the emergency room as I saw her.  She feels better.  She has remained afebrile.  3/22/2020: She feels much better today.  She has decreased abdominal pain.  Her absolute leukopenia has resolved.  She denies nausea and vomiting.    Past Medical History:   Diagnosis Date   • Anemia    • Anxiety and depression    • Back pain    • Bronchitis    • Diabetes (CMS/HCC)     DX 4-5 YRS AGO, CHECKS BS 4X/DAY, LAST A1C 7.1%   • Endometrial cancer (CMS/HCC)     STAGE II   • GERD (gastroesophageal reflux disease)    • Hypertension    • MRSA (methicillin resistant staph aureus) culture positive 2018    S/P NECROTIZING FASCITIS IN BILATERAL FEET   • Necrotizing fasciitis (CMS/HCC)    • PCOS (polycystic ovarian syndrome)    • PTSD (post-traumatic stress disorder)    • Retinopathy 3/18/2020   • Sepsis (CMS/HCC)    • Stage 3 chronic kidney disease (CMS/HCC) 2/24/2020   • Subconjunctival hemorrhage        Past Surgical History:   Procedure Laterality Date   • EXPLORATORY LAPAROTOMY, TOTAL ABDOMINAL HYSTERECTOMY SALPINGO OOPHORECTOMY N/A 2/10/2020    Procedure: EXPLORATORY LAPAROTOMY, TOTAL ABDOMINAL HYSTERECTOMY, BILATERAL SALPINGO-OOPHORECTOMY WITH OPTIMAL  STAGING (R-0), OMENTECTOMY;  Surgeon: Celine Rubio MD;  Location: [facility];  Service: Gynecology Oncology;  Laterality: N/A;   • EYE SURGERY Left     BLOOD VESSEL IN EYE REPAIR   • TOE  AMPUTATION Left 2019    big toe - unhealing sore   • TOE AMPUTATION Right 2018    big toe and second toe - Necrotizing fasciitis and MRSA        Family History   Problem Relation Age of Onset   • Fibroids Mother    • Diabetes type II Mother    • Hypertension Mother    • Heart attack Mother    • Fibroids Sister         PCOS   • Diabetes type II Sister    • Dementia Maternal Grandmother    • Stroke Maternal Grandmother        Social History     Socioeconomic History   • Marital status:      Spouse name: Not on file   • Number of children: 1   • Years of education: Not on file   • Highest education level: Not on file   Tobacco Use   • Smoking status: Former Smoker     Types: Cigarettes     Last attempt to quit: 2000     Years since quittin.2   • Smokeless tobacco: Never Used   • Tobacco comment: social MAYBE 1 CIG/DAY   Substance and Sexual Activity   • Alcohol use: Not Currently     Frequency: Monthly or less     Drinks per session: 1 or 2   • Drug use: Never   • Sexual activity: Not Currently   Social History Narrative    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 Staying in [place] with mother due to Endometrial Cancer.        Allergies   Allergen Reactions   • Bactrim [Sulfamethoxazole-Trimethoprim] Anaphylaxis   • Penicillins Hives   • Promethazine Mental Status Change         Medication:    Current Facility-Administered Medications:   •  acetaminophen (TYLENOL) tablet 650 mg, 650 mg, Oral, Q6H, Praful Myers MD, 650 mg at 20 1701  •  cetirizine (zyrTEC) tablet 10 mg, 10 mg, Oral, Daily, Praful Myers MD, 10 mg at 20 0929  •  dextrose (D50W) 25 g/ 50mL Intravenous Solution 25 g, 25 g, Intravenous, Q15 Min PRN, Praful Myers MD  •  dextrose (GLUTOSE) oral gel 15 g, 15 g, Oral, Q15 Min PRN, Praful Meyrs MD  •  docusate sodium (COLACE) capsule 200 mg, 200 mg, Oral, BID, Praful Myers MD, 200 mg at 20 0929  •  filgrastim  (NEUPOGEN) injection 480 mcg, 480 mcg, Subcutaneous, Q PM, Praful Myers MD, 480 mcg at 03/22/20 1641  •  gabapentin (NEURONTIN) capsule 300 mg, 300 mg, Oral, TID, Praful Myers MD, 300 mg at 03/22/20 1635  •  glucagon (human recombinant) (GLUCAGEN DIAGNOSTIC) injection 1 mg, 1 mg, Subcutaneous, Q15 Min PRN, Praful Myers MD  •  HYDROcodone-acetaminophen (NORCO) 5-325 MG per tablet 1 tablet, 1 tablet, Oral, Q6H PRN, Praful Myers MD, 1 tablet at 03/22/20 1635  •  hydrOXYzine (ATARAX) tablet 25 mg, 25 mg, Oral, TID PRN, Praful Myers MD  •  insulin detemir (LEVEMIR) injection 20 Units, 20 Units, Subcutaneous, Nightly, Praful Myers MD, 20 Units at 03/21/20 2317  •  insulin lispro (humaLOG) injection 0-7 Units, 0-7 Units, Subcutaneous, 4x Daily With Meals & Nightly, Praful Myers MD, 4 Units at 03/22/20 1701  •  LORazepam (ATIVAN) tablet 1 mg, 1 mg, Oral, Q6H PRN, Praful Myers MD  •  Magnesium Sulfate 2 gram Bolus, followed by 8 gram infusion (total Mg dose 10 grams)- Mg less than or equal to 1mg/dL, 2 g, Intravenous, PRN **OR** Magnesium Sulfate 2 gram / 50mL Infusion (GIVE X 3 BAGS TO EQUAL 6GM TOTAL DOSE) - Mg 1.1 - 1.5 mg/dl, 2 g, Intravenous, PRN, Last Rate: 25 mL/hr at 03/21/20 1825, 2 g at 03/21/20 1825 **OR** Magnesium Sulfate 4 gram infusion- Mg 1.6-1.9 mg/dL, 4 g, Intravenous, PRN, Praful Myers MD  •  melatonin tablet 5 mg, 5 mg, Oral, Nightly PRN, Praful Myers MD  •  Morphine sulfate (PF) injection 3 mg, 3 mg, Intravenous, Q2H PRN, Praful Myers MD, 3 mg at 03/22/20 1407  •  ondansetron (ZOFRAN) tablet 4 mg, 4 mg, Oral, Q6H PRN **OR** ondansetron (ZOFRAN) injection 4 mg, 4 mg, Intravenous, Q6H PRN, Praful Myers MD, 4 mg at 03/21/20 1211  •  pantoprazole (PROTONIX) EC tablet 40 mg, 40 mg, Oral, [facility], Praful Myesr MD, 40 mg at 03/22/20 0605  •  piperacillin-tazobactam (ZOSYN)  3.375 g in iso-osmotic dextrose 50 ml (premix), 3.375 g, Intravenous, Q8H, Abrahan Talley MD, 3.375 g at 20 1407  •  polyethylene glycol 3350 powder (packet), 17 g, Oral, Daily PRN, Praful Myers MD  •  [COMPLETED] Insert peripheral IV, , , Once **AND** sodium chloride 0.9 % flush 10 mL, 10 mL, Intravenous, PRN, Praful Myers MD  •  sodium chloride 0.9 % flush 10 mL, 10 mL, Intravenous, Q12H, Praful Myers MD, 10 mL at 20 0938  •  sodium chloride 0.9 % flush 10 mL, 10 mL, Intravenous, PRN, Praful Myers MD  •  sodium chloride 0.9 % flush 10 mL, 10 mL, Intravenous, Q12H, Abrahan Talley MD  •  sodium chloride 0.9 % flush 10 mL, 10 mL, Intravenous, PRN, Abrahan Talley MD  •  sodium chloride 0.9 % flush 20 mL, 20 mL, Intravenous, PRN, Abrahan Talley MD  •  sodium chloride 0.9 % infusion, 75 mL/hr, Intravenous, Continuous, Praful Myers MD, Last Rate: 75 mL/hr at 20 1641, 75 mL/hr at 20 1641  •  sodium chloride 0.9 % solution, , , PRN, Praful Myers MD, 1,000 mL at 20 1236  •  traMADol (ULTRAM) tablet 50 mg, 50 mg, Oral, Q6H PRN, Praful Myers MD, 50 mg at 20 2059    Antibiotics:  Anti-Infectives (From admission, onward)    Ordered     Dose/Rate Route Frequency Start Stop    20 1753  piperacillin-tazobactam (ZOSYN) 3.375 g in iso-osmotic dextrose 50 ml (premix)     Abrahan Talley MD reviewed the order on 20 1203.   Ordering Provider:  Abrahan Talley MD    3.375 g  over 4 Hours Intravenous Every 8 Hours 0320 0559    20 0147  piperacillin-tazobactam (ZOSYN) 4.5 g in iso-osmotic dextrose 100 mL IVPB (premix)     Ordering Provider:  Carlyle Bryant PA    4.5 g Intravenous Once 20 0149 20 0443                  Physical Exam:   Vital Signs  Temp (24hrs), Av.7 °F (36.5 °C), Min:97.3 °F (36.3 °C), Max:98.1 °F (36.7 °C)    Temp  Min: 97.3 °F (36.3 °C)   Max: 98.1 °F (36.7 °C)  BP  Min: 103/63  Max: 117/70  Pulse  Min: 80  Max: 91  Resp  Min: 16  Max: 17  No data recorded    GENERAL: She is alert and responsive.    HEENT: Normocephalic, atraumatic.  No conjunctival injection. No icterus. Oropharynx clear without evidence of thrush or exudate.   ABDOMEN: Mild to moderate abdominal distention.  EXT:  No cyanosis, clubbing or edema. No cord.  :  Without Reyna catheter.  MSK:  No joint effusions   SKIN: Warm and dry  NEURO: She is alert and responsive..  She moves all 4 extremities        .Laboratory Data    Results from last 7 days   Lab Units 03/22/20  0410 03/21/20  0531 03/20/20  0449   WBC 10*3/mm3 3.24* 1.95* 0.96*   HEMOGLOBIN g/dL 8.7* 7.3* 7.4*   HEMATOCRIT % 28.3* 24.4* 24.2*   PLATELETS 10*3/mm3 74* 47* 65*     Results from last 7 days   Lab Units 03/20/20  0449   SODIUM mmol/L 134*   POTASSIUM mmol/L 3.9   CHLORIDE mmol/L 99   CO2 mmol/L 22.0   BUN mg/dL 11   CREATININE mg/dL 0.73   GLUCOSE mg/dL 103*   CALCIUM mg/dL 7.9*     Results from last 7 days   Lab Units 03/17/20  2301   ALK PHOS U/L 49   BILIRUBIN mg/dL 0.7   ALT (SGPT) U/L 9   AST (SGOT) U/L 12             Results from last 7 days   Lab Units 03/17/20  2301   LACTATE mmol/L 1.6             Estimated Creatinine Clearance: 98.8 mL/min (by C-G formula based on SCr of 0.73 mg/dL).      Microbiology:  3/17:  BC  No growth        Radiology:  Imaging Results (Last 72 Hours)     ** No results found for the last 72 hours. **        I read her CT scan    Impression:   1.  Sepsis- with fever, tachycardia, hypotension, neutropenia, known focus of infection  2.  Acute appendicitis-she has acute appendicitis and underwent appendectomy on 3/21.  She is clinically improving.  I discussed her complex situation with Dr. Myers today.  She did have evidence of significant appendicitis with early perforation in the setting of severe immunocompromise.  We think that she should receive approximately 1 week of  "outpatient intravenous antibiotic therapy.  3.  Neutropenia/pancytopenia- improving.    4.  Endometrial cancer, on chemotherapy  5.  Right foot ulcer  6.  Abdominal wall wound dehiscence  7.  T2 Diabetes mellitus  8.  Neutropenic fever    PLAN/RECOMMENDATIONS:   1.  PICC line placement  2.  Continue Zosyn   3.  Continue Wound care-Dr. Myers indicates that he will discharge her off of a wound VAC.      I discussed her complex situation in detail with Dr. Myers today.    Abrahan Talley MD  3/22/2020  19:14                  Electronically signed by Abrahan Talley MD at 03/22/20 1917     Praful Myesr MD at 03/22/20 1014          General Surgery Post op Follow Up Note    Subjective:   Feeling better.  Hungry.    /70 (BP Location: Right arm, Patient Position: Lying)   Pulse 83   Temp 97.6 °F (36.4 °C) (Oral)   Resp 16   Ht 157.5 cm (62\")   Wt 87.3 kg (192 lb 6.4 oz)   LMP  (LMP Unknown)   SpO2 97%   BMI 35.19 kg/m²      General Appearance:  in no acute distress  Abdomen: incision is clean and dry, x3.    CBC  Results from last 7 days   Lab Units 03/21/20  0531   WBC 10*3/mm3 1.95*   HEMOGLOBIN g/dL 7.3*   HEMATOCRIT % 24.4*   PLATELETS 10*3/mm3 47*       CMP/BMP  Results from last 7 days   Lab Units 03/20/20  0449  03/17/20  2301   SODIUM mmol/L 134*   < > 129*   POTASSIUM mmol/L 3.9   < > 4.3   CHLORIDE mmol/L 99   < > 95*   CO2 mmol/L 22.0   < > 24.0   BUN mg/dL 11   < > 23*   CREATININE mg/dL 0.73   < > 0.98   CALCIUM mg/dL 7.9*   < > 8.5*   BILIRUBIN mg/dL  --   --  0.7   ALK PHOS U/L  --   --  49   ALT (SGPT) U/L  --   --  9   AST (SGOT) U/L  --   --  12   GLUCOSE mg/dL 103*   < > 241*    < > = values in this interval not displayed.         ASSESSMENT/PLAN:    Advance diet.  Repeat labs in the morning.    Praful Myers MD  3/22/2020  10:14    Electronically signed by Praful Myers MD at 03/22/20 1015     Kelle Russell MD at 03/22/20 0841              [facility] " [facility] Medicine Services  PROGRESS NOTE    Patient Name: Jeana Chung  : 1975  MRN: [id number]    Date of Admission: 3/17/2020  Primary Care Physician: Tania Angeles PA-C    Subjective   Subjective     CC:  Acute appendicitis    HPI:  Patient doing well.   Pain controlled.  No acute issues.   Not eating extremely well but will try today     Review of Systems  Gen- No fevers, chills  CV- No chest pain, palpitations  Resp- No cough, dyspnea  GI- No N/V/D, abd pain         Objective   Objective     Vital Signs:   Temp:  [97.1 °F (36.2 °C)-98.4 °F (36.9 °C)] 97.6 °F (36.4 °C)  Heart Rate:  [80-91] 85  Resp:  [14-16] 16  BP: ()/(52-77) 117/70        Physical Exam:  GEN- no acute distress noted, resting in bed, awake  HEENT- atraumatic, normocephlic, eomi  NECK- supple, trachea midline, no masses  RESP: ctab, normal effort  CV: no murmurs, s1/s2, rrr  GI: appropriate post-op tenderness, drain in place with serosanginous fluid  MSK: no edema noted, spontaneous movement of all extremities  NEURO: alert, oriented, no focal deficits  SKIN: no rashes  PSYCH: appropriate mood and affect       Results Reviewed:  Results from last 7 days   Lab Units 20  0531 20  0449 20  0502 20  0605   WBC 10*3/mm3 1.95* 0.96* 0.74* 0.76*   HEMOGLOBIN g/dL 7.3* 7.4* 7.3* 7.9*   HEMATOCRIT % 24.4* 24.2* 24.1* 25.4*   PLATELETS 10*3/mm3 47* 65* 65* 77*   INR  1.14  --   --  1.11     Results from last 7 days   Lab Units 20  0449 20  0502 20  0605 20  2301   SODIUM mmol/L 134* 133* 130* 129*   POTASSIUM mmol/L 3.9 4.0 3.8 4.3   CHLORIDE mmol/L 99 100 98 95*   CO2 mmol/L 22.0 23.0 22.0 24.0   BUN mg/dL 11 14 18 23*   CREATININE mg/dL 0.73 0.76 0.77 0.98   GLUCOSE mg/dL 103* 97 170* 241*   CALCIUM mg/dL 7.9* 8.0* 8.2* 8.5*   ALT (SGPT) U/L  --   --   --  9   AST (SGOT) U/L  --   --   --  12     Estimated Creatinine Clearance: 98.8 mL/min (by C-G formula  based on SCr of 0.73 mg/dL).    Microbiology Results Abnormal     Procedure Component Value - Date/Time    Blood Culture - Blood, Arm, Left [[id number]] Collected:  03/18/20 0322    Lab Status:  Preliminary result Specimen:  Blood from Arm, Left Updated:  03/22/20 0530     Blood Culture No growth at 4 days    Blood Culture - Blood, Arm, Right [[id number]] Collected:  03/18/20 0322    Lab Status:  Preliminary result Specimen:  Blood from Arm, Right Updated:  03/22/20 0530     Blood Culture No growth at 4 days          Imaging Results (Last 24 Hours)     ** No results found for the last 24 hours. **               I have reviewed the medications:  Scheduled Meds:  acetaminophen 650 mg Oral Q6H   cetirizine 10 mg Oral Daily   docusate sodium 200 mg Oral BID   filgrastim (NEUPOGEN) injection 480 mcg Subcutaneous Q PM   gabapentin 300 mg Oral TID   insulin detemir 20 Units Subcutaneous Nightly   insulin lispro 0-7 Units Subcutaneous 4x Daily With Meals & Nightly   pantoprazole 40 mg Oral QAM   piperacillin-tazobactam 3.375 g Intravenous Q8H   sodium chloride 10 mL Intravenous Q12H     Continuous Infusions:  sodium chloride 100 mL/hr Last Rate: 100 mL/hr (03/22/20 0606)     PRN Meds:.dextrose  •  dextrose  •  glucagon (human recombinant)  •  hydrOXYzine  •  LORazepam  •  magnesium sulfate **OR** magnesium sulfate **OR** magnesium sulfate  •  melatonin  •  Morphine  •  ondansetron **OR** ondansetron  •  polyethylene glycol  •  [COMPLETED] Insert peripheral IV **AND** sodium chloride  •  sodium chloride  •  sodium chloride  •  traMADol    Assessment/Plan   Assessment & Plan     Active Hospital Problems    Diagnosis  POA   • **Acute appendicitis [K35.80]  Yes   • Neutropenia (CMS/HCC) [D70.9]  Yes   • Open wound of right foot [S91.301A]  Yes   • Hyponatremia [E87.1]  Yes   • Pancytopenia (CMS/HCC) [D61.818]  Unknown   • Stage 3 chronic kidney disease (CMS/HCC) [N18.3]  Yes   • Endometrial cancer (CMS/HCC) [C54.1]  Yes   • Iron  deficiency anemia [D50.9]  Yes   • Type 2 diabetes mellitus with diabetic polyneuropathy, with long-term current use of insulin (CMS/[facility]) [E11.42, Z79.4]  Not Applicable   • Essential hypertension [I10]  Yes      Resolved Hospital Problems   No resolved problems to display.        Brief Hospital Course to date:  Jeana Chung is a 44 y.o. female with history of endometrial cancer on chemotherapy (last dose 3/10), CKD, T2DM, recent REGINALDO, BSO and omentectomy 2/11/20 with post-operative wound dehiscence. Patient was initially directed to the ED after being seen in [facility] clinic with new foot ulcer. Found also to have fever, fatigue, weakness and RLQ abdominal pain. CT scan in hospital workup revealed acute appencdicitis. Patient admitted to hospitalist service. Surgery also following and underwent appendectomy 3/21 with Dr Myers. ID is also following for assistance with abx.         Sepsis   Neutropenic fever   Acute appendicitis  Neutropenia/pancytopenia in setting of known endometrial cancer on chemotherapy  ---continue IV zosyn, doing well post operatively  ---post-op care per general surgery  ---continue to monitor labs daily, currently getting neupogen for neutropenia     Right foot ulcer  ---abx as above, ID/WOC dollow    T2DM  ---SSI/titrate as needed- may need to uptitrate today given now eating somewhat    DVT Prophylaxis:  Mech/scds given pancytopenia    Disposition: I expect the patient to be discharged pending further improvement     CODE STATUS:   Code Status and Medical Interventions:   Ordered at: 03/18/20 0403     Level Of Support Discussed With:    Patient     Code Status:    CPR     Medical Interventions (Level of Support Prior to Arrest):    Full         Electronically signed by Kelle Russell MD, 03/22/20, 08:41.        Electronically signed by Kelle Russell MD at 03/22/20 1246     Abrahan Talley MD at 03/21/20 1809          INFECTIOUS DISEASE PROGRESS NOTE   Jeana Pollack  Juliet  1975  [id number]      Admission Date: 3/17/2020      Requesting Provider: No ref. provider found  Evaluating Physician: Abrahan Talley MD    Reason for Consultation:  Acute appendicitis     History of present illness:  3/18/20:    3/18/2020: Patient is a 44 y.o. female with endometrial cancer on chemotherapy,( last on 3/10)  CKD, T2DM,, seen today for an abdominal wound infection.  She underwent a REGINALDO, bilateral salpingo-oophrectomy, omentectomy 2/11/20, with postoperative wound dehiscence for which she has been followed by PT wound care.  She was seen in PT wound care yesterday for a new foot ulcer, and once home developed a fever of 101.3, with fatigue and weakness and right lower quadrant pain.  She was instructed to present to the ED.  An abdominal CT revealed acute appendicitis, no perforation or abscess, splenomegaly. CXR without acute cardiopulmonary findings. Tmax of 99.4, pulse of 124, .  Admitting labs with pancytopenia, normal lactate.  Blood cultures pending .   3/19/20:  Tmax of 100.7 last pm.  She continues to have RLQ discomfort.  No nausea.  PT to change vac today.   3/20/2020: She feels better today.  She has decreased abdominal pain.  She has been afebrile today but had a temperature of up to 100.3 yesterday.  3/21/2020: She underwent appendectomy today.  She has just come back from the emergency room as I saw her.  She feels better.  She has remained afebrile.    Past Medical History:   Diagnosis Date   • Anemia    • Anxiety and depression    • Back pain    • Bronchitis    • Diabetes (CMS/HCC)     DX 4-5 YRS AGO, CHECKS BS 4X/DAY, LAST A1C 7.1%   • Endometrial cancer (CMS/HCC)     STAGE II   • GERD (gastroesophageal reflux disease)    • Hypertension    • MRSA (methicillin resistant staph aureus) culture positive 2018    S/P NECROTIZING FASCITIS IN BILATERAL FEET   • Necrotizing fasciitis (CMS/HCC)    • PCOS (polycystic ovarian syndrome)    • PTSD (post-traumatic stress  disorder)    • Retinopathy 3/18/2020   • Sepsis (CMS/HCC)    • Stage 3 chronic kidney disease (CMS/HCC) 2020   • Subconjunctival hemorrhage        Past Surgical History:   Procedure Laterality Date   • EXPLORATORY LAPAROTOMY, TOTAL ABDOMINAL HYSTERECTOMY SALPINGO OOPHORECTOMY N/A 2/10/2020    Procedure: EXPLORATORY LAPAROTOMY, TOTAL ABDOMINAL HYSTERECTOMY, BILATERAL SALPINGO-OOPHORECTOMY WITH OPTIMAL  STAGING (R-0), OMENTECTOMY;  Surgeon: Celine Rubio MD;  Location: [facility];  Service: Gynecology Oncology;  Laterality: N/A;   • EYE SURGERY Left     BLOOD VESSEL IN EYE REPAIR   • TOE AMPUTATION Left 2019    big toe - unhealing sore   • TOE AMPUTATION Right 2018    big toe and second toe - Necrotizing fasciitis and MRSA        Family History   Problem Relation Age of Onset   • Fibroids Mother    • Diabetes type II Mother    • Hypertension Mother    • Heart attack Mother    • Fibroids Sister         PCOS   • Diabetes type II Sister    • Dementia Maternal Grandmother    • Stroke Maternal Grandmother        Social History     Socioeconomic History   • Marital status:      Spouse name: Not on file   • Number of children: 1   • Years of education: Not on file   • Highest education level: Not on file   Tobacco Use   • Smoking status: Former Smoker     Types: Cigarettes     Last attempt to quit: 2000     Years since quittin.2   • Smokeless tobacco: Never Used   • Tobacco comment: social MAYBE 1 CIG/DAY   Substance and Sexual Activity   • Alcohol use: Not Currently     Frequency: Monthly or less     Drinks per session: 1 or 2   • Drug use: Never   • Sexual activity: Not Currently   Social History Narrative    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 Staying in [place] with mother due to Endometrial Cancer.        Allergies   Allergen Reactions   • Bactrim [Sulfamethoxazole-Trimethoprim] Anaphylaxis   • Penicillins Hives   • Promethazine Mental Status Change          Medication:    Current Facility-Administered Medications:   •  acetaminophen (TYLENOL) tablet 650 mg, 650 mg, Oral, Q6H, Praful Myers MD, Stopped at 03/21/20 1151  •  cetirizine (zyrTEC) tablet 10 mg, 10 mg, Oral, Daily, Praful Myers MD, 10 mg at 03/21/20 0937  •  dextrose (D50W) 25 g/ 50mL Intravenous Solution 25 g, 25 g, Intravenous, Q15 Min PRN, Praful Myers MD  •  dextrose (GLUTOSE) oral gel 15 g, 15 g, Oral, Q15 Min PRN, Praful Myers MD  •  docusate sodium (COLACE) capsule 200 mg, 200 mg, Oral, BID, Praful Myers MD, Stopped at 03/21/20 0935  •  filgrastim (NEUPOGEN) injection 480 mcg, 480 mcg, Subcutaneous, Q PM, Praful Myers MD, 480 mcg at 03/20/20 1804  •  gabapentin (NEURONTIN) capsule 300 mg, 300 mg, Oral, TID, Praful Myers MD, 300 mg at 03/21/20 0937  •  glucagon (human recombinant) (GLUCAGEN DIAGNOSTIC) injection 1 mg, 1 mg, Subcutaneous, Q15 Min PRN, Praful Myers MD  •  hydrOXYzine (ATARAX) tablet 25 mg, 25 mg, Oral, TID PRN, Praful Myers MD  •  insulin detemir (LEVEMIR) injection 20 Units, 20 Units, Subcutaneous, Nightly, Praful Myers MD, 20 Units at 03/20/20 2237  •  insulin lispro (humaLOG) injection 0-7 Units, 0-7 Units, Subcutaneous, 4x Daily With Meals & Nightly, Praful Myers MD, Stopped at 03/21/20 0935  •  LORazepam (ATIVAN) tablet 1 mg, 1 mg, Oral, Q6H PRN, Praful Myers MD  •  Magnesium Sulfate 2 gram Bolus, followed by 8 gram infusion (total Mg dose 10 grams)- Mg less than or equal to 1mg/dL, 2 g, Intravenous, PRN **OR** Magnesium Sulfate 2 gram / 50mL Infusion (GIVE X 3 BAGS TO EQUAL 6GM TOTAL DOSE) - Mg 1.1 - 1.5 mg/dl, 2 g, Intravenous, PRN, Last Rate: 25 mL/hr at 03/21/20 1552, 2 g at 03/21/20 1552 **OR** Magnesium Sulfate 4 gram infusion- Mg 1.6-1.9 mg/dL, 4 g, Intravenous, PRN, Praful Myers MD  •  melatonin tablet 5 mg, 5 mg, Oral, Nightly  PRN, Praful Myers MD  •  Morphine sulfate (PF) injection 3 mg, 3 mg, Intravenous, Q2H PRN, Praful Myers MD, 3 mg at 03/21/20 0937  •  ondansetron (ZOFRAN) tablet 4 mg, 4 mg, Oral, Q6H PRN **OR** ondansetron (ZOFRAN) injection 4 mg, 4 mg, Intravenous, Q6H PRN, Praful Myers MD, 4 mg at 03/21/20 1211  •  pantoprazole (PROTONIX) EC tablet 40 mg, 40 mg, Oral, QAM, Praful Myers MD, 40 mg at 03/21/20 0700  •  piperacillin-tazobactam (ZOSYN) 3.375 g in iso-osmotic dextrose 50 ml (premix), 3.375 g, Intravenous, Q8H, Praful Myers MD, 3.375 g at 03/21/20 0700  •  polyethylene glycol 3350 powder (packet), 17 g, Oral, Daily PRN, Praful Myers MD  •  [COMPLETED] Insert peripheral IV, , , Once **AND** sodium chloride 0.9 % flush 10 mL, 10 mL, Intravenous, PRN, Praful Myers MD  •  sodium chloride 0.9 % flush 10 mL, 10 mL, Intravenous, Q12H, Praful Myers MD, 10 mL at 03/21/20 0938  •  sodium chloride 0.9 % flush 10 mL, 10 mL, Intravenous, PRN, Praful Myers MD  •  sodium chloride 0.9 % infusion, 100 mL/hr, Intravenous, Continuous, Praful Myers MD, Stopped at 03/21/20 1343  •  sodium chloride 0.9 % solution, , , PRN, Praful Myers MD, 1,000 mL at 03/21/20 1236  •  traMADol (ULTRAM) tablet 50 mg, 50 mg, Oral, Q6H PRN, Praful Myers MD, 50 mg at 03/18/20 2059    Antibiotics:  Anti-Infectives (From admission, onward)    Ordered     Dose/Rate Route Frequency Start Stop    03/19/20 1753  piperacillin-tazobactam (ZOSYN) 3.375 g in iso-osmotic dextrose 50 ml (premix)     Milo Anderson [facility] reviewed the order on 03/21/20 7161.   Ordering Provider:  Praful Myers MD    3.375 g  over 4 Hours Intravenous Every 8 Hours 03/19/20 2200 03/23/20 1359    03/18/20 0147  piperacillin-tazobactam (ZOSYN) 4.5 g in iso-osmotic dextrose 100 mL IVPB (premix)     Ordering Provider:  Carlyle Bryant PA    4.5 g Intravenous  Once 20 0149 20 0443                  Physical Exam:   Vital Signs  Temp (24hrs), Av.8 °F (36.6 °C), Min:97.1 °F (36.2 °C), Max:98.7 °F (37.1 °C)    Temp  Min: 97.1 °F (36.2 °C)  Max: 98.7 °F (37.1 °C)  BP  Min: 94/52  Max: 133/77  Pulse  Min: 81  Max: 100  Resp  Min: 14  Max: 16  SpO2  Min: 92 %  Max: 99 %    GENERAL: He is drowsy after anesthesia  HEENT: Normocephalic, atraumatic.  No conjunctival injection. No icterus. Oropharynx clear without evidence of thrush or exudate.   ABDOMEN: Decreased right lower quadrant tenderness.    EXT:  No cyanosis, clubbing or edema. No cord.  :  Without Reyna catheter.  MSK:  No joint effusions   SKIN: Warm and dry  NEURO: Drowsy but arousable.  She moves all 4 extremities.Laboratory Data    Results from last 7 days   Lab Units 20  0531 20  0449 20  0502   WBC 10*3/mm3 1.95* 0.96* 0.74*   HEMOGLOBIN g/dL 7.3* 7.4* 7.3*   HEMATOCRIT % 24.4* 24.2* 24.1*   PLATELETS 10*3/mm3 47* 65* 65*     Results from last 7 days   Lab Units 20  0449   SODIUM mmol/L 134*   POTASSIUM mmol/L 3.9   CHLORIDE mmol/L 99   CO2 mmol/L 22.0   BUN mg/dL 11   CREATININE mg/dL 0.73   GLUCOSE mg/dL 103*   CALCIUM mg/dL 7.9*     Results from last 7 days   Lab Units 20  2301   ALK PHOS U/L 49   BILIRUBIN mg/dL 0.7   ALT (SGPT) U/L 9   AST (SGOT) U/L 12             Results from last 7 days   Lab Units 20  2301   LACTATE mmol/L 1.6             Estimated Creatinine Clearance: 98.8 mL/min (by C-G formula based on SCr of 0.73 mg/dL).      Microbiology:  3/17:  BC  No growth        Radiology:  Imaging Results (Last 72 Hours)     ** No results found for the last 72 hours. **        I read her CT scan    Impression:   1.  Sepsis- with fever, tachycardia, hypotension, neutropenia, known focus of infection  2.  Acute appendicitis-she has acute appendicitis and underwent appendectomy on 3/21  3.  Neutropenia/pancytopenia- improving.    4.  Endometrial cancer, on  chemotherapy  5.  Right foot ulcer  6.  Abdominal wall wound dehiscence  7.  T2 Diabetes mellitus  8.  Neutropenic fever    PLAN/RECOMMENDATIONS:   1.  Surgical intervention-done  2.  Continue Zosyn   3.  Continue Wound care  4.  Continue Neupogen         Abrahan Talley MD  3/21/2020  18:09                  Electronically signed by Abrahan Talley MD at 20 1811       Consult Notes (last 48 hours) (Notes from 20 1358 through 20 1358)    No notes of this type exist for this encounter.          Discharge Summary      Kelle Russell MD at 20 1231              [facility] Medicine Services  DISCHARGE SUMMARY    Patient Name: Jeana Chung  : 1975  MRN: [id number]    Date of Admission: 3/17/2020 10:07 PM  Date of Discharge:  3/23/2020  Primary Care Physician: Tania Angeles PA-C    Consults     Date and Time Order Name Status Description    3/18/2020 0427 Inpatient General Surgery Consult Completed     3/18/2020 042 Inpatient Infectious Diseases Consult Completed           Hospital Course     Presenting Problem:   Acute appendicitis, unspecified acute appendicitis type [K35.80]    Active Hospital Problems    Diagnosis  POA   • **Acute appendicitis [K35.80]  Yes   • Neutropenia (CMS/HCC) [D70.9]  Yes   • Open wound of right foot [S91.301A]  Yes   • Hyponatremia [E87.1]  Yes   • Pancytopenia (CMS/HCC) [D61.818]  Unknown   • Stage 3 chronic kidney disease (CMS/HCC) [N18.3]  Yes   • Endometrial cancer (CMS/HCC) [C54.1]  Yes   • Iron deficiency anemia [D50.9]  Yes   • Type 2 diabetes mellitus with diabetic polyneuropathy, with long-term current use of insulin (CMS/HCC) [E11.42, Z79.4]  Not Applicable   • Essential hypertension [I10]  Yes      Resolved Hospital Problems   No resolved problems to display.          Hospital Course:  Jeana Chung is a 44 y.o. female with history of endometrial cancer on chemotherapy (last dose 3/10), CKD, T2DM,  recent REGINALDO, BSO and omentectomy 2/11/20 with post-operative wound dehiscence. Patient was initially directed to the ED after being seen in [facility] clinic with new foot ulcer. Found also to have fever, fatigue, weakness and RLQ abdominal pain. CT scan in hospital workup revealed acute appencdicitis. Patient admitted to hospitalist service. Surgery also following and underwent appendectomy 3/21 with Dr Myers. ID is also following for assistance with abx. Patient did well post-operatively and noted to be discharged in stable condition 3/23.           Sepsis   Neutropenic fever   Acute appendicitis  Neutropenia/pancytopenia in setting of known endometrial cancer on chemotherapy  ---Hospital course as above. Patient was continued on IV zosyn, doing well post operatively, patient will continue IV zosyn upon discharge via PICC line and home infusions with home health. Patient will be seen closely in follow up as above.  ---post-op care per general surgery, patient will have close f/u with Dr Myers as above.   ---patient will have labs checked Monday 3/30 with HH.     Right foot ulcer  ---abx as above, ID/WOC followed during admission. Patient will be followed by HH at time of discharge     T2DM  ---patient to continue her home regimen at time of discharge without changes    Endometrial cancer  ----follow up with Dr Rubio as scheduled       Discharge Follow Up Recommendations for outpatient labs/diagnostics:  PCP 1-2 weeks  [facility], Dr De La Rosa, [facility] will f/u in 1 week- on Monday 3/30 for labs, patient will then be seen by Dr De La Rosa via telemedicine shortly thereafter  Dr Myers, 3 weeks  Dr Rubio, as scheduled    Day of Discharge     HPI:   Patient doing well.  Pain controlled  Ready for discharge.     Review of Systems  Gen- No fevers, chills  CV- No chest pain, palpitations  Resp- No cough, dyspnea  GI- No N/V/D, abd pain        Vital Signs:   Temp:  [97.3 °F (36.3 °C)-97.9 °F (36.6 °C)] 97.9 °F  (36.6 °C)  Heart Rate:  [79-84] 79  Resp:  [17-18] 18  BP: (103-108)/(63-65) 108/65     Physical Exam:    GEN- no acute distress noted, resting in bed, awake  HEENT- atraumatic, normocephlic, eomi  NECK- supple, trachea midline, no masses  RESP: ctab, normal effort  CV: no murmurs, s1/s2, rrr  GI: appropriate post-op tenderness, drain in place with serosanginous fluid  MSK: no edema noted, spontaneous movement of all extremities  NEURO: alert, oriented, no focal deficits  SKIN: no rashes  PSYCH: appropriate mood and affect   Pertinent  and/or Most Recent Results     Results from last 7 days   Lab Units 03/23/20  0435 03/22/20  0410 03/21/20  0531 03/20/20  0449 03/19/20  0502 03/18/20  0605 03/17/20  2301   WBC 10*3/mm3 5.64 3.24* 1.95* 0.96* 0.74* 0.76* 0.85*   HEMOGLOBIN g/dL 8.2* 8.7* 7.3* 7.4* 7.3* 7.9* 8.3*   HEMATOCRIT % 26.7* 28.3* 24.4* 24.2* 24.1* 25.4* 26.5*   PLATELETS 10*3/mm3 74* 74* 47* 65* 65* 77* 73*   SODIUM mmol/L  --   --   --  134* 133* 130* 129*   POTASSIUM mmol/L  --   --   --  3.9 4.0 3.8 4.3   CHLORIDE mmol/L  --   --   --  99 100 98 95*   CO2 mmol/L  --   --   --  22.0 23.0 22.0 24.0   BUN mg/dL  --   --   --  11 14 18 23*   CREATININE mg/dL  --   --   --  0.73 0.76 0.77 0.98   GLUCOSE mg/dL  --   --   --  103* 97 170* 241*   CALCIUM mg/dL  --   --   --  7.9* 8.0* 8.2* 8.5*     Results from last 7 days   Lab Units 03/21/20  0531 03/18/20  0605 03/17/20  2301   BILIRUBIN mg/dL  --   --  0.7   ALK PHOS U/L  --   --  49   ALT (SGPT) U/L  --   --  9   AST (SGOT) U/L  --   --  12   PROTIME Seconds 14.4* 14.1*  --    INR  1.14 1.11  --    APTT seconds  --  44.4*  --            Invalid input(s): TG, LDLCALC, LDLREALC  Results from last 7 days   Lab Units 03/17/20  2301   LACTATE mmol/L 1.6       Brief Urine Lab Results  (Last result in the past 365 days)      Color   Clarity   Blood   Leuk Est   Nitrite   Protein   CREAT   Urine HCG        03/17/20 2345 Yellow Clear Negative Small (1+) Negative  Negative               Microbiology Results Abnormal     Procedure Component Value - Date/Time    Blood Culture - Blood, Arm, Left [[id number]] Collected:  03/18/20 0322    Lab Status:  Final result Specimen:  Blood from Arm, Left Updated:  03/23/20 0530     Blood Culture No growth at 5 days    Blood Culture - Blood, Arm, Right [[id number]] Collected:  03/18/20 0322    Lab Status:  Final result Specimen:  Blood from Arm, Right Updated:  03/23/20 0530     Blood Culture No growth at 5 days          Imaging Results (All)     Procedure Component Value Units Date/Time    CT Abdomen Pelvis Without Contrast [[id number]] Collected:  03/18/20 0112     Updated:  03/18/20 0114    Narrative:       CT Abdomen Pelvis WO    INDICATION:   44-year-old female with fever and lower abdominal pain today. History of endometrial cancer.    TECHNIQUE:   CT of the abdomen and pelvis without IV contrast. Coronal and sagittal reconstructions were obtained.  Radiation dose reduction techniques included automated exposure control or exposure modulation based on body size. Count of known CT and cardiac nuc  med studies performed in previous 12 months: 3.     COMPARISON:   CT abdomen pelvis 1/30/2020    FINDINGS:  Visualized lung bases are unremarkable.    Abdomen:   The liver is within normal limits. Splenomegaly again noted. The pancreas is within normal limits. The gallbladder is normal. Both adrenal glands are normal. The kidneys are normal. Abdominal aorta normal in course and caliber. Small bowel is  unremarkable without obstruction. The appendix is enlarged, measuring 1.3 cm in diameter. There is periappendiceal inflammatory stranding. The findings appear most consistent with acute appendicitis. No drainable fluid collections. No free  intraperitoneal air. The colon is unremarkable. Ventral lower abdominal wound from recent surgery.    Pelvis:   The urinary bladder is unremarkable.  Hysterectomy. No free pelvic fluid.    No acute osseous  abnormality.        Impression:         1. Acute appendicitis. No evidence of perforation or abscess.  2. Postsurgical changes from recent hysterectomy.  3. Splenomegaly.      NOTIFICATION: Critical Value/emergent results were called by telephone at the time of interpretation on 3/18/2020 1:09 AM to BENSON Hughes who verbally acknowledged these results.      Signer Name: Prasanna Zapata MD   Signed: 3/18/2020 1:12 AM   Workstation [facility]: [facility]    [facility]    XR Chest 1 View [[id number]] Collected:  03/18/20 0002     Updated:  03/18/20 0004    Narrative:       CR Chest 1 Vw    INDICATION:   44-year-old female with fever today. History of stage III endometrial cancer. Recent chemotherapy.     COMPARISON:    Chest 2/7/2020    FINDINGS:  Single portable AP view(s) of the chest.  The heart and mediastinal contours are normal. The lungs are clear. No pneumothorax or pleural effusion.      Impression:       No acute cardiopulmonary findings.    Signer Name: Prasanna Zapata MD   Signed: 3/18/2020 12:02 AM   Workstation Name: [facility]    [facility]iology [facility]                         Plan for Follow-up of Pending Labs/Results: [facility] Dr Rubio   Order Current Status    Tissue Pathology Exam In process        Discharge Details        Discharge Medications      New Medications      Instructions Start Date   piperacillin-tazobactam 3-0.375 GM/50ML IVPB  Commonly known as:  ZOSYN   3.375 g, Intravenous, Every 8 Hours         Changes to Medications      Instructions Start Date   cholecalciferol 1.25 MG (23978 UT) capsule  Commonly known as:  VITAMIN D3  What changed:  additional instructions   50,000 Units, Oral, Weekly         Continue These Medications      Instructions Start Date   acetaminophen 325 MG tablet  Commonly known as:  TYLENOL   650 mg, Oral, Every 6 Hours Scheduled      dexamethasone 4 MG tablet  Commonly known as:   "DECADRON   Take 5 tabs the night before chemo then take 2 tabs in the morning daily on days 2, 3 & 4.  Take with food.      docusate sodium 250 MG capsule  Commonly known as:  COLACE   250 mg, Oral, 2 Times Daily      FreeStyle Michelle Sensor System   Does not apply, Every 14 Days      gabapentin 300 MG capsule  Commonly known as:  NEURONTIN   300 mg, Oral, 3 Times Daily      Insulin Glargine 100 UNIT/ML injection pen  Commonly known as:  BASAGLAR KWIKPEN   40 Units, Subcutaneous, Nightly      insulin lispro 100 UNIT/ML injection  Commonly known as:  HumaLOG   15 Units, Subcutaneous, 3 Times Daily Before Meals      LORazepam 1 MG tablet  Commonly known as:  Ativan   1 mg, Oral, Every 6 Hours PRN      MIRALAX PO   17 g, Oral, Daily      MULTIPLE VITAMIN PO   Multiple Vitamin      omeprazole 20 MG capsule  Commonly known as:  PrilOSEC   20 mg, Oral, Daily      ondansetron 4 MG tablet  Commonly known as:  ZOFRAN   4 mg, Oral, Every 6 Hours PRN      ondansetron ODT 8 MG disintegrating tablet  Commonly known as:  ZOFRAN-ODT   8 mg, Oral, Every 8 Hours PRN      promethazine 25 MG tablet  Commonly known as:  PHENERGAN   25 mg, Oral, Every 6 Hours PRN      traMADol 50 MG tablet  Commonly known as:  ULTRAM   50 mg, Oral, Every 6 Hours PRN             Allergies   Allergen Reactions   • Bactrim [Sulfamethoxazole-Trimethoprim] Anaphylaxis   • Penicillins Hives   • Promethazine Mental Status Change         Discharge Disposition:  Home or Self Care    Diet:  Hospital:  Diet Order   Procedures   • Diet Full Liquid; Consistent Carbohydrate       Activity:  As tolerated    Restrictions or Other Recommendations:  Per Dr Myers- \"leave drain in place until 10 days post-operatively to ensure that she is eating and this area is without leak or abscess formation. Once the drain is out all she would need to do is cover this with a dry gauze and allow this to heal down.  She may sponge bathe for now with the drain and PICC line in place.\"   "     CODE STATUS:    Code Status and Medical Interventions:   Ordered at: 03/18/20 0403     Level Of Support Discussed With:    Patient     Code Status:    CPR     Medical Interventions (Level of Support Prior to Arrest):    Full       Future Appointments   Date Time Provider Department Center   3/30/2020  1:45 PM Mary Kay Bell, PT [facility] [place] [facility] [place]   4/1/2020  9:30 AM Celine Peace MD [facility] [place]   4/1/2020 10:30 AM CHAIR [facility] [place] [facility] [place]   4/2/2020  1:00 PM Leidy Gilliam, PT [facility] [place] [facility] [place]       Additional Instructions for the Follow-ups that You Need to Schedule     Discharge Follow-up with PCP   As directed       Currently Documented PCP:    Tania Angeles PA-C    PCP Phone Number:    [phone]     Follow Up Details:  1-2 weeks         Discharge Follow-up with Specified Provider: Dr Myers; 3 Weeks   As directed      To:  Dr Myers    Follow Up:  3 Weeks         Discharge Follow-up with Specified Provider: Dr De La Rosa, via telemedicine   As directed      To:  Dr De La Rosa, via telemedicine         Discharge Follow-up with Specified Provider: Dr peace; 2 Weeks   As directed      To:  Dr peace    Follow Up:  2 Weeks               Time Spent on Discharge:  45 minutes    Electronically signed by Kelle Russell MD, 03/23/20, 12:49 PM.        Electronically signed by Kelle Russell MD at 03/23/20 9324

## 2020-03-23 NOTE — PROGRESS NOTES
Gynecologic Oncology   Daily Progress Note    Chief Complaint: pancytopenia, s/p Appendectomy, endometrial cancer    Subjective   Feeling better, doing well post-op    Updated ROS is remarkable for no fevers, chills.      Objective   Temp:  [97.3 °F (36.3 °C)-97.9 °F (36.6 °C)] 97.9 °F (36.6 °C)  Heart Rate:  [79-84] 79  Resp:  [17-18] 18  BP: (103-108)/(63-65) 108/65  Vitals:    03/23/20 0807   BP: 108/65   Pulse: 79   Resp: 18   Temp: 97.9 °F (36.6 °C)   SpO2:      I/O last 3 completed shifts:  In: 595 [P.O.:360; I.V.:235]  Out: 1630 [Urine:800; Drains:830]     GENERAL: Alert, well-appearing female in no apparent distress.    CARDIOVASCULAR: deferred    RESPIRATORY: deferred  GASTROINTESTINAL:  Soft, non-distended, no rebound or guarding. Wound at midline probed with qtip.  10 cm deep, fascia intact.  Drainage noted.  Wound vac removed at OR.    GENITOURINARY: Deferred  SKIN:  Warm, dry, well-perfused.    PSYCHIATRIC: AO x3, with appropriate affect, normal thought processes  EXREMITIES: Symmetric. No peripheral edema.    Lab Results   Component Value Date    WBC 5.64 03/23/2020    HGB 8.2 (L) 03/23/2020    HCT 26.7 (L) 03/23/2020    MCV 85.0 03/23/2020    PLT 74 (L) 03/23/2020    NEUTROABS 2.44 03/22/2020    GLUCOSE 103 (H) 03/20/2020    BUN 11 03/20/2020    CREATININE 0.73 03/20/2020    EGFRIFNONA 86 03/20/2020     (L) 03/20/2020    K 3.9 03/20/2020    CL 99 03/20/2020    CO2 22.0 03/20/2020    MG 2.2 03/22/2020    CALCIUM 7.9 (L) 03/20/2020         Assessment/Plan   Jeana Chung is a 44 y.o. with stage IIIa endometrial cancer, status post exploratory laparotomy, total abdominal hysterectomy, bilateral salpingo-oophorectomy, omentectomy on 2/10/2020, complicated by wound dehiscence.  She underwent first cycle of chemo 3/10/2020 with carboplatin and paclitaxel.  She is admitted with acute appendicitis and is now POD #2 s/p appendectomy     Acute appendicitis  -s/p Appendectomy 3/21      Pancytopenia  secondary to recent chemo  -improving   -ANC normalized s/p GSCF  -H/H better s/p total 2 u PRBC this admit  -Still with TCP.  Fall precautions reviewed.  -Will get labs 3/30 with      Diabetes with diabetic neuropathy and retinopathy, chemotherapy exacerbation of pre-existing neuropathy  -Trend fingersticks, continue insulin  -Continue home gabapentin     Wound dehiscence  -I called Vinny with wound care to discuss.  Will need packing to get to base of wound.     Disposition  -d/c home         Celine Rubio MD  03/23/20  11:04

## 2020-03-23 NOTE — PROGRESS NOTES
Case Management Discharge Note      Final Note:  arranged for skilled nursing- 1 time visit on Monday, 3/30 to draw labs: CBC,m CMP, ESR, and CRP to be faxed to [facility] at [phone]. Pt has no preferrencce to  agency.  HH arranged with [facility] and CM spoke with Jean Carlos and she accepted referral.           Destination      No service has been selected for the patient.      Durable Medical Equipment      No service has been selected for the patient.      Dialysis/Infusion      No service has been selected for the patient.      Home Medical Care - Selection Complete      Service Provider Request Status Selected Services Address Phone Number Fax Number    [facility] [address] [facility] [address] [phone]      Therapy      No service has been selected for the patient.      Community Resources      No service has been selected for the patient.             Final Discharge Disposition Code: 06 - home with home health care

## 2020-03-24 ENCOUNTER — TRANSITIONAL CARE MANAGEMENT TELEPHONE ENCOUNTER (OUTPATIENT)
Dept: CALL CENTER | Facility: HOSPITAL | Age: 45
End: 2020-03-24

## 2020-03-24 RX ORDER — FLASH GLUCOSE SENSOR
1 KIT MISCELLANEOUS
Qty: 2 EACH | Refills: 3 | Status: SHIPPED | OUTPATIENT
Start: 2020-03-24 | End: 2021-12-22 | Stop reason: SDUPTHER

## 2020-03-24 NOTE — PAYOR COMM NOTE
"Jeana Chung (44 y.o. Female)     Date of Birth Social Security Number Address Home Phone MRN    1975  [address] [place] [address] [phone] [id number]    [place] Marital Status          None        Admission Date Admission Type Admitting Provider Attending Provider Department, Room/Bed    3/17/20 Emergency Kelle Russell MD  [address], S569/1    Discharge Date Discharge Disposition Discharge Destination        3/23/2020 Home or Self Care              Attending Provider:  (none)   Allergies:  Bactrim [Sulfamethoxazole-trimethoprim], Penicillins, Promethazine    Isolation:  None   Infection:  None   Code Status:  Prior    Ht:  157.5 cm (62\")   Wt:  87.3 kg (192 lb 6.4 oz)    Admission Cmt:  None   Principal Problem:  Acute appendicitis [K35.80]                 Active Insurance as of 3/17/2020     Primary Coverage     Payor Plan Insurance Group Employer/Plan Group    ANTHEM BLUE CROSS ANTHEM BLUE CROSS BLUE SHIELD PPO [id number]     Payor Plan Address Payor Plan Phone Number Payor Plan Fax Number Effective Dates    [address] [phone]  2020 - None Entered    [place] [address]       Subscriber Name Subscriber Birth Date Member ID       JEANA CHUNG 1975 [id number]                 Emergency Contacts      (Rel.) Home Phone Work Phone Mobile Phone    WADE CHUNG (Spouse) [phone] -- [phone]    Rosa Sloan (Mother) [phone] -- [phone]               Discharge Summary      Kelle Russell MD at [facility]              [facility] Medicine Services  DISCHARGE SUMMARY    Patient Name: Jeana Chung  : 1975  MRN: [id number]    Date of Admission: 3/17/2020 10:07 PM  Date of Discharge:  3/23/2020  Primary Care Physician: Tania Angeles PA-C    Consults     Date and Time Order Name Status Description    3/18/2020 0427 Inpatient General Surgery Consult " Completed     3/18/2020 0427 Inpatient Infectious Diseases Consult Completed           Hospital Course     Presenting Problem:   Acute appendicitis, unspecified acute appendicitis type [K35.80]    Active Hospital Problems    Diagnosis  POA   • **Acute appendicitis [K35.80]  Yes   • Neutropenia (CMS/HCC) [D70.9]  Yes   • Open wound of right foot [S91.301A]  Yes   • Hyponatremia [E87.1]  Yes   • Pancytopenia (CMS/HCC) [D61.818]  Unknown   • Stage 3 chronic kidney disease (CMS/HCC) [N18.3]  Yes   • Endometrial cancer (CMS/HCC) [C54.1]  Yes   • Iron deficiency anemia [D50.9]  Yes   • Type 2 diabetes mellitus with diabetic polyneuropathy, with long-term current use of insulin (CMS/HCC) [E11.42, Z79.4]  Not Applicable   • Essential hypertension [I10]  Yes      Resolved Hospital Problems   No resolved problems to display.          Hospital Course:  Jeana Chung is a 44 y.o. female with history of endometrial cancer on chemotherapy (last dose 3/10), CKD, T2DM, recent REGINALDO, BSO and omentectomy 2/11/20 with post-operative wound dehiscence. Patient was initially directed to the ED after being seen in [facility] clinic with new foot ulcer. Found also to have fever, fatigue, weakness and RLQ abdominal pain. CT scan in hospital workup revealed acute appencdicitis. Patient admitted to hospitalist service. Surgery also following and underwent appendectomy 3/21 with Dr Myers. ID is also following for assistance with abx. Patient did well post-operatively and noted to be discharged in stable condition 3/23.           Sepsis   Neutropenic fever   Acute appendicitis  Neutropenia/pancytopenia in setting of known endometrial cancer on chemotherapy  ---Hospital course as above. Patient was continued on IV zosyn, doing well post operatively, patient will continue IV zosyn upon discharge via PICC line and home infusions with home health. Patient will be seen closely in follow up as above.  ---post-op care per general surgery,  patient will have close f/u with Dr Myers as above.   ---patient will have labs checked Monday 3/30 with HH.     Right foot ulcer  ---abx as above, ID/WOC followed during admission. Patient will be followed by HH at time of discharge     T2DM  ---patient to continue her home regimen at time of discharge without changes    Endometrial cancer  ----follow up with Dr Rubio as scheduled       Discharge Follow Up Recommendations for outpatient labs/diagnostics:  PCP 1-2 weeks  [facility], Dr De La Rosa, [facility] will f/u in 1 week- on Monday 3/30 for labs, patient will then be seen by Dr De La Rosa via telemedicine shortly thereafter  Dr Myers, 3 weeks  Dr Rubio, as scheduled    Day of Discharge     HPI:   Patient doing well.  Pain controlled  Ready for discharge.     Review of Systems  Gen- No fevers, chills  CV- No chest pain, palpitations  Resp- No cough, dyspnea  GI- No N/V/D, abd pain        Vital Signs:   Temp:  [97.3 °F (36.3 °C)-97.9 °F (36.6 °C)] 97.9 °F (36.6 °C)  Heart Rate:  [79-84] 79  Resp:  [17-18] 18  BP: (103-108)/(63-65) 108/65     Physical Exam:    GEN- no acute distress noted, resting in bed, awake  HEENT- atraumatic, normocephlic, eomi  NECK- supple, trachea midline, no masses  RESP: ctab, normal effort  CV: no murmurs, s1/s2, rrr  GI: appropriate post-op tenderness, drain in place with serosanginous fluid  MSK: no edema noted, spontaneous movement of all extremities  NEURO: alert, oriented, no focal deficits  SKIN: no rashes  PSYCH: appropriate mood and affect   Pertinent  and/or Most Recent Results     Results from last 7 days   Lab Units 03/23/20  0435 03/22/20  0410 03/21/20  0531 03/20/20  0449 03/19/20  0502 03/18/20  0605 03/17/20  2301   WBC 10*3/mm3 5.64 3.24* 1.95* 0.96* 0.74* 0.76* 0.85*   HEMOGLOBIN g/dL 8.2* 8.7* 7.3* 7.4* 7.3* 7.9* 8.3*   HEMATOCRIT % 26.7* 28.3* 24.4* 24.2* 24.1* 25.4* 26.5*   PLATELETS 10*3/mm3 74* 74* 47* 65* 65* 77* 73*   SODIUM mmol/L  --   --   --  134* 133*  130* 129*   POTASSIUM mmol/L  --   --   --  3.9 4.0 3.8 4.3   CHLORIDE mmol/L  --   --   --  99 100 98 95*   CO2 mmol/L  --   --   --  22.0 23.0 22.0 24.0   BUN mg/dL  --   --   --  11 14 18 23*   CREATININE mg/dL  --   --   --  0.73 0.76 0.77 0.98   GLUCOSE mg/dL  --   --   --  103* 97 170* 241*   CALCIUM mg/dL  --   --   --  7.9* 8.0* 8.2* 8.5*     Results from last 7 days   Lab Units 03/21/20  0531 03/18/20  0605 03/17/20  2301   BILIRUBIN mg/dL  --   --  0.7   ALK PHOS U/L  --   --  49   ALT (SGPT) U/L  --   --  9   AST (SGOT) U/L  --   --  12   PROTIME Seconds 14.4* 14.1*  --    INR  1.14 1.11  --    APTT seconds  --  44.4*  --            Invalid input(s): TG, LDLCALC, LDLREALC  Results from last 7 days   Lab Units 03/17/20  2301   LACTATE mmol/L 1.6       Brief Urine Lab Results  (Last result in the past 365 days)      Color   Clarity   Blood   Leuk Est   Nitrite   Protein   CREAT   Urine HCG        03/17/20 2345 Yellow Clear Negative Small (1+) Negative Negative               Microbiology Results Abnormal     Procedure Component Value - Date/Time    Blood Culture - Blood, Arm, Left [[id number]] Collected:  03/18/20 0322    Lab Status:  Final result Specimen:  Blood from Arm, Left Updated:  03/23/20 0530     Blood Culture No growth at 5 days    Blood Culture - Blood, Arm, Right [[id number]] Collected:  03/18/20 0322    Lab Status:  Final result Specimen:  Blood from Arm, Right Updated:  03/23/20 0530     Blood Culture No growth at 5 days          Imaging Results (All)     Procedure Component Value Units Date/Time    CT Abdomen Pelvis Without Contrast [[id number]] Collected:  03/18/20 0112     Updated:  03/18/20 0114    Narrative:       CT Abdomen Pelvis WO    INDICATION:   44-year-old female with fever and lower abdominal pain today. History of endometrial cancer.    TECHNIQUE:   CT of the abdomen and pelvis without IV contrast. Coronal and sagittal reconstructions were obtained.  Radiation dose reduction  techniques included automated exposure control or exposure modulation based on body size. Count of known CT and cardiac nuc  med studies performed in previous 12 months: 3.     COMPARISON:   CT abdomen pelvis 1/30/2020    FINDINGS:  Visualized lung bases are unremarkable.    Abdomen:   The liver is within normal limits. Splenomegaly again noted. The pancreas is within normal limits. The gallbladder is normal. Both adrenal glands are normal. The kidneys are normal. Abdominal aorta normal in course and caliber. Small bowel is  unremarkable without obstruction. The appendix is enlarged, measuring 1.3 cm in diameter. There is periappendiceal inflammatory stranding. The findings appear most consistent with acute appendicitis. No drainable fluid collections. No free  intraperitoneal air. The colon is unremarkable. Ventral lower abdominal wound from recent surgery.    Pelvis:   The urinary bladder is unremarkable.  Hysterectomy. No free pelvic fluid.    No acute osseous abnormality.        Impression:         1. Acute appendicitis. No evidence of perforation or abscess.  2. Postsurgical changes from recent hysterectomy.  3. Splenomegaly.      NOTIFICATION: Critical Value/emergent results were called by telephone at the time of interpretation on 3/18/2020 1:09 AM to BENSON Hughes who verbally acknowledged these results.      Signer Name: Prasanna Zapata MD   Signed: 3/18/2020 1:12 AM   Workstation Name: [email or web address]    [facility]    XR Chest 1 View [[id number]] Collected:  03/18/20 0002     Updated:  03/18/20 0004    Narrative:       CR Chest 1 Vw    INDICATION:   44-year-old female with fever today. History of stage III endometrial cancer. Recent chemotherapy.     COMPARISON:    Chest 2/7/2020    FINDINGS:  Single portable AP view(s) of the chest.  The heart and mediastinal contours are normal. The lungs are clear. No pneumothorax or pleural effusion.      Impression:       No acute cardiopulmonary  findings.    Signer Name: Prasanna Zapata MD   Signed: 3/18/2020 12:02 AM   Workstation Name: [facility]-    [facility]                         Plan for Follow-up of Pending Labs/Results: [facility] Dr Rubio   Order Current Status    Tissue Pathology Exam In process        Discharge Details        Discharge Medications      New Medications      Instructions Start Date   HYDROcodone-acetaminophen 5-325 MG per tablet  Commonly known as:  NORCO   1 tablet, Oral, Every 4 Hours PRN      piperacillin-tazobactam 3-0.375 GM/50ML IVPB  Commonly known as:  ZOSYN   3.375 g, Intravenous, Every 8 Hours         Changes to Medications      Instructions Start Date   cholecalciferol 1.25 MG (14628 UT) capsule  Commonly known as:  VITAMIN D3  What changed:  additional instructions   50,000 Units, Oral, Weekly         Continue These Medications      Instructions Start Date   acetaminophen 325 MG tablet  Commonly known as:  TYLENOL   650 mg, Oral, Every 6 Hours Scheduled      dexamethasone 4 MG tablet  Commonly known as:  DECADRON   Take 5 tabs the night before chemo then take 2 tabs in the morning daily on days 2, 3 & 4.  Take with food.      docusate sodium 250 MG capsule  Commonly known as:  COLACE   250 mg, Oral, 2 Times Daily      FreeStyle Michelle Sensor System   Does not apply, Every 14 Days      gabapentin 300 MG capsule  Commonly known as:  NEURONTIN   300 mg, Oral, 3 Times Daily      Insulin Glargine 100 UNIT/ML injection pen  Commonly known as:  BASAGLAR KWIKPEN   40 Units, Subcutaneous, Nightly      insulin lispro 100 UNIT/ML injection  Commonly known as:  HumaLOG   15 Units, Subcutaneous, 3 Times Daily Before Meals      LORazepam 1 MG tablet  Commonly known as:  Ativan   1 mg, Oral, Every 6 Hours PRN      MIRALAX PO   17 g, Oral, Daily      MULTIPLE VITAMIN PO   Multiple Vitamin      omeprazole 20 MG capsule  Commonly known as:  PrilOSEC   20 mg, Oral,  "Daily      ondansetron 4 MG tablet  Commonly known as:  ZOFRAN   4 mg, Oral, Every 6 Hours PRN      ondansetron ODT 8 MG disintegrating tablet  Commonly known as:  ZOFRAN-ODT   8 mg, Oral, Every 8 Hours PRN      promethazine 25 MG tablet  Commonly known as:  PHENERGAN   25 mg, Oral, Every 6 Hours PRN         Stop These Medications    traMADol 50 MG tablet  Commonly known as:  ULTRAM            Allergies   Allergen Reactions   • Bactrim [Sulfamethoxazole-Trimethoprim] Anaphylaxis   • Penicillins Hives   • Promethazine Mental Status Change         Discharge Disposition:  Home or Self Care    Diet:  Hospital:  Diet Order   Procedures   • Diet Full Liquid; Consistent Carbohydrate       Activity:  As tolerated    Restrictions or Other Recommendations:  Per Dr Myers- \"leave drain in place until 10 days post-operatively to ensure that she is eating and this area is without leak or abscess formation. Once the drain is out all she would need to do is cover this with a dry gauze and allow this to heal down.  She may sponge bathe for now with the drain and PICC line in place.\"       CODE STATUS:    Code Status and Medical Interventions:   Ordered at: 03/18/20 0403     Level Of Support Discussed With:    Patient     Code Status:    CPR     Medical Interventions (Level of Support Prior to Arrest):    Full       Future Appointments   Date Time Provider Department Center   3/30/2020  1:45 PM Mary Kay Bell, PT [facility]   4/1/2020  9:30 AM Celine Rubio MD [facility] [place]   4/1/2020 10:30 AM CHAIR [facility] [place] [place]   4/2/2020  1:00 PM Leidy Gilliam, PT [facility] [place]   4/9/2020  2:00 PM Tania Angeles PA-C [facility] None       Additional Instructions for the Follow-ups that You Need to Schedule     Discharge Follow-up with PCP   As directed       Currently Documented PCP:    Tania Angeles PA-C    PCP Phone Number:    [phone]     Follow Up Details:  1-2 weeks         Discharge " Follow-up with Specified Provider: Dr Myers; 3 Weeks   As directed      To:  Dr Myers    Follow Up:  3 Weeks         Discharge Follow-up with Specified Provider: Dr De La Rosa, via telemedicine   As directed      To:  Dr De La Rosa, via telemedicine         Discharge Follow-up with Specified Provider: Dr peace; 2 Weeks   As directed      To:  Dr peace    Follow Up:  2 Weeks               Time Spent on Discharge:  45 minutes    Electronically signed by Kelle Russell MD, 03/23/20, 12:49 PM.        Electronically signed by Kelle Russell MD at 03/23/20 6256

## 2020-03-24 NOTE — OUTREACH NOTE
Call Center TCM Note      Responses   [facility] patient discharged from?  [place]   Does the patient have one of the following disease processes/diagnoses(primary or secondary)?  General Surgery   TCM attempt successful?  Yes   Call start time  1156   Call end time  1159   Discharge diagnosis  Lap appy, endometrial CA on chemo    Medication alerts for this patient  outpatient IV abx at [facility]   Meds reviewed with patient/caregiver?  Yes   Is the patient having any side effects they believe may be caused by any medication additions or changes?  No   Does the patient have all medications related to this admission filled (includes all antibiotics, pain medications, etc.)  Yes   Is the patient taking all medications as directed (includes completed medication regime)?  Yes   Does the patient have a follow up appointment scheduled with their surgeon?  Yes   Has the patient kept scheduled appointments due by today?  Yes   Comments  f/u with Tania Angeles PA-C on 4/9 at 2 pm   Psychosocial issues?  No   Did the patient receive a copy of their discharge instructions?  Yes   Nursing interventions  Reviewed instructions with patient   What is the patient's perception of their health status since discharge?  Improving   Is the patient/caregiver able to teach back the hierarchy of who to call/visit for symptoms/problems? PCP, Specialist, Home health nurse, Urgent Care, ED, 911  Yes   TCM call completed?  Yes   Wrap up additional comments  Patient states she is doing well, she is getting outpatient IV antibiotics, confirmed appt on 4/9/          Eladia Fisher, RN    3/24/2020, 11:59

## 2020-03-25 ENCOUNTER — NURSE TRIAGE (OUTPATIENT)
Dept: CALL CENTER | Facility: HOSPITAL | Age: 45
End: 2020-03-25

## 2020-03-25 NOTE — TELEPHONE ENCOUNTER
"    Reason for Disposition  • [1] Patient is improved AND [2] caller has simple question that triager can answer    Additional Information  • Negative: Sounds like a life-threatening emergency to the triager  • Negative: Discharged in past month from the hospital with a diagnosis of chronic obstructive pulmonic disease (COPD)  • Negative: Discharged in past month from the hospital with a diagnosis of congestive heart failure (CHF) or heart failure (HF)  • Negative: Discharged in past month from the hospital with a diagnosis of heart attack (myocardial infarction)  • Negative: Discharged in past month from the hospital with a diagnosis of pneumonia  • Negative: [1] Post-op AND [2] incision symptoms or questions  • Negative: [1] Post-op AND [2] general symptoms or questions  • Negative: Pain, redness, swelling, or pus at IV Site  • Negative: IV not running or running slowly  • Negative: Symptoms arising from use of a urinary catheter (Reyna or Coude)  • Negative: Medication question  • Negative: [1] New symptom AND [2] not a possible complication of hospitalized condition  • Negative: Patient sounds very sick or weak to the triager  • Negative: Sounds like a serious complication to the triager  • Negative: [1] Caller has URGENT question AND [2] triager unable to answer question  • Negative: [1] Discharged from hospital within this past week AND [2] taking Coumadin (warfarin) AND [3] no INR testing performed within 5 days of discharge  • Negative: [1] Caller has NON-URGENT question AND [2] triager unable to answer question    Answer Assessment - Initial Assessment Questions  1. MAIN CONCERN OR SYMPTOM:  \"What is your main concern right now?\" \"What question do you have?\" \"What's the main symptom you're worried about?\" (e.g., breathing difficulty, ankle swelling, weight gain.)      Caller had recent lap appe and had a sore spot above incision site.  2. ONSET: \"When did the  *No Answer*  start?\"      The spot was there " "upon discharge.  3. BETTER-SAME-WORSE: \"Are you getting better, staying the same, or getting worse compared to the day you were discharged?\"      Caller is describing no pain.  4. HOSPITALIZATION: \"How long were you hospitalized?\" (e.g., days)      6 days.  5. DISCHARGE DIAGNOSIS:  \"What problem or disease were you hospitalized for?\"      Caller had her appendix out.  6. DISCHARGE DATE: \"What date were you discharged from the hospital?\"      March 23, 2020.  7. DISCHARGE DOCTOR: \"Who is the main doctor taking care of you now?\"      Tania Angeles  8. DISCHARGE APPOINTMENT: \"Have you scheduled a follow-up discharge appointment with your doctor?\"      yes  9. DISCHARGE MEDICATIONS: \"Did the physician who discharged you order any new medications for you to use? If yes, have you filled the prescription and started taking the medication?\"       Caller has all d/c medications.  10. PAIN: \"Is there any pain?\" If so, ask: \"How bad is it?\"  (Scale 1-10; or mild, moderate, severe)        Caller reports no pain.  11. FEVER: \"Do you have a fever?\" If so, ask: \"What is it, how was it measured  and when did it start?\"        None reported.  12. OTHER SYMPTOMS: \"Do you have any other symptoms?\"        *No Answer*    Protocols used: POST-HOSPITALIZATION FOLLOW-UP CALL-ADULT-      "

## 2020-03-30 ENCOUNTER — TELEPHONE (OUTPATIENT)
Dept: GYNECOLOGIC ONCOLOGY | Facility: CLINIC | Age: 45
End: 2020-03-30

## 2020-03-30 ENCOUNTER — LAB REQUISITION (OUTPATIENT)
Dept: LAB | Facility: HOSPITAL | Age: 45
End: 2020-03-30

## 2020-03-30 DIAGNOSIS — Z79.4 TYPE 2 DIABETES MELLITUS WITH DIABETIC POLYNEUROPATHY, WITH LONG-TERM CURRENT USE OF INSULIN (HCC): Primary | ICD-10-CM

## 2020-03-30 DIAGNOSIS — C54.1 ENDOMETRIAL CANCER (HCC): ICD-10-CM

## 2020-03-30 DIAGNOSIS — E11.42 TYPE 2 DIABETES MELLITUS WITH DIABETIC POLYNEUROPATHY, WITH LONG-TERM CURRENT USE OF INSULIN (HCC): Primary | ICD-10-CM

## 2020-03-30 DIAGNOSIS — D70.9 NEUTROPENIA, UNSPECIFIED (HCC): ICD-10-CM

## 2020-03-30 LAB
ALBUMIN SERPL-MCNC: 2.5 G/DL (ref 3.5–5.2)
ALBUMIN/GLOB SERPL: 0.5 G/DL
ALP SERPL-CCNC: 171 U/L (ref 39–117)
ALT SERPL W P-5'-P-CCNC: 8 U/L (ref 1–33)
ANION GAP SERPL CALCULATED.3IONS-SCNC: 16 MMOL/L (ref 5–15)
AST SERPL-CCNC: 22 U/L (ref 1–32)
BILIRUB SERPL-MCNC: 0.3 MG/DL (ref 0.2–1.2)
BUN BLD-MCNC: 8 MG/DL (ref 6–20)
BUN/CREAT SERPL: 12.5 (ref 7–25)
CALCIUM SPEC-SCNC: 8.8 MG/DL (ref 8.6–10.5)
CHLORIDE SERPL-SCNC: 96 MMOL/L (ref 98–107)
CO2 SERPL-SCNC: 23 MMOL/L (ref 22–29)
CREAT BLD-MCNC: 0.64 MG/DL (ref 0.57–1)
CRP SERPL-MCNC: 7.03 MG/DL (ref 0–0.5)
DEPRECATED RDW RBC AUTO: 48 FL (ref 37–54)
ERYTHROCYTE [DISTWIDTH] IN BLOOD BY AUTOMATED COUNT: 16.8 % (ref 12.3–15.4)
ERYTHROCYTE [SEDIMENTATION RATE] IN BLOOD: 60 MM/HR (ref 0–20)
GFR SERPL CREATININE-BSD FRML MDRD: 100 ML/MIN/1.73
GLOBULIN UR ELPH-MCNC: 5.1 GM/DL
GLUCOSE BLD-MCNC: 168 MG/DL (ref 65–99)
HCT VFR BLD AUTO: 30.1 % (ref 34–46.6)
HGB BLD-MCNC: 9.3 G/DL (ref 12–15.9)
MCH RBC QN AUTO: 27.4 PG (ref 26.6–33)
MCHC RBC AUTO-ENTMCNC: 30.9 G/DL (ref 31.5–35.7)
MCV RBC AUTO: 88.5 FL (ref 79–97)
PLATELET # BLD AUTO: 107 10*3/MM3 (ref 140–450)
PMV BLD AUTO: 11.1 FL (ref 6–12)
POTASSIUM BLD-SCNC: 4.4 MMOL/L (ref 3.5–5.2)
PROT SERPL-MCNC: 7.6 G/DL (ref 6–8.5)
RBC # BLD AUTO: 3.4 10*6/MM3 (ref 3.77–5.28)
SODIUM BLD-SCNC: 135 MMOL/L (ref 136–145)
WBC NRBC COR # BLD: 10.12 10*3/MM3 (ref 3.4–10.8)

## 2020-03-30 PROCEDURE — 86140 C-REACTIVE PROTEIN: CPT | Performed by: INTERNAL MEDICINE

## 2020-03-30 PROCEDURE — 85027 COMPLETE CBC AUTOMATED: CPT | Performed by: INTERNAL MEDICINE

## 2020-03-30 PROCEDURE — 85652 RBC SED RATE AUTOMATED: CPT | Performed by: INTERNAL MEDICINE

## 2020-03-30 PROCEDURE — 80053 COMPREHEN METABOLIC PANEL: CPT | Performed by: INTERNAL MEDICINE

## 2020-03-30 NOTE — TELEPHONE ENCOUNTER
Spoke with pt.  She has an abscess at her appendectomy incision.  She was seen for this at providing surgeons office.  Discussed with Dr. Rubio.  Pt will need to be cleared by ID prior to starting chemotherapy.  Pt verbalized understanding and will call our office with update in one week or sooner with any questions or concerns. Chemotherapy moved from 04/01/2020 to 04/08/2020.

## 2020-04-01 ENCOUNTER — READMISSION MANAGEMENT (OUTPATIENT)
Dept: CALL CENTER | Facility: HOSPITAL | Age: 45
End: 2020-04-01

## 2020-04-01 ENCOUNTER — TELEPHONE (OUTPATIENT)
Dept: GYNECOLOGIC ONCOLOGY | Facility: CLINIC | Age: 45
End: 2020-04-01

## 2020-04-01 ENCOUNTER — APPOINTMENT (OUTPATIENT)
Dept: ONCOLOGY | Facility: HOSPITAL | Age: 45
End: 2020-04-01

## 2020-04-01 DIAGNOSIS — K35.890 OTHER ACUTE APPENDICITIS: ICD-10-CM

## 2020-04-01 DIAGNOSIS — Z79.4 TYPE 2 DIABETES MELLITUS WITH DIABETIC POLYNEUROPATHY, WITH LONG-TERM CURRENT USE OF INSULIN (HCC): ICD-10-CM

## 2020-04-01 DIAGNOSIS — S91.301D OPEN WOUND OF RIGHT FOOT, SUBSEQUENT ENCOUNTER: Primary | ICD-10-CM

## 2020-04-01 DIAGNOSIS — E11.42 TYPE 2 DIABETES MELLITUS WITH DIABETIC POLYNEUROPATHY, WITH LONG-TERM CURRENT USE OF INSULIN (HCC): ICD-10-CM

## 2020-04-01 DIAGNOSIS — C54.1 ENDOMETRIAL CANCER (HCC): ICD-10-CM

## 2020-04-01 NOTE — TELEPHONE ENCOUNTER
Celine Rubio MD    Pt called about an order that was suppose to be sent   Pt would like for the nurse to call her back [phone]    Thanks

## 2020-04-01 NOTE — OUTREACH NOTE
General Surgery Week 2 Survey      Responses   [facility] patient discharged from?  [place]   Does the patient have one of the following disease processes/diagnoses(primary or secondary)?  General Surgery   Week 2 attempt successful?  Yes   Call start time  1727   Call end time  1732   General alerts for this patient  RN   Discharge diagnosis  Lap appy, endometrial CA on chemo    Meds reviewed with patient/caregiver?  Yes   Is the patient having any side effects they believe may be caused by any medication additions or changes?  No   Does the patient have all medications related to this admission filled (includes all antibiotics, pain medications, etc.)  Yes   Is the patient taking all medications as directed (includes completed medication regime)?  Yes   Does the patient have a follow up appointment scheduled with their surgeon?  Yes   Has the patient kept scheduled appointments due by today?  Yes   What is the Home health agency?   [facility]- 1 time visit for labs    Has home health visited the patient within 72 hours of discharge?  N/A   Psychosocial issues?  No   Did the patient receive a copy of their discharge instructions?  Yes   Nursing interventions  Reviewed instructions with patient   What is the patient's perception of their health status since discharge?  Improving   Nursing interventions  Nurse provided patient education   Is the patient /caregiver able to teach back basic post-op care?  Lifting as instructed by MD in discharge instructions, Keep incision areas clean,dry and protected, No tub bath, swimming, or hot tub until instructed by MD, Take showers only when approved by MD-sponge bathe until then   Is the patient/caregiver able to teach back signs and symptoms of incisional infection?  Fever, Increased drainage or bleeding, Increased redness, swelling or pain at the incisonal site, Incisional warmth, Pus or odor from incision   Is the patient/caregiver able to teach back steps to  recovery at home?  Eat a well-balance diet, Rest and rebuild strength, gradually increase activity, Set small, achievable goals for return to baseline health   Is the patient/caregiver able to teach back the hierarchy of who to call/visit for symptoms/problems? PCP, Specialist, Home health nurse, Urgent Care, ED, 911  Yes   Week 2 call completed?  Yes          Kyle Weems RN

## 2020-04-02 NOTE — TELEPHONE ENCOUNTER
Wound care already ordered.  Was placed on hold while pt listed as inpatient.  New order placed. Pt notified and she v/u.

## 2020-04-07 ENCOUNTER — TELEPHONE (OUTPATIENT)
Dept: GYNECOLOGIC ONCOLOGY | Facility: CLINIC | Age: 45
End: 2020-04-07

## 2020-04-07 ENCOUNTER — TRANSCRIBE ORDERS (OUTPATIENT)
Dept: LAB | Facility: HOSPITAL | Age: 45
End: 2020-04-07

## 2020-04-07 ENCOUNTER — LAB (OUTPATIENT)
Dept: LAB | Facility: HOSPITAL | Age: 45
End: 2020-04-07

## 2020-04-07 ENCOUNTER — HOSPITAL ENCOUNTER (OUTPATIENT)
Dept: PHYSICAL THERAPY | Facility: HOSPITAL | Age: 45
Setting detail: THERAPIES SERIES
Discharge: HOME | End: 2020-04-07

## 2020-04-07 ENCOUNTER — READMISSION MANAGEMENT (OUTPATIENT)
Dept: CALL CENTER | Facility: HOSPITAL | Age: 45
End: 2020-04-07

## 2020-04-07 DIAGNOSIS — T81.31XD POSTOPERATIVE WOUND DEHISCENCE, SUBSEQUENT ENCOUNTER: Primary | ICD-10-CM

## 2020-04-07 DIAGNOSIS — S91.301A OPEN WOUND OF RIGHT FOOT, INITIAL ENCOUNTER: ICD-10-CM

## 2020-04-07 DIAGNOSIS — K35.80 ACUTE APPENDICITIS WITHOUT PERITONITIS: Primary | ICD-10-CM

## 2020-04-07 DIAGNOSIS — K35.80 ACUTE APPENDICITIS WITHOUT PERITONITIS: ICD-10-CM

## 2020-04-07 DIAGNOSIS — S31.109D OPEN WOUND OF ABDOMEN, SUBSEQUENT ENCOUNTER: ICD-10-CM

## 2020-04-07 LAB
ALBUMIN SERPL-MCNC: 3.6 G/DL (ref 3.5–5.2)
ALBUMIN/GLOB SERPL: 0.6 G/DL
ALP SERPL-CCNC: 101 U/L (ref 39–117)
ALT SERPL W P-5'-P-CCNC: 9 U/L (ref 1–33)
ANION GAP SERPL CALCULATED.3IONS-SCNC: 13 MMOL/L (ref 5–15)
AST SERPL-CCNC: 24 U/L (ref 1–32)
BASOPHILS # BLD AUTO: 0.06 10*3/MM3 (ref 0–0.2)
BASOPHILS NFR BLD AUTO: 1.1 % (ref 0–1.5)
BILIRUB SERPL-MCNC: 0.7 MG/DL (ref 0.2–1.2)
BUN BLD-MCNC: 11 MG/DL (ref 6–20)
BUN/CREAT SERPL: 15.3 (ref 7–25)
CALCIUM SPEC-SCNC: 9.7 MG/DL (ref 8.6–10.5)
CHLORIDE SERPL-SCNC: 97 MMOL/L (ref 98–107)
CO2 SERPL-SCNC: 26 MMOL/L (ref 22–29)
CREAT BLD-MCNC: 0.72 MG/DL (ref 0.57–1)
CRP SERPL-MCNC: 1.98 MG/DL (ref 0–0.5)
CYTO UR: NORMAL
DACRYOCYTES BLD QL SMEAR: NORMAL
DEPRECATED RDW RBC AUTO: 67.6 FL (ref 37–54)
EOSINOPHIL # BLD AUTO: 0 10*3/MM3 (ref 0–0.4)
EOSINOPHIL NFR BLD AUTO: 0 % (ref 0.3–6.2)
ERYTHROCYTE [DISTWIDTH] IN BLOOD BY AUTOMATED COUNT: 21.4 % (ref 12.3–15.4)
ERYTHROCYTE [SEDIMENTATION RATE] IN BLOOD: 62 MM/HR (ref 0–20)
GFR SERPL CREATININE-BSD FRML MDRD: 87 ML/MIN/1.73
GLOBULIN UR ELPH-MCNC: 5.8 GM/DL
GLUCOSE BLD-MCNC: 247 MG/DL (ref 65–99)
HCT VFR BLD AUTO: 34 % (ref 34–46.6)
HGB BLD-MCNC: 10.4 G/DL (ref 12–15.9)
IMM GRANULOCYTES # BLD AUTO: 0.02 10*3/MM3 (ref 0–0.05)
IMM GRANULOCYTES NFR BLD AUTO: 0.4 % (ref 0–0.5)
LAB AP CARIS, ADDENDUM: NORMAL
LAB AP CASE REPORT: NORMAL
LAB AP CLINICAL INFORMATION: NORMAL
LAB AP SPECIAL STAINS: NORMAL
LYMPHOCYTES # BLD AUTO: 1.35 10*3/MM3 (ref 0.7–3.1)
LYMPHOCYTES NFR BLD AUTO: 24.2 % (ref 19.6–45.3)
Lab: NORMAL
MCH RBC QN AUTO: 27.4 PG (ref 26.6–33)
MCHC RBC AUTO-ENTMCNC: 30.6 G/DL (ref 31.5–35.7)
MCV RBC AUTO: 89.5 FL (ref 79–97)
MONOCYTES # BLD AUTO: 0.39 10*3/MM3 (ref 0.1–0.9)
MONOCYTES NFR BLD AUTO: 7 % (ref 5–12)
NEUTROPHILS # BLD AUTO: 3.75 10*3/MM3 (ref 1.7–7)
NEUTROPHILS NFR BLD AUTO: 67.3 % (ref 42.7–76)
NRBC BLD AUTO-RTO: 0 /100 WBC (ref 0–0.2)
OVALOCYTES BLD QL SMEAR: NORMAL
PATH REPORT.FINAL DX SPEC: NORMAL
PATH REPORT.GROSS SPEC: NORMAL
PLAT MORPH BLD: NORMAL
PLATELET # BLD AUTO: 180 10*3/MM3 (ref 140–450)
PMV BLD AUTO: 9.9 FL (ref 6–12)
POTASSIUM BLD-SCNC: 4 MMOL/L (ref 3.5–5.2)
PROT SERPL-MCNC: 9.4 G/DL (ref 6–8.5)
RBC # BLD AUTO: 3.8 10*6/MM3 (ref 3.77–5.28)
SODIUM BLD-SCNC: 136 MMOL/L (ref 136–145)
WBC MORPH BLD: NORMAL
WBC NRBC COR # BLD: 5.57 10*3/MM3 (ref 3.4–10.8)

## 2020-04-07 PROCEDURE — 86140 C-REACTIVE PROTEIN: CPT

## 2020-04-07 PROCEDURE — 97163 PT EVAL HIGH COMPLEX 45 MIN: CPT

## 2020-04-07 PROCEDURE — 97597 DBRDMT OPN WND 1ST 20 CM/<: CPT

## 2020-04-07 PROCEDURE — 80053 COMPREHEN METABOLIC PANEL: CPT

## 2020-04-07 PROCEDURE — 36415 COLL VENOUS BLD VENIPUNCTURE: CPT

## 2020-04-07 PROCEDURE — 85007 BL SMEAR W/DIFF WBC COUNT: CPT

## 2020-04-07 PROCEDURE — 85025 COMPLETE CBC W/AUTO DIFF WBC: CPT

## 2020-04-07 PROCEDURE — 85652 RBC SED RATE AUTOMATED: CPT

## 2020-04-07 NOTE — THERAPY EVALUATION
Outpatient Rehabilitation - Wound/Debridement Initial Eval   [place]     Patient Name: Jeana Pollack Juliet RN  : 1975  MRN: [id number]  Today's Date: 2020                  Admit Date: 2020    Visit Dx:    ICD-10-CM ICD-9-CM   1. Postoperative wound dehiscence, subsequent encounter T81.31XD V58.89     998.32   2. Open wound of abdomen, subsequent encounter S31.109D V58.89     879.2   3. Open wound of right foot, initial encounter S91.301A 892.0             Patient Active Problem List   Diagnosis   • Acute stress reaction with predominately emotional disturbance   • Sleep disturbance   • Cancer related pain   • Moderate episode of recurrent major depressive disorder (CMS/HCC)   • Anxiety   • Iron deficiency anemia   • Type 2 diabetes mellitus with diabetic polyneuropathy, with long-term current use of insulin (CMS/HCC)   • Essential hypertension   • Heart murmur   • Family history of cardiac disorder in mother   • Endometrial cancer (CMS/HCC)   • Stage 3 chronic kidney disease (CMS/HCC)   • Gastroesophageal reflux disease   • Amputation of left great toe (CMS/HCC)   • Amputation of right great toe (CMS/HCC)   • Neuropathy   • Hypotension due to drugs   • Neutropenia (CMS/HCC)   • Acute appendicitis   • Open wound of right foot   • Hyponatremia   • Pancytopenia (CMS/HCC)        Past Medical History:   Diagnosis Date   • Anemia    • Anxiety and depression    • Back pain    • Bronchitis    • Diabetes (CMS/HCC)     DX 4-5 YRS AGO, CHECKS BS 4X/DAY, LAST A1C 7.1%   • Endometrial cancer (CMS/HCC)     STAGE II   • GERD (gastroesophageal reflux disease)    • Hypertension    • MRSA (methicillin resistant staph aureus) culture positive 2018    S/P NECROTIZING FASCITIS IN BILATERAL FEET   • Necrotizing fasciitis (CMS/HCC)    • PCOS (polycystic ovarian syndrome)    • PTSD (post-traumatic stress disorder)    • Retinopathy 3/18/2020   • Sepsis (CMS/HCC)    • Stage 3 chronic kidney disease (CMS/HCC)  2/24/2020   • Subconjunctival hemorrhage         Past Surgical History:   Procedure Laterality Date   • APPENDECTOMY N/A 3/21/2020    Procedure: APPENDECTOMY LAPAROSCOPIC;  Surgeon: Praful Myers MD;  Location: [facility] OR;  Service: General;  Laterality: N/A;   • EXPLORATORY LAPAROTOMY, TOTAL ABDOMINAL HYSTERECTOMY SALPINGO OOPHORECTOMY N/A 2/10/2020    Procedure: EXPLORATORY LAPAROTOMY, TOTAL ABDOMINAL HYSTERECTOMY, BILATERAL SALPINGO-OOPHORECTOMY WITH OPTIMAL  STAGING (R-0), OMENTECTOMY;  Surgeon: Celine Rubio MD;  Location: [facility] OR;  Service: Gynecology Oncology;  Laterality: N/A;   • EYE SURGERY Left     BLOOD VESSEL IN EYE REPAIR   • TOE AMPUTATION Left 06/2019    big toe - unhealing sore   • TOE AMPUTATION Right 2018    big toe and second toe - Necrotizing fasciitis and MRSA        Patient History     Row Name 04/07/20 1100             History    Chief Complaint  Ulcer, wound or other skin conditions  -MF      Brief Description of Current Complaint  Pt with new abcess to R upper abdomen and limited healing noted to abdomen wound and R great toe amputation site.   -      Patient/Caregiver Goals  Heal wound  -      Patient's Rating of General Health  Good  -      How has patient tried to help current problem?  continuation of dressing while in hospital and nugauze packing to RUQ packing  -         Pain     Pain Location  Abdomen  -      Pain at Present  4  -         Fall Risk Assessment    Any falls in the past year:  No  -         Daily Activities    Primary Language  English  -      Pt Participated in POC and Goals  Yes  -        User Key  (r) = Recorded By, (t) = Taken By, (c) = Cosigned By    Initials Name Provider Type    MF Vinny Lobato, PT Physical Therapist          EVALUATION  PT Ortho     Row Name 04/07/20 1100       Subjective Comments    Subjective Comments  Pt with moderate c/o pain with packing and dressing changes.   -       Subjective Pain    Able to  rate subjective pain?  yes  -MF    Pre-Treatment Pain Level  4  -MF    Post-Treatment Pain Level  4  -MF    Subjective Pain Comment  6/10 with debridement and packing  -MF       Transfers    Sit-Stand Hanson (Transfers)  independent  -MF    Stand-Sit Hanson (Transfers)  independent  -MF    Comment (Transfers)  supine on stretcher  -MF       Gait/Stairs Assessment/Training    Hanson Level (Gait)  independent  -MF      User Key  (r) = Recorded By, (t) = Taken By, (c) = Cosigned By    Initials Name Provider Type     Vinny Lobato PT Physical Therapist        LDA Wound     Row Name 04/07/20 1100             Wound 04/07/20 1100 Right medial foot Diabetic Ulcer    Wound - Properties Group Date first assessed: 04/07/20  - Time first assessed: 1100  -MF Side: Right  -MF Orientation: medial  -MF Location: foot  -MF Primary Wound Type: Diabetic ulc  -MF    Wound Image  Images linked: 1  -MF      Dressing Appearance  intact;moist drainage  -MF      Base  black eschar  -MF      Periwound  intact;dry  -MF      Periwound Temperature  warm  -MF      Periwound Skin Turgor  other (see comments) moderate periwound callus  -MF      Edges  callused  -MF      Wound Length (cm)  1 cm  -MF      Wound Width (cm)  2.5 cm  -MF      Wound Depth (cm)  0.5 cm  -MF      Drainage Characteristics/Odor  serosanguineous  -MF      Drainage Amount  small  -MF      Care, Wound  irrigated with;sterile normal saline;debrided  -MF      Dressing Care, Wound  petroleum-based xeroform with optifoam to cover  -MF      Periwound Care, Wound  dry periwound area maintained  -MF         Wound 04/07/20 1100 Right upper;lateral abdomen Abcess    Wound - Properties Group Date first assessed: 04/07/20  - Time first assessed: 1100  -MF Side: Right  - Orientation: upper;lateral  -MF Location: abdomen  -MF Primary Wound Type: Abcess  -MF    Wound Image  Images linked: 1  -MF      Dressing Appearance  intact;moist drainage  -MF      Base   red;slough;yellow  -MF      Red (%), Wound Tissue Color  70  -MF      Yellow (%), Wound Tissue Color  30  -MF      Periwound  intact;dry  -MF      Periwound Temperature  warm  -MF      Periwound Skin Turgor  firm moderate induration  -MF      Edges  irregular  -MF      Wound Length (cm)  0.7 cm  -MF      Wound Width (cm)  3.5 cm  -MF      Wound Depth (cm)  7 cm  -MF      Drainage Characteristics/Odor  serosanguineous  -MF      Drainage Amount  moderate  -MF      Care, Wound  irrigated with;sterile normal saline;debrided  -MF      Dressing Care, Wound  foam;low-adherent HFBc to pack with covaderm to cover  -MF      Periwound Care, Wound  barrier film applied  -MF         Wound 04/07/20 1100 midline abdomen Incision    Wound - Properties Group Date first assessed: 04/07/20  - Time first assessed: 1100  - Orientation: midline  -MF Location: abdomen  - Primary Wound Type: Incision  -MF    Wound Image  Images linked: 1  -MF      Dressing Appearance  intact;moist drainage  -MF      Base  moist;pink;red;slough;yellow  -MF      Black (%), Wound Tissue Color  0  -MF      Red (%), Wound Tissue Color  90  -MF      Yellow (%), Wound Tissue Color  10  -MF      Periwound  intact;dry  -MF      Periwound Temperature  warm  -MF      Periwound Skin Turgor  soft  -MF      Wound Length (cm)  1.5 cm  -MF      Wound Width (cm)  0.7 cm  -MF      Wound Depth (cm)  4 cm  -MF      Drainage Characteristics/Odor  serosanguineous  -MF      Drainage Amount  moderate  -MF      Care, Wound  irrigated with;sterile normal saline;debrided  -MF      Dressing Care, Wound  foam;low-adherent;silver impregnated HFBc to pack with mepilex Ag foam to cover covderm to secure  -MF        User Key  (r) = Recorded By, (t) = Taken By, (c) = Cosigned By    Initials Name Provider Type    Vinny Colindres, PT Physical Therapist            WOUND DEBRIDEMENT  Total area of Debridement: ~10cm2  Debridement Site 1  Location- Site 1: R upper abdomen    Selective Debridement- Site 1: Wound Surface <20cmsq  Instruments- Site 1: tweezers, scissors  Excised Tissue Description- Site 1: minimum, slough  Bleeding- Site 1: none   Debridement Site 2  Location- Site 2: R foot  Selective Debridement- Site 2: Wound Surface <20cmsq  Instruments- Site 2: scapel, #10, tweezers, scissors  Excised Tissue Description- Site 2: moderate, slough, other (comment)(callused skin )         Therapy Education     Row Name 04/07/20 1100             Therapy Education    Education Details  Pt to cont with dressing management keeping dressings dry and changing daily until next visit.   -MF      Program  Progressed;Reinforced  -MF      How Provided  Verbal;Demonstration  -MF      Provided to  Patient  -MF      Level of Understanding  Verbalized;Teach back education performed  -MF        User Key  (r) = Recorded By, (t) = Taken By, (c) = Cosigned By    Initials Name Provider Type    Vinny Colindres, PT Physical Therapist          Recommendation and Plan  PT Assessment/Plan     Row Name 04/07/20 1100          PT Assessment    Functional Limitations  Other (comment);Performance in self-care ADL wound care  -MF     Impairments  Integumentary integrity;Pain  -MF     Assessment Comments  Pt presents with R foot ulceration, midline abdomen, nonhealing surgical wound, and R upper abdomen abscess.  Pt with moderate eschar / callus to great toe amputation site.  PT was able to debride down to soft skin to help improve healing potential.  R upper abdomen wound with moderate slough to upper portion of wound debrided to help decrease bioburden. abdomen wounds packed with HFBc to help better manage exudate and improve granulation.  Great toe amputation site packed with osmel and covered with xeroform to help soften remaining callus for easier debridement.   -MF     Rehab Potential  Fair  -MF     Patient/caregiver participated in establishment of treatment plan and goals  Yes  -MF     Patient would  benefit from skilled therapy intervention  Yes  -        PT Plan    PT Frequency  1x/week  -     Predicted Duration of Therapy Intervention (Therapy Eval)  6 weeks  -     Planned CPT's?  PT EVAL HIGH COMPLEXITY: 53766;PT YESSENIA DEBRIDE OPEN WOUND UP TO 20 CM: 15774;PT NLFU MIST: 69039;PT NONSELECT DEBRIDE 15 MIN: 47303;PT SELF CARE/MGMT/TRAIN 15 MIN: 84596  -     Physical Therapy Interventions (Optional Details)  wound care;patient/family education  -     PT Plan Comments  Pt to manage dressing changes at home until next week and return for assessment of wounds with further debridement and dressing management.   -       User Key  (r) = Recorded By, (t) = Taken By, (c) = Cosigned By    Initials Name Provider Type    Vinny Colindres, PT Physical Therapist            Goals  PT OP Goals     Row Name 04/07/20 1100          PT Short Term Goals    STG Date to Achieve  05/22/20  -     STG 1  Pt to be able to demonstrate home dressing management  -     STG 2  Decrease RUQ depth by 50%   -     STG 3  Pt to have no s/s of infection   -        Long Term Goals    LTG Date to Achieve  07/06/20  -     LTG 1  Decrease RUQ wound size by 75% as evidence of wound healing   -     LTG 2  Decrease Midline abdomen size by 75% as evidence of wound healing   -        Time Calculation    PT Goal Re-Cert Due Date  07/06/20  -       User Key  (r) = Recorded By, (t) = Taken By, (c) = Cosigned By    Initials Name Provider Type    Vinny Colindres, PT Physical Therapist          Time Calculation: Start Time: 1100  Therapy Charges for Today     Code Description Service Date Service Provider Modifiers Qty    56732320930 HC YESSENIA DEBRIDE OPEN WOUND UP TO 20CM 4/7/2020 Vinny Lobato, PT GP 1    [id number] HC PT EVAL HIGH COMPLEXITY 4 4/7/2020 Vinny Lobato, PT GP 1                Vinny Lobato, PT  4/7/2020

## 2020-04-07 NOTE — TELEPHONE ENCOUNTER
I was contacted by Dr. Abrahan Talley regarding this patient's care.  He thinks her chemotherapy needs to be delayed for 1 week due to ongoing treatment of appendiceal abscess status post surgical intervention.  She will be rescheduled for clinic and possible chemotherapy for 4/15/2020.

## 2020-04-07 NOTE — OUTREACH NOTE
General Surgery Week 3 Survey      Responses   [facility] patient discharged from?  [facility]   Does the patient have one of the following disease processes/diagnoses(primary or secondary)?  General Surgery   Week 3 attempt successful?  Yes   Call start time  0901   Call end time  0904   Meds reviewed with patient/caregiver?  Yes   Is the patient taking all medications as directed (includes completed medication regime)?  Yes   Has the patient kept scheduled appointments due by today?  Yes   What is the patient's perception of their health status since discharge?  Worsening   Week 3 call completed?  Yes   Wrap up additional comments  She is going to wound care this am to let them look at her foot.  She is still having drainage.  SHe also has an appt today with ID, she has an abscess in her incision          Libby Donaldson RN

## 2020-04-08 ENCOUNTER — APPOINTMENT (OUTPATIENT)
Dept: ONCOLOGY | Facility: HOSPITAL | Age: 45
End: 2020-04-08

## 2020-04-13 ENCOUNTER — TRANSCRIBE ORDERS (OUTPATIENT)
Dept: LAB | Facility: HOSPITAL | Age: 45
End: 2020-04-13

## 2020-04-13 ENCOUNTER — HOSPITAL ENCOUNTER (OUTPATIENT)
Dept: PHYSICAL THERAPY | Facility: HOSPITAL | Age: 45
Setting detail: THERAPIES SERIES
Discharge: HOME | End: 2020-04-13

## 2020-04-13 ENCOUNTER — LAB (OUTPATIENT)
Dept: LAB | Facility: HOSPITAL | Age: 45
End: 2020-04-13

## 2020-04-13 DIAGNOSIS — I10 ESSENTIAL HYPERTENSION, BENIGN: ICD-10-CM

## 2020-04-13 DIAGNOSIS — D61.810 PANCYTOPENIA DUE TO ANTINEOPLASTIC CHEMOTHERAPY (HCC): ICD-10-CM

## 2020-04-13 DIAGNOSIS — T81.31XD DISRUPTION OF CLOSURE OF CORNEA, SUBSEQUENT ENCOUNTER: ICD-10-CM

## 2020-04-13 DIAGNOSIS — S31.109D OPEN WOUND OF ABDOMEN, SUBSEQUENT ENCOUNTER: ICD-10-CM

## 2020-04-13 DIAGNOSIS — T45.1X5A PANCYTOPENIA DUE TO ANTINEOPLASTIC CHEMOTHERAPY (HCC): ICD-10-CM

## 2020-04-13 DIAGNOSIS — N18.6 TYPE 2 DIABETES MELLITUS WITH ESRD (END-STAGE RENAL DISEASE) (HCC): ICD-10-CM

## 2020-04-13 DIAGNOSIS — L03.311 CELLULITIS OF ABDOMINAL WALL: ICD-10-CM

## 2020-04-13 DIAGNOSIS — Z79.4 TYPE 2 DIABETES MELLITUS WITH DIABETIC POLYNEUROPATHY, WITH LONG-TERM CURRENT USE OF INSULIN (HCC): ICD-10-CM

## 2020-04-13 DIAGNOSIS — E11.22 TYPE 2 DIABETES MELLITUS WITH ESRD (END-STAGE RENAL DISEASE) (HCC): ICD-10-CM

## 2020-04-13 DIAGNOSIS — N18.30 CHRONIC KIDNEY DISEASE, STAGE III (MODERATE) (HCC): ICD-10-CM

## 2020-04-13 DIAGNOSIS — T81.31XD POSTOPERATIVE WOUND DEHISCENCE, SUBSEQUENT ENCOUNTER: Primary | ICD-10-CM

## 2020-04-13 DIAGNOSIS — E11.42 DIABETIC POLYNEUROPATHY ASSOCIATED WITH TYPE 2 DIABETES MELLITUS (HCC): ICD-10-CM

## 2020-04-13 DIAGNOSIS — E11.42 TYPE 2 DIABETES MELLITUS WITH DIABETIC POLYNEUROPATHY, WITH LONG-TERM CURRENT USE OF INSULIN (HCC): ICD-10-CM

## 2020-04-13 DIAGNOSIS — S91.301D OPEN WOUND OF RIGHT FOOT, SUBSEQUENT ENCOUNTER: ICD-10-CM

## 2020-04-13 DIAGNOSIS — K35.890 OTHER ACUTE APPENDICITIS: ICD-10-CM

## 2020-04-13 DIAGNOSIS — K35.890 OTHER ACUTE APPENDICITIS: Primary | ICD-10-CM

## 2020-04-13 LAB
ALBUMIN SERPL-MCNC: 3.2 G/DL (ref 3.5–5.2)
ALBUMIN/GLOB SERPL: 0.6 G/DL
ALP SERPL-CCNC: 87 U/L (ref 39–117)
ALT SERPL W P-5'-P-CCNC: 10 U/L (ref 1–33)
ANION GAP SERPL CALCULATED.3IONS-SCNC: 11 MMOL/L (ref 5–15)
AST SERPL-CCNC: 27 U/L (ref 1–32)
BASOPHILS # BLD AUTO: 0.04 10*3/MM3 (ref 0–0.2)
BASOPHILS NFR BLD AUTO: 0.9 % (ref 0–1.5)
BILIRUB SERPL-MCNC: 0.7 MG/DL (ref 0.2–1.2)
BUN BLD-MCNC: 11 MG/DL (ref 6–20)
BUN/CREAT SERPL: 14.5 (ref 7–25)
CALCIUM SPEC-SCNC: 9.6 MG/DL (ref 8.6–10.5)
CHLORIDE SERPL-SCNC: 104 MMOL/L (ref 98–107)
CO2 SERPL-SCNC: 22 MMOL/L (ref 22–29)
CREAT BLD-MCNC: 0.76 MG/DL (ref 0.57–1)
CRP SERPL-MCNC: 1.02 MG/DL (ref 0–0.5)
DEPRECATED RDW RBC AUTO: 70.4 FL (ref 37–54)
EOSINOPHIL # BLD AUTO: 0.2 10*3/MM3 (ref 0–0.4)
EOSINOPHIL NFR BLD AUTO: 4.4 % (ref 0.3–6.2)
ERYTHROCYTE [DISTWIDTH] IN BLOOD BY AUTOMATED COUNT: 22.5 % (ref 12.3–15.4)
ERYTHROCYTE [SEDIMENTATION RATE] IN BLOOD: 58 MM/HR (ref 0–20)
GFR SERPL CREATININE-BSD FRML MDRD: 82 ML/MIN/1.73
GLOBULIN UR ELPH-MCNC: 5.1 GM/DL
GLUCOSE BLD-MCNC: 143 MG/DL (ref 65–99)
HCT VFR BLD AUTO: 34.1 % (ref 34–46.6)
HGB BLD-MCNC: 10.8 G/DL (ref 12–15.9)
IMM GRANULOCYTES # BLD AUTO: 0.03 10*3/MM3 (ref 0–0.05)
IMM GRANULOCYTES NFR BLD AUTO: 0.7 % (ref 0–0.5)
LYMPHOCYTES # BLD AUTO: 0.76 10*3/MM3 (ref 0.7–3.1)
LYMPHOCYTES NFR BLD AUTO: 16.6 % (ref 19.6–45.3)
MCH RBC QN AUTO: 28.7 PG (ref 26.6–33)
MCHC RBC AUTO-ENTMCNC: 31.7 G/DL (ref 31.5–35.7)
MCV RBC AUTO: 90.7 FL (ref 79–97)
MONOCYTES # BLD AUTO: 0.44 10*3/MM3 (ref 0.1–0.9)
MONOCYTES NFR BLD AUTO: 9.6 % (ref 5–12)
NEUTROPHILS # BLD AUTO: 3.12 10*3/MM3 (ref 1.7–7)
NEUTROPHILS NFR BLD AUTO: 67.8 % (ref 42.7–76)
NRBC BLD AUTO-RTO: 0 /100 WBC (ref 0–0.2)
PLAT MORPH BLD: NORMAL
PLATELET # BLD AUTO: 116 10*3/MM3 (ref 140–450)
PMV BLD AUTO: 11.1 FL (ref 6–12)
POTASSIUM BLD-SCNC: 4 MMOL/L (ref 3.5–5.2)
PROT SERPL-MCNC: 8.3 G/DL (ref 6–8.5)
RBC # BLD AUTO: 3.76 10*6/MM3 (ref 3.77–5.28)
RBC MORPH BLD: NORMAL
SODIUM BLD-SCNC: 137 MMOL/L (ref 136–145)
WBC MORPH BLD: NORMAL
WBC NRBC COR # BLD: 4.59 10*3/MM3 (ref 3.4–10.8)

## 2020-04-13 PROCEDURE — 86140 C-REACTIVE PROTEIN: CPT

## 2020-04-13 PROCEDURE — 36415 COLL VENOUS BLD VENIPUNCTURE: CPT

## 2020-04-13 PROCEDURE — 80053 COMPREHEN METABOLIC PANEL: CPT

## 2020-04-13 PROCEDURE — 85652 RBC SED RATE AUTOMATED: CPT

## 2020-04-13 PROCEDURE — 85025 COMPLETE CBC W/AUTO DIFF WBC: CPT

## 2020-04-13 PROCEDURE — 97597 DBRDMT OPN WND 1ST 20 CM/<: CPT

## 2020-04-13 PROCEDURE — 85007 BL SMEAR W/DIFF WBC COUNT: CPT

## 2020-04-13 NOTE — THERAPY WOUND CARE TREATMENT
Outpatient Rehabilitation - Wound/Debridement Treatment Note  [facility]     Patient Name: Jeana Pollack Juliet, RN  : 1975  MRN: [id number]  Today's Date: 2020             R foot      RUQ        Midline abd      Admit Date: 2020    Visit Dx:    ICD-10-CM ICD-9-CM   1. Postoperative wound dehiscence, subsequent encounter T81.31XD V58.89     998.32   2. Open wound of right foot, subsequent encounter S91.301D V58.89     892.0   3. Type 2 diabetes mellitus with diabetic polyneuropathy, with long-term current use of insulin (CMS/HCC) E11.42 250.60    Z79.4 357.2     V58.67   4. Open wound of abdomen, subsequent encounter S31.109D V58.89     879.2       Patient Active Problem List   Diagnosis   • Acute stress reaction with predominately emotional disturbance   • Sleep disturbance   • Cancer related pain   • Moderate episode of recurrent major depressive disorder (CMS/HCC)   • Anxiety   • Iron deficiency anemia   • Type 2 diabetes mellitus with diabetic polyneuropathy, with long-term current use of insulin (CMS/HCC)   • Essential hypertension   • Heart murmur   • Family history of cardiac disorder in mother   • Endometrial cancer (CMS/HCC)   • Stage 3 chronic kidney disease (CMS/HCC)   • Gastroesophageal reflux disease   • Amputation of left great toe (CMS/HCC)   • Amputation of right great toe (CMS/HCC)   • Neuropathy   • Hypotension due to drugs   • Neutropenia (CMS/HCC)   • Acute appendicitis   • Open wound of right foot   • Hyponatremia   • Pancytopenia (CMS/HCC)        Past Medical History:   Diagnosis Date   • Anemia    • Anxiety and depression    • Back pain    • Bronchitis    • Diabetes (CMS/HCC)     DX 4-5 YRS AGO, CHECKS BS 4X/DAY, LAST A1C 7.1%   • Endometrial cancer (CMS/HCC)     STAGE II   • GERD (gastroesophageal reflux disease)    • Hypertension    • MRSA (methicillin resistant staph aureus) culture positive 2018    S/P NECROTIZING FASCITIS IN BILATERAL FEET   • Necrotizing fasciitis  (CMS/[facility]ly [facility])    • PCOS (polycystic ovarian syndrome)    • PTSD (post-traumatic stress disorder)    • Retinopathy 3/18/2020   • Sepsis (CMS/Former[facility])    • Stage 3 chronic kidney disease (CMS/HCC) 2/24/2020   • Subconjunctival hemorrhage         Past Surgical History:   Procedure Laterality Date   • APPENDECTOMY N/A 3/21/2020    Procedure: APPENDECTOMY LAPAROSCOPIC;  Surgeon: Praful Myers MD;  Location: Counts include 234 beds at the [facility] OR;  Service: General;  Laterality: N/A;   • EXPLORATORY LAPAROTOMY, TOTAL ABDOMINAL HYSTERECTOMY SALPINGO OOPHORECTOMY N/A 2/10/2020    Procedure: EXPLORATORY LAPAROTOMY, TOTAL ABDOMINAL HYSTERECTOMY, BILATERAL SALPINGO-OOPHORECTOMY WITH OPTIMAL  STAGING (R-0), OMENTECTOMY;  Surgeon: Celine Rubio MD;  Location: Counts include 234 beds at the [facility] OR;  Service: Gynecology Oncology;  Laterality: N/A;   • EYE SURGERY Left     BLOOD VESSEL IN EYE REPAIR   • TOE AMPUTATION Left 06/2019    big toe - unhealing sore   • TOE AMPUTATION Right 2018    big toe and second toe - Necrotizing fasciitis and MRSA          EVALUATION  PT Ortho     Row Name 04/13/20 1100       Subjective Comments    Subjective Comments  Pt reports the covaderm dressings are somewhat irritating to her periwound skin. They've been changing the dressings daily d/t drainage. The optifoam did not stick well to her foot.  -       Subjective Pain    Able to rate subjective pain?  yes  -    Pre-Treatment Pain Level  4  -MC    Post-Treatment Pain Level  4  -       Transfers    Sit-Stand [place] (Transfers)  independent  -    Stand-Sit [place] (Transfers)  independent  -MC    Comment (Transfers)  supine on stretcher for tx  -       Gait/Stairs Assessment/Training    [place] Level (Gait)  independent  -      User Key  (r) = Recorded By, (t) = Taken By, (c) = Cosigned By    Initials Name Provider Type    Mary Kay Flores PT Physical Therapist          LDA Wound     Row Name 04/13/20 1100             Wound 04/07/20 1100 Right medial foot Diabetic  Ulcer    Wound - Properties Group Date first assessed: 04/07/20  - Time first assessed: 1100  -MF Side: Right  - Orientation: medial  - Location: foot  - Primary Wound Type: Diabetic ulc  -    Wound Image  Images linked: 1  -MC      Dressing Appearance  intact;moist drainage  -MC      Base  purple;red;pink no discernable open area. Dark purple/red callus  -MC      Periwound  intact;dry  -MC      Periwound Temperature  warm  -MC      Periwound Skin Turgor  other (see comments) min periwound callus remaining  -MC      Edges  callused  -      Wound Length (cm)  1.5 cm  -MC      Wound Width (cm)  1 cm  -      Wound Depth (cm)  0.1 cm  -      Drainage Characteristics/Odor  serosanguineous  -      Drainage Amount  small  -      Care, Wound  cleansed with;wound cleanser;debrided  -      Dressing Care, Wound  dressing applied;petroleum-based;gauze;silver impregnated;low-adherent;foam xeroform, mepilex Ag, primafix tape  -      Periwound Care, Wound  cleansed with pH balanced cleanser;dry periwound area maintained  -         Wound 04/07/20 1100 Right upper;lateral abdomen Abcess    Wound - Properties Group Date first assessed: 04/07/20  - Time first assessed: 1100  -MF Side: Right  - Orientation: upper;lateral  - Location: abdomen  - Primary Wound Type: Abcess  -    Wound Image  Images linked: 2  -MC      Dressing Appearance  intact;moist drainage  -MC      Base  red;slough;yellow increasing granulation  -      Periwound  intact;dry  -MC      Periwound Temperature  warm  -      Periwound Skin Turgor  firm moderate induration, improving  -MC      Edges  irregular  -      Wound Length (cm)  0.9 cm  -MC      Wound Width (cm)  2.5 cm  -      Wound Depth (cm)  4.4 cm  -      Drainage Characteristics/Odor  yellow;tan;creamy;clots;serosanguineous initial creamy tan/yellow with mixed clots  -      Drainage Amount  moderate  -      Care, Wound  irrigated with;wound cleanser;debrided   "-MC      Dressing Care, Wound  dressing applied;collagen;antimicrobial agent applied;foam;silver impregnated;low-adherent;border dressing osmel, saline-moist HFBc, mepilex Ag, 4\" optifoam  -MC      Periwound Care, Wound  cleansed with pH balanced cleanser;dry periwound area maintained  -MC         Wound 04/07/20 1100 midline abdomen Incision    Wound - Properties Group Date first assessed: 04/07/20  - Time first assessed: 1100  -MF Orientation: midline  -MF Location: abdomen  -MF Primary Wound Type: Incision  -MF    Wound Image  Images linked: 1  -MC      Dressing Appearance  intact;moist drainage  -MC      Base  moist;pink;red;epithelialization  -MC      Periwound  intact;dry  -MC      Periwound Temperature  warm  -MC      Periwound Skin Turgor  soft  -MC      Edges  open;irregular  -MC      Wound Length (cm)  1 cm  -MC      Wound Width (cm)  0.3 cm  -MC      Wound Depth (cm)  3.2 cm angling up toward 12:00  -      Drainage Characteristics/Odor  serosanguineous  -MC      Drainage Amount  moderate  -MC      Care, Wound  irrigated with;wound cleanser;debrided  -MC      Dressing Care, Wound  dressing applied;antimicrobial agent applied;foam;low-adherent;border dressing dry HFBc, 6\" optifoam  -MC         [REMOVED] Wound 03/17/20 1430 Right medial foot Diabetic Ulcer    Wound - Properties Group Date first assessed: 03/17/20  - Time first assessed: 1430  -MC Present on Hospital Admission: N  -MC Side: Right  -MC Orientation: medial  -MC Location: foot  -MC Primary Wound Type: Diabetic ulc  - Resolution Date: 04/13/20  - Resolution Time: 1100  -MC Wound Outcome: Other  -MC, Another LDA added, resolved this LDA for clarification.        [REMOVED] Wound 02/17/20 1300 midline abdomen Other (comment)    Wound - Properties Group Date first assessed: 02/17/20  - Time first assessed: 1300  -JM Orientation: midline  -JM Location: abdomen  -JM Primary Wound Type: Other  -JM, dehisced surgical incision  Additional " Comments: dehisced abd incision  - Resolution Date: 04/13/20  - Resolution Time: 1100  -MC Wound Outcome: Other  -MC, Resolved LDA as another had been added for the same area.       User Key  (r) = Recorded By, (t) = Taken By, (c) = Cosigned By    Initials Name Provider Type    SEDRICK Luis AlfredoVinny, PT Physical Therapist    Mary Kay Flores, PT Physical Therapist    Leidy Millard, PT Physical Therapist            WOUND DEBRIDEMENT  Total area of Debridement: 4 cm2  Debridement Site 1  Location- Site 1: R upper abdomen   Selective Debridement- Site 1: Wound Surface <20cmsq  Instruments- Site 1: tweezers, scissors  Excised Tissue Description- Site 1: minimum, slough  Bleeding- Site 1: none   Debridement Site 2  Location- Site 2: R foot  Selective Debridement- Site 2: Wound Surface <20cmsq  Instruments- Site 2: #15, scapel, tweezers  Excised Tissue Description- Site 2: maximum, other (comment)(callus)  Bleeding- Site 2: none         Therapy Education     Row Name 04/13/20 1100             Therapy Education    Education Details  Continue with current dressing changes with mild modifications. Use optifoam over abd wounds rather than covaderm. Use mepilex Ag over R foot wound and secure with primafix tape.  -MC      Given  Bandaging/dressing change  -MC      Program  Reinforced;Modified  -MC      How Provided  Verbal;Demonstration  -MC      Provided to  Patient  -MC      Level of Understanding  Verbalized;Teach back education performed  -MC        User Key  (r) = Recorded By, (t) = Taken By, (c) = Cosigned By    Initials Name Provider Type    Mary Kay Flores, PT Physical Therapist          Recommendation and Plan  PT Assessment/Plan     Row Name 04/13/20 1100          PT Assessment    Functional Limitations  Other (comment);Performance in self-care ADL wound mgmt  -MC     Impairments  Integumentary integrity;Pain  -MC     Assessment Comments  Pt with improved wound dimensions to all areas today.  There is no discernable open area to the R foot, but pt continues to develop thick, purple/red callus that requires debridement for accurate skin assessment. Pt with notable increase in granulation and decreased depth to the RUQ wound, but continues to develop creamy drainage from this area. Pt will continue to benefit from skilled PT wound care for debridement, dressings management, and other appropriate interventions.  -     Rehab Potential  Fair  -     Patient/caregiver participated in establishment of treatment plan and goals  Yes  -     Patient would benefit from skilled therapy intervention  Yes  -        PT Plan    PT Frequency  1x/week  -     Physical Therapy Interventions (Optional Details)  patient/family education;wound care  -     PT Plan Comments  1x/week tx for debridement, dressings assessment, and education.  -       User Key  (r) = Recorded By, (t) = Taken By, (c) = Cosigned By    Initials Name Provider Type    Mary Kay Flores, PT Physical Therapist          Goals  PT OP Goals     Row Name 04/13/20 1100          Time Calculation    PT Goal Re-Cert Due Date  07/06/20  -       User Key  (r) = Recorded By, (t) = Taken By, (c) = Cosigned By    Initials Name Provider Type    Mary Kay Flores, PT Physical Therapist          PT Goal Re-Cert Due Date: 07/06/20            Time Calculation: Start Time: 1100  Therapy Charges for Today     Code Description Service Date Service Provider Modifiers Qty    [id number] HC YESSENIA DEBRIDE OPEN WOUND UP TO 20CM 4/13/2020 Mary Kay Bell, PT GP 1                  Mary Kay Bell, PT  4/13/2020

## 2020-04-14 ENCOUNTER — READMISSION MANAGEMENT (OUTPATIENT)
Dept: CALL CENTER | Facility: HOSPITAL | Age: 45
End: 2020-04-14

## 2020-04-14 NOTE — OUTREACH NOTE
General Surgery Week 4 Survey      Responses   [facility] patient discharged from?  [facility]   Does the patient have one of the following disease processes/diagnoses(primary or secondary)?  General Surgery   Week 4 attempt successful?  Yes   Call start time  1618   Call end time  1621   Discharge diagnosis  Lap appy, endometrial CA on chemo    Is the patient taking all medications as directed (includes completed medication regime)?  Yes   Medication comments  Patient states she saw the Infectious Disease Doctor and she does not have to do anymore antibiotics   Has the patient kept scheduled appointments due by today?  Yes   Is the patient still receiving Home Health Services?  N/A   Psychosocial issues?  No   What is the patient's perception of their health status since discharge?  Improving   Is the patient/caregiver able to teach back the hierarchy of who to call/visit for symptoms/problems? PCP, Specialist, Home health nurse, Urgent Care, ED, 911  Yes   Additional teach back comments  Patient states she is improving.  She saw her infectious disease doctor and she doesn't need anitibiotics and can start chemo on Thurs.    Week 4 call completed?  Yes   Would the patient like one additional call?  No   Graduated  Yes   Did the patient feel the follow up calls were helpful during their recovery period?  Yes   Was the number of calls appropriate?  Yes   Wrap up additional comments  No questions or needs at this time          Misty Ortega LPN

## 2020-04-15 ENCOUNTER — TELEPHONE (OUTPATIENT)
Dept: NUTRITION | Facility: HOSPITAL | Age: 45
End: 2020-04-15

## 2020-04-15 NOTE — PROGRESS NOTES
"Onc Nutrition    Patient: Jeana Jaki Chung RN  YOB: 1975    Weight: 192# (3/17/20) - fairly stable  Nutrition Symptoms: none at this time    Spoke with patient via phone call for nutrition follow up.  Note patient with recent hospitalization with appendicitis s/p appy.  Also patient is receiving wound care.  Patient reports she is now seeing an Endocrinologist to aid with glycemic control.  She states her appetite has been good and she is trying to increase her protein to aid with wound healing.      Encouraged her to continue with her good oral intake and focusing on high protein foods.  Advised her to continue with insulin regimen as prescribed by her Endocrinologist.  Recommended she drink nutritional supplements on days when her appetite is decreased to aid with calorie and protein intake.  Discussed adding a daily fiber supplement to aid with bowel regularity.      Answered her questions and she voiced understanding of information discussed.  Will provide written diet materials \"Soft and Moist High Protein Menu Ideas\", \"Taste and Smell Changes\", and \"Constipation, Diarrhea, and Fiber\" at her infusion appointment on 3/16/20.  Encouraged to call RD with questions.  Will monitor as needed during her treatment course.    Starr Pratt RD  04/15/20          "

## 2020-04-16 ENCOUNTER — HOSPITAL ENCOUNTER (OUTPATIENT)
Dept: ONCOLOGY | Facility: HOSPITAL | Age: 45
Setting detail: INFUSION SERIES
Discharge: HOME | End: 2020-04-16

## 2020-04-16 ENCOUNTER — OFFICE VISIT (OUTPATIENT)
Dept: GYNECOLOGIC ONCOLOGY | Facility: CLINIC | Age: 45
End: 2020-04-16

## 2020-04-16 VITALS
SYSTOLIC BLOOD PRESSURE: 181 MMHG | HEART RATE: 112 BPM | RESPIRATION RATE: 16 BRPM | DIASTOLIC BLOOD PRESSURE: 98 MMHG | WEIGHT: 185 LBS | OXYGEN SATURATION: 98 % | BODY MASS INDEX: 33.84 KG/M2 | TEMPERATURE: 97.6 F

## 2020-04-16 VITALS — DIASTOLIC BLOOD PRESSURE: 90 MMHG | SYSTOLIC BLOOD PRESSURE: 177 MMHG | HEART RATE: 90 BPM

## 2020-04-16 DIAGNOSIS — E11.42 TYPE 2 DIABETES MELLITUS WITH DIABETIC POLYNEUROPATHY, WITH LONG-TERM CURRENT USE OF INSULIN (HCC): ICD-10-CM

## 2020-04-16 DIAGNOSIS — Z79.4 TYPE 2 DIABETES MELLITUS WITH DIABETIC POLYNEUROPATHY, WITH LONG-TERM CURRENT USE OF INSULIN (HCC): ICD-10-CM

## 2020-04-16 DIAGNOSIS — C54.1 ENDOMETRIAL CANCER (HCC): Primary | ICD-10-CM

## 2020-04-16 DIAGNOSIS — D61.810 ANTINEOPLASTIC CHEMOTHERAPY INDUCED PANCYTOPENIA (HCC): ICD-10-CM

## 2020-04-16 DIAGNOSIS — D70.1 CHEMOTHERAPY-INDUCED NEUTROPENIA (HCC): Primary | ICD-10-CM

## 2020-04-16 DIAGNOSIS — T45.1X5A CHEMOTHERAPY-INDUCED NEUTROPENIA (HCC): Primary | ICD-10-CM

## 2020-04-16 DIAGNOSIS — G62.9 NEUROPATHY: ICD-10-CM

## 2020-04-16 DIAGNOSIS — T45.1X5A ANTINEOPLASTIC CHEMOTHERAPY INDUCED PANCYTOPENIA (HCC): ICD-10-CM

## 2020-04-16 DIAGNOSIS — I10 ESSENTIAL HYPERTENSION: ICD-10-CM

## 2020-04-16 DIAGNOSIS — Z09 HOSPITAL DISCHARGE FOLLOW-UP: ICD-10-CM

## 2020-04-16 LAB
GLUCOSE BLDC GLUCOMTR-MCNC: 295 MG/DL (ref 70–130)
GLUCOSE BLDC GLUCOMTR-MCNC: 324 MG/DL (ref 70–130)

## 2020-04-16 PROCEDURE — 25010000002 DEXAMETHASONE PER 1 MG: Performed by: OBSTETRICS & GYNECOLOGY

## 2020-04-16 PROCEDURE — 96415 CHEMO IV INFUSION ADDL HR: CPT

## 2020-04-16 PROCEDURE — 96413 CHEMO IV INFUSION 1 HR: CPT

## 2020-04-16 PROCEDURE — 96417 CHEMO IV INFUS EACH ADDL SEQ: CPT

## 2020-04-16 PROCEDURE — 25010000002 CARBOPLATIN PER 50 MG: Performed by: OBSTETRICS & GYNECOLOGY

## 2020-04-16 PROCEDURE — 25010000002 PACLITAXEL PER 30 MG: Performed by: OBSTETRICS & GYNECOLOGY

## 2020-04-16 PROCEDURE — 25010000002 PALONOSETRON PER 25 MCG: Performed by: OBSTETRICS & GYNECOLOGY

## 2020-04-16 PROCEDURE — 96367 TX/PROPH/DG ADDL SEQ IV INF: CPT

## 2020-04-16 PROCEDURE — 63710000001 INSULIN LISPRO (HUMAN) PER 5 UNITS: Performed by: OBSTETRICS & GYNECOLOGY

## 2020-04-16 PROCEDURE — 99215 OFFICE O/P EST HI 40 MIN: CPT | Performed by: OBSTETRICS & GYNECOLOGY

## 2020-04-16 PROCEDURE — 25010000002 PEGFILGRASTIM 6 MG/0.6ML PREFILLED SYRINGE KIT: Performed by: OBSTETRICS & GYNECOLOGY

## 2020-04-16 PROCEDURE — 82962 GLUCOSE BLOOD TEST: CPT

## 2020-04-16 PROCEDURE — 25010000002 FOSAPREPITANT PER 1 MG: Performed by: OBSTETRICS & GYNECOLOGY

## 2020-04-16 PROCEDURE — 25010000002 DIPHENHYDRAMINE PER 50 MG: Performed by: OBSTETRICS & GYNECOLOGY

## 2020-04-16 PROCEDURE — 96377 APPLICATON ON-BODY INJECTOR: CPT

## 2020-04-16 PROCEDURE — 96375 TX/PRO/DX INJ NEW DRUG ADDON: CPT

## 2020-04-16 RX ORDER — FAMOTIDINE 10 MG/ML
20 INJECTION, SOLUTION INTRAVENOUS ONCE
Status: CANCELLED | OUTPATIENT
Start: 2020-04-16

## 2020-04-16 RX ORDER — PALONOSETRON 0.05 MG/ML
0.25 INJECTION, SOLUTION INTRAVENOUS ONCE
Status: COMPLETED | OUTPATIENT
Start: 2020-04-16 | End: 2020-04-16

## 2020-04-16 RX ORDER — FAMOTIDINE 10 MG/ML
20 INJECTION, SOLUTION INTRAVENOUS ONCE
Status: COMPLETED | OUTPATIENT
Start: 2020-04-16 | End: 2020-04-16

## 2020-04-16 RX ORDER — PALONOSETRON 0.05 MG/ML
0.25 INJECTION, SOLUTION INTRAVENOUS ONCE
Status: CANCELLED | OUTPATIENT
Start: 2020-04-16

## 2020-04-16 RX ORDER — DIPHENHYDRAMINE HYDROCHLORIDE 50 MG/ML
50 INJECTION INTRAMUSCULAR; INTRAVENOUS AS NEEDED
Status: CANCELLED | OUTPATIENT
Start: 2020-04-16

## 2020-04-16 RX ORDER — ENALAPRILAT 2.5 MG/2ML
0.62 INJECTION INTRAVENOUS ONCE
Status: COMPLETED | OUTPATIENT
Start: 2020-04-16 | End: 2020-04-16

## 2020-04-16 RX ORDER — FAMOTIDINE 10 MG/ML
20 INJECTION, SOLUTION INTRAVENOUS AS NEEDED
Status: CANCELLED | OUTPATIENT
Start: 2020-04-16

## 2020-04-16 RX ORDER — SODIUM CHLORIDE 9 MG/ML
250 INJECTION, SOLUTION INTRAVENOUS ONCE
Status: COMPLETED | OUTPATIENT
Start: 2020-04-16 | End: 2020-04-16

## 2020-04-16 RX ORDER — SODIUM CHLORIDE 9 MG/ML
250 INJECTION, SOLUTION INTRAVENOUS ONCE
Status: CANCELLED | OUTPATIENT
Start: 2020-04-16

## 2020-04-16 RX ORDER — ENALAPRILAT 2.5 MG/2ML
0.62 INJECTION INTRAVENOUS ONCE
Status: CANCELLED
Start: 2020-04-16

## 2020-04-16 RX ADMIN — DIPHENHYDRAMINE HYDROCHLORIDE 50 MG: 50 INJECTION INTRAMUSCULAR; INTRAVENOUS at 10:45

## 2020-04-16 RX ADMIN — PACLITAXEL 330 MG: 300 INJECTION INTRAVENOUS at 11:35

## 2020-04-16 RX ADMIN — ENALAPRILAT 0.62 MG: 1.25 INJECTION INTRAVENOUS at 10:33

## 2020-04-16 RX ADMIN — INSULIN LISPRO 12 UNITS: 100 INJECTION, SOLUTION INTRAVENOUS; SUBCUTANEOUS at 11:46

## 2020-04-16 RX ADMIN — PEGFILGRASTIM 6 MG: KIT SUBCUTANEOUS at 15:46

## 2020-04-16 RX ADMIN — PALONOSETRON HYDROCHLORIDE 0.25 MG: 0.25 INJECTION INTRAVENOUS at 10:40

## 2020-04-16 RX ADMIN — SODIUM CHLORIDE 150 MG: 9 INJECTION, SOLUTION INTRAVENOUS at 11:02

## 2020-04-16 RX ADMIN — CARBOPLATIN 630 MG: 10 INJECTION, SOLUTION INTRAVENOUS at 15:01

## 2020-04-16 RX ADMIN — FAMOTIDINE 20 MG: 10 INJECTION INTRAVENOUS at 10:43

## 2020-04-16 RX ADMIN — SODIUM CHLORIDE 250 ML: 9 INJECTION, SOLUTION INTRAVENOUS at 10:30

## 2020-04-16 RX ADMIN — DEXAMETHASONE SODIUM PHOSPHATE 20 MG: 10 INJECTION INTRAMUSCULAR; INTRAVENOUS at 11:02

## 2020-04-16 NOTE — PROGRESS NOTES
Jeana Chung RN  [id number]  1975      Reason for visit: Endometrial cancer, consideration of ongoing chemotherapy, chemotherapy-induced pancytopenia, hospital discharge follow-up, status post appendectomy with appendiceal abscess, cleared for chemotherapy by infectious disease    History of present illness:  The patient is a 44 y.o. year old female who presents today for treatment and evaluation of the above issues.     Dr. Abrahan Talley, infectious disease, communicated with me prior to this visit the patient was cleared for chemotherapy.  Today, patient is overall much improved.  She denies nausea and is eating and drinking well.  She notes that her abdomen is softer and her wounds are healing appropriately.  She did dressing changes last night and this morning.  Pictures are available in the EMR from 2020 when she last saw wound care and these photos were reviewed.  She notes some occasional hot and cold spells but thinks she is having hot flashes perhaps.  She notes fatigue but is out of bed more than half the day and doing light housework.  She gets tired easily.  She has neuropathy and is taking gabapentin 300 mg a.m. and p.m. and 600 mg at bedtime which controls the symptoms well.  This is acute from chemotherapy superimposed on chronic diabetic neuropathy.  She notes normal bowel and bladder function.  She has anxiety and is taking Ativan for this as needed.  She did see a counselor early in things but thinks that she is doing okay for now.  She notes anxiety related to coronavirus pandemic and not being able to have her  with her as he lives in a different state.  She notes that her mother is supportive.    OBGYN History:  She is a .  She does not use HRT. She has never had a pap smear.      Oncologic History:     Endometrial cancer (CMS/HCC)    2019 Imaging     Presented to ED in [place] due to progressively heavy vaginal bleeding and severe back pain.  Ultrasound revealed uterine and cervical masses.      1/2020 Biopsy     ED follow-up with gynecologist in [place], told she likely has cervical cancer. Insufficient tissue on attempted cervical biopsy. Patient moved to [place], [place] to be closer to mother and sister for work-up and treatment.       1/21/2020 Imaging     Gyn evaluation at [facility]. Repeat TVUS showed cervical mass, right adnexal mass, and large uterine fibroid vs mass. Referred to Gyn Oncology      1/23/2020 Initial Diagnosis     Endometrial cancer (CMS/HCC)  Cervical biopsy positive for infiltrating endometrioid adenocarcinoma      1/30/2020 Imaging     CT chest, abdomen, pelvis showed enlarged uterus with multiple masses and 4 cm cystic/solid mass at right ovary. No metastatic disease noted in abdomen or chest.      2/10/2020 Surgery     Exploratory laparotomy, total abdominal hysterectomy, bilateral salpingo-oophorectomy, with optimal debulking (R=0), and omentectomy.    Pelvic washing cytology positive for malignant cells, consistent with adenocarcinoma. Final pathology showed large grade 3 endometrioid tumor with lymphvascular invasion at the uterus. Cervical stromal involvement, vaginal margin negative. Right tube and ovary involved. Left tube and ovary negative, omentum negative. MSI normal. [id number], Stage IIIA grade 3        3/9/2020 -  Chemotherapy     OP UTERINE PACLitaxel / CARBOplatin (Q21D)  Cycle #1 complicated by pancytopenia, appendicitis, appendectomy, appendiceal abscess        3/17/2020 - 3/23/2020 Other Event     Hospital admission with acute appendicitis           Past Medical History:   Diagnosis Date   • Anemia    • Anxiety and depression    • Back pain    • Bronchitis    • Diabetes (CMS/HCC)     DX 4-5 YRS AGO, CHECKS BS 4X/DAY, LAST A1C 7.1%   • Endometrial cancer (CMS/HCC)     STAGE II   • GERD (gastroesophageal reflux disease)    • Hypertension    • MRSA (methicillin resistant staph aureus) culture positive 2018     S/P NECROTIZING FASCITIS IN BILATERAL FEET   • Necrotizing fasciitis (CMS/HCC)    • PCOS (polycystic ovarian syndrome)    • PTSD (post-traumatic stress disorder)    • Retinopathy 3/18/2020   • Sepsis (CMS/HCC)    • Stage 3 chronic kidney disease (CMS/HCC) 2020   • Subconjunctival hemorrhage        Past Surgical History:   Procedure Laterality Date   • APPENDECTOMY N/A 3/21/2020    Procedure: APPENDECTOMY LAPAROSCOPIC;  Surgeon: Praful Myers MD;  Location: [facility];  Service: General;  Laterality: N/A;   • EXPLORATORY LAPAROTOMY, TOTAL ABDOMINAL HYSTERECTOMY SALPINGO OOPHORECTOMY N/A 2/10/2020    Procedure: EXPLORATORY LAPAROTOMY, TOTAL ABDOMINAL HYSTERECTOMY, BILATERAL SALPINGO-OOPHORECTOMY WITH OPTIMAL  STAGING (R-0), OMENTECTOMY;  Surgeon: Celine Rubio MD;  Location: [facility];  Service: Gynecology Oncology;  Laterality: N/A;   • EYE SURGERY Left     BLOOD VESSEL IN EYE REPAIR   • TOE AMPUTATION Left 2019    big toe - unhealing sore   • TOE AMPUTATION Right 2018    big toe and second toe - Necrotizing fasciitis and MRSA        MEDICATIONS: The current medication list was reviewed with the patient and updated in the EMR this date per the Medical Assistant. Medication dosages and frequencies were confirmed to be accurate.      Allergies:  is allergic to bactrim [sulfamethoxazole-trimethoprim]; penicillins; and promethazine.    Social History:   Social History     Socioeconomic History   • Marital status:      Spouse name: Not on file   • Number of children: 1   • Years of education: Not on file   • Highest education level: Not on file   Tobacco Use   • Smoking status: Former Smoker     Types: Cigarettes     Last attempt to quit: 2000     Years since quittin.3   • Smokeless tobacco: Never Used   • Tobacco comment: social MAYBE 1 CIG/DAY   Substance and Sexual Activity   • Alcohol use: Not Currently     Frequency: Monthly or less     Drinks per session: 1 or 2   • Drug use: Never    • Sexual activity: Not Currently   Social History Narrative    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 Staying in [place] with mother due to Endometrial Cancer.        Family History:    Family History   Problem Relation Age of Onset   • Fibroids Mother    • Diabetes type II Mother    • Hypertension Mother    • Heart attack Mother    • Fibroids Sister         PCOS   • Diabetes type II Sister    • Dementia Maternal Grandmother    • Stroke Maternal Grandmother        Health Maintenance:    Health Maintenance   Topic Date Due   • TDAP/TD VACCINES (1 - Tdap) 04/24/1984   • HEPATITIS C SCREENING  01/22/2020   • MAMMOGRAM  02/24/2020   • INFLUENZA VACCINE  08/01/2020   • HEMOGLOBIN A1C  08/07/2020   • URINE MICROALBUMIN  01/30/2021   • PNEUMOCOCCAL VACCINE (19-64 HIGHEST RISK) (2 of 3 - PCV13) 02/24/2021   • DIABETIC FOOT EXAM  02/24/2021   • DIABETIC EYE EXAM  02/24/2021   • ANNUAL PHYSICAL  02/25/2021   • PAP SMEAR  Discontinued         Review of Systems   Constitutional: Positive for fatigue. Negative for appetite change, chills, fever and unexpected weight change.   HENT: Negative for ear discharge, ear pain, hearing loss, mouth sores, nosebleeds, postnasal drip, sinus pressure, sinus pain, sore throat, tinnitus and trouble swallowing.    Eyes: Positive for visual disturbance. Negative for pain and itching.        Blurry vision   Respiratory: Negative for cough, shortness of breath and wheezing.    Cardiovascular: Negative for chest pain and palpitations.   Gastrointestinal: Negative for abdominal pain, blood in stool, constipation, diarrhea, nausea and vomiting.   Endocrine: Positive for heat intolerance. Negative for cold intolerance.   Genitourinary: Negative for difficulty urinating, flank pain, frequency, hematuria, urgency, vaginal bleeding and vaginal discharge.   Musculoskeletal: Negative for arthralgias, gait problem and myalgias.   Skin: Negative for color change, rash and wound.    Allergic/Immunologic: Negative for immunocompromised state.   Neurological: Positive for numbness. Negative for dizziness, seizures, syncope and headaches.   Hematological: Negative for adenopathy. Does not bruise/bleed easily.        Anemia   Psychiatric/Behavioral: Positive for sleep disturbance. Negative for agitation, confusion and dysphoric mood. The patient is nervous/anxious.        Physical Exam    Vitals:    04/16/20 0937   BP: (!) 181/98   Pulse: 112   Resp: 16   Temp: 97.6 °F (36.4 °C)   TempSrc: Temporal   SpO2: 98%   Weight: 83.9 kg (185 lb)   PainSc:   2   PainLoc: Abdomen     Body mass index is 33.84 kg/m².    Wt Readings from Last 3 Encounters:   04/16/20 83.9 kg (185 lb)   03/18/20 87.3 kg (192 lb 6.4 oz)   03/10/20 88.5 kg (195 lb)         GENERAL: Alert, well-appearing female appearing her stated age who is in no apparent distress.  Patient is obese by BMI criteria.  HEENT: Sclera anicteric. Head normocephalic, atraumatic. Mucus membranes moist.   NECK: Supple, free from thyromegaly  BREASTS: Deferred  CARDIOVASCULAR: Regular rate and rhythm without murmur rub or gallop, no extremity edema  RESPIRATORY: Clear to auscultation bilaterally, normal effort  BACK: No CVA tenderness, no vertebral tenderness on palpation  GASTROINTESTINAL: Abdomen soft, nontender, no rebound, no guarding.  No palpable mass.  Incisions not evaluated today, pictures reviewed.  SKIN:  Warm, dry, well-perfused.  All visible areas intact.  No rashes, lesions, ulcers.  PSYCHIATRIC: AO x3, with appropriate affect, normal thought processes.  NEUROLOGIC: No focal deficits. Moves extremities well.  MUSCULOSKELETAL: Normal gait and station.   EXTREMITIES:   No cyanosis, clubbing, symmetric.  LYMPHATICS: No adenopathy bilateral neck and groin areas     PELVIC exam:    Deferred    ECOG PS 1    PROCEDURES:    none    Diagnostic Data:      Ct Abdomen Pelvis Without Contrast    Result Date: 3/18/2020  1. Acute appendicitis. No evidence  of perforation or abscess. 2. Postsurgical changes from recent hysterectomy. 3. Splenomegaly. NOTIFICATION: Critical Value/emergent results were called by telephone at the time of interpretation on 3/18/2020 1:09 AM to BENSON Hughes who verbally acknowledged these results. Signer Name: Prasanna Zapata MD  Signed: 3/18/2020 1:12 AM  Workstation Name: [facility]  Radiology [facility]    Xr Chest 1 View    Result Date: 3/18/2020  No acute cardiopulmonary findings. Signer Name: Prasanna Zapata MD  Signed: 3/18/2020 12:02 AM  Workstation Name: [facility]  Radiology [facility]        Lab Results   Component Value Date    WBC 4.59 04/13/2020    HGB 10.8 (L) 04/13/2020    HCT 34.1 04/13/2020    MCV 90.7 04/13/2020     (L) 04/13/2020    NEUTROABS 3.12 04/13/2020    GLUCOSE 143 (H) 04/13/2020    BUN 11 04/13/2020    CREATININE 0.76 04/13/2020    EGFRIFNONA 82 04/13/2020     04/13/2020    K 4.0 04/13/2020     04/13/2020    CO2 22.0 04/13/2020    MG 2.2 03/22/2020    PHOS 2.5 03/20/2020    CALCIUM 9.6 04/13/2020    ALBUMIN 3.20 (L) 04/13/2020    AST 27 04/13/2020    ALT 10 04/13/2020    BILITOT 0.7 04/13/2020     No results found for:         Assessment/Plan   This is a 44 y.o. woman who presents for toxicity evaluation and consideration of ongoing chemotherapy for advanced endometrial cancer.  Encounter Diagnoses   Name Primary?   • Endometrial cancer (CMS/HCC) Yes   • Neuropathy    • Type 2 diabetes mellitus with diabetic polyneuropathy, with long-term current use of insulin (CMS/HCC)    • Antineoplastic chemotherapy induced pancytopenia (CMS/HCC)    • Hospital discharge follow-up    • Essential hypertension          Endometrial cancer: Stage IIIa due to adnexal involvement  -Status post surgery as noted above  -Consideration of cycle #2 of combined carboplatin plus paclitaxel chemotherapy IV q. 21 days with a goal of 6  -Dose modification with cycle #2 (29% reduction  in carboplatin) due to pancytopenia, acute appendicitis requiring surgical intervention and hospital stay: Cleared for chemotherapy by infectious disease    Chemotherapy induced pancytopenia with cycle #1  -Dose modification with cycle #2     Neuropathy: Chronic diabetes related with superimposed chemotherapy-induced neuropathy  -On gabapentin    Emotional distress related to cancer diagnosis  Has seen counselor  Francis Lyons     Complex social issues  -Staying with her family in [place] for treatment    Chronic Medical Conditions  -T2DM, HTN, steatohepatitis:  ?  Esophageal varicosities and HORNE, history of necrotizing fasciitis.  May require referral to gastroenterologist.  -Has seen endocrinology regarding her diabetes and new regimen was proposed for the jorge-chemotherapy timeframe due to steroid-induced hyperglycemia.  -Sliding scale insulin built into chemotherapy treatment plan due to diabetes mellitus with hyperglycemia  -Vasotec as needed with infusion due to hypertension    Pain assessment was performed today as a part of patient’s care.  For patients with pain related to surgery, gynecologic malignancy or cancer treatment, the plan is as noted in the assessment/plan.  For patients with pain not related to these issues, they are to seek any further needed care from a more appropriate provider, such as PCP.    Orders Placed This Encounter   Procedures   • CBC & Differential     Standing Status:   Future     Standing Expiration Date:   4/27/2021     Order Specific Question:   Manual Differential     Answer:   No       FOLLOW UP: surgery    Celine Rubio MD  04/16/20  1:09 PM

## 2020-04-20 ENCOUNTER — HOSPITAL ENCOUNTER (OUTPATIENT)
Dept: PHYSICAL THERAPY | Facility: HOSPITAL | Age: 45
Setting detail: THERAPIES SERIES
Discharge: HOME | End: 2020-04-20

## 2020-04-20 ENCOUNTER — TELEPHONE (OUTPATIENT)
Dept: GYNECOLOGIC ONCOLOGY | Facility: CLINIC | Age: 45
End: 2020-04-20

## 2020-04-20 DIAGNOSIS — E11.42 TYPE 2 DIABETES MELLITUS WITH DIABETIC POLYNEUROPATHY, WITH LONG-TERM CURRENT USE OF INSULIN (HCC): ICD-10-CM

## 2020-04-20 DIAGNOSIS — Z79.4 TYPE 2 DIABETES MELLITUS WITH DIABETIC POLYNEUROPATHY, WITH LONG-TERM CURRENT USE OF INSULIN (HCC): ICD-10-CM

## 2020-04-20 DIAGNOSIS — S31.109D OPEN WOUND OF ABDOMEN, SUBSEQUENT ENCOUNTER: ICD-10-CM

## 2020-04-20 DIAGNOSIS — G89.18 JOINT PAIN FOLLOWING CHEMOTHERAPY: Primary | ICD-10-CM

## 2020-04-20 DIAGNOSIS — M79.2 NEUROPATHIC PAIN: ICD-10-CM

## 2020-04-20 DIAGNOSIS — S91.301D OPEN WOUND OF RIGHT FOOT, SUBSEQUENT ENCOUNTER: Primary | ICD-10-CM

## 2020-04-20 DIAGNOSIS — M25.50 JOINT PAIN FOLLOWING CHEMOTHERAPY: Primary | ICD-10-CM

## 2020-04-20 DIAGNOSIS — T81.31XD POSTOPERATIVE WOUND DEHISCENCE, SUBSEQUENT ENCOUNTER: ICD-10-CM

## 2020-04-20 PROCEDURE — 97597 DBRDMT OPN WND 1ST 20 CM/<: CPT

## 2020-04-20 RX ORDER — OXYCODONE HYDROCHLORIDE 5 MG/1
5-10 TABLET ORAL EVERY 4 HOURS PRN
Qty: 30 TABLET | Refills: 0 | Status: SHIPPED | OUTPATIENT
Start: 2020-04-20 | End: 2020-05-07 | Stop reason: SDUPTHER

## 2020-04-20 NOTE — THERAPY WOUND CARE TREATMENT
Outpatient Rehabilitation - Wound/Debridement Treatment Note  [facility]     Patient Name: Jeana Pollack Juliet RN  : 1975  MRN: [id number]  Today's Date: 2020             R foot      R midline abd      Admit Date: 2020    Visit Dx:    ICD-10-CM ICD-9-CM   1. Open wound of right foot, subsequent encounter S91.301D V58.89     892.0   2. Type 2 diabetes mellitus with diabetic polyneuropathy, with long-term current use of insulin (CMS/HCC) E11.42 250.60    Z79.4 357.2     V58.67   3. Postoperative wound dehiscence, subsequent encounter T81.31XD V58.89     998.32   4. Open wound of abdomen, subsequent encounter S31.109D V58.89     879.2       Patient Active Problem List   Diagnosis   • Acute stress reaction with predominately emotional disturbance   • Sleep disturbance   • Cancer related pain   • Moderate episode of recurrent major depressive disorder (CMS/HCC)   • Anxiety   • Iron deficiency anemia   • Type 2 diabetes mellitus with diabetic polyneuropathy, with long-term current use of insulin (CMS/HCC)   • Essential hypertension   • Heart murmur   • Family history of cardiac disorder in mother   • Endometrial cancer (CMS/HCC)   • Stage 3 chronic kidney disease (CMS/HCC)   • Gastroesophageal reflux disease   • Amputation of left great toe (CMS/HCC)   • Amputation of right great toe (CMS/HCC)   • Neuropathy   • Hypotension due to drugs   • Neutropenia (CMS/HCC)   • Acute appendicitis   • Open wound of right foot   • Hyponatremia   • Pancytopenia (CMS/HCC)   • Antineoplastic chemotherapy induced pancytopenia (CMS/HCC)   • Hospital discharge follow-up        Past Medical History:   Diagnosis Date   • Anemia    • Anxiety and depression    • Back pain    • Bronchitis    • Diabetes (CMS/HCC)     DX 4-5 YRS AGO, CHECKS BS 4X/DAY, LAST A1C 7.1%   • Endometrial cancer (CMS/HCC)     STAGE II   • GERD (gastroesophageal reflux disease)    • Hypertension    • MRSA (methicillin resistant staph aureus)  culture positive 2018    S/P NECROTIZING FASCITIS IN BILATERAL FEET   • Necrotizing fasciitis (CMS/HCC)    • PCOS (polycystic ovarian syndrome)    • PTSD (post-traumatic stress disorder)    • Retinopathy 3/18/2020   • Sepsis (CMS/HCC)    • Stage 3 chronic kidney disease (CMS/HCC) 2/24/2020   • Subconjunctival hemorrhage         Past Surgical History:   Procedure Laterality Date   • APPENDECTOMY N/A 3/21/2020    Procedure: APPENDECTOMY LAPAROSCOPIC;  Surgeon: Praful Myers MD;  Location: [facility];  Service: General;  Laterality: N/A;   • EXPLORATORY LAPAROTOMY, TOTAL ABDOMINAL HYSTERECTOMY SALPINGO OOPHORECTOMY N/A 2/10/2020    Procedure: EXPLORATORY LAPAROTOMY, TOTAL ABDOMINAL HYSTERECTOMY, BILATERAL SALPINGO-OOPHORECTOMY WITH OPTIMAL  STAGING (R-0), OMENTECTOMY;  Surgeon: Celine Rubio MD;  Location:  [facility];  Service: Gynecology Oncology;  Laterality: N/A;   • EYE SURGERY Left     BLOOD VESSEL IN EYE REPAIR   • TOE AMPUTATION Left 06/2019    big toe - unhealing sore   • TOE AMPUTATION Right 2018    big toe and second toe - Necrotizing fasciitis and MRSA          EVALUATION  PT Ortho     Row Name 04/20/20 1309       Subjective Comments    Subjective Comments  Pt reports the areas seem to be getting better. She thinks there is some tunneling in the RUQ wound, which her mother is worried about packing correctly.  -MC       Subjective Pain    Able to rate subjective pain?  yes  -MC    Pre-Treatment Pain Level  4  -MC    Post-Treatment Pain Level  4  -MC       Transfers    Sit-Stand [place] (Transfers)  independent  -MC    Stand-Sit [place] (Transfers)  independent  -MC    Comment (Transfers)  supine on stretcher for tx  -       Gait/Stairs Assessment/Training    [place] Level (Gait)  independent  -MC      User Key  (r) = Recorded By, (t) = Taken By, (c) = Cosigned By    Initials Name Provider Type    Mary Kay Flores PT Physical Therapist          LDA Wound     Row Name  "04/20/20 1115             Wound 04/07/20 1100 Right medial foot Diabetic Ulcer    Wound - Properties Group Date first assessed: 04/07/20  - Time first assessed: 1100  - Side: Right  - Orientation: medial  - Location: foot  -MF Primary Wound Type: Diabetic ulc  -    Wound Image  Images linked: 1  -MC      Dressing Appearance  intact;moist drainage  -MC      Base  pink;closed/resurfaced;dry no discernable open area  -MC      Periwound  intact;dry  -MC      Periwound Temperature  warm  -MC      Periwound Skin Turgor  other (see comments) min periwound callus remaining  -MC      Edges  callused  -      Drainage Amount  none  -MC      Care, Wound  cleansed with;wound cleanser;debrided  -MC      Dressing Care, Wound  open to air  -         Wound 04/07/20 1100 Right upper;lateral abdomen Abcess    Wound - Properties Group Date first assessed: 04/07/20  - Time first assessed: 1100  -MF Side: Right  - Orientation: upper;lateral  - Location: abdomen  - Primary Wound Type: Abcess  -    Dressing Appearance  intact;moist drainage  -MC      Base  red;slough;yellow increasing granulation  -      Periwound  intact;dry  -      Periwound Temperature  warm  -      Periwound Skin Turgor  firm mild induration  -MC      Edges  irregular  -      Wound Length (cm)  0.9 cm  -MC      Wound Width (cm)  2.5 cm  -      Wound Depth (cm)  3.2 cm  -MC      Tunneling [Depth (cm)/Location]  3.8cm @ 9:00  -      Drainage Characteristics/Odor  yellow;tan;creamy;clots;serosanguineous min tan drainage only today  -MC      Drainage Amount  small  -MC      Care, Wound  cleansed with;wound cleanser;debrided  -MC      Dressing Care, Wound  dressing applied;antimicrobial agent applied;foam;low-adherent;border dressing saline-moist HFBc, 4\" optifoam  -MC      Periwound Care, Wound  cleansed with pH balanced cleanser;dry periwound area maintained  -         Wound 04/07/20 1100 midline abdomen Incision    Wound - Properties " "Group Date first assessed: 04/07/20  - Time first assessed: 1100  - Orientation: midline  - Location: abdomen  - Primary Wound Type: Incision  -    Wound Image  Images linked: 1  -      Dressing Appearance  intact;moist drainage  -      Base  moist;pink;red;epithelialization  -      Periwound  intact;dry  -      Periwound Temperature  warm  -      Periwound Skin Turgor  soft  -      Edges  open;irregular  -      Wound Length (cm)  1 cm  -      Wound Width (cm)  0.5 cm  -      Wound Depth (cm)  2.5 cm  -      Tunneling [Depth (cm)/Location]  3.2 cm @12:00  -      Drainage Characteristics/Odor  serosanguineous  -      Drainage Amount  small  -      Care, Wound  cleansed with;wound cleanser;debrided  -      Dressing Care, Wound  dressing applied;antimicrobial agent applied;foam;low-adherent;border dressing dry HFBc, 4\" optifoam  -      Periwound Care, Wound  cleansed with pH balanced cleanser;dry periwound area maintained  -        User Key  (r) = Recorded By, (t) = Taken By, (c) = Cosigned By    Initials Name Provider Type    MF Vinny Lobato, PT Physical Therapist     Mary Kay Bell, PT Physical Therapist            WOUND DEBRIDEMENT  Total area of Debridement: 4 cm2  Debridement Site 1  Location- Site 1: R upper abdomen   Selective Debridement- Site 1: Wound Surface <20cmsq  Instruments- Site 1: tweezers  Excised Tissue Description- Site 1: minimum, slough  Bleeding- Site 1: none   Debridement Site 2  Location- Site 2: R foot  Selective Debridement- Site 2: Wound Surface <20cmsq  Instruments- Site 2: #15, scapel, tweezers  Excised Tissue Description- Site 2: maximum, other (comment)(thick callus over new skin)  Bleeding- Site 2: none         Therapy Education     Row Name 04/20/20 9938             Therapy Education    Education Details  Continue with current dressing change frequency. No need to cover abd wounds with silver foam. Leave foot MILE. May put HFBc in " dry to allow easier access to tunnel of RUQ wound, then moisten the rest of the HFBc to facilitate complete packing.  -      Given  Bandaging/dressing change  -      Program  Reinforced  -MC      How Provided  Verbal;Demonstration  -MC      Provided to  Patient  -      Level of Understanding  Verbalized;Teach back education performed  -        User Key  (r) = Recorded By, (t) = Taken By, (c) = Cosigned By    Initials Name Provider Type    Mary Kay Flores, PT Physical Therapist          Recommendation and Plan  PT Assessment/Plan     Row Name 04/20/20 1115          PT Assessment    Functional Limitations  Other (comment);Performance in self-care ADL wound mgmt  -     Impairments  Integumentary integrity;Pain  -     Assessment Comments  Pt with improved wound depth to the abd wounds today, and improved drainage since last assessment. R foot area appears closed today. PT able to probe tunneling areas today to both abd wounds, with no other s/sx of worsening, infection, etc. Pt appears to be improving steadily with the current POC.  -     Rehab Potential  Fair  -     Patient/caregiver participated in establishment of treatment plan and goals  Yes  -     Patient would benefit from skilled therapy intervention  Yes  -MC        PT Plan    PT Frequency  1x/week  -     Physical Therapy Interventions (Optional Details)  patient/family education;wound care  -     PT Plan Comments  debridement, dressings  -       User Key  (r) = Recorded By, (t) = Taken By, (c) = Cosigned By    Initials Name Provider Type    Mary Kay Flores, PT Physical Therapist          Goals  PT OP Goals     Row Name 04/20/20 1115          Time Calculation    PT Goal Re-Cert Due Date  07/06/20  -       User Key  (r) = Recorded By, (t) = Taken By, (c) = Cosigned By    Initials Name Provider Type    Mary Kay Flores, PT Physical Therapist          PT Goal Re-Cert Due Date: 07/06/20            Time Calculation: Start  Time: 1115  Therapy Charges for Today     Code Description Service Date Service Provider Modifiers Qty    [id number]  YESSENIA DEBRIDE OPEN WOUND UP TO 20CM 4/20/2020 Mary Kay Bell, PT GP 1                  Mary Kay Bell, PT  4/20/2020

## 2020-04-20 NOTE — TELEPHONE ENCOUNTER
Patient called nurse's line with c/o severe pain. Call returned to discuss.   Jeana had last chemotherapy infusion on 4/16/2020. She went home with Maged MAURER. She c/o severe pain over the weekend, 9/10 at times. She notes the pain wakes her up at night and her current home medications are not providing relief. She describes pain as worsening of her neuropathy along with severe bone and joint pain. She doubled her gabapentin and has been taking 600 mg PO TID. She also used trazodone and Ativan to help her sleep, but neither helped. She is taking Tylenol as well and using conservative measures such as heating pad.   We discussed bone and joint pain can be related to chemotherapy as well as G-CSF. OK to continue gabapentin 600 mg PO TID for neuropathic pain. Instructed to alternate Tylenol and ibuprofen for bone/joint pain. I will also send new Rx for immediate release oxcodone 5-10 mg PO [id number] PRN breakthrough pain. Patient encouraged to call if symptoms worsen or persist despite new regimen.

## 2020-04-23 ENCOUNTER — APPOINTMENT (OUTPATIENT)
Dept: ONCOLOGY | Facility: HOSPITAL | Age: 45
End: 2020-04-23

## 2020-04-24 ENCOUNTER — HOSPITAL ENCOUNTER (OUTPATIENT)
Dept: ONCOLOGY | Facility: HOSPITAL | Age: 45
Setting detail: INFUSION SERIES
Discharge: HOME | End: 2020-04-24

## 2020-04-24 VITALS
HEART RATE: 101 BPM | TEMPERATURE: 97.8 F | RESPIRATION RATE: 18 BRPM | SYSTOLIC BLOOD PRESSURE: 139 MMHG | BODY MASS INDEX: 34.96 KG/M2 | WEIGHT: 190 LBS | HEIGHT: 62 IN | DIASTOLIC BLOOD PRESSURE: 66 MMHG

## 2020-04-24 DIAGNOSIS — C54.1 ENDOMETRIAL CANCER (HCC): ICD-10-CM

## 2020-04-24 PROCEDURE — G0463 HOSPITAL OUTPT CLINIC VISIT: HCPCS

## 2020-04-27 ENCOUNTER — HOSPITAL ENCOUNTER (OUTPATIENT)
Dept: ONCOLOGY | Facility: HOSPITAL | Age: 45
Setting detail: INFUSION SERIES
Discharge: HOME | End: 2020-04-27

## 2020-04-27 ENCOUNTER — HOSPITAL ENCOUNTER (OUTPATIENT)
Dept: PHYSICAL THERAPY | Facility: HOSPITAL | Age: 45
Setting detail: THERAPIES SERIES
Discharge: HOME | End: 2020-04-27

## 2020-04-27 VITALS
TEMPERATURE: 97.4 F | BODY MASS INDEX: 34.78 KG/M2 | HEART RATE: 99 BPM | SYSTOLIC BLOOD PRESSURE: 184 MMHG | RESPIRATION RATE: 20 BRPM | DIASTOLIC BLOOD PRESSURE: 77 MMHG | WEIGHT: 189 LBS | HEIGHT: 62 IN

## 2020-04-27 DIAGNOSIS — E11.42 TYPE 2 DIABETES MELLITUS WITH DIABETIC POLYNEUROPATHY, WITH LONG-TERM CURRENT USE OF INSULIN (HCC): ICD-10-CM

## 2020-04-27 DIAGNOSIS — I10 ESSENTIAL HYPERTENSION: ICD-10-CM

## 2020-04-27 DIAGNOSIS — Z79.4 TYPE 2 DIABETES MELLITUS WITH DIABETIC POLYNEUROPATHY, WITH LONG-TERM CURRENT USE OF INSULIN (HCC): ICD-10-CM

## 2020-04-27 DIAGNOSIS — S31.109D OPEN WOUND OF ABDOMEN, SUBSEQUENT ENCOUNTER: ICD-10-CM

## 2020-04-27 DIAGNOSIS — T81.31XD POSTOPERATIVE WOUND DEHISCENCE, SUBSEQUENT ENCOUNTER: ICD-10-CM

## 2020-04-27 DIAGNOSIS — S91.301D OPEN WOUND OF RIGHT FOOT, SUBSEQUENT ENCOUNTER: Primary | ICD-10-CM

## 2020-04-27 DIAGNOSIS — C54.1 ENDOMETRIAL CANCER (HCC): ICD-10-CM

## 2020-04-27 LAB
ERYTHROCYTE [DISTWIDTH] IN BLOOD BY AUTOMATED COUNT: 22.8 % (ref 12.3–15.4)
HCT VFR BLD AUTO: 28.5 % (ref 34–46.6)
HGB BLD-MCNC: 9 G/DL (ref 12–15.9)
LYMPHOCYTES # BLD AUTO: 1.2 10*3/MM3 (ref 0.7–3.1)
LYMPHOCYTES NFR BLD AUTO: 16.8 % (ref 19.6–45.3)
MCH RBC QN AUTO: 28.3 PG (ref 26.6–33)
MCHC RBC AUTO-ENTMCNC: 31.7 G/DL (ref 31.5–35.7)
MCV RBC AUTO: 89.4 FL (ref 79–97)
MONOCYTES # BLD AUTO: 0.3 10*3/MM3 (ref 0.1–0.9)
MONOCYTES NFR BLD AUTO: 4.3 % (ref 5–12)
NEUTROPHILS # BLD AUTO: 5.5 10*3/MM3 (ref 1.7–7)
NEUTROPHILS NFR BLD AUTO: 78.9 % (ref 42.7–76)
PLATELET # BLD AUTO: 84 10*3/MM3 (ref 140–450)
PMV BLD AUTO: 6.4 FL (ref 6–12)
RBC # BLD AUTO: 3.19 10*6/MM3 (ref 3.77–5.28)
WBC NRBC COR # BLD: 7 10*3/MM3 (ref 3.4–10.8)

## 2020-04-27 PROCEDURE — 36592 COLLECT BLOOD FROM PICC: CPT

## 2020-04-27 PROCEDURE — 97597 DBRDMT OPN WND 1ST 20 CM/<: CPT

## 2020-04-27 PROCEDURE — 85025 COMPLETE CBC W/AUTO DIFF WBC: CPT | Performed by: OBSTETRICS & GYNECOLOGY

## 2020-04-27 NOTE — THERAPY WOUND CARE TREATMENT
Outpatient Rehabilitation - Wound/Debridement Treatment Note  [facility]     Patient Name: Jeana Pollack Juliet, RN  : 1975  MRN: [id number]  Today's Date: 2020                 Admit Date: 2020    Visit Dx:    ICD-10-CM ICD-9-CM   1. Open wound of right foot, subsequent encounter S91.301D V58.89     892.0   2. Type 2 diabetes mellitus with diabetic polyneuropathy, with long-term current use of insulin (CMS/HCC) E11.42 250.60    Z79.4 357.2     V58.67   3. Postoperative wound dehiscence, subsequent encounter T81.31XD V58.89     998.32   4. Open wound of abdomen, subsequent encounter S31.109D V58.89     879.2   RUQ:      Midline ABD:    R foot:      Patient Active Problem List   Diagnosis   • Acute stress reaction with predominately emotional disturbance   • Sleep disturbance   • Cancer related pain   • Moderate episode of recurrent major depressive disorder (CMS/HCC)   • Anxiety   • Iron deficiency anemia   • Type 2 diabetes mellitus with diabetic polyneuropathy, with long-term current use of insulin (CMS/HCC)   • Essential hypertension   • Heart murmur   • Family history of cardiac disorder in mother   • Endometrial cancer (CMS/HCC)   • Stage 3 chronic kidney disease (CMS/HCC)   • Gastroesophageal reflux disease   • Amputation of left great toe (CMS/HCC)   • Amputation of right great toe (CMS/HCC)   • Neuropathy   • Hypotension due to drugs   • Neutropenia (CMS/HCC)   • Acute appendicitis   • Open wound of right foot   • Hyponatremia   • Pancytopenia (CMS/HCC)   • Antineoplastic chemotherapy induced pancytopenia (CMS/HCC)   • Hospital discharge follow-up        Past Medical History:   Diagnosis Date   • Anemia    • Anxiety and depression    • Back pain    • Bronchitis    • Diabetes (CMS/HCC)     DX 4-5 YRS AGO, CHECKS BS 4X/DAY, LAST A1C 7.1%   • Endometrial cancer (CMS/HCC)     STAGE II   • GERD (gastroesophageal reflux disease)    • Hypertension    • MRSA (methicillin resistant staph  aureus) culture positive 2018    S/P NECROTIZING FASCITIS IN BILATERAL FEET   • Necrotizing fasciitis (CMS/HCC)    • PCOS (polycystic ovarian syndrome)    • PTSD (post-traumatic stress disorder)    • Retinopathy 3/18/2020   • Sepsis (CMS/HCC)    • Stage 3 chronic kidney disease (CMS/HCC) 2/24/2020   • Subconjunctival hemorrhage         Past Surgical History:   Procedure Laterality Date   • APPENDECTOMY N/A 3/21/2020    Procedure: APPENDECTOMY LAPAROSCOPIC;  Surgeon: Praful Myers MD;  Location: [facility] OR;  Service: General;  Laterality: N/A;   • EXPLORATORY LAPAROTOMY, TOTAL ABDOMINAL HYSTERECTOMY SALPINGO OOPHORECTOMY N/A 2/10/2020    Procedure: EXPLORATORY LAPAROTOMY, TOTAL ABDOMINAL HYSTERECTOMY, BILATERAL SALPINGO-OOPHORECTOMY WITH OPTIMAL  STAGING (R-0), OMENTECTOMY;  Surgeon: Celine Rubio MD;  Location:  [place] OR;  Service: Gynecology Oncology;  Laterality: N/A;   • EYE SURGERY Left     BLOOD VESSEL IN EYE REPAIR   • TOE AMPUTATION Left 06/2019    big toe - unhealing sore   • TOE AMPUTATION Right 2018    big toe and second toe - Necrotizing fasciitis and MRSA          EVALUATION  PT Ortho     Row Name 04/27/20 1300       Subjective Comments    Subjective Comments  Pt reports foot wound started draining again, so she has kept area covered with gauze, tries to limit weight-bearing.  States RUQ wound is increasingly painful and with increased drainage, still having to change dressing daily.  -       Subjective Pain    Able to rate subjective pain?  yes  -    Pre-Treatment Pain Level  6  -    Post-Treatment Pain Level  7  -       Transfers    Sit-Stand [place] (Transfers)  independent  -    Stand-Sit [place] (Transfers)  independent  -    Comment (Transfers)  supine on stretcher for tx  -       Gait/Stairs Assessment/Training    [place] Level (Gait)  independent  -      User Key  (r) = Recorded By, (t) = Taken By, (c) = Cosigned By    Initials Name Provider Type    TEJA  "Leidy Gilliam PT Physical Therapist          LDA Wound     Row Name 04/27/20 1300             Wound 04/07/20 1100 Right upper;lateral abdomen Abcess    Wound - Properties Group Date first assessed: 04/07/20  - Time first assessed: 1100  - Side: Right  -MF Orientation: upper;lateral  -MF Location: abdomen  -MF Primary Wound Type: Abcess  -MF    Wound Image  Images linked: 2  -JM      Dressing Appearance  intact;moist drainage  -JM      Base  red;slough;yellow;necrotic central area obscured, ?down to fascia/SQ  -JM      Periwound  intact;redness;indurated;moist  -JM      Periwound Temperature  warm  -JM      Periwound Skin Turgor  firm mild induration  -JM      Edges  irregular  -JM      Wound Length (cm)  1 cm  -JM      Wound Width (cm)  2.5 cm  -JM      Wound Depth (cm)  4 cm obscured by necrotic tissue  -JM      Tunneling [Depth (cm)/Location]  5cm @9:00  -JM      Drainage Characteristics/Odor  yellow;tan;creamy;clots;serosanguineous  -      Drainage Amount  moderate;large  -JM      Care, Wound  irrigated with;sterile normal saline;debrided  -      Dressing Care, Wound  dressing applied;antimicrobial agent applied;foam saline-moist HFBc, qwick, 6\" optifoam gentle  -JM      Periwound Care, Wound  cleansed with pH balanced cleanser;dry periwound area maintained  -         Wound 04/07/20 1100 midline abdomen Incision    Wound - Properties Group Date first assessed: 04/07/20  - Time first assessed: 1100  -MF Orientation: midline  - Location: abdomen  - Primary Wound Type: Incision  -MF    Wound Image  Images linked: 2  -JM      Dressing Appearance  intact;moist drainage  -JM      Base  moist;pink;red;epithelialization;clean  -JM      Periwound  intact;dry  -JM      Periwound Temperature  warm  -JM      Periwound Skin Turgor  soft  -JM      Edges  open;irregular  -JM      Wound Length (cm)  1.5 cm  -JM      Wound Width (cm)  0.5 cm  -JM      Wound Depth (cm)  4 cm depth angling superiorly  -      " "Drainage Characteristics/Odor  serosanguineous  -JM      Drainage Amount  small  -JM      Care, Wound  cleansed with;wound cleanser;debrided  -JM      Dressing Care, Wound  dressing applied;antimicrobial agent applied;foam;border dressing dry HFBc, 4\" optifoam gentle  -JM      Periwound Care, Wound  cleansed with pH balanced cleanser;dry periwound area maintained  -JM         Wound 04/07/20 1100 Right medial foot Diabetic Ulcer    Wound - Properties Group Date first assessed: 04/07/20  - Time first assessed: 1100  - Side: Right  - Orientation: medial  - Location: foot  -MF Primary Wound Type: Diabetic [facility]  -    Wound Image  Images linked: 2  -JM      Dressing Appearance  intact;moist drainage gauze/tape  -JM      Base  pink;moist;red small open area after debridement  -      Periwound  intact;pink;moist  -      Periwound Temperature  warm  -      Periwound Skin Turgor  firm  -      Edges  callused  -      Wound Length (cm)  0.2 cm  -JM      Wound Width (cm)  0.4 cm  -      Wound Depth (cm)  0.1 cm  -JM      Drainage Characteristics/Odor  serosanguineous  -JM      Drainage Amount  small  -JM      Care, Wound  cleansed with;wound cleanser;debrided  -JM      Dressing Care, Wound  dressing applied;silver impregnated;foam mepilex ag, primafix tape  -      Periwound Care, Wound  cleansed with pH balanced cleanser;dry periwound area maintained  -JM        User Key  (r) = Recorded By, (t) = Taken By, (c) = Cosigned By    Initials Name Provider Type    Vinny Colindres, PT Physical Therapist    JM Leidy Gilliam, PT Physical Therapist            WOUND DEBRIDEMENT  Total area of Debridement: 4cmsq  Debridement Site 1  Location- Site 1: R upper abdomen   Selective Debridement- Site 1: Wound Surface <20cmsq  Instruments- Site 1: tweezers, scissors  Excised Tissue Description- Site 1: moderate, slough, other (comment)(stringy non-viable tissue, noted suture fragments in slough)  Bleeding- Site 1: " none   Debridement Site 2  Location- Site 2: R foot  Selective Debridement- Site 2: Wound Surface <20cmsq  Instruments- Site 2: #15, scapel, tweezers  Excised Tissue Description- Site 2: maximum, other (comment)(loose callous)  Bleeding- Site 2: none         Therapy Education     Row Name 04/27/20 1300             Therapy Education    Education Details  May reduce dressing change to midline abd to every other day.  Continue daily dressing change for RUQ d/t drainage.  Addition of qwick and larger optifoam dressing to help manage drainage.  Recommended follow-up with surgeon for RUQ wound due to increased depth, drainage, and slough and questionable SQ/fascia in base  -JM      Given  Bandaging/dressing change  -JM      Program  Reinforced  -JM      How Provided  Verbal;Demonstration  -JM      Provided to  Patient  -JM      Level of Understanding  Verbalized;Teach back education performed  -        User Key  (r) = Recorded By, (t) = Taken By, (c) = Cosigned By    Initials Name Provider Type    Leidy Millard, PT Physical Therapist          Recommendation and Plan  PT Assessment/Plan     Row Name 04/27/20 1300          PT Assessment    Functional Limitations  Other (comment);Performance in self-care ADL wound mgmt  -JM     Impairments  Integumentary integrity;Pain  -JM     Assessment Comments  RUQ wound with granulation in periphery but central area now deeper with stringy non-viable yellow tissues in base.  Suture fragments noted from this area, and PT only performed limited debridement of loose stringy slough.  Pt also having increased drainage and pain in this wound.  PT recommended follow-up with her surgeon to assess wound.  Midline abd continuing to improve with less drainage and clean beefy red base.  R foot with new drainage today, had thick callous that had lifted and  from tissues underneath, which were noted to be mostly reepithelialized after callous was debrided.  Dressed area with ag  foam.  Pt may benefit from referral to Dr. Gomez to assess foot and assist with referral for custom inserts for offloading.  -     Rehab Potential  Fair  -     Patient/caregiver participated in establishment of treatment plan and goals  Yes  -     Patient would benefit from skilled therapy intervention  Yes  -        PT Plan    PT Frequency  1x/week  -     Physical Therapy Interventions (Optional Details)  patient/family education;wound care  -     PT Plan Comments  debridement, dressings management  -       User Key  (r) = Recorded By, (t) = Taken By, (c) = Cosigned By    Initials Name Provider Type    Leidy Millard, PT Physical Therapist          Goals  PT OP Goals     Row Name 04/27/20 1300          Time Calculation    PT Goal Re-Cert Due Date  07/06/20  -       User Key  (r) = Recorded By, (t) = Taken By, (c) = Cosigned By    Initials Name Provider Type    Leidy Millard, PT Physical Therapist          PT Goal Re-Cert Due Date: 07/06/20            Time Calculation: Start Time: 1300  Therapy Charges for Today     Code Description Service Date Service Provider Modifiers Qty    01292104385 HC YESSENIA DEBRIDE OPEN WOUND UP TO 20CM 4/27/2020 Leidy Gilliam, PT GP 1                  Leidy Gilliam, PT  4/27/2020

## 2020-04-30 ENCOUNTER — TELEPHONE (OUTPATIENT)
Dept: GYNECOLOGIC ONCOLOGY | Facility: CLINIC | Age: 45
End: 2020-04-30

## 2020-04-30 ENCOUNTER — HOSPITAL ENCOUNTER (OUTPATIENT)
Dept: ONCOLOGY | Facility: HOSPITAL | Age: 45
Setting detail: INFUSION SERIES
Discharge: HOME | End: 2020-04-30

## 2020-04-30 VITALS
RESPIRATION RATE: 20 BRPM | BODY MASS INDEX: 35.15 KG/M2 | HEIGHT: 62 IN | TEMPERATURE: 97.6 F | HEART RATE: 94 BPM | DIASTOLIC BLOOD PRESSURE: 68 MMHG | SYSTOLIC BLOOD PRESSURE: 133 MMHG | WEIGHT: 191 LBS

## 2020-04-30 DIAGNOSIS — G62.9 NEUROPATHY: ICD-10-CM

## 2020-04-30 DIAGNOSIS — C54.1 ENDOMETRIAL CANCER (HCC): ICD-10-CM

## 2020-04-30 DIAGNOSIS — G62.9 NEUROPATHY: Primary | ICD-10-CM

## 2020-04-30 PROCEDURE — G0463 HOSPITAL OUTPT CLINIC VISIT: HCPCS

## 2020-04-30 RX ORDER — GABAPENTIN 300 MG/1
300 CAPSULE ORAL 3 TIMES DAILY
Qty: 104 CAPSULE | Refills: 3 | Status: SHIPPED | OUTPATIENT
Start: 2020-04-30 | End: 2020-08-05 | Stop reason: ALTCHOICE

## 2020-04-30 RX ORDER — TRAMADOL HYDROCHLORIDE 50 MG/1
50 TABLET ORAL EVERY 6 HOURS PRN
Qty: 30 TABLET | Refills: 0 | Status: CANCELLED | OUTPATIENT
Start: 2020-04-30

## 2020-04-30 RX ORDER — TRAMADOL HYDROCHLORIDE 50 MG/1
50 TABLET ORAL EVERY 6 HOURS PRN
Qty: 30 TABLET | Refills: 0 | Status: SHIPPED | OUTPATIENT
Start: 2020-04-30 | End: 2021-12-06

## 2020-04-30 RX ORDER — GABAPENTIN 300 MG/1
300 CAPSULE ORAL 4 TIMES DAILY
Qty: 120 CAPSULE | Refills: 3 | Status: CANCELLED | OUTPATIENT
Start: 2020-04-30

## 2020-04-30 NOTE — TELEPHONE ENCOUNTER
----- Message from Celine Rubio MD sent at 4/28/2020  3:22 PM EDT -----  pls call pt with fall precautions  thanks    ----- Message -----  From: Lab, Background User  Sent: 4/27/2020   1:56 PM EDT  To: Celine Rubio MD    I called pt to review labs with her and instruct on injury / fall precautions.  No answer.  I left message for pt to return my call.

## 2020-05-04 ENCOUNTER — HOSPITAL ENCOUNTER (OUTPATIENT)
Dept: PHYSICAL THERAPY | Facility: HOSPITAL | Age: 45
Setting detail: THERAPIES SERIES
Discharge: HOME | End: 2020-05-04

## 2020-05-04 DIAGNOSIS — S31.109D OPEN WOUND OF ABDOMEN, SUBSEQUENT ENCOUNTER: ICD-10-CM

## 2020-05-04 DIAGNOSIS — T81.31XD POSTOPERATIVE WOUND DEHISCENCE, SUBSEQUENT ENCOUNTER: Primary | ICD-10-CM

## 2020-05-04 DIAGNOSIS — S91.301D OPEN WOUND OF RIGHT FOOT, SUBSEQUENT ENCOUNTER: ICD-10-CM

## 2020-05-04 DIAGNOSIS — Z79.4 TYPE 2 DIABETES MELLITUS WITH DIABETIC POLYNEUROPATHY, WITH LONG-TERM CURRENT USE OF INSULIN (HCC): ICD-10-CM

## 2020-05-04 DIAGNOSIS — E11.42 TYPE 2 DIABETES MELLITUS WITH DIABETIC POLYNEUROPATHY, WITH LONG-TERM CURRENT USE OF INSULIN (HCC): ICD-10-CM

## 2020-05-04 PROCEDURE — 97597 DBRDMT OPN WND 1ST 20 CM/<: CPT | Performed by: PHYSICAL THERAPIST

## 2020-05-04 NOTE — THERAPY PROGRESS REPORT/RE-CERT
Outpatient Rehabilitation - Wound/Debridement Progress Note  [facility]     Patient Name: Jeana Pollack Juliet, RN  : 1975  MRN: [id number]  Today's Date: 2020                 Admit Date: 2020    Visit Dx:    ICD-10-CM ICD-9-CM   1. Postoperative wound dehiscence, subsequent encounter T81.31XD V58.89     998.32   2. Open wound of abdomen, subsequent encounter S31.109D V58.89     879.2   3. Open wound of right foot, subsequent encounter S91.301D V58.89     892.0   4. Type 2 diabetes mellitus with diabetic polyneuropathy, with long-term current use of insulin (CMS/HCC) E11.42 250.60    Z79.4 357.2     V58.67       Patient Active Problem List   Diagnosis   • Acute stress reaction with predominately emotional disturbance   • Sleep disturbance   • Cancer related pain   • Moderate episode of recurrent major depressive disorder (CMS/HCC)   • Anxiety   • Iron deficiency anemia   • Type 2 diabetes mellitus with diabetic polyneuropathy, with long-term current use of insulin (CMS/HCC)   • Essential hypertension   • Heart murmur   • Family history of cardiac disorder in mother   • Endometrial cancer (CMS/HCC)   • Stage 3 chronic kidney disease (CMS/HCC)   • Gastroesophageal reflux disease   • Amputation of left great toe (CMS/HCC)   • Amputation of right great toe (CMS/HCC)   • Neuropathy   • Hypotension due to drugs   • Neutropenia (CMS/HCC)   • Acute appendicitis   • Open wound of right foot   • Hyponatremia   • Pancytopenia (CMS/HCC)   • Antineoplastic chemotherapy induced pancytopenia (CMS/HCC)   • Hospital discharge follow-up        Past Medical History:   Diagnosis Date   • Anemia    • Anxiety and depression    • Back pain    • Bronchitis    • Diabetes (CMS/HCC)     DX 4-5 YRS AGO, CHECKS BS 4X/DAY, LAST A1C 7.1%   • Endometrial cancer (CMS/HCC)     STAGE II   • GERD (gastroesophageal reflux disease)    • Hypertension    • MRSA (methicillin resistant staph aureus) culture positive 2018    S/P  NECROTIZING FASCITIS IN BILATERAL FEET   • Necrotizing fasciitis (CMS/HCC)    • PCOS (polycystic ovarian syndrome)    • PTSD (post-traumatic stress disorder)    • Retinopathy 3/18/2020   • Sepsis (CMS/HCC)    • Stage 3 chronic kidney disease (CMS/HCC) 2/24/2020   • Subconjunctival hemorrhage         Past Surgical History:   Procedure Laterality Date   • APPENDECTOMY N/A 3/21/2020    Procedure: APPENDECTOMY LAPAROSCOPIC;  Surgeon: Praful Myers MD;  Location: [facility] OR;  Service: General;  Laterality: N/A;   • EXPLORATORY LAPAROTOMY, TOTAL ABDOMINAL HYSTERECTOMY SALPINGO OOPHORECTOMY N/A 2/10/2020    Procedure: EXPLORATORY LAPAROTOMY, TOTAL ABDOMINAL HYSTERECTOMY, BILATERAL SALPINGO-OOPHORECTOMY WITH OPTIMAL  STAGING (R-0), OMENTECTOMY;  Surgeon: Celine Rubio MD;  Location: [facility] OR;  Service: Gynecology Oncology;  Laterality: N/A;   • EYE SURGERY Left     BLOOD VESSEL IN EYE REPAIR   • TOE AMPUTATION Left 06/2019    big toe - unhealing sore   • TOE AMPUTATION Right 2018    big toe and second toe - Necrotizing fasciitis and MRSA               PT Ortho     Row Name 05/04/20 8324       Subjective Comments    Subjective Comments  Pt reports just saw Dr Blum, podiatrist for her Rt foot; will have custom inserts soon. Also ordered Santyl for the wound.   -MW       Subjective Pain    Able to rate subjective pain?  yes  -MW    Pre-Treatment Pain Level  0  -MW    Post-Treatment Pain Level  0  -MW    Subjective Pain Comment  denies pain at wounds  -MW       Transfers    Sit-Stand [place] (Transfers)  independent  -MW    Stand-Sit [place] (Transfers)  independent  -MW    Comment (Transfers)  supine on stretcher for tx  -MW       Gait/Stairs Assessment/Training    [place] Level (Gait)  independent  -MW      User Key  (r) = Recorded By, (t) = Taken By, (c) = Cosigned By    Initials Name Provider Type    MW Yvette Ribeiro, PT Physical Therapist          LDA Wound     Row Name 05/04/20 0820     "         Wound 04/07/20 1100 midline abdomen Incision    Wound - Properties Group Date first assessed: 04/07/20  - Time first assessed: 1100  - Orientation: midline  - Location: abdomen  - Primary Wound Type: Incision  -MF    Wound Image  Images linked: 1  -MW      Dressing Appearance  intact;moist drainage  -MW      Base  moist;pink;red;epithelialization;clean  -MW      Periwound  intact;dry  -MW      Periwound Temperature  warm  -MW      Periwound Skin Turgor  soft  -MW      Edges  open;irregular  -MW      Wound Length (cm)  0.9 cm  -MW      Wound Width (cm)  0.4 cm  -MW      Wound Depth (cm)  4.3 cm central depth slides toward 12:00  -MW      Drainage Characteristics/Odor  serosanguineous;creamy;yellow  -MW      Drainage Amount  small  -MW      Care, Wound  cleansed with;wound cleanser  -MW      Dressing Care, Wound  dressing applied;antimicrobial agent applied;foam;border dressing dry HFBc, 4\" optifoam   -MW      Periwound Care, Wound  cleansed with pH balanced cleanser;dry periwound area maintained  -MW         Wound 04/07/20 1100 Right upper;lateral abdomen Abcess    Wound - Properties Group Date first assessed: 04/07/20  - Time first assessed: 1100  -MF Side: Right  -MF Orientation: upper;lateral  - Location: abdomen  - Primary Wound Type: Abcess  -MF    Wound Image  Images linked: 1  -MW      Dressing Appearance  intact;moist drainage  -MW      Base  red;slough;yellow;necrotic central area obscured, ?down to fascia/SQ  -MW      Periwound  intact;redness;indurated;moist  -MW      Periwound Temperature  warm  -MW      Periwound Skin Turgor  firm mild induration; inferior aspect   -MW      Edges  irregular  -MW      Wound Length (cm)  0.6 cm  -MW      Wound Width (cm)  2.5 cm  -MW      Wound Depth (cm)  4.5 cm  -MW      Drainage Characteristics/Odor  yellow;tan;creamy;serosanguineous  -MW      Drainage Amount  moderate  -MW      Care, Wound  cleansed with;wound cleanser;debrided multiple swabs " "with Skintegrity spray   -MW      Dressing Care, Wound  dressing applied;antimicrobial agent applied;foam HFBc damp, Qwick, 6\" optifoam   -MW      Periwound Care, Wound  cleansed with pH balanced cleanser;dry periwound area maintained  -MW         Wound 04/07/20 1100 Right medial foot Diabetic Ulcer    Wound - Properties Group Date first assessed: 04/07/20  - Time first assessed: 1100  -MF Side: Right  -MF Orientation: medial  -MF Location: foot  -MF Primary Wound Type: Diabetic ulc  -MF    Dressing Appearance  intact just dressed by podiatrist; left intact   -MW        User Key  (r) = Recorded By, (t) = Taken By, (c) = Cosigned By    Initials Name Provider Type    Vinny Colindres, PT Physical Therapist    MW Yvette Ribeiro, PT Physical Therapist            WOUND DEBRIDEMENT  Total area of Debridement: ~2 cm2   Debridement Site 1  Location- Site 1: R upper abdomen   Selective Debridement- Site 1: Wound Surface <20cmsq  Instruments- Site 1: tweezers, scissors  Excised Tissue Description- Site 1: minimum, slough(inferior aspect of wound edge )  Bleeding- Site 1: none                 Recommendation and Plan  PT Assessment/Plan     Row Name 05/04/20 1115          PT Assessment    Functional Limitations  Other (comment);Performance in self-care ADL wound mgmt  -MW     Impairments  Integumentary integrity;Pain  -MW     Assessment Comments  RUQ wound with clean pink/red sidewalls, but base with shiny yellow tissue; due to depth vs narrow opening, difficult to determine tissue, note less stringy debris this visit.  Noted slough just below wound edge along inferior aspect, the majority of which was selectively debrided. Pt reports improvement in drainage and pain in this wound. Midline abd wound with clean beefy red sidewalls, and new epithelial growth along edges. Goals have been partially met or are ongoing, except for clean home dressing changes, which is met with assistance from pt's mother. Rt foot wound was " not treated this date as she had just been to Dr Blum podiatrist for care. Cont POC for skilled PT wound care.   -MW     Rehab Potential  Fair  -MW     Patient/caregiver participated in establishment of treatment plan and goals  Yes  -MW     Patient would benefit from skilled therapy intervention  Yes  -MW        PT Plan    PT Frequency  1x/week  -MW     Predicted Duration of Therapy Intervention (Therapy Eval)  8 visits   -MW     Planned CPT's?  PT YESSENIA DEBRIDE OPEN WOUND UP TO 20 CM: 14788;PT NONSELECT DEBRIDE 15 MIN: 27939;PT SELF CARE/MGMT/TRAIN 15 MIN: 76205;PT NLFU MIST: 39473  -MW     Physical Therapy Interventions (Optional Details)  wound care;patient/family education  -MW     PT Plan Comments  debridement, dressings management  -       User Key  (r) = Recorded By, (t) = Taken By, (c) = Cosigned By    Initials Name Provider Type    Yvette Dubois, PT Physical Therapist          Goals  PT OP Goals     Row Name 05/04/20 1115          STG Date to Achieve  05/22/20  -MW    STG 1  Pt to be able to demonstrate home dressing management  -MW    STG 1 Progress  Met  -MW    STG 2  Decrease RUQ depth by 50%   -MW    STG 2 Progress  Partially Met  -MW    STG 3  Pt to have no s/s of infection   -MW    STG 3 Progress  Partially Met  -MW          LTG Date to Achieve  07/06/20  -MW    LTG 1  Decrease RUQ wound size by 75% as evidence of wound healing   -MW    LTG 1 Progress  Ongoing  -MW    LTG 2  Decrease Midline abdomen size by 75% as evidence of wound healing   -MW    LTG 2 Progress  Progressing;Ongoing  -MW          PT Goal Re-Cert Due Date  07/06/20  -      User Key  (r) = Recorded By, (t) = Taken By, (c) = Cosigned By    Initials Name Provider Type    Yvette Dubois, PT Physical Therapist          Time Calculation: Start Time: 1115  Therapy Charges for Today     Code Description Service Date Service Provider Modifiers Qty    [id number] HC YESSENIA DEBRIDE OPEN WOUND UP TO 20CM 5/4/2020 Yvette Ribeiro  PT GP 1                  Yvette Ribeiro, PT  5/4/2020

## 2020-05-07 ENCOUNTER — DOCUMENTATION (OUTPATIENT)
Dept: NUTRITION | Facility: HOSPITAL | Age: 45
End: 2020-05-07

## 2020-05-07 ENCOUNTER — OFFICE VISIT (OUTPATIENT)
Dept: GYNECOLOGIC ONCOLOGY | Facility: CLINIC | Age: 45
End: 2020-05-07

## 2020-05-07 ENCOUNTER — HOSPITAL ENCOUNTER (OUTPATIENT)
Dept: ONCOLOGY | Facility: HOSPITAL | Age: 45
Setting detail: INFUSION SERIES
Discharge: HOME | End: 2020-05-07

## 2020-05-07 VITALS
WEIGHT: 187 LBS | BODY MASS INDEX: 34.19 KG/M2 | HEART RATE: 116 BPM | RESPIRATION RATE: 16 BRPM | TEMPERATURE: 97.1 F | OXYGEN SATURATION: 99 %

## 2020-05-07 VITALS — HEART RATE: 107 BPM | DIASTOLIC BLOOD PRESSURE: 93 MMHG | SYSTOLIC BLOOD PRESSURE: 183 MMHG

## 2020-05-07 DIAGNOSIS — M79.2 NEUROPATHIC PAIN: ICD-10-CM

## 2020-05-07 DIAGNOSIS — M25.50 JOINT PAIN FOLLOWING CHEMOTHERAPY: ICD-10-CM

## 2020-05-07 DIAGNOSIS — T45.1X5A ANTINEOPLASTIC CHEMOTHERAPY INDUCED PANCYTOPENIA (HCC): ICD-10-CM

## 2020-05-07 DIAGNOSIS — E11.42 TYPE 2 DIABETES MELLITUS WITH DIABETIC POLYNEUROPATHY, WITH LONG-TERM CURRENT USE OF INSULIN (HCC): ICD-10-CM

## 2020-05-07 DIAGNOSIS — C54.1 ENDOMETRIAL CANCER (HCC): Primary | ICD-10-CM

## 2020-05-07 DIAGNOSIS — Z79.4 TYPE 2 DIABETES MELLITUS WITH DIABETIC POLYNEUROPATHY, WITH LONG-TERM CURRENT USE OF INSULIN (HCC): ICD-10-CM

## 2020-05-07 DIAGNOSIS — G89.18 JOINT PAIN FOLLOWING CHEMOTHERAPY: ICD-10-CM

## 2020-05-07 DIAGNOSIS — I10 ESSENTIAL HYPERTENSION: ICD-10-CM

## 2020-05-07 DIAGNOSIS — D61.810 ANTINEOPLASTIC CHEMOTHERAPY INDUCED PANCYTOPENIA (HCC): ICD-10-CM

## 2020-05-07 DIAGNOSIS — G47.9 SLEEP DISTURBANCE: ICD-10-CM

## 2020-05-07 PROBLEM — D69.59 CHEMOTHERAPY-INDUCED THROMBOCYTOPENIA: Status: ACTIVE | Noted: 2020-05-07

## 2020-05-07 LAB
ALBUMIN SERPL-MCNC: 3.8 G/DL (ref 3.5–5.2)
ALBUMIN/GLOB SERPL: 1 G/DL
ALP SERPL-CCNC: 96 U/L (ref 39–117)
ALT SERPL W P-5'-P-CCNC: 12 U/L (ref 1–33)
ANION GAP SERPL CALCULATED.3IONS-SCNC: 14 MMOL/L (ref 5–15)
AST SERPL-CCNC: 24 U/L (ref 1–32)
BILIRUB SERPL-MCNC: 0.7 MG/DL (ref 0.2–1.2)
BUN BLD-MCNC: 21 MG/DL (ref 6–20)
BUN/CREAT SERPL: 28.8 (ref 7–25)
CALCIUM SPEC-SCNC: 9.3 MG/DL (ref 8.6–10.5)
CHLORIDE SERPL-SCNC: 101 MMOL/L (ref 98–107)
CO2 SERPL-SCNC: 22 MMOL/L (ref 22–29)
CREAT BLD-MCNC: 0.73 MG/DL (ref 0.57–1)
CREAT BLDA-MCNC: 0.6 MG/DL
ERYTHROCYTE [DISTWIDTH] IN BLOOD BY AUTOMATED COUNT: 23.6 % (ref 12.3–15.4)
GFR SERPL CREATININE-BSD FRML MDRD: 85 ML/MIN/1.73
GLOBULIN UR ELPH-MCNC: 4 GM/DL
GLUCOSE BLD-MCNC: 354 MG/DL (ref 65–99)
GLUCOSE BLDC GLUCOMTR-MCNC: 273 MG/DL (ref 70–130)
HCT VFR BLD AUTO: 32.1 % (ref 34–46.6)
HGB BLD-MCNC: 10.2 G/DL (ref 12–15.9)
LYMPHOCYTES # BLD AUTO: 0.6 10*3/MM3 (ref 0.7–3.1)
LYMPHOCYTES NFR BLD AUTO: 7.8 % (ref 19.6–45.3)
MCH RBC QN AUTO: 29.1 PG (ref 26.6–33)
MCHC RBC AUTO-ENTMCNC: 31.7 G/DL (ref 31.5–35.7)
MCV RBC AUTO: 91.8 FL (ref 79–97)
MONOCYTES # BLD AUTO: 0.1 10*3/MM3 (ref 0.1–0.9)
MONOCYTES NFR BLD AUTO: 1.6 % (ref 5–12)
NEUTROPHILS # BLD AUTO: 7 10*3/MM3 (ref 1.7–7)
NEUTROPHILS NFR BLD AUTO: 90.6 % (ref 42.7–76)
PLATELET # BLD AUTO: 134 10*3/MM3 (ref 140–450)
PMV BLD AUTO: 6.7 FL (ref 6–12)
POTASSIUM BLD-SCNC: 3.8 MMOL/L (ref 3.5–5.2)
PROT SERPL-MCNC: 7.8 G/DL (ref 6–8.5)
RBC # BLD AUTO: 3.49 10*6/MM3 (ref 3.77–5.28)
SODIUM BLD-SCNC: 137 MMOL/L (ref 136–145)
WBC NRBC COR # BLD: 7.7 10*3/MM3 (ref 3.4–10.8)

## 2020-05-07 PROCEDURE — 96372 THER/PROPH/DIAG INJ SC/IM: CPT

## 2020-05-07 PROCEDURE — 82962 GLUCOSE BLOOD TEST: CPT

## 2020-05-07 PROCEDURE — 82565 ASSAY OF CREATININE: CPT

## 2020-05-07 PROCEDURE — 96375 TX/PRO/DX INJ NEW DRUG ADDON: CPT

## 2020-05-07 PROCEDURE — 99215 OFFICE O/P EST HI 40 MIN: CPT | Performed by: OBSTETRICS & GYNECOLOGY

## 2020-05-07 PROCEDURE — 96413 CHEMO IV INFUSION 1 HR: CPT

## 2020-05-07 PROCEDURE — 96415 CHEMO IV INFUSION ADDL HR: CPT

## 2020-05-07 PROCEDURE — 96367 TX/PROPH/DG ADDL SEQ IV INF: CPT

## 2020-05-07 PROCEDURE — 25010000002 DIPHENHYDRAMINE PER 50 MG: Performed by: OBSTETRICS & GYNECOLOGY

## 2020-05-07 PROCEDURE — 80053 COMPREHEN METABOLIC PANEL: CPT | Performed by: OBSTETRICS & GYNECOLOGY

## 2020-05-07 PROCEDURE — 25010000002 PALONOSETRON 0.25 MG/5ML SOLUTION PREFILLED SYRINGE: Performed by: OBSTETRICS & GYNECOLOGY

## 2020-05-07 PROCEDURE — 96377 APPLICATON ON-BODY INJECTOR: CPT

## 2020-05-07 PROCEDURE — 63710000001 INSULIN LISPRO (HUMAN) PER 5 UNITS: Performed by: OBSTETRICS & GYNECOLOGY

## 2020-05-07 PROCEDURE — 25010000002 FOSAPREPITANT PER 1 MG: Performed by: OBSTETRICS & GYNECOLOGY

## 2020-05-07 PROCEDURE — 25010000002 PACLITAXEL PER 30 MG: Performed by: OBSTETRICS & GYNECOLOGY

## 2020-05-07 PROCEDURE — 25010000002 CARBOPLATIN PER 50 MG: Performed by: OBSTETRICS & GYNECOLOGY

## 2020-05-07 PROCEDURE — 25010000002 PEGFILGRASTIM 6 MG/0.6ML PREFILLED SYRINGE KIT: Performed by: OBSTETRICS & GYNECOLOGY

## 2020-05-07 PROCEDURE — 85025 COMPLETE CBC W/AUTO DIFF WBC: CPT | Performed by: OBSTETRICS & GYNECOLOGY

## 2020-05-07 PROCEDURE — 96417 CHEMO IV INFUS EACH ADDL SEQ: CPT

## 2020-05-07 PROCEDURE — 25010000002 DEXAMETHASONE PER 1 MG: Performed by: OBSTETRICS & GYNECOLOGY

## 2020-05-07 RX ORDER — PALONOSETRON 0.05 MG/ML
0.25 INJECTION, SOLUTION INTRAVENOUS ONCE
Status: CANCELLED | OUTPATIENT
Start: 2020-05-07

## 2020-05-07 RX ORDER — PALONOSETRON 0.05 MG/ML
0.25 INJECTION, SOLUTION INTRAVENOUS ONCE
Status: COMPLETED | OUTPATIENT
Start: 2020-05-07 | End: 2020-05-07

## 2020-05-07 RX ORDER — OXYCODONE HYDROCHLORIDE 5 MG/1
5 TABLET ORAL EVERY 6 HOURS PRN
Qty: 20 TABLET | Refills: 0 | Status: SHIPPED | OUTPATIENT
Start: 2020-05-07 | End: 2020-07-08 | Stop reason: ALTCHOICE

## 2020-05-07 RX ORDER — SODIUM CHLORIDE 9 MG/ML
250 INJECTION, SOLUTION INTRAVENOUS ONCE
Status: COMPLETED | OUTPATIENT
Start: 2020-05-07 | End: 2020-05-07

## 2020-05-07 RX ORDER — DIPHENHYDRAMINE HYDROCHLORIDE 50 MG/ML
50 INJECTION INTRAMUSCULAR; INTRAVENOUS AS NEEDED
Status: CANCELLED | OUTPATIENT
Start: 2020-05-07

## 2020-05-07 RX ORDER — FAMOTIDINE 10 MG/ML
20 INJECTION, SOLUTION INTRAVENOUS AS NEEDED
Status: CANCELLED | OUTPATIENT
Start: 2020-05-07

## 2020-05-07 RX ORDER — SODIUM CHLORIDE 9 MG/ML
250 INJECTION, SOLUTION INTRAVENOUS ONCE
Status: CANCELLED | OUTPATIENT
Start: 2020-05-07

## 2020-05-07 RX ORDER — FAMOTIDINE 10 MG/ML
20 INJECTION, SOLUTION INTRAVENOUS ONCE
Status: CANCELLED | OUTPATIENT
Start: 2020-05-07

## 2020-05-07 RX ORDER — FAMOTIDINE 10 MG/ML
20 INJECTION, SOLUTION INTRAVENOUS ONCE
Status: COMPLETED | OUTPATIENT
Start: 2020-05-07 | End: 2020-05-07

## 2020-05-07 RX ADMIN — CARBOPLATIN 570 MG: 10 INJECTION, SOLUTION INTRAVENOUS at 15:40

## 2020-05-07 RX ADMIN — PEGFILGRASTIM 6 MG: KIT SUBCUTANEOUS at 16:16

## 2020-05-07 RX ADMIN — SODIUM CHLORIDE 150 MG: 9 INJECTION, SOLUTION INTRAVENOUS at 12:01

## 2020-05-07 RX ADMIN — SODIUM CHLORIDE 295 MG: 9 INJECTION, SOLUTION INTRAVENOUS at 12:32

## 2020-05-07 RX ADMIN — FAMOTIDINE 20 MG: 10 INJECTION INTRAVENOUS at 11:37

## 2020-05-07 RX ADMIN — PALONOSETRON 0.25 MG: 0.25 INJECTION, SOLUTION INTRAVENOUS at 11:36

## 2020-05-07 RX ADMIN — INSULIN LISPRO 8 UNITS: 100 INJECTION, SOLUTION INTRAVENOUS; SUBCUTANEOUS at 13:53

## 2020-05-07 RX ADMIN — SODIUM CHLORIDE 250 ML: 9 INJECTION, SOLUTION INTRAVENOUS at 11:36

## 2020-05-07 RX ADMIN — DEXAMETHASONE SODIUM PHOSPHATE 20 MG: 4 INJECTION, SOLUTION INTRAMUSCULAR; INTRAVENOUS at 11:37

## 2020-05-07 RX ADMIN — DIPHENHYDRAMINE HYDROCHLORIDE 50 MG: 50 INJECTION INTRAMUSCULAR; INTRAVENOUS at 11:37

## 2020-05-07 RX ADMIN — INSULIN LISPRO 12 UNITS: 100 INJECTION, SOLUTION INTRAVENOUS; SUBCUTANEOUS at 11:52

## 2020-05-07 NOTE — PROGRESS NOTES
Jeana Chung RN  [id number]  1975      Reason for visit: Endometrial cancer, consideration of ongoing chemotherapy, chemotherapy-induced pancytopenia, hospital discharge follow-up, status post appendectomy with appendiceal abscess, cleared for chemotherapy by infectious disease    History of present illness:  The patient is a 45 y.o. year old female who presents today for treatment and evaluation of the above issues.     Today, patient notes neuropathy which is new in her fingers mainly the first 1 to 2 weeks after treatment.  She is having difficulty writing due to this neuropathy.  It has resolved as of the third week after treatment.  She complains of insomnia.  She is tried Ativan, trazodone, and hydroxyzine.  These have not helped and she denies caffeine use.  She has not tried melatonin.  She has not increased her activity as she is seeing a podiatrist and had a blister on her right foot for which she needed wound care.  She has significant diabetes which contributes to these issues.  She still undergoing wound care for her abdominal wounds related to her laparotomy and subsequent appendicitis complicated by abscess.  She has bone pain after Neulasta and is requesting a refill on oxycodone.  She looks well and notes good energy.  She has no complaints regarding bowel, bladder, oral intake.    OBGYN History:  She is a .  She does not use HRT. She has never had a pap smear.      Oncologic History:     Endometrial cancer (CMS/HCC)    2019 Imaging     Presented to ED in [place] due to progressively heavy vaginal bleeding and severe back pain. Ultrasound revealed uterine and cervical masses.      2020 Biopsy     ED follow-up with gynecologist in [place], told she likely has cervical cancer. Insufficient tissue on attempted cervical biopsy. Patient moved to [place], [place] to be closer to mother and sister for work-up and treatment.       2020 Imaging     [facility]yn evaluation at [facility]. Repeat TVUS showed cervical mass, right adnexal mass, and large uterine fibroid vs mass. Referred to Gyn Oncology      1/23/2020 Initial Diagnosis     Endometrial cancer (CMS/HCC)  Cervical biopsy positive for infiltrating endometrioid adenocarcinoma      1/30/2020 Imaging     CT chest, abdomen, pelvis showed enlarged uterus with multiple masses and 4 cm cystic/solid mass at right ovary. No metastatic disease noted in abdomen or chest.      2/10/2020 Surgery     Exploratory laparotomy, total abdominal hysterectomy, bilateral salpingo-oophorectomy, with optimal debulking (R=0), and omentectomy.    Pelvic washing cytology positive for malignant cells, consistent with adenocarcinoma. Final pathology showed large grade 3 endometrioid tumor with lymphvascular invasion at the uterus. Cervical stromal involvement, vaginal margin negative. Right tube and ovary involved. Left tube and ovary negative, omentum negative. MSI normal. [id number], Stage IIIA grade 3        3/9/2020 -  Chemotherapy     OP UTERINE PACLitaxel / CARBOplatin (Q21D)  Cycle #1 complicated by pancytopenia, appendicitis, appendectomy, appendiceal abscess  Cycle #2 remarkable for thrombocytopenia, worsening neuropathy.  Dose modification with cycle #3.        3/17/2020 - 3/23/2020 Other Event     Hospital admission with acute appendicitis           Past Medical History:   Diagnosis Date   • Anemia    • Anxiety and depression    • Back pain    • Bronchitis    • Diabetes (CMS/HCC)     DX 4-5 YRS AGO, CHECKS BS 4X/DAY, LAST A1C 7.1%   • Endometrial cancer (CMS/HCC)     STAGE II   • GERD (gastroesophageal reflux disease)    • Hypertension    • MRSA (methicillin resistant staph aureus) culture positive 2018    S/P NECROTIZING FASCITIS IN BILATERAL FEET   • Necrotizing fasciitis (CMS/HCC)    • PCOS (polycystic ovarian syndrome)    • PTSD (post-traumatic stress disorder)    • Retinopathy 3/18/2020   • Sepsis (CMS/HCC)    • Stage 3 chronic kidney disease  (CMS/[facility]) 2020   • Subconjunctival hemorrhage        Past Surgical History:   Procedure Laterality Date   • APPENDECTOMY N/A 3/21/2020    Procedure: APPENDECTOMY LAPAROSCOPIC;  Surgeon: Praful Myers MD;  Location: [facility] OR;  Service: General;  Laterality: N/A;   • EXPLORATORY LAPAROTOMY, TOTAL ABDOMINAL HYSTERECTOMY SALPINGO OOPHORECTOMY N/A 2/10/2020    Procedure: EXPLORATORY LAPAROTOMY, TOTAL ABDOMINAL HYSTERECTOMY, BILATERAL SALPINGO-OOPHORECTOMY WITH OPTIMAL  STAGING (R-0), OMENTECTOMY;  Surgeon: Celine Rubio MD;  Location: [facility] OR;  Service: Gynecology Oncology;  Laterality: N/A;   • EYE SURGERY Left     BLOOD VESSEL IN EYE REPAIR   • TOE AMPUTATION Left 2019    big toe - unhealing sore   • TOE AMPUTATION Right 2018    big toe and second toe - Necrotizing fasciitis and MRSA        MEDICATIONS: The current medication list was reviewed with the patient and updated in the EMR this date per the Medical Assistant. Medication dosages and frequencies were confirmed to be accurate.      Allergies:  is allergic to bactrim [sulfamethoxazole-trimethoprim]; penicillins; and promethazine.    Social History:   Social History     Socioeconomic History   • Marital status:      Spouse name: Not on file   • Number of children: 1   • Years of education: Not on file   • Highest education level: Not on file   Tobacco Use   • Smoking status: Former Smoker     Types: Cigarettes     Last attempt to quit: 2000     Years since quittin.3   • Smokeless tobacco: Never Used   • Tobacco comment: social MAYBE 1 CIG/DAY   Substance and Sexual Activity   • Alcohol use: Not Currently     Frequency: Monthly or less     Drinks per session: 1 or 2   • Drug use: Never   • Sexual activity: Not Currently   Social History Narrative    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 Staying in [place] with mother due to Endometrial Cancer.        Family History:    Family History   Problem Relation Age of  Onset   • Fibroids Mother    • Diabetes type II Mother    • Hypertension Mother    • Heart attack Mother    • Fibroids Sister         PCOS   • Diabetes type II Sister    • Dementia Maternal Grandmother    • Stroke Maternal Grandmother        Health Maintenance:    Health Maintenance   Topic Date Due   • TDAP/TD VACCINES (1 - Tdap) 04/24/1984   • HEPATITIS C SCREENING  01/22/2020   • MAMMOGRAM  02/24/2020   • INFLUENZA VACCINE  08/01/2020   • HEMOGLOBIN A1C  08/07/2020   • URINE MICROALBUMIN  01/30/2021   • PNEUMOCOCCAL VACCINE (19-64 HIGHEST RISK) (2 of 3 - PCV13) 02/24/2021   • DIABETIC FOOT EXAM  02/24/2021   • DIABETIC EYE EXAM  02/24/2021   • ANNUAL PHYSICAL  02/25/2021   • PAP SMEAR  Discontinued         Review of Systems   Constitutional: Positive for fatigue. Negative for appetite change, chills, fever and unexpected weight change.   HENT: Negative for ear discharge, ear pain, hearing loss, mouth sores, nosebleeds, postnasal drip, sinus pressure, sinus pain, sore throat, tinnitus and trouble swallowing.    Eyes: Positive for visual disturbance. Negative for pain and itching.        Blurry vision   Respiratory: Negative for cough, shortness of breath and wheezing.    Cardiovascular: Negative for chest pain and palpitations.   Gastrointestinal: Negative for abdominal pain, blood in stool, constipation, diarrhea, nausea and vomiting.   Endocrine: Positive for heat intolerance. Negative for cold intolerance.   Genitourinary: Negative for difficulty urinating, flank pain, frequency, hematuria, urgency, vaginal bleeding and vaginal discharge.   Musculoskeletal: Negative for arthralgias, gait problem and myalgias.   Skin: Positive for wound. Negative for color change and rash.   Allergic/Immunologic: Negative for immunocompromised state.   Neurological: Positive for numbness. Negative for dizziness, seizures, syncope and headaches.   Hematological: Negative for adenopathy. Does not bruise/bleed easily.        Anemia    Psychiatric/Behavioral: Positive for sleep disturbance. Negative for agitation, confusion and dysphoric mood. The patient is nervous/anxious.        Physical Exam    Vitals:    05/07/20 0958   Pulse: 116   Resp: 16   Temp: 97.1 °F (36.2 °C)   TempSrc: Temporal   SpO2: 99%   Weight: 84.8 kg (187 lb)   PainSc:   3   PainLoc: Foot  Comment: bilateral toes - neuropathy     Body mass index is 34.19 kg/m².    Wt Readings from Last 3 Encounters:   05/07/20 84.8 kg (187 lb)   04/30/20 86.6 kg (191 lb)   04/27/20 85.7 kg (189 lb)         GENERAL: Alert, well-appearing female appearing her stated age who is in no apparent distress.  Patient is obese by BMI criteria.  HEENT: Sclera anicteric. Head normocephalic, atraumatic. Mucus membranes moist.  Alopecia  NECK: Supple, free from thyromegaly  BREASTS: Deferred  CARDIOVASCULAR: Regular rate and rhythm without murmur rub or gallop, no extremity edema  RESPIRATORY: Clear to auscultation bilaterally, normal effort  BACK: No CVA tenderness, no vertebral tenderness on palpation  GASTROINTESTINAL: Abdomen soft, nontender, no rebound, no guarding.  No palpable mass.  Incisions clean and packing in place.  No associated induration or erythema suggestive of ongoing cellulitis.  SKIN:  Warm, dry, well-perfused.  All visible areas intact.  No rashes, lesions, ulcers.  PSYCHIATRIC: AO x3, with appropriate affect, normal thought processes.  NEUROLOGIC: No focal deficits. Moves extremities well.  MUSCULOSKELETAL: Normal gait and station.  Brace on right ankle/foot.  EXTREMITIES:   No cyanosis, clubbing, symmetric.  LYMPHATICS: No adenopathy bilateral neck and groin areas     PELVIC exam:    Deferred    ECOG PS 1    PROCEDURES:    none    Diagnostic Data:      No radiology results for the last 30 days.      Lab Results   Component Value Date    WBC 7.70 05/07/2020    HGB 10.2 (L) 05/07/2020    HCT 32.1 (L) 05/07/2020    MCV 91.8 05/07/2020     (L) 05/07/2020    NEUTROABS 7.00  05/07/2020    GLUCOSE 354 (H) 05/07/2020    BUN 21 (H) 05/07/2020    CREATININE 0.60 05/07/2020    EGFRIFNONA 85 05/07/2020     05/07/2020    K 3.8 05/07/2020     05/07/2020    CO2 22.0 05/07/2020    MG 2.2 03/22/2020    PHOS 2.5 03/20/2020    CALCIUM 9.3 05/07/2020    ALBUMIN 3.80 05/07/2020    AST 24 05/07/2020    ALT 12 05/07/2020    BILITOT 0.7 05/07/2020     No results found for:         Assessment/Plan   This is a 45 y.o. woman who presents for toxicity evaluation and consideration of ongoing chemotherapy for advanced endometrial cancer.  Encounter Diagnoses   Name Primary?   • Endometrial cancer (CMS/HCC) Yes   • Joint pain following chemotherapy    • Neuropathic pain    • Antineoplastic chemotherapy induced pancytopenia (CMS/HCC)    • Type 2 diabetes mellitus with diabetic polyneuropathy, with long-term current use of insulin (CMS/HCC)    • Essential hypertension    • Sleep disturbance          Endometrial cancer: Stage IIIa due to adnexal involvement  -Status post surgery as noted above  -Consideration of cycle #2 of combined carboplatin plus paclitaxel chemotherapy IV q. 21 days with a goal of 6  -Dose modification with cycle #2 (29% reduction in carboplatin) due to pancytopenia, acute appendicitis requiring surgical intervention and hospital stay: Cleared for chemotherapy by infectious disease    Chemotherapy induced pancytopenia with cycle #1  -Dose modification with cycle #2   -Dose modification of carboplatin by 10% of current dose with cycle #3 due to ongoing issues with chemotherapy-induced thrombocytopenia.    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  Patient having difficulty writing first 1 to 2 weeks after treatment.  Decrease carboplatin and paclitaxel by 10%.    Emotional distress related to cancer diagnosis  Has seen counselor  Has Ambien     Complex social issues  -Staying with her  family in [place] for treatment    Joint pain, neuro pathic pain  Oxycodone again prescribed.    Chronic Medical Conditions  -T2DM, HTN, steatohepatitis:  ?  Esophageal varicosities and HORNE, history of necrotizing fasciitis.  May require referral to gastroenterologist.  -Has seen endocrinology regarding her diabetes and new regimen was proposed for the jorge-chemotherapy timeframe due to steroid-induced hyperglycemia.  -Sliding scale insulin built into chemotherapy treatment plan due to diabetes mellitus with hyperglycemia  -Vasotec as needed with infusion due to hypertension    Pain assessment was performed today as a part of patient’s care.  For patients with pain related to surgery, gynecologic malignancy or cancer treatment, the plan is as noted in the assessment/plan.  For patients with pain not related to these issues, they are to seek any further needed care from a more appropriate provider, such as PCP.    Orders Placed This Encounter   Procedures   • Comprehensive metabolic panel     Standing Status:   Future     Number of Occurrences:   1     Standing Expiration Date:   5/7/2021   • CBC and Differential     Standing Status:   Future     Number of Occurrences:   1     Standing Expiration Date:   5/7/2021     Order Specific Question:   Manual Differential     Answer:   No   • CBC & Differential     Standing Status:   Future     Standing Expiration Date:   5/18/2021     Order Specific Question:   Manual Differential     Answer:   No       FOLLOW UP: In 3 weeks for consideration of ongoing chemotherapy    Celine Rubio MD  05/07/20  1:09 PM

## 2020-05-07 NOTE — PROGRESS NOTES
Onc Nutrition    Patient: Jeana Jaki Chung RN  YOB: 1975    Weight: 187# - ~5# weight loss since last RD visit  Nutrition Symptoms: none    Follow up with patient during her infusion appointment.  She states she received written diet materials at previous infusion appointment.  Patient reports overall her appetite / oral intake have been good.  She states her weight has been fluctuating but denies significant weight changes.  She reports her blood sugars continue to be elevated with steroids but she is increases her insulin per her endocrinologist recommendations.  She denies nutritional complaints at this time.    Encouraged her to continue with her good oral intake and to drink ONS as needed to aid with calorie and protein intake.  Also encouraged her to continue to monitor her blood sugars and adjust insulin as needed per MD orders.    Answered her questions and she voiced understanding of information discussed.  Encouraged to call RD with questions.  Will monitor as needed during her treatment course.    Starr Pratt RD  05/07/20

## 2020-05-11 LAB — CREAT BLDA-MCNC: 0.6 MG/DL (ref 0.6–1.3)

## 2020-05-14 ENCOUNTER — HOSPITAL ENCOUNTER (OUTPATIENT)
Dept: PHYSICAL THERAPY | Facility: HOSPITAL | Age: 45
Setting detail: THERAPIES SERIES
Discharge: HOME | End: 2020-05-14

## 2020-05-14 ENCOUNTER — TELEPHONE (OUTPATIENT)
Dept: PHYSICAL THERAPY | Facility: HOSPITAL | Age: 45
End: 2020-05-14

## 2020-05-14 ENCOUNTER — HOSPITAL ENCOUNTER (OUTPATIENT)
Dept: ONCOLOGY | Facility: HOSPITAL | Age: 45
Setting detail: INFUSION SERIES
Discharge: HOME | End: 2020-05-14

## 2020-05-14 ENCOUNTER — APPOINTMENT (OUTPATIENT)
Dept: LAB | Facility: HOSPITAL | Age: 45
End: 2020-05-14

## 2020-05-14 VITALS
TEMPERATURE: 97.6 F | SYSTOLIC BLOOD PRESSURE: 121 MMHG | WEIGHT: 189 LBS | HEART RATE: 91 BPM | RESPIRATION RATE: 16 BRPM | HEIGHT: 62 IN | DIASTOLIC BLOOD PRESSURE: 59 MMHG | BODY MASS INDEX: 34.78 KG/M2

## 2020-05-14 DIAGNOSIS — T81.31XD POSTOPERATIVE WOUND DEHISCENCE, SUBSEQUENT ENCOUNTER: Primary | ICD-10-CM

## 2020-05-14 DIAGNOSIS — S31.109D OPEN WOUND OF ABDOMEN, SUBSEQUENT ENCOUNTER: ICD-10-CM

## 2020-05-14 DIAGNOSIS — C54.1 ENDOMETRIAL CANCER (HCC): ICD-10-CM

## 2020-05-14 PROCEDURE — G0463 HOSPITAL OUTPT CLINIC VISIT: HCPCS

## 2020-05-14 PROCEDURE — 87075 CULTR BACTERIA EXCEPT BLOOD: CPT | Performed by: OBSTETRICS & GYNECOLOGY

## 2020-05-14 PROCEDURE — 97597 DBRDMT OPN WND 1ST 20 CM/<: CPT

## 2020-05-14 PROCEDURE — 87070 CULTURE OTHR SPECIMN AEROBIC: CPT | Performed by: OBSTETRICS & GYNECOLOGY

## 2020-05-14 PROCEDURE — 87205 SMEAR GRAM STAIN: CPT | Performed by: OBSTETRICS & GYNECOLOGY

## 2020-05-14 PROCEDURE — 87186 SC STD MICRODIL/AGAR DIL: CPT | Performed by: OBSTETRICS & GYNECOLOGY

## 2020-05-14 NOTE — THERAPY WOUND CARE TREATMENT
Outpatient Rehabilitation - Wound/Debridement Treatment Note  [facility]     Patient Name: Jeana Pollack Juliet, RN  : 1975  MRN: [id number]  Today's Date: 2020                 Admit Date: 2020    Visit Dx:    ICD-10-CM ICD-9-CM   1. Postoperative wound dehiscence, subsequent encounter T81.31XD V58.89     998.32   2. Open wound of abdomen, subsequent encounter S31.109D V58.89     879.2       Patient Active Problem List   Diagnosis   • Acute stress reaction with predominately emotional disturbance   • Sleep disturbance   • Cancer related pain   • Moderate episode of recurrent major depressive disorder (CMS/HCC)   • Anxiety   • Iron deficiency anemia   • Type 2 diabetes mellitus with diabetic polyneuropathy, with long-term current use of insulin (CMS/HCC)   • Essential hypertension   • Heart murmur   • Family history of cardiac disorder in mother   • Endometrial cancer (CMS/HCC)   • Stage 3 chronic kidney disease (CMS/HCC)   • Gastroesophageal reflux disease   • Amputation of left great toe (CMS/HCC)   • Amputation of right great toe (CMS/HCC)   • Neuropathy   • Hypotension due to drugs   • Neutropenia (CMS/HCC)   • Acute appendicitis   • Open wound of right foot   • Hyponatremia   • Pancytopenia (CMS/HCC)   • Antineoplastic chemotherapy induced pancytopenia (CMS/HCC)   • Chemotherapy-induced thrombocytopenia        Past Medical History:   Diagnosis Date   • Anemia    • Anxiety and depression    • Back pain    • Bronchitis    • Diabetes (CMS/HCC)     DX 4-5 YRS AGO, CHECKS BS 4X/DAY, LAST A1C 7.1%   • Endometrial cancer (CMS/HCC)     STAGE II   • GERD (gastroesophageal reflux disease)    • Hypertension    • MRSA (methicillin resistant staph aureus) culture positive 2018    S/P NECROTIZING FASCITIS IN BILATERAL FEET   • Necrotizing fasciitis (CMS/HCC)    • PCOS (polycystic ovarian syndrome)    • PTSD (post-traumatic stress disorder)    • Retinopathy 3/18/2020   • Sepsis (CMS/HCC)    • Stage 3  chronic kidney disease (CMS/HCC) 2/24/2020   • Subconjunctival hemorrhage         Past Surgical History:   Procedure Laterality Date   • APPENDECTOMY N/A 3/21/2020    Procedure: APPENDECTOMY LAPAROSCOPIC;  Surgeon: Praful Myers MD;  Location: [facility] OR;  Service: General;  Laterality: N/A;   • EXPLORATORY LAPAROTOMY, TOTAL ABDOMINAL HYSTERECTOMY SALPINGO OOPHORECTOMY N/A 2/10/2020    Procedure: EXPLORATORY LAPAROTOMY, TOTAL ABDOMINAL HYSTERECTOMY, BILATERAL SALPINGO-OOPHORECTOMY WITH OPTIMAL  STAGING (R-0), OMENTECTOMY;  Surgeon: Celine Rubio MD;  Location:  [facility] OR;  Service: Gynecology Oncology;  Laterality: N/A;   • EYE SURGERY Left     BLOOD VESSEL IN EYE REPAIR   • TOE AMPUTATION Left 06/2019    big toe - unhealing sore   • TOE AMPUTATION Right 2018    big toe and second toe - Necrotizing fasciitis and MRSA          EVALUATION  PT Ortho     Row Name 05/14/20 1430       Subjective Comments    Subjective Comments  States RUQ wound still draining a lot, doesn't seem to have changed since last tx.  -       Subjective Pain    Able to rate subjective pain?  yes  -    Pre-Treatment Pain Level  0  -    Post-Treatment Pain Level  0  -    Subjective Pain Comment  tender RUQ with packing and debridement  -       Transfers    Sit-Stand [place] (Transfers)  independent  -    Stand-Sit [place] (Transfers)  independent  -    Comment (Transfers)  supine on stretcher for tx  -       Gait/Stairs Assessment/Training    [place] Level (Gait)  independent  -      User Key  (r) = Recorded By, (t) = Taken By, (c) = Cosigned By    Initials Name Provider Type    Leidy Millard, PT Physical Therapist          [facility] Wound     Row Name 05/14/20 1430             Wound 04/07/20 1100 midline abdomen Incision    Wound - Properties Group Date first assessed: 04/07/20  -MF Time first assessed: 1100  -MF Orientation: midline  -MF Location: abdomen  -MF Primary Wound Type: Incision  -MF     "Wound Image  Images linked: 1  -JM      Dressing Appearance  intact;moist drainage  -JM      Base  moist;pink;red;epithelialization;clean  -JM      Periwound  intact;dry  -JM      Periwound Temperature  warm  -JM      Periwound Skin Turgor  soft  -JM      Edges  open;irregular  -JM      Wound Length (cm)  1 cm  -JM      Wound Width (cm)  0.5 cm  -JM      Wound Depth (cm)  4 cm  -JM      Drainage Characteristics/Odor  serosanguineous;yellow  -JM      Drainage Amount  small  -JM      Care, Wound  cleansed with;wound cleanser;debrided  -JM      Dressing Care, Wound  dressing applied;collagen;foam;border dressing osmel, 4\" optifoam gentle  -JM      Periwound Care, Wound  cleansed with pH balanced cleanser;dry periwound area maintained  -JM         Wound 04/07/20 1100 Right upper;lateral abdomen Abcess    Wound - Properties Group Date first assessed: 04/07/20  - Time first assessed: 1100  - Side: Right  - Orientation: upper;lateral  - Location: abdomen  - Primary Wound Type: Abcess  -    Dressing Appearance  intact;moist drainage  -JM      Base  red;slough;yellow;necrotic central area obscured, ?down to fascia/SQ  -JM      Periwound  intact;redness;indurated;moist  -JM      Periwound Temperature  warm  -JM      Periwound Skin Turgor  firm mild induration; inferior aspect   -JM      Edges  irregular  -JM      Wound Length (cm)  1 cm  -JM      Wound Width (cm)  2.3 cm  -JM      Wound Depth (cm)  4.7 cm  -JM      Drainage Characteristics/Odor  yellow;tan;creamy;serosanguineous culture swabs obtained  -JM      Drainage Amount  moderate  -JM      Care, Wound  cleansed with;wound cleanser;debrided  -JM      Dressing Care, Wound  dressing applied;collagen;antimicrobial agent applied;foam;border dressing osmel, moist-HFBc, qwick, 6\" optifoam gentle  -JM      Periwound Care, Wound  cleansed with pH balanced cleanser;dry periwound area maintained  -JM        User Key  (r) = Recorded By, (t) = Taken By, (c) = Cosigned " By    Initials Name Provider Type    SEDRIKC Luis AlfredoVinny, PT Physical Therapist    Leidy Millard, PT Physical Therapist            WOUND DEBRIDEMENT  Total area of Debridement: 2cmsq  Debridement Site 1  Location- Site 1: R upper abdomen   Selective Debridement- Site 1: Wound Surface <20cmsq  Instruments- Site 1: tweezers, scissors  Excised Tissue Description- Site 1: moderate, slough(stringy slough)  Bleeding- Site 1: scant             Therapy Education     Row Name 05/14/20 1300             Therapy Education    Education Details  Continue with every other day dressing change for midline abd, daily PRN for drainage RUQ.  -JM      Given  Bandaging/dressing change  -JM      Program  Reinforced  -JM      How Provided  Verbal;Demonstration  -JM      Provided to  Patient  -JM      Level of Understanding  Verbalized;Teach back education performed  -TEJA        User Key  (r) = Recorded By, (t) = Taken By, (c) = Cosigned By    Initials Name Provider Type    Leidy Millard, PT Physical Therapist          Recommendation and Plan  PT Assessment/Plan     Row Name 05/14/20 1300          PT Assessment    Functional Limitations  Other (comment);Performance in self-care ADL wound mgmt  -     Impairments  Integumentary integrity;Pain  -JM     Assessment Comments  RUQ wound without improvement since last tx, still with stringy slough and nonviable tissue in base that may require more intense debridement than PT can provide as OP.  PT also obtained wound cultures of RUQ wound due to lack of healing and persistent drainage and redness (VO from Dr. Rubio's office).  PT will also contact Dr. Myers's office to assess RUQ wound need for debridement.  Midline abd wound also stable, but is clean without S&S of infection.  PT d/c'd HFB packing and only packed with osmel today to help promote closure of remaining areas.  Will continue with weekly tx for debridement and dressings management.  -JM        PT Plan    PT  Frequency  1x/week  -TEJA     Physical Therapy Interventions (Optional Details)  patient/family education;wound care  -     PT Plan Comments  debridement, check on cultures  -       User Key  (r) = Recorded By, (t) = Taken By, (c) = Cosigned By    Initials Name Provider Type    Leidy Millard, PT Physical Therapist          Goals  PT OP Goals     Row Name 05/14/20 1430          Time Calculation    PT Goal Re-Cert Due Date  07/06/20  -       User Key  (r) = Recorded By, (t) = Taken By, (c) = Cosigned By    Initials Name Provider Type    Leidy Millard, PT Physical Therapist          PT Goal Re-Cert Due Date: 07/06/20            Time Calculation: Start Time: 1430  Therapy Charges for Today     Code Description Service Date Service Provider Modifiers Qty    70511465310 HC YESSENIA DEBRIDE OPEN WOUND UP TO 20CM 5/14/2020 Leidy Gilliam, PT GP 1                  Leidy Gilliam, PT  5/14/2020

## 2020-05-14 NOTE — TELEPHONE ENCOUNTER
Patient was seen at PT wound care today.  RUQ wound still has stringy necrotic tissue in base that may require more intense debridement than PT can provide in office, measurements have not changed in last 2 weeks.  VM left at Dr. Myers's office requesting patient to be assessed for need for debridement.  PT also obtained wound cultures today to rule out any infectious process that may be hindering wound healing.  We will continue to treat patient weekly for debridement and dressings management.  Please contact PT wound care if you have any questions about treatment.  [phone]  Leidy Gilliam, PT 5/14/2020 15:49

## 2020-05-15 ENCOUNTER — TELEPHONE (OUTPATIENT)
Dept: GYNECOLOGIC ONCOLOGY | Facility: CLINIC | Age: 45
End: 2020-05-15

## 2020-05-15 NOTE — TELEPHONE ENCOUNTER
I called patient to check on her regarding wound culture growing E. coli.  She thinks that her wounds continue to improve.  She has no complaints of being ill.  She denies cellulitis.    I discussed this also with Dr. Abrahan Talley who recommended ongoing observation.  I notified patient.  No further action needed at this point.

## 2020-05-16 LAB
BACTERIA SPEC AEROBE CULT: ABNORMAL
GRAM STN SPEC: ABNORMAL

## 2020-05-18 ENCOUNTER — HOSPITAL ENCOUNTER (OUTPATIENT)
Dept: PHYSICAL THERAPY | Facility: HOSPITAL | Age: 45
Setting detail: THERAPIES SERIES
Discharge: HOME | End: 2020-05-18

## 2020-05-18 ENCOUNTER — TELEPHONE (OUTPATIENT)
Dept: GYNECOLOGIC ONCOLOGY | Facility: CLINIC | Age: 45
End: 2020-05-18

## 2020-05-18 ENCOUNTER — HOSPITAL ENCOUNTER (OUTPATIENT)
Dept: ONCOLOGY | Facility: HOSPITAL | Age: 45
Setting detail: INFUSION SERIES
Discharge: HOME | End: 2020-05-18

## 2020-05-18 VITALS
DIASTOLIC BLOOD PRESSURE: 60 MMHG | WEIGHT: 191 LBS | BODY MASS INDEX: 35.15 KG/M2 | RESPIRATION RATE: 16 BRPM | SYSTOLIC BLOOD PRESSURE: 119 MMHG | TEMPERATURE: 97 F | HEART RATE: 94 BPM | HEIGHT: 62 IN

## 2020-05-18 DIAGNOSIS — T81.31XD POSTOPERATIVE WOUND DEHISCENCE, SUBSEQUENT ENCOUNTER: Primary | ICD-10-CM

## 2020-05-18 DIAGNOSIS — S31.109D OPEN WOUND OF ABDOMEN, SUBSEQUENT ENCOUNTER: ICD-10-CM

## 2020-05-18 DIAGNOSIS — C54.1 ENDOMETRIAL CANCER (HCC): ICD-10-CM

## 2020-05-18 LAB
ERYTHROCYTE [DISTWIDTH] IN BLOOD BY AUTOMATED COUNT: 20.6 % (ref 12.3–15.4)
HCT VFR BLD AUTO: 26.1 % (ref 34–46.6)
HGB BLD-MCNC: 8.3 G/DL (ref 12–15.9)
LYMPHOCYTES # BLD AUTO: 1.3 10*3/MM3 (ref 0.7–3.1)
LYMPHOCYTES NFR BLD AUTO: 17.7 % (ref 19.6–45.3)
MCH RBC QN AUTO: 29.8 PG (ref 26.6–33)
MCHC RBC AUTO-ENTMCNC: 31.6 G/DL (ref 31.5–35.7)
MCV RBC AUTO: 94.4 FL (ref 79–97)
MONOCYTES # BLD AUTO: 0.3 10*3/MM3 (ref 0.1–0.9)
MONOCYTES NFR BLD AUTO: 3.9 % (ref 5–12)
NEUTROPHILS # BLD AUTO: 5.8 10*3/MM3 (ref 1.7–7)
NEUTROPHILS NFR BLD AUTO: 78.4 % (ref 42.7–76)
PLATELET # BLD AUTO: 87 10*3/MM3 (ref 140–450)
PMV BLD AUTO: 6.8 FL (ref 6–12)
RBC # BLD AUTO: 2.77 10*6/MM3 (ref 3.77–5.28)
WBC NRBC COR # BLD: 7.4 10*3/MM3 (ref 3.4–10.8)

## 2020-05-18 PROCEDURE — 97597 DBRDMT OPN WND 1ST 20 CM/<: CPT | Performed by: PHYSICAL THERAPIST

## 2020-05-18 PROCEDURE — 36592 COLLECT BLOOD FROM PICC: CPT

## 2020-05-18 PROCEDURE — 85025 COMPLETE CBC W/AUTO DIFF WBC: CPT | Performed by: OBSTETRICS & GYNECOLOGY

## 2020-05-18 NOTE — PROGRESS NOTES
Frida Dorsey RN notified Saima WARD of patients hemoglobin 8.3.  Pt denies any shortness of breath dizziness or heart palpitations. Per Saima no transfusion at this time.  Pt instructed to call the office if she develops any symptoms.

## 2020-05-18 NOTE — THERAPY WOUND CARE TREATMENT
Outpatient Rehabilitation - Wound/Debridement Treatment Note  [facility]     Patient Name: Jeana Pollack Juliet, RN  : 1975  MRN: [id number]  Today's Date: 2020                 Admit Date: 2020    Visit Dx:    ICD-10-CM ICD-9-CM   1. Postoperative wound dehiscence, subsequent encounter T81.31XD V58.89     998.32   2. Open wound of abdomen, subsequent encounter S31.109D V58.89     879.2       Patient Active Problem List   Diagnosis   • Acute stress reaction with predominately emotional disturbance   • Sleep disturbance   • Cancer related pain   • Moderate episode of recurrent major depressive disorder (CMS/HCC)   • Anxiety   • Iron deficiency anemia   • Type 2 diabetes mellitus with diabetic polyneuropathy, with long-term current use of insulin (CMS/HCC)   • Essential hypertension   • Heart murmur   • Family history of cardiac disorder in mother   • Endometrial cancer (CMS/HCC)   • Stage 3 chronic kidney disease (CMS/HCC)   • Gastroesophageal reflux disease   • Amputation of left great toe (CMS/HCC)   • Amputation of right great toe (CMS/HCC)   • Neuropathy   • Hypotension due to drugs   • Neutropenia (CMS/HCC)   • Acute appendicitis   • Open wound of right foot   • Hyponatremia   • Pancytopenia (CMS/HCC)   • Antineoplastic chemotherapy induced pancytopenia (CMS/HCC)   • Chemotherapy-induced thrombocytopenia        Past Medical History:   Diagnosis Date   • Anemia    • Anxiety and depression    • Back pain    • Bronchitis    • Diabetes (CMS/HCC)     DX 4-5 YRS AGO, CHECKS BS 4X/DAY, LAST A1C 7.1%   • Endometrial cancer (CMS/HCC)     STAGE II   • GERD (gastroesophageal reflux disease)    • Hypertension    • MRSA (methicillin resistant staph aureus) culture positive 2018    S/P NECROTIZING FASCITIS IN BILATERAL FEET   • Necrotizing fasciitis (CMS/HCC)    • PCOS (polycystic ovarian syndrome)    • PTSD (post-traumatic stress disorder)    • Retinopathy 3/18/2020   • Sepsis (CMS/HCC)    • Stage 3  chronic kidney disease (CMS/HCC) 2/24/2020   • Subconjunctival hemorrhage         Past Surgical History:   Procedure Laterality Date   • APPENDECTOMY N/A 3/21/2020    Procedure: APPENDECTOMY LAPAROSCOPIC;  Surgeon: Praful Myers MD;  Location: [facility] OR;  Service: General;  Laterality: N/A;   • EXPLORATORY LAPAROTOMY, TOTAL ABDOMINAL HYSTERECTOMY SALPINGO OOPHORECTOMY N/A 2/10/2020    Procedure: EXPLORATORY LAPAROTOMY, TOTAL ABDOMINAL HYSTERECTOMY, BILATERAL SALPINGO-OOPHORECTOMY WITH OPTIMAL  STAGING (R-0), OMENTECTOMY;  Surgeon: Celine Rubio MD;  Location:  [place] OR;  Service: Gynecology Oncology;  Laterality: N/A;   • EYE SURGERY Left     BLOOD VESSEL IN EYE REPAIR   • TOE AMPUTATION Left 06/2019    big toe - unhealing sore   • TOE AMPUTATION Right 2018    big toe and second toe - Necrotizing fasciitis and MRSA          PT Ortho     Row Name 05/18/20 1115       Subjective Comments    Subjective Comments  Notes increased drainage both sites, but no odor. Aware of E coli present on culture but MD's have decided no antibx at this time.   -MW       Subjective Pain    Able to rate subjective pain?  yes  -MW    Pre-Treatment Pain Level  0  -MW    Post-Treatment Pain Level  0  -MW    Subjective Pain Comment  RUQ tender with debridement   -MW       Transfers    Sit-Stand [place] (Transfers)  independent  -MW    Stand-Sit [place] (Transfers)  independent  -MW    Comment (Transfers)  supine on stretcher for tx  -MW       Gait/Stairs Assessment/Training    [place] Level (Gait)  independent  -MW      User Key  (r) = Recorded By, (t) = Taken By, (c) = Cosigned By    Initials Name Provider Type    Yvette Dubois, PT Physical Therapist          LDA Wound     Row Name 05/18/20 1115             Wound 04/07/20 1100 midline abdomen Incision    Wound - Properties Group Date first assessed: 04/07/20  -MF Time first assessed: 1100  -MF Orientation: midline  -MF Location: abdomen  -MF Primary Wound  "Type: Incision  -MF    Dressing Appearance  intact;moist drainage  -MW      Base  moist;pink;red;epithelialization;clean  -MW      Periwound  intact  -MW      Periwound Temperature  warm  -MW      Periwound Skin Turgor  soft  -MW      Edges  open;irregular  -MW      Drainage Characteristics/Odor  serosanguineous;yellow  -MW      Drainage Amount  moderate;large  -MW      Care, Wound  cleansed with;wound cleanser  -MW      Dressing Care, Wound  dressing applied;collagen;antimicrobial agent applied;foam;border dressing Danita [employer], HFBc dry, 4\" optifoam   -MW      Periwound Care, Wound  cleansed with pH balanced cleanser;dry periwound area maintained  -MW         Wound 04/07/20 1100 Right upper;lateral abdomen Abcess    Wound - Properties Group Date first assessed: 04/07/20  - Time first assessed: 1100  -MF Side: Right  -MF Orientation: upper;lateral  - Location: abdomen  -MF Primary Wound Type: Abcess  -MF    Dressing Appearance  intact;moist drainage  -MW      Base  red;pink;subcutaneous;yellow;white;slough central area obscured, ?down to fascia/SQ  -MW      Periwound  intact;redness;indurated;moist  -MW      Periwound Temperature  warm  -MW      Periwound Skin Turgor  firm;soft mild induration; inferior aspect   -MW      Edges  irregular  -MW      Drainage Characteristics/Odor  serosanguineous;yellow  -MW      Drainage Amount  moderate  -MW      Care, Wound  cleansed with;wound cleanser;debrided 4x4's and swabs with Skintegrity spray   -MW      Dressing Care, Wound  dressing applied;collagen;antimicrobial agent applied;foam;border dressing Danita AG, HFBc damp, qwick, 6\" optifoam   -MW      Periwound Care, Wound  cleansed with pH balanced cleanser;dry periwound area maintained  -MW        User Key  (r) = Recorded By, (t) = Taken By, (c) = Cosigned By    Initials Name Provider Type    Vinny Colindres, PT Physical Therapist    MW Yvette Ribeiro, PT Physical Therapist            WOUND DEBRIDEMENT  Total area " of Debridement: ~2 cm2   Debridement Site 1  Location- Site 1: R upper abdomen   Selective Debridement- Site 1: Wound Surface <20cmsq  Instruments- Site 1: tweezers, scissors  Excised Tissue Description- Site 1: minimum, slough(from depth and inferior edge )  Bleeding- Site 1: seeping, scant                 Recommendation and Plan  PT Assessment/Plan     Row Name 05/18/20 1115          PT Assessment    Functional Limitations  Other (comment);Performance in self-care ADL wound mgmt  -MW     Impairments  Integumentary integrity;Pain  -     Assessment Comments  RUQ wound culture with E Coli, but MD's have recommended no antibx at this time, but to continue to monitor. Drainage yellow serous this session, no creamy drainage noted. Wound depth with continues stringy yellow slough/ nonviable tissue; able to debride some with tweezers and scissors but this remains very difficult due to wound shape /depth. Lower midline wound with increased drainage noted, serous yellow but not creamy; resumed HFBc dry to assist with drainage management. Pt asking about wound VAC options to promote wound closure. Will discuss with primary PT and then MD's.   -     Rehab Potential  Fair  -     Patient/caregiver participated in establishment of treatment plan and goals  Yes  -     Patient would benefit from skilled therapy intervention  Yes  -MW        PT Plan    PT Frequency  1x/week  -     Physical Therapy Interventions (Optional Details)  wound care;patient/family education  -     PT Plan Comments  debridement, dressings management; consider VAC or Dakins gauze?   -       User Key  (r) = Recorded By, (t) = Taken By, (c) = Cosigned By    Initials Name Provider Type    Yvette Dubois, PT Physical Therapist                     Time Calculation: Start Time: 1115  Therapy Charges for Today     Code Description Service Date Service Provider Modifiers Qty    [id number] [facility] DEBRIDE OPEN WOUND UP TO 20CM 5/18/2020 Quintin  Yvette, PT GP 1                  Yvette Ribeiro, PT  5/18/2020

## 2020-05-18 NOTE — TELEPHONE ENCOUNTER
----- Message from Celine Rubio MD sent at 5/18/2020  4:09 PM EDT -----  pls notify pt - fall precautions, screen for anemia symptoms.  Would prefer obs if possible  Thanks    ----- Message -----  From: Lab, Background User  Sent: 5/18/2020  12:40 PM EDT  To: Celine Rubio MD

## 2020-05-18 NOTE — TELEPHONE ENCOUNTER
Patient was seen in infusion for [facility]. APRN states she wants fall precautions and the patient is asymptomatic.

## 2020-05-19 LAB — BACTERIA SPEC ANAEROBE CULT: NORMAL

## 2020-05-21 ENCOUNTER — HOSPITAL ENCOUNTER (OUTPATIENT)
Dept: ONCOLOGY | Facility: HOSPITAL | Age: 45
Setting detail: INFUSION SERIES
Discharge: HOME | End: 2020-05-21

## 2020-05-21 VITALS
SYSTOLIC BLOOD PRESSURE: 149 MMHG | HEIGHT: 62 IN | TEMPERATURE: 97.6 F | DIASTOLIC BLOOD PRESSURE: 66 MMHG | RESPIRATION RATE: 16 BRPM | HEART RATE: 99 BPM | WEIGHT: 190 LBS | BODY MASS INDEX: 34.96 KG/M2

## 2020-05-21 DIAGNOSIS — C54.1 ENDOMETRIAL CANCER (HCC): Primary | ICD-10-CM

## 2020-05-21 PROCEDURE — G0463 HOSPITAL OUTPT CLINIC VISIT: HCPCS

## 2020-05-21 PROCEDURE — 96523 IRRIG DRUG DELIVERY DEVICE: CPT

## 2020-05-21 RX ORDER — SODIUM CHLORIDE 0.9 % (FLUSH) 0.9 %
10 SYRINGE (ML) INJECTION AS NEEDED
Status: CANCELLED | OUTPATIENT
Start: 2020-05-21

## 2020-05-21 RX ORDER — SODIUM CHLORIDE 0.9 % (FLUSH) 0.9 %
10 SYRINGE (ML) INJECTION AS NEEDED
Status: DISCONTINUED | OUTPATIENT
Start: 2020-05-21 | End: 2020-05-22 | Stop reason: HOSPADM

## 2020-05-21 RX ADMIN — SODIUM CHLORIDE, PRESERVATIVE FREE 10 ML: 5 INJECTION INTRAVENOUS at 13:16

## 2020-05-26 ENCOUNTER — HOSPITAL ENCOUNTER (OUTPATIENT)
Dept: PHYSICAL THERAPY | Facility: HOSPITAL | Age: 45
Setting detail: THERAPIES SERIES
Discharge: HOME | End: 2020-05-26

## 2020-05-26 DIAGNOSIS — T81.31XD POSTOPERATIVE WOUND DEHISCENCE, SUBSEQUENT ENCOUNTER: Primary | ICD-10-CM

## 2020-05-26 DIAGNOSIS — S31.109D OPEN WOUND OF ABDOMEN, SUBSEQUENT ENCOUNTER: ICD-10-CM

## 2020-05-26 PROCEDURE — 97597 DBRDMT OPN WND 1ST 20 CM/<: CPT

## 2020-05-26 NOTE — THERAPY WOUND CARE TREATMENT
Outpatient Rehabilitation - Wound/Debridement Treatment Note  [facility]     Patient Name: Jeana Pollack Juliet, RN  : 1975  MRN: [id number]  Today's Date: 2020                   Admit Date: 2020    Visit Dx:    ICD-10-CM ICD-9-CM   1. Postoperative wound dehiscence, subsequent encounter T81.31XD V58.89     998.32   2. Open wound of abdomen, subsequent encounter S31.109D V58.89     879.2       Patient Active Problem List   Diagnosis   • Acute stress reaction with predominately emotional disturbance   • Sleep disturbance   • Cancer related pain   • Moderate episode of recurrent major depressive disorder (CMS/HCC)   • Anxiety   • Iron deficiency anemia   • Type 2 diabetes mellitus with diabetic polyneuropathy, with long-term current use of insulin (CMS/HCC)   • Essential hypertension   • Heart murmur   • Family history of cardiac disorder in mother   • Endometrial cancer (CMS/HCC)   • Stage 3 chronic kidney disease (CMS/HCC)   • Gastroesophageal reflux disease   • Amputation of left great toe (CMS/HCC)   • Amputation of right great toe (CMS/HCC)   • Neuropathy   • Hypotension due to drugs   • Neutropenia (CMS/HCC)   • Acute appendicitis   • Open wound of right foot   • Hyponatremia   • Pancytopenia (CMS/HCC)   • Antineoplastic chemotherapy induced pancytopenia (CMS/HCC)   • Chemotherapy-induced thrombocytopenia        Past Medical History:   Diagnosis Date   • Anemia    • Anxiety and depression    • Back pain    • Bronchitis    • Diabetes (CMS/HCC)     DX 4-5 YRS AGO, CHECKS BS 4X/DAY, LAST A1C 7.1%   • Endometrial cancer (CMS/HCC)     STAGE II   • GERD (gastroesophageal reflux disease)    • Hypertension    • MRSA (methicillin resistant staph aureus) culture positive 2018    S/P NECROTIZING FASCITIS IN BILATERAL FEET   • Necrotizing fasciitis (CMS/HCC)    • PCOS (polycystic ovarian syndrome)    • PTSD (post-traumatic stress disorder)    • Retinopathy 3/18/2020   • Sepsis (CMS/HCC)    • Stage 3  "chronic kidney disease (CMS/HCC) 2/24/2020   • Subconjunctival hemorrhage         Past Surgical History:   Procedure Laterality Date   • APPENDECTOMY N/A 3/21/2020    Procedure: APPENDECTOMY LAPAROSCOPIC;  Surgeon: Praful Myers MD;  Location:  [facility] OR;  Service: General;  Laterality: N/A;   • EXPLORATORY LAPAROTOMY, TOTAL ABDOMINAL HYSTERECTOMY SALPINGO OOPHORECTOMY N/A 2/10/2020    Procedure: EXPLORATORY LAPAROTOMY, TOTAL ABDOMINAL HYSTERECTOMY, BILATERAL SALPINGO-OOPHORECTOMY WITH OPTIMAL  STAGING (R-0), OMENTECTOMY;  Surgeon: Celine Rubio MD;  Location:  [facility] OR;  Service: Gynecology Oncology;  Laterality: N/A;   • EYE SURGERY Left     BLOOD VESSEL IN EYE REPAIR   • TOE AMPUTATION Left 06/2019    big toe - unhealing sore   • TOE AMPUTATION Right 2018    big toe and second toe - Necrotizing fasciitis and MRSA          EVALUATION  PT Ortho     Row Name 05/26/20 1115       Subjective Comments    Subjective Comments  Pt reports both areas are improving steadily. Reports the lower abd wound is so small her mother couldn't probe it with a qtip so they deferred packing yesterday.  -       Subjective Pain    Able to rate subjective pain?  yes  -    Pre-Treatment Pain Level  0  -    Post-Treatment Pain Level  0  -    Subjective Pain Comment  Pain is \"practically gone\"  -       Transfers    Sit-Stand [place] (Transfers)  independent  -    Stand-Sit [place] (Transfers)  independent  -    Comment (Transfers)  supine on stretcher for tx  -       Gait/Stairs Assessment/Training    [place] Level (Gait)  independent  -      User Key  (r) = Recorded By, (t) = Taken By, (c) = Cosigned By    Initials Name Provider Type    Mary Kay Flores, PT Physical Therapist          LDA Wound     Row Name 05/26/20 1115             [REMOVED] Wound 04/07/20 1100 Right medial foot Diabetic Ulcer    Wound - Properties Group Date first assessed: 04/07/20  - Time first assessed: 1100  -MF " "Side: Right  - Orientation: medial  - Location: foot  -MF Primary Wound Type: Diabetic ulc  -MF Resolution Date: 05/26/20  - Resolution Time: 1115  -MC Wound Outcome: Other  -MC, being managed by podiatrist        Wound 04/07/20 1100 midline abdomen Incision    Wound - Properties Group Date first assessed: 04/07/20  - Time first assessed: 1100  - Orientation: midline  - Location: abdomen  -MF Primary Wound Type: Incision  -MF    Dressing Appearance  intact;moist drainage  -MC      Base  moist;pink;red;epithelialization;clean very small opening, unable to visualize base  -MC      Periwound  intact  -      Periwound Temperature  warm  -      Periwound Skin Turgor  soft  -MC      Edges  open;irregular  -MC      Wound Length (cm)  0.4 cm  -MC      Wound Width (cm)  0.2 cm  -      Wound Depth (cm)  3.6 cm angling toward 12:00  -      Drainage Characteristics/Odor  serosanguineous;yellow  -MC      Drainage Amount  moderate  -      Care, Wound  cleansed with;wound cleanser  -      Dressing Care, Wound  dressing applied;silver impregnated;collagen;antimicrobial agent applied;foam;low-adherent;border dressing osmel, dry HFBc, 4\" optifoam  -      Periwound Care, Wound  cleansed with pH balanced cleanser;dry periwound area maintained  -         Wound 04/07/20 1100 Right upper;lateral abdomen Abcess    Wound - Properties Group Date first assessed: 04/07/20  - Time first assessed: 1100  -MF Side: Right  - Orientation: upper;lateral  - Location: abdomen  -MF Primary Wound Type: Abcess  -MF    Dressing Appearance  intact;moist drainage  -MC      Base  red;pink;subcutaneous;yellow;white;slough central area obscured, ?down to fascia/SQ  -MC      Periwound  intact;redness;indurated;moist  -      Periwound Temperature  warm  -      Periwound Skin Turgor  firm;soft mild induration; inferior aspect   -MC      Edges  irregular  -MC      Wound Length (cm)  0.8 cm  -MC      Wound Width (cm)  2.1 cm  " "-      Wound Depth (cm)  3.7 cm  -      Undermining [Depth (cm)/Location]  3.6 cm angling toward 9:00  -      Drainage Characteristics/Odor  serosanguineous;yellow  -MC      Drainage Amount  small  -MC      Care, Wound  cleansed with;wound cleanser;debrided  -      Dressing Care, Wound  dressing applied;silver impregnated;collagen;antimicrobial agent applied;foam;low-adherent;border dressing osmel, saline-moist HFBc, qwick, 6\" optifoam  -      Periwound Care, Wound  cleansed with pH balanced cleanser;dry periwound area maintained  -        User Key  (r) = Recorded By, (t) = Taken By, (c) = Cosigned By    Initials Name Provider Type    Vinny Colindres, PT Physical Therapist    Mary Kay Flores, PT Physical Therapist            WOUND DEBRIDEMENT  Total area of Debridement: 1 cm2  Debridement Site 1  Location- Site 1: R upper abdomen   Selective Debridement- Site 1: Wound Surface <20cmsq  Instruments- Site 1: tweezers, scissors  Excised Tissue Description- Site 1: minimum, slough  Bleeding- Site 1: none             Therapy Education     Row Name 05/26/20 1114             Therapy Education    Education Details  Continue with current POC. May leave qwick off RUQ wound and decrease changes to every other day d/t decreased drainage. OK to decrease frequency to every other week. Call with any concerns/changes.  -MC      Given  Bandaging/dressing change  -      Program  Reinforced  -MC      How Provided  Verbal;Demonstration  -MC      Provided to  Patient  -MC      Level of Understanding  Verbalized;Teach back education performed  -        User Key  (r) = Recorded By, (t) = Taken By, (c) = Cosigned By    Initials Name Provider Type    Mary Kay Flores, PT Physical Therapist          Recommendation and Plan  PT Assessment/Plan     Row Name 05/26/20 3840          PT Assessment    Functional Limitations  Other (comment);Performance in self-care ADL wound mgmt  -     Impairments  Integumentary " integrity;Pain  -     Assessment Comments  Pt with improved wound measurements to both areas. The RUQ wound has clean/moist/pink wound walls, but continues to have ?fascia in the wound base that is stringy and thin. The midline abd wound has notably decreased in superficial measurements, but continues to have notable tunneling/depth. This measurement is likely skewed toward a larger size, however, by the decreasing superficial measurements. Pt will continue to benefit from skilled PT wound care. As pt requires little debridement at this time, pt is appropriate to decrease frequency to every other week.  -     Rehab Potential  Fair  -     Patient/caregiver participated in establishment of treatment plan and goals  Yes  -     Patient would benefit from skilled therapy intervention  Yes  -        PT Plan    PT Frequency  Other (comment) every 2 weeks  -     Physical Therapy Interventions (Optional Details)  patient/family education;wound care  -     PT Plan Comments  debridement, dressings management  -       User Key  (r) = Recorded By, (t) = Taken By, (c) = Cosigned By    Initials Name Provider Type     Mary Kay Bell, PT Physical Therapist          Goals  PT OP Goals     Row Name 05/26/20 1115          Time Calculation    PT Goal Re-Cert Due Date  07/06/20  -       User Key  (r) = Recorded By, (t) = Taken By, (c) = Cosigned By    Initials Name Provider Type     Mary Kay Bell, PT Physical Therapist          PT Goal Re-Cert Due Date: 07/06/20            Time Calculation: Start Time: 1115  Therapy Charges for Today     Code Description Service Date Service Provider Modifiers Qty    [id number] [facility] DEBRIDE OPEN WOUND UP TO 20CM 5/26/2020 Mary Kay Bell, PT GP 1                  Mary Kay Bell, PT  5/26/2020

## 2020-05-28 ENCOUNTER — HOSPITAL ENCOUNTER (OUTPATIENT)
Dept: ONCOLOGY | Facility: HOSPITAL | Age: 45
Setting detail: INFUSION SERIES
Discharge: HOME | End: 2020-05-28

## 2020-05-28 ENCOUNTER — OFFICE VISIT (OUTPATIENT)
Dept: GYNECOLOGIC ONCOLOGY | Facility: CLINIC | Age: 45
End: 2020-05-28

## 2020-05-28 ENCOUNTER — DOCUMENTATION (OUTPATIENT)
Dept: NUTRITION | Facility: HOSPITAL | Age: 45
End: 2020-05-28

## 2020-05-28 VITALS
TEMPERATURE: 97.6 F | WEIGHT: 188 LBS | HEART RATE: 110 BPM | RESPIRATION RATE: 12 BRPM | OXYGEN SATURATION: 99 % | BODY MASS INDEX: 34.39 KG/M2

## 2020-05-28 VITALS — HEART RATE: 100 BPM | DIASTOLIC BLOOD PRESSURE: 74 MMHG | SYSTOLIC BLOOD PRESSURE: 180 MMHG

## 2020-05-28 DIAGNOSIS — G89.18 JOINT PAIN FOLLOWING CHEMOTHERAPY: ICD-10-CM

## 2020-05-28 DIAGNOSIS — Z79.4 TYPE 2 DIABETES MELLITUS WITH DIABETIC POLYNEUROPATHY, WITH LONG-TERM CURRENT USE OF INSULIN (HCC): ICD-10-CM

## 2020-05-28 DIAGNOSIS — C54.1 ENDOMETRIAL CANCER (HCC): Primary | ICD-10-CM

## 2020-05-28 DIAGNOSIS — G62.9 NEUROPATHY: ICD-10-CM

## 2020-05-28 DIAGNOSIS — N89.5 VAGINAL STENOSIS: ICD-10-CM

## 2020-05-28 DIAGNOSIS — M25.50 JOINT PAIN FOLLOWING CHEMOTHERAPY: ICD-10-CM

## 2020-05-28 DIAGNOSIS — E11.42 TYPE 2 DIABETES MELLITUS WITH DIABETIC POLYNEUROPATHY, WITH LONG-TERM CURRENT USE OF INSULIN (HCC): ICD-10-CM

## 2020-05-28 DIAGNOSIS — T14.8XXD WOUND HEALING, DELAYED: ICD-10-CM

## 2020-05-28 DIAGNOSIS — G89.3 CANCER RELATED PAIN: ICD-10-CM

## 2020-05-28 DIAGNOSIS — N94.10 DYSPAREUNIA, FEMALE: ICD-10-CM

## 2020-05-28 DIAGNOSIS — M79.2 NEUROPATHIC PAIN: ICD-10-CM

## 2020-05-28 LAB
ALBUMIN SERPL-MCNC: 3.4 G/DL (ref 3.5–5.2)
ALBUMIN/GLOB SERPL: 1.1 G/DL
ALP SERPL-CCNC: 87 U/L (ref 39–117)
ALT SERPL W P-5'-P-CCNC: 11 U/L (ref 1–33)
ANION GAP SERPL CALCULATED.3IONS-SCNC: 12 MMOL/L (ref 5–15)
AST SERPL-CCNC: 19 U/L (ref 1–32)
BILIRUB SERPL-MCNC: 0.6 MG/DL (ref 0.2–1.2)
BUN BLD-MCNC: 21 MG/DL (ref 6–20)
BUN/CREAT SERPL: 29.2 (ref 7–25)
CALCIUM SPEC-SCNC: 9.3 MG/DL (ref 8.6–10.5)
CHLORIDE SERPL-SCNC: 102 MMOL/L (ref 98–107)
CO2 SERPL-SCNC: 24 MMOL/L (ref 22–29)
CREAT BLD-MCNC: 0.72 MG/DL (ref 0.57–1)
CREAT BLDA-MCNC: 0.6 MG/DL (ref 0.6–1.3)
CREAT SERPL-MCNC: 0.6 MG/DL
ERYTHROCYTE [DISTWIDTH] IN BLOOD BY AUTOMATED COUNT: 21 % (ref 12.3–15.4)
GFR SERPL CREATININE-BSD FRML MDRD: 87 ML/MIN/1.73
GLOBULIN UR ELPH-MCNC: 3.1 GM/DL
GLUCOSE BLD-MCNC: 321 MG/DL (ref 65–99)
HCT VFR BLD AUTO: 31.7 % (ref 34–46.6)
HGB BLD-MCNC: 10.5 G/DL (ref 12–15.9)
LYMPHOCYTES # BLD AUTO: 0.7 10*3/MM3 (ref 0.7–3.1)
LYMPHOCYTES NFR BLD AUTO: 8.7 % (ref 19.6–45.3)
MCH RBC QN AUTO: 31.6 PG (ref 26.6–33)
MCHC RBC AUTO-ENTMCNC: 33.2 G/DL (ref 31.5–35.7)
MCV RBC AUTO: 95.5 FL (ref 79–97)
MONOCYTES # BLD AUTO: 0.3 10*3/MM3 (ref 0.1–0.9)
MONOCYTES NFR BLD AUTO: 3.8 % (ref 5–12)
NEUTROPHILS # BLD AUTO: 6.6 10*3/MM3 (ref 1.7–7)
NEUTROPHILS NFR BLD AUTO: 87.5 % (ref 42.7–76)
PLATELET # BLD AUTO: 120 10*3/MM3 (ref 140–450)
PMV BLD AUTO: 7.4 FL (ref 6–12)
POTASSIUM BLD-SCNC: 4.1 MMOL/L (ref 3.5–5.2)
PROT SERPL-MCNC: 6.5 G/DL (ref 6–8.5)
RBC # BLD AUTO: 3.32 10*6/MM3 (ref 3.77–5.28)
SODIUM BLD-SCNC: 138 MMOL/L (ref 136–145)
WBC NRBC COR # BLD: 7.5 10*3/MM3 (ref 3.4–10.8)

## 2020-05-28 PROCEDURE — 25010000002 DEXAMETHASONE PER 1 MG: Performed by: OBSTETRICS & GYNECOLOGY

## 2020-05-28 PROCEDURE — 96417 CHEMO IV INFUS EACH ADDL SEQ: CPT

## 2020-05-28 PROCEDURE — 25010000002 CARBOPLATIN PER 50 MG: Performed by: OBSTETRICS & GYNECOLOGY

## 2020-05-28 PROCEDURE — 25010000002 PEGFILGRASTIM 6 MG/0.6ML PREFILLED SYRINGE KIT: Performed by: OBSTETRICS & GYNECOLOGY

## 2020-05-28 PROCEDURE — 25010000002 DIPHENHYDRAMINE PER 50 MG: Performed by: OBSTETRICS & GYNECOLOGY

## 2020-05-28 PROCEDURE — 25010000002 FOSAPREPITANT PER 1 MG: Performed by: OBSTETRICS & GYNECOLOGY

## 2020-05-28 PROCEDURE — 96375 TX/PRO/DX INJ NEW DRUG ADDON: CPT

## 2020-05-28 PROCEDURE — 99215 OFFICE O/P EST HI 40 MIN: CPT | Performed by: OBSTETRICS & GYNECOLOGY

## 2020-05-28 PROCEDURE — 96367 TX/PROPH/DG ADDL SEQ IV INF: CPT

## 2020-05-28 PROCEDURE — 96377 APPLICATON ON-BODY INJECTOR: CPT

## 2020-05-28 PROCEDURE — 96366 THER/PROPH/DIAG IV INF ADDON: CPT

## 2020-05-28 PROCEDURE — 96415 CHEMO IV INFUSION ADDL HR: CPT

## 2020-05-28 PROCEDURE — 96368 THER/DIAG CONCURRENT INF: CPT

## 2020-05-28 PROCEDURE — 96413 CHEMO IV INFUSION 1 HR: CPT

## 2020-05-28 PROCEDURE — 85025 COMPLETE CBC W/AUTO DIFF WBC: CPT | Performed by: OBSTETRICS & GYNECOLOGY

## 2020-05-28 PROCEDURE — 25010000002 PALONOSETRON 0.25 MG/5ML SOLUTION PREFILLED SYRINGE: Performed by: OBSTETRICS & GYNECOLOGY

## 2020-05-28 PROCEDURE — 80053 COMPREHEN METABOLIC PANEL: CPT | Performed by: OBSTETRICS & GYNECOLOGY

## 2020-05-28 PROCEDURE — 25010000002 PACLITAXEL PER 30 MG: Performed by: OBSTETRICS & GYNECOLOGY

## 2020-05-28 PROCEDURE — 82565 ASSAY OF CREATININE: CPT

## 2020-05-28 RX ORDER — HYDROCODONE BITARTRATE AND ACETAMINOPHEN 7.5; 325 MG/1; MG/1
1 TABLET ORAL EVERY 6 HOURS PRN
Qty: 30 TABLET | Refills: 0 | Status: SHIPPED | OUTPATIENT
Start: 2020-05-28 | End: 2020-06-17 | Stop reason: SDUPTHER

## 2020-05-28 RX ORDER — PALONOSETRON 0.05 MG/ML
0.25 INJECTION, SOLUTION INTRAVENOUS ONCE
Status: COMPLETED | OUTPATIENT
Start: 2020-05-28 | End: 2020-05-28

## 2020-05-28 RX ORDER — FAMOTIDINE 10 MG/ML
20 INJECTION, SOLUTION INTRAVENOUS ONCE
Status: COMPLETED | OUTPATIENT
Start: 2020-05-28 | End: 2020-05-28

## 2020-05-28 RX ORDER — FAMOTIDINE 10 MG/ML
20 INJECTION, SOLUTION INTRAVENOUS AS NEEDED
Status: CANCELLED | OUTPATIENT
Start: 2020-05-28

## 2020-05-28 RX ORDER — PALONOSETRON 0.05 MG/ML
0.25 INJECTION, SOLUTION INTRAVENOUS ONCE
Status: CANCELLED | OUTPATIENT
Start: 2020-05-28

## 2020-05-28 RX ORDER — DIPHENHYDRAMINE HYDROCHLORIDE 50 MG/ML
50 INJECTION INTRAMUSCULAR; INTRAVENOUS AS NEEDED
Status: CANCELLED | OUTPATIENT
Start: 2020-05-28

## 2020-05-28 RX ORDER — SODIUM CHLORIDE 9 MG/ML
250 INJECTION, SOLUTION INTRAVENOUS ONCE
Status: CANCELLED | OUTPATIENT
Start: 2020-05-28

## 2020-05-28 RX ORDER — LORAZEPAM 1 MG/1
1 TABLET ORAL EVERY 6 HOURS PRN
Status: DISCONTINUED | OUTPATIENT
Start: 2020-05-28 | End: 2020-05-29 | Stop reason: HOSPADM

## 2020-05-28 RX ORDER — FAMOTIDINE 10 MG/ML
20 INJECTION, SOLUTION INTRAVENOUS ONCE
Status: CANCELLED | OUTPATIENT
Start: 2020-05-28

## 2020-05-28 RX ORDER — LORAZEPAM 0.5 MG/1
1 TABLET ORAL EVERY 6 HOURS PRN
Status: CANCELLED
Start: 2020-05-28

## 2020-05-28 RX ORDER — SODIUM CHLORIDE 9 MG/ML
250 INJECTION, SOLUTION INTRAVENOUS ONCE
Status: COMPLETED | OUTPATIENT
Start: 2020-05-28 | End: 2020-05-28

## 2020-05-28 RX ADMIN — SODIUM CHLORIDE 295 MG: 9 INJECTION, SOLUTION INTRAVENOUS at 12:42

## 2020-05-28 RX ADMIN — FAMOTIDINE 20 MG: 10 INJECTION, SOLUTION INTRAVENOUS at 11:53

## 2020-05-28 RX ADMIN — LORAZEPAM 1 MG: 1 TABLET ORAL at 14:40

## 2020-05-28 RX ADMIN — DEXAMETHASONE SODIUM PHOSPHATE 20 MG: 10 INJECTION INTRAMUSCULAR; INTRAVENOUS at 12:35

## 2020-05-28 RX ADMIN — CARBOPLATIN 570 MG: 10 INJECTION, SOLUTION INTRAVENOUS at 15:47

## 2020-05-28 RX ADMIN — SODIUM CHLORIDE 150 MG: 9 INJECTION, SOLUTION INTRAVENOUS at 12:35

## 2020-05-28 RX ADMIN — PEGFILGRASTIM 6 MG: KIT SUBCUTANEOUS at 16:33

## 2020-05-28 RX ADMIN — PALONOSETRON 0.25 MG: 0.25 INJECTION, SOLUTION INTRAVENOUS at 11:50

## 2020-05-28 RX ADMIN — DIPHENHYDRAMINE HYDROCHLORIDE 50 MG: 50 INJECTION INTRAMUSCULAR; INTRAVENOUS at 11:53

## 2020-05-28 RX ADMIN — SODIUM CHLORIDE 250 ML: 9 INJECTION, SOLUTION INTRAVENOUS at 11:53

## 2020-05-28 NOTE — PROGRESS NOTES
Onc Nutrition    Patient: Jeana Jaki Chung RN  YOB: 1975    Weight: 188# - stable x 3 weeks  Nutrition Symptoms: none    Follow up with patient during her infusion appointment.  She states her appetite has been pretty good and she denies significant nutritional complaints at this time.      Briefly reviewed the importance of good nutrition during her treatment course.  Encouraged her to be eating smaller more frequent snacks throughout the day with an emphasis on high protein foods and adequate hydration.  Also encouraged her to continue checking her blood sugars often and adjust her insulin as needed / per endocrinologist orders.    Answered her questions and she voiced understanding of information discussed.  Encouraged to call RD with questions.  Will follow up as indicated.    Starr Pratt RD  05/28/20

## 2020-05-28 NOTE — PROGRESS NOTES
Unable to flush or aspirate blood return in R Upper arm PICC.  Orders to dc.  Dc'd per protocol w/o event.  BP elevated before, during and after Taxol.  Did not take BP meds today Patient denies h/a or any other complaints.  Does feel restless, probable restless legs due to benadryl.   Notified Dr Lowery.  Ativan given but BP remained elevated 180's/90's prior to dc.  Patient is instructed to take BP meds per rx and monitor at home. Instructed to call with continued HTN.  Pt v/u.

## 2020-05-28 NOTE — PROGRESS NOTES
"Jeana Chung RN  [id number]  1975      Reason for visit: Endometrial cancer, consideration of ongoing chemotherapy, chemotherapy-induced pancytopenia, hospital discharge follow-up, status post appendectomy with appendiceal abscess, cleared for chemotherapy by infectious disease    History of present illness:  The patient is a 45 y.o. year old female who presents today for treatment and evaluation of the above issues.     Today, patient notes that she still has worsening bone pain about 1-1/2 weeks after treatment.  She is using turmeric for this and its helping with her neuropathy.  She would like to try hydrocodone instead of oxycodone for her chemotherapy induced pain.  She is out of bed all day except for the first week after treatment.  Bowel and bladder function is normal.  She notes good appetite.  She saw her  and has complaints of dyspareunia and vaginal \"tightening\".  Radiation oncology had discussed the importance of vaginal dilator, but patient states that she never received 1 to use.    OBGYN History:  She is a .  She does not use HRT. She has never had a pap smear.      Oncologic History:     Endometrial cancer (CMS/HCC)    2019 Imaging     Presented to ED in [place] due to progressively heavy vaginal bleeding and severe back pain. Ultrasound revealed uterine and cervical masses.      2020 Biopsy     ED follow-up with gynecologist in [place], told she likely has cervical cancer. Insufficient tissue on attempted cervical biopsy. Patient moved to [place], [place] to be closer to mother and sister for work-up and treatment.       2020 Imaging     Gyn evaluation at [facility]. Repeat TVUS showed cervical mass, right adnexal mass, and large uterine fibroid vs mass. Referred to Gyn Oncology      2020 Initial Diagnosis     Endometrial cancer (CMS/HCC)  Cervical biopsy positive for infiltrating endometrioid adenocarcinoma      2020 Imaging     CT chest, " abdomen, pelvis showed enlarged uterus with multiple masses and 4 cm cystic/solid mass at right ovary. No metastatic disease noted in abdomen or chest.      2/10/2020 Surgery     Exploratory laparotomy, total abdominal hysterectomy, bilateral salpingo-oophorectomy, with optimal debulking (R=0), and omentectomy.    Pelvic washing cytology positive for malignant cells, consistent with adenocarcinoma. Final pathology showed large grade 3 endometrioid tumor with lymphvascular invasion at the uterus. Cervical stromal involvement, vaginal margin negative. Right tube and ovary involved. Left tube and ovary negative, omentum negative. MSI normal. [id number], Stage IIIA grade 3        3/9/2020 -  Chemotherapy     OP UTERINE PACLitaxel / CARBOplatin (Q21D)  Cycle #1 complicated by pancytopenia, appendicitis, appendectomy, appendiceal abscess  Cycle #2 remarkable for thrombocytopenia, worsening neuropathy.  Dose modification with cycle #3.        3/17/2020 - 3/23/2020 Other Event     Hospital admission with acute appendicitis      5/21/2020 -  Chemotherapy     OP CENTRAL VENOUS ACCESS DEVICE ACCESS, CARE, AND MAINTENANCE (CVAD)           Past Medical History:   Diagnosis Date   • Anemia    • Anxiety and depression    • Back pain    • Bronchitis    • Diabetes (CMS/HCC)     DX 4-5 YRS AGO, CHECKS BS 4X/DAY, LAST A1C 7.1%   • Endometrial cancer (CMS/HCC)     STAGE II   • GERD (gastroesophageal reflux disease)    • Hypertension    • MRSA (methicillin resistant staph aureus) culture positive 2018    S/P NECROTIZING FASCITIS IN BILATERAL FEET   • Necrotizing fasciitis (CMS/HCC)    • PCOS (polycystic ovarian syndrome)    • PTSD (post-traumatic stress disorder)    • Retinopathy 3/18/2020   • Sepsis (CMS/HCC)    • Stage 3 chronic kidney disease (CMS/HCC) 2/24/2020   • Subconjunctival hemorrhage        Past Surgical History:   Procedure Laterality Date   • APPENDECTOMY N/A 3/21/2020    Procedure: APPENDECTOMY LAPAROSCOPIC;  Surgeon:  Praful Myers MD;  Location:  [facility];  Service: General;  Laterality: N/A;   • EXPLORATORY LAPAROTOMY, TOTAL ABDOMINAL HYSTERECTOMY SALPINGO OOPHORECTOMY N/A 2/10/2020    Procedure: EXPLORATORY LAPAROTOMY, TOTAL ABDOMINAL HYSTERECTOMY, BILATERAL SALPINGO-OOPHORECTOMY WITH OPTIMAL  STAGING (R-0), OMENTECTOMY;  Surgeon: Celine Rubio MD;  Location:  [facility];  Service: Gynecology Oncology;  Laterality: N/A;   • EYE SURGERY Left     BLOOD VESSEL IN EYE REPAIR   • TOE AMPUTATION Left 2019    big toe - unhealing sore   • TOE AMPUTATION Right     big toe and second toe - Necrotizing fasciitis and MRSA        MEDICATIONS: The current medication list was reviewed with the patient and updated in the EMR this date per the Medical Assistant. Medication dosages and frequencies were confirmed to be accurate.      Allergies:  is allergic to bactrim [sulfamethoxazole-trimethoprim]; penicillins; and promethazine.    Social History:   Social History     Socioeconomic History   • Marital status:      Spouse name: Not on file   • Number of children: 1   • Years of education: Not on file   • Highest education level: Not on file   Tobacco Use   • Smoking status: Former Smoker     Types: Cigarettes     Last attempt to quit: 2000     Years since quittin.4   • Smokeless tobacco: Never Used   • Tobacco comment: social MAYBE 1 CIG/DAY   Substance and Sexual Activity   • Alcohol use: Not Currently     Frequency: Monthly or less     Drinks per session: 1 or 2   • Drug use: Never   • Sexual activity: Not Currently   Social History Narrative    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 Staying in [place] with mother due to Endometrial Cancer.        Family History:    Family History   Problem Relation Age of Onset   • Fibroids Mother    • Diabetes type II Mother    • Hypertension Mother    • Heart attack Mother    • Fibroids Sister         PCOS   • Diabetes type II Sister    • Dementia Maternal  Grandmother    • Stroke Maternal Grandmother        Health Maintenance:    Health Maintenance   Topic Date Due   • TDAP/TD VACCINES (1 - Tdap) 04/24/1984   • HEPATITIS C SCREENING  01/22/2020   • MAMMOGRAM  02/24/2020   • INFLUENZA VACCINE  08/01/2020   • HEMOGLOBIN A1C  08/07/2020   • URINE MICROALBUMIN  01/30/2021   • PNEUMOCOCCAL VACCINE (19-64 HIGHEST RISK) (2 of 3 - PCV13) 02/24/2021   • DIABETIC FOOT EXAM  02/24/2021   • DIABETIC EYE EXAM  02/24/2021   • ANNUAL PHYSICAL  02/25/2021   • PAP SMEAR  Discontinued         Review of Systems   Constitutional: Positive for fatigue. Negative for appetite change, chills, fever and unexpected weight change.   HENT: Negative for ear discharge, ear pain, hearing loss, mouth sores, nosebleeds, postnasal drip, sinus pressure, sinus pain, sore throat, tinnitus and trouble swallowing.    Eyes: Positive for visual disturbance. Negative for pain and itching.        Blurry vision   Respiratory: Negative for cough, shortness of breath and wheezing.    Cardiovascular: Negative for chest pain and palpitations.   Gastrointestinal: Negative for abdominal pain, blood in stool, constipation, diarrhea, nausea and vomiting.   Endocrine: Positive for heat intolerance. Negative for cold intolerance.   Genitourinary: Positive for dyspareunia. Negative for difficulty urinating, flank pain, frequency, hematuria, urgency, vaginal bleeding and vaginal discharge.   Musculoskeletal: Positive for arthralgias and myalgias. Negative for gait problem.   Skin: Positive for wound. Negative for color change and rash.   Allergic/Immunologic: Negative for immunocompromised state.   Neurological: Positive for numbness. Negative for dizziness, seizures, syncope and headaches.   Hematological: Negative for adenopathy. Does not bruise/bleed easily.        Anemia   Psychiatric/Behavioral: Positive for sleep disturbance. Negative for agitation, confusion and dysphoric mood. The patient is nervous/anxious.         Physical Exam    Vitals:    05/28/20 0956   Pulse: 110   Resp: 12   Temp: 97.6 °F (36.4 °C)   SpO2: 99%   Weight: 85.3 kg (188 lb)   PainSc: 0-No pain     Body mass index is 34.39 kg/m².    Wt Readings from Last 3 Encounters:   05/28/20 85.3 kg (188 lb)   05/21/20 86.2 kg (190 lb)   05/18/20 86.6 kg (191 lb)         GENERAL: Alert, well-appearing female appearing her stated age who is in no apparent distress.  Patient is obese by BMI criteria.  HEENT: Sclera anicteric. Head normocephalic, atraumatic. Mucus membranes moist.  Alopecia  NECK: Supple, free from thyromegaly  BREASTS: Deferred  CARDIOVASCULAR: Regular rate and rhythm without murmur rub or gallop, no extremity edema  RESPIRATORY: Clear to auscultation bilaterally, normal effort  BACK: No CVA tenderness, no vertebral tenderness on palpation  GASTROINTESTINAL: Abdomen soft, nontender, no rebound, no guarding.  No palpable mass.  Incisions examined with packing in place.  Drainage of right upper quadrant, midline incision is drier without drainage noted.    SKIN:  Warm, dry, well-perfused.  All visible areas intact.  No rashes, ulcers.  *Abrasion right knee  PSYCHIATRIC: AO x3, with appropriate affect, normal thought processes.  NEUROLOGIC: No focal deficits. Moves extremities well.  MUSCULOSKELETAL: Normal gait and station.  Brace on right ankle/foot.  EXTREMITIES:   No cyanosis, clubbing, symmetric.  LYMPHATICS: No adenopathy bilateral neck and groin areas     PELVIC exam:    Deferred    ECOG PS 1    PROCEDURES:    none    Diagnostic Data:      No radiology results for the last 30 days.      Lab Results   Component Value Date    WBC 7.50 05/28/2020    HGB 10.5 (L) 05/28/2020    HCT 31.7 (L) 05/28/2020    MCV 95.5 05/28/2020     (L) 05/28/2020    NEUTROABS 6.60 05/28/2020    GLUCOSE 321 (H) 05/28/2020    BUN 21 (H) 05/28/2020    CREATININE 0.72 05/28/2020    EGFRIFNONA 87 05/28/2020     05/28/2020    K 4.1 05/28/2020     05/28/2020     CO2 24.0 05/28/2020    MG 2.2 03/22/2020    PHOS 2.5 03/20/2020    CALCIUM 9.3 05/28/2020    ALBUMIN 3.40 (L) 05/28/2020    AST 19 05/28/2020    ALT 11 05/28/2020    BILITOT 0.6 05/28/2020     No results found for:         Assessment/Plan   This is a 45 y.o. woman who presents for toxicity evaluation and consideration of ongoing chemotherapy for advanced endometrial cancer.  Encounter Diagnoses   Name Primary?   • Endometrial cancer (CMS/HCC) Yes   • Joint pain following chemotherapy    • Neuropathic pain    • Neuropathy    • Cancer related pain    • Type 2 diabetes mellitus with diabetic polyneuropathy, with long-term current use of insulin (CMS/HCC)    • Wound healing, delayed    • Dyspareunia, female    • Vaginal stenosis          Endometrial cancer: Stage IIIa due to adnexal involvement  -Status post surgery as noted above  -Consideration of cycle #4 of combined carboplatin plus paclitaxel chemotherapy IV q. 21 days with a goal of 6  -Dose modification with cycle #2 (29% reduction in carboplatin) due to pancytopenia, acute appendicitis requiring surgical intervention and hospital stay: Cleared for chemotherapy by infectious disease  -Continue current treatment at current dose.    Chemotherapy induced pancytopenia with cycle #1  -Dose modification with cycle #2   -Dose modification of carboplatin by 10% of current dose with cycle #3 due to ongoing issues with chemotherapy-induced thrombocytopenia.    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  Patient having difficulty writing first 1 to 2 weeks after treatment.  Decreased carboplatin and paclitaxel by 10% in previous cycle.    Emotional distress related to cancer diagnosis  Has seen counselor  Has Ambien     Complex social issues  -Staying with her family in [place] for treatment    Joint pain, neuro pathic pain  Norco prescribed   On gabapentin    Chronic  Medical Conditions  -T2DM, HTN, steatohepatitis:  ?  Esophageal varicosities and HORNE, history of necrotizing fasciitis.  May require referral to gastroenterologist.  -Has seen endocrinology regarding her diabetes and new regimen was proposed for the jorge-chemotherapy timeframe due to steroid-induced hyperglycemia.  -Sliding scale insulin built into chemotherapy treatment plan due to diabetes mellitus with hyperglycemia  -Vasotec as needed with infusion due to hypertension    Dyspareunia, complains of vaginal stricture  Vaginal dilators provided.    Pain assessment was performed today as a part of patient’s care.  For patients with pain related to surgery, gynecologic malignancy or cancer treatment, the plan is as noted in the assessment/plan.  For patients with pain not related to these issues, they are to seek any further needed care from a more appropriate provider, such as PCP.    Orders Placed This Encounter   Procedures   • Comprehensive metabolic panel     Standing Status:   Future     Standing Expiration Date:   5/28/2021   • CBC and Differential     Standing Status:   Future     Standing Expiration Date:   5/28/2021     Order Specific Question:   Manual Differential     Answer:   No   • CBC & Differential     Standing Status:   Future     Standing Expiration Date:   6/8/2021     Order Specific Question:   Manual Differential     Answer:   No       FOLLOW UP: In 3 weeks for consideration of ongoing chemotherapy    Celine Rubio MD  05/28/20  1:09 PM

## 2020-06-04 ENCOUNTER — APPOINTMENT (OUTPATIENT)
Dept: ONCOLOGY | Facility: HOSPITAL | Age: 45
End: 2020-06-04

## 2020-06-08 ENCOUNTER — HOSPITAL ENCOUNTER (EMERGENCY)
Facility: HOSPITAL | Age: 45
Discharge: HOME | End: 2020-06-08
Attending: EMERGENCY MEDICINE | Admitting: EMERGENCY MEDICINE

## 2020-06-08 ENCOUNTER — APPOINTMENT (OUTPATIENT)
Dept: ONCOLOGY | Facility: HOSPITAL | Age: 45
End: 2020-06-08

## 2020-06-08 VITALS
SYSTOLIC BLOOD PRESSURE: 181 MMHG | HEART RATE: 101 BPM | DIASTOLIC BLOOD PRESSURE: 85 MMHG | TEMPERATURE: 97.8 F | HEIGHT: 62 IN | BODY MASS INDEX: 34.96 KG/M2 | OXYGEN SATURATION: 100 % | WEIGHT: 190 LBS | RESPIRATION RATE: 18 BRPM

## 2020-06-08 DIAGNOSIS — L02.11 ABSCESS OF MULTIPLE SITES OF HEAD AND NECK: Primary | ICD-10-CM

## 2020-06-08 DIAGNOSIS — L02.811 ABSCESS OF MULTIPLE SITES OF HEAD AND NECK: Primary | ICD-10-CM

## 2020-06-08 LAB
ANION GAP SERPL CALCULATED.3IONS-SCNC: 12 MMOL/L (ref 5–15)
BASOPHILS # BLD AUTO: 0.03 10*3/MM3 (ref 0–0.2)
BASOPHILS NFR BLD AUTO: 0.5 % (ref 0–1.5)
BUN BLD-MCNC: 12 MG/DL (ref 6–20)
BUN/CREAT SERPL: 20 (ref 7–25)
CALCIUM SPEC-SCNC: 8.5 MG/DL (ref 8.6–10.5)
CHLORIDE SERPL-SCNC: 103 MMOL/L (ref 98–107)
CO2 SERPL-SCNC: 25 MMOL/L (ref 22–29)
CREAT BLD-MCNC: 0.6 MG/DL (ref 0.57–1)
DEPRECATED RDW RBC AUTO: 56.1 FL (ref 37–54)
EOSINOPHIL # BLD AUTO: 0.01 10*3/MM3 (ref 0–0.4)
EOSINOPHIL NFR BLD AUTO: 0.2 % (ref 0.3–6.2)
ERYTHROCYTE [DISTWIDTH] IN BLOOD BY AUTOMATED COUNT: 16 % (ref 12.3–15.4)
GFR SERPL CREATININE-BSD FRML MDRD: 107 ML/MIN/1.73
GLUCOSE BLD-MCNC: 239 MG/DL (ref 65–99)
HCT VFR BLD AUTO: 27.4 % (ref 34–46.6)
HGB BLD-MCNC: 9.2 G/DL (ref 12–15.9)
IMM GRANULOCYTES # BLD AUTO: 0.27 10*3/MM3 (ref 0–0.05)
IMM GRANULOCYTES NFR BLD AUTO: 4.3 % (ref 0–0.5)
LYMPHOCYTES # BLD AUTO: 1.09 10*3/MM3 (ref 0.7–3.1)
LYMPHOCYTES NFR BLD AUTO: 17.4 % (ref 19.6–45.3)
MCH RBC QN AUTO: 31.9 PG (ref 26.6–33)
MCHC RBC AUTO-ENTMCNC: 33.6 G/DL (ref 31.5–35.7)
MCV RBC AUTO: 95.1 FL (ref 79–97)
MONOCYTES # BLD AUTO: 0.41 10*3/MM3 (ref 0.1–0.9)
MONOCYTES NFR BLD AUTO: 6.6 % (ref 5–12)
NEUTROPHILS # BLD AUTO: 4.44 10*3/MM3 (ref 1.7–7)
NEUTROPHILS NFR BLD AUTO: 71 % (ref 42.7–76)
NRBC BLD AUTO-RTO: 0 /100 WBC (ref 0–0.2)
PLATELET # BLD AUTO: 104 10*3/MM3 (ref 140–450)
PMV BLD AUTO: 10.3 FL (ref 6–12)
POTASSIUM BLD-SCNC: 3.7 MMOL/L (ref 3.5–5.2)
RBC # BLD AUTO: 2.88 10*6/MM3 (ref 3.77–5.28)
SODIUM BLD-SCNC: 140 MMOL/L (ref 136–145)
WBC NRBC COR # BLD: 6.25 10*3/MM3 (ref 3.4–10.8)

## 2020-06-08 PROCEDURE — 85025 COMPLETE CBC W/AUTO DIFF WBC: CPT | Performed by: NURSE PRACTITIONER

## 2020-06-08 PROCEDURE — 99283 EMERGENCY DEPT VISIT LOW MDM: CPT

## 2020-06-08 PROCEDURE — 25010000003 LIDOCAINE 1 % SOLUTION

## 2020-06-08 PROCEDURE — 80048 BASIC METABOLIC PNL TOTAL CA: CPT | Performed by: NURSE PRACTITIONER

## 2020-06-08 PROCEDURE — 36415 COLL VENOUS BLD VENIPUNCTURE: CPT

## 2020-06-08 RX ORDER — DOXYCYCLINE 100 MG/1
100 CAPSULE ORAL 2 TIMES DAILY
Qty: 20 CAPSULE | Refills: 0 | Status: SHIPPED | OUTPATIENT
Start: 2020-06-08 | End: 2021-12-06

## 2020-06-08 RX ORDER — LIDOCAINE HYDROCHLORIDE 10 MG/ML
10 INJECTION, SOLUTION EPIDURAL; INFILTRATION; INTRACAUDAL; PERINEURAL ONCE
Status: DISCONTINUED | OUTPATIENT
Start: 2020-06-08 | End: 2020-06-08 | Stop reason: HOSPADM

## 2020-06-08 RX ORDER — HYDROCODONE BITARTRATE AND ACETAMINOPHEN 10; 325 MG/1; MG/1
1 TABLET ORAL ONCE
Status: COMPLETED | OUTPATIENT
Start: 2020-06-08 | End: 2020-06-08

## 2020-06-08 RX ADMIN — HYDROCODONE BITARTRATE AND ACETAMINOPHEN 1 TABLET: 10; 325 TABLET ORAL at 20:44

## 2020-06-09 ENCOUNTER — TELEPHONE (OUTPATIENT)
Dept: GYNECOLOGIC ONCOLOGY | Facility: CLINIC | Age: 45
End: 2020-06-09

## 2020-06-09 ENCOUNTER — HOSPITAL ENCOUNTER (OUTPATIENT)
Dept: PHYSICAL THERAPY | Facility: HOSPITAL | Age: 45
Setting detail: THERAPIES SERIES
Discharge: HOME | End: 2020-06-09

## 2020-06-09 DIAGNOSIS — L02.811 ABSCESS OF SCALP: ICD-10-CM

## 2020-06-09 DIAGNOSIS — T81.31XD POSTOPERATIVE WOUND DEHISCENCE, SUBSEQUENT ENCOUNTER: Primary | ICD-10-CM

## 2020-06-09 DIAGNOSIS — S31.109D OPEN WOUND OF ABDOMEN, SUBSEQUENT ENCOUNTER: ICD-10-CM

## 2020-06-09 PROCEDURE — 97597 DBRDMT OPN WND 1ST 20 CM/<: CPT

## 2020-06-09 PROCEDURE — 87205 SMEAR GRAM STAIN: CPT | Performed by: OBSTETRICS & GYNECOLOGY

## 2020-06-09 PROCEDURE — 87147 CULTURE TYPE IMMUNOLOGIC: CPT | Performed by: OBSTETRICS & GYNECOLOGY

## 2020-06-09 PROCEDURE — 87070 CULTURE OTHR SPECIMN AEROBIC: CPT | Performed by: OBSTETRICS & GYNECOLOGY

## 2020-06-09 NOTE — ED PROVIDER NOTES
Subjective   Jeana Chung is a 45 yr old female presents to the emergency department with complaints of an abscess to the right posterior neck.  Patient reports that the site came up a few days ago.  Patient is currently being treated for stage III endometrial cancer.  She gets chemo every 3 weeks and her last chemo was on Thursday.  She advises that she wears wigs and believes that they are irritating her scalp causing the abscesses.  Patient has been applying warm compresses to the areas however they have not started to drain.  She denies any fevers, chills.  No nausea, vomiting.      History provided by:  Patient  Abscess   Location:  Head/neck  Head/neck abscess location:  R neck  Size:  3 cm x 3cm , 1 cm x 1 cm   Abscess quality: fluctuance, induration, painful and redness    Duration:  4 days  Progression:  Worsening  Pain details:     Quality:  Throbbing    Severity:  Moderate    Timing:  Constant    Progression:  Worsening  Context: immunosuppression    Relieved by:  Nothing  Ineffective treatments:  Warm compresses  Associated symptoms: no fever, no nausea and no vomiting    Risk factors: prior abscess        Review of Systems   Constitutional: Negative for chills and fever.   Gastrointestinal: Negative for nausea and vomiting.   Skin:        abscess   Allergic/Immunologic: Positive for immunocompromised state.   All other systems reviewed and are negative.      Past Medical History:   Diagnosis Date   • Anemia    • Anxiety and depression    • Back pain    • Bronchitis    • Diabetes (CMS/HCC)     DX 4-5 YRS AGO, CHECKS BS 4X/DAY, LAST A1C 7.1%   • Endometrial cancer (CMS/HCC)     STAGE II   • GERD (gastroesophageal reflux disease)    • Hypertension    • MRSA (methicillin resistant staph aureus) culture positive 2018    S/P NECROTIZING FASCITIS IN BILATERAL FEET   • Necrotizing fasciitis (CMS/HCC)    • PCOS (polycystic ovarian syndrome)    • PTSD (post-traumatic stress disorder)    • Retinopathy  3/18/2020   • Sepsis (CMS/[facility])    • Stage 3 chronic kidney disease (CMS/HCC) 2020   • Subconjunctival hemorrhage        Allergies   Allergen Reactions   • Bactrim [Sulfamethoxazole-Trimethoprim] Anaphylaxis   • Penicillins Hives   • Promethazine Mental Status Change       Past Surgical History:   Procedure Laterality Date   • APPENDECTOMY N/A 3/21/2020    Procedure: APPENDECTOMY LAPAROSCOPIC;  Surgeon: Praful Myers MD;  Location: [facility] OR;  Service: General;  Laterality: N/A;   • EXPLORATORY LAPAROTOMY, TOTAL ABDOMINAL HYSTERECTOMY SALPINGO OOPHORECTOMY N/A 2/10/2020    Procedure: EXPLORATORY LAPAROTOMY, TOTAL ABDOMINAL HYSTERECTOMY, BILATERAL SALPINGO-OOPHORECTOMY WITH OPTIMAL  STAGING (R-0), OMENTECTOMY;  Surgeon: Celine Rubio MD;  Location:  [facility];  Service: Gynecology Oncology;  Laterality: N/A;   • EYE SURGERY Left     BLOOD VESSEL IN EYE REPAIR   • TOE AMPUTATION Left 2019    big toe - unhealing sore   • TOE AMPUTATION Right 2018    big toe and second toe - Necrotizing fasciitis and MRSA        Family History   Problem Relation Age of Onset   • Fibroids Mother    • Diabetes type II Mother    • Hypertension Mother    • Heart attack Mother    • Fibroids Sister         PCOS   • Diabetes type II Sister    • Dementia Maternal Grandmother    • Stroke Maternal Grandmother        Social History     Socioeconomic History   • Marital status:      Spouse name: Not on file   • Number of children: 1   • Years of education: Not on file   • Highest education level: Not on file   Tobacco Use   • Smoking status: Former Smoker     Types: Cigarettes     Last attempt to quit: 2000     Years since quittin.4   • Smokeless tobacco: Never Used   • Tobacco comment: social MAYBE 1 CIG/DAY   Substance and Sexual Activity   • Alcohol use: Not Currently     Frequency: Monthly or less     Drinks per session: 1 or 2   • Drug use: Never   • Sexual activity: Not Currently   Social History Narrative     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 Staying in [place] with mother due to Endometrial Cancer.            Objective   Physical Exam   Constitutional: She is oriented to person, place, and time. She appears well-developed and well-nourished. She is cooperative.  Non-toxic appearance.   Patient appears uncomfortable.   HENT:   Head: Normocephalic and atraumatic.   Nose: Nose normal.   Mouth/Throat: Oropharynx is clear and moist.   Eyes: Conjunctivae, EOM and lids are normal.   Neck: Trachea normal, normal range of motion and full passive range of motion without pain.   3 cm x 3 cm abscess to right posterior neck.  Site has a small area of fluctuance with surrounding induration.  Site is tender to palpation.  No red streaking.  There is also a 1 cm x 1 cm area with mild fluctuance.   Cardiovascular: Regular rhythm, normal heart sounds, intact distal pulses and normal pulses.   Pulmonary/Chest: Effort normal and breath sounds normal. No respiratory distress. She has no decreased breath sounds. She has no wheezes. She has no rhonchi. She has no rales.   Abdominal: Soft. Normal appearance and bowel sounds are normal. There is no tenderness.   Musculoskeletal: Normal range of motion.   Neurological: She is alert and oriented to person, place, and time. She has normal strength. No cranial nerve deficit.   Skin: Skin is warm, dry and intact. No rash noted.   Rt posterior neck:  2 abscesses.   Psychiatric: She has a normal mood and affect. Her speech is normal and behavior is normal.   Nursing note and vitals reviewed.      Incision & Drainage  Date/Time: 6/8/2020 8:21 PM  Performed by: Danelle Oh APRN  Authorized by: Yoshi Lopez MD     Consent:     Consent obtained:  Verbal    Consent given by:  Patient    Risks discussed:  Incomplete drainage, infection and pain    Alternatives discussed:  No treatment  Location:     Type:  Abscess    Size:  3 cm x 3cm     Location:  Neck    Neck  location:  R posterior  Pre-procedure details:     Skin preparation:  Betadine  Anesthesia (see MAR for exact dosages):     Anesthesia method:  Local infiltration    Local anesthetic:  Lidocaine 1% w/o epi  Procedure type:     Complexity:  Complex  Procedure details:     Incision types:  Single straight    Incision depth:  Subcutaneous    Scalpel blade:  11    Wound management:  Probed and deloculated and irrigated with saline    Drainage:  Serosanguinous    Drainage amount:  Moderate    Wound treatment:  Drain placed    Packing materials:  1/4 in iodoform gauze  Post-procedure details:     Patient tolerance of procedure:  Tolerated well, no immediate complications               ED Course  ED Course as of Jun 09 0330 Mon Jun 08, 2020 2019 WBC: 6.25 [KG]   2019 Hemoglobin(!): 9.2 [KG]   2019 Hematocrit(!): 27.4 [KG]   2033 Patient tolerated the procedure.  Patient reports she is feeling better at this time.  Patient will be discharged home.  Patient to take antibiotics as ordered.  Patient actually has an appointment to see wound care in the morning for a previous abscess to her right upper abdomen.  Patient to keep appointment as planned.  Patient agrees and verbalized understanding.    [KG]      ED Course User Index  [KG] Danelle Oh APRN                  COVID-19 RISK SCREEN     1. Has the patient had close contact without PPE with a lab confirmed COVID-19 (+) person or a person under investigation (PUI) for COVID-19 infection?  -- No     2. Has the patient had respiratory symptoms, worsened/new cough and/or SOA, unexplained fever, or sudden loss of smell and/or taste in the past 7 days? --  No    3. Does the patient have baseline higher exposure risk such as working in healthcare field, currently residing in healthcare facility, or ongoing hemodialysis?  --  No                    Recent Results (from the past 24 hour(s))   Basic Metabolic Panel    Collection Time: 06/08/20  8:01 PM   Result Value Ref  Range    Glucose 239 (H) 65 - 99 mg/dL    BUN 12 6 - 20 mg/dL    Creatinine 0.60 0.57 - 1.00 mg/dL    Sodium 140 136 - 145 mmol/L    Potassium 3.7 3.5 - 5.2 mmol/L    Chloride 103 98 - 107 mmol/L    CO2 25.0 22.0 - 29.0 mmol/L    Calcium 8.5 (L) 8.6 - 10.5 mg/dL    eGFR Non African Amer 107 >60 mL/min/1.73    BUN/Creatinine Ratio 20.0 7.0 - 25.0    Anion Gap 12.0 5.0 - 15.0 mmol/L   CBC Auto Differential    Collection Time: 06/08/20  8:01 PM   Result Value Ref Range    WBC 6.25 3.40 - 10.80 10*3/mm3    RBC 2.88 (L) 3.77 - 5.28 10*6/mm3    Hemoglobin 9.2 (L) 12.0 - 15.9 g/dL    Hematocrit 27.4 (L) 34.0 - 46.6 %    MCV 95.1 79.0 - 97.0 fL    MCH 31.9 26.6 - 33.0 pg    MCHC 33.6 31.5 - 35.7 g/dL    RDW 16.0 (H) 12.3 - 15.4 %    RDW-SD 56.1 (H) 37.0 - 54.0 fl    MPV 10.3 6.0 - 12.0 fL    Platelets 104 (L) 140 - 450 10*3/mm3    Neutrophil % 71.0 42.7 - 76.0 %    Lymphocyte % 17.4 (L) 19.6 - 45.3 %    Monocyte % 6.6 5.0 - 12.0 %    Eosinophil % 0.2 (L) 0.3 - 6.2 %    Basophil % 0.5 0.0 - 1.5 %    Immature Grans % 4.3 (H) 0.0 - 0.5 %    Neutrophils, Absolute 4.44 1.70 - 7.00 10*3/mm3    Lymphocytes, Absolute 1.09 0.70 - 3.10 10*3/mm3    Monocytes, Absolute 0.41 0.10 - 0.90 10*3/mm3    Eosinophils, Absolute 0.01 0.00 - 0.40 10*3/mm3    Basophils, Absolute 0.03 0.00 - 0.20 10*3/mm3    Immature Grans, Absolute 0.27 (H) 0.00 - 0.05 10*3/mm3    nRBC 0.0 0.0 - 0.2 /100 WBC     Note: In addition to lab results from this visit, the labs listed above may include labs taken at another facility or during a different encounter within the last 24 hours. Please correlate lab times with ED admission and discharge times for further clarification of the services performed during this visit.    No orders to display     Vitals:    06/08/20 1713   BP: (!) 181/85   BP Location: Left arm   Patient Position: Sitting   Pulse: 101   Resp: 18   Temp: 97.8 °F (36.6 °C)   TempSrc: Infrared   SpO2: 100%   Weight: 86.2 kg (190 lb)   Height: 157.5 cm  "(62\")     Medications   HYDROcodone-acetaminophen (NORCO)  MG per tablet 1 tablet (1 tablet Oral Given 6/8/20 2044)     ECG/EMG Results (last 24 hours)     ** No results found for the last 24 hours. **        No orders to display                 MDM    Final diagnoses:   Abscess of multiple sites of head and neck            Danelle Oh, APRN  06/09/20 0330    "

## 2020-06-09 NOTE — THERAPY WOUND CARE TREATMENT
Outpatient Rehabilitation - Wound/Debridement Treatment Note  [facility]     Patient Name: Jeana Pollack Juliet RN  : 1975  MRN: [id number]  Today's Date: 2020                 Admit Date: 2020    Visit Dx:    ICD-10-CM ICD-9-CM   1. Postoperative wound dehiscence, subsequent encounter T81.31XD V58.89     998.32   2. Open wound of abdomen, subsequent encounter S31.109D V58.89     879.2   3. Abscess of scalp L02.811 682.8   RUQ wound:    Midline abd:    Posterior scalp:    R posterior lateral scalp:      Patient Active Problem List   Diagnosis   • Acute stress reaction with predominately emotional disturbance   • Sleep disturbance   • Cancer related pain   • Moderate episode of recurrent major depressive disorder (CMS/HCC)   • Anxiety   • Iron deficiency anemia   • Type 2 diabetes mellitus with diabetic polyneuropathy, with long-term current use of insulin (CMS/HCC)   • Essential hypertension   • Heart murmur   • Family history of cardiac disorder in mother   • Endometrial cancer (CMS/HCC)   • Stage 3 chronic kidney disease (CMS/HCC)   • Gastroesophageal reflux disease   • Amputation of left great toe (CMS/HCC)   • Amputation of right great toe (CMS/HCC)   • Neuropathy   • Hypotension due to drugs   • Neutropenia (CMS/HCC)   • Acute appendicitis   • Open wound of right foot   • Hyponatremia   • Pancytopenia (CMS/HCC)   • Antineoplastic chemotherapy induced pancytopenia (CMS/HCC)   • Chemotherapy-induced thrombocytopenia   • Wound healing, delayed   • Dyspareunia, female   • Vaginal stenosis        Past Medical History:   Diagnosis Date   • Anemia    • Anxiety and depression    • Back pain    • Bronchitis    • Diabetes (CMS/HCC)     DX 4-5 YRS AGO, CHECKS BS 4X/DAY, LAST A1C 7.1%   • Endometrial cancer (CMS/HCC)     STAGE II   • GERD (gastroesophageal reflux disease)    • Hypertension    • MRSA (methicillin resistant staph aureus) culture positive 2018    S/P NECROTIZING FASCITIS IN BILATERAL  "FEET   • Necrotizing fasciitis (CMS/HCC)    • PCOS (polycystic ovarian syndrome)    • PTSD (post-traumatic stress disorder)    • Retinopathy 3/18/2020   • Sepsis (CMS/HCC)    • Stage 3 chronic kidney disease (CMS/HCC) 2/24/2020   • Subconjunctival hemorrhage         Past Surgical History:   Procedure Laterality Date   • APPENDECTOMY N/A 3/21/2020    Procedure: APPENDECTOMY LAPAROSCOPIC;  Surgeon: Praful Myers MD;  Location: [facility] OR;  Service: General;  Laterality: N/A;   • EXPLORATORY LAPAROTOMY, TOTAL ABDOMINAL HYSTERECTOMY SALPINGO OOPHORECTOMY N/A 2/10/2020    Procedure: EXPLORATORY LAPAROTOMY, TOTAL ABDOMINAL HYSTERECTOMY, BILATERAL SALPINGO-OOPHORECTOMY WITH OPTIMAL  STAGING (R-0), OMENTECTOMY;  Surgeon: Celine Rubio MD;  Location: [facility] OR;  Service: Gynecology Oncology;  Laterality: N/A;   • EYE SURGERY Left     BLOOD VESSEL IN EYE REPAIR   • TOE AMPUTATION Left 06/2019    big toe - unhealing sore   • TOE AMPUTATION Right 2018    big toe and second toe - Necrotizing fasciitis and MRSA          EVALUATION  PT Ortho     Row Name 06/09/20 1345       Subjective Comments    Subjective Comments  Pt reports increased drainage from abd wounds.  Pt also presented to ED last night due to development of abscess x2 to posterior scalp.  Pt states raised areas started a couple days ago, thinks it might be from wearing wig.  Pt reports areas were drained in ED and she just started an oral abx this afternoon prior to tx.  -       Subjective Pain    Able to rate subjective pain?  yes  -    Pre-Treatment Pain Level  0  -    Post-Treatment Pain Level  0  -    Subjective Pain Comment  only \"tenderness\" of scalp wounds  -       Transfers    Sit-Stand [place] (Transfers)  independent  -    Stand-Sit [place] (Transfers)  independent  -    Comment (Transfers)  supine on stretcher for tx  -       Gait/Stairs Assessment/Training    [place] Level (Gait)  independent  -      User Key  " "(r) = Recorded By, (t) = Taken By, (c) = Cosigned By    Initials Name Provider Type    Leidy Millard PT Physical Therapist          LDA Wound     Row Name 06/09/20 1345             Wound 06/09/20 1400 Right posterior;lateral scalp Abcess    Wound - Properties Group Date first assessed: 06/09/20  - Time first assessed: 1400  -JM Side: Right  - Orientation: posterior;lateral  - Location: scalp  -JM Primary Wound Type: Abcess  -    Wound Image  Images linked: 1  -JM      Dressing Appearance  intact;moist drainage iodoform gauze  -JM      Base  necrotic;white;yellow;slough;pink  -JM      Periwound  indurated;redness;swelling  -      Periwound Temperature  warm  -      Periwound Skin Turgor  firm  -      Edges  open  -JM      Wound Length (cm)  0.7 cm  -JM      Wound Width (cm)  0.7 cm  -      Wound Depth (cm)  1.1 cm  -      Drainage Characteristics/Odor  creamy;purulent wound culture obtained  -JM      Drainage Amount  small  -JM      Care, Wound  cleansed with;wound cleanser;debrided  -      Dressing Care, Wound  dressing applied;antimicrobial agent applied;foam;border dressing saline-moist HFBc, 4\" optifoam  -      Periwound Care, Wound  cleansed with pH balanced cleanser;dry periwound area maintained  -         Wound 06/09/20 1400 Right posterior;midline scalp Abcess    Wound - Properties Group Date first assessed: 06/09/20  - Time first assessed: 1400  -JM Side: Right  - Orientation: posterior;midline  - Location: scalp  - Primary Wound Type: Abcess  -    Wound Image  Images linked: 1  -JM      Dressing Appearance  intact;moist drainage iodoform gauze  -JM      Base  necrotic;white;yellow;slough;pink  -JM      Periwound  indurated;redness;swelling;warm;pustules intact pustule to L of wound, eschar superiorly  -      Periwound Temperature  warm  -      Periwound Skin Turgor  firm  -      Edges  open;irregular  -JM      Wound Length (cm)  0.4 cm  -      Wound Width " "(cm)  0.7 cm  -JM      Wound Depth (cm)  0.3 cm  -JM      Drainage Characteristics/Odor  creamy;purulent;yellow  -JM      Drainage Amount  small  -JM      Care, Wound  cleansed with;wound cleanser;debrided  -JM      Dressing Care, Wound  dressing applied;collagen;antimicrobial agent applied;foam saline-moist HFBc, 4\" optifoam  -JM      Periwound Care, Wound  cleansed with pH balanced cleanser;dry periwound area maintained  -JM         Wound 04/07/20 1100 midline abdomen Incision    Wound - Properties Group Date first assessed: 04/07/20  - Time first assessed: 1100  - Orientation: midline  -MF Location: abdomen  - Primary Wound Type: Incision  -MF    Wound Image  Images linked: 2  -JM      Dressing Appearance  intact;moist drainage  -JM      Base  moist;pink;red;epithelialization;yellow;slough side walls with yellow slough, unable to visualize base  -JM      Periwound  intact;pink  -JM      Periwound Temperature  warm  -JM      Periwound Skin Turgor  soft  -JM      Edges  open;irregular  -JM      Wound Length (cm)  0.9 cm  -JM      Wound Width (cm)  0.4 cm  -JM      Wound Depth (cm)  3.7 cm angling toward 12:00  -JM      Drainage Characteristics/Odor  serosanguineous;yellow;creamy  -JM      Drainage Amount  moderate  -JM      Care, Wound  cleansed with;wound cleanser;debrided  -JM      Dressing Care, Wound  dressing applied;collagen;antimicrobial agent applied;foam osmel to depth, HFBc lightly moistened, 4\" optifoam gentle  -JM      Periwound Care, Wound  cleansed with pH balanced cleanser;dry periwound area maintained  -JM         Wound 04/07/20 1100 Right upper;lateral abdomen Abcess    Wound - Properties Group Date first assessed: 04/07/20  - Time first assessed: 1100  - Side: Right  -MF Orientation: upper;lateral  - Location: abdomen  -MF Primary Wound Type: Abcess  -MF    Wound Image  Images linked: 2  -JM      Dressing Appearance  intact;moist drainage  -JM      Base  " "red;pink;subcutaneous;yellow;white;slough white tissue in base ?fascia  -      Periwound  intact;redness;indurated;moist  -      Periwound Temperature  warm  -      Periwound Skin Turgor  soft  -      Edges  rolled/closed  -      Wound Length (cm)  0.8 cm  -      Wound Width (cm)  2.5 cm  -      Wound Depth (cm)  3 cm  -      Undermining [Depth (cm)/Location]  4.5cm@9:00-2:00  -      Drainage Characteristics/Odor  serosanguineous;yellow;creamy  -      Drainage Amount  moderate  -      Care, Wound  cleansed with;wound cleanser;debrided  -      Dressing Care, Wound  dressing applied;collagen;antimicrobial agent applied;foam osmel to depth, lightly-moistened HFBc, qwick, 6\" optifoam  -      Periwound Care, Wound  cleansed with pH balanced cleanser;dry periwound area maintained  -        User Key  (r) = Recorded By, (t) = Taken By, (c) = Cosigned By    Initials Name Provider Type    Vinny Colindres, PT Physical Therapist    Leidy Millard, PT Physical Therapist            WOUND DEBRIDEMENT  Total area of Debridement: 2cmsq  Debridement Site 1  Location- Site 1: R upper abdomen   Selective Debridement- Site 1: Wound Surface <20cmsq  Instruments- Site 1: tweezers  Excised Tissue Description- Site 1: minimum, slough  Bleeding- Site 1: none   Debridement Site 2  Location- Site 2: R inf midline ABD  Selective Debridement- Site 2: Wound Surface <20cmsq  Instruments- Site 2: tweezers  Excised Tissue Description- Site 2: scant, slough  Bleeding- Site 2: none   Debridement Site 3  Location- Site 3: Post scalp wounds  Selective Debridement- Site 3: Wound Surface <20cmsq  Instruments- Site 3: tweezers, scissors  Excised Tissue Description- Site 3: minimum, slough  Bleeding- Site 3: none     Therapy Education     Row Name 06/09/20 8162             Therapy Education    Education Details  Continue with osmel, HFBc, optfoam dressings to abdomen, change PRN for drainage or every 2 days.  Use " HFBc and optifoam for new scalp wounds, change every 2 days or PRN for drainage.  -TEJA      Given  Bandaging/dressing change  -      Program  Reinforced  -TEJA      How Provided  Verbal;Demonstration  -      Provided to  Patient  -      Level of Understanding  Verbalized;Teach back education performed  -        User Key  (r) = Recorded By, (t) = Taken By, (c) = Cosigned By    Initials Name Provider Type    Leidy Millard, PT Physical Therapist          Recommendation and Plan  PT Assessment/Plan     Row Name 06/09/20 1400          PT Assessment    Functional Limitations  Other (comment);Performance in self-care ADL wound mgmt  -     Impairments  Integumentary integrity;Pain  -     Assessment Comments  ABD wounds with increased creamy drainage from ABD wounds, new slough inferior midline ABD.  RUQ wound with rolled/closed superior edge, base with less stringy slough but difficult to visualize, possibly fascia in depth.  Pt may require debridement of closed wound edges RUQ.  May also benefit from wound vac to RUQ but would recommend imaging of abd to confirm no fistula or other precautions for NPWT.  Pt with new posterior scalp abscesses s/p I&D in ED, just started oral abx today, so PT obtained wound culture of R scalp abcess, will request culture orders and wound care orders for scalp wounds from Dr. Rubio.  -        PT Plan    PT Frequency  1x/week  -     Physical Therapy Interventions (Optional Details)  patient/family education;wound care  -     PT Plan Comments  debridement, dressings management  -       User Key  (r) = Recorded By, (t) = Taken By, (c) = Cosigned By    Initials Name Provider Type    Leidy Millard PT Physical Therapist          Goals  PT OP Goals     Row Name 06/09/20 1345          Time Calculation    PT Goal Re-Cert Due Date  07/06/20  -       User Key  (r) = Recorded By, (t) = Taken By, (c) = Cosigned By    Initials Name Provider Type    Leidy Millard  KAROL, PT Physical Therapist          PT Goal Re-Cert Due Date: 07/06/20            Time Calculation: Start Time: 1345  Therapy Charges for Today     Code Description Service Date Service Provider Modifiers Qty    [id number]  YESSENIA DEBRIDE OPEN WOUND UP TO 20CM 6/9/2020 Leidy Gilliam, PT GP 1                  Leidy Gilliam, PT  6/9/2020

## 2020-06-09 NOTE — TELEPHONE ENCOUNTER
Received call from wound care. They are seeing patient and are evaluating her for 2 new scalp wounds that were debrided in the ER last night. They are going to add new orders for them to evaluate and treat as well as culture orders.

## 2020-06-09 NOTE — DISCHARGE INSTRUCTIONS
Keep appointment as scheduled with wound care.  Apply warm compresses to the site.  Take meds as ordered.

## 2020-06-10 ENCOUNTER — TELEPHONE (OUTPATIENT)
Dept: PHYSICAL THERAPY | Facility: HOSPITAL | Age: 45
End: 2020-06-10

## 2020-06-10 NOTE — TELEPHONE ENCOUNTER
Dr. Rubio, can you assess the patient's RUQ wound at her next visit?  The edges look like they have rolled/closed and may need to be debrided.  Also the base of the wound has less necrotic tissue but is difficult to visualize, looks like possibly fascia in base.  She may benefit from a wound vac as long as there is no fistula or abscess in the depth and if the wound edges are re-opened slightly.  Thank you.  Lediy Gilliam, PT 6/10/2020 15:40

## 2020-06-11 ENCOUNTER — APPOINTMENT (OUTPATIENT)
Dept: ONCOLOGY | Facility: HOSPITAL | Age: 45
End: 2020-06-11

## 2020-06-11 ENCOUNTER — TELEPHONE (OUTPATIENT)
Dept: GYNECOLOGIC ONCOLOGY | Facility: CLINIC | Age: 45
End: 2020-06-11

## 2020-06-11 LAB
BACTERIA SPEC AEROBE CULT: ABNORMAL
BACTERIA SPEC AEROBE CULT: ABNORMAL
GRAM STN SPEC: ABNORMAL

## 2020-06-11 NOTE — TELEPHONE ENCOUNTER
----- Message from Celine Rubio MD sent at 6/11/2020  2:17 PM EDT -----  Pt will have to be seen in office to determine if improving.  She is on doxy    ----- Message -----  From: Lab, Background User  Sent: 6/9/2020   4:27 PM EDT  To: Celine Rubio MD    06/11/20:  I called pt.  She said her neck wound has significantly decreased in sized and seems to be healing well.  She said she doesn't feel she needs to come in and have it looked at tomorrow but will call for appt if it worsens or healing slows.  She has an appt here next week.

## 2020-06-17 ENCOUNTER — HOSPITAL ENCOUNTER (OUTPATIENT)
Dept: ONCOLOGY | Facility: HOSPITAL | Age: 45
Setting detail: INFUSION SERIES
Discharge: HOME | End: 2020-06-17

## 2020-06-17 ENCOUNTER — OFFICE VISIT (OUTPATIENT)
Dept: GYNECOLOGIC ONCOLOGY | Facility: CLINIC | Age: 45
End: 2020-06-17

## 2020-06-17 ENCOUNTER — APPOINTMENT (OUTPATIENT)
Dept: PHYSICAL THERAPY | Facility: HOSPITAL | Age: 45
End: 2020-06-17

## 2020-06-17 VITALS
HEART RATE: 119 BPM | DIASTOLIC BLOOD PRESSURE: 88 MMHG | BODY MASS INDEX: 34.6 KG/M2 | HEIGHT: 62 IN | SYSTOLIC BLOOD PRESSURE: 179 MMHG | RESPIRATION RATE: 18 BRPM | OXYGEN SATURATION: 98 % | WEIGHT: 188 LBS | TEMPERATURE: 98 F

## 2020-06-17 VITALS — SYSTOLIC BLOOD PRESSURE: 166 MMHG | DIASTOLIC BLOOD PRESSURE: 71 MMHG | HEART RATE: 95 BPM

## 2020-06-17 DIAGNOSIS — M79.2 NEUROPATHIC PAIN: ICD-10-CM

## 2020-06-17 DIAGNOSIS — M25.50 JOINT PAIN FOLLOWING CHEMOTHERAPY: ICD-10-CM

## 2020-06-17 DIAGNOSIS — C54.1 ENDOMETRIAL CANCER (HCC): Primary | ICD-10-CM

## 2020-06-17 DIAGNOSIS — G89.18 JOINT PAIN FOLLOWING CHEMOTHERAPY: ICD-10-CM

## 2020-06-17 LAB
ALBUMIN SERPL-MCNC: 3.4 G/DL (ref 3.5–5.2)
ALBUMIN/GLOB SERPL: 0.9 G/DL
ALP SERPL-CCNC: 84 U/L (ref 39–117)
ALT SERPL W P-5'-P-CCNC: 8 U/L (ref 1–33)
ANION GAP SERPL CALCULATED.3IONS-SCNC: 11 MMOL/L (ref 5–15)
AST SERPL-CCNC: 19 U/L (ref 1–32)
BILIRUB SERPL-MCNC: 0.6 MG/DL (ref 0.2–1.2)
BUN BLD-MCNC: 22 MG/DL (ref 6–20)
BUN/CREAT SERPL: 26.2 (ref 7–25)
CALCIUM SPEC-SCNC: 9 MG/DL (ref 8.6–10.5)
CHLORIDE SERPL-SCNC: 100 MMOL/L (ref 98–107)
CO2 SERPL-SCNC: 22 MMOL/L (ref 22–29)
CREAT BLD-MCNC: 0.84 MG/DL (ref 0.57–1)
CREAT BLDA-MCNC: 0.7 MG/DL
ERYTHROCYTE [DISTWIDTH] IN BLOOD BY AUTOMATED COUNT: 18.7 % (ref 12.3–15.4)
GFR SERPL CREATININE-BSD FRML MDRD: 73 ML/MIN/1.73
GLOBULIN UR ELPH-MCNC: 3.8 GM/DL
GLUCOSE BLD-MCNC: 403 MG/DL (ref 65–99)
GLUCOSE BLDC GLUCOMTR-MCNC: 294 MG/DL (ref 70–130)
HCT VFR BLD AUTO: 29.4 % (ref 34–46.6)
HGB BLD-MCNC: 9.3 G/DL (ref 12–15.9)
LYMPHOCYTES # BLD AUTO: 0.6 10*3/MM3 (ref 0.7–3.1)
LYMPHOCYTES NFR BLD AUTO: 9.4 % (ref 19.6–45.3)
MCH RBC QN AUTO: 30.8 PG (ref 26.6–33)
MCHC RBC AUTO-ENTMCNC: 31.6 G/DL (ref 31.5–35.7)
MCV RBC AUTO: 97.6 FL (ref 79–97)
MONOCYTES # BLD AUTO: 0.1 10*3/MM3 (ref 0.1–0.9)
MONOCYTES NFR BLD AUTO: 2.1 % (ref 5–12)
NEUTROPHILS # BLD AUTO: 5.8 10*3/MM3 (ref 1.7–7)
NEUTROPHILS NFR BLD AUTO: 88.5 % (ref 42.7–76)
PLATELET # BLD AUTO: 127 10*3/MM3 (ref 140–450)
PMV BLD AUTO: 6.9 FL (ref 6–12)
POTASSIUM BLD-SCNC: 4.5 MMOL/L (ref 3.5–5.2)
PROT SERPL-MCNC: 7.2 G/DL (ref 6–8.5)
RBC # BLD AUTO: 3.01 10*6/MM3 (ref 3.77–5.28)
SODIUM BLD-SCNC: 133 MMOL/L (ref 136–145)
WBC NRBC COR # BLD: 6.5 10*3/MM3 (ref 3.4–10.8)

## 2020-06-17 PROCEDURE — 25010000002 PALONOSETRON 0.25 MG/5ML SOLUTION PREFILLED SYRINGE: Performed by: OBSTETRICS & GYNECOLOGY

## 2020-06-17 PROCEDURE — 25010000002 CARBOPLATIN PER 50 MG: Performed by: OBSTETRICS & GYNECOLOGY

## 2020-06-17 PROCEDURE — 96372 THER/PROPH/DIAG INJ SC/IM: CPT

## 2020-06-17 PROCEDURE — 25010000002 DIPHENHYDRAMINE PER 50 MG: Performed by: OBSTETRICS & GYNECOLOGY

## 2020-06-17 PROCEDURE — 96374 THER/PROPH/DIAG INJ IV PUSH: CPT

## 2020-06-17 PROCEDURE — 25010000002 PACLITAXEL PER 30 MG: Performed by: OBSTETRICS & GYNECOLOGY

## 2020-06-17 PROCEDURE — 85025 COMPLETE CBC W/AUTO DIFF WBC: CPT | Performed by: OBSTETRICS & GYNECOLOGY

## 2020-06-17 PROCEDURE — 96413 CHEMO IV INFUSION 1 HR: CPT

## 2020-06-17 PROCEDURE — 96417 CHEMO IV INFUS EACH ADDL SEQ: CPT

## 2020-06-17 PROCEDURE — 96415 CHEMO IV INFUSION ADDL HR: CPT

## 2020-06-17 PROCEDURE — 99215 OFFICE O/P EST HI 40 MIN: CPT | Performed by: OBSTETRICS & GYNECOLOGY

## 2020-06-17 PROCEDURE — 25010000002 PEGFILGRASTIM 6 MG/0.6ML PREFILLED SYRINGE KIT: Performed by: OBSTETRICS & GYNECOLOGY

## 2020-06-17 PROCEDURE — 96377 APPLICATON ON-BODY INJECTOR: CPT

## 2020-06-17 PROCEDURE — 80053 COMPREHEN METABOLIC PANEL: CPT | Performed by: OBSTETRICS & GYNECOLOGY

## 2020-06-17 PROCEDURE — 25010000002 DEXAMETHASONE PER 1 MG: Performed by: OBSTETRICS & GYNECOLOGY

## 2020-06-17 PROCEDURE — 63710000001 INSULIN LISPRO (HUMAN) PER 5 UNITS: Performed by: OBSTETRICS & GYNECOLOGY

## 2020-06-17 PROCEDURE — 82962 GLUCOSE BLOOD TEST: CPT

## 2020-06-17 PROCEDURE — 25010000002 FOSAPREPITANT PER 1 MG: Performed by: OBSTETRICS & GYNECOLOGY

## 2020-06-17 PROCEDURE — 96375 TX/PRO/DX INJ NEW DRUG ADDON: CPT

## 2020-06-17 RX ORDER — PALONOSETRON 0.05 MG/ML
0.25 INJECTION, SOLUTION INTRAVENOUS ONCE
Status: CANCELLED | OUTPATIENT
Start: 2020-06-17

## 2020-06-17 RX ORDER — FAMOTIDINE 10 MG/ML
20 INJECTION, SOLUTION INTRAVENOUS AS NEEDED
Status: CANCELLED | OUTPATIENT
Start: 2020-06-17

## 2020-06-17 RX ORDER — PALONOSETRON 0.05 MG/ML
0.25 INJECTION, SOLUTION INTRAVENOUS ONCE
Status: COMPLETED | OUTPATIENT
Start: 2020-06-17 | End: 2020-06-17

## 2020-06-17 RX ORDER — FAMOTIDINE 10 MG/ML
20 INJECTION, SOLUTION INTRAVENOUS ONCE
Status: COMPLETED | OUTPATIENT
Start: 2020-06-17 | End: 2020-06-17

## 2020-06-17 RX ORDER — DEXAMETHASONE 4 MG/1
TABLET ORAL
Qty: 11 TABLET | Refills: 1 | Status: SHIPPED | OUTPATIENT
Start: 2020-06-17 | End: 2021-12-06

## 2020-06-17 RX ORDER — HYDROCODONE BITARTRATE AND ACETAMINOPHEN 7.5; 325 MG/1; MG/1
1 TABLET ORAL EVERY 6 HOURS PRN
Qty: 30 TABLET | Refills: 0 | Status: SHIPPED | OUTPATIENT
Start: 2020-06-17 | End: 2020-07-08 | Stop reason: SDUPTHER

## 2020-06-17 RX ORDER — DIPHENHYDRAMINE HYDROCHLORIDE 50 MG/ML
50 INJECTION INTRAMUSCULAR; INTRAVENOUS AS NEEDED
Status: CANCELLED | OUTPATIENT
Start: 2020-06-17

## 2020-06-17 RX ORDER — FAMOTIDINE 10 MG/ML
20 INJECTION, SOLUTION INTRAVENOUS ONCE
Status: CANCELLED | OUTPATIENT
Start: 2020-06-17

## 2020-06-17 RX ORDER — SODIUM CHLORIDE 9 MG/ML
250 INJECTION, SOLUTION INTRAVENOUS ONCE
Status: CANCELLED | OUTPATIENT
Start: 2020-06-17

## 2020-06-17 RX ADMIN — SODIUM CHLORIDE 150 MG: 9 INJECTION, SOLUTION INTRAVENOUS at 10:45

## 2020-06-17 RX ADMIN — INSULIN LISPRO 12 UNITS: 100 INJECTION, SOLUTION INTRAVENOUS; SUBCUTANEOUS at 11:27

## 2020-06-17 RX ADMIN — PALONOSETRON 0.25 MG: 0.25 INJECTION, SOLUTION INTRAVENOUS at 10:51

## 2020-06-17 RX ADMIN — DIPHENHYDRAMINE HYDROCHLORIDE 50 MG: 50 INJECTION INTRAMUSCULAR; INTRAVENOUS at 11:06

## 2020-06-17 RX ADMIN — SODIUM CHLORIDE 265 MG: 9 INJECTION, SOLUTION INTRAVENOUS at 11:25

## 2020-06-17 RX ADMIN — PEGFILGRASTIM 6 MG: KIT SUBCUTANEOUS at 15:08

## 2020-06-17 RX ADMIN — CARBOPLATIN 570 MG: 10 INJECTION, SOLUTION INTRAVENOUS at 14:35

## 2020-06-17 RX ADMIN — FAMOTIDINE 20 MG: 10 INJECTION, SOLUTION INTRAVENOUS at 10:45

## 2020-06-17 RX ADMIN — DEXAMETHASONE SODIUM PHOSPHATE 20 MG: 10 INJECTION INTRAMUSCULAR; INTRAVENOUS at 10:45

## 2020-06-17 NOTE — PROGRESS NOTES
Jeana Chung RN  [id number]  1975      Reason for visit: Endometrial cancer, consideration of ongoing chemotherapy, chemotherapy-induced pancytopenia, hospital discharge follow-up, status post appendectomy with appendiceal abscess, ongoing wound care issues, new abscesses at scalp related to leg    History of present illness:  The patient is a 45 y.o. year old female who presents today for treatment and evaluation of the above issues.     Today, patient notes that she has ongoing wound care.  She saw Dr. Kyle Kelly yesterday and right upper quadrant wound care was performed.  He recommended changing from blue foam to twice daily dry gauze packing.  He was seen in the emergency room and multiple abscesses were noted on her head related to her awake.  She is requesting a refill on her narcotic medicine related to neuropathy.  She notes constant neuropathy in her right fingers and is having difficulty tying her shoes.  Her right arm is always cold.  I been communicating with wound care and they have concerns regarding her midline incision as well.  Bowel and bladder function is normal.  She is seeing podiatry for her feet.  She is having some marital issues related to being far away from her  and she has been to visit him recently.  She has concerns regarding vaginal brachytherapy and effects on vaginal tissue.  She notes normal energy.  She denies fevers and chills.    OBGYN History:  She is a .  She does not use HRT. She has never had a pap smear.      Oncologic History:     Endometrial cancer (CMS/HCC)    2019 Imaging     Presented to ED in [place] due to progressively heavy vaginal bleeding and severe back pain. Ultrasound revealed uterine and cervical masses.      2020 Biopsy     ED follow-up with gynecologist in [place], told she likely has cervical cancer. Insufficient tissue on attempted cervical biopsy. Patient moved to [place], [place] to be closer to mother and sister for  work-up and treatment.       1/21/2020 Imaging     Gyn evaluation at [facility]. Repeat TVUS showed cervical mass, right adnexal mass, and large uterine fibroid vs mass. Referred to Gyn Oncology      1/23/2020 Initial Diagnosis     Endometrial cancer (CMS/HCC)  Cervical biopsy positive for infiltrating endometrioid adenocarcinoma      1/30/2020 Imaging     CT chest, abdomen, pelvis showed enlarged uterus with multiple masses and 4 cm cystic/solid mass at right ovary. No metastatic disease noted in abdomen or chest.      2/10/2020 Surgery     Exploratory laparotomy, total abdominal hysterectomy, bilateral salpingo-oophorectomy, with optimal debulking (R=0), and omentectomy.    Pelvic washing cytology positive for malignant cells, consistent with adenocarcinoma. Final pathology showed large grade 3 endometrioid tumor with lymphvascular invasion at the uterus. Cervical stromal involvement, vaginal margin negative. Right tube and ovary involved. Left tube and ovary negative, omentum negative. MSI normal. [id number], Stage IIIA grade 3        3/9/2020 -  Chemotherapy     OP UTERINE PACLitaxel / CARBOplatin (Q21D)  Cycle #1 complicated by pancytopenia, appendicitis, appendectomy, appendiceal abscess  Cycle #2 remarkable for thrombocytopenia, worsening neuropathy.  Dose modification with cycle #3.        3/17/2020 - 3/23/2020 Other Event     Hospital admission with acute appendicitis      5/21/2020 -  Chemotherapy     OP CENTRAL VENOUS ACCESS DEVICE ACCESS, CARE, AND MAINTENANCE (CVAD)           Past Medical History:   Diagnosis Date   • Anemia    • Anxiety and depression    • Back pain    • Bronchitis    • Diabetes (CMS/HCC)     DX 4-5 YRS AGO, CHECKS BS 4X/DAY, LAST A1C 7.1%   • Endometrial cancer (CMS/HCC)     STAGE II   • GERD (gastroesophageal reflux disease)    • Hypertension    • MRSA (methicillin resistant staph aureus) culture positive 2018    S/P NECROTIZING FASCITIS IN BILATERAL FEET   • Necrotizing  fasciitis (CMS/HCC)    • PCOS (polycystic ovarian syndrome)    • PTSD (post-traumatic stress disorder)    • Retinopathy 3/18/2020   • Sepsis (CMS/HCC)    • Stage 3 chronic kidney disease (CMS/HCC) 2020   • Subconjunctival hemorrhage        Past Surgical History:   Procedure Laterality Date   • APPENDECTOMY N/A 3/21/2020    Procedure: APPENDECTOMY LAPAROSCOPIC;  Surgeon: Praful Myers MD;  Location: [facility] OR;  Service: General;  Laterality: N/A;   • EXPLORATORY LAPAROTOMY, TOTAL ABDOMINAL HYSTERECTOMY SALPINGO OOPHORECTOMY N/A 2/10/2020    Procedure: EXPLORATORY LAPAROTOMY, TOTAL ABDOMINAL HYSTERECTOMY, BILATERAL SALPINGO-OOPHORECTOMY WITH OPTIMAL  STAGING (R-0), OMENTECTOMY;  Surgeon: Celine Rubio MD;  Location: [facility] OR;  Service: Gynecology Oncology;  Laterality: N/A;   • EYE SURGERY Left     BLOOD VESSEL IN EYE REPAIR   • TOE AMPUTATION Left 2019    big toe - unhealing sore   • TOE AMPUTATION Right     big toe and second toe - Necrotizing fasciitis and MRSA        MEDICATIONS: The current medication list was reviewed with the patient and updated in the EMR this date per the Medical Assistant. Medication dosages and frequencies were confirmed to be accurate.      Allergies:  is allergic to bactrim [sulfamethoxazole-trimethoprim]; penicillins; and promethazine.    Social History:   Social History     Socioeconomic History   • Marital status:      Spouse name: Not on file   • Number of children: 1   • Years of education: Not on file   • Highest education level: Not on file   Tobacco Use   • Smoking status: Former Smoker     Types: Cigarettes     Last attempt to quit: 2000     Years since quittin.4   • Smokeless tobacco: Never Used   • Tobacco comment: social MAYBE 1 CIG/DAY   Substance and Sexual Activity   • Alcohol use: Not Currently     Frequency: Monthly or less     Drinks per session: 1 or 2   • Drug use: Never   • Sexual activity: Not Currently   Social History  Narrative    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 Staying in [place] with mother due to Endometrial Cancer.        Family History:    Family History   Problem Relation Age of Onset   • Fibroids Mother    • Diabetes type II Mother    • Hypertension Mother    • Heart attack Mother    • Fibroids Sister         PCOS   • Diabetes type II Sister    • Dementia Maternal Grandmother    • Stroke Maternal Grandmother        Health Maintenance:    Health Maintenance   Topic Date Due   • TDAP/TD VACCINES (1 - Tdap) 04/24/1984   • HEPATITIS C SCREENING  01/22/2020   • COLONOSCOPY  01/22/2020   • MAMMOGRAM  02/24/2020   • INFLUENZA VACCINE  08/01/2020   • HEMOGLOBIN A1C  08/07/2020   • URINE MICROALBUMIN  01/30/2021   • PNEUMOCOCCAL VACCINE (19-64 HIGHEST RISK) (2 of 3 - PCV13) 02/24/2021   • DIABETIC FOOT EXAM  02/24/2021   • DIABETIC EYE EXAM  02/24/2021   • ANNUAL PHYSICAL  02/25/2021   • PAP SMEAR  Discontinued         Review of Systems   Constitutional: Positive for fatigue. Negative for appetite change, chills, fever and unexpected weight change.   HENT: Negative for ear discharge, ear pain, hearing loss, mouth sores, nosebleeds, postnasal drip, sinus pressure, sinus pain, sore throat, tinnitus and trouble swallowing.    Eyes: Positive for visual disturbance. Negative for pain and itching.        Blurry vision   Respiratory: Negative for cough, shortness of breath and wheezing.    Cardiovascular: Negative for chest pain and palpitations.   Gastrointestinal: Negative for abdominal pain, blood in stool, constipation, diarrhea, nausea and vomiting.   Endocrine: Positive for heat intolerance. Negative for cold intolerance.   Genitourinary: Positive for dyspareunia. Negative for difficulty urinating, flank pain, frequency, hematuria, urgency, vaginal bleeding and vaginal discharge.   Musculoskeletal: Positive for arthralgias and myalgias. Negative for gait problem.   Skin: Positive for wound. Negative for color  "change and rash.   Allergic/Immunologic: Negative for immunocompromised state.   Neurological: Positive for numbness. Negative for dizziness, seizures, syncope and headaches.   Hematological: Negative for adenopathy. Does not bruise/bleed easily.        Anemia   Psychiatric/Behavioral: Positive for sleep disturbance. Negative for agitation, confusion and dysphoric mood. The patient is nervous/anxious.        Physical Exam    Vitals:    06/17/20 0925   BP: 179/88   Pulse: 119   Resp: 18   Temp: 98 °F (36.7 °C)   SpO2: 98%   Weight: 85.3 kg (188 lb)   Height: 157.5 cm (62\")   PainSc: 0-No pain     Body mass index is 34.39 kg/m².    Wt Readings from Last 3 Encounters:   06/17/20 85.3 kg (188 lb)   06/08/20 86.2 kg (190 lb)   05/28/20 85.3 kg (188 lb)         GENERAL: Alert, well-appearing female appearing her stated age who is in no apparent distress.  Patient is obese by BMI criteria.  HEENT: Sclera anicteric. Head normocephalic, atraumatic. Mucus membranes moist.  Alopecia, scalp with 4 wounds 2 of which are packed with blue foam.  Appear better than images in chart.  NECK: Supple, free from thyromegaly  BREASTS: Deferred  CARDIOVASCULAR: Regular rate and rhythm without murmur rub or gallop, no extremity edema  RESPIRATORY: Clear to auscultation bilaterally, normal effort  BACK: No CVA tenderness, no vertebral tenderness on palpation  GASTROINTESTINAL: Abdomen soft, nontender, no rebound, no guarding.  No palpable mass.  Incisions examined with packing in place.  Right upper quadrant incision was left with the dressing on.  Midline incision was noted and could not be properly packed due to its small opening at the skin.  Therefore, area was sharply incised and iodoform quarter-inch gauze placed.  No drainage was noted after the dressing was removed and the intact base of the wound was probed.    SKIN:  Warm, dry, well-perfused. No rashes, ulcers.  Wounds as noted above.  PSYCHIATRIC: AO x3, with appropriate affect, " normal thought processes.  NEUROLOGIC: No focal deficits. Moves extremities well.  MUSCULOSKELETAL: Normal gait and station.  Brace on right ankle/foot.  EXTREMITIES:   No cyanosis, clubbing, symmetric.  LYMPHATICS: No adenopathy bilateral neck and groin areas     PELVIC exam:    Deferred    ECOG PS 1    PROCEDURES:    Extension of skin incision at midline laparotomy wound as noted above    Diagnostic Data:      No radiology results for the last 30 days.      Lab Results   Component Value Date    WBC 6.50 06/17/2020    HGB 9.3 (L) 06/17/2020    HCT 29.4 (L) 06/17/2020    MCV 97.6 (H) 06/17/2020     (L) 06/17/2020    NEUTROABS 5.80 06/17/2020    GLUCOSE 403 (C) 06/17/2020    BUN 22 (H) 06/17/2020    CREATININE 0.70 06/17/2020    EGFRIFNONA 73 06/17/2020     (L) 06/17/2020    K 4.5 06/17/2020     06/17/2020    CO2 22.0 06/17/2020    MG 2.2 03/22/2020    PHOS 2.5 03/20/2020    CALCIUM 9.0 06/17/2020    ALBUMIN 3.40 (L) 06/17/2020    AST 19 06/17/2020    ALT 8 06/17/2020    BILITOT 0.6 06/17/2020     No results found for:         Assessment/Plan   This is a 45 y.o. woman who presents for toxicity evaluation and consideration of ongoing chemotherapy for advanced endometrial cancer.  Encounter Diagnoses   Name Primary?   • Joint pain following chemotherapy    • Neuropathic pain    • Endometrial cancer (CMS/HCC) Yes         Endometrial cancer: Stage IIIa due to adnexal involvement  -Status post surgery as noted above  -Consideration of cycle #5 of combined carboplatin plus paclitaxel chemotherapy IV q. 21 days with a goal of 6  -Dose modification with cycle #2 (29% reduction in carboplatin) due to pancytopenia, acute appendicitis requiring surgical intervention and hospital stay: Cleared for chemotherapy by infectious disease  -Dose modification of paclitaxel today and will see if we can change to docetaxel for last cycle.    Chemotherapy induced pancytopenia with cycle #1  -Dose modification with  cycle #2   -Dose modification of carboplatin by 10% of current dose with cycle #3 due to ongoing issues with chemotherapy-induced thrombocytopenia.    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  Patient having difficulty writing first 1 to 2 weeks after treatment.  Decreased carboplatin and paclitaxel by 10% in previous cycle.  Decreased paclitaxel by an additional 10% 6/17/2020    Emotional distress related to cancer diagnosis  Has seen counselor  Francis Lyons     Complex social issues  -Staying with her family in [place] for treatment    Joint pain, neuro pathic pain  Norco refilled  On gabapentin    Chronic Medical Conditions  -T2DM, HTN, steatohepatitis:  ?  Esophageal varicosities and HORNE, history of necrotizing fasciitis.  May require referral to gastroenterologist.  -Has seen endocrinology regarding her diabetes and new regimen was proposed for the jorge-chemotherapy timeframe due to steroid-induced hyperglycemia.  -Sliding scale insulin built into chemotherapy treatment plan due to diabetes mellitus with hyperglycemia; glucose as noted above.  -Vasotec as needed with infusion due to hypertension    Dyspareunia, complains of vaginal stricture  Vaginal dilators provided, dyspareunia improved.    Pain assessment was performed today as a part of patient’s care.  For patients with pain related to surgery, gynecologic malignancy or cancer treatment, the plan is as noted in the assessment/plan.  For patients with pain not related to these issues, they are to seek any further needed care from a more appropriate provider, such as PCP.    Orders Placed This Encounter   Procedures   • Comprehensive metabolic panel     Standing Status:   Future     Number of Occurrences:   1     Standing Expiration Date:   6/17/2021   • CBC and Differential     Standing Status:   Future     Number of Occurrences:   1     Standing Expiration Date:    6/17/2021     Order Specific Question:   Manual Differential     Answer:   No   • CBC & Differential     Standing Status:   Future     Standing Expiration Date:   6/26/2021     Order Specific Question:   Manual Differential     Answer:   No       FOLLOW UP: In 3 weeks for consideration of ongoing chemotherapy    Celine Rubio MD  06/17/20  1:09 PM

## 2020-06-18 ENCOUNTER — HOSPITAL ENCOUNTER (OUTPATIENT)
Dept: PHYSICAL THERAPY | Facility: HOSPITAL | Age: 45
Setting detail: THERAPIES SERIES
Discharge: HOME | End: 2020-06-18

## 2020-06-18 DIAGNOSIS — L02.811 ABSCESS OF SCALP: ICD-10-CM

## 2020-06-18 DIAGNOSIS — S31.109D OPEN WOUND OF ABDOMEN, SUBSEQUENT ENCOUNTER: ICD-10-CM

## 2020-06-18 DIAGNOSIS — T81.31XD POSTOPERATIVE WOUND DEHISCENCE, SUBSEQUENT ENCOUNTER: Primary | ICD-10-CM

## 2020-06-18 PROCEDURE — 97597 DBRDMT OPN WND 1ST 20 CM/<: CPT | Performed by: PHYSICAL THERAPIST

## 2020-06-18 NOTE — THERAPY PROGRESS REPORT/RE-CERT
Outpatient Rehabilitation - Wound/Debridement Progress Note  [facility]     Patient Name: Jeana Pollack Juliet, RN  : 1975  MRN: [id number]  Today's Date: 2020                 Admit Date: 2020    Visit Dx:    ICD-10-CM ICD-9-CM   1. Postoperative wound dehiscence, subsequent encounter T81.31XD V58.89     998.32   2. Open wound of abdomen, subsequent encounter S31.109D V58.89     879.2   3. Abscess of scalp L02.811 682.8       Patient Active Problem List   Diagnosis   • Acute stress reaction with predominately emotional disturbance   • Sleep disturbance   • Cancer related pain   • Moderate episode of recurrent major depressive disorder (CMS/HCC)   • Anxiety   • Iron deficiency anemia   • Type 2 diabetes mellitus with diabetic polyneuropathy, with long-term current use of insulin (CMS/HCC)   • Essential hypertension   • Heart murmur   • Family history of cardiac disorder in mother   • Endometrial cancer (CMS/HCC)   • Stage 3 chronic kidney disease (CMS/HCC)   • Gastroesophageal reflux disease   • Amputation of left great toe (CMS/HCC)   • Amputation of right great toe (CMS/HCC)   • Neuropathy   • Hypotension due to drugs   • Neutropenia (CMS/HCC)   • Acute appendicitis   • Open wound of right foot   • Hyponatremia   • Pancytopenia (CMS/HCC)   • Antineoplastic chemotherapy induced pancytopenia (CMS/HCC)   • Chemotherapy-induced thrombocytopenia   • Wound healing, delayed   • Dyspareunia, female   • Vaginal stenosis        Past Medical History:   Diagnosis Date   • Anemia    • Anxiety and depression    • Back pain    • Bronchitis    • Diabetes (CMS/HCC)     DX 4-5 YRS AGO, CHECKS BS 4X/DAY, LAST A1C 7.1%   • Endometrial cancer (CMS/HCC)     STAGE II   • GERD (gastroesophageal reflux disease)    • Hypertension    • MRSA (methicillin resistant staph aureus) culture positive 2018    S/P NECROTIZING FASCITIS IN BILATERAL FEET   • Necrotizing fasciitis (CMS/HCC)    • PCOS (polycystic ovarian syndrome)     • PTSD (post-traumatic stress disorder)    • Retinopathy 3/18/2020   • Sepsis (CMS/HCC)    • Stage 3 chronic kidney disease (CMS/HCC) 2/24/2020   • Subconjunctival hemorrhage         Past Surgical History:   Procedure Laterality Date   • APPENDECTOMY N/A 3/21/2020    Procedure: APPENDECTOMY LAPAROSCOPIC;  Surgeon: Praful Myers MD;  [facility];  Service: General;  Laterality: N/A;   • EXPLORATORY LAPAROTOMY, TOTAL ABDOMINAL HYSTERECTOMY SALPINGO OOPHORECTOMY N/A 2/10/2020    Procedure: EXPLORATORY LAPAROTOMY, TOTAL ABDOMINAL HYSTERECTOMY, BILATERAL SALPINGO-OOPHORECTOMY WITH OPTIMAL  STAGING (R-0), OMENTECTOMY;  Surgeon: Celine Rubio MD;  [facility];  Service: Gynecology Oncology;  Laterality: N/A;   • EYE SURGERY Left     BLOOD VESSEL IN EYE REPAIR   • TOE AMPUTATION Left 06/2019    big toe - unhealing sore   • TOE AMPUTATION Right 2018    big toe and second toe - Necrotizing fasciitis and MRSA      Midline abdominal incision          Rt upper quarter                PT Ortho     Row Name 06/18/20 3910       Subjective Comments    Subjective Comments  Pt reports MD's want to use gauze. Iodoform to incision line, dry gauze roll to upper Rt. Supposed to change BID and clean with iodine.   -MW       Subjective Pain    Able to rate subjective pain?  yes  -MW    Pre-Treatment Pain Level  2  -MW    Post-Treatment Pain Level  2  -MW    Subjective Pain Comment  Rt upper quadrant is a little sore  -MW       Transfers    Sit-Stand Dickinson (Transfers)  independent  -MW    Stand-Sit Dickinson (Transfers)  independent  -MW    Comment (Transfers)  supine on stretcher for tx; seated for scalp   -MW       Gait/Stairs Assessment/Training    Dickinson Level (Gait)  independent  -MW      User Key  (r) = Recorded By, (t) = Taken By, (c) = Cosigned By    Initials Name Provider Type    Yvette Dubois, PT Physical Therapist          LDA Wound     Row Name 06/18/20 0298              "Wound 06/09/20 1400 Right posterior;lateral scalp Abcess    Wound - Properties Group Date first assessed: 06/09/20 -JM Time first assessed: 1400 -JM Side: Right  - Orientation: posterior;lateral  - Location: scalp  -JM Primary Wound Type: Abcess  -JM    Wound Image  Images linked: 2  -MW      Dressing Appearance  intact;dried drainage iodoform gauze  -MW      Base  moist;red;subcutaneous;yellow;white;slough  -MW      Periwound  indurated;redness;swelling  -MW      Periwound Temperature  warm  -MW      Periwound Skin Turgor  soft  -MW      Edges  open  -MW      Wound Length (cm)  0.7 cm  -MW      Wound Width (cm)  0.7 cm  -MW      Wound Depth (cm)  0.7 cm  -MW      Drainage Characteristics/Odor  sanguineous  -MW      Drainage Amount  scant HFB dried / adherent   -MW      Care, Wound  cleansed with;wound cleanser;debrided;honey applied  -MW      Dressing Care, Wound  dressing changed;calcium alginate;silver impregnated;low-adherent;border dressing Kenton Perez , 4\" optifoam   -MW      Periwound Care, Wound  cleansed with pH balanced cleanser;dry periwound area maintained  -MW         Wound 06/09/20 1400 Right posterior;midline scalp Abcess    Wound - Properties Group Date first assessed: 06/09/20 -JM Time first assessed: 1400 -JM Side: Right  - Orientation: posterior;midline  - Location: scalp  -JM Primary Wound Type: Abcess  -JM    Dressing Appearance  intact;no drainage iodoform gauze  -MW      Base  scab  -MW      Periwound  redness;swelling;warm  -MW      Periwound Temperature  warm  -MW      Periwound Skin Turgor  firm  -MW      Edges  open;irregular  -MW      Wound Length (cm)  0.3 cm  -MW      Wound Width (cm)  0.3 cm  -MW      Wound Depth (cm)  -- scab left intact   -MW      Drainage Amount  none  -MW      Care, Wound  cleansed with;wound cleanser  -MW      Dressing Care, Wound  dressing changed;low-adherent;foam;border dressing  -MW      Periwound Care, Wound  cleansed with pH balanced " "cleanser;dry periwound area maintained  -MW         Wound 04/07/20 1100 midline abdomen Incision    Wound - Properties Group Date first assessed: 04/07/20  - Time first assessed: 1100 -MF Orientation: midline  - Location: abdomen  -MF Primary Wound Type: Incision  -MF    Wound Image  Images linked: 1  -MW      Dressing Appearance  intact;moist drainage  -MW      Base  moist;pink;red;subcutaneous  -MW      Periwound  intact;pink  -MW      Periwound Temperature  warm  -MW      Periwound Skin Turgor  soft  -MW      Edges  open;irregular  -MW      Wound Length (cm)  1.6 cm  -MW      Wound Width (cm)  0.3 cm  -MW      Wound Depth (cm)  2.1 cm  -MW      Undermining [Depth (cm)/Location]  3 @ 11:00  -MW      Drainage Characteristics/Odor  serosanguineous;yellow;creamy  -MW      Drainage Amount  moderate  -MW      Care, Wound  cleansed with;wound cleanser  -MW      Dressing Care, Wound  dressing changed;packed with;packing strip;low-adherent;foam;border dressing 1/2\" iodoform, 4\" optifoam  -MW      Periwound Care, Wound  cleansed with pH balanced cleanser;dry periwound area maintained  -MW         Wound 04/07/20 1100 Right upper;lateral abdomen Abcess    Wound - Properties Group Date first assessed: 04/07/20  - Time first assessed: 1100  - Side: Right  -MF Orientation: upper;lateral  -MF Location: abdomen  -MF Primary Wound Type: Abcess  -MF    Wound Image  Images linked: 1  -MW      Dressing Appearance  intact;moist drainage  -MW      Base  red;pink;subcutaneous;yellow;white;slough fascia in base   -MW      Periwound  intact;redness;indurated;pink  -MW      Periwound Temperature  warm  -MW      Periwound Skin Turgor  soft  -MW      Edges  rolled/closed  -MW      Wound Length (cm)  1 cm  -MW      Wound Width (cm)  1.8 cm  -MW      Wound Depth (cm)  3 cm  -MW      Undermining [Depth (cm)/Location]  3.3 @ 9:00  -MW      Drainage Characteristics/Odor  serosanguineous;yellow;creamy  -MW      Drainage Amount  small  -MW " "     Care, Wound  cleansed with;wound cleanser  -MW      Dressing Care, Wound  dressing changed;gauze, dry;low-adherent;foam;border dressing 1\" conform roll to gently pack; 4\" optfoam to cover   -MW      Periwound Care, Wound  cleansed with pH balanced cleanser;dry periwound area maintained  -MW        User Key  (r) = Recorded By, (t) = Taken By, (c) = Cosigned By    Initials Name Provider Type    Vinny Colindres, PT Physical Therapist    MW Yvette Ribeiro, PT Physical Therapist    Leidy Millard, PT Physical Therapist            WOUND DEBRIDEMENT  Total area of Debridement: 1 cm2   Debridement Site 1  Location- Site 1: Rt posterior scalp   Selective Debridement- Site 1: Wound Surface <20cmsq  Instruments- Site 1: tweezers, scapel, #15, scissors  Excised Tissue Description- Site 1: minimum, slough  Bleeding- Site 1: none                 Recommendation and Plan  PT Assessment/Plan     Row Name 06/18/20 0843          PT Assessment    Functional Limitations  Other (comment);Performance in self-care ADL wound mgmt  -MW     Impairments  Integumentary integrity;Pain  -MW     Assessment Comments  Pt returns after MD visits; midline abdominal incision has been opened more, with pink side walls visible. Note RUQ wound with granulation tissue along majority of fascial base (previously was loose), side walls pink/red subcutaneous tissues. Wound appears dry, but will continue dry gauze roll packing per MD orders this visit and reassess wound bed next visit. Midline wound packed with iodoform gauze per MD orders. Scalp wounds improving with decrease in wound dimensions and more central area closed/ scab intact. Rt lateral site with clean base but nonviable side walls; added [place]rah[place] to soften for debridement/ promote autolytic debridement. Cont PT wound care.   -MW     Rehab Potential  Fair  -MW     Patient/caregiver participated in establishment of treatment plan and goals  Yes  -MW     Patient would benefit " from skilled therapy intervention  Yes  -MW        PT Plan    PT Frequency  1x/week  -MW     Predicted Duration of Therapy Intervention (Therapy Eval)  4 visits   -MW     Planned CPT's?  PT YESSENIA DEBRIDE OPEN WOUND UP TO 20 CM: 22015;PT NONSELECT DEBRIDE 15 MIN: 38083;PT SELF CARE/MGMT/TRAIN 15 MIN: 48139;PT NLFU MIST: 45600  -MW     Physical Therapy Interventions (Optional Details)  wound care;patient/family education  -MW     PT Plan Comments  debridement, dressings management  -MW       User Key  (r) = Recorded By, (t) = Taken By, (c) = Cosigned By    Initials Name Provider Type    MW Yvette Ribeiro, PT Physical Therapist          Goals  PT OP Goals     Row Name 06/18/20 0845          PT Short Term Goals    STG Date to Achieve  05/22/20  -MW     STG 1  Pt to be able to demonstrate home dressing management  -MW     STG 1 Progress  Met  -MW     STG 2  Decrease RUQ depth by 50%   -MW     STG 2 Progress  Partially Met  -MW     STG 3  Pt to have no s/s of infection   -MW     STG 3 Progress  Partially Met  -MW        Long Term Goals    LTG Date to Achieve  07/06/20  -MW     LTG 1  Decrease RUQ wound size by 75% as evidence of wound healing   -MW     LTG 1 Progress  Ongoing  -MW     LTG 2  Decrease Midline abdomen size by 75% as evidence of wound healing   -MW     LTG 2 Progress  Progressing;Ongoing  -MW        Time Calculation    PT Goal Re-Cert Due Date  07/06/20  -MW       User Key  (r) = Recorded By, (t) = Taken By, (c) = Cosigned By    Initials Name Provider Type    Yvette Dubois, PT Physical Therapist            Time Calculation: Start Time: 0845  Therapy Charges for Today     Code Description Service Date Service Provider Modifiers Qty    [id number]  YESSENIA DEBRIDE OPEN WOUND UP TO 20CM 6/18/2020 Yvette Ribeiro, PT GP 1                  Yvette Ribeiro, PT  6/18/2020

## 2020-06-25 ENCOUNTER — HOSPITAL ENCOUNTER (OUTPATIENT)
Dept: PHYSICAL THERAPY | Facility: HOSPITAL | Age: 45
Setting detail: THERAPIES SERIES
Discharge: HOME | End: 2020-06-25

## 2020-06-25 DIAGNOSIS — L02.811 ABSCESS OF SCALP: ICD-10-CM

## 2020-06-25 DIAGNOSIS — T81.31XD POSTOPERATIVE WOUND DEHISCENCE, SUBSEQUENT ENCOUNTER: Primary | ICD-10-CM

## 2020-06-25 DIAGNOSIS — S31.109D OPEN WOUND OF ABDOMEN, SUBSEQUENT ENCOUNTER: ICD-10-CM

## 2020-06-25 PROCEDURE — 97597 DBRDMT OPN WND 1ST 20 CM/<: CPT

## 2020-06-25 NOTE — THERAPY WOUND CARE TREATMENT
Outpatient Rehabilitation - Wound/Debridement Treatment Note  [facility]     Patient Name: Jeana Pollack Juliet, RN  : 1975  MRN: [id number]  Today's Date: 2020                 Admit Date: 2020    Visit Dx:    ICD-10-CM ICD-9-CM   1. Postoperative wound dehiscence, subsequent encounter T81.31XD V58.89     998.32   2. Open wound of abdomen, subsequent encounter S31.109D V58.89     879.2   3. Abscess of scalp L02.811 682.8       Patient Active Problem List   Diagnosis   • Acute stress reaction with predominately emotional disturbance   • Sleep disturbance   • Cancer related pain   • Moderate episode of recurrent major depressive disorder (CMS/HCC)   • Anxiety   • Iron deficiency anemia   • Type 2 diabetes mellitus with diabetic polyneuropathy, with long-term current use of insulin (CMS/HCC)   • Essential hypertension   • Heart murmur   • Family history of cardiac disorder in mother   • Endometrial cancer (CMS/HCC)   • Stage 3 chronic kidney disease (CMS/HCC)   • Gastroesophageal reflux disease   • Amputation of left great toe (CMS/HCC)   • Amputation of right great toe (CMS/HCC)   • Neuropathy   • Hypotension due to drugs   • Neutropenia (CMS/HCC)   • Acute appendicitis   • Open wound of right foot   • Hyponatremia   • Pancytopenia (CMS/HCC)   • Antineoplastic chemotherapy induced pancytopenia (CMS/HCC)   • Chemotherapy-induced thrombocytopenia   • Wound healing, delayed   • Dyspareunia, female   • Vaginal stenosis        Past Medical History:   Diagnosis Date   • Anemia    • Anxiety and depression    • Back pain    • Bronchitis    • Diabetes (CMS/HCC)     DX 4-5 YRS AGO, CHECKS BS 4X/DAY, LAST A1C 7.1%   • Endometrial cancer (CMS/HCC)     STAGE II   • GERD (gastroesophageal reflux disease)    • Hypertension    • MRSA (methicillin resistant staph aureus) culture positive 2018    S/P NECROTIZING FASCITIS IN BILATERAL FEET   • Necrotizing fasciitis (CMS/HCC)    • PCOS (polycystic ovarian  syndrome)    • PTSD (post-traumatic stress disorder)    • Retinopathy 3/18/2020   • Sepsis (CMS/HCC)    • Stage 3 chronic kidney disease (CMS/HCC) 2/24/2020   • Subconjunctival hemorrhage         Past Surgical History:   Procedure Laterality Date   • APPENDECTOMY N/A 3/21/2020    Procedure: APPENDECTOMY LAPAROSCOPIC;  Surgeon: Praful Myers MD;  Location: [facility] OR;  Service: General;  Laterality: N/A;   • EXPLORATORY LAPAROTOMY, TOTAL ABDOMINAL HYSTERECTOMY SALPINGO OOPHORECTOMY N/A 2/10/2020    Procedure: EXPLORATORY LAPAROTOMY, TOTAL ABDOMINAL HYSTERECTOMY, BILATERAL SALPINGO-OOPHORECTOMY WITH OPTIMAL  STAGING (R-0), OMENTECTOMY;  Surgeon: Celine Rubio MD;  Location: [facility] OR;  Service: Gynecology Oncology;  Laterality: N/A;   • EYE SURGERY Left     BLOOD VESSEL IN EYE REPAIR   • TOE AMPUTATION Left 06/2019    big toe - unhealing sore   • TOE AMPUTATION Right 2018    big toe and second toe - Necrotizing fasciitis and MRSA          EVALUATION  PT Ortho     Row Name 06/25/20 1349       Subjective Comments    Subjective Comments  Pt reports she passed out yesterday evening. Her blood sugar was fine, but her mother could not wake her, so her abd dressings were not changed last night or this morning.  -       Subjective Pain    Able to rate subjective pain?  yes  -    Pre-Treatment Pain Level  0  -    Post-Treatment Pain Level  4  -    Subjective Pain Comment  RUQ especially tender with dressing removal.  -       Transfers    Sit-Stand Murray (Transfers)  independent  -MC    Stand-Sit Murray (Transfers)  independent  -MC    Comment (Transfers)  supine on stretcher for tx  -       Gait/Stairs Assessment/Training    Murray Level (Gait)  independent  -      User Key  (r) = Recorded By, (t) = Taken By, (c) = Cosigned By    Initials Name Provider Type    Mary Kay Flores, PT Physical Therapist          LDA Wound     Row Name 06/25/20 1348             Wound 06/09/20 1400  Right posterior;lateral scalp Abcess    Wound - Properties Group Date first assessed: 06/09/20  - Time first assessed: 1400  -JM Side: Right  - Orientation: posterior;lateral  - Location: scalp  -JM Primary Wound Type: Abcess  -JM    Dressing Appearance  intact;dried drainage;moist drainage no packing  -MC      Base  moist;red;subcutaneous;yellow;white;slough  -MC      Periwound  indurated;redness;swelling  -MC      Periwound Temperature  warm  -MC      Periwound Skin Turgor  soft  -MC      Edges  open  -MC      Wound Length (cm)  0.7 cm  -MC      Wound Width (cm)  0.7 cm  -MC      Wound Depth (cm)  0.5 cm  -MC      Drainage Characteristics/Odor  sanguineous;serosanguineous  -MC      Drainage Amount  small  -MC      Care, Wound  cleansed with;wound cleanser;debrided  -      Dressing Care, Wound  dressing applied;antimicrobial agent applied;foam;low-adherent;border dressing HFBt, small optifoam border  -      Periwound Care, Wound  cleansed with pH balanced cleanser;dry periwound area maintained  -MC         Wound 06/09/20 1400 Right posterior;midline scalp Abcess    Wound - Properties Group Date first assessed: 06/09/20  - Time first assessed: 1400  -JM Side: Right  - Orientation: posterior;midline  - Location: scalp  - Primary Wound Type: Abcess  -JM    Dressing Appearance  open to air  -      Base  scab  -MC      Periwound  redness;swelling;warm  -MC      Periwound Temperature  warm  -MC      Periwound Skin Turgor  firm  -MC      Edges  open;irregular  -MC      Drainage Amount  none  -MC      Dressing Care, Wound  open to air  -MC         Wound 04/07/20 1100 midline abdomen Incision    Wound - Properties Group Date first assessed: 04/07/20  - Time first assessed: 1100 - Orientation: midline  - Location: abdomen  - Primary Wound Type: Incision  -MF    Wound Image  Images linked: 1  -MC      Dressing Appearance  intact;moist drainage  -MC      Base  moist;pink;red;subcutaneous  -MC       "Periwound  intact;pink  -      Periwound Temperature  warm  -      Periwound Skin Turgor  soft  -MC      Edges  open;irregular  -MC      Wound Length (cm)  1.2 cm  -MC      Wound Width (cm)  0.4 cm  -MC      Wound Depth (cm)  2.6 cm  -MC      Undermining [Depth (cm)/Location]  3.4 cm @ 12:00  -      Drainage Characteristics/Odor  serosanguineous;yellow;creamy  -MC      Drainage Amount  moderate  -      Care, Wound  cleansed with;wound cleanser;debrided;silver agent applied Ag Nitrate  -      Dressing Care, Wound  packed with;gauze, iodoform;low-adherent;foam;border dressing 1/2\" iodoform gauze, 4\" optifoam  -      Periwound Care, Wound  cleansed with pH balanced cleanser;dry periwound area maintained  -         Wound 04/07/20 1100 Right upper;lateral abdomen Abcess    Wound - Properties Group Date first assessed: 04/07/20  - Time first assessed: 1100  - Side: Right  - Orientation: upper;lateral  - Location: abdomen  - Primary Wound Type: Abcess  -    Wound Image  Images linked: 1  -      Dressing Appearance  intact;moist drainage  -      Base  red;pink;subcutaneous;yellow;white;slough fascia in base, walls narrowing  -      Periwound  intact;redness;indurated;pink  -MC      Periwound Temperature  warm  -      Periwound Skin Turgor  soft  -      Edges  rolled/closed  -      Wound Length (cm)  0.8 cm  -      Wound Width (cm)  2.2 cm  -      Wound Depth (cm)  4.5 cm  -      Undermining [Depth (cm)/Location]  3.2 cm @ 9:00  -      Drainage Characteristics/Odor  serosanguineous;yellow;creamy  -MC      Drainage Amount  small  -      Care, Wound  cleansed with;wound cleanser;debrided  -      Dressing Care, Wound  dressing applied;gauze, dry;low-adherent;foam;border dressing 1\" conform, 4\" optifoam  -      Periwound Care, Wound  cleansed with pH balanced cleanser;dry periwound area maintained  -        User Key  (r) = Recorded By, (t) = Taken By, (c) = Cosigned By    " Initials Name Provider Type    SEDRICK KumarVinny marcos, PT Physical Therapist    Mary Kay Flores, PT Physical Therapist    Leidy Millard, PT Physical Therapist            WOUND DEBRIDEMENT  Total area of Debridement: 1 cm2  Debridement Site 1  Location- Site 1: Rt posterior scalp   Selective Debridement- Site 1: Wound Surface <20cmsq  Instruments- Site 1: #15, scapel, tweezers, scissors  Excised Tissue Description- Site 1: moderate, slough  Bleeding- Site 1: scant, held pressure, 1 minute             Therapy Education     Row Name 06/25/20 1346             Therapy Education    Education Details  Continue with MD recommendations for abd dressing changes. Switch to HFBt and optifoam to the posterior scalp wound.  -MC      Given  Bandaging/dressing change  -MC      Program  Reinforced;Modified  -MC      How Provided  Verbal;Demonstration  -MC      Provided to  Patient  -MC      Level of Understanding  Verbalized;Teach back education performed  -MC        User Key  (r) = Recorded By, (t) = Taken By, (c) = Cosigned By    Initials Name Provider Type    Mary Kay Flores, PT Physical Therapist          Recommendation and Plan  PT Assessment/Plan     Row Name 06/25/20 134          PT Assessment    Functional Limitations  Other (comment);Performance in self-care ADL wound mgmt  -MC     Impairments  Integumentary integrity;Pain  -MC     Assessment Comments  Pt with slightly larger depths to the abdominal wounds, which may be due to increased fluid retention as opposed to wound worsening. RUQ wound walls appear to be narrowing, but the wound is otherwise unchanged. Ag Nitrate stick used to tunneling of lower midline wound to attempt to promote inflammatory response/granulation formation. R posterior/lateral scalp wound appears stable, but PT able to remove additional slough today to allow for more epithelial/granulation tissue growth. Pt will continue to benefit from skilled PT wound care to promote healing.   -     Rehab Potential  Fair  -     Patient/caregiver participated in establishment of treatment plan and goals  Yes  -     Patient would benefit from skilled therapy intervention  Yes  -        PT Plan    PT Frequency  1x/week  -     Physical Therapy Interventions (Optional Details)  patient/family education;wound care  -     PT Plan Comments  debridement, dressings management  -       User Key  (r) = Recorded By, (t) = Taken By, (c) = Cosigned By    Initials Name Provider Type     Mary Kay Bell, PT Physical Therapist          Goals  PT OP Goals     Row Name 06/25/20 1345          Time Calculation    PT Goal Re-Cert Due Date  07/06/20  -       User Key  (r) = Recorded By, (t) = Taken By, (c) = Cosigned By    Initials Name Provider Type     Mary Kay Bell, PT Physical Therapist          PT Goal Re-Cert Due Date: 07/06/20            Time Calculation: Start Time: 1345  Therapy Charges for Today     Code Description Service Date Service Provider Modifiers Qty    [id number] HC YESSENIA DEBRIDE OPEN WOUND UP TO 20CM 6/25/2020 Mary Kay Bell, PT GP 1                  Mary Kay Bell, PT  6/25/2020

## 2020-06-29 ENCOUNTER — TELEPHONE (OUTPATIENT)
Dept: GYNECOLOGIC ONCOLOGY | Facility: CLINIC | Age: 45
End: 2020-06-29

## 2020-07-02 ENCOUNTER — TELEPHONE (OUTPATIENT)
Dept: GYNECOLOGIC ONCOLOGY | Facility: CLINIC | Age: 45
End: 2020-07-02

## 2020-07-02 ENCOUNTER — HOSPITAL ENCOUNTER (OUTPATIENT)
Dept: PHYSICAL THERAPY | Facility: HOSPITAL | Age: 45
Setting detail: THERAPIES SERIES
Discharge: HOME | End: 2020-07-02

## 2020-07-02 ENCOUNTER — LAB (OUTPATIENT)
Dept: LAB | Facility: HOSPITAL | Age: 45
End: 2020-07-02

## 2020-07-02 DIAGNOSIS — C54.1 ENDOMETRIAL CANCER (HCC): Primary | ICD-10-CM

## 2020-07-02 DIAGNOSIS — C54.1 ENDOMETRIAL CANCER (HCC): ICD-10-CM

## 2020-07-02 DIAGNOSIS — S31.109D OPEN WOUND OF ABDOMEN, SUBSEQUENT ENCOUNTER: ICD-10-CM

## 2020-07-02 DIAGNOSIS — T81.31XD POSTOPERATIVE WOUND DEHISCENCE, SUBSEQUENT ENCOUNTER: Primary | ICD-10-CM

## 2020-07-02 DIAGNOSIS — L02.811 ABSCESS OF SCALP: ICD-10-CM

## 2020-07-02 LAB
ERYTHROCYTE [DISTWIDTH] IN BLOOD BY AUTOMATED COUNT: 17.3 % (ref 12.3–15.4)
HCT VFR BLD AUTO: 26.7 % (ref 34–46.6)
HGB BLD-MCNC: 8.7 G/DL (ref 12–15.9)
LYMPHOCYTES # BLD AUTO: 1.2 10*3/MM3 (ref 0.7–3.1)
LYMPHOCYTES NFR BLD AUTO: 20.1 % (ref 19.6–45.3)
MCH RBC QN AUTO: 31.7 PG (ref 26.6–33)
MCHC RBC AUTO-ENTMCNC: 32.4 G/DL (ref 31.5–35.7)
MCV RBC AUTO: 97.8 FL (ref 79–97)
MONOCYTES # BLD AUTO: 0.2 10*3/MM3 (ref 0.1–0.9)
MONOCYTES NFR BLD AUTO: 4.1 % (ref 5–12)
NEUTROPHILS NFR BLD AUTO: 4.4 10*3/MM3 (ref 1.7–7)
NEUTROPHILS NFR BLD AUTO: 75.8 % (ref 42.7–76)
PLATELET # BLD AUTO: 101 10*3/MM3 (ref 140–450)
PMV BLD AUTO: 6.1 FL (ref 6–12)
RBC # BLD AUTO: 2.73 10*6/MM3 (ref 3.77–5.28)
WBC # BLD AUTO: 5.8 10*3/MM3 (ref 3.4–10.8)

## 2020-07-02 PROCEDURE — 85025 COMPLETE CBC W/AUTO DIFF WBC: CPT

## 2020-07-02 PROCEDURE — 97597 DBRDMT OPN WND 1ST 20 CM/<: CPT

## 2020-07-02 PROCEDURE — 36415 COLL VENOUS BLD VENIPUNCTURE: CPT

## 2020-07-02 NOTE — THERAPY PROGRESS REPORT/RE-CERT
Outpatient Rehabilitation - Wound/Debridement Progress Note  [facility]     Patient Name: Jeana Pollack Juliet, RN  : 1975  MRN: [id number]  Today's Date: 2020                 Admit Date: 2020    Visit Dx:    ICD-10-CM ICD-9-CM   1. Postoperative wound dehiscence, subsequent encounter T81.31XD V58.89     998.32   2. Open wound of abdomen, subsequent encounter S31.109D V58.89     879.2   3. Abscess of scalp L02.811 682.8       Patient Active Problem List   Diagnosis   • Acute stress reaction with predominately emotional disturbance   • Sleep disturbance   • Cancer related pain   • Moderate episode of recurrent major depressive disorder (CMS/HCC)   • Anxiety   • Iron deficiency anemia   • Type 2 diabetes mellitus with diabetic polyneuropathy, with long-term current use of insulin (CMS/HCC)   • Essential hypertension   • Heart murmur   • Family history of cardiac disorder in mother   • Endometrial cancer (CMS/HCC)   • Stage 3 chronic kidney disease (CMS/HCC)   • Gastroesophageal reflux disease   • Amputation of left great toe (CMS/HCC)   • Amputation of right great toe (CMS/HCC)   • Neuropathy   • Hypotension due to drugs   • Neutropenia (CMS/HCC)   • Acute appendicitis   • Open wound of right foot   • Hyponatremia   • Pancytopenia (CMS/HCC)   • Antineoplastic chemotherapy induced pancytopenia (CMS/HCC)   • Chemotherapy-induced thrombocytopenia   • Wound healing, delayed   • Dyspareunia, female   • Vaginal stenosis        Past Medical History:   Diagnosis Date   • Anemia    • Anxiety and depression    • Back pain    • Bronchitis    • Diabetes (CMS/HCC)     DX 4-5 YRS AGO, CHECKS BS 4X/DAY, LAST A1C 7.1%   • Endometrial cancer (CMS/HCC)     STAGE II   • GERD (gastroesophageal reflux disease)    • Hypertension    • MRSA (methicillin resistant staph aureus) culture positive 2018    S/P NECROTIZING FASCITIS IN BILATERAL FEET   • Necrotizing fasciitis (CMS/HCC)    • PCOS (polycystic ovarian syndrome)     • PTSD (post-traumatic stress disorder)    • Retinopathy 3/18/2020   • Sepsis (CMS/HCC)    • Stage 3 chronic kidney disease (CMS/HCC) 2/24/2020   • Subconjunctival hemorrhage         Past Surgical History:   Procedure Laterality Date   • APPENDECTOMY N/A 3/21/2020    Procedure: APPENDECTOMY LAPAROSCOPIC;  Surgeon: Praful Myers MD;  Location: [facility] OR;  Service: General;  Laterality: N/A;   • EXPLORATORY LAPAROTOMY, TOTAL ABDOMINAL HYSTERECTOMY SALPINGO OOPHORECTOMY N/A 2/10/2020    Procedure: EXPLORATORY LAPAROTOMY, TOTAL ABDOMINAL HYSTERECTOMY, BILATERAL SALPINGO-OOPHORECTOMY WITH OPTIMAL  STAGING (R-0), OMENTECTOMY;  Surgeon: Celine Rubio MD;  Location: [facility] OR;  Service: Gynecology Oncology;  Laterality: N/A;   • EYE SURGERY Left     BLOOD VESSEL IN EYE REPAIR   • TOE AMPUTATION Left 06/2019    big toe - unhealing sore   • TOE AMPUTATION Right 2018    big toe and second toe - Necrotizing fasciitis and MRSA          EVALUATION  PT Ortho     Row Name 07/02/20 1345       Subjective Comments    Subjective Comments  Pt states this might be her last visit because she is finishing her chemo on 7/9/20 and plans to return home and continue wound care at a local [facility].  -       Subjective Pain    Able to rate subjective pain?  yes  -    Pre-Treatment Pain Level  0  -    Post-Treatment Pain Level  4  -    Subjective Pain Comment  RUQ  -       Transfers    Sit-Stand [place] (Transfers)  independent  -    Stand-Sit [place] (Transfers)  independent  -    Comment (Transfers)  supine on stretcher  -       Gait/Stairs Assessment/Training    [place] Level (Gait)  independent  -      User Key  (r) = Recorded By, (t) = Taken By, (c) = Cosigned By    Initials Name Provider Type    Leidy Millard, PT Physical Therapist          FLORIAN Wound     Row Name 07/02/20 1345             Wound 07/02/20 1345 Left abdomen Soft Tissue Necrosis    Wound - Properties Group Date first  "assessed: 07/02/20  - Time first assessed: 1345  -JM Side: Left  - Location: abdomen  - Primary Wound Type: Soft tissue  -JM    Wound Image  Images linked: 1  -JM      Dressing Appearance  open to air  -JM      Base  moist;pink;yellow;white;slough;subcutaneous  -JM      Periwound  intact;dry;redness  -JM      Periwound Temperature  warm  -JM      Periwound Skin Turgor  soft  -JM      Edges  open  -JM      Wound Length (cm)  0.6 cm  -JM      Wound Width (cm)  0.7 cm  -JM      Wound Depth (cm)  0.2 cm  -JM      Drainage Characteristics/Odor  serosanguineous  -      Drainage Amount  scant  -JM      Care, Wound  cleansed with;wound cleanser;debrided;honey applied  -JM      Dressing Care, Wound  dressing applied;foam;border dressing 4\" optifoam gentle  -      Periwound Care, Wound  cleansed with pH balanced cleanser;dry periwound area maintained  -         Wound 06/09/20 1400 Right posterior;lateral scalp Abcess    Wound - Properties Group Date first assessed: 06/09/20  - Time first assessed: 1400  -JM Side: Right  - Orientation: posterior;lateral  - Location: scalp  - Primary Wound Type: Abcess  -    Wound Image  Images linked: 1  -JM      Dressing Appearance  intact;moist drainage HFB mildly adherent  -JM      Base  moist;red;subcutaneous;yellow;white;slough  -JM      Periwound  indurated;redness;swelling  -      Periwound Temperature  warm  -JM      Periwound Skin Turgor  soft  -JM      Edges  open  -JM      Wound Length (cm)  0.9 cm  -JM      Wound Width (cm)  1.2 cm  -JM      Wound Depth (cm)  0.6 cm  -JM      Drainage Characteristics/Odor  sanguineous;serosanguineous  -      Drainage Amount  small  -JM      Care, Wound  cleansed with;wound cleanser;debrided;honey applied  -JM      Dressing Care, Wound  dressing applied;antimicrobial agent applied;foam;border dressing HFBt, 4\" optifoam  -JM      Periwound Care, Wound  cleansed with pH balanced cleanser;dry periwound area maintained  -   " "      Wound 06/09/20 1400 Right posterior;midline scalp Abcess    Wound - Properties Group Date first assessed: 06/09/20  - Time first assessed: 1400  -JM Side: Right  -JM Orientation: posterior;midline  - Location: scalp  -JM Primary Wound Type: Abcess  -JM    Dressing Appearance  open to air  -JM      Base  scab  -JM      Periwound  redness;swelling;warm  -JM      Periwound Temperature  warm  -JM      Periwound Skin Turgor  firm  -JM      Edges  open;irregular  -JM      Drainage Amount  none  -JM      Dressing Care, Wound  open to air  -JM         Wound 04/07/20 1100 midline abdomen Incision    Wound - Properties Group Date first assessed: 04/07/20  - Time first assessed: 1100  - Orientation: midline  - Location: abdomen  - Primary Wound Type: Incision  -MF    Wound Image  Images linked: 1  -JM      Dressing Appearance  intact;moist drainage  -JM      Base  moist;pink;red;subcutaneous  -JM      Periwound  intact;pink  -JM      Periwound Temperature  warm  -JM      Periwound Skin Turgor  soft  -JM      Edges  open;irregular  -JM      Wound Length (cm)  1.1 cm  -JM      Wound Width (cm)  0.3 cm  -JM      Wound Depth (cm)  2.5 cm  -JM      Drainage Characteristics/Odor  serosanguineous;yellow;creamy slight green tint  -JM      Drainage Amount  moderate  -JM      Care, Wound  cleansed with;wound cleanser;debrided  -JM      Dressing Care, Wound  gauze, iodoform;foam;border dressing 1/4\" iodofom, 4\" optifoam gentle  -      Periwound Care, Wound  cleansed with pH balanced cleanser;dry periwound area maintained  -JM         Wound 04/07/20 1100 Right upper;lateral abdomen Abcess    Wound - Properties Group Date first assessed: 04/07/20  - Time first assessed: 1100  -MF Side: Right  -MF Orientation: upper;lateral  - Location: abdomen  - Primary Wound Type: Abcess  -    Wound Image  Images linked: 1  -JM      Dressing Appearance  intact;moist drainage  -      Base  " "red;pink;subcutaneous;yellow;white;slough fascia in base, walls narrowing  -      Periwound  intact;redness;indurated;pink  -      Periwound Temperature  warm  -      Periwound Skin Turgor  soft  -      Edges  rolled/closed  -      Wound Length (cm)  0.6 cm  -JM      Wound Width (cm)  2.1 cm  -JM      Wound Depth (cm)  3.5 cm  -      Drainage Characteristics/Odor  serosanguineous;yellow;creamy slight green tint  -      Drainage Amount  small  -JM      Care, Wound  cleansed with;wound cleanser;debrided  -      Dressing Care, Wound  dressing applied;gauze;foam;border dressing 1\" conform, 4\" optifoam gentle  -JM      Periwound Care, Wound  cleansed with pH balanced cleanser;dry periwound area maintained  -        User Key  (r) = Recorded By, (t) = Taken By, (c) = Cosigned By    Initials Name Provider Type    MF Vinny Lobato, PT Physical Therapist    Leidy Millard, PT Physical Therapist            WOUND DEBRIDEMENT  Total area of Debridement: 1cmsq  Debridement Site 1  Location- Site 1: Rt posterior scalp   Selective Debridement- Site 1: Wound Surface <20cmsq  Instruments- Site 1: #15, scapel, tweezers  Excised Tissue Description- Site 1: moderate, slough, eschar  Bleeding- Site 1: scant   Debridement Site 2  Location- Site 2: LUQ eschar  Selective Debridement- Site 2: Wound Surface <20cmsq  Instruments- Site 2: #15, scapel, tweezers  Excised Tissue Description- Site 2: maximum, eschar  Bleeding- Site 2: scant         Therapy Education     Row Name 07/02/20 1905             Therapy Education    Education Details  Continue current dressings until able to establish care at local [facility].  PT will keep pt's episode open due to only tentative d/c today.  Pt can return PRN.  -JM      Given  Bandaging/dressing change  -JM      Program  Reinforced;Modified  -TEJA      How Provided  Verbal;Demonstration  -JM      Provided to  Patient  -JM      Level of Understanding  Verbalized;Teach back education " performed  -        User Key  (r) = Recorded By, (t) = Taken By, (c) = Cosigned By    Initials Name Provider Type    Leidy Millard, PT Physical Therapist          Recommendation and Plan  PT Assessment/Plan     Row Name 07/02/20 0749          PT Assessment    Functional Limitations  Other (comment);Performance in self-care ADL wound mgmt  -     Impairments  Integumentary integrity;Pain  -     Assessment Comments  ABD wounds with slight decrease in dimensions.  RUQ wound still with fascia visible in base, and both RUQ and midline abd with creamy yellow-green drainage.  PT debrided loose eschar from L abdomen, with small open area noted underneath.  R scalp abscess red/moist centrally, PT able to debride additional eschar from edges.  Pt will continue to benefit from debridement and dressings management.  Pt to tentatively be discharged today due to possibly returing home after last chemo tx on 7/9/20.  Will continue with current POC if pt chooses to stay locally.  -     Rehab Potential  Fair  -     Patient/caregiver participated in establishment of treatment plan and goals  Yes  -     Patient would benefit from skilled therapy intervention  Yes  -        PT Plan    PT Frequency  Other (comment) tentative d/c  -     Planned CPT's?  PT YESESNIA DEBRIDE OPEN WOUND UP TO 20 CM: 40591;PT NONSELECT DEBRIDE 15 MIN: 94783;PT SELF CARE/MGMT/TRAIN 15 MIN: 99262  -     Physical Therapy Interventions (Optional Details)  patient/family education;wound care  -     PT Plan Comments  tentative d/c  -       User Key  (r) = Recorded By, (t) = Taken By, (c) = Cosigned By    Initials Name Provider Type    Leidy Millard, PT Physical Therapist          Goals  PT OP Goals     Row Name 07/02/20 1919          PT Short Term Goals    STG Date to Achieve  05/22/20  -     STG 1  Pt to be able to demonstrate home dressing management  -     STG 1 Progress  Met  -     STG 2  Decrease RUQ depth by 50%   -TEJA      STG 2 Progress  Partially Met  -     STG 3  Pt to have no s/s of infection   -     STG 3 Progress  Partially Met  -        Long Term Goals    LTG Date to Achieve  07/06/20  -     LTG 1  Decrease RUQ wound size by 75% as evidence of wound healing   -     LTG 1 Progress  Ongoing  -     LTG 2  Decrease Midline abdomen size by 75% as evidence of wound healing   -     LTG 2 Progress  Progressing;Ongoing  -     LTG 3  Pt will demonstrate 75% reduction in wound area to R foot to indicate healing progress.  -     LTG 3 Progress  Not Met  -        Time Calculation    PT Goal Re-Cert Due Date  09/30/20  -       User Key  (r) = Recorded By, (t) = Taken By, (c) = Cosigned By    Initials Name Provider Type    Leidy Millard, PT Physical Therapist          PT Goal Re-Cert Due Date: 09/30/20  PT Short Term Goals  STG Date to Achieve: 05/22/20  STG 1: Pt to be able to demonstrate home dressing management  STG 1 Progress: Met  STG 2: Decrease RUQ depth by 50%   STG 2 Progress: Partially Met  STG 3: Pt to have no s/s of infection   STG 3 Progress: Partially Met  Long Term Goals  LTG Date to Achieve: 07/06/20  LTG 1: Decrease RUQ wound size by 75% as evidence of wound healing   LTG 1 Progress: Ongoing  LTG 2: Decrease Midline abdomen size by 75% as evidence of wound healing   LTG 2 Progress: Progressing, Ongoing  LTG 3: Pt will demonstrate 75% reduction in wound area to R foot to indicate healing progress.  LTG 3 Progress: Not Met      Time Calculation: Start Time: 1345  Therapy Charges for Today     Code Description Service Date Service Provider Modifiers Qty    [id number] HC YESSENIA DEBRIDE OPEN WOUND UP TO 20CM 7/2/2020 Leidy Gilliam, PT GP 1                  Leidy Gilliam, PT  7/2/2020

## 2020-07-07 ENCOUNTER — HOSPITAL ENCOUNTER (OUTPATIENT)
Dept: PHYSICAL THERAPY | Facility: HOSPITAL | Age: 45
Setting detail: THERAPIES SERIES
Discharge: HOME | End: 2020-07-07

## 2020-07-07 DIAGNOSIS — L02.811 ABSCESS OF SCALP: ICD-10-CM

## 2020-07-07 DIAGNOSIS — S31.109D OPEN WOUND OF ABDOMEN, SUBSEQUENT ENCOUNTER: ICD-10-CM

## 2020-07-07 DIAGNOSIS — T81.31XD POSTOPERATIVE WOUND DEHISCENCE, SUBSEQUENT ENCOUNTER: Primary | ICD-10-CM

## 2020-07-07 PROCEDURE — 97597 DBRDMT OPN WND 1ST 20 CM/<: CPT

## 2020-07-07 NOTE — PROGRESS NOTES
Jeana Chung RN  [id number]  1975      Reason for visit: Endometrial cancer, consideration of ongoing chemotherapy, chemotherapy-induced pancytopenia, status post appendectomy with appendiceal abscess, ongoing wound care issues, new abscesses at scalp related to leg    History of present illness:  The patient is a 45 y.o. year old female who presents today for treatment and evaluation of the above issues.     Ms Chung presents for consideration of cycle #6 of Docetaxol and Carboplatin. She reports continued neuropathy that is interfering with her ability to grasp objects fully and start her car. She reports that her multiple abdominal and scalp wounds are healing - she is continuing with wound care. She still reports situational anxiety and depression, but is excited to be going back home to [place] tomorrow after treatment. She plans to see a wound care doctor while there and has already set that up.    OBGYN History:  She is a .  She does not use HRT. She has never had a pap smear.      Oncologic History:     Endometrial cancer (CMS/HCC)    2019 Imaging     Presented to ED in [place] due to progressively heavy vaginal bleeding and severe back pain. Ultrasound revealed uterine and cervical masses.      2020 Biopsy     ED follow-up with gynecologist in [place], told she likely has cervical cancer. Insufficient tissue on attempted cervical biopsy. Patient moved to [place], [place] to be closer to mother and sister for work-up and treatment.       2020 Imaging     Gyn evaluation at [facility]. Repeat TVUS showed cervical mass, right adnexal mass, and large uterine fibroid vs mass. Referred to Gyn Oncology      2020 Initial Diagnosis     Endometrial cancer (CMS/HCC)  Cervical biopsy positive for infiltrating endometrioid adenocarcinoma      2020 Imaging     CT chest, abdomen, pelvis showed enlarged uterus with multiple masses and 4 cm cystic/solid mass at right  ovary. No metastatic disease noted in abdomen or chest.      2/10/2020 Surgery     Exploratory laparotomy, total abdominal hysterectomy, bilateral salpingo-oophorectomy, with optimal debulking (R=0), and omentectomy.    Pelvic washing cytology positive for malignant cells, consistent with adenocarcinoma. Final pathology showed large grade 3 endometrioid tumor with lymphvascular invasion at the uterus. Cervical stromal involvement, vaginal margin negative. Right tube and ovary involved. Left tube and ovary negative, omentum negative. MSI normal. [id number], Stage IIIA grade 3        3/9/2020 - 7/8/2020 Chemotherapy     OP UTERINE PACLitaxel / CARBOplatin (Q21D)  Cycle #1 complicated by pancytopenia, appendicitis, appendectomy, appendiceal abscess  Cycle #2 remarkable for thrombocytopenia, worsening neuropathy.  Dose modification with cycle #3.        3/17/2020 - 3/23/2020 Other Event     Hospital admission with acute appendicitis      5/21/2020 -  Chemotherapy     OP CENTRAL VENOUS ACCESS DEVICE ACCESS, CARE, AND MAINTENANCE (CVAD)      7/8/2020 -  Chemotherapy     OP OVARIAN DOCEtaxel / CARBOplatin           Past Medical History:   Diagnosis Date   • Anemia    • Anxiety and depression    • Back pain    • Bronchitis    • Diabetes (CMS/HCC)     DX 4-5 YRS AGO, CHECKS BS 4X/DAY, LAST A1C 7.1%   • Endometrial cancer (CMS/HCC)     STAGE II   • GERD (gastroesophageal reflux disease)    • Hypertension    • MRSA (methicillin resistant staph aureus) culture positive 2018    S/P NECROTIZING FASCITIS IN BILATERAL FEET   • Necrotizing fasciitis (CMS/HCC)    • PCOS (polycystic ovarian syndrome)    • PTSD (post-traumatic stress disorder)    • Retinopathy 3/18/2020   • Sepsis (CMS/HCC)    • Stage 3 chronic kidney disease (CMS/HCC) 2/24/2020   • Subconjunctival hemorrhage        Past Surgical History:   Procedure Laterality Date   • APPENDECTOMY N/A 3/21/2020    Procedure: APPENDECTOMY LAPAROSCOPIC;  Surgeon: Praful Myers,  MD;  Location: [facility] OR;  Service: General;  Laterality: N/A;   • EXPLORATORY LAPAROTOMY, TOTAL ABDOMINAL HYSTERECTOMY SALPINGO OOPHORECTOMY N/A 2/10/2020    Procedure: EXPLORATORY LAPAROTOMY, TOTAL ABDOMINAL HYSTERECTOMY, BILATERAL SALPINGO-OOPHORECTOMY WITH OPTIMAL  STAGING (R-0), OMENTECTOMY;  Surgeon: Celine Rubio MD;  Location: [facility] OR;  Service: Gynecology Oncology;  Laterality: N/A;   • EYE SURGERY Left     BLOOD VESSEL IN EYE REPAIR   • TOE AMPUTATION Left 2019    big toe - unhealing sore   • TOE AMPUTATION Right     big toe and second toe - Necrotizing fasciitis and MRSA        MEDICATIONS: The current medication list was reviewed with the patient and updated in the EMR this date per the Medical Assistant. Medication dosages and frequencies were confirmed to be accurate.      Allergies:  is allergic to bactrim [sulfamethoxazole-trimethoprim]; penicillins; and promethazine.    Social History:   Social History     Socioeconomic History   • Marital status:      Spouse name: Not on file   • Number of children: 1   • Years of education: Not on file   • Highest education level: Not on file   Tobacco Use   • Smoking status: Former Smoker     Types: Cigarettes     Last attempt to quit: 2000     Years since quittin.5   • Smokeless tobacco: Never Used   • Tobacco comment: social MAYBE 1 CIG/DAY   Substance and Sexual Activity   • Alcohol use: Not Currently     Frequency: Monthly or less     Drinks per session: 1 or 2   • Drug use: Never   • Sexual activity: Not Currently   Social History Narrative    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 Staying in [place] with mother due to Endometrial Cancer.        Family History:    Family History   Problem Relation Age of Onset   • Fibroids Mother    • Diabetes type II Mother    • Hypertension Mother    • Heart attack Mother    • Fibroids Sister         PCOS   • Diabetes type II Sister    • Dementia Maternal Grandmother    • Stroke  Maternal Grandmother        Health Maintenance:    Health Maintenance   Topic Date Due   • TDAP/TD VACCINES (1 - Tdap) 04/24/1984   • HEPATITIS C SCREENING  01/22/2020   • COLONOSCOPY  01/22/2020   • MAMMOGRAM  02/24/2020   • INFLUENZA VACCINE  08/01/2020   • HEMOGLOBIN A1C  08/07/2020   • URINE MICROALBUMIN  01/30/2021   • PNEUMOCOCCAL VACCINE (19-64 HIGHEST RISK) (2 of 3 - PCV13) 02/24/2021   • DIABETIC FOOT EXAM  02/24/2021   • DIABETIC EYE EXAM  02/24/2021   • ANNUAL PHYSICAL  02/25/2021   • PAP SMEAR  Discontinued         Review of Systems   Constitutional: Positive for fatigue. Negative for appetite change, chills, fever and unexpected weight change.   HENT: Negative for ear discharge, ear pain, hearing loss, mouth sores, nosebleeds, postnasal drip, sinus pressure, sinus pain, sore throat, tinnitus and trouble swallowing.    Eyes: Positive for visual disturbance. Negative for pain and itching.        Blurry vision   Respiratory: Negative for cough, shortness of breath and wheezing.    Cardiovascular: Negative for chest pain and palpitations.   Gastrointestinal: Negative for abdominal pain, blood in stool, constipation, diarrhea, nausea and vomiting.   Endocrine: Positive for heat intolerance. Negative for cold intolerance.   Genitourinary: Positive for dyspareunia. Negative for difficulty urinating, flank pain, frequency, hematuria, urgency, vaginal bleeding and vaginal discharge.   Musculoskeletal: Positive for arthralgias and myalgias. Negative for gait problem.   Skin: Positive for wound. Negative for color change and rash.   Allergic/Immunologic: Negative for immunocompromised state.   Neurological: Positive for numbness. Negative for dizziness, seizures, syncope and headaches.   Hematological: Negative for adenopathy. Does not bruise/bleed easily.        Anemia   Psychiatric/Behavioral: Positive for sleep disturbance. Negative for agitation, confusion and dysphoric mood. The patient is nervous/anxious.   "      Physical Exam    Vitals:    07/08/20 0945   BP: 171/79   Pulse: 113   Resp: 16   Temp: 97.8 °F (36.6 °C)   TempSrc: Temporal   SpO2: 98%   Weight: 82.1 kg (181 lb)   PainSc: 0-No pain     Body mass index is 33.11 kg/m².    Wt Readings from Last 3 Encounters:   07/08/20 82.1 kg (181 lb)   06/17/20 85.3 kg (188 lb)   06/08/20 86.2 kg (190 lb)         GENERAL: Alert, well-appearing female appearing her stated age who is in no apparent distress.  Patient is obese by BMI criteria.  HEENT: Sclera anicteric. Head normocephalic, atraumatic. Mucus membranes moist.  Alopecia, scalp with 4 wounds, healing well.  NECK: Supple, free from thyromegaly  BREASTS: Deferred  CARDIOVASCULAR: Regular rate and rhythm without murmur rub or gallop, no extremity edema  RESPIRATORY: Clear to auscultation bilaterally, normal effort  BACK: No CVA tenderness, no vertebral tenderness on palpation  GASTROINTESTINAL: Abdomen soft, nontender, no rebound, no guarding.  No palpable mass.  Incisions examined with packing in place.  Right upper quadrant incision was left with the dressing on.  Midline incision was noted and could not be properly packed due to its small opening at the skin.  Therefore, area was sharply incised after Betadine application and 1\" plain inch gauze placed.  No drainage was noted after the dressing was removed and the intact base of the wound was probed.    SKIN:  Warm, dry, well-perfused. No rashes, ulcers.  Wounds as noted above.  PSYCHIATRIC: AO x3, with appropriate affect, normal thought processes.  NEUROLOGIC: No focal deficits. Moves extremities well.  MUSCULOSKELETAL: Normal gait and station.  Brace on right ankle/foot.  EXTREMITIES:   No cyanosis, clubbing, symmetric.  LYMPHATICS: No adenopathy bilateral neck and groin areas     PELVIC exam:    Deferred    ECOG PS 1    PROCEDURES:    Extension of skin incision at midline laparotomy wound as noted above    Diagnostic Data:      No radiology results for the last " 30 days.      Lab Results   Component Value Date    WBC 7.90 07/08/2020    HGB 8.7 (L) 07/08/2020    HCT 26.7 (L) 07/08/2020    MCV 99.6 (H) 07/08/2020     07/08/2020    NEUTROABS 6.70 07/08/2020    GLUCOSE 324 (H) 07/08/2020    BUN 15 07/08/2020    CREATININE 0.73 07/08/2020    EGFRIFNONA 85 07/08/2020     07/08/2020    K 4.0 07/08/2020     07/08/2020    CO2 25.0 07/08/2020    MG 2.2 03/22/2020    PHOS 2.5 03/20/2020    CALCIUM 9.6 07/08/2020    ALBUMIN 4.10 07/08/2020    AST 21 07/08/2020    ALT 13 07/08/2020    BILITOT 0.8 07/08/2020     Lab Results   Component Value Date     8.5 07/08/2020           Assessment/Plan   This is a 45 y.o. woman who presents for toxicity evaluation and consideration of ongoing chemotherapy for advanced endometrial cancer.  Encounter Diagnoses   Name Primary?   • Endometrial cancer (CMS/HCC) Yes   • Joint pain following chemotherapy    • Neuropathic pain    • Neuropathy    • Wound healing, delayed      Endometrial cancer: Stage IIIa due to adnexal involvement  -Status post surgery as noted above  -Treated with combined carboplatin plus paclitaxel chemotherapy IV q. 21 days with a goal of 6 -paclitaxel discontinued with cycle 6 and patient treated with carboplatin and docetaxel due to persistent neuropathy issues.  -Dose modification with cycle #2 (29% reduction in carboplatin) due to pancytopenia, acute appendicitis requiring surgical intervention and hospital stay: Cleared for chemotherapy by infectious disease  -Patient declines vaginal brachytherapy.  She is emotionally distraught and worried that her marriage may end in divorce.  Her  has been in [place] throughout COVID and patient's treatment.  She is plainly stated that this is a quality of life issue and she is reached her physical and emotional limits with her treatment plan.  I think this is reasonable given all of her complications and issues.  - Plan for repeat imaging and office visit  in 3 months.  Although I would typically do posttreatment imaging in 1 month's time, patient is leaving town and does not plan to return until a 3-month follow-up.  I think is reasonable to do undergo imaging at that point.    Chemotherapy induced pancytopenia with cycle #1  -Dose modification with cycle #2   -Dose modification of carboplatin by 10% of current dose with cycle #3 due to ongoing issues with chemotherapy-induced thrombocytopenia.    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  Patient having difficulty writing first 1 to 2 weeks after treatment.  Decreased carboplatin and paclitaxel by 10% in previous cycle.  Decreased paclitaxel by an additional 10% 6/17/2020.  -Changed to docetaxel for cycle #6.    Emotional distress related to cancer diagnosis  Has seen counselor  Francis Lyons     Complex social issues  -Staying with her family in [place] for treatment    Joint pain, neuro pathic pain  Norco refilled  On gabapentin    Chronic Medical Conditions  -T2DM, HTN, steatohepatitis:  ?  Esophageal varicosities and HORNE, history of necrotizing fasciitis.  May require referral to gastroenterologist.  -Has seen endocrinology regarding her diabetes and new regimen was proposed for the jorge-chemotherapy timeframe due to steroid-induced hyperglycemia.  -Sliding scale insulin built into chemotherapy treatment plan due to diabetes mellitus with hyperglycemia; glucose as noted above.  -Vasotec as needed with infusion due to hypertension    Dyspareunia, complains of vaginal stricture  Vaginal dilators provided, dyspareunia improved.    Pain assessment was performed today as a part of patient’s care.  For patients with pain related to surgery, gynecologic malignancy or cancer treatment, the plan is as noted in the assessment/plan.  For patients with pain not related to these issues, they are to seek any further needed care from a  more appropriate provider, such as PCP.    Orders Placed This Encounter   Procedures   • CT Chest With Contrast     Standing Status:   Future     Standing Expiration Date:   7/8/2021     Scheduling Instructions:      Stat read     Order Specific Question:   Patient Pregnant     Answer:   No   • CT Abdomen Pelvis With Contrast     Stat read     Standing Status:   Future     Standing Expiration Date:   7/8/2021     Scheduling Instructions:      Oral & IV contrast            Stat read     Order Specific Question:   Will Oral Contrast be needed for this procedure?     Answer:   Yes     Order Specific Question:   Patient Pregnant     Answer:   No   • Comprehensive metabolic panel     Standing Status:   Future     Number of Occurrences:   1     Standing Expiration Date:   7/8/2021   •      Standing Status:   Future     Number of Occurrences:   1     Standing Expiration Date:   7/8/2021   • Ambulatory Referral to Wound Clinic     Referral Priority:   Routine     Referral Type:   Consultation     Referral Reason:   Specialty Services Required     Requested Specialty:   Wound Care     Number of Visits Requested:   1   • CBC and Differential     Standing Status:   Future     Number of Occurrences:   1     Standing Expiration Date:   7/8/2021     Order Specific Question:   Manual Differential     Answer:   No       FOLLOW UP: In 3 months for surveillance imaging and survivorship visit    Celine Rubio MD  07/08/20  1:09 PM

## 2020-07-08 ENCOUNTER — HOSPITAL ENCOUNTER (OUTPATIENT)
Dept: ONCOLOGY | Facility: HOSPITAL | Age: 45
Setting detail: INFUSION SERIES
Discharge: HOME | End: 2020-07-08

## 2020-07-08 ENCOUNTER — OFFICE VISIT (OUTPATIENT)
Dept: GYNECOLOGIC ONCOLOGY | Facility: CLINIC | Age: 45
End: 2020-07-08

## 2020-07-08 VITALS
BODY MASS INDEX: 33.11 KG/M2 | OXYGEN SATURATION: 98 % | WEIGHT: 181 LBS | DIASTOLIC BLOOD PRESSURE: 79 MMHG | TEMPERATURE: 97.8 F | HEART RATE: 113 BPM | RESPIRATION RATE: 16 BRPM | SYSTOLIC BLOOD PRESSURE: 171 MMHG

## 2020-07-08 DIAGNOSIS — M79.2 NEUROPATHIC PAIN: ICD-10-CM

## 2020-07-08 DIAGNOSIS — C54.1 ENDOMETRIAL CANCER (HCC): Primary | ICD-10-CM

## 2020-07-08 DIAGNOSIS — T14.8XXD WOUND HEALING, DELAYED: ICD-10-CM

## 2020-07-08 DIAGNOSIS — M25.50 JOINT PAIN FOLLOWING CHEMOTHERAPY: ICD-10-CM

## 2020-07-08 DIAGNOSIS — G62.9 NEUROPATHY: ICD-10-CM

## 2020-07-08 DIAGNOSIS — G89.18 JOINT PAIN FOLLOWING CHEMOTHERAPY: ICD-10-CM

## 2020-07-08 DIAGNOSIS — G62.9 NEUROPATHY: Primary | ICD-10-CM

## 2020-07-08 DIAGNOSIS — C54.1 ENDOMETRIAL CANCER (HCC): ICD-10-CM

## 2020-07-08 LAB
ALBUMIN SERPL-MCNC: 4.1 G/DL (ref 3.5–5.2)
ALBUMIN/GLOB SERPL: 1.2 G/DL
ALP SERPL-CCNC: 100 U/L (ref 39–117)
ALT SERPL W P-5'-P-CCNC: 13 U/L (ref 1–33)
ANION GAP SERPL CALCULATED.3IONS-SCNC: 12 MMOL/L (ref 5–15)
AST SERPL-CCNC: 21 U/L (ref 1–32)
BILIRUB SERPL-MCNC: 0.8 MG/DL (ref 0–1.2)
BUN SERPL-MCNC: 15 MG/DL (ref 6–20)
BUN/CREAT SERPL: 20.5 (ref 7–25)
CALCIUM SPEC-SCNC: 9.6 MG/DL (ref 8.6–10.5)
CANCER AG125 SERPL QL: 8.5 U/ML (ref 0–38.1)
CHLORIDE SERPL-SCNC: 101 MMOL/L (ref 98–107)
CO2 SERPL-SCNC: 25 MMOL/L (ref 22–29)
CREAT SERPL-MCNC: 0.5 MG/DL
CREAT SERPL-MCNC: 0.73 MG/DL (ref 0.57–1)
ERYTHROCYTE [DISTWIDTH] IN BLOOD BY AUTOMATED COUNT: 19.4 % (ref 12.3–15.4)
GFR SERPL CREATININE-BSD FRML MDRD: 85 ML/MIN/1.73
GLOBULIN UR ELPH-MCNC: 3.3 GM/DL
GLUCOSE SERPL-MCNC: 324 MG/DL (ref 65–99)
HCT VFR BLD AUTO: 26.7 % (ref 34–46.6)
HGB BLD-MCNC: 8.7 G/DL (ref 12–15.9)
LYMPHOCYTES # BLD AUTO: 0.9 10*3/MM3 (ref 0.7–3.1)
LYMPHOCYTES NFR BLD AUTO: 11.5 % (ref 19.6–45.3)
MCH RBC QN AUTO: 32.4 PG (ref 26.6–33)
MCHC RBC AUTO-ENTMCNC: 32.5 G/DL (ref 31.5–35.7)
MCV RBC AUTO: 99.6 FL (ref 79–97)
MONOCYTES # BLD AUTO: 0.3 10*3/MM3 (ref 0.1–0.9)
MONOCYTES NFR BLD AUTO: 3.5 % (ref 5–12)
NEUTROPHILS NFR BLD AUTO: 6.7 10*3/MM3 (ref 1.7–7)
NEUTROPHILS NFR BLD AUTO: 85 % (ref 42.7–76)
PLATELET # BLD AUTO: 166 10*3/MM3 (ref 140–450)
PMV BLD AUTO: 6.7 FL (ref 6–12)
POTASSIUM SERPL-SCNC: 4 MMOL/L (ref 3.5–5.2)
PROT SERPL-MCNC: 7.4 G/DL (ref 6–8.5)
RBC # BLD AUTO: 2.68 10*6/MM3 (ref 3.77–5.28)
SODIUM SERPL-SCNC: 138 MMOL/L (ref 136–145)
WBC # BLD AUTO: 7.9 10*3/MM3 (ref 3.4–10.8)

## 2020-07-08 PROCEDURE — 25010000002 PALONOSETRON PER 25 MCG: Performed by: OBSTETRICS & GYNECOLOGY

## 2020-07-08 PROCEDURE — 25010000002 CARBOPLATIN PER 50 MG: Performed by: OBSTETRICS & GYNECOLOGY

## 2020-07-08 PROCEDURE — 86304 IMMUNOASSAY TUMOR CA 125: CPT | Performed by: OBSTETRICS & GYNECOLOGY

## 2020-07-08 PROCEDURE — 96366 THER/PROPH/DIAG IV INF ADDON: CPT

## 2020-07-08 PROCEDURE — 99215 OFFICE O/P EST HI 40 MIN: CPT | Performed by: OBSTETRICS & GYNECOLOGY

## 2020-07-08 PROCEDURE — 96367 TX/PROPH/DG ADDL SEQ IV INF: CPT

## 2020-07-08 PROCEDURE — 80053 COMPREHEN METABOLIC PANEL: CPT | Performed by: OBSTETRICS & GYNECOLOGY

## 2020-07-08 PROCEDURE — 96417 CHEMO IV INFUS EACH ADDL SEQ: CPT

## 2020-07-08 PROCEDURE — 96413 CHEMO IV INFUSION 1 HR: CPT

## 2020-07-08 PROCEDURE — 25010000002 FOSAPREPITANT PER 1 MG: Performed by: OBSTETRICS & GYNECOLOGY

## 2020-07-08 PROCEDURE — 96375 TX/PRO/DX INJ NEW DRUG ADDON: CPT

## 2020-07-08 PROCEDURE — 25010000002 DOCETAXEL 20 MG/ML SOLUTION 8 ML VIAL: Performed by: OBSTETRICS & GYNECOLOGY

## 2020-07-08 PROCEDURE — 85025 COMPLETE CBC W/AUTO DIFF WBC: CPT | Performed by: OBSTETRICS & GYNECOLOGY

## 2020-07-08 RX ORDER — HYDROCODONE BITARTRATE AND ACETAMINOPHEN 7.5; 325 MG/1; MG/1
1 TABLET ORAL EVERY 6 HOURS PRN
Qty: 30 TABLET | Refills: 0 | Status: CANCELLED | OUTPATIENT
Start: 2020-07-08

## 2020-07-08 RX ORDER — DIPHENHYDRAMINE HYDROCHLORIDE 50 MG/ML
50 INJECTION INTRAMUSCULAR; INTRAVENOUS AS NEEDED
Status: CANCELLED | OUTPATIENT
Start: 2020-07-08

## 2020-07-08 RX ORDER — SODIUM CHLORIDE 9 MG/ML
250 INJECTION, SOLUTION INTRAVENOUS ONCE
Status: CANCELLED | OUTPATIENT
Start: 2020-07-08

## 2020-07-08 RX ORDER — FAMOTIDINE 10 MG/ML
20 INJECTION, SOLUTION INTRAVENOUS AS NEEDED
Status: CANCELLED | OUTPATIENT
Start: 2020-07-08

## 2020-07-08 RX ORDER — LORATADINE 10 MG/1
10 TABLET ORAL DAILY
Qty: 30 TABLET | Refills: 1 | Status: SHIPPED | OUTPATIENT
Start: 2020-07-08 | End: 2021-12-06

## 2020-07-08 RX ORDER — HYDROCODONE BITARTRATE AND ACETAMINOPHEN 7.5; 325 MG/1; MG/1
1 TABLET ORAL EVERY 6 HOURS PRN
Qty: 30 TABLET | Refills: 0 | Status: SHIPPED | OUTPATIENT
Start: 2020-07-08 | End: 2022-01-04

## 2020-07-08 RX ORDER — PALONOSETRON 0.05 MG/ML
0.25 INJECTION, SOLUTION INTRAVENOUS ONCE
Status: CANCELLED | OUTPATIENT
Start: 2020-07-08

## 2020-07-08 RX ORDER — SODIUM CHLORIDE 9 MG/ML
250 INJECTION, SOLUTION INTRAVENOUS ONCE
Status: DISCONTINUED | OUTPATIENT
Start: 2020-07-08 | End: 2020-07-09 | Stop reason: HOSPADM

## 2020-07-08 RX ORDER — PALONOSETRON 0.05 MG/ML
0.25 INJECTION, SOLUTION INTRAVENOUS ONCE
Status: COMPLETED | OUTPATIENT
Start: 2020-07-08 | End: 2020-07-08

## 2020-07-08 RX ADMIN — DOCETAXEL 140 MG: 20 INJECTION, SOLUTION, CONCENTRATE INTRAVENOUS at 12:03

## 2020-07-08 RX ADMIN — PALONOSETRON HYDROCHLORIDE 0.25 MG: 0.25 INJECTION INTRAVENOUS at 11:34

## 2020-07-08 RX ADMIN — CARBOPLATIN 570 MG: 10 INJECTION, SOLUTION INTRAVENOUS at 13:07

## 2020-07-08 RX ADMIN — SODIUM CHLORIDE 150 MG: 9 INJECTION, SOLUTION INTRAVENOUS at 11:33

## 2020-07-08 NOTE — THERAPY WOUND CARE TREATMENT
Outpatient Rehabilitation - Wound/Debridement Treatment Note  [facility]     Patient Name: Jeana Pollack Juliet RN  : 1975  MRN: [id number]  Today's Date: 2020                 Admit Date: 2020    Visit Dx:    ICD-10-CM ICD-9-CM   1. Postoperative wound dehiscence, subsequent encounter T81.31XD V58.89     998.32   2. Open wound of abdomen, subsequent encounter S31.109D V58.89     879.2   3. Abscess of scalp L02.811 682.8       Patient Active Problem List   Diagnosis   • Acute stress reaction with predominately emotional disturbance   • Sleep disturbance   • Cancer related pain   • Moderate episode of recurrent major depressive disorder (CMS/HCC)   • Anxiety   • Iron deficiency anemia   • Type 2 diabetes mellitus with diabetic polyneuropathy, with long-term current use of insulin (CMS/HCC)   • Essential hypertension   • Heart murmur   • Family history of cardiac disorder in mother   • Endometrial cancer (CMS/HCC)   • Stage 3 chronic kidney disease (CMS/HCC)   • Gastroesophageal reflux disease   • Amputation of left great toe (CMS/HCC)   • Amputation of right great toe (CMS/HCC)   • Neuropathy   • Hypotension due to drugs   • Neutropenia (CMS/HCC)   • Acute appendicitis   • Open wound of right foot   • Hyponatremia   • Pancytopenia (CMS/HCC)   • Antineoplastic chemotherapy induced pancytopenia (CMS/HCC)   • Chemotherapy-induced thrombocytopenia   • Wound healing, delayed   • Dyspareunia, female   • Vaginal stenosis        Past Medical History:   Diagnosis Date   • Anemia    • Anxiety and depression    • Back pain    • Bronchitis    • Diabetes (CMS/HCC)     DX 4-5 YRS AGO, CHECKS BS 4X/DAY, LAST A1C 7.1%   • Endometrial cancer (CMS/HCC)     STAGE II   • GERD (gastroesophageal reflux disease)    • Hypertension    • MRSA (methicillin resistant staph aureus) culture positive 2018    S/P NECROTIZING FASCITIS IN BILATERAL FEET   • Necrotizing fasciitis (CMS/HCC)    • PCOS (polycystic ovarian syndrome)     • PTSD (post-traumatic stress disorder)    • Retinopathy 3/18/2020   • Sepsis (CMS/HCC)    • Stage 3 chronic kidney disease (CMS/HCC) 2/24/2020   • Subconjunctival hemorrhage         Past Surgical History:   Procedure Laterality Date   • APPENDECTOMY N/A 3/21/2020    Procedure: APPENDECTOMY LAPAROSCOPIC;  Surgeon: Praful Myers MD;  Location: [facility] OR;  Service: General;  Laterality: N/A;   • EXPLORATORY LAPAROTOMY, TOTAL ABDOMINAL HYSTERECTOMY SALPINGO OOPHORECTOMY N/A 2/10/2020    Procedure: EXPLORATORY LAPAROTOMY, TOTAL ABDOMINAL HYSTERECTOMY, BILATERAL SALPINGO-OOPHORECTOMY WITH OPTIMAL  STAGING (R-0), OMENTECTOMY;  Surgeon: Celine Rubio MD;  Location: [facility] OR;  Service: Gynecology Oncology;  Laterality: N/A;   • EYE SURGERY Left     BLOOD VESSEL IN EYE REPAIR   • TOE AMPUTATION Left 06/2019    big toe - unhealing sore   • TOE AMPUTATION Right 2018    big toe and second toe - Necrotizing fasciitis and MRSA          EVALUATION  PT Ortho     Row Name 07/07/20 7958       Subjective Comments    Subjective Comments  Pt reports this will likely be her last visit as long as she feels OK after chemo tomorrow. She will let us know if her plans change. She is already set up with a WCC closer to home, but will need her medical records from her (encouraged her to submit the request or have the WCC submit the request directly through medical records here).  -MC       Subjective Pain    Able to rate subjective pain?  yes  -MC    Pre-Treatment Pain Level  2  -MC    Post-Treatment Pain Level  4  -MC    Subjective Pain Comment  RUQ  -MC       Transfers    Sit-Stand [place] (Transfers)  independent  -MC    Stand-Sit [place] (Transfers)  independent  -MC    Comment (Transfers)  supine on stretcher for tx  -       Gait/Stairs Assessment/Training    [place] Level (Gait)  independent  -      User Key  (r) = Recorded By, (t) = Taken By, (c) = Cosigned By    Initials Name Provider Type    MC  "Mary Kay Bell PT Physical Therapist          LDA Wound     Row Name 07/07/20 1430             Wound 07/02/20 1345 Left abdomen Soft Tissue Necrosis    Wound - Properties Group Date first assessed: 07/02/20  - Time first assessed: 1345  -JM Side: Left  - Location: abdomen  -JM Primary Wound Type: Soft tissue  -JM    Dressing Appearance  open to air  -      Base  closed/resurfaced;clean;dry  -MC      Periwound  intact;dry;redness  -      Periwound Temperature  warm  -      Periwound Skin Turgor  soft  -MC      Drainage Amount  none  -MC      Dressing Care, Wound  open to air  -MC         Wound 06/09/20 1400 Right posterior;lateral scalp Abcess    Wound - Properties Group Date first assessed: 06/09/20  - Time first assessed: 1400  -JM Side: Right  - Orientation: posterior;lateral  - Location: scalp  -JM Primary Wound Type: Abcess  -    Dressing Appearance  intact;moist drainage HFB adherent, not in contact with wound base  -      Base  moist;red;subcutaneous;yellow;white;slough  -      Periwound  indurated;redness;swelling  -      Periwound Temperature  warm  -      Periwound Skin Turgor  soft  -MC      Edges  open  -      Drainage Characteristics/Odor  sanguineous;serosanguineous  -      Drainage Amount  small  -MC      Care, Wound  cleansed with;wound cleanser;debrided;honey applied  -      Dressing Care, Wound  dressing applied;silver impregnated;hydrofiber;low-adherent;foam;border dressing opticell Ag+ ribbon, 4\" optifoam  -      Periwound Care, Wound  cleansed with pH balanced cleanser;dry periwound area maintained  -         Wound 06/09/20 1400 Right posterior;midline scalp Abcess    Wound - Properties Group Date first assessed: 06/09/20  - Time first assessed: 1400  -JM Side: Right  - Orientation: posterior;midline  - Location: scalp  -JM Primary Wound Type: Abcess  -    Dressing Appearance  open to air  -      Base  scab  -MC      Periwound  " "redness;swelling;warm  -MC      Periwound Temperature  warm  -MC      Periwound Skin Turgor  firm  -MC      Edges  open;irregular  -MC      Drainage Amount  none  -MC         Wound 04/07/20 1100 midline abdomen Incision    Wound - Properties Group Date first assessed: 04/07/20  - Time first assessed: 1100  -MF Orientation: midline  - Location: abdomen  -MF Primary Wound Type: Incision  -MF    Dressing Appearance  intact;moist drainage  -MC      Base  moist;pink;red;subcutaneous  -MC      Periwound  intact;pink  -MC      Periwound Temperature  warm  -MC      Periwound Skin Turgor  soft  -MC      Edges  open;irregular  -MC      Drainage Characteristics/Odor  serosanguineous;yellow;creamy slight green tint  -MC      Drainage Amount  moderate  -MC      Care, Wound  cleansed with;wound cleanser  -MC      Dressing Care, Wound  dressing applied;gauze, iodoform;low-adherent;foam;border dressing 1/4\" iodoform gauze, 4\" optifoam  -MC      Periwound Care, Wound  cleansed with pH balanced cleanser;dry periwound area maintained  -MC         Wound 04/07/20 1100 Right upper;lateral abdomen Abcess    Wound - Properties Group Date first assessed: 04/07/20  - Time first assessed: 1100  - Side: Right  -MF Orientation: upper;lateral  -MF Location: abdomen  -MF Primary Wound Type: Abcess  -MF    Dressing Appearance  intact;moist drainage  -MC      Base  red;pink;subcutaneous;yellow;white;slough fascia in base, walls narrowing  -MC      Periwound  intact;redness;indurated;pink  -MC      Periwound Temperature  warm  -MC      Periwound Skin Turgor  soft  -MC      Edges  rolled/closed  -MC      Drainage Characteristics/Odor  serosanguineous;yellow;creamy slight green tint  -MC      Drainage Amount  small  -MC      Care, Wound  cleansed with;wound cleanser;debrided  -MC      Dressing Care, Wound  dressing applied;gauze, [id number];low-adherent;foam;border dressing lightly moistened 1\" conform, 4\" optifoam  -MC      Periwound Care, " Wound  cleansed with pH balanced cleanser;dry periwound area maintained  -MC        User Key  (r) = Recorded By, (t) = Taken By, (c) = Cosigned By    Initials Name Provider Type    Vinny Colindres, PT Physical Therapist    Mary Kay Flores, PT Physical Therapist    Leidy Millard, PT Physical Therapist            WOUND DEBRIDEMENT  Total area of Debridement: 1 cm2  Debridement Site 1  Location- Site 1: Rt posterior scalp   Selective Debridement- Site 1: Wound Surface <20cmsq  Instruments- Site 1: #15, scapel, tweezers, scissors  Excised Tissue Description- Site 1: moderate, slough, eschar  Bleeding- Site 1: scant, held pressure, 1 minute             Therapy Education     Row Name 07/07/20 1430             Therapy Education    Education Details  Continue with current dressing changes until follow up with [facility], except change posterior/lateral scalp dressing to honey/opticell Ag ribbon to potentially allow more wound bed contact without adherence. Call for any additional appts.  -MC      Given  Bandaging/dressing change  -MC      Program  Reinforced  -MC      How Provided  Verbal;Demonstration  -MC      Provided to  Patient  -MC      Level of Understanding  Verbalized;Teach back education performed  -MC        User Key  (r) = Recorded By, (t) = Taken By, (c) = Cosigned By    Initials Name Provider Type    Mary Kay Flores, PT Physical Therapist          Recommendation and Plan  PT Assessment/Plan     Row Name 07/07/20 1430          PT Assessment    Functional Limitations  Other (comment);Performance in self-care ADL wound mgmt  -MC     Impairments  Integumentary integrity;Pain  -MC     Assessment Comments  Pt continues to improve, with continued narrowing of the RUQ wound and the midline abd wound opening. Edges of the posterior scalp wound appear to be jesus as well. The left abdomen wound is completely epithelialized today. Pt will continue to benefit from skilled wound care, and is likely  to establish care with a local wound care center if she moves back home this week. Will tentatively d/c today.  -     Rehab Potential  Fair  -     Patient/caregiver participated in establishment of treatment plan and goals  Yes  -     Patient would benefit from skilled therapy intervention  Yes  -        PT Plan    PT Frequency  Other (comment) prn within 30 days  -     Physical Therapy Interventions (Optional Details)  patient/family education;wound care  -     PT Plan Comments  tentative d/c  -       User Key  (r) = Recorded By, (t) = Taken By, (c) = Cosigned By    Initials Name Provider Type    Mary Kay Flores, PT Physical Therapist          Goals  PT OP Goals     Row Name 07/07/20 1430          Time Calculation    PT Goal Re-Cert Due Date  09/30/20  -       User Key  (r) = Recorded By, (t) = Taken By, (c) = Cosigned By    Initials Name Provider Type    Mary Kay Flores, PT Physical Therapist          PT Goal Re-Cert Due Date: 09/30/20            Time Calculation: Start Time: 1430  Therapy Charges for Today     Code Description Service Date Service Provider Modifiers Qty    [id number] HC YESSENIA DEBRIDE OPEN WOUND UP TO 20CM 7/7/2020 Mary Kay Bell, PT GP 1                  Mary Kay Bell, PT  7/8/2020

## 2020-07-10 ENCOUNTER — TELEPHONE (OUTPATIENT)
Dept: GYNECOLOGIC ONCOLOGY | Facility: CLINIC | Age: 45
End: 2020-07-10

## 2020-07-10 NOTE — TELEPHONE ENCOUNTER
Patient needs referral to wound clinic faxed to [facility] in [place]. Her callback is [phone]

## 2020-07-13 ENCOUNTER — TELEPHONE (OUTPATIENT)
Dept: GYNECOLOGIC ONCOLOGY | Facility: CLINIC | Age: 45
End: 2020-07-13

## 2020-07-13 NOTE — TELEPHONE ENCOUNTER
PT NEEDS PAT TO CALL HER BACK, NEEDS HER TO FAX OVER ORDERS AND NOTES TO [place]. PLEASE CALL HER ASAP  NEEDS THIS TO BE DONE BEFORE SHE CAN BE SEEN,   PT -[phone]

## 2020-07-17 ENCOUNTER — TELEPHONE (OUTPATIENT)
Dept: GYNECOLOGIC ONCOLOGY | Facility: CLINIC | Age: 45
End: 2020-07-17

## 2020-07-17 NOTE — TELEPHONE ENCOUNTER
Patient left voicemail on nurse's line this morning, I returned call.   She underwent cycle #6 of chemotherapy on 7/8/2020. This was changed to Carbo/Docetaxel from Carbo/Taxol due to intolerance of Taxol. Patient reports she went home to [place] after her infusion and then began to feel very poorly with severe fatigue this week.   She presented to her local ED on 7/15/2020 in the afternoon and was admitted overnight with neutropenia and anemia. A COVID test was done and results are pending. She says she has been given Granix injections and PRBC transfusions. They are monitoring her labs and giving antibiotics. She is feeling somewhat better, hopes to go home some time this weekend. I thanked patient for notifying us. I will pass this on to Dr. Rubio so she is updated.   If she goes home stable, continue plan for post-treatment scan and survivorship in 10/2020. Call if any further problems. She v/u.

## 2020-08-05 ENCOUNTER — TELEPHONE (OUTPATIENT)
Dept: GYNECOLOGIC ONCOLOGY | Facility: CLINIC | Age: 45
End: 2020-08-05

## 2020-08-05 DIAGNOSIS — G62.9 NEUROPATHY: Primary | ICD-10-CM

## 2020-08-05 RX ORDER — PREGABALIN 100 MG/1
100 CAPSULE ORAL 3 TIMES DAILY
Qty: 90 CAPSULE | Refills: 5 | Status: SHIPPED | OUTPATIENT
Start: 2020-08-05 | End: 2021-12-06

## 2020-08-05 NOTE — TELEPHONE ENCOUNTER
Patient phoned complaining of issues that aren't going away. She has been hospitalized since going home for neutropenia. She also had to have a blood transfusion.  Her complaints consist of Arms-tingling,numbness  Hands-can't hold/feel anything  Mouth-numb  Nail beds-turning blue/brown

## 2020-08-05 NOTE — TELEPHONE ENCOUNTER
Returned patient's call. She reports neuropathy in bilateral hands and forearms is severe and very bothersome. She states they feel cold, numb, and often very painful. This is affecting her . She is currently taking gabapentin 300 mg PO TID, notes she doubled her gabapentin at night twice this week without any improvement. Prior to chemotherapy patient had baseline diabetic neuropathy in feet, notes feet not currently bothersome. She has taken Lyrica in the past and feels like she tolerated this well.   I am recommending d/c gabapentin and changing to Lyrica. If this is ineffective, will refer to neurology.   She v/u and agrees with plan.     Patient also notes she is scheduled to establish care with gyn onc close to home in [place], new patient visit at the end of this month. Once established, she will have her surveillance there and return to us PRN.

## 2020-08-12 ENCOUNTER — DOCUMENTATION (OUTPATIENT)
Dept: PHYSICAL THERAPY | Facility: HOSPITAL | Age: 45
End: 2020-08-12

## 2020-08-12 NOTE — THERAPY DISCHARGE NOTE
Outpatient Rehabilitation - Wound/Debridement D/C Summary        Patient Name: Jeana Pollack Juliet RN  : 1975  MRN: [id number]  Today's Date: 2020                  Admit Date: (Not on file)    Visit Dx:  No diagnosis found.    Patient Active Problem List   Diagnosis   • Acute stress reaction with predominately emotional disturbance   • Sleep disturbance   • Cancer related pain   • Moderate episode of recurrent major depressive disorder (CMS/HCC)   • Anxiety   • Iron deficiency anemia   • Type 2 diabetes mellitus with diabetic polyneuropathy, with long-term current use of insulin (CMS/HCC)   • Essential hypertension   • Heart murmur   • Family history of cardiac disorder in mother   • Endometrial cancer (CMS/HCC)   • Stage 3 chronic kidney disease (CMS/HCC)   • Gastroesophageal reflux disease   • Amputation of left great toe (CMS/HCC)   • Amputation of right great toe (CMS/HCC)   • Neuropathy   • Hypotension due to drugs   • Neutropenia (CMS/HCC)   • Acute appendicitis   • Open wound of right foot   • Hyponatremia   • Pancytopenia (CMS/HCC)   • Antineoplastic chemotherapy induced pancytopenia (CMS/HCC)   • Chemotherapy-induced thrombocytopenia   • Wound healing, delayed   • Dyspareunia, female   • Vaginal stenosis        Past Medical History:   Diagnosis Date   • Anemia    • Anxiety and depression    • Back pain    • Bronchitis    • Diabetes (CMS/HCC)     DX 4-5 YRS AGO, CHECKS BS 4X/DAY, LAST A1C 7.1%   • Endometrial cancer (CMS/HCC)     STAGE II   • GERD (gastroesophageal reflux disease)    • Hypertension    • MRSA (methicillin resistant staph aureus) culture positive 2018    S/P NECROTIZING FASCITIS IN BILATERAL FEET   • Necrotizing fasciitis (CMS/HCC)    • PCOS (polycystic ovarian syndrome)    • PTSD (post-traumatic stress disorder)    • Retinopathy 3/18/2020   • Sepsis (CMS/HCC)    • Stage 3 chronic kidney disease (CMS/HCC) 2020   • Subconjunctival hemorrhage         Past Surgical  History:   Procedure Laterality Date   • APPENDECTOMY N/A 3/21/2020    Procedure: APPENDECTOMY LAPAROSCOPIC;  Surgeon: Praful Myers MD;  Location:  [facility] [facility];  Service: General;  Laterality: N/A;   • EXPLORATORY LAPAROTOMY, TOTAL ABDOMINAL HYSTERECTOMY SALPINGO OOPHORECTOMY N/A 2/10/2020    Procedure: EXPLORATORY LAPAROTOMY, TOTAL ABDOMINAL HYSTERECTOMY, BILATERAL SALPINGO-OOPHORECTOMY WITH OPTIMAL  STAGING (R-0), OMENTECTOMY;  Surgeon: Celine Rubio MD;  Location:  [facility];  Service: Gynecology Oncology;  Laterality: N/A;   • EYE SURGERY Left     BLOOD VESSEL IN EYE REPAIR   • TOE AMPUTATION Left 06/2019    big toe - unhealing sore   • TOE AMPUTATION Right 2018    big toe and second toe - Necrotizing fasciitis and MRSA          EVALUATION                WOUND DEBRIDEMENT                            Recommendation and Plan      Goals  PT OP Goals     Row Name 08/12/20 0085          PT Short Term Goals    STG Date to Achieve  05/22/20  -     STG 1  Pt to be able to demonstrate home dressing management  -     STG 1 Progress  Met  -     STG 2  Decrease RUQ depth by 50%   -     STG 2 Progress  Partially Met  -     STG 3  Pt to have no s/s of infection   -     STG 3 Progress  Partially Met  -        Long Term Goals    LTG Date to Achieve  07/06/20  -     LTG 1  Decrease RUQ wound size by 75% as evidence of wound healing   -     LTG 1 Progress  Partially Met  -     LTG 2  Decrease Midline abdomen size by 75% as evidence of wound healing   -     LTG 2 Progress  Partially Met  -     LTG 3  Pt will demonstrate 75% reduction in wound area to R foot to indicate healing progress.  -     LTG 3 Progress  Not Met  -       User Key  (r) = Recorded By, (t) = Taken By, (c) = Cosigned By    Initials Name Provider Type    Mary Kay Flores, PT Physical Therapist          Time Calculation:              OP Discharge Summary     Row Name 08/12/20 3232             OP PT Discharge Summary     Date of Discharge  08/12/20  -      Reason for Discharge  Transfer to other facility/level of care;Patient/Caregiver request following up with [facility] near her home in [place].  -      Outcomes Achieved  Patient able to partially acheive established goals  -        User Key  (r) = Recorded By, (t) = Taken By, (c) = Cosigned By    Initials Name Provider Type    Mary Kay Flores, PT Physical Therapist          Mary Kay Bell, WILNER  8/12/2020

## 2020-10-01 ENCOUNTER — TELEPHONE (OUTPATIENT)
Dept: GYNECOLOGIC ONCOLOGY | Facility: CLINIC | Age: 45
End: 2020-10-01

## 2020-10-01 NOTE — TELEPHONE ENCOUNTER
Called patient. I was trying to precert her CT scan for 10/16 and insurance was denying because it was a duplicate.  Patient states she has established care with GYN ONC and general surgeon at home. She recently had surgery due to non healing wounds and hernia. She had a CT prior to surgery which was negative per patient report. Also said her surgery was a big one and now she has a gaping hole that she is continuing to see wound care for.  All appoinitments were cancelled and she will follow up PRN here.

## 2020-10-16 ENCOUNTER — APPOINTMENT (OUTPATIENT)
Dept: CT IMAGING | Facility: HOSPITAL | Age: 45
End: 2020-10-16

## 2020-12-21 ENCOUNTER — IMPORTED ENCOUNTER (OUTPATIENT)
Dept: URBAN - NONMETROPOLITAN AREA CLINIC 1 | Facility: CLINIC | Age: 45
End: 2020-12-21

## 2020-12-21 PROBLEM — H43.11: Noted: 2020-12-21

## 2020-12-21 PROCEDURE — 99204 OFFICE O/P NEW MOD 45 MIN: CPT

## 2020-12-21 NOTE — PATIENT DISCUSSION
vitreal heme ODEDUCATE PTREFER TO DR. CLAYTON/ROZ FOR EVAL AND TXPAST LASER AND INJECTION THERAPY WITH DR. Winnie Nicholson

## 2021-06-24 NOTE — TELEPHONE ENCOUNTER
I called pt and reviewed her labs with her.  Informed her that h/h and platelets low and instructed on injury / fall precautions.  She is not symptomatic with anemia.  She has upcoming appt dates / times and will call back with any questions or concerns.   
abdominal pain
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
## 2021-12-02 ENCOUNTER — TELEPHONE (OUTPATIENT)
Dept: GYNECOLOGIC ONCOLOGY | Facility: CLINIC | Age: 46
End: 2021-12-02

## 2021-12-02 DIAGNOSIS — C54.1 ENDOMETRIAL CANCER (HCC): Primary | ICD-10-CM

## 2021-12-02 NOTE — TELEPHONE ENCOUNTER
Caller: Jose David Chung, RN    Relationship: Self    Best call back number: [phone]    What was the call regarding: JOSE DAVID CALLED TO SAY THAT SHE IS IN [place] FOR THE HOLIDAYS. SHE SAYS SHE HAS BEEN EXPERIENCING BLEEDING FOR THE LAST COUPLE OF WEEKS. [facility] DID A PAP SMEAR, AND SHE SAYS SINCE THEN, SHE HAS BEEN BLEEDING NON-STOP. SHE SAYS THEY ARE SUPPOSED TO SEND OVER A REFERRAL, BUT SHE IS VERY CONCERNED. SHE WOULD LIKE A CALL BACK TO DISCUSS THIS.    Do you require a callback: YES

## 2021-12-02 NOTE — TELEPHONE ENCOUNTER
Pt has a h/o Stage III Endometrial Cancer dx 12/2019 and was last seen 7/8/2020.  She cx her 3 mo surveillance/scans appt because [place].  Per APRN, we are working pt in on Dr. Rubio's schedule for 12/6/2021 due to possible recurrence and possible need for tx.  Confirmed with pt that she is not saturating a pad in a hour.  Informed to go to ER if bleeding worsens and gets that heavy.  Pt v/u and appreciation and confirmed appt date/time.    Per APRN, stat imaging orders put in by Vicki.

## 2021-12-05 ENCOUNTER — HOSPITAL ENCOUNTER (OUTPATIENT)
Dept: CT IMAGING | Facility: HOSPITAL | Age: 46
Discharge: HOME | End: 2021-12-05
Admitting: NURSE PRACTITIONER

## 2021-12-05 DIAGNOSIS — C54.1 ENDOMETRIAL CANCER (HCC): ICD-10-CM

## 2021-12-05 LAB — CREAT BLDA-MCNC: 0.8 MG/DL (ref 0.6–1.3)

## 2021-12-05 PROCEDURE — 71260 CT THORAX DX C+: CPT

## 2021-12-05 PROCEDURE — 82565 ASSAY OF CREATININE: CPT

## 2021-12-05 PROCEDURE — 25010000002 IOPAMIDOL 61 % SOLUTION: Performed by: NURSE PRACTITIONER

## 2021-12-05 PROCEDURE — 74177 CT ABD & PELVIS W/CONTRAST: CPT

## 2021-12-05 RX ADMIN — IOPAMIDOL 85 ML: 612 INJECTION, SOLUTION INTRAVENOUS at 08:55

## 2021-12-05 NOTE — PROGRESS NOTES
"Jeana Chung RN  [id number]  1975      Reason for visit: Endometrial cancer, vaginal bleeding    History of present illness:  The patient is a 46 y.o. year old female who presents today for treatment and evaluation of the above issues.     She has recently come back to the area after moving to [place] where she was followed by a GYN oncologist there. She reports that she had intercourse approximately 2 weeks ago and noticed some vaginal spotting. A few days later, she noticed this again. She went to see here doctor and they performed a pap smear, which caused quite a bit of bleeding and blood clots that have continued up until her visit today. She still has daily neuropathy in her hands and feet which cause her to stumble and drop things.  She is starting to lose her vision related to her diabetes.  She has also been having back pain, anorexia, nausea, diarrhea. She reports depressive symptoms and states that she has just \"been through the ringer\".  She is concerned that her  is going to divorce her and it is causing a lot of emotional distress.    OBGYN History:  She is a .  She does not use HRT. She has never had a pap smear.    Oncologic History:  Oncology/Hematology History   Endometrial cancer (HCC)   2019 Imaging    Presented to ED in [place] due to progressively heavy vaginal bleeding and severe back pain. Ultrasound revealed uterine and cervical masses.     2020 Biopsy    ED follow-up with gynecologist in [place], told she likely has cervical cancer. Insufficient tissue on attempted cervical biopsy. Patient moved to [place], [place] to be closer to mother and sister for work-up and treatment.      2020 Imaging    Gyn evaluation at [facility]. Repeat TVUS showed cervical mass, right adnexal mass, and large uterine fibroid vs mass. Referred to Gyn Oncology     2020 Initial Diagnosis    Endometrial cancer (CMS/HCC)  Cervical biopsy positive for " infiltrating endometrioid adenocarcinoma     1/30/2020 Imaging    CT chest, abdomen, pelvis showed enlarged uterus with multiple masses and 4 cm cystic/solid mass at right ovary. No metastatic disease noted in abdomen or chest.     2/10/2020 Surgery    Exploratory laparotomy, total abdominal hysterectomy, bilateral salpingo-oophorectomy, with optimal debulking (R=0), and omentectomy.    Pelvic washing cytology positive for malignant cells, consistent with adenocarcinoma. Final pathology showed large grade 3 endometrioid tumor with lymphvascular invasion at the uterus. Cervical stromal involvement, vaginal margin negative. Right tube and ovary involved. Left tube and ovary negative, omentum negative. MSI normal. [id number], Stage IIIA grade 3       3/9/2020 - 7/8/2020 Chemotherapy    OP UTERINE PACLitaxel / CARBOplatin (Q21D)  Cycle #1 complicated by pancytopenia, appendicitis, appendectomy, appendiceal abscess  Cycle #2 remarkable for thrombocytopenia, worsening neuropathy.  Dose modification with cycle #3.       3/17/2020 - 3/23/2020 Other Event    Hospital admission with acute appendicitis     5/21/2020 - 5/21/2020 Chemotherapy    OP CENTRAL VENOUS ACCESS DEVICE ACCESS, CARE, AND MAINTENANCE (CVAD)     7/8/2020 - 7/28/2020 Chemotherapy    OP OVARIAN DOCEtaxel / CARBOplatin     12/5/2021 Progression    Complains of vaginal bleeding.  CT scan chest abdomen and pelvis:  IMPRESSION:  Abnormal soft tissue mass seen at the vaginal vault with likely  extension to involve the sigmoid colon adjacent to the mass on the left.  Multiple new lung lesions including 2.5 cm medial right lower lobe nodule, 7 mm right peripheral lung nodule, to left small lung nodules measuring 1.2 cm together and smaller other lung nodules concerning for metastatic disease.  Office biopsy of vaginal mass performed 12/6/2021.             Past Medical History:   Diagnosis Date   • Anemia    • Anxiety and depression    • Back pain    • Bronchitis    •  Diabetes (HCC)     DX 4-5 YRS AGO, CHECKS BS 4X/DAY, LAST A1C 7.1%   • Endometrial cancer ([facility])     STAGE II   • GERD (gastroesophageal reflux disease)    • Hypertension    • MRSA (methicillin resistant staph aureus) culture positive 2018    S/P NECROTIZING FASCITIS IN BILATERAL FEET   • Necrotizing fasciitis (HCC)    • PCOS (polycystic ovarian syndrome)    • PTSD (post-traumatic stress disorder)    • Retinopathy 3/18/2020   • Sepsis (HCC)    • Stage 3 chronic kidney disease (HCC) 2020   • Subconjunctival hemorrhage        Past Surgical History:   Procedure Laterality Date   • APPENDECTOMY N/A 3/21/2020    Procedure: APPENDECTOMY LAPAROSCOPIC;  Surgeon: Praful Myers MD;  Location:  [facility];  Service: General;  Laterality: N/A;   • EXPLORATORY LAPAROTOMY, TOTAL ABDOMINAL HYSTERECTOMY SALPINGO OOPHORECTOMY N/A 2/10/2020    Procedure: EXPLORATORY LAPAROTOMY, TOTAL ABDOMINAL HYSTERECTOMY, BILATERAL SALPINGO-OOPHORECTOMY WITH OPTIMAL  STAGING (R-0), OMENTECTOMY;  Surgeon: Celine Rubio MD;  Location:  [facility];  Service: Gynecology Oncology;  Laterality: N/A;   • EYE SURGERY Left     BLOOD VESSEL IN EYE REPAIR   • TOE AMPUTATION Left 2019    big toe - unhealing sore   • TOE AMPUTATION Right 2018    big toe and second toe - Necrotizing fasciitis and MRSA        MEDICATIONS: The current medication list was reviewed with the patient and updated in the EMR this date per the Medical Assistant. Medication dosages and frequencies were confirmed to be accurate.      Allergies:  is allergic to bactrim [sulfamethoxazole-trimethoprim], penicillins, and promethazine.    Social History:   Social History     Socioeconomic History   • Marital status:    • Number of children: 1   Tobacco Use   • Smoking status: Former Smoker     Types: Cigarettes     Quit date:      Years since quittin.9   • Smokeless tobacco: Never Used   • Tobacco comment: social MAYBE 1 CIG/DAY   Substance and Sexual Activity    • Alcohol use: Not Currently   • Drug use: Never   • Sexual activity: Not Currently       Family History:    Family History   Problem Relation Age of Onset   • Fibroids Mother    • Diabetes type II Mother    • Hypertension Mother    • Heart attack Mother    • Fibroids Sister         PCOS   • Diabetes type II Sister    • Dementia Maternal Grandmother    • Stroke Maternal Grandmother        Health Maintenance:    Health Maintenance   Topic Date Due   • COLORECTAL CANCER SCREENING  Never done   • COVID-19 Vaccine (1) Never done   • Hepatitis B (1 of 3 - Risk 3-dose series) Never done   • TDAP/TD VACCINES (1 - Tdap) Never done   • ANNUAL WELLNESS VISIT  Never done   • MAMMOGRAM  Never done   • Pneumococcal Vaccine 0-64 (2 of 4 - PCV13) 02/24/2021   • DIABETIC FOOT EXAM  02/24/2021   • DIABETIC EYE EXAM  02/24/2021   • URINE MICROALBUMIN  07/24/2021   • HEMOGLOBIN A1C  07/30/2021   • INFLUENZA VACCINE  08/01/2021   • HEPATITIS C SCREENING  Completed   • PAP SMEAR  Discontinued     Review of systems:  See history of present illness    Physical Exam    Vitals:    12/06/21 1559   BP: 167/72   Pulse: 103   Resp: 16   Temp: 98.4 °F (36.9 °C)   TempSrc: Temporal   SpO2: 99%   Weight: 81.6 kg (180 lb)   PainSc:   6     Body mass index is 32.92 kg/m².    Wt Readings from Last 3 Encounters:   12/06/21 81.6 kg (180 lb)   07/08/20 82.1 kg (181 lb)   06/17/20 85.3 kg (188 lb)     GENERAL: Alert, somewhat ill-appearing female appearing her stated age who is in emotional distress.  Patient is obese by BMI criteria.  HEENT: Sclera anicteric. Head normocephalic, atraumatic. Mucus membranes moist.    NECK: Supple, free from thyromegaly  BREASTS: Deferred  CARDIOVASCULAR: Regular rate and rhythm without murmur rub or gallop, no extremity edema  RESPIRATORY: Clear to auscultation bilaterally, normal effort  BACK: No CVA tenderness, no vertebral tenderness on palpation  GASTROINTESTINAL: Abdomen soft, nontender, no rebound, no guarding.  No palpable hernia at abdominal incision.  Incisions healed.  SKIN:  Warm, dry, well-perfused. No rashes, ulcers.  Wounds as noted above.  PSYCHIATRIC: AO x3, with appropriate affect, normal thought processes.  NEUROLOGIC: No focal deficits. Moves extremities well.  MUSCULOSKELETAL: Normal gait and station.  Brace on right ankle/foot.  EXTREMITIES:   No cyanosis, clubbing, symmetric.  LYMPHATICS: No adenopathy bilateral neck and groin areas     PELVIC exam:    External genitalia are free from lesion. On speculum examination, the vaginal cuff had a small lesion on the right lateral aspect with some bleeding noted. On bimanual examination, no mass was appreciated.  Uterus and cervix were absent. Parametria were smooth. Rectovaginal exam was deferred.     ECOG PS 1    PROCEDURES: Vaginal biopsy was performed after obtaining informed consent.  Kevorkian biopsy forceps was used to perform biopsies of vaginal mass x2.  Hemostasis was achieved with Monsel's.  Procedure was well-tolerated.      Diagnostic Data:      CT Chest With Contrast Diagnostic    Result Date: 12/6/2021  1. Abnormal soft tissue mass seen at the vaginal vault with likely extension to involve the sigmoid colon adjacent to the mass on the left. Findings most consistent with local recurrence. Small lymph nodes in the left pelvic sidewall with pulmonary nodules identified throughout the lower lung fields bilaterally all suggesting metastasis. 2. Development of an incisional hernia identified just left of midline containing fat only. Stable enlargement of the spleen with stable splenic varices.  D:  12/05/2021 E:  12/06/2021  This report was finalized on 12/6/2021 3:47 PM by Dr. Starr Saunders MD.      CT Abdomen Pelvis With Contrast    Result Date: 12/6/2021  1. Abnormal soft tissue mass seen at the vaginal vault with likely extension to involve the sigmoid colon adjacent to the mass on the left. Findings most consistent with local recurrence. Small  lymph nodes in the left pelvic sidewall with pulmonary nodules identified throughout the lower lung fields bilaterally all suggesting metastasis. 2. Development of an incisional hernia identified just left of midline containing fat only. Stable enlargement of the spleen with stable splenic varices.  D:  12/05/2021 E:  12/06/2021  This report was finalized on 12/6/2021 3:47 PM by Dr. Starr Saunders MD.          Lab Results   Component Value Date    WBC 7.90 07/08/2020    HGB 8.7 (L) 07/08/2020    HCT 26.7 (L) 07/08/2020    MCV 99.6 (H) 07/08/2020     07/08/2020    NEUTROABS 6.70 07/08/2020    GLUCOSE 324 (H) 07/08/2020    BUN 15 07/08/2020    CREATININE 0.80 12/05/2021    EGFRIFNONA 85 07/08/2020     07/08/2020    K 4.0 07/08/2020     07/08/2020    CO2 25.0 07/08/2020    MG 2.2 03/22/2020    PHOS 2.5 03/20/2020    CALCIUM 9.6 07/08/2020    ALBUMIN 4.10 07/08/2020    AST 21 07/08/2020    ALT 13 07/08/2020    BILITOT 0.8 07/08/2020     Lab Results   Component Value Date     8.5 07/08/2020           Assessment/Plan   This is a 46 y.o. woman who presents with vaginal bleeding in the setting of Stage IIIA endometrial cancer s/p carboplatin and paclitaxel x6 cycles.  Encounter Diagnosis   Name Primary?   • Endometrial cancer (HCC) Yes     Endometrial cancer: Stage IIIa due to adnexal involvement, now with recurrence  -Patient had previously declined vaginal brachytherapy  -CT scan showing recurrence at the vaginal cuff and new lung nodules  -Discussed referral to radiation oncology for the vaginal cuff mass  -Discussed immunotherapy options for treatment/maintenance therapy, including Keytruda and Lenvima vs possible research trials available.  Unfortunately, patient is not a candidate for [id number] due to her neuropathy.  Keytruda/Lenvima will be initiated.  (I called the patient 12/7/2021 and advised her of this.  Also, will cancel PET/CT scan as I am not sure what that adds to her treatment at  this point).  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  Esophageal varicosities and HORNE, history of necrotizing fasciitis.   - Has seen GI and has been told she has IBS.    Pain assessment was performed today as a part of patient’s care.  For patients with pain related to surgery, gynecologic malignancy or cancer treatment, the plan is as noted in the assessment/plan.  For patients with pain not related to these issues, they are to seek any further needed care from a more appropriate provider, such as PCP.    FOLLOW UP: Treatment consent/teaching    Patient was seen and examined with Dr. Cagle,  resident, who performed portions of the examination and documentation for this patient's care under my direct supervision.  I agree with the above documentation and plan.    Celine Rubio MD  12/07/21  17:36 EST

## 2021-12-06 ENCOUNTER — OFFICE VISIT (OUTPATIENT)
Dept: GYNECOLOGIC ONCOLOGY | Facility: CLINIC | Age: 46
End: 2021-12-06

## 2021-12-06 VITALS
TEMPERATURE: 98.4 F | BODY MASS INDEX: 32.92 KG/M2 | SYSTOLIC BLOOD PRESSURE: 167 MMHG | HEART RATE: 103 BPM | RESPIRATION RATE: 16 BRPM | WEIGHT: 180 LBS | DIASTOLIC BLOOD PRESSURE: 72 MMHG | OXYGEN SATURATION: 99 %

## 2021-12-06 DIAGNOSIS — C54.1 ENDOMETRIAL CANCER (HCC): Primary | ICD-10-CM

## 2021-12-06 PROCEDURE — 99215 OFFICE O/P EST HI 40 MIN: CPT | Performed by: OBSTETRICS & GYNECOLOGY

## 2021-12-06 RX ORDER — LORAZEPAM 1 MG/1
1 TABLET ORAL EVERY 6 HOURS PRN
Qty: 30 TABLET | Refills: 3 | Status: SHIPPED | OUTPATIENT
Start: 2021-12-06 | End: 2022-04-13 | Stop reason: DRUGHIGH

## 2021-12-06 RX ORDER — GABAPENTIN 600 MG/1
600 TABLET ORAL 3 TIMES DAILY
COMMUNITY
End: 2022-01-07 | Stop reason: SDUPTHER

## 2021-12-06 RX ORDER — OXYCODONE HYDROCHLORIDE 5 MG/1
5 TABLET ORAL EVERY 6 HOURS PRN
Qty: 60 TABLET | Refills: 0 | Status: SHIPPED | OUTPATIENT
Start: 2021-12-06 | End: 2022-01-07 | Stop reason: SDUPTHER

## 2021-12-06 RX ORDER — DICYCLOMINE HYDROCHLORIDE 10 MG/1
CAPSULE ORAL
COMMUNITY
Start: 2021-09-21 | End: 2021-12-17

## 2021-12-06 RX ORDER — HYOSCYAMINE SULFATE 0.5 MG/ML
INJECTION, SOLUTION SUBCUTANEOUS EVERY 4 HOURS PRN
COMMUNITY
End: 2022-01-04

## 2021-12-07 PROCEDURE — 88305 TISSUE EXAM BY PATHOLOGIST: CPT | Performed by: OBSTETRICS & GYNECOLOGY

## 2021-12-07 RX ORDER — ONDANSETRON 8 MG/1
8 TABLET, ORALLY DISINTEGRATING ORAL EVERY 8 HOURS PRN
Qty: 30 TABLET | Refills: 2 | Status: SHIPPED | OUTPATIENT
Start: 2021-12-07 | End: 2022-05-25

## 2021-12-07 NOTE — TELEPHONE ENCOUNTER
Pt called saying that her pharmacy had not received the medications that Dr. Rubio sent last night.  RN stated the meds were sent to the [facility] in [place].  Pt thought they were going to [facility] but said they were fine at [facility] and she would pick them up there.  Pt also requested some zofran for nausea.  RN sent rx in.

## 2021-12-08 ENCOUNTER — TELEPHONE (OUTPATIENT)
Dept: GYNECOLOGIC ONCOLOGY | Facility: CLINIC | Age: 46
End: 2021-12-08

## 2021-12-08 ENCOUNTER — SPECIALTY PHARMACY (OUTPATIENT)
Dept: ONCOLOGY | Facility: HOSPITAL | Age: 46
End: 2021-12-08

## 2021-12-08 DIAGNOSIS — Z51.11 MAINTENANCE CHEMOTHERAPY: ICD-10-CM

## 2021-12-08 DIAGNOSIS — G62.9 NEUROPATHY: Primary | ICD-10-CM

## 2021-12-08 DIAGNOSIS — Z01.818 PREOPERATIVE EXAMINATION, UNSPECIFIED: Primary | ICD-10-CM

## 2021-12-08 DIAGNOSIS — C54.1 ENDOMETRIAL CANCER (HCC): ICD-10-CM

## 2021-12-08 RX ORDER — SODIUM CHLORIDE 9 MG/ML
250 INJECTION, SOLUTION INTRAVENOUS ONCE
Status: CANCELLED | OUTPATIENT
Start: 2021-12-14

## 2021-12-08 NOTE — TELEPHONE ENCOUNTER
Called and scheduled patient for port a cath insertion and relayed all information she would need. She knows I will send her a message on [employer] with dates and times along with surgery instructions and location.

## 2021-12-08 NOTE — PROGRESS NOTES
Oral Chemotherapy - New Referral    Received a referral from Dr. Rubio     Medication(s): lenvatinib + pembrolizumab  Indication: metastatic endometrial adenocarcinoma; MSI stable and MMR proficient   Relevant past treatments: s/p carboplatin + paclitaxel/docetaxel x 6 cycles  Is the therapy appropriate based on treatment guidelines and FDA labeling?: Yes per NCCN - biomarker-directed systemic therapy for second-line treatment for non-MSI high and non-MMR-deficient tumors  Therapeutic Goals: Continue treatment until progression or intolerable toxicity  Patient can self-administer oral medications: Yes    The current medication list was reviewed and there are no relevant drug-drug interactions.     The patient has no relevant drug allergies.    Patient will need baseline labs.    A prescription was released to [facility] for   Drug: lenvatinib  Strength: 14 mg (10mg + 4mg)  Directions: Take 14mg PO daily  Quantity: 60  Refills: 5

## 2021-12-08 NOTE — TELEPHONE ENCOUNTER
Phoned patient. Informed of ECHO scheduled for 12/13/21 at 130. Also informed of chemo ed on 12/14/21 and first infusion.

## 2021-12-09 ENCOUNTER — TELEPHONE (OUTPATIENT)
Dept: GYNECOLOGIC ONCOLOGY | Facility: CLINIC | Age: 46
End: 2021-12-09

## 2021-12-09 DIAGNOSIS — Z01.818 PREOPERATIVE CLEARANCE: Primary | ICD-10-CM

## 2021-12-09 LAB
CYTO UR: NORMAL
LAB AP CASE REPORT: NORMAL
LAB AP CLINICAL INFORMATION: NORMAL
LAB AP DIAGNOSIS COMMENT: NORMAL
PATH REPORT.FINAL DX SPEC: NORMAL
PATH REPORT.GROSS SPEC: NORMAL

## 2021-12-09 NOTE — TELEPHONE ENCOUNTER
Caller: ANTONIETA REED [facility]    Best call back number: [phone] MAY CALL BACK ANYTIME AND LEAVE  IF NEEDED.  IF UNABLE TO SEND ORDERS VIA [facility] PLEASE FAX ORDERS TO [phone]    What orders are you requesting (i.e. lab or imaging): COVID TEST ORDERS    In what timeframe would the patient need to come in: ASAP.  ORDERS NEED TO BE ENTERED IN BY 0300PM.     Where will you receive your lab/imaging services: [facility]     FELIZ WARM TRANSFER, JOSE ANTONIO WHITE RN CLINICAL STAFF INFORMED TO SEND DIRECTLY TO HER AND SHE WILL TAKE CARE OF ORDERS ASAP.        [facility]

## 2021-12-09 NOTE — TELEPHONE ENCOUNTER
RN called with results below.  Pt stated that she is going to keep her appts for Monday and Tuesday but her mother really wants her to seek a 2nd opinion from [facility].  RN stated that was up to her if she wanted to do that and just to let us know if anything changes about the 13th and 14th.  Pt v/u.

## 2021-12-09 NOTE — TELEPHONE ENCOUNTER
----- Message from Celine Rubio MD sent at 12/9/2021 10:00 AM EST -----  Please notify patient that biopsy confirmed cancer recurrence as anticipated.  Thank you    ----- Message -----  From: Lab, Background User  Sent: 12/9/2021   8:36 AM EST  To: Celine Rubio MD

## 2021-12-09 NOTE — PROGRESS NOTES
Completed an independent review of medication order and prescription.  I agree with Dr. Brito' assessment and confirm that she sent the lenvatinib prescription to [facility] as described.

## 2021-12-10 ENCOUNTER — PATIENT EDUCATION (SURGERY INSTRUCTIONS) (OUTPATIENT)
Dept: GYNECOLOGIC ONCOLOGY | Facility: CLINIC | Age: 46
End: 2021-12-10

## 2021-12-10 ENCOUNTER — LAB (OUTPATIENT)
Dept: LAB | Facility: HOSPITAL | Age: 46
End: 2021-12-10

## 2021-12-10 ENCOUNTER — TRANSCRIBE ORDERS (OUTPATIENT)
Dept: GENERAL RADIOLOGY | Facility: HOSPITAL | Age: 46
End: 2021-12-10

## 2021-12-10 ENCOUNTER — APPOINTMENT (OUTPATIENT)
Dept: PREADMISSION TESTING | Facility: HOSPITAL | Age: 46
End: 2021-12-10

## 2021-12-10 DIAGNOSIS — G62.9 NEUROPATHY: ICD-10-CM

## 2021-12-10 DIAGNOSIS — Z01.818 PREOPERATIVE CLEARANCE: ICD-10-CM

## 2021-12-10 DIAGNOSIS — C54.1 ENDOMETRIAL CANCER (HCC): ICD-10-CM

## 2021-12-10 DIAGNOSIS — C54.1 ENDOMETRIAL SARCOMA (HCC): Primary | ICD-10-CM

## 2021-12-10 LAB
ALBUMIN SERPL-MCNC: 3.8 G/DL (ref 3.5–5.2)
ALBUMIN/GLOB SERPL: 1.5 G/DL
ALP SERPL-CCNC: 74 U/L (ref 39–117)
ALT SERPL W P-5'-P-CCNC: 15 U/L (ref 1–33)
ANION GAP SERPL CALCULATED.3IONS-SCNC: 12 MMOL/L (ref 5–15)
AST SERPL-CCNC: 37 U/L (ref 1–32)
BASOPHILS # BLD AUTO: 0.03 10*3/MM3 (ref 0–0.2)
BASOPHILS NFR BLD AUTO: 0.5 % (ref 0–1.5)
BILIRUB SERPL-MCNC: 1.5 MG/DL (ref 0–1.2)
BUN SERPL-MCNC: 17 MG/DL (ref 6–20)
BUN/CREAT SERPL: 19.3 (ref 7–25)
CALCIUM SPEC-SCNC: 9.5 MG/DL (ref 8.6–10.5)
CHLORIDE SERPL-SCNC: 107 MMOL/L (ref 98–107)
CO2 SERPL-SCNC: 24 MMOL/L (ref 22–29)
CREAT SERPL-MCNC: 0.88 MG/DL (ref 0.57–1)
DEPRECATED RDW RBC AUTO: 51.3 FL (ref 37–54)
EOSINOPHIL # BLD AUTO: 0.35 10*3/MM3 (ref 0–0.4)
EOSINOPHIL NFR BLD AUTO: 6.1 % (ref 0.3–6.2)
ERYTHROCYTE [DISTWIDTH] IN BLOOD BY AUTOMATED COUNT: 14.3 % (ref 12.3–15.4)
GFR SERPL CREATININE-BSD FRML MDRD: 69 ML/MIN/1.73
GLOBULIN UR ELPH-MCNC: 2.5 GM/DL
GLUCOSE SERPL-MCNC: 80 MG/DL (ref 65–99)
HCT VFR BLD AUTO: 36.6 % (ref 34–46.6)
HGB BLD-MCNC: 12.2 G/DL (ref 12–15.9)
IMM GRANULOCYTES # BLD AUTO: 0.02 10*3/MM3 (ref 0–0.05)
IMM GRANULOCYTES NFR BLD AUTO: 0.3 % (ref 0–0.5)
LYMPHOCYTES # BLD AUTO: 1.61 10*3/MM3 (ref 0.7–3.1)
LYMPHOCYTES NFR BLD AUTO: 28 % (ref 19.6–45.3)
MCH RBC QN AUTO: 33.1 PG (ref 26.6–33)
MCHC RBC AUTO-ENTMCNC: 33.3 G/DL (ref 31.5–35.7)
MCV RBC AUTO: 99.2 FL (ref 79–97)
MONOCYTES # BLD AUTO: 0.45 10*3/MM3 (ref 0.1–0.9)
MONOCYTES NFR BLD AUTO: 7.8 % (ref 5–12)
NEUTROPHILS NFR BLD AUTO: 3.3 10*3/MM3 (ref 1.7–7)
NEUTROPHILS NFR BLD AUTO: 57.3 % (ref 42.7–76)
NRBC BLD AUTO-RTO: 0 /100 WBC (ref 0–0.2)
PLATELET # BLD AUTO: 72 10*3/MM3 (ref 140–450)
PMV BLD AUTO: 11.7 FL (ref 6–12)
POTASSIUM SERPL-SCNC: 4.2 MMOL/L (ref 3.5–5.2)
PROT SERPL-MCNC: 6.3 G/DL (ref 6–8.5)
RBC # BLD AUTO: 3.69 10*6/MM3 (ref 3.77–5.28)
SARS-COV-2 RNA PNL SPEC NAA+PROBE: NOT DETECTED
SODIUM SERPL-SCNC: 143 MMOL/L (ref 136–145)
T4 FREE SERPL-MCNC: 1.35 NG/DL (ref 0.93–1.7)
TSH SERPL DL<=0.05 MIU/L-ACNC: 1.66 UIU/ML (ref 0.27–4.2)
WBC NRBC COR # BLD: 5.76 10*3/MM3 (ref 3.4–10.8)

## 2021-12-10 PROCEDURE — 84439 ASSAY OF FREE THYROXINE: CPT

## 2021-12-10 PROCEDURE — 84443 ASSAY THYROID STIM HORMONE: CPT

## 2021-12-10 PROCEDURE — 80053 COMPREHEN METABOLIC PANEL: CPT

## 2021-12-10 PROCEDURE — 85025 COMPLETE CBC W/AUTO DIFF WBC: CPT

## 2021-12-10 PROCEDURE — 36415 COLL VENOUS BLD VENIPUNCTURE: CPT

## 2021-12-10 PROCEDURE — U0004 COV-19 TEST NON-CDC HGH THRU: HCPCS | Performed by: OBSTETRICS & GYNECOLOGY

## 2021-12-10 PROCEDURE — C9803 HOPD COVID-19 SPEC COLLECT: HCPCS

## 2021-12-10 NOTE — PATIENT INSTRUCTIONS
Outpatient Pre-op Patient Education  [facility]       Jeana Chung, SAMI  [id number]  1975    SURGEON: Celine Rubio MD    Surgery Coordinator : Brenda   If you have any questions             If you have FMLA paperwork or Short Term Disability, please give that to the  either during your visit or after your surgery. A family member may bring the paper work if you can't or your employer may fax it to [phone].               Appointment  1. You have Covid Testing on 12/10/2021 at 01:00 pm. This is located at [address] [place] [address] located in the lower level of the building ( basement ). Upon arrival please park in the designated parking spaces located to the left of the building.   1. Your surgery has been scheduled on  12/14/2021 at [facility] located at [address] [facility] [address] [facility]. You will need to be there at 07:30 am       The Day(s) Before Surgery  1.  Do not drink alcohol or smoke.     2.  Do not take vitamins or aspirin one week before surgery.       3.  If you are ill on the days leading up to your surgery, please call our office.      4.  If you are using medications for diabetes, call the physician who manages these and get instructions on how they should be taken before and after surgery.    5.  Nothing to eat or drink after midnight on 12/13/2021.      6.  Please make prior arrangements for someone to drive you home after your procedure        The Day of Surgery  1.  Do not eat, drink, or chew gum.     2.  On the morning of your surgery, you may take her prescription medications with a sip of water. Bring all medication with you to the surgery center. (Diabetic patients should bring insulin if instructed to do so by their diabetes managing physician).   3. Bathe or shower the morning of your surgery. Do not use powders, lotions, or creams. Deodorants are okay.     4. Wear loose, comfortable clothing.     5. Bring holders for  glasses, contacts, or dentures.      6. Bring any required payment and forms, including insurance cards. Leave all money and valuables at home.        Post-surgery Instructions   1.  For the first 24 hours, rest and take periodic deep breaths to remove anesthetic agents from your body.    2.  Follow any specific instructions relevant to your particular surgery.      3.  Limit activity to avoid stress to the surgical site.       4.  Keep all dressings dry. Shower or bathe as instructed by your doctor.      5.  Drink and eat light foods. Remain on liquids alone only if nausea and vomiting occur. Return to your regular diet gradually, as tolerance allows.     6. Avoid alcohol for at least 24 hours.       7.  Take prescription pain medication as directed and with food.       8.  Call our office after discharge from the surgery center to make your post-op appointment.         In Case of Emergency:       Call our office if you experience any of the following:    Excessive drainage, bleeding, swelling, or redness at the incision site   Severe pain not eased by pain medication  Temperature above 101    Persistent nausea or vomiting    Skin rash or general body itching

## 2021-12-10 NOTE — PROGRESS NOTES
Specialty Pharmacy Refill Coordination Note     Jeana is a 46 y.o. female contacted today regarding Lenvima 14mg PO QD specialty medication(s).    Reviewed and verified with patient:      Specialty medication(s) and dose(s) confirmed: yes      Processed fill for Lenvima at [facility] and the copay is $3307. We may need apply for free drug.               Minda Acosta, Pharmacy Technician  Specialty Pharmacy Technician

## 2021-12-12 ENCOUNTER — TELEPHONE (OUTPATIENT)
Dept: RADIATION ONCOLOGY | Facility: HOSPITAL | Age: 46
End: 2021-12-12

## 2021-12-12 ENCOUNTER — TELEPHONE (OUTPATIENT)
Dept: GYNECOLOGIC ONCOLOGY | Facility: CLINIC | Age: 46
End: 2021-12-12

## 2021-12-13 ENCOUNTER — HOSPITAL ENCOUNTER (OUTPATIENT)
Dept: GENERAL RADIOLOGY | Facility: HOSPITAL | Age: 46
End: 2021-12-13

## 2021-12-13 ENCOUNTER — TELEPHONE (OUTPATIENT)
Dept: GYNECOLOGIC ONCOLOGY | Facility: CLINIC | Age: 46
End: 2021-12-13

## 2021-12-13 ENCOUNTER — OUTSIDE FACILITY SERVICE (OUTPATIENT)
Dept: GYNECOLOGIC ONCOLOGY | Facility: CLINIC | Age: 46
End: 2021-12-13

## 2021-12-13 DIAGNOSIS — D69.6 THROMBOCYTOPENIA (HCC): Primary | ICD-10-CM

## 2021-12-13 DIAGNOSIS — R74.8 ELEVATED LIVER ENZYMES: ICD-10-CM

## 2021-12-13 DIAGNOSIS — G62.9 NEUROPATHY: Primary | ICD-10-CM

## 2021-12-13 DIAGNOSIS — C54.1 ENDOMETRIAL CANCER (HCC): ICD-10-CM

## 2021-12-13 RX ORDER — ONDANSETRON HYDROCHLORIDE 8 MG/1
8 TABLET, FILM COATED ORAL 3 TIMES DAILY PRN
Qty: 30 TABLET | Refills: 5 | Status: SHIPPED | OUTPATIENT
Start: 2021-12-13 | End: 2021-12-20 | Stop reason: SDUPTHER

## 2021-12-13 NOTE — TELEPHONE ENCOUNTER
I called pt and informed her that her platelets were too low for surgery.  She had questions about should she have chemo as scheduled.  She couldn't remember the name of what she was getting and I was having computer problems when we were on the phone.   She said she was told at some point that her liver had cirrhosis when I mentioned her bili was high.  OK to undergo Keytruda as scheduled 12/14, but hold Lenvima for now until liver US +/- GI eval.

## 2021-12-13 NOTE — TELEPHONE ENCOUNTER
Caller: Jose David Chung, RN    Relationship: Self    Best call back number: [phone]    What is the best time to reach you: ANYTIME    Who are you requesting to speak with (clinical staff, provider,  specific staff member): DR REYNOSO OR NURSE    Do you know the name of the person who called: JOSE DAVID    What was the call regarding:   WANTED TO CHECK TO FIND OUT IF STILL SUPPOSED TO BE ATTENDING APPTS SCHEDULED FOR TOMORROW OR NOT . SINCE SURGERY WAS CANCELLED DUE TO PLATELETS LOW    Do you require a callback: YES

## 2021-12-13 NOTE — TELEPHONE ENCOUNTER
L/m that yes she would come for appts tomorrow but requested she call back for RN to give her additional information regarding other appts and tomorrow.

## 2021-12-13 NOTE — TELEPHONE ENCOUNTER
I spoke with MsBert Juliet fong about her upcoming appointment.  She said she had wound healing issues and completed chemotherapy but did not want to undergo radiation.  She said when she completed chemo she moved to [place] and did not follow up with Dr. Rubio and didn't want radiation.  She said the wound is now healed.  She had postcoital bleeding and was found to have a 7.3 x 5.3 cm soft tissue mass extending from the vaginal vault.  This was concerning for local recurrence and was new since the prior study.  She had adenopathy in the iliac region on the left.  There were lymph nodes that were small in size as well in the inguinal regions bilaterally.  Soft tissue mass was adjacent to the sigmoid colon and likely extending into the wall of the sigmoid colon.  CT of the chest showed a new nodule identified in the medial aspect of the right lower lobe concerning for malignancy.  The nodule measured 2.5 cm.  There was a smaller nodule identified at the right lung base concerning for malignancy as well measuring 7 mm.  There were 2 nodules identified abutting each other the left lung base measuring 1.2 cm together.  This was all concerning for metastatic disease.  We will see Ms. Landa next week and discuss treatment options.

## 2021-12-14 ENCOUNTER — SPECIALTY PHARMACY (OUTPATIENT)
Dept: ONCOLOGY | Facility: HOSPITAL | Age: 46
End: 2021-12-14

## 2021-12-14 ENCOUNTER — HOSPITAL ENCOUNTER (OUTPATIENT)
Dept: ONCOLOGY | Facility: HOSPITAL | Age: 46
Setting detail: INFUSION SERIES
Discharge: HOME | End: 2021-12-14

## 2021-12-14 ENCOUNTER — LAB (OUTPATIENT)
Dept: LAB | Facility: HOSPITAL | Age: 46
End: 2021-12-14

## 2021-12-14 ENCOUNTER — HOSPITAL ENCOUNTER (OUTPATIENT)
Dept: ONCOLOGY | Facility: HOSPITAL | Age: 46
Discharge: HOME | End: 2021-12-14

## 2021-12-14 ENCOUNTER — HOSPITAL ENCOUNTER (OUTPATIENT)
Dept: CARDIOLOGY | Facility: HOSPITAL | Age: 46
Discharge: HOME | End: 2021-12-14

## 2021-12-14 ENCOUNTER — TRANSCRIBE ORDERS (OUTPATIENT)
Dept: CARDIOLOGY | Facility: HOSPITAL | Age: 46
End: 2021-12-14

## 2021-12-14 ENCOUNTER — DOCUMENTATION (OUTPATIENT)
Dept: NUTRITION | Facility: HOSPITAL | Age: 46
End: 2021-12-14

## 2021-12-14 VITALS
RESPIRATION RATE: 18 BRPM | BODY MASS INDEX: 31.89 KG/M2 | DIASTOLIC BLOOD PRESSURE: 67 MMHG | TEMPERATURE: 98 F | WEIGHT: 180 LBS | SYSTOLIC BLOOD PRESSURE: 151 MMHG | HEIGHT: 63 IN | HEART RATE: 99 BPM

## 2021-12-14 DIAGNOSIS — C54.1 ENDOMETRIAL CANCER (HCC): ICD-10-CM

## 2021-12-14 DIAGNOSIS — G62.9 NEUROPATHY: Primary | ICD-10-CM

## 2021-12-14 DIAGNOSIS — Z79.899 ENCOUNTER FOR LONG-TERM (CURRENT) USE OF OTHER MEDICATIONS: Primary | ICD-10-CM

## 2021-12-14 DIAGNOSIS — Z79.899 ENCOUNTER FOR LONG-TERM (CURRENT) USE OF OTHER MEDICATIONS: ICD-10-CM

## 2021-12-14 LAB
ALBUMIN SERPL-MCNC: 3.9 G/DL (ref 3.5–5.2)
ALBUMIN/GLOB SERPL: 1.4 G/DL
ALP SERPL-CCNC: 80 U/L (ref 39–117)
ALT SERPL W P-5'-P-CCNC: 15 U/L (ref 1–33)
ANION GAP SERPL CALCULATED.3IONS-SCNC: 9 MMOL/L (ref 5–15)
AST SERPL-CCNC: 31 U/L (ref 1–32)
BILIRUB SERPL-MCNC: 1.9 MG/DL (ref 0–1.2)
BUN SERPL-MCNC: 21 MG/DL (ref 6–20)
BUN/CREAT SERPL: 22.3 (ref 7–25)
CALCIUM SPEC-SCNC: 9.6 MG/DL (ref 8.6–10.5)
CHLORIDE SERPL-SCNC: 105 MMOL/L (ref 98–107)
CO2 SERPL-SCNC: 27 MMOL/L (ref 22–29)
CREAT SERPL-MCNC: 0.94 MG/DL (ref 0.57–1)
ERYTHROCYTE [DISTWIDTH] IN BLOOD BY AUTOMATED COUNT: 13.6 % (ref 12.3–15.4)
GFR SERPL CREATININE-BSD FRML MDRD: 64 ML/MIN/1.73
GLOBULIN UR ELPH-MCNC: 2.8 GM/DL
GLUCOSE SERPL-MCNC: 171 MG/DL (ref 65–99)
HCT VFR BLD AUTO: 36 % (ref 34–46.6)
HGB BLD-MCNC: 12.1 G/DL (ref 12–15.9)
LYMPHOCYTES # BLD AUTO: 1.2 10*3/MM3 (ref 0.7–3.1)
LYMPHOCYTES NFR BLD AUTO: 24.2 % (ref 19.6–45.3)
MCH RBC QN AUTO: 32.6 PG (ref 26.6–33)
MCHC RBC AUTO-ENTMCNC: 33.7 G/DL (ref 31.5–35.7)
MCV RBC AUTO: 96.7 FL (ref 79–97)
MONOCYTES # BLD AUTO: 0.4 10*3/MM3 (ref 0.1–0.9)
MONOCYTES NFR BLD AUTO: 8.7 % (ref 5–12)
NEUTROPHILS NFR BLD AUTO: 3.2 10*3/MM3 (ref 1.7–7)
NEUTROPHILS NFR BLD AUTO: 67.1 % (ref 42.7–76)
PLATELET # BLD AUTO: 80 10*3/MM3 (ref 140–450)
PMV BLD AUTO: 7.3 FL (ref 6–12)
POTASSIUM SERPL-SCNC: 4.1 MMOL/L (ref 3.5–5.2)
PROT ?TM UR-MCNC: 8.1 MG/DL
PROT SERPL-MCNC: 6.7 G/DL (ref 6–8.5)
QT INTERVAL: 376 MS
QTC INTERVAL: 467 MS
RBC # BLD AUTO: 3.73 10*6/MM3 (ref 3.77–5.28)
SODIUM SERPL-SCNC: 141 MMOL/L (ref 136–145)
WBC NRBC COR # BLD: 4.8 10*3/MM3 (ref 3.4–10.8)

## 2021-12-14 PROCEDURE — 25010000002 PEMBROLIZUMAB 100 MG/4ML SOLUTION 4 ML VIAL: Performed by: OBSTETRICS & GYNECOLOGY

## 2021-12-14 PROCEDURE — 93010 ELECTROCARDIOGRAM REPORT: CPT | Performed by: INTERNAL MEDICINE

## 2021-12-14 PROCEDURE — 85025 COMPLETE CBC W/AUTO DIFF WBC: CPT

## 2021-12-14 PROCEDURE — 96413 CHEMO IV INFUSION 1 HR: CPT

## 2021-12-14 PROCEDURE — 84156 ASSAY OF PROTEIN URINE: CPT | Performed by: OBSTETRICS & GYNECOLOGY

## 2021-12-14 PROCEDURE — 36415 COLL VENOUS BLD VENIPUNCTURE: CPT

## 2021-12-14 PROCEDURE — 93005 ELECTROCARDIOGRAM TRACING: CPT | Performed by: OBSTETRICS & GYNECOLOGY

## 2021-12-14 PROCEDURE — 80053 COMPREHEN METABOLIC PANEL: CPT

## 2021-12-14 RX ORDER — SODIUM CHLORIDE 9 MG/ML
250 INJECTION, SOLUTION INTRAVENOUS ONCE
Status: DISCONTINUED | OUTPATIENT
Start: 2021-12-14 | End: 2021-12-16 | Stop reason: HOSPADM

## 2021-12-14 RX ADMIN — SODIUM CHLORIDE 200 MG: 9 INJECTION, SOLUTION INTRAVENOUS at 11:26

## 2021-12-14 NOTE — PROGRESS NOTES
Outpatient Infusion • [address] [facility] • [address] • [place] [id number] • [phone]      CHEMOTHERAPY EDUCATION SHEET    NAME:  Jeana Chung      : 1975           DATE: 21    Booklets Given: Chemotherapy and You []  Eating Hints []    Sexuality/Fertility Books []     Chemotherapy/Biotherapy Education Sheets: (list all that apply)  Pembrolizumab, immune checkpoint inhibitors patient education packet                                                                                                                                                                Chemotherapy Regimen:  pembrolizumab IV [id number]     TOPICS EDUCATION PROVIDED EDUCATION REINFORCED COMMENTS   ANEMIA:  role of RBC, cause, s/s, ways to manage, role of transfusion [x] [] Reviewed the role of RBC and the use of transfusions if hemoglobin decreases too much.  Patient to notify us if they experience shortness of breath, dizziness, or palpitations.  Also let patient know they could feel more tired than usual and to try to stay active, but rest if they need to.   THROMBOCYTOPENIA:  role of platelet, cause, s/s, ways to prevent bleeding, things to avoid, when to seek help [] []    NEUTROPENIA:  role of WBC, cause, infection precautions, s/s of infection, when to call MD [] []    NUTRITION & APPETITE CHANGES:  importance of maintaining healthy diet & weight, ways to manage to improve intake, dietary consult, exercise regimen [] []    DIARRHEA:  causes, s/s of dehydration, ways to manage, dietary changes, when to call MD [x] [] Discussed the potential for diarrhea from this medication, and stressed the importance of adequate fluid intake while receiving therapy. Recommended to try OTC therapies such as loperamide first (because diarrhea could be due to oral lenvatinib therapy), but instructed to call if OTC medications aren't working as she would likely need steroids.    CONSTIPATION:  causes, ways to manage, dietary changes,  when to call MD [] []    NAUSEA & VOMITING:  cause, use of antiemetics, dietary changes, when to call MD [x] [] Premeds: none  PRN meds: Zofran    Instructed the patient to take a dose of the PRN medication at the first onset of nausea and if it's not working to call us for additional medications.  Also provided non-drug measures to mitigate nausea.   MOUTH SORES:  causes, oral care, ways to manage [] []    ALOPECIA:  cause, ways to manage, resources [] []    INFERTILITY & SEXUALITY:  causes, fertility preservation options, sexuality changes, ways to manage, importance of birth control [] []    NERVOUS SYSTEM CHANGES:  causes, s/s, neuropathies, cognitive changes, ways to manage [] []    PAIN:  causes, ways to manage [] [] ????   SKIN & NAIL CHANGES:  cause, s/s, ways to manage [x] [] Discussed the potential for a rash or itchy skin from this medication, and offered prevention strategies. Instructed patient to call if a rash develops that worsens or gets larger.    ORGAN TOXICITIES:  cause, s/s, need for diagnostic tests, labs, when to notify MD [x] [] Discussed various immune-mediated reactions in organs such as bowels, liver, skin, nerves, and thyroid, as well as associated signs/symptoms of each and instructions for when to call.    SURVIVORSHIP:  distress, distress assessment, secondary malignancies, early/late effects, follow-up, social issues, social support [] []    HOME CARE:  use of spill kits, storing of PO chemo, how to manage bodily fluids [x] [] To prevent exposure to others, recommended patient to close the toilet lid and flush twice after using the bathroom.    MISCELLANEOUS:  drug interactions, administration, vesicant, et [x] [] Discussed electrolyte and laboratory abnormalities that can happen with pembrolizumab, such as increased glucose, decreased albumin and sodium. Assured patient that these would be monitored at each infusion visit. Also educated on the potential for flu-like symptoms or  common cold symptoms in the first few days after infusion (chills, myalgia, cough, low-grade fever) and recommended OTC treatment options if needed.          Oral Chemotherapy Teaching      Patient Name/: Jeana Chung (: 1975)  Oral Chemotherapy Regimen:  lenvatinib 14 mg by mouth daily  Date Started Medication: Planned start of 21    Initial Teaching Follow Up Comments     Safety     Storage instructions (away from children; away from heat/cold, sunlight, or moisture), handling - use of gloves (caregivers), washing hands after touching pills, managing waste     “How are you storing your medications?”, reminders on storage, proper handling (caregivers using gloves, washing hands, away from children, managing waste, etc.), disposal of medication with D/C or dosage change    Instructed patient to store medication in a room temperature, dry location such as the kitchen cabinet out of reach of children or pets. Medication should be kept in its original container if possible to prevent contamination of other surfaces. If anyone else will be handling the medication, they should wear gloves when doing so and wash hands afterwards. If treatment changes at any point and there are leftover tablets, they cannot be flushed or thrown away. Leftover tablets must be brought in to a future clinic visit or home pharmacy for proper disposal.      Adherence      patient and/or caregiver on how to take medication, take with/without food, assess their adherence potential, stress importance of adherence, ways to manage adherence (pill boxes, phone reminders, calendars), what to do if miss a dose   “How are you taking your medication?” “How are you remembering to take your medication?”, “How many doses have you missed?”, determine reasons for non-adherence (not remembering, side effects, etc), ways to improve, overadherence? Remind patient of ways to improve/maintain adherence   Instructed patient to take  lenvatinib 14 mg (one 10 mg tablet and one 4 mg tablet) by mouth once daily with or without food, at the same time each day. The tablets should be taken whole and not crushed, cut, or dissolved. If a dose is missed, it can be taken if remembered within 12 hours, but should be skipped if it has been more than 12 hours since the missed dose. Stressed the importance of adherence with this oral medication, and patient voiced understanding.     Side Effects/Adverse Reactions      patient on potential side effects, s/s, ways to manage, when to call MD/seek help     Determine if patient experiencing side effects, ways to manage  Counseled patient on the following common adverse effects of lenvatinib:  Increased blood pressure: recommended patient to monitor blood pressure regularly and keep a log to review at each clinic visit.   Diarrhea: stressed the importance of adequate fluid intake while taking this medicine, and provided supplementary handout with recommendations for OTC therapies such as loperamide; instructed patient to call us if medications aren't working.  Fatigue: recommended patient to stay as active as possible to help combat feelings of fatigue, but reassured patient that it is okay to rest as needed.  Nausea/vomiting: educated on the potential for nausea with this medication, and gave instructions on how to take Zofran as needed for nausea.  Decreased appetite or weight loss: recommended patient to eat smaller, more frequent meals throughout the day to help with appetite, and recommended patient to regularly monitor weight to assess for any unintended weight loss.  Mouth sores: recommended prevention strategies including a home recipe mouthwash including water, salt, and baking soda to use at least 4 times a day. Instructed patient to call if mouth sores do not go away or become bothersome.  Muscle/joint pain, headache: recommended OTC pain relief options such as ibuprofen or acetaminophen if  needed.  Hand-Foot Syndrome: provided supplementary handout and educated on signs/symptoms of these reaction. Stressed prevention measures such as staying moisturized with creams or lotions, avoiding hot showers or baths, and avoiding activities that would put excess force or friction on the hands and feet.  Voice changes: educated on the potential for voice changes or hoarseness while taking this medication.    Educated on the following severe adverse effects:  Organ toxicity of kidneys and liver, PRES, thyroid, and cardiotoxicity (including QTc prolongation). Educated on signs/symptoms of each toxicity and instructions for when to call. Discussed the potential for GI perforation and instructed to call if experiencing severe abdominal pain with or without nausea and fever. Also educated on the increased risk of bleeding or blood clots and signs/symptoms of a blood clot with instructions for when to call 911.       Miscellaneous     Food interactions, DDIs, financial issues Determine if patient started any new medications since being placed on oral chemo (analyze for DDI) To prevent exposure to others, recommended patient to close the toilet lid and flush twice after using the bathroom. Patient has no significant drug interactions. Pursuing free drug for the patient due to lack of sufficient coverage.      Additional Notes:  Discussed aforementioned material with patient here in clinic. All questions and concerns addressed. Provided patient with the pharmacists contact information and instructions to call should additional questions arise. Obtained consent today at this visit. Patient was given a personalized monthly treatment calendar of tentative treatment cycles, as well as education sheets over lenvatinib and pembrolizumab. Reviewed treatment calendar with patient to ensure full understanding of treatment regimen. Also spent time reviewing the immune checkpoint inhibitors patient education packet to educate on  how pembrolizumab works and associated adverse effects.     Thank you,  Javan Vallejo  PharmD Candidate 2022 12/14/2021  14:30 EST

## 2021-12-14 NOTE — PROGRESS NOTES
Outpatient Oncology Nutrition     Reason for Visit:     Oncology Nutrition Screening and Follow Up Visit with patient during her Keytruda infusion appointment.  Note patient is starting a new treatment regimen due to recurrent disease.  Familiar with patient from previous treatment.    Patient Name:  Jeana Jaki Chung RN    :  1975    MRN:  [id number]    Date of Encounter: 2021    Nutrition Assessment     Diagnosis: endometrial cancer   Surgery: exp lap / REGINALDO / BSO / optimal debulking / omentectomy (2/10/20)  Chemotherapy: Carbo / Taxol x 5 cycles + Carbo / Taxotere x 1 cycle (completed 2020)  Current Treatment: Keytruda - every 21 days + Lenvatinib(on hold per MD note on 21) - po daily   Radiation: consult 21     Patient Active Problem List:    Patient Active Problem List   Diagnosis   • Acute stress reaction with predominately emotional disturbance   • Sleep disturbance   • Cancer related pain   • Moderate episode of recurrent major depressive disorder (HCC)   • Anxiety   • Iron deficiency anemia   • Type 2 diabetes mellitus with diabetic polyneuropathy, with long-term current use of insulin (HCC)   • Essential hypertension   • Heart murmur   • Family history of cardiac disorder in mother   • Endometrial cancer (HCC)   • Stage 3 chronic kidney disease (HCC)   • Gastroesophageal reflux disease   • Amputation of left great toe (HCC)   • Amputation of right great toe (HCC)   • Neuropathy   • Hypotension due to drugs   • Neutropenia (HCC)   • Acute appendicitis   • Open wound of right foot   • Hyponatremia   • Pancytopenia (HCC)   • Antineoplastic chemotherapy induced pancytopenia (HCC)   • Chemotherapy-induced thrombocytopenia   • Wound healing, delayed   • Dyspareunia, female   • Vaginal stenosis       Food / Nutrition Related History   Patient states her appetite and oral intake have been decreased.  She also complains of bowel irregularity and being recently diagnosed with IBS.   "  Discussed her history of insulin dependent diabetes.  She reports having a Freestyle Michelle monitor and checks her blood sugars several times daily.  Briefly reviewed diabetes diet basics.      Hydration Status   Discussed the importance of hydration and reviewed hydrating fluids.    Goal: ~90 ounces     How many 8 ounces glasses of water do you consume per day? Patient reports drinking some water.    Enteral Feeding       Anthropometric Measurements     Height:    Ht Readings from Last 1 Encounters:   12/14/21 160 cm (63\")       Weight:    Wt Readings from Last 1 Encounters:   12/14/21 81.6 kg (180 lb)       BMI:  31.9 - Obese    Weight Change: patient statess she has lost weight but is unable to report how much weight she has lost and the time period in which she lost it    Review of Lab Data (Time Frame - 1 month / 2 month)   Labs reviewed - 12/10/21 & 12/14/21 - increased glucose and Tbili noted     Medication Review   MAR reviewed - Novolog and MVI noted     Nutrition Focused Physical Findings       Nutrition Impact Symptoms   Altered appetite  Nausea - occasional  Constipation / Diarrhea     Physical Activity   Not my normal self, but able to be up and about with fairly normal activities    Current Nutritional Intake     Oral diet:  Diabetic     Oral nutritional supplements: Premier protein     Intake: patient reports her oral intake has been less than normal recently     Malnutrition Risk Assessment     Recent weight loss over the past 6 months:  Yes and 2 = Unsure    How much weight loss:  5 = unsure    Eating poorly because of a decreased appetite:  1 = Yes    Malnutrition Screening Score:     MST = 2 more Patient at risk for malnutrition     Nutrition Diagnosis     Problem At risk for chronic disease related malnutrition    Etiology Newly diagnosed recurrent endometrial cancer / clinical condition / nutrition impact symptoms    Signs / Symptoms Reported weight loss and decreased oral intake      Nutrition " "Intervention   Reviewed the importance of good nutrition during her treatment course focusing on adequate calorie, protein, nutrient and fluid intake.  Advised her to be consuming smaller more frequent meals/snacks throughout the day to aid with oral intake and nausea management.  Emphasized the importance of protein and its role in the diet; reviewed high protein foods; and recommended she have a protein source at each meal/snack.  Offered several snack ideas she may find more appealing at this time.  Discussed different types of ONS and their role in the diet.  Encouraged her to drink ONS as needed to aid with calorie and protein intake.  Also discussed tips to aid with bowel regularity including using a soluble fiber supplement (Metamucil, Benefiber, or Citrucel).  Provided written diet materials (\"Soft and Moist High Protein Menu Ideas\", \"Increasing Fluids\" and \"Constipation, Diarrhea, and Fiber\") to reinforce information discussed.     Goal   To achieve adequate nutritional and hydration intake.  To aid with nutrition impact symptom management as needed.     Monitoring / Evaluation   Answered her questions and she voiced understanding of information discussed.  RD's contact information provided and encouraged to call with questions.  Will monitor as needed during her treatment course.    Starr Pratt MS, RD, LD   "

## 2021-12-15 ENCOUNTER — APPOINTMENT (OUTPATIENT)
Dept: PET IMAGING | Facility: HOSPITAL | Age: 46
End: 2021-12-15

## 2021-12-17 ENCOUNTER — OFFICE VISIT (OUTPATIENT)
Dept: RADIATION ONCOLOGY | Facility: HOSPITAL | Age: 46
End: 2021-12-17

## 2021-12-17 ENCOUNTER — HOSPITAL ENCOUNTER (OUTPATIENT)
Dept: RADIATION ONCOLOGY | Facility: HOSPITAL | Age: 46
Setting detail: RADIATION/ONCOLOGY SERIES
Discharge: HOME | End: 2021-12-17

## 2021-12-17 ENCOUNTER — HOSPITAL ENCOUNTER (OUTPATIENT)
Dept: CARDIOLOGY | Facility: HOSPITAL | Age: 46
Discharge: HOME | End: 2021-12-17
Admitting: OBSTETRICS & GYNECOLOGY

## 2021-12-17 ENCOUNTER — SPECIALTY PHARMACY (OUTPATIENT)
Dept: ONCOLOGY | Facility: HOSPITAL | Age: 46
End: 2021-12-17

## 2021-12-17 VITALS
BODY MASS INDEX: 31.96 KG/M2 | DIASTOLIC BLOOD PRESSURE: 70 MMHG | RESPIRATION RATE: 18 BRPM | OXYGEN SATURATION: 98 % | WEIGHT: 180.4 LBS | HEART RATE: 98 BPM | SYSTOLIC BLOOD PRESSURE: 163 MMHG | TEMPERATURE: 97 F

## 2021-12-17 VITALS — BODY MASS INDEX: 31.89 KG/M2 | HEIGHT: 63 IN | WEIGHT: 180 LBS

## 2021-12-17 DIAGNOSIS — C54.1 ENDOMETRIAL CANCER (HCC): ICD-10-CM

## 2021-12-17 DIAGNOSIS — Z01.818 PREOPERATIVE EXAMINATION, UNSPECIFIED: ICD-10-CM

## 2021-12-17 DIAGNOSIS — Z51.11 MAINTENANCE CHEMOTHERAPY: ICD-10-CM

## 2021-12-17 DIAGNOSIS — C79.89 SECONDARY MALIGNANT NEOPLASM OF PELVIS (HCC): Primary | ICD-10-CM

## 2021-12-17 LAB
BH CV ECHO MEAS - AO ROOT AREA (BSA CORRECTED): 1.5
BH CV ECHO MEAS - AO ROOT AREA: 5.7 CM^2
BH CV ECHO MEAS - AO ROOT DIAM: 2.7 CM
BH CV ECHO MEAS - BSA(HAYCOCK): 1.9 M^2
BH CV ECHO MEAS - BSA: 1.8 M^2
BH CV ECHO MEAS - BZI_BMI: 31.9 KILOGRAMS/M^2
BH CV ECHO MEAS - BZI_METRIC_HEIGHT: 160 CM
BH CV ECHO MEAS - BZI_METRIC_WEIGHT: 81.6 KG
BH CV ECHO MEAS - EDV(MOD-SP2): 118 ML
BH CV ECHO MEAS - EDV(MOD-SP4): 165 ML
BH CV ECHO MEAS - EF(MOD-BP): 58 %
BH CV ECHO MEAS - EF(MOD-SP2): 61 %
BH CV ECHO MEAS - EF(MOD-SP4): 52.7 %
BH CV ECHO MEAS - ESV(MOD-SP2): 46 ML
BH CV ECHO MEAS - ESV(MOD-SP4): 78 ML
BH CV ECHO MEAS - IVSD: 1 CM
BH CV ECHO MEAS - LA DIMENSION: 3.7 CM
BH CV ECHO MEAS - LAD MAJOR: 7 CM
BH CV ECHO MEAS - LAT PEAK E' VEL: 6.9 CM/SEC
BH CV ECHO MEAS - LATERAL E/E' RATIO: 21
BH CV ECHO MEAS - LV DIASTOLIC VOL/BSA (35-75): 89.2 ML/M^2
BH CV ECHO MEAS - LV MAX PG: 6.8 MMHG
BH CV ECHO MEAS - LV MEAN PG: 3 MMHG
BH CV ECHO MEAS - LV SYSTOLIC VOL/BSA (12-30): 42.2 ML/M^2
BH CV ECHO MEAS - LV V1 MAX: 130 CM/SEC
BH CV ECHO MEAS - LV V1 MEAN: 75 CM/SEC
BH CV ECHO MEAS - LV V1 VTI: 31.4 CM
BH CV ECHO MEAS - LVIDD: 4 CM
BH CV ECHO MEAS - LVIDS: 2.2 CM
BH CV ECHO MEAS - LVLD AP2: 8.7 CM
BH CV ECHO MEAS - LVLD AP4: 8.9 CM
BH CV ECHO MEAS - LVLS AP2: 7.5 CM
BH CV ECHO MEAS - LVLS AP4: 7.5 CM
BH CV ECHO MEAS - LVOT AREA (M): 2 CM^2
BH CV ECHO MEAS - LVOT AREA: 2 CM^2
BH CV ECHO MEAS - LVOT DIAM: 1.6 CM
BH CV ECHO MEAS - LVPWD: 1.1 CM
BH CV ECHO MEAS - MED PEAK E' VEL: 4.9 CM/SEC
BH CV ECHO MEAS - MEDIAL E/E' RATIO: 29.4
BH CV ECHO MEAS - MV A MAX VEL: 172 CM/SEC
BH CV ECHO MEAS - MV DEC SLOPE: 1076 CM/SEC^2
BH CV ECHO MEAS - MV E MAX VEL: 145 CM/SEC
BH CV ECHO MEAS - MV E/A: 0.84
BH CV ECHO MEAS - MV MAX PG: 14.3 MMHG
BH CV ECHO MEAS - MV MEAN PG: 7 MMHG
BH CV ECHO MEAS - MV P1/2T MAX VEL: 186 CM/SEC
BH CV ECHO MEAS - MV P1/2T: 50.6 MSEC
BH CV ECHO MEAS - MV V2 MAX: 189 CM/SEC
BH CV ECHO MEAS - MV V2 MEAN: 122 CM/SEC
BH CV ECHO MEAS - MV V2 VTI: 41.5 CM
BH CV ECHO MEAS - MVA P1/2T LCG: 1.2 CM^2
BH CV ECHO MEAS - MVA(P1/2T): 4.3 CM^2
BH CV ECHO MEAS - MVA(VTI): 1.5 CM^2
BH CV ECHO MEAS - PA ACC SLOPE: 423 CM/SEC^2
BH CV ECHO MEAS - PA ACC TIME: 0.18 SEC
BH CV ECHO MEAS - PA PR(ACCEL): -2 MMHG
BH CV ECHO MEAS - SI(LVOT): 34.1 ML/M^2
BH CV ECHO MEAS - SI(MOD-SP2): 38.9 ML/M^2
BH CV ECHO MEAS - SI(MOD-SP4): 47 ML/M^2
BH CV ECHO MEAS - SV(LVOT): 63.1 ML
BH CV ECHO MEAS - SV(MOD-SP2): 72 ML
BH CV ECHO MEAS - SV(MOD-SP4): 87 ML
BH CV ECHO MEASUREMENTS AVERAGE E/E' RATIO: 24.58
BH CV VAS BP LEFT ARM: NORMAL MMHG
BH CV XLRA - RV BASE: 4.2 CM
BH CV XLRA - RV LENGTH: 5 CM
BH CV XLRA - RV MID: 3.6 CM
BH CV XLRA - TDI S': 12.9 CM/SEC
LEFT ATRIUM VOLUME INDEX: 50.3 ML/M^2
LEFT ATRIUM VOLUME: 93 ML
LV EF 2D ECHO EST: 60 %
MAXIMAL PREDICTED HEART RATE: 172 BPM
STRESS TARGET HR: 146 BPM

## 2021-12-17 PROCEDURE — 93356 MYOCRD STRAIN IMG SPCKL TRCK: CPT

## 2021-12-17 PROCEDURE — 93306 TTE W/DOPPLER COMPLETE: CPT
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
## 2021-12-17 NOTE — PROGRESS NOTES
RADIATION ONCOLOGY PROGRESS NOTE  12/17/21    Vitals:    12/17/21 0912   BP: 163/70   Pulse: 98   Resp: 18   Temp: 97 °F (36.1 °C)   SpO2: 98%     Mrs. Chung tearful and emotional during assessment, expresses wishes that she would have gotten radiation a year ago when she initially had the consult with , but due to other medical problems she did not want to at the time.  Emotional support given.

## 2021-12-20 ENCOUNTER — HOSPITAL ENCOUNTER (EMERGENCY)
Facility: HOSPITAL | Age: 46
Discharge: HOME | End: 2021-12-20
Attending: EMERGENCY MEDICINE | Admitting: EMERGENCY MEDICINE

## 2021-12-20 ENCOUNTER — APPOINTMENT (OUTPATIENT)
Dept: GENERAL RADIOLOGY | Facility: HOSPITAL | Age: 46
End: 2021-12-20

## 2021-12-20 VITALS
TEMPERATURE: 98.3 F | SYSTOLIC BLOOD PRESSURE: 131 MMHG | HEART RATE: 96 BPM | RESPIRATION RATE: 18 BRPM | DIASTOLIC BLOOD PRESSURE: 63 MMHG | WEIGHT: 180 LBS | HEIGHT: 63 IN | OXYGEN SATURATION: 97 % | BODY MASS INDEX: 31.89 KG/M2

## 2021-12-20 DIAGNOSIS — J12.82 PNEUMONIA DUE TO COVID-19 VIRUS: Primary | ICD-10-CM

## 2021-12-20 DIAGNOSIS — C54.1 ENDOMETRIAL CANCER (HCC): ICD-10-CM

## 2021-12-20 DIAGNOSIS — G62.9 NEUROPATHY: ICD-10-CM

## 2021-12-20 DIAGNOSIS — U07.1 PNEUMONIA DUE TO COVID-19 VIRUS: Primary | ICD-10-CM

## 2021-12-20 DIAGNOSIS — D61.818 PANCYTOPENIA (HCC): ICD-10-CM

## 2021-12-20 DIAGNOSIS — Z85.42 HISTORY OF ENDOMETRIAL CANCER: ICD-10-CM

## 2021-12-20 LAB
ALBUMIN SERPL-MCNC: 3.8 G/DL (ref 3.5–5.2)
ALBUMIN/GLOB SERPL: 1.6 G/DL
ALP SERPL-CCNC: 75 U/L (ref 39–117)
ALT SERPL W P-5'-P-CCNC: 16 U/L (ref 1–33)
ANION GAP SERPL CALCULATED.3IONS-SCNC: 10 MMOL/L (ref 5–15)
AST SERPL-CCNC: 42 U/L (ref 1–32)
BACTERIA UR QL AUTO: ABNORMAL /HPF
BASOPHILS # BLD AUTO: 0.01 10*3/MM3 (ref 0–0.2)
BASOPHILS NFR BLD AUTO: 0.4 % (ref 0–1.5)
BILIRUB SERPL-MCNC: 1.7 MG/DL (ref 0–1.2)
BILIRUB UR QL STRIP: NEGATIVE
BUN SERPL-MCNC: 16 MG/DL (ref 6–20)
BUN/CREAT SERPL: 19.8 (ref 7–25)
CALCIUM SPEC-SCNC: 8.6 MG/DL (ref 8.6–10.5)
CHLORIDE SERPL-SCNC: 101 MMOL/L (ref 98–107)
CLARITY UR: CLEAR
CO2 SERPL-SCNC: 24 MMOL/L (ref 22–29)
COLOR UR: ABNORMAL
CREAT SERPL-MCNC: 0.81 MG/DL (ref 0.57–1)
D-LACTATE SERPL-SCNC: 1.2 MMOL/L (ref 0.5–2)
DEPRECATED RDW RBC AUTO: 45 FL (ref 37–54)
EOSINOPHIL # BLD AUTO: 0.09 10*3/MM3 (ref 0–0.4)
EOSINOPHIL NFR BLD AUTO: 3.2 % (ref 0.3–6.2)
ERYTHROCYTE [DISTWIDTH] IN BLOOD BY AUTOMATED COUNT: 13.4 % (ref 12.3–15.4)
FLUAV SUBTYP SPEC NAA+PROBE: NOT DETECTED
FLUBV RNA ISLT QL NAA+PROBE: NOT DETECTED
GFR SERPL CREATININE-BSD FRML MDRD: 75 ML/MIN/1.73
GLOBULIN UR ELPH-MCNC: 2.4 GM/DL
GLUCOSE SERPL-MCNC: 124 MG/DL (ref 65–99)
GLUCOSE UR STRIP-MCNC: NEGATIVE MG/DL
HCT VFR BLD AUTO: 31.8 % (ref 34–46.6)
HGB BLD-MCNC: 11.2 G/DL (ref 12–15.9)
HGB UR QL STRIP.AUTO: ABNORMAL
HYALINE CASTS UR QL AUTO: ABNORMAL /LPF
IMM GRANULOCYTES # BLD AUTO: 0.02 10*3/MM3 (ref 0–0.05)
IMM GRANULOCYTES NFR BLD AUTO: 0.7 % (ref 0–0.5)
KETONES UR QL STRIP: ABNORMAL
LEUKOCYTE ESTERASE UR QL STRIP.AUTO: NEGATIVE
LYMPHOCYTES # BLD AUTO: 0.49 10*3/MM3 (ref 0.7–3.1)
LYMPHOCYTES NFR BLD AUTO: 17.3 % (ref 19.6–45.3)
MAGNESIUM SERPL-MCNC: 1.5 MG/DL (ref 1.6–2.6)
MCH RBC QN AUTO: 32.4 PG (ref 26.6–33)
MCHC RBC AUTO-ENTMCNC: 35.2 G/DL (ref 31.5–35.7)
MCV RBC AUTO: 91.9 FL (ref 79–97)
MONOCYTES # BLD AUTO: 0.27 10*3/MM3 (ref 0.1–0.9)
MONOCYTES NFR BLD AUTO: 9.5 % (ref 5–12)
NEUTROPHILS NFR BLD AUTO: 1.95 10*3/MM3 (ref 1.7–7)
NEUTROPHILS NFR BLD AUTO: 68.9 % (ref 42.7–76)
NITRITE UR QL STRIP: NEGATIVE
NRBC BLD AUTO-RTO: 0 /100 WBC (ref 0–0.2)
PH UR STRIP.AUTO: 5.5 [PH] (ref 5–8)
PLATELET # BLD AUTO: 54 10*3/MM3 (ref 140–450)
PMV BLD AUTO: 10.3 FL (ref 6–12)
POTASSIUM SERPL-SCNC: 4 MMOL/L (ref 3.5–5.2)
PROCALCITONIN SERPL-MCNC: 0.42 NG/ML (ref 0–0.25)
PROT SERPL-MCNC: 6.2 G/DL (ref 6–8.5)
PROT UR QL STRIP: ABNORMAL
RBC # BLD AUTO: 3.46 10*6/MM3 (ref 3.77–5.28)
RBC # UR STRIP: ABNORMAL /HPF
REF LAB TEST METHOD: ABNORMAL
SARS-COV-2 RNA PNL SPEC NAA+PROBE: DETECTED
SODIUM SERPL-SCNC: 135 MMOL/L (ref 136–145)
SP GR UR STRIP: 1.02 (ref 1–1.03)
SQUAMOUS #/AREA URNS HPF: ABNORMAL /HPF
UROBILINOGEN UR QL STRIP: ABNORMAL
WBC # UR STRIP: ABNORMAL /HPF
WBC NRBC COR # BLD: 2.83 10*3/MM3 (ref 3.4–10.8)

## 2021-12-20 PROCEDURE — 84145 PROCALCITONIN (PCT): CPT | Performed by: EMERGENCY MEDICINE

## 2021-12-20 PROCEDURE — 80053 COMPREHEN METABOLIC PANEL: CPT | Performed by: EMERGENCY MEDICINE

## 2021-12-20 PROCEDURE — 83605 ASSAY OF LACTIC ACID: CPT | Performed by: EMERGENCY MEDICINE

## 2021-12-20 PROCEDURE — 87040 BLOOD CULTURE FOR BACTERIA: CPT | Performed by: EMERGENCY MEDICINE

## 2021-12-20 PROCEDURE — 87636 SARSCOV2 & INF A&B AMP PRB: CPT | Performed by: EMERGENCY MEDICINE

## 2021-12-20 PROCEDURE — 71045 X-RAY EXAM CHEST 1 VIEW: CPT

## 2021-12-20 PROCEDURE — 85025 COMPLETE CBC W/AUTO DIFF WBC: CPT | Performed by: EMERGENCY MEDICINE

## 2021-12-20 PROCEDURE — 36415 COLL VENOUS BLD VENIPUNCTURE: CPT

## 2021-12-20 PROCEDURE — 99283 EMERGENCY DEPT VISIT LOW MDM: CPT

## 2021-12-20 PROCEDURE — 83735 ASSAY OF MAGNESIUM: CPT | Performed by: EMERGENCY MEDICINE

## 2021-12-20 PROCEDURE — 81001 URINALYSIS AUTO W/SCOPE: CPT | Performed by: EMERGENCY MEDICINE

## 2021-12-20 RX ORDER — SODIUM CHLORIDE 0.9 % (FLUSH) 0.9 %
10 SYRINGE (ML) INJECTION AS NEEDED
Status: DISCONTINUED | OUTPATIENT
Start: 2021-12-20 | End: 2021-12-20 | Stop reason: HOSPADM

## 2021-12-20 RX ORDER — ONDANSETRON HYDROCHLORIDE 8 MG/1
8 TABLET, FILM COATED ORAL 3 TIMES DAILY PRN
Qty: 30 TABLET | Refills: 5 | Status: SHIPPED | OUTPATIENT
Start: 2021-12-20 | End: 2022-07-18 | Stop reason: SDUPTHER

## 2021-12-20 RX ADMIN — SODIUM CHLORIDE 1000 ML: 9 INJECTION, SOLUTION INTRAVENOUS at 08:53

## 2021-12-20 NOTE — TELEPHONE ENCOUNTER
RN called pt after talking with Saima WARD and discussed that we do not have a certain indication or contraindications that we can find that would tell us if she is a candidate for the treatment or not.  We are hesitant to recommend it because of her being on Keytruda.  We recommended she follow up with her PCP if it was something she wanted to pursue because we also do not prescribe it.  Pt v/u.  Pt also asked for a refill on her zofran because she cannot find the bottle.  RN explained she would have to pay out of pocket as it is not time for a refill on it yet.  Pt v/u.

## 2021-12-20 NOTE — DISCHARGE INSTRUCTIONS
FOR ALL COVID PATIENTS:  If you have COVID and your oxygen level drops to or below 94% for at least a few readings, you may need to be hospitalized for more aggressive treatment and may be admitted at [facility] (if our readings are similar to yours).    CURRENT MONOCLONAL ANTIBODY INFUSION OPTIONS:  [phone]  [facility] [place] [place] -  call Central Scheduling at [phone]  [facility]- Call to see if they are performing infusions.

## 2021-12-20 NOTE — ED PROVIDER NOTES
Subjective   46-year-old female presents for evaluation of fever.  Of note, the patient is currently undergoing chemotherapy treatment for endometrial cancer and is followed by Dr. Tolentino.  She states that yesterday she began experiencing some subjective fever and noted that her temperature was nearly 100 °F.  Throughout the night, she began experiencing a mild cough and diffuse myalgias.  This morning, she checked her temperature and was noted to be febrile at 101.9 °F.  As a result, she was advised to come to the emergency department to be evaluated.  The patient states that she had her first Covid vaccines but has not yet had her booster.  She denies any known sick contacts or known exposures to anyone with COVID-19.  She denies any urinary symptoms.          Review of Systems   Constitutional: Positive for fatigue and fever.   Respiratory: Positive for cough.    Musculoskeletal: Positive for myalgias.   All other systems reviewed and are negative.      Past Medical History:   Diagnosis Date   • Anemia    • Anxiety and depression    • Back pain    • Bronchitis    • Diabetes (HCC)     DX 4-5 YRS AGO, CHECKS BS 4X/DAY, LAST A1C 7.1%   • Endometrial cancer ([facility])     STAGE II   • GERD (gastroesophageal reflux disease)    • Hypertension    • IBS (irritable bowel syndrome)    • MRSA (methicillin resistant staph aureus) culture positive 2018    S/P NECROTIZING FASCITIS IN BILATERAL FEET   • Necrotizing fasciitis (HCC)    • PCOS (polycystic ovarian syndrome)    • PTSD (post-traumatic stress disorder)    • Retinopathy 3/18/2020   • Sepsis (HCC)    • Stage 3 chronic kidney disease ([facility]) 2/24/2020   • Subconjunctival hemorrhage        Allergies   Allergen Reactions   • Bactrim [Sulfamethoxazole-Trimethoprim] Anaphylaxis   • Penicillins Hives   • Promethazine Mental Status Change       Past Surgical History:   Procedure Laterality Date   • APPENDECTOMY N/A 3/21/2020    Procedure: APPENDECTOMY LAPAROSCOPIC;  Surgeon:  Praful Myers MD;  Location:  [facility];  Service: General;  Laterality: N/A;   • EXPLORATORY LAPAROTOMY, TOTAL ABDOMINAL HYSTERECTOMY SALPINGO OOPHORECTOMY N/A 2/10/2020    Procedure: EXPLORATORY LAPAROTOMY, TOTAL ABDOMINAL HYSTERECTOMY, BILATERAL SALPINGO-OOPHORECTOMY WITH OPTIMAL  STAGING (R-0), OMENTECTOMY;  Surgeon: Celine Rubio MD;  Location:  [facility];  Service: Gynecology Oncology;  Laterality: N/A;   • EYE SURGERY Left     BLOOD VESSEL IN EYE REPAIR   • TOE AMPUTATION Left 2019    big toe - unhealing sore   • TOE AMPUTATION Right 2018    big toe and second toe - Necrotizing fasciitis and MRSA        Family History   Problem Relation Age of Onset   • Fibroids Mother    • Diabetes type II Mother    • Hypertension Mother    • Heart attack Mother    • Fibroids Sister         PCOS   • Diabetes type II Sister    • Dementia Maternal Grandmother    • Stroke Maternal Grandmother        Social History     Socioeconomic History   • Marital status:    • Number of children: 1   Tobacco Use   • Smoking status: Former Smoker     Types: Cigarettes     Quit date:      Years since quittin.9   • Smokeless tobacco: Never Used   • Tobacco comment: social MAYBE 1 CIG/DAY   Substance and Sexual Activity   • Alcohol use: Not Currently   • Drug use: Never   • Sexual activity: Not Currently           Objective   Physical Exam  Vitals and nursing note reviewed.   Constitutional:       General: She is not in acute distress.     Appearance: She is well-developed. She is not diaphoretic.   HENT:      Head: Normocephalic and atraumatic.   Eyes:      Pupils: Pupils are equal, round, and reactive to light.   Neck:      Comments: No meningeal signs or nuchal rigidity  Cardiovascular:      Rate and Rhythm: Normal rate and regular rhythm.      Heart sounds: Normal heart sounds. No murmur heard.  No friction rub. No gallop.    Pulmonary:      Effort: Pulmonary effort is normal. No respiratory distress.       Breath sounds: Normal breath sounds. No wheezing or rales.   Abdominal:      General: Bowel sounds are normal. There is no distension.      Palpations: Abdomen is soft. There is no mass.      Tenderness: There is no abdominal tenderness. There is no guarding or rebound.      Comments: No focal abdominal tenderness noted, no peritoneal signs, no pain out of proportion to exam   Musculoskeletal:         General: Normal range of motion.      Cervical back: Neck supple.   Skin:     General: Skin is warm and dry.      Findings: No erythema or rash.   Neurological:      General: No focal deficit present.      Mental Status: She is alert and oriented to person, place, and time.   Psychiatric:         Mood and Affect: Mood normal.         Thought Content: Thought content normal.         Judgment: Judgment normal.         Procedures           ED Course  ED Course as of 12/20/21 1353   Mon Dec 20, 2021   0809 46-year-old female with a history of endometrial cancer, currently undergoing chemotherapy treatment, presents for evaluation of fever this morning.  She notes that she had a fever of 101.9 degrees.  She endorses a mild cough and myalgias as well.  On arrival to the ED, the patient is nontoxic-appearing.  No focal exam findings noted.  Broad differential diagnosis.  We will obtain labs and imaging, and we will reassess following initial interventions. [DD]   0910 I personally viewed the patient's x-ray images myself, and I am in agreement with the radiologist's reading for final interpretation.     [DD]   0912 COVID-19 swab was positive. [DD]   0916 I discussed the patient's case with Dr. Tolentino who felt comfortable letting the patient go home.  She recommended against antibiotics at this time and advised the patient to hold her lenvatinib for the time being.  Normal work of breathing.  Oxygen saturations are in the upper 90s.  I feel that this is a reasonable course of action as the patient is not neutropenic at this  time and has good outpatient follow-up. [DD]   0949 We will discharge her home with a pulse oximeter and she will continue to closely monitor her oxygen saturations in the coming days.  Agreeable with plan and given appropriate strict return precautions. [DD]      ED Course User Index  [DD] Kyle Sommer MD                                       Recent Results (from the past 24 hour(s))   COVID-19 and FLU A/B PCR - Swab, Nasopharynx    Collection Time: 12/20/21  8:11 AM    Specimen: Nasopharynx; Swab   Result Value Ref Range    COVID19 Detected (C) Not Detected - Ref. Range    Influenza A PCR Not Detected Not Detected    Influenza B PCR Not Detected Not Detected   Comprehensive Metabolic Panel    Collection Time: 12/20/21  8:20 AM    Specimen: Arm, Right; Blood   Result Value Ref Range    Glucose 124 (H) 65 - 99 mg/dL    BUN 16 6 - 20 mg/dL    Creatinine 0.81 0.57 - 1.00 mg/dL    Sodium 135 (L) 136 - 145 mmol/L    Potassium 4.0 3.5 - 5.2 mmol/L    Chloride 101 98 - 107 mmol/L    CO2 24.0 22.0 - 29.0 mmol/L    Calcium 8.6 8.6 - 10.5 mg/dL    Total Protein 6.2 6.0 - 8.5 g/dL    Albumin 3.80 3.50 - 5.20 g/dL    ALT (SGPT) 16 1 - 33 U/L    AST (SGOT) 42 (H) 1 - 32 U/L    Alkaline Phosphatase 75 39 - 117 U/L    Total Bilirubin 1.7 (H) 0.0 - 1.2 mg/dL    eGFR Non African Amer 75 >60 mL/min/1.73    Globulin 2.4 gm/dL    A/G Ratio 1.6 g/dL    BUN/Creatinine Ratio 19.8 7.0 - 25.0    Anion Gap 10.0 5.0 - 15.0 mmol/L   Lactic Acid, Plasma    Collection Time: 12/20/21  8:20 AM    Specimen: Arm, Right; Blood   Result Value Ref Range    Lactate 1.2 0.5 - 2.0 mmol/L   Procalcitonin    Collection Time: 12/20/21  8:20 AM    Specimen: Arm, Right; Blood   Result Value Ref Range    Procalcitonin 0.42 (H) 0.00 - 0.25 ng/mL   Magnesium    Collection Time: 12/20/21  8:20 AM    Specimen: Arm, Right; Blood   Result Value Ref Range    Magnesium 1.5 (L) 1.6 - 2.6 mg/dL   CBC Auto Differential    Collection Time: 12/20/21  8:20 AM     Specimen: Arm, Right; Blood   Result Value Ref Range    WBC 2.83 (L) 3.40 - 10.80 10*3/mm3    RBC 3.46 (L) 3.77 - 5.28 10*6/mm3    Hemoglobin 11.2 (L) 12.0 - 15.9 g/dL    Hematocrit 31.8 (L) 34.0 - 46.6 %    MCV 91.9 79.0 - 97.0 fL    MCH 32.4 26.6 - 33.0 pg    MCHC 35.2 31.5 - 35.7 g/dL    RDW 13.4 12.3 - 15.4 %    RDW-SD 45.0 37.0 - 54.0 fl    MPV 10.3 6.0 - 12.0 fL    Platelets 54 (L) 140 - 450 10*3/mm3    Neutrophil % 68.9 42.7 - 76.0 %    Lymphocyte % 17.3 (L) 19.6 - 45.3 %    Monocyte % 9.5 5.0 - 12.0 %    Eosinophil % 3.2 0.3 - 6.2 %    Basophil % 0.4 0.0 - 1.5 %    Immature Grans % 0.7 (H) 0.0 - 0.5 %    Neutrophils, Absolute 1.95 1.70 - 7.00 10*3/mm3    Lymphocytes, Absolute 0.49 (L) 0.70 - 3.10 10*3/mm3    Monocytes, Absolute 0.27 0.10 - 0.90 10*3/mm3    Eosinophils, Absolute 0.09 0.00 - 0.40 10*3/mm3    Basophils, Absolute 0.01 0.00 - 0.20 10*3/mm3    Immature Grans, Absolute 0.02 0.00 - 0.05 10*3/mm3    nRBC 0.0 0.0 - 0.2 /100 WBC   Urinalysis With Culture If Indicated - Urine, Clean Catch    Collection Time: 12/20/21  8:59 AM    Specimen: Urine, Clean Catch   Result Value Ref Range    Color, UA Dark Yellow (A) Yellow, Straw    Appearance, UA Clear Clear    pH, UA 5.5 5.0 - 8.0    Specific Gravity, UA 1.019 1.001 - 1.030    Glucose, UA Negative Negative    Ketones, UA Trace (A) Negative    Bilirubin, UA Negative Negative    Blood, UA Large (3+) (A) Negative    Protein, UA Trace (A) Negative    Leuk Esterase, UA Negative Negative    Nitrite, UA Negative Negative    Urobilinogen, UA 4.0 E.U./dL (A) 0.2 - 1.0 E.U./dL   Urinalysis, Microscopic Only - Urine, Clean Catch    Collection Time: 12/20/21  8:59 AM    Specimen: Urine, Clean Catch   Result Value Ref Range    RBC, UA Too Numerous to Count (A) None Seen, 0-2 /HPF    WBC, UA 0-2 None Seen, 0-2 /HPF    Bacteria, UA Trace None Seen, Trace /HPF    Squamous Epithelial Cells, UA 0-2 None Seen, 0-2 /HPF    Hyaline Casts, UA 0-6 0 - 6 /LPF    Methodology  "Automated Microscopy      Note: In addition to lab results from this visit, the labs listed above may include labs taken at another facility or during a different encounter within the last 24 hours. Please correlate lab times with ED admission and discharge times for further clarification of the services performed during this visit.    XR Chest 1 View   Final Result   Patchy opacifications in the midlungs left greater than   right consistent with airspace disease such as bronchopneumonia without   pleural effusion.       D:  12/20/2021   E:  12/20/2021       This report was finalized on 12/20/2021 1:49 PM by Dr. Brock Flood.            Vitals:    12/20/21 0749 12/20/21 0930   BP: 148/70 131/63   BP Location: Left arm    Patient Position: Sitting    Pulse: 96    Resp: 18    Temp: 98.3 °F (36.8 °C)    TempSrc: Oral    SpO2: 96% 97%   Weight: 81.6 kg (180 lb)    Height: 160 cm (63\")      Medications   sodium chloride 0.9 % bolus 1,000 mL (0 mL Intravenous Stopped 12/20/21 0950)     ECG/EMG Results (last 24 hours)     ** No results found for the last 24 hours. **        No orders to display                    MDM    Final diagnoses:   Pneumonia due to COVID-19 virus   Pancytopenia (HCC)   History of endometrial cancer       ED Disposition  ED Disposition     ED Disposition Condition Comment    Discharge Stable           No follow-up provider specified.       Medication List      No changes were made to your prescriptions during this visit.          Kyle Sommer MD  12/20/21 5058    "

## 2021-12-20 NOTE — TELEPHONE ENCOUNTER
Provider: DR REYNOSO    Caller: JOSE DAVID    Relationship to Patient: SELF    Reason for Call: JOSE DAVID IS CALLING BECAUSE SHE WAS DIAGNOSED WITH COVID, SHE WAS WANTING TO KNOW IF SHE SHOULD GET AN ANTIBODY TREATMENT.  SHE STATES THAT [facility] DOESN'T HAVE IT THERE, BUT [facility] DOES.    PLEASE CALL TO DISCUSS AND ADVISE

## 2021-12-21 ENCOUNTER — READMISSION MANAGEMENT (OUTPATIENT)
Dept: CALL CENTER | Facility: HOSPITAL | Age: 46
End: 2021-12-21

## 2021-12-21 ENCOUNTER — TELEPHONE (OUTPATIENT)
Dept: FAMILY MEDICINE CLINIC | Facility: CLINIC | Age: 46
End: 2021-12-21

## 2021-12-21 NOTE — TELEPHONE ENCOUNTER
Spoke with patient's mother.  Will keep virtual visit at this time.  Will start referral for Monoclonal Antibodies.  Prescription given to Esther.

## 2021-12-21 NOTE — TELEPHONE ENCOUNTER
needs video visit to evaluate and place order for infusion. video visit with any provider available. okay to add on my schedule at 3:45 if no one is available

## 2021-12-21 NOTE — OUTREACH NOTE
Prep Survey      Responses   [facility] patient discharged from? [place]   Is LACE score < 7 ? No   Emergency Room discharge w/ pulse ox? Yes   Eligibility Readm Mgmt   Discharge diagnosis covid   Does the patient have one of the following disease processes/diagnoses(primary or secondary)? COVID-19   Is there a DME ordered? Yes   What DME was ordered? PO Given w/ instructions   General alerts for this patient ED d/c call x 3 only   Prep survey completed? Yes          Frieda Reid RN

## 2021-12-21 NOTE — TELEPHONE ENCOUNTER
MOTHER OF PATIENT HAS CALLED AND STATED PATIENT'S CANCER HAS COME BACK AND PATIENT TESTED POSITIVE FOR COVID YESTERDAY.  PATIENT IS REQUESTING AN ORDER FOR HER TO GET THE INFUSION FOR THE MONOCLONAL ANTIBODIES.  PATIENT IS REQUESTING THE ORDER TO GO TO [facility]   FAX# [phone]    MOTHER OF PATIENT IS REQUESTING A CALL BACK ONCE ORDER HAS BEEN SENT.    CALL BACK NUMBER [phone]

## 2021-12-21 NOTE — TELEPHONE ENCOUNTER
SPOKE WITH JOSE DAVID'S MOM. EXPLAINED THAT JOSE DAVID'S PCP WOULD HAVE TO GIVE ORDER FOR INFUSION. SHE STATED THAT JOSE DAVID DOES NOT HAVE A PCP AT THIS TIME AND [place].

## 2021-12-21 NOTE — TELEPHONE ENCOUNTER
Contacted patient and scheduled virtual visit. Mother is very concerned because she is very lethargic and weak and experiencing high fever. She is unable to sit up to eat and is doubts that she will be able to complete this video visit at 7:30 grayson

## 2021-12-21 NOTE — OUTREACH NOTE
COVID-19 Week 1 Survey      Responses   [facility] patient discharged from? [place]   Does the patient have one of the following disease processes/diagnoses(primary or secondary)? COVID-19   COVID-19 underlying condition? None   Call Number Call 1   Week 1 Call successful? No   Discharge diagnosis odette Gabriel, RN

## 2021-12-22 ENCOUNTER — READMISSION MANAGEMENT (OUTPATIENT)
Dept: CALL CENTER | Facility: HOSPITAL | Age: 46
End: 2021-12-22

## 2021-12-22 ENCOUNTER — TELEMEDICINE (OUTPATIENT)
Dept: FAMILY MEDICINE CLINIC | Facility: CLINIC | Age: 46
End: 2021-12-22

## 2021-12-22 DIAGNOSIS — E11.42 TYPE 2 DIABETES MELLITUS WITH DIABETIC POLYNEUROPATHY, WITH LONG-TERM CURRENT USE OF INSULIN (HCC): ICD-10-CM

## 2021-12-22 DIAGNOSIS — Z79.4 TYPE 2 DIABETES MELLITUS WITH DIABETIC POLYNEUROPATHY, WITH LONG-TERM CURRENT USE OF INSULIN (HCC): ICD-10-CM

## 2021-12-22 DIAGNOSIS — C54.1 ENDOMETRIAL CANCER (HCC): ICD-10-CM

## 2021-12-22 DIAGNOSIS — U07.1 COVID-19 VIRUS INFECTION: Primary | ICD-10-CM

## 2021-12-22 PROCEDURE — 99213 OFFICE O/P EST LOW 20 MIN: CPT | Performed by: PHYSICIAN ASSISTANT

## 2021-12-22 RX ORDER — FLASH GLUCOSE SENSOR
1 KIT MISCELLANEOUS
Qty: 2 EACH | Refills: 3 | Status: SHIPPED | OUTPATIENT
Start: 2021-12-22 | End: 2022-03-15 | Stop reason: ALTCHOICE

## 2021-12-22 NOTE — PROGRESS NOTES
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  Patient's endometrial cancer recurred and she was recently undergoing chemotherapy, Keytruda.  She reports flu-like symptoms which is also a side effect of Keytruda.  Went to ER on 12/20 and was diagnosed with Covid-19.  Sent home with pulse oximeter and monitoring oxygen levels.  Oxygen is staying in mid-high 90s.  She is managing fever with tylenol.  Unable to take NSAIDs due to thrombocytopenia.  Her temperature was 99.6 this morning without fever.  Previously 101, and tylenol would improve it.  Has mild nausea.  Cough and nasal drainage is clear sputum.    Past Medical History:   Diagnosis Date   • Anemia    • Anxiety and depression    • Back pain    • Bronchitis    • Diabetes (HCC)     DX 4-5 YRS AGO, CHECKS BS 4X/DAY, LAST A1C 7.1%   • Endometrial cancer (HCC)     STAGE II   • GERD (gastroesophageal reflux disease)    • Hypertension    • IBS (irritable bowel syndrome)    • MRSA (methicillin resistant staph aureus) culture positive 2018    S/P NECROTIZING FASCITIS IN BILATERAL FEET   • Necrotizing fasciitis (HCC)    • PCOS (polycystic ovarian syndrome)    • PTSD (post-traumatic stress disorder)    • Retinopathy 3/18/2020   • Sepsis (HCC)    • Stage 3 chronic kidney disease (HCC) 2/24/2020   • Subconjunctival hemorrhage        Past Surgical History:   Procedure Laterality Date   • APPENDECTOMY N/A 3/21/2020    Procedure: APPENDECTOMY LAPAROSCOPIC;  Surgeon: Praful Myers MD;  Location: [facility] OR;  Service: General;  Laterality: N/A;   • EXPLORATORY LAPAROTOMY, TOTAL ABDOMINAL HYSTERECTOMY SALPINGO OOPHORECTOMY N/A 2/10/2020    Procedure: EXPLORATORY LAPAROTOMY, TOTAL ABDOMINAL HYSTERECTOMY, BILATERAL SALPINGO-OOPHORECTOMY WITH OPTIMAL  STAGING (R-0), OMENTECTOMY;  Surgeon: Celine Rubio MD;  Location: [facility] OR;  Service: Gynecology  Oncology;  Laterality: N/A;   • EYE SURGERY Left     BLOOD VESSEL IN EYE REPAIR   • TOE AMPUTATION Left 2019    big toe - unhealing sore   • TOE AMPUTATION Right 2018    big toe and second toe - Necrotizing fasciitis and MRSA        Family History   Problem Relation Age of Onset   • Fibroids Mother    • Diabetes type II Mother    • Hypertension Mother    • Heart attack Mother    • Fibroids Sister         PCOS   • Diabetes type II Sister    • Dementia Maternal Grandmother    • Stroke Maternal Grandmother        Social History     Socioeconomic History   • Marital status:    • Number of children: 1   Tobacco Use   • Smoking status: Former Smoker     Types: Cigarettes     Quit date:      Years since quittin.9   • Smokeless tobacco: Never Used   • Tobacco comment: social MAYBE 1 CIG/DAY   Substance and Sexual Activity   • Alcohol use: Not Currently   • Drug use: Never   • Sexual activity: Not Currently       Allergies   Allergen Reactions   • Bactrim [Sulfamethoxazole-Trimethoprim] Anaphylaxis   • Penicillins Hives   • Promethazine Mental Status Change       ROS    Review of Systems   Constitutional: Positive for chills, fatigue and fever.   HENT: Positive for congestion, postnasal drip and rhinorrhea. Negative for ear pain, sinus pressure and sore throat.    Respiratory: Positive for cough and shortness of breath. Negative for wheezing.    Cardiovascular: Negative for chest pain.   Gastrointestinal: Positive for nausea.   Musculoskeletal: Positive for myalgias.   Neurological: Positive for dizziness and headache.       There were no vitals filed for this visit.  There is no height or weight on file to calculate BMI.    Current Outpatient Medications on File Prior to Visit   Medication Sig Dispense Refill   • Continuous Blood Gluc Sensor (FREESTYLE CAMMY SENSOR SYSTEM) Every 14 (Fourteen) Days. 1 each 1   • gabapentin (NEURONTIN) 600 MG tablet Take 600 mg by mouth 3 (Three) Times a Day.     •  HYDROcodone-acetaminophen (NORCO) 7.5-325 MG per tablet Take 1 tablet by mouth Every 6 (Six) Hours As Needed for Moderate Pain . 30 tablet 0   • hyoscyamine (LEVSIN) 0.5 MG/ML injection Infuse  into a venous catheter Every 4 (Four) Hours As Needed for Bladder Spasms.     • insulin aspart (novoLOG) 100 UNIT/ML injection Inject 8 Units under the skin into the appropriate area as directed Every Morning. Then sliding scale after meals.     • Lenvatinib, 14 MG Daily Dose, 10 & 4 MG capsule therapy Take 14 mg by mouth Daily. Take with or without food. 60 each 5   • LORazepam (Ativan) 1 MG tablet Take 1 tablet by mouth Every 6 (Six) Hours As Needed for Anxiety. 30 tablet 3   • MULTIPLE VITAMIN PO Multiple Vitamin     • omeprazole (PrilOSEC) 20 MG capsule Take 1 capsule by mouth Daily. 90 capsule 1   • ondansetron (ZOFRAN) 8 MG tablet Take 1 tablet by mouth 3 (Three) Times a Day As Needed for Nausea or Vomiting. 30 tablet 5   • ondansetron ODT (Zofran ODT) 8 MG disintegrating tablet Place 1 tablet on the tongue Every 8 (Eight) Hours As Needed for Nausea or Vomiting. 30 tablet 2   • oxyCODONE (ROXICODONE) 5 MG immediate release tablet Take 1 tablet by mouth Every 6 (Six) Hours As Needed for Severe Pain . 60 tablet 0   • Polyethylene Glycol 3350 (MIRALAX PO) Take 17 g by mouth Daily.     • [DISCONTINUED] Continuous Blood Gluc Sensor (FREESTYLE CAMMY 14 DAY SENSOR) misc 1 each Every 14 (Fourteen) Days. 2 each 3     No current facility-administered medications on file prior to visit.       Results for orders placed or performed during the hospital encounter of 12/20/21   Blood Culture - Blood, Hand, Right    Specimen: Hand, Right; Blood   Result Value Ref Range    Blood Culture No growth at 24 hours    Blood Culture - Blood, Arm, Left    Specimen: Arm, Left; Blood   Result Value Ref Range    Blood Culture No growth at 24 hours    COVID-19 and FLU A/B PCR - Swab, Nasopharynx    Specimen: Nasopharynx; Swab   Result Value Ref  Range    COVID19 Detected (C) Not Detected - Ref. Range    Influenza A PCR Not Detected Not Detected    Influenza B PCR Not Detected Not Detected   Comprehensive Metabolic Panel    Specimen: Arm, Right; Blood   Result Value Ref Range    Glucose 124 (H) 65 - 99 mg/dL    BUN 16 6 - 20 mg/dL    Creatinine 0.81 0.57 - 1.00 mg/dL    Sodium 135 (L) 136 - 145 mmol/L    Potassium 4.0 3.5 - 5.2 mmol/L    Chloride 101 98 - 107 mmol/L    CO2 24.0 22.0 - 29.0 mmol/L    Calcium 8.6 8.6 - 10.5 mg/dL    Total Protein 6.2 6.0 - 8.5 g/dL    Albumin 3.80 3.50 - 5.20 g/dL    ALT (SGPT) 16 1 - 33 U/L    AST (SGOT) 42 (H) 1 - 32 U/L    Alkaline Phosphatase 75 39 - 117 U/L    Total Bilirubin 1.7 (H) 0.0 - 1.2 mg/dL    eGFR Non African Amer 75 >60 mL/min/1.73    Globulin 2.4 gm/dL    A/G Ratio 1.6 g/dL    BUN/Creatinine Ratio 19.8 7.0 - 25.0    Anion Gap 10.0 5.0 - 15.0 mmol/L   Urinalysis With Culture If Indicated - Urine, Clean Catch    Specimen: Urine, Clean Catch   Result Value Ref Range    Color, UA Dark Yellow (A) Yellow, Straw    Appearance, UA Clear Clear    pH, UA 5.5 5.0 - 8.0    Specific Gravity, UA 1.019 1.001 - 1.030    Glucose, UA Negative Negative    Ketones, UA Trace (A) Negative    Bilirubin, UA Negative Negative    Blood, UA Large (3+) (A) Negative    Protein, UA Trace (A) Negative    Leuk Esterase, UA Negative Negative    Nitrite, UA Negative Negative    Urobilinogen, UA 4.0 E.U./dL (A) 0.2 - 1.0 E.U./dL   Lactic Acid, Plasma    Specimen: Arm, Right; Blood   Result Value Ref Range    Lactate 1.2 0.5 - 2.0 mmol/L   Procalcitonin    Specimen: Arm, Right; Blood   Result Value Ref Range    Procalcitonin 0.42 (H) 0.00 - 0.25 ng/mL   Magnesium    Specimen: Arm, Right; Blood   Result Value Ref Range    Magnesium 1.5 (L) 1.6 - 2.6 mg/dL   CBC Auto Differential    Specimen: Arm, Right; Blood   Result Value Ref Range    WBC 2.83 (L) 3.40 - 10.80 10*3/mm3    RBC 3.46 (L) 3.77 - 5.28 10*6/mm3    Hemoglobin 11.2 (L) 12.0 - 15.9 g/dL     Hematocrit 31.8 (L) 34.0 - 46.6 %    MCV 91.9 79.0 - 97.0 fL    MCH 32.4 26.6 - 33.0 pg    MCHC 35.2 31.5 - 35.7 g/dL    RDW 13.4 12.3 - 15.4 %    RDW-SD 45.0 37.0 - 54.0 fl    MPV 10.3 6.0 - 12.0 fL    Platelets 54 (L) 140 - 450 10*3/mm3    Neutrophil % 68.9 42.7 - 76.0 %    Lymphocyte % 17.3 (L) 19.6 - 45.3 %    Monocyte % 9.5 5.0 - 12.0 %    Eosinophil % 3.2 0.3 - 6.2 %    Basophil % 0.4 0.0 - 1.5 %    Immature Grans % 0.7 (H) 0.0 - 0.5 %    Neutrophils, Absolute 1.95 1.70 - 7.00 10*3/mm3    Lymphocytes, Absolute 0.49 (L) 0.70 - 3.10 10*3/mm3    Monocytes, Absolute 0.27 0.10 - 0.90 10*3/mm3    Eosinophils, Absolute 0.09 0.00 - 0.40 10*3/mm3    Basophils, Absolute 0.01 0.00 - 0.20 10*3/mm3    Immature Grans, Absolute 0.02 0.00 - 0.05 10*3/mm3    nRBC 0.0 0.0 - 0.2 /100 WBC   Urinalysis, Microscopic Only - Urine, Clean Catch    Specimen: Urine, Clean Catch   Result Value Ref Range    RBC, UA Too Numerous to Count (A) None Seen, 0-2 /HPF    WBC, UA 0-2 None Seen, 0-2 /HPF    Bacteria, UA Trace None Seen, Trace /HPF    Squamous Epithelial Cells, UA 0-2 None Seen, 0-2 /HPF    Hyaline Casts, UA 0-6 0 - 6 /LPF    Methodology Automated Microscopy        PE    Physical Exam  Vitals reviewed.   Constitutional:       General: She is not in acute distress.     Appearance: Normal appearance. She is well-developed. She is ill-appearing. She is not diaphoretic.   HENT:      Head: Normocephalic and atraumatic.   Eyes:      Extraocular Movements: Extraocular movements intact.      Conjunctiva/sclera: Conjunctivae normal.   Pulmonary:      Effort: No respiratory distress.   Musculoskeletal:         General: Normal range of motion.      Cervical back: Normal range of motion.   Neurological:      General: No focal deficit present.      Mental Status: She is alert.   Psychiatric:         Attention and Perception: She is attentive.         Mood and Affect: Mood normal.         Speech: Speech normal.         Behavior: Behavior  normal. Behavior is cooperative.         Thought Content: Thought content normal.         Judgment: Judgment normal.          A/P    Diagnoses and all orders for this visit:    1. COVID-19 virus infection (Primary)  Diagnosed 12/20.  Having symptoms a few days prior, associated these symptoms with chemotherapy.  Unclear when symptoms truly started.  Main complaint is fever.  Managed with tylenol.  Low platelets, can't take NSAIDs.  Denies discolored sputum or nasal drainage, all clear sputum.  Has pulse ox and oxygen is stating in mid-high 90s.  Aware to go to ER if this drops below 90%.  Monoclonal Antibody infusion ordered, hopefully she can get scheduled today or tomorrow.    2. Endometrial cancer (HCC)  Currently undergoing chemotherapy treatment.  Followed by oncology.    3. Type 2 diabetes mellitus with diabetic polyneuropathy, with long-term current use of insulin (HCC)  -     Continuous Blood Gluc Sensor ([facility] 14 Day Sensor) misc; 1 each Every 14 (Fourteen) Days.  Dispense: 2 each; Refill: 3    You have chosen to receive care through a telehealth visit.  Do you consent to use a video/audio connection for your medical care today? Yes.  Spent 10 minutes on video with patient.  Total of 20 minutes documenting, preparing and coordinating care.  Patient was at home for video, provider in office.      Plan of care reviewed with patient at the conclusion of today's visit. Education was provided regarding diagnosis, management and any prescribed or recommended OTC medications.  Patient verbalizes understanding of and agreement with management plan.    No follow-ups on file.     Tania Angeles PA-C

## 2021-12-22 NOTE — OUTREACH NOTE
COVID-19 Week 1 Survey      Responses   [facility] patient discharged from? [facility]   Does the patient have one of the following disease processes/diagnoses(primary or secondary)? COVID-19   COVID-19 underlying condition? None   Call Number Call 2   Week 1 Call successful? Yes   Call start time [phone]   Call end time [phone]   General alerts for this patient ED d/c call x 3 only   Discharge diagnosis covid   Medication alerts for this patient no meds given   Does the patient have all medications ordered at discharge? N/A   Does the patient have a primary care provider?  Yes   Does the patient have an appointment with their PCP or specialist within 7 days of discharge? Yes   Has the patient kept scheduled appointments due by today? Yes   Comments trying to get monoclonal antibiodies.   Psychosocial issues? No   Did the patient receive a copy of their discharge instructions? Yes   Did the patient receive a copy of COVID-19 specific instructions? Yes   Nursing interventions Reviewed instructions with patient   What is the patient's perception of their health status since discharge? Same   Does the patient have any of the following symptoms? Fever/chills,  Shortness of breath   Nursing Interventions Nurse provided patient education   Pulse Ox monitoring Intermittent   Pulse Ox device source Patient   O2 Sat comments 97%   O2 Sat: education provided Sat levels,  Monitoring frequency,  When to seek care   O2 Sat education comments Lung cancer so sats  are good   Is the patient/caregiver able to teach back steps to recovery at home? Set small, achievable goals for return to baseline health   If the patient is a current smoker, are they able to teach back resources for cessation? Not a smoker   Is the patient/caregiver able to teach back the hierarchy of who to call/visit for symptoms/problems? PCP, Specialist, Home health nurse, Urgent Care, ED, 911 Yes   COVID-19 call completed? Yes   Wrap up additional comments pt  endo stage three cancer now in lungs she states is terminal.           Mirian Martinez RN

## 2021-12-22 NOTE — TELEPHONE ENCOUNTER
Covid infusion orders have been received by [facility], I spoke with patient's mother and notified her so she can contact [facility] to make an upcoming appt.

## 2021-12-23 ENCOUNTER — READMISSION MANAGEMENT (OUTPATIENT)
Dept: CALL CENTER | Facility: HOSPITAL | Age: 46
End: 2021-12-23

## 2021-12-23 NOTE — OUTREACH NOTE
COVID-19 Week 1 Survey      Responses   [facility] patient discharged from? [facility]   Does the patient have one of the following disease processes/diagnoses(primary or secondary)? COVID-19   COVID-19 underlying condition? None   Call Number Call 3   Week 1 Call successful? Yes   Call start time [phone]   Call end time [phone]   Does the patient have all medications ordered at discharge? N/A   Comments regarding appointments Kept telehealth appt 12/22/21  visit yesterday. Has monoclonal antibody infusion appt 12/27/21.   Does the patient have a primary care provider?  Yes   Does the patient have an appointment with their PCP or specialist within 7 days of discharge? Yes   Has the patient kept scheduled appointments due by today? Yes   Has home health visited the patient within 72 hours of discharge? N/A   Psychosocial issues? No   What is the patient's perception of their health status since discharge? Improving   Does the patient have any of the following symptoms? Fever/chills,  Shortness of breath,  Cough,  Loss of taste/smell  [Loss of smell, low grade fever today, slight cough, decreased appetite.]   Nursing Interventions Nurse provided patient education,  Advised patient to call provider   Pulse Ox monitoring Intermittent   Pulse Ox device source Patient   O2 Sat comments 93-97% on RA.   Is the patient/caregiver able to teach back the hierarchy of who to call/visit for symptoms/problems? PCP, Specialist, Home health nurse, Urgent Care, ED, 911 Yes   COVID-19 call completed? Yes   Revoked No further contact(revokes)-requires comment   Is the patient interested in additional calls from an ambulatory ?  NOTE:  applies to high risk patients requiring additional follow-up. No   Graduated/Revoked comments 3rd call from ER visit.   Wrap up additional comments States is slightly improved-reports fever lower today. States has decreased appetite, but believes is due to her cancer. States platelets are low,  and has some bloody nasal discharge today. Advised to notify PCP-voiced understanding. States will be getting monoclonal antibodies on Monday. Denies any needs at this time.          Krystina Abarca RN

## 2021-12-25 LAB
BACTERIA SPEC AEROBE CULT: NORMAL
BACTERIA SPEC AEROBE CULT: NORMAL

## 2021-12-29 ENCOUNTER — TELEPHONE (OUTPATIENT)
Dept: GYNECOLOGIC ONCOLOGY | Facility: CLINIC | Age: 46
End: 2021-12-29

## 2021-12-29 PROBLEM — Z79.899 ENCOUNTER FOR LONG-TERM (CURRENT) USE OF OTHER MEDICATIONS: Status: ACTIVE | Noted: 2021-12-29

## 2021-12-29 PROBLEM — Z51.81 ENCOUNTER FOR THERAPEUTIC DRUG MONITORING: Status: ACTIVE | Noted: 2021-12-29

## 2021-12-29 NOTE — TELEPHONE ENCOUNTER
Pt called to ask if she should be taking her Lenvima and if she should be trying to keep US and rad onc appts.  RN told her that she should continue to hold her Lenvima until the covid symptoms are completely gone.  RN stated that is she is feeling especially fatigued still then she should wait to resume closer to her infusion date.  RN told her that she really should keep the US and rad onc appts if she is able to.  Pt v/u.

## 2022-01-03 NOTE — PROGRESS NOTES
RE-EVALUATION    PATIENT:                                                      Jeana Pollack SAMI Chung  :                                                          1975  DATE:                          2022   DIAGNOSIS:     Endometrial cancer (HCC)  - FIGO Stage IIIA, calculated as Stage Unknown (pT3a, pNX, cM0)  1/3/22 patient has recurrent tumor in the pelvis         BRIEF HISTORY:  The patient is a very pleasant 46 y.o. female  we saw her on 2021 for recurrent endometrial cancer.  There is a full consult note dictated that day. She was to undergo treatment planning for palliative radiation recently but tested positive for Covid.  She has now recovered and ready to proceed with treatment planning.  Ms. Chung says she has been very irritable.  She cries easily.  She is living at home with her parents while her  is in [place].  She has seen Angelica Nolen once with a teleconference call.  She had an episode of vaginal bleeding again and had rectal bleeding associated with constipation.    Allergies   Allergen Reactions   • Bactrim [Sulfamethoxazole-Trimethoprim] Anaphylaxis   • Penicillins Hives   • Promethazine Mental Status Change       Review of Systems   Constitutional: Positive for fatigue.   Musculoskeletal: Positive for arthralgias.   Psychiatric/Behavioral: The patient is nervous/anxious.    All other systems reviewed and are negative.          Objective   VITAL SIGNS:   Vitals:    22 1232   BP: 146/69   Pulse: 90   Resp: 20   Temp: 96.4 °F (35.8 °C)   TempSrc: Temporal   SpO2: 98%   Weight: 79 kg (174 lb 1.6 oz)   PainSc:   8   PainLoc: Generalized        Karnofsky score: 80       Physical Exam  Vitals reviewed.   Constitutional:       Appearance: Normal appearance.   Neurological:      Mental Status: She is alert.   Psychiatric:      Comments: Tearful today              The following portions of the patient's history were reviewed and updated as appropriate: allergies,  current medications, past family history, past medical history, past social history, past surgical history and problem list.    Diagnoses and all orders for this visit:    Secondary malignant neoplasm of pelvis (HCC)      Ms. Chung has a 7.3 x 5.3 cm soft tissue mass extending in the vaginal vault that is concerning for recurrence.  She also had adenopathy in the iliac region on the left.  Small inguinal regional lymph nodes bilaterally.  Soft tissue mass: And likely extend into the wall of the sigmoid colon.  CT of the chest showed a new nodule identified in the medial aspect of the right lower lobe concerning for ligament.  This nodule measured 2.5 cm.  There was a small nodule identified right lung base concerning for malignancy as well measuring 7 mm.  There were 2 nodules identified abutting each other in the left lung base measuring 1.2 cm together. Biopsy was compatible with known endometrioid carcinoma.  She is here to discuss palliative radiation.           RECOMMENDATIONS: I recommend palliative radiation of 45 [place] in 25 fractions.  If she has a good response we can consider a brachytherapy implant by my partner Dr. Iglesia Licea.  The pros and cons, risks and benefits of external beam radiation were previously discussed and informed consent previously obtained.  Her questions were answered today.  We will start shortly.  I encouraged her to follow-up with SYLVIA Hummel. She met with our dietitian regarding constipation and we discussed the benefits of daily Metamucil. On 12/20/2021 her platelets were 54K.           Azul Coyle MD

## 2022-01-04 ENCOUNTER — APPOINTMENT (OUTPATIENT)
Dept: ONCOLOGY | Facility: HOSPITAL | Age: 47
End: 2022-01-04

## 2022-01-04 ENCOUNTER — HOSPITAL ENCOUNTER (OUTPATIENT)
Dept: RADIATION ONCOLOGY | Facility: HOSPITAL | Age: 47
Setting detail: RADIATION/ONCOLOGY SERIES
Discharge: HOME | End: 2022-01-04

## 2022-01-04 ENCOUNTER — HOSPITAL ENCOUNTER (OUTPATIENT)
Dept: RADIATION ONCOLOGY | Facility: HOSPITAL | Age: 47
Discharge: HOME | End: 2022-01-04

## 2022-01-04 ENCOUNTER — OFFICE VISIT (OUTPATIENT)
Dept: RADIATION ONCOLOGY | Facility: HOSPITAL | Age: 47
End: 2022-01-04

## 2022-01-04 VITALS
SYSTOLIC BLOOD PRESSURE: 146 MMHG | RESPIRATION RATE: 20 BRPM | HEART RATE: 90 BPM | TEMPERATURE: 96.4 F | OXYGEN SATURATION: 98 % | BODY MASS INDEX: 30.84 KG/M2 | WEIGHT: 174.1 LBS | DIASTOLIC BLOOD PRESSURE: 69 MMHG

## 2022-01-04 DIAGNOSIS — C79.89 SECONDARY MALIGNANT NEOPLASM OF PELVIS: Primary | ICD-10-CM

## 2022-01-04 PROCEDURE — G0463 HOSPITAL OUTPT CLINIC VISIT: HCPCS

## 2022-01-05 ENCOUNTER — APPOINTMENT (OUTPATIENT)
Dept: ONCOLOGY | Facility: HOSPITAL | Age: 47
End: 2022-01-05

## 2022-01-07 DIAGNOSIS — G62.9 NEUROPATHY: Primary | ICD-10-CM

## 2022-01-07 DIAGNOSIS — C54.1 ENDOMETRIAL CANCER: ICD-10-CM

## 2022-01-07 RX ORDER — OXYCODONE HYDROCHLORIDE 5 MG/1
5 TABLET ORAL EVERY 6 HOURS PRN
Qty: 60 TABLET | Refills: 0 | Status: SHIPPED | OUTPATIENT
Start: 2022-01-07 | End: 2022-01-27 | Stop reason: SDUPTHER

## 2022-01-07 RX ORDER — GABAPENTIN 600 MG/1
600 TABLET ORAL 3 TIMES DAILY
Qty: 90 TABLET | Refills: 3 | Status: SHIPPED | OUTPATIENT
Start: 2022-01-07 | End: 2022-03-15 | Stop reason: SDUPTHER

## 2022-01-07 NOTE — TELEPHONE ENCOUNTER
Pt called needing refills on pain medication and gabapentin.  Pt has been trying to take tylenol prn but it doesn't help and pt tries to not take ibuprofen.

## 2022-01-11 ENCOUNTER — TELEPHONE (OUTPATIENT)
Dept: GYNECOLOGIC ONCOLOGY | Facility: CLINIC | Age: 47
End: 2022-01-11

## 2022-01-13 PROCEDURE — 77301 RADIOTHERAPY DOSE PLAN IMRT: CPT | Performed by: RADIOLOGY

## 2022-01-13 NOTE — PROGRESS NOTES
"Jeana Chung RN  [id number]  1975      Reason for visit: Recurrent endometrial cancer, consideration of ongoing treatment    History of present illness:  The patient is a 46 y.o. year old female who presents today for treatment and evaluation of the above issues.     Patient was diagnosed with biopsy-proven recurrent endometrial cancer.  Her clinical course has been complicated due to thrombocytopenia (could not undergo Port-A-Cath insertion) and COVID infection 12/20/2021.  Treatment was held since COVID infection patient represents today for consideration of treatment.    Today,for her endometrial cancer, she has been receiving Keytruda and Lenvima and tolerated her last treatment without issue. She denies any itchy skin, rash, or diarrhea from the chemotherapy but notes that she did have an episode of itchy skin related to her mother changing detergents. She placed a call to radiation and is waiting to hear back from them next week. She did have a liver ultrasound this morning. The patient is followed by GI and was told she had IBS. She notes he also discussed a rare condition that can cause elevated bilirubin.     The patient notes that her dog scraped the scab off from the area she had previously burned. While she had COVID-19, she noted the area had a foul odor so she placed a Band-Aid over it but the Band-Aid affected her skin. Since then, she has been applying bacitracin and iodine to the affected area. She notes the area has improved but it continues to have an odor.     She does note that she has been bleeding easily. She accidentally scratched her nose as her nails are longer than she is used to and her nose started bleeding.     She recently had COVID-19 and felt that \"her brain was not functioning\" when she had it. She is starting to improve from a mental capacity and feels her energy level has improved. She did lose her sense of taste and smell for about 2 to 3 days. The patient did " "receive the monoclonal antibodies when she had COVID-19.     The patient had some issues with constipation when she had COVID-19. She was requiring a suppository to move her bowels. When she would push to have a bowel movement, she was experiencing vaginal bleeding but that has since stopped. She has started using MiraLAX. She does still have vaginal bleeding at baseline and continues to wear a pad. She occasionally experiences a random sharp back pain which is brief and then resolves. She occasionally has to take pain medication and her pain is mainly in her back. Today, she had to take a pain pill as she rated her pain as \"almost a 10.\" She currently has oxycodone 5 mg tablets but does not feel they are effective.     When she was in [place], she noted that she had a foot ulcer which was malodorous but she currently does not have any foot ulcers. She now wears clogs to prevent ulcers from occurring. She continues on gabapentin for the neuropathy.     OBGYN History:  She is a .  She does not use HRT. She has never had a pap smear.    Oncologic History:  Oncology/Hematology History   Endometrial cancer (HCC)   2019 Imaging    Presented to ED in [place] due to progressively heavy vaginal bleeding and severe back pain. Ultrasound revealed uterine and cervical masses.     2020 Biopsy    ED follow-up with gynecologist in [place], told she likely has cervical cancer. Insufficient tissue on attempted cervical biopsy. Patient moved to [place], [place] to be closer to mother and sister for work-up and treatment.      2020 Imaging    Gyn evaluation at [facility]. Repeat TVUS showed cervical mass, right adnexal mass, and large uterine fibroid vs mass. Referred to Gyn Oncology     2020 Initial Diagnosis    Endometrial cancer (CMS/HCC)  Cervical biopsy positive for infiltrating endometrioid adenocarcinoma     2020 Imaging    CT chest, abdomen, pelvis showed enlarged uterus with multiple " masses and 4 cm cystic/solid mass at right ovary. No metastatic disease noted in abdomen or chest.     2/10/2020 Surgery    Exploratory laparotomy, total abdominal hysterectomy, bilateral salpingo-oophorectomy, with optimal debulking (R=0), and omentectomy.    Pelvic washing cytology positive for malignant cells, consistent with adenocarcinoma. Final pathology showed large grade 3 endometrioid tumor with lymphvascular invasion at the uterus. Cervical stromal involvement, vaginal margin negative. Right tube and ovary involved. Left tube and ovary negative, omentum negative. MSI normal. [id number], Stage IIIA grade 3       3/9/2020 - 7/8/2020 Chemotherapy    OP UTERINE PACLitaxel / CARBOplatin (Q21D)  Cycle #1 complicated by pancytopenia, appendicitis, appendectomy, appendiceal abscess  Cycle #2 remarkable for thrombocytopenia, worsening neuropathy.  Dose modification with cycle #3.       3/17/2020 - 3/23/2020 Other Event    Hospital admission with acute appendicitis     5/21/2020 - 5/21/2020 Chemotherapy    OP CENTRAL VENOUS ACCESS DEVICE ACCESS, CARE, AND MAINTENANCE (CVAD)     7/8/2020 - 7/28/2020 Chemotherapy    OP OVARIAN DOCEtaxel / CARBOplatin     12/5/2021 Progression    Complains of vaginal bleeding.  CT scan chest abdomen and pelvis:  IMPRESSION:  Abnormal soft tissue mass seen at the vaginal vault with likely  extension to involve the sigmoid colon adjacent to the mass on the left.  Multiple new lung lesions including 2.5 cm medial right lower lobe nodule, 7 mm right peripheral lung nodule, to left small lung nodules measuring 1.2 cm together and smaller other lung nodules concerning for metastatic disease.  Office biopsy of vaginal mass performed 12/6/2021.  Final Diagnosis   VAGINA, BIOPSY:               Compatible with known endometrioid carcinoma.               See comment.          12/13/2021 -  Chemotherapy    Course complicated by COVID infection diagnosed 12/20/2021.  Thrombocytopenia, unknown  etiology.  OP ENDOMETRIAL Lenvatinib / Pembrolizumab 200 mg           Past Medical History:   Diagnosis Date   • Anemia    • Anxiety and depression    • Back pain    • Bronchitis    • Diabetes (HCC)     DX 4-5 YRS AGO, CHECKS BS 4X/DAY, LAST A1C 7.1%   • Endometrial cancer (HCC)     STAGE II   • GERD (gastroesophageal reflux disease)    • Hypertension    • IBS (irritable bowel syndrome)    • MRSA (methicillin resistant staph aureus) culture positive 2018    S/P NECROTIZING FASCITIS IN BILATERAL FEET   • Necrotizing fasciitis (HCC)    • PCOS (polycystic ovarian syndrome)    • PTSD (post-traumatic stress disorder)    • Retinopathy 3/18/2020   • Sepsis (HCC)    • Stage 3 chronic kidney disease (HCC) 2/24/2020   • Subconjunctival hemorrhage        Past Surgical History:   Procedure Laterality Date   • APPENDECTOMY N/A 3/21/2020    Procedure: APPENDECTOMY LAPAROSCOPIC;  Surgeon: Praful Myers MD;  Location:  [facility];  Service: General;  Laterality: N/A;   • EXPLORATORY LAPAROTOMY, TOTAL ABDOMINAL HYSTERECTOMY SALPINGO OOPHORECTOMY N/A 2/10/2020    Procedure: EXPLORATORY LAPAROTOMY, TOTAL ABDOMINAL HYSTERECTOMY, BILATERAL SALPINGO-OOPHORECTOMY WITH OPTIMAL  STAGING (R-0), OMENTECTOMY;  Surgeon: Celine Rubio MD;  Location:  [facility];  Service: Gynecology Oncology;  Laterality: N/A;   • EYE SURGERY Left     BLOOD VESSEL IN EYE REPAIR   • TOE AMPUTATION Left 06/2019    big toe - unhealing sore   • TOE AMPUTATION Right 2018    big toe and second toe - Necrotizing fasciitis and MRSA        MEDICATIONS: The current medication list was reviewed with the patient and updated in the EMR this date per the Medical Assistant. Medication dosages and frequencies were confirmed to be accurate.      Allergies:  is allergic to bactrim [sulfamethoxazole-trimethoprim], penicillins, and promethazine.    Social History:   Social History     Socioeconomic History   • Marital status:    • Number of children: 1   Tobacco  "Use   • Smoking status: Former Smoker     Types: Cigarettes     Quit date:      Years since quittin.0   • Smokeless tobacco: Never Used   • Tobacco comment: social MAYBE 1 CIG/DAY   Substance and Sexual Activity   • Alcohol use: Not Currently   • Drug use: Never   • Sexual activity: Not Currently       Family History:    Family History   Problem Relation Age of Onset   • Fibroids Mother    • Diabetes type II Mother    • Hypertension Mother    • Heart attack Mother    • Fibroids Sister         PCOS   • Diabetes type II Sister    • Dementia Maternal Grandmother    • Stroke Maternal Grandmother        Health Maintenance:    Health Maintenance   Topic Date Due   • COLORECTAL CANCER SCREENING  Never done   • Hepatitis B (1 of 3 - Risk 3-dose series) Never done   • TDAP/TD VACCINES (1 - Tdap) Never done   • ANNUAL WELLNESS VISIT  Never done   • MAMMOGRAM  Never done   • Pneumococcal Vaccine 0-64 (2 of 4 - PCV13) 2021   • DIABETIC FOOT EXAM  2021   • DIABETIC EYE EXAM  2021   • COVID-19 Vaccine (2 - Booster for Smiley series) 05/15/2021   • URINE MICROALBUMIN  2021   • HEMOGLOBIN A1C  2021   • INFLUENZA VACCINE  2021   • HEPATITIS C SCREENING  Completed   • PAP SMEAR  Discontinued     Review of systems:  See history of present illness    Physical Exam    Vitals:    22 1026   BP: 129/64   Pulse: 98   Resp: 18   SpO2: 99%   Weight: 78.2 kg (172 lb 4.8 oz)   Height: 160 cm (62.99\")   PainSc:   8   PainLoc: Back     Body mass index is 30.53 kg/m².    Wt Readings from Last 3 Encounters:   22 78.2 kg (172 lb 4.8 oz)   22 79 kg (174 lb 1.6 oz)   21 81.6 kg (180 lb)     GENERAL: Alert, somewhat ill-appearing female appearing her stated age who is in emotional distress.  Patient is obese by BMI criteria.  HEENT: Sclera anicteric. Head normocephalic, atraumatic. Mucus membranes moist.    NECK: Supple, free from thyromegaly  BREASTS: Deferred  CARDIOVASCULAR: " Slightly tachycardic otherwise regular rhythm without murmur rub or gallop, no extremity edema  RESPIRATORY: Clear to auscultation bilaterally, normal effort  BACK: No CVA tenderness, no vertebral tenderness on palpation  GASTROINTESTINAL: Abdomen soft, nontender, no rebound, no guarding. No palpable hernia at abdominal incision.  Incisions healed.  SKIN:  Warm, dry, well-perfused. Wound on right mid-abdomen measuring 3 x 2.5 cm.   PSYCHIATRIC: AO x3, with appropriate affect, normal thought processes.  NEUROLOGIC: No focal deficits. Moves extremities well.  MUSCULOSKELETAL: Normal gait and station.   EXTREMITIES:   No cyanosis, clubbing, symmetric.  LYMPHATICS: No adenopathy bilateral neck and groin areas     PELVIC exam:    Deferred    ECOG PS 1    PROCEDURES: Sharp debridement of wound at right mid abdomen was performed using scissors.  This was well-tolerated.  Healthy granulation tissue noted underneath.      Diagnostic Data:      Lab Results   Component Value Date    WBC 6.30 01/14/2022    HGB 11.3 (L) 01/14/2022    HCT 33.5 (L) 01/14/2022    MCV 96.7 01/14/2022    PLT 60 (L) 01/14/2022    NEUTROABS 4.40 01/14/2022    GLUCOSE 140 (H) 01/14/2022    BUN 15 01/14/2022    CREATININE 0.96 01/14/2022    EGFRIFNONA 62 01/14/2022     01/14/2022    K 4.1 01/14/2022     01/14/2022    CO2 26.0 01/14/2022    MG 1.5 (L) 12/20/2021    PHOS 2.5 03/20/2020    CALCIUM 9.3 01/14/2022    ALBUMIN 3.70 01/14/2022    AST 33 (H) 01/14/2022    ALT 9 01/14/2022    BILITOT 2.5 (H) 01/14/2022     Lab Results   Component Value Date     8.5 07/08/2020           Assessment/Plan   This is a 46 y.o. woman who presents with vaginal bleeding in the setting of Stage IIIA endometrial cancer s/p carboplatin and paclitaxel x6 cycles.  Encounter Diagnoses   Name Primary?   • Endometrial cancer (HCC) Yes   • Encounter for long-term (current) use of other medications    • Encounter for therapeutic drug monitoring    • Neuropathy    •  Secondary malignant neoplasm of pelvis (HCC)    • Burn    • Wound of abdomen      Endometrial cancer: Stage IIIa due to adnexal involvement, now with recurrence  -Patient would like Port-A-Cath  -She can proceed with administration of the Keytruda today.  Discussed Lenvima with chemo pharmacist and okay to proceed despite elevated bili.  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     Emotional distress related to cancer diagnosis  -Previously referred for counseling  - Ativan     Complex social issues  - Staying with her family in [place] currently    Chronic Medical Conditions  -T2DM, HTN, steatohepatitis:  ?  Esophageal varicosities and HORNE, history of necrotizing fasciitis.   - Has seen GI and has been told she has IBS.    Thrombocytopenia.   Her platelet count is 60.  This could be related to post COVID or to liver disease.  Post COVID infection favored.    Mid-abdominal wound.   The wound was irrigated, sharply debrided, and cleansed in the office. It does not appear to be infected. We will refer her to wound care for debridement.     Pain management.   In regards to her pain, she was advised that she could take 2 tablets of the oxycodone 5 mg for a total of 10 mg every 4 hours as needed.     Constipation.   For constipation, she is to continue with Dulcolax and laxatives, including MiraLAX.     Recent COVID-19.   She received monoclonal antibodies when she had COVID-19.       Pain assessment was performed today as a part of patient’s care.  For patients with pain related to surgery, gynecologic malignancy or cancer treatment, the plan is as noted in the assessment/plan.  For patients with pain not related to these issues, they are to seek any further needed care from  a more appropriate provider, such as PCP.    FOLLOW UP: Treatment consent/teaching. Follow-up in 3 weeks.     Patient was seen and examined with Dr. Cagle,  resident, who performed portions of the examination and documentation for this patient's care under my direct supervision.  I agree with the above documentation and plan.    Kaitlyn Reyna  01/17/22  17:36 EST    Transcribed from ambient dictation for Celine Rubio MD by Kaitlyn Reyna.  01/17/22   10:53 EST    Patient verbalized consent to the visit recording.

## 2022-01-14 ENCOUNTER — HOSPITAL ENCOUNTER (OUTPATIENT)
Dept: ONCOLOGY | Facility: HOSPITAL | Age: 47
Setting detail: INFUSION SERIES
Discharge: HOME | End: 2022-01-14

## 2022-01-14 ENCOUNTER — HOSPITAL ENCOUNTER (OUTPATIENT)
Dept: ONCOLOGY | Facility: HOSPITAL | Age: 47
Discharge: HOME | End: 2022-01-14

## 2022-01-14 ENCOUNTER — OFFICE VISIT (OUTPATIENT)
Dept: GYNECOLOGIC ONCOLOGY | Facility: CLINIC | Age: 47
End: 2022-01-14

## 2022-01-14 ENCOUNTER — HOSPITAL ENCOUNTER (OUTPATIENT)
Dept: ULTRASOUND IMAGING | Facility: HOSPITAL | Age: 47
Discharge: HOME | End: 2022-01-14

## 2022-01-14 VITALS
DIASTOLIC BLOOD PRESSURE: 64 MMHG | BODY MASS INDEX: 30.53 KG/M2 | OXYGEN SATURATION: 99 % | RESPIRATION RATE: 18 BRPM | WEIGHT: 172.3 LBS | SYSTOLIC BLOOD PRESSURE: 129 MMHG | HEART RATE: 98 BPM | HEIGHT: 63 IN

## 2022-01-14 DIAGNOSIS — C54.1 ENDOMETRIAL CANCER: ICD-10-CM

## 2022-01-14 DIAGNOSIS — G62.9 NEUROPATHY: ICD-10-CM

## 2022-01-14 DIAGNOSIS — Z51.81 ENCOUNTER FOR THERAPEUTIC DRUG MONITORING: ICD-10-CM

## 2022-01-14 DIAGNOSIS — Z79.899 ENCOUNTER FOR LONG-TERM (CURRENT) USE OF OTHER MEDICATIONS: ICD-10-CM

## 2022-01-14 DIAGNOSIS — T30.0 BURN: ICD-10-CM

## 2022-01-14 DIAGNOSIS — C54.1 ENDOMETRIAL CANCER: Primary | ICD-10-CM

## 2022-01-14 DIAGNOSIS — R74.8 ELEVATED LIVER ENZYMES: ICD-10-CM

## 2022-01-14 DIAGNOSIS — D69.6 THROMBOCYTOPENIA: ICD-10-CM

## 2022-01-14 DIAGNOSIS — Z79.899 ENCOUNTER FOR LONG-TERM (CURRENT) USE OF OTHER MEDICATIONS: Primary | ICD-10-CM

## 2022-01-14 DIAGNOSIS — C79.89 SECONDARY MALIGNANT NEOPLASM OF PELVIS: ICD-10-CM

## 2022-01-14 DIAGNOSIS — S31.109A WOUND OF ABDOMEN: ICD-10-CM

## 2022-01-14 LAB
ALBUMIN SERPL-MCNC: 3.7 G/DL (ref 3.5–5.2)
ALBUMIN/GLOB SERPL: 1.1 G/DL
ALP SERPL-CCNC: 85 U/L (ref 39–117)
ALT SERPL W P-5'-P-CCNC: 9 U/L (ref 1–33)
ANION GAP SERPL CALCULATED.3IONS-SCNC: 11 MMOL/L (ref 5–15)
AST SERPL-CCNC: 33 U/L (ref 1–32)
BILIRUB SERPL-MCNC: 2.5 MG/DL (ref 0–1.2)
BUN SERPL-MCNC: 15 MG/DL (ref 6–20)
BUN/CREAT SERPL: 15.6 (ref 7–25)
CALCIUM SPEC-SCNC: 9.3 MG/DL (ref 8.6–10.5)
CHLORIDE SERPL-SCNC: 102 MMOL/L (ref 98–107)
CO2 SERPL-SCNC: 26 MMOL/L (ref 22–29)
CREAT SERPL-MCNC: 0.96 MG/DL (ref 0.57–1)
ERYTHROCYTE [DISTWIDTH] IN BLOOD BY AUTOMATED COUNT: 16.3 % (ref 12.3–15.4)
GFR SERPL CREATININE-BSD FRML MDRD: 62 ML/MIN/1.73
GLOBULIN UR ELPH-MCNC: 3.3 GM/DL
GLUCOSE SERPL-MCNC: 140 MG/DL (ref 65–99)
HCT VFR BLD AUTO: 33.5 % (ref 34–46.6)
HGB BLD-MCNC: 11.3 G/DL (ref 12–15.9)
LYMPHOCYTES # BLD AUTO: 1.8 10*3/MM3 (ref 0.7–3.1)
LYMPHOCYTES NFR BLD AUTO: 28.6 % (ref 19.6–45.3)
MCH RBC QN AUTO: 32.6 PG (ref 26.6–33)
MCHC RBC AUTO-ENTMCNC: 33.7 G/DL (ref 31.5–35.7)
MCV RBC AUTO: 96.7 FL (ref 79–97)
MONOCYTES # BLD AUTO: 0.1 10*3/MM3 (ref 0.1–0.9)
MONOCYTES NFR BLD AUTO: 2.2 % (ref 5–12)
NEUTROPHILS NFR BLD AUTO: 4.4 10*3/MM3 (ref 1.7–7)
NEUTROPHILS NFR BLD AUTO: 69.2 % (ref 42.7–76)
PLATELET # BLD AUTO: 60 10*3/MM3 (ref 140–450)
PMV BLD AUTO: 8.3 FL (ref 6–12)
POTASSIUM SERPL-SCNC: 4.1 MMOL/L (ref 3.5–5.2)
PROT ?TM UR-MCNC: 16.7 MG/DL
PROT SERPL-MCNC: 7 G/DL (ref 6–8.5)
RBC # BLD AUTO: 3.46 10*6/MM3 (ref 3.77–5.28)
SODIUM SERPL-SCNC: 139 MMOL/L (ref 136–145)
T4 FREE SERPL-MCNC: 1.52 NG/DL (ref 0.93–1.7)
TSH SERPL DL<=0.05 MIU/L-ACNC: 1.99 UIU/ML (ref 0.27–4.2)
WBC NRBC COR # BLD: 6.3 10*3/MM3 (ref 3.4–10.8)

## 2022-01-14 PROCEDURE — 77338 DESIGN MLC DEVICE FOR IMRT: CPT | Performed by: RADIOLOGY

## 2022-01-14 PROCEDURE — 84439 ASSAY OF FREE THYROXINE: CPT | Performed by: OBSTETRICS & GYNECOLOGY

## 2022-01-14 PROCEDURE — 96413 CHEMO IV INFUSION 1 HR: CPT

## 2022-01-14 PROCEDURE — 77300 RADIATION THERAPY DOSE PLAN: CPT | Performed by: RADIOLOGY

## 2022-01-14 PROCEDURE — 84443 ASSAY THYROID STIM HORMONE: CPT | Performed by: OBSTETRICS & GYNECOLOGY

## 2022-01-14 PROCEDURE — 25010000002 PEMBROLIZUMAB 100 MG/4ML SOLUTION 4 ML VIAL: Performed by: OBSTETRICS & GYNECOLOGY

## 2022-01-14 PROCEDURE — 76981 USE PARENCHYMA: CPT

## 2022-01-14 PROCEDURE — 96366 THER/PROPH/DIAG IV INF ADDON: CPT

## 2022-01-14 PROCEDURE — 76705 ECHO EXAM OF ABDOMEN: CPT

## 2022-01-14 PROCEDURE — 84156 ASSAY OF PROTEIN URINE: CPT | Performed by: OBSTETRICS & GYNECOLOGY

## 2022-01-14 PROCEDURE — 85025 COMPLETE CBC W/AUTO DIFF WBC: CPT | Performed by: OBSTETRICS & GYNECOLOGY

## 2022-01-14 PROCEDURE — 99215 OFFICE O/P EST HI 40 MIN: CPT | Performed by: OBSTETRICS & GYNECOLOGY

## 2022-01-14 PROCEDURE — 80053 COMPREHEN METABOLIC PANEL: CPT | Performed by: OBSTETRICS & GYNECOLOGY

## 2022-01-14 PROCEDURE — 16020 DRESS/DEBRID P-THICK BURN S: CPT | Performed by: OBSTETRICS & GYNECOLOGY

## 2022-01-14 RX ORDER — SODIUM CHLORIDE 9 MG/ML
250 INJECTION, SOLUTION INTRAVENOUS ONCE
Status: CANCELLED | OUTPATIENT
Start: 2022-01-14

## 2022-01-14 RX ORDER — SODIUM CHLORIDE 9 MG/ML
250 INJECTION, SOLUTION INTRAVENOUS ONCE
Status: COMPLETED | OUTPATIENT
Start: 2022-01-14 | End: 2022-01-14

## 2022-01-14 RX ADMIN — SODIUM CHLORIDE 250 ML: 9 INJECTION, SOLUTION INTRAVENOUS at 12:10

## 2022-01-14 RX ADMIN — SODIUM CHLORIDE 200 MG: 9 INJECTION, SOLUTION INTRAVENOUS at 12:14

## 2022-01-18 ENCOUNTER — SPECIALTY PHARMACY (OUTPATIENT)
Dept: ONCOLOGY | Facility: HOSPITAL | Age: 47
End: 2022-01-18

## 2022-01-18 ENCOUNTER — TELEPHONE (OUTPATIENT)
Dept: GYNECOLOGIC ONCOLOGY | Facility: CLINIC | Age: 47
End: 2022-01-18

## 2022-01-18 DIAGNOSIS — K74.60 HEPATIC CIRRHOSIS, UNSPECIFIED HEPATIC CIRRHOSIS TYPE, UNSPECIFIED WHETHER ASCITES PRESENT: ICD-10-CM

## 2022-01-18 DIAGNOSIS — C54.1 ENDOMETRIAL CANCER: Primary | ICD-10-CM

## 2022-01-18 PROBLEM — D69.6 THROMBOCYTOPENIA (HCC): Status: ACTIVE | Noted: 2022-01-18

## 2022-01-18 NOTE — TELEPHONE ENCOUNTER
RN received notice from Dr. Rubio that pt needed a GI referral due to liver cirrhosis being present on US.  RN called pt to let her know and she v/u.  Referral was placed.

## 2022-01-24 ENCOUNTER — HOSPITAL ENCOUNTER (OUTPATIENT)
Dept: RADIATION ONCOLOGY | Facility: HOSPITAL | Age: 47
Discharge: HOME | End: 2022-01-24

## 2022-01-24 ENCOUNTER — LAB (OUTPATIENT)
Dept: LAB | Facility: HOSPITAL | Age: 47
End: 2022-01-24

## 2022-01-24 DIAGNOSIS — C54.1 ENDOMETRIAL CANCER: ICD-10-CM

## 2022-01-24 DIAGNOSIS — R42 VERTIGO: ICD-10-CM

## 2022-01-24 DIAGNOSIS — C54.1 ENDOMETRIAL CANCER: Primary | ICD-10-CM

## 2022-01-24 LAB
ALBUMIN SERPL-MCNC: 3.6 G/DL (ref 3.5–5.2)
ALBUMIN/GLOB SERPL: 1.4 G/DL
ALP SERPL-CCNC: 85 U/L (ref 39–117)
ALT SERPL W P-5'-P-CCNC: 18 U/L (ref 1–33)
ANION GAP SERPL CALCULATED.3IONS-SCNC: 9 MMOL/L (ref 5–15)
AST SERPL-CCNC: 46 U/L (ref 1–32)
BILIRUB SERPL-MCNC: 7.1 MG/DL (ref 0–1.2)
BUN SERPL-MCNC: 16 MG/DL (ref 6–20)
BUN/CREAT SERPL: 14.8 (ref 7–25)
CALCIUM SPEC-SCNC: 9 MG/DL (ref 8.6–10.5)
CHLORIDE SERPL-SCNC: 100 MMOL/L (ref 98–107)
CO2 SERPL-SCNC: 27 MMOL/L (ref 22–29)
CREAT SERPL-MCNC: 1.08 MG/DL (ref 0.57–1)
ERYTHROCYTE [DISTWIDTH] IN BLOOD BY AUTOMATED COUNT: 17.9 % (ref 12.3–15.4)
GFR SERPL CREATININE-BSD FRML MDRD: 54 ML/MIN/1.73
GLOBULIN UR ELPH-MCNC: 2.6 GM/DL
GLUCOSE SERPL-MCNC: 198 MG/DL (ref 65–99)
HCT VFR BLD AUTO: 33.6 % (ref 34–46.6)
HGB BLD-MCNC: 11.6 G/DL (ref 12–15.9)
LYMPHOCYTES # BLD AUTO: 1.3 10*3/MM3 (ref 0.7–3.1)
LYMPHOCYTES NFR BLD AUTO: 21.4 % (ref 19.6–45.3)
MAGNESIUM SERPL-MCNC: 1.8 MG/DL (ref 1.6–2.6)
MCH RBC QN AUTO: 33.8 PG (ref 26.6–33)
MCHC RBC AUTO-ENTMCNC: 34.5 G/DL (ref 31.5–35.7)
MCV RBC AUTO: 97.8 FL (ref 79–97)
MONOCYTES # BLD AUTO: 0.3 10*3/MM3 (ref 0.1–0.9)
MONOCYTES NFR BLD AUTO: 4.8 % (ref 5–12)
NEUTROPHILS NFR BLD AUTO: 4.4 10*3/MM3 (ref 1.7–7)
NEUTROPHILS NFR BLD AUTO: 73.8 % (ref 42.7–76)
PLATELET # BLD AUTO: 90 10*3/MM3 (ref 140–450)
PMV BLD AUTO: 6.4 FL (ref 6–12)
POTASSIUM SERPL-SCNC: 4.4 MMOL/L (ref 3.5–5.2)
PROT SERPL-MCNC: 6.2 G/DL (ref 6–8.5)
RBC # BLD AUTO: 3.43 10*6/MM3 (ref 3.77–5.28)
SODIUM SERPL-SCNC: 136 MMOL/L (ref 136–145)
WBC NRBC COR # BLD: 5.9 10*3/MM3 (ref 3.4–10.8)

## 2022-01-24 PROCEDURE — 85025 COMPLETE CBC W/AUTO DIFF WBC: CPT

## 2022-01-24 PROCEDURE — 80053 COMPREHEN METABOLIC PANEL: CPT

## 2022-01-24 PROCEDURE — 83735 ASSAY OF MAGNESIUM: CPT

## 2022-01-24 PROCEDURE — 36415 COLL VENOUS BLD VENIPUNCTURE: CPT

## 2022-01-24 RX ORDER — PROCHLORPERAZINE MALEATE 10 MG
10 TABLET ORAL EVERY 6 HOURS PRN
Qty: 30 TABLET | Refills: 3 | Status: SHIPPED | OUTPATIENT
Start: 2022-01-24 | End: 2022-05-25

## 2022-01-24 RX ORDER — MECLIZINE HYDROCHLORIDE 25 MG/1
25 TABLET ORAL 3 TIMES DAILY PRN
Qty: 15 TABLET | Refills: 0 | Status: SHIPPED | OUTPATIENT
Start: 2022-01-24 | End: 2022-05-23 | Stop reason: SDUPTHER

## 2022-01-24 NOTE — TELEPHONE ENCOUNTER
Pt called and stated that she has been experiencing a lot of vertigo and car sickness.  She stated that she can be walking around her house and she will become very dizzy and nauseous and the zofran does not help at all.  Pt states riding in a car is almost unbearable.      RN spoke with Saima WARD who ordered lab work and will potentially do some anti-vert medication based on what that lab work shows.      RN relayed plan to pt who v/u.

## 2022-01-25 ENCOUNTER — HOSPITAL ENCOUNTER (OUTPATIENT)
Dept: RADIATION ONCOLOGY | Facility: HOSPITAL | Age: 47
Discharge: HOME | End: 2022-01-25

## 2022-01-25 VITALS — BODY MASS INDEX: 29.2 KG/M2 | WEIGHT: 164.8 LBS

## 2022-01-25 PROCEDURE — 77386: CPT | Performed by: RADIOLOGY

## 2022-01-26 ENCOUNTER — HOSPITAL ENCOUNTER (OUTPATIENT)
Dept: RADIATION ONCOLOGY | Facility: HOSPITAL | Age: 47
Discharge: HOME | End: 2022-01-26

## 2022-01-26 PROCEDURE — 77386: CPT | Performed by: RADIOLOGY

## 2022-01-27 ENCOUNTER — DOCUMENTATION (OUTPATIENT)
Dept: NUTRITION | Facility: HOSPITAL | Age: 47
End: 2022-01-27

## 2022-01-27 ENCOUNTER — HOSPITAL ENCOUNTER (OUTPATIENT)
Dept: RADIATION ONCOLOGY | Facility: HOSPITAL | Age: 47
Discharge: HOME | End: 2022-01-27

## 2022-01-27 DIAGNOSIS — C54.1 ENDOMETRIAL CANCER: ICD-10-CM

## 2022-01-27 PROCEDURE — 77386: CPT | Performed by: RADIOLOGY

## 2022-01-27 RX ORDER — OXYCODONE HYDROCHLORIDE 10 MG/1
10 TABLET ORAL EVERY 6 HOURS PRN
Qty: 60 TABLET | Refills: 0 | Status: SHIPPED | OUTPATIENT
Start: 2022-01-27 | End: 2022-03-08

## 2022-01-27 RX ORDER — OXYCODONE HYDROCHLORIDE 10 MG/1
10 TABLET ORAL EVERY 6 HOURS PRN
Qty: 60 TABLET | Refills: 0 | Status: SHIPPED | OUTPATIENT
Start: 2022-01-27 | End: 2022-01-27 | Stop reason: SDUPTHER

## 2022-01-27 NOTE — TELEPHONE ENCOUNTER
RN called pt back and asked about the confusion with the script.  Pt stated that Dr. Rubio told her she could take 10mg every 6 hours at her last appt but a new script was not called in so she is running low on the 5mg tabs that she has.  RN stated she would send a new script to Dr. Rubio for approval.  Pt v/u.

## 2022-01-27 NOTE — PROGRESS NOTES
[facility] Nutrition    Diagnosis: Recurrent endometrial cancer / Cervical biopsy positive for infiltrating endometrioid adenocarcinoma  Surgery: Exp lap / REGINALDO / BSO / optimal debulking / omentectomy (2/10/20)  Chemotherapy: Carbo / Taxol x 5 cycles + Carbo / Taxotere x 1 cycle (completed 7/2020)  Current Treatment: Keytruda - every 21 days + Lenvatinib - po daily / 2 of 35 cycles completed  Radiation: 45 Gray in 25 fractions / If she has a good response, consideration for a brachytherapy implant      Weight 164.8 lbs / 16 lbs weight loss past month (UBW past 6 months has been approximately 180 lbs) - 9% weight loss    Patient is living with her mother, who is her primary caregiver while undergoing treatment.  Her  lives in [place].      Discussed weight loss, which she attributes to the ongoing diarrhea; states that whatever she eats, goes right through her.  Discussed diet modification and reviewed current nutritional intake.  Discussed the types of fiber, identifying insoluble and soluble with recommendations to focus on soluble fiber foods that could improve the frequency and consistency of bowel movements.  Also discussed the advantage of using a soluble fiber supplement such as Metamucil, Benefiber or Citracel to aid with management of the treatment side effect of possible more frequent loose stools as the treatment plan progresses; provided guidance for use.    Patient is diabetic; monitoring blood sugars, as well as modification of diet for control.    Will continue to follow closely.

## 2022-01-27 NOTE — TELEPHONE ENCOUNTER
Caller: Jeana Chung, RN    Relationship: Self    Best call back number: [phone]    Requested Prescriptions:   Requested Prescriptions     Pending Prescriptions Disp Refills   • oxyCODONE (ROXICODONE) 5 MG immediate release tablet 60 tablet 0     Sig: Take 1 tablet by mouth Every 6 (Six) Hours As Needed for Severe Pain .        Pharmacy where request should be sent: [id number] [place] [address] AT [facility] [address]  - [phone]  - [phone] FX     Additional details provided by patient:  PATIENT STATES DR. REYNOSO INCREASED THIS SCRIPT TO 60MG & LAST TIME PATIENT GOT 60 PILLS.    Does the patient have less than a 3 day supply:  [x] Yes  [] No    Luis Hinson Rep   01/27/22 12:43 EST

## 2022-01-28 ENCOUNTER — HOSPITAL ENCOUNTER (OUTPATIENT)
Dept: RADIATION ONCOLOGY | Facility: HOSPITAL | Age: 47
Discharge: HOME | End: 2022-01-28

## 2022-01-28 PROCEDURE — 77386: CPT | Performed by: RADIOLOGY

## 2022-01-28 PROCEDURE — 77336 RADIATION PHYSICS CONSULT: CPT | Performed by: RADIOLOGY

## 2022-02-01 ENCOUNTER — OFFICE VISIT (OUTPATIENT)
Dept: GASTROENTEROLOGY | Facility: CLINIC | Age: 47
End: 2022-02-01

## 2022-02-01 ENCOUNTER — HOSPITAL ENCOUNTER (OUTPATIENT)
Dept: RADIATION ONCOLOGY | Facility: HOSPITAL | Age: 47
Setting detail: RADIATION/ONCOLOGY SERIES
Discharge: HOME | End: 2022-02-01

## 2022-02-01 ENCOUNTER — HOSPITAL ENCOUNTER (OUTPATIENT)
Dept: RADIATION ONCOLOGY | Facility: HOSPITAL | Age: 47
Discharge: HOME | End: 2022-02-01

## 2022-02-01 VITALS
SYSTOLIC BLOOD PRESSURE: 165 MMHG | DIASTOLIC BLOOD PRESSURE: 73 MMHG | WEIGHT: 169 LBS | BODY MASS INDEX: 31.1 KG/M2 | TEMPERATURE: 97.3 F | HEIGHT: 62 IN | HEART RATE: 96 BPM

## 2022-02-01 VITALS — WEIGHT: 165.6 LBS | BODY MASS INDEX: 30.29 KG/M2

## 2022-02-01 DIAGNOSIS — K75.81 NASH (NONALCOHOLIC STEATOHEPATITIS): ICD-10-CM

## 2022-02-01 DIAGNOSIS — R74.8 ELEVATED LIVER ENZYMES: ICD-10-CM

## 2022-02-01 DIAGNOSIS — E80.6 HYPERBILIRUBINEMIA: ICD-10-CM

## 2022-02-01 DIAGNOSIS — K74.69 OTHER CIRRHOSIS OF LIVER: Primary | ICD-10-CM

## 2022-02-01 DIAGNOSIS — Z12.11 SCREEN FOR COLON CANCER: ICD-10-CM

## 2022-02-01 DIAGNOSIS — D69.6 THROMBOCYTOPENIA: ICD-10-CM

## 2022-02-01 PROCEDURE — 99214 OFFICE O/P EST MOD 30 MIN: CPT | Performed by: NURSE PRACTITIONER

## 2022-02-01 PROCEDURE — 77386: CPT | Performed by: RADIOLOGY

## 2022-02-01 RX ORDER — PREDNISONE 10 MG/1
TABLET ORAL
Qty: 105 TABLET | Refills: 0 | Status: SHIPPED | OUTPATIENT
Start: 2022-02-01 | End: 2022-02-01

## 2022-02-01 RX ORDER — PEMBROLIZUMAB 25 MG/ML
100 INJECTION, SOLUTION INTRAVENOUS
COMMUNITY
End: 2022-05-25

## 2022-02-01 RX ORDER — URSODIOL 250 MG/1
250 TABLET, FILM COATED ORAL EVERY MORNING
Qty: 30 TABLET | Refills: 5 | Status: SHIPPED | OUTPATIENT
Start: 2022-02-01 | End: 2022-02-10 | Stop reason: ALTCHOICE

## 2022-02-01 RX ORDER — URSODIOL 500 MG/1
500 TABLET, FILM COATED ORAL EVERY EVENING
Qty: 30 TABLET | Refills: 5 | Status: SHIPPED | OUTPATIENT
Start: 2022-02-01 | End: 2022-09-14 | Stop reason: SDUPTHER

## 2022-02-01 NOTE — PROGRESS NOTES
GASTROENTEROLOGY OFFICE NOTE  Jeana Chung, RN  [id number]  1975    CARE TEAM  Patient Care Team:  Tania Angeles PA-C as PCP - General (Physician Assistant)  Celine Rubio MD as Consulting Physician (Gynecologic Oncology)  Miri Lorenzo CNM as Midwife (Certified Nurse Midwife)    Referring Provider: Tania Angeles PA-C    Chief Complaint   Patient presents with   • Hepatic Disease   • Abdominal Pain   • Diarrhea   • Constipation   • Nausea        HISTORY OF PRESENT ILLNESS:  Ms. Chung is a very pleasant 46-year-old female with HORNE cirrhosis, chronic hyperbilirubinemia of unclear etiology and recurrent endometrial cancer seen today for jaundice, increase in bilirubin after initiating treatment for endometrial cancer with Keytruda and Lenvima. Pretreatment blood work reveals AST 33/ALT 9/ALP 85/T bili 2.5, posttreatment AST 46/ALT 18/ALP 85/T bili 7.1.    She reports that she has had elevated bilirubin chronically, review of medical records available through care everywhere show that her bilirubin is typically elevated but less than 3 first noted in January 2021 with slightly elevated liver enzymes; AST 38/ALT 18//T bili 1.4 (January 2021), AST 39/ALT 20//T bili 1.5 (February 2021). At that time she was evaluated for etiology of elevated liver enzymes, serologic testing was unremarkable. She was diagnosed with HORNE cirrhosis. She reports she was told by one doctor that she had cirrhosis and another that she did not. She has not had a liver biopsy. Ultrasound in August 2021 shows liver diffusely infiltrated with fat and a focal area of echogenicity within the right lobe of the liver that may reflect calcification. Spleen measured 14 cm and appeared normal. She tells me that she was then  evaluated by gastroenterologist in [place] for elevated bilirubin and was told that she had a rare disease that causes hyperbilirubinemia.    More recently ultrasound  liver with elastography on 1/14/2021 shows coarsened heterogeneous echotexture of the liver parenchyma with shear wave elastography correlating with liver fibrosis Metavir score of F4.       PAST MEDICAL HISTORY  Past Medical History:   Diagnosis Date   • Anemia    • Anxiety and depression    • Back pain    • Bronchitis    • Diabetes (HCC)     DX 4-5 YRS AGO, CHECKS BS 4X/DAY, LAST A1C 7.1%   • Endometrial cancer (HCC)     STAGE II   • GERD (gastroesophageal reflux disease)    • Hypertension    • IBS (irritable bowel syndrome)    • MRSA (methicillin resistant staph aureus) culture positive 2018    S/P NECROTIZING FASCITIS IN BILATERAL FEET   • Necrotizing fasciitis (HCC)    • PCOS (polycystic ovarian syndrome)    • PTSD (post-traumatic stress disorder)    • Retinopathy 3/18/2020   • Sepsis (HCC)    • Stage 3 chronic kidney disease (HCC) 2/24/2020   • Subconjunctival hemorrhage         PAST SURGICAL HISTORY  Past Surgical History:   Procedure Laterality Date   • APPENDECTOMY N/A 3/21/2020    Procedure: APPENDECTOMY LAPAROSCOPIC;  Surgeon: Praful Myers MD;  Location: [facility];  Service: General;  Laterality: N/A;   • EXPLORATORY LAPAROTOMY, TOTAL ABDOMINAL HYSTERECTOMY SALPINGO OOPHORECTOMY N/A 2/10/2020    Procedure: EXPLORATORY LAPAROTOMY, TOTAL ABDOMINAL HYSTERECTOMY, BILATERAL SALPINGO-OOPHORECTOMY WITH OPTIMAL  STAGING (R-0), OMENTECTOMY;  Surgeon: Celine Rubio MD;  Location: [facility];  Service: Gynecology Oncology;  Laterality: N/A;   • EYE SURGERY Left     BLOOD VESSEL IN EYE REPAIR   • TOE AMPUTATION Left 06/2019    big toe - unhealing sore   • TOE AMPUTATION Right 2018    big toe and second toe - Necrotizing fasciitis and MRSA         MEDICATIONS:    Current Outpatient Medications:   •  Continuous Blood Gluc Sensor (FreeStyle Michelle 14 Day Sensor) misc, 1 each Every 14 (Fourteen) Days., Disp: 2 each, Rfl: 3  •  Continuous Blood Gluc Sensor (FREESTYLE MICHELLE SENSOR SYSTEM), Every 14 (Fourteen)  Days., Disp: 1 each, Rfl: 1  •  gabapentin (NEURONTIN) 600 MG tablet, Take 1 tablet by mouth 3 (Three) Times a Day., Disp: 90 tablet, Rfl: 3  •  insulin aspart (novoLOG) 100 UNIT/ML injection, Inject 8 Units under the skin into the appropriate area as directed Every Morning. Then sliding scale after meals., Disp: , Rfl:   •  Lenvatinib, 14 MG Daily Dose, 10 & 4 MG capsule therapy, Take 14 mg by mouth Daily. Take with or without food., Disp: 60 each, Rfl: 5  •  LORazepam (Ativan) 1 MG tablet, Take 1 tablet by mouth Every 6 (Six) Hours As Needed for Anxiety., Disp: 30 tablet, Rfl: 3  •  meclizine (ANTIVERT) 25 MG tablet, Take 1 tablet by mouth 3 (Three) Times a Day As Needed for Dizziness or Nausea., Disp: 15 tablet, Rfl: 0  •  MULTIPLE VITAMIN PO, Multiple Vitamin, Disp: , Rfl:   •  omeprazole (PrilOSEC) 20 MG capsule, Take 1 capsule by mouth Daily., Disp: 90 capsule, Rfl: 1  •  ondansetron (ZOFRAN) 8 MG tablet, Take 1 tablet by mouth 3 (Three) Times a Day As Needed for Nausea or Vomiting., Disp: 30 tablet, Rfl: 5  •  ondansetron ODT (Zofran ODT) 8 MG disintegrating tablet, Place 1 tablet on the tongue Every 8 (Eight) Hours As Needed for Nausea or Vomiting., Disp: 30 tablet, Rfl: 2  •  oxyCODONE (ROXICODONE) 10 MG tablet, Take 1 tablet by mouth Every 6 (Six) Hours As Needed for Severe Pain ., Disp: 60 tablet, Rfl: 0  •  Pembrolizumab (Keytruda) 100 MG/4ML solution, Infuse 100 mg into a venous catheter., Disp: , Rfl:   •  Polyethylene Glycol 3350 (MIRALAX PO), Take 17 g by mouth Daily., Disp: , Rfl:   •  prochlorperazine (COMPAZINE) 10 MG tablet, Take 1 tablet by mouth Every 6 (Six) Hours As Needed for Nausea or Vomiting., Disp: 30 tablet, Rfl: 3  •  ursodiol (ACTIGALL) 250 MG tablet, Take 1 tablet by mouth Every Morning., Disp: 30 tablet, Rfl: 5  •  ursodiol (ACTIGALL) 500 MG tablet, Take 1 tablet by mouth Every Evening., Disp: 30 tablet, Rfl: 5    ALLERGIES  Allergies   Allergen Reactions   • Bactrim  "[Sulfamethoxazole-Trimethoprim] Anaphylaxis   • Penicillins Hives   • Promethazine Mental Status Change       FAMILY HISTORY:  Family History   Problem Relation Age of Onset   • Fibroids Mother    • Diabetes type II Mother    • Hypertension Mother    • Heart attack Mother    • Fibroids Sister         PCOS   • Diabetes type II Sister    • Dementia Maternal Grandmother    • Stroke Maternal Grandmother        SOCIAL HISTORY  Social History     Socioeconomic History   • Marital status:    • Number of children: 1   Tobacco Use   • Smoking status: Former Smoker     Types: Cigarettes     Quit date: 2000     Years since quittin.1   • Smokeless tobacco: Never Used   • Tobacco comment: social MAYBE 1 CIG/DAY   Vaping Use   • Vaping Use: Never used   Substance and Sexual Activity   • Alcohol use: Not Currently   • Drug use: Never   • Sexual activity: Not Currently         PHYSICAL EXAM   /73 (BP Location: Right arm, Patient Position: Sitting, Cuff Size: Adult)   Pulse 96   Temp 97.3 °F (36.3 °C) (Temporal)   Ht 157.5 cm (62\")   Wt 76.7 kg (169 lb)   LMP  (LMP Unknown)   BMI 30.91 kg/m²   Physical Exam  Constitutional:       General: She is not in acute distress.  HENT:      Head: Normocephalic and atraumatic.   Eyes:      General: Scleral icterus present.      Pupils: Pupils are equal, round, and reactive to light.   Cardiovascular:      Rate and Rhythm: Normal rate and regular rhythm.   Pulmonary:      Effort: Pulmonary effort is normal.   Abdominal:      General: Bowel sounds are normal. There is no distension.      Palpations: Abdomen is soft.      Tenderness: There is no abdominal tenderness.   Musculoskeletal:      Cervical back: Neck supple.   Skin:     General: Skin is warm and dry.      Coloration: Skin is jaundiced.   Neurological:      Mental Status: She is alert and oriented to person, place, and time.   Psychiatric:         Mood and Affect: Mood normal.         Thought Content: Thought " content normal.           Results Review:  Results for JOSE DAVID PARISH RN (MRN [id number]) as of 2/1/2022 11:24   Ref. Range 1/14/2022 09:07 1/24/2022 15:20   Glucose Latest Ref Range: 65 - 99 mg/dL 140 (H) 198 (H)   Sodium Latest Ref Range: 136 - 145 mmol/L 139 136   Potassium Latest Ref Range: 3.5 - 5.2 mmol/L 4.1 4.4   CO2 Latest Ref Range: 22.0 - 29.0 mmol/L 26.0 27.0   Chloride Latest Ref Range: 98 - 107 mmol/L 102 100   Anion Gap Latest Ref Range: 5.0 - 15.0 mmol/L 11.0 9.0   Creatinine Latest Ref Range: 0.57 - 1.00 mg/dL 0.96 1.08 (H)   BUN Latest Ref Range: 6 - 20 mg/dL 15 16   BUN/Creatinine Ratio Latest Ref Range: 7.0 - 25.0  15.6 14.8   Calcium Latest Ref Range: 8.6 - 10.5 mg/dL 9.3 9.0   eGFR Non African Am Latest Ref Range: >60 mL/min/1.73 62 54 (L)   Alkaline Phosphatase Latest Ref Range: 39 - 117 U/L 85 85   Total Protein Latest Ref Range: 6.0 - 8.5 g/dL 7.0 6.2   ALT (SGPT) Latest Ref Range: 1 - 33 U/L 9 18   AST (SGOT) Latest Ref Range: 1 - 32 U/L 33 (H) 46 (H)   Total Bilirubin Latest Ref Range: 0.0 - 1.2 mg/dL 2.5 (H) 7.1 (H)   Albumin Latest Ref Range: 3.50 - 5.20 g/dL 3.70 3.60   Globulin Latest Units: gm/dL 3.3 2.6   A/G Ratio Latest Units: g/dL 1.1 1.4   Results for JOSE DAVID PARISH RN (MRN [id number]) as of 2/1/2022 11:24   Ref. Range 1/14/2022 09:07 1/24/2022 15:20   WBC Latest Ref Range: 3.40 - 10.80 10*3/mm3 6.30 5.90   RBC Latest Ref Range: 3.77 - 5.28 10*6/mm3 3.46 (L) 3.43 (L)   Hemoglobin Latest Ref Range: 12.0 - 15.9 g/dL 11.3 (L) 11.6 (L)   Hematocrit Latest Ref Range: 34.0 - 46.6 % 33.5 (L) 33.6 (L)   RDW Latest Ref Range: 12.3 - 15.4 % 16.3 (H) 17.9 (H)   MCV Latest Ref Range: 79.0 - 97.0 fL 96.7 97.8 (H)   MCH Latest Ref Range: 26.6 - 33.0 pg 32.6 33.8 (H)   MCHC Latest Ref Range: 31.5 - 35.7 g/dL 33.7 34.5   MPV Latest Ref Range: 6.0 - 12.0 fL 8.3 6.4    Latest Ref Range: 140 - 450 10*3/mm3 60 (L) 90 (L)       EXAMINATION: US LIVER-01/14/2022:     INDICATION:  Elevated liver enzymes, low platelets;  D69.6-Thrombocytopenia, unspecified; R74.8-Abnormal levels of other  serum enzymes.      TECHNIQUE: Ultrasound right upper quadrant abdomen and liver with  elastography performed.     COMPARISON: NONE.     FINDINGS:  The visualized portions of the pancreas are within normal  limits.     The liver demonstrates coarsened echotexture throughout a heterogeneous  hepatic parenchyma without focal liver lesion.     Shear wave elastography performed with multiple data points obtained of  the hepatic parenchyma with a mean of 2.5 m/s correlating with liver  fibrosis quantification Metavir score of F4 (severe).     The gallbladder is present without cholelithiasis, gallbladder wall  thickening or pericholecystic fluid.     The common bile duct measures 4 mm in diameter within normal limits.     The right kidney measures 11.4 cm in length without evidence of contour  deforming mass or obvious calculi with questionable mild hydronephrosis.     IMPRESSION:  Coarsened heterogeneous echotexture of the liver parenchyma  consistent with parenchymal disease process. Shear wave elastography  performed with multiple data points obtained of the hepatic parenchyma  with a mean of 2.5 m/s correlating with liver fibrosis quantification  Metavir score of F4 (severe).        ASSESSMENT / PLAN  1. Abnormal liver enzymes and hyperbilirubinemia of unknown etiology -consider DILI secondary to Keytruda, although this is typically hepatocellular injury versus PSC. Will treat with Ursodiol and consider steroids if ineffective or liver enzymes elevate  -Ursodiol 750 mg daily     2. Chronic Hyperbilirubinemia  -MRCP    3. HORNE  ?Cirrhosis  4. Thrombocytopenia  -EGD for variceal surveillance  -CBC, CMP, PT/INR, AFP marker    5. Screen for colon cancer  -Colonoscopy for colorectal cancer screening    Return for Follow up after procedures.    I discussed the patients findings and my recommendations with  patient    Dustin Stewart, APRN

## 2022-02-02 ENCOUNTER — HOSPITAL ENCOUNTER (OUTPATIENT)
Dept: RADIATION ONCOLOGY | Facility: HOSPITAL | Age: 47
Discharge: HOME | End: 2022-02-02

## 2022-02-02 ENCOUNTER — HOSPITAL ENCOUNTER (OUTPATIENT)
Dept: PHYSICAL THERAPY | Facility: HOSPITAL | Age: 47
Setting detail: THERAPIES SERIES
Discharge: HOME | End: 2022-02-02

## 2022-02-02 DIAGNOSIS — T21.22XD PARTIAL THICKNESS BURN OF ABDOMEN, SUBSEQUENT ENCOUNTER: Primary | ICD-10-CM

## 2022-02-02 PROCEDURE — 97162 PT EVAL MOD COMPLEX 30 MIN: CPT

## 2022-02-02 PROCEDURE — 97597 DBRDMT OPN WND 1ST 20 CM/<: CPT

## 2022-02-02 PROCEDURE — 77386: CPT | Performed by: RADIOLOGY

## 2022-02-03 ENCOUNTER — HOSPITAL ENCOUNTER (OUTPATIENT)
Dept: RADIATION ONCOLOGY | Facility: HOSPITAL | Age: 47
Discharge: HOME | End: 2022-02-03

## 2022-02-03 PROCEDURE — 77386: CPT | Performed by: RADIOLOGY

## 2022-02-03 NOTE — PROGRESS NOTES
Jeana Chung, SAMI  [id number]  1975    Reason for visit:  Recurrent endometrial cancer, consideration of ongoing treatment     History of present illness:  The patient is a 46 y.o. year old female who presents today for treatment and evaluation of the above issues.     Patient was diagnosed with biopsy-proven recurrent endometrial cancer. She is currently receiving Keytruda and Lenvima. Her clinical course has been complicated due to thrombocytopenia (could not undergo Port-A-Cath insertion) and COVID infection 2021. One cycle of Keytruda was held due to COVID, but she restarted treatment at her last visit three weeks ago.      Today, Ms. Chung has significant jaundice which she reports has been developing over the past few weeks. She does report frequent loose bowel movements over the past week. She reports she has been started on ursodiol by her GI team and she is wondering whether this is contributing to her diarrhea (8 times a day, fecal incontinence). She has been taking Lenvima daily. She endorses fatigue particularly following radiation treatments. She also reports dry, sore throat that is worse with swallowing.    She reports that her prior burn site has been improving with wound care. She also reports improvement in her vaginal bleeding, and improved appetite.     OBGYN History:  She is a .  She does not use HRT. She has never had a pap smear.    Oncologic History:  Oncology/Hematology History   Endometrial cancer (HCC)   2019 Imaging    Presented to ED in [place] due to progressively heavy vaginal bleeding and severe back pain. Ultrasound revealed uterine and cervical masses.     2020 Biopsy    ED follow-up with gynecologist in [place], told she likely has cervical cancer. Insufficient tissue on attempted cervical biopsy. Patient moved to [place], [place] to be closer to mother and sister for work-up and treatment.      2020 Imaging    Gyn evaluation at [facility]. Repeat TVUS showed cervical mass, right adnexal mass, and large uterine fibroid vs mass. Referred to Gyn Oncology     1/23/2020 Initial Diagnosis    Endometrial cancer (CMS/HCC)  Cervical biopsy positive for infiltrating endometrioid adenocarcinoma     1/30/2020 Imaging    CT chest, abdomen, pelvis showed enlarged uterus with multiple masses and 4 cm cystic/solid mass at right ovary. No metastatic disease noted in abdomen or chest.     2/10/2020 Surgery    Exploratory laparotomy, total abdominal hysterectomy, bilateral salpingo-oophorectomy, with optimal debulking (R=0), and omentectomy.    Pelvic washing cytology positive for malignant cells, consistent with adenocarcinoma. Final pathology showed large grade 3 endometrioid tumor with lymphvascular invasion at the uterus. Cervical stromal involvement, vaginal margin negative. Right tube and ovary involved. Left tube and ovary negative, omentum negative. MSI normal. [id number], Stage IIIA grade 3       3/9/2020 - 7/8/2020 Chemotherapy    OP UTERINE PACLitaxel / CARBOplatin (Q21D)  Cycle #1 complicated by pancytopenia, appendicitis, appendectomy, appendiceal abscess  Cycle #2 remarkable for thrombocytopenia, worsening neuropathy.  Dose modification with cycle #3.       3/17/2020 - 3/23/2020 Other Event    Hospital admission with acute appendicitis     5/21/2020 - 5/21/2020 Chemotherapy    OP CENTRAL VENOUS ACCESS DEVICE ACCESS, CARE, AND MAINTENANCE (CVAD)     7/8/2020 - 7/28/2020 Chemotherapy    OP OVARIAN DOCEtaxel / CARBOplatin     12/5/2021 Progression    Complains of vaginal bleeding.  CT scan chest abdomen and pelvis:  IMPRESSION:  Abnormal soft tissue mass seen at the vaginal vault with likely  extension to involve the sigmoid colon adjacent to the mass on the left.  Multiple new lung lesions including 2.5 cm medial right lower lobe nodule, 7 mm right peripheral lung nodule, to left small lung nodules measuring 1.2 cm together and smaller other lung  nodules concerning for metastatic disease.  Office biopsy of vaginal mass performed 12/6/2021.  Final Diagnosis   VAGINA, BIOPSY:               Compatible with known endometrioid carcinoma.               See comment.          12/13/2021 -  Chemotherapy    Course complicated by COVID infection diagnosed 12/20/2021.  Thrombocytopenia, unknown etiology.  OP ENDOMETRIAL Lenvatinib / Pembrolizumab 200 mg           Past Medical History:   Diagnosis Date   • Anemia    • Anxiety and depression    • Back pain    • Bronchitis    • Diabetes (HCC)     DX 4-5 YRS AGO, CHECKS BS 4X/DAY, LAST A1C 7.1%   • Endometrial cancer (HCC)     STAGE II   • GERD (gastroesophageal reflux disease)    • Hypertension    • IBS (irritable bowel syndrome)    • MRSA (methicillin resistant staph aureus) culture positive 2018    S/P NECROTIZING FASCITIS IN BILATERAL FEET   • Necrotizing fasciitis (HCC)    • PCOS (polycystic ovarian syndrome)    • PTSD (post-traumatic stress disorder)    • Retinopathy 3/18/2020   • Sepsis (HCC)    • Stage 3 chronic kidney disease (HCC) 2/24/2020   • Subconjunctival hemorrhage        Past Surgical History:   Procedure Laterality Date   • APPENDECTOMY N/A 3/21/2020    Procedure: APPENDECTOMY LAPAROSCOPIC;  Surgeon: Praful Myers MD;  [facility];  Service: General;  Laterality: N/A;   • EXPLORATORY LAPAROTOMY, TOTAL ABDOMINAL HYSTERECTOMY SALPINGO OOPHORECTOMY N/A 2/10/2020    Procedure: EXPLORATORY LAPAROTOMY, TOTAL ABDOMINAL HYSTERECTOMY, BILATERAL SALPINGO-OOPHORECTOMY WITH OPTIMAL  STAGING (R-0), OMENTECTOMY;  Surgeon: Celine Rubio MD;  Location:  [facility];  Service: Gynecology Oncology;  Laterality: N/A;   • EYE SURGERY Left     BLOOD VESSEL IN EYE REPAIR   • TOE AMPUTATION Left 06/2019    big toe - unhealing sore   • TOE AMPUTATION Right 2018    big toe and second toe - Necrotizing fasciitis and MRSA        MEDICATIONS: The current medication list was reviewed with the patient and updated in  the EMR this date per the Medical Assistant. Medication dosages and frequencies were confirmed to be accurate.      Allergies:  is allergic to bactrim [sulfamethoxazole-trimethoprim], promethazine, and penicillins.    Social History:   Social History     Socioeconomic History   • Marital status:    • Number of children: 1   Tobacco Use   • Smoking status: Former Smoker     Types: Cigarettes     Quit date:      Years since quittin.   • Smokeless tobacco: Never Used   • Tobacco comment: social MAYBE 1 CIG/DAY   Vaping Use   • Vaping Use: Never used   Substance and Sexual Activity   • Alcohol use: Not Currently   • Drug use: Never   • Sexual activity: Not Currently       Family History:    Family History   Problem Relation Age of Onset   • Fibroids Mother    • Diabetes type II Mother    • Hypertension Mother    • Heart attack Mother    • Fibroids Sister         PCOS   • Diabetes type II Sister    • Dementia Maternal Grandmother    • Stroke Maternal Grandmother        Health Maintenance:    Health Maintenance   Topic Date Due   • COLORECTAL CANCER SCREENING  Never done   • Hepatitis B (1 of 3 - Risk 3-dose series) Never done   • TDAP/TD VACCINES (1 - Tdap) Never done   • ANNUAL WELLNESS VISIT  Never done   • MAMMOGRAM  Never done   • Pneumococcal Vaccine 0-64 (2 of 4 - PCV13) 2021   • DIABETIC FOOT EXAM  2021   • DIABETIC EYE EXAM  2021   • COVID-19 Vaccine (2 - Booster for Smiley series) 05/15/2021   • URINE MICROALBUMIN  2021   • HEMOGLOBIN A1C  2021   • INFLUENZA VACCINE  2021   • HEPATITIS C SCREENING  Completed   • PAP SMEAR  Discontinued       Review of Systems   Constitutional: Negative for activity change, appetite change and fever.   HENT: Positive for sore throat.    Respiratory: Negative for cough and shortness of breath.    Cardiovascular: Positive for leg swelling (left > right). Negative for chest pain.   Gastrointestinal: Positive for diarrhea.  "Negative for abdominal pain, constipation and vomiting.   Genitourinary: Positive for vaginal bleeding.   Musculoskeletal: Positive for back pain.   Skin: Positive for color change.   Hematological: Bruises/bleeds easily.       Physical Exam    Vitals:    02/04/22 1003 02/04/22 1004   BP: 131/71  Comment: BOO    Pulse: 104    Resp: 20    Temp: 96 °F (35.6 °C)    TempSrc: Infrared    SpO2: 100%  Comment: RA    Weight: 76.2 kg (168 lb)    Height: 157.5 cm (62\")    PainSc:   7   5   PainLoc: Foot  Comment: Bilateral- Neuropathy Back       Body mass index is 30.73 kg/m².    Wt Readings from Last 3 Encounters:   02/04/22 76.2 kg (168 lb)   02/01/22 75.1 kg (165 lb 9.6 oz)   02/01/22 76.7 kg (169 lb)       GENERAL: Alert, jaundiced female in no apparent distress.   HEENT: Sclera icteric. Head normocephalic, atraumatic. Mucus membranes moist. Mild pharyngeal injection, no thrush.   CARDIOVASCULAR: Normal rate, regular rhythm, no murmurs, rubs, or gallops.  Trace bilateral peripheral edema, no calf swelling or tenderness.  RESPIRATORY: Clear to auscultation bilaterally, normal respiratory effort   GASTROINTESTINAL:  Abdomen is soft, non-tender, non-distended, no rebound or guarding, no masses, or hernias. RLQ burn covered in bandage, with fibrinous coating underneath and no evidence of infection  SKIN:  Warm, dry, well-perfused.  Jaundice.  PSYCHIATRIC: AO x3, with appropriate affect, normal thought processes.  NEUROLOGIC: No focal deficits. Moves extremities well.  MUSCULOSKELETAL: Uses cane, abnormal gait with mild limping  EXTREMITIES:   No cyanosis, clubbing, symmetric.      PELVIC exam: deferred    ECOG PS 1    PROCEDURES:  none    Diagnostic Data:      CT Chest With Contrast Diagnostic    Result Date: 12/6/2021  1. Abnormal soft tissue mass seen at the vaginal vault with likely extension to involve the sigmoid colon adjacent to the mass on the left. Findings most consistent with local recurrence. Small lymph nodes in " the left pelvic sidewall with pulmonary nodules identified throughout the lower lung fields bilaterally all suggesting metastasis. 2. Development of an incisional hernia identified just left of midline containing fat only. Stable enlargement of the spleen with stable splenic varices.  D:  12/05/2021 E:  12/06/2021  This report was finalized on 12/6/2021 3:47 PM by Dr. Starr Saunders MD.      CT Abdomen Pelvis With Contrast    Result Date: 12/6/2021  1. Abnormal soft tissue mass seen at the vaginal vault with likely extension to involve the sigmoid colon adjacent to the mass on the left. Findings most consistent with local recurrence. Small lymph nodes in the left pelvic sidewall with pulmonary nodules identified throughout the lower lung fields bilaterally all suggesting metastasis. 2. Development of an incisional hernia identified just left of midline containing fat only. Stable enlargement of the spleen with stable splenic varices.  D:  12/05/2021 E:  12/06/2021  This report was finalized on 12/6/2021 3:47 PM by Dr. Starr Saunders MD.      US Liver    Result Date: 1/14/2022  Coarsened heterogeneous echotexture of the liver parenchyma consistent with parenchymal disease process. Shear wave elastography performed with multiple data points obtained of the hepatic parenchyma with a mean of 2.5 m/s correlating with liver fibrosis quantification Metavir score of F4 (severe).   D:  01/14/2022 E:  01/14/2022  This report was finalized on 1/14/2022 9:23 PM by Dr. Brock Flood.      XR Chest 1 View    Result Date: 12/20/2021  Patchy opacifications in the midlungs left greater than right consistent with airspace disease such as bronchopneumonia without pleural effusion.  D:  12/20/2021 E:  12/20/2021  This report was finalized on 12/20/2021 1:49 PM by Dr. Brock Flood.        Lab Results   Component Value Date    WBC 5.10 02/04/2022    HGB 11.1 (L) 02/04/2022    HCT 32.8 (L) 02/04/2022    .7 (H) 02/04/2022     PLT 58 (L) 02/04/2022    NEUTROABS 4.10 02/04/2022    GLUCOSE 137 (H) 02/04/2022    BUN 18 02/04/2022    CREATININE 0.96 02/04/2022    EGFRIFNONA 62 02/04/2022     02/04/2022    K 4.2 02/04/2022     02/04/2022    CO2 22.0 02/04/2022    MG 1.8 01/24/2022    PHOS 2.5 03/20/2020    CALCIUM 9.2 02/04/2022    ALBUMIN 3.20 (L) 02/04/2022    AST 62 (H) 02/04/2022    ALT 27 02/04/2022    BILITOT 9.6 (H) 02/04/2022     Lab Results   Component Value Date     8.5 07/08/2020           Assessment/Plan   This is a 46 y.o. woman with recurrent stage IIIA endometrial cancer presenting for treatment monitoring.  Encounter Diagnoses   Name Primary?   • Encounter for long-term (current) use of other medications Yes   • Encounter for therapeutic drug monitoring    • Neuropathy    • Endometrial cancer (HCC)    • Elevated bilirubin    • Diarrhea due to drug    • Wound of abdomen        Endometrial cancer: Stage IIIa due to adnexal involvement, now with recurrence  -Patient would like Port-A-Cath, however this has not been placed due to her thrombocytopenia  -Hold Lenvima and Keytruda for one week then follow-up for consideration of restarting. This will allow for evaluation of improvement in her diarrhea while off Lenvima, as it is unclear whether this is exacerbating her diarrhea. Holding Keytruda will allow for evaluation of her liver enzymes as it is unclear whether she has drug-induced liver injury from Keytruda or another primary hepatitis  -Follows with GI for evaluation and management of her chronic hyperbilirubinemia. Per review of documentaiton, differential remains DILI secondary to Keytruda (typically presents with hepatocellular injury rather than hyperbilirubinemia) vs PSC. Plan is for patient to undergo MRCP.    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      Emotional distress related to cancer diagnosis  - Previously referred for counseling  - Ativan      Complex social issues  - Staying with her family in [place] currently     Chronic Medical Conditions  -T2DM, HTN, steatohepatitis:  ?  Esophageal varicosities and HORNE, history of necrotizing fasciitis.   - Has seen GI and has been told she has IBS.     Chronic thrombocytopenia.   - Platelet count is between 60-90k, stable at this level since 3/2020 per review of prior labs. Likely secondary to liver disease for which patient is undergoing evaluation with GI.     Mid-abdominal wound.   -Going to wound care     Pain management.   - In regards to her pain, she was advised that she could take 2 tablets of the oxycodone 5 mg for a total of 10 mg every 4 hours as needed.      Constipation.   - For constipation, she is to continue with Dulcolax and laxatives, including MiraLAX.   - Currently having diarrhea, not constipation     Recent COVID-19.   - She received monoclonal antibodies when she had COVID-19 in 12/2021.     Pain assessment was performed today as a part of patient’s care.  For patients with pain related to surgery, gynecologic malignancy or cancer treatment, the plan is as noted in the assessment/plan.  For patients with pain not related to these issues, they are to seek any further needed care from a more appropriate provider, such as PCP.      Orders Placed This Encounter   Procedures   • Urinalysis without microscopic (no culture) - Urine, Clean Catch     Standing Status:   Future     Standing Expiration Date:   2/11/2023     Order Specific Question:   Release to patient     Answer:   Immediate   • TSH     Standing Status:   Future     Standing Expiration Date:   2/11/2023     Order Specific Question:   Release to  patient     Answer:   Immediate   • Comprehensive metabolic panel     Standing Status:   Future     Standing Expiration Date:   2/11/2023     Order Specific Question:   Release to patient     Answer:   Immediate   • T4, free     Standing Status:   Future     Standing Expiration Date:   2/11/2023     Order Specific Question:   Release to patient     Answer:   Immediate   • CBC and Differential     Standing Status:   Future     Standing Expiration Date:   2/11/2023     Order Specific Question:   Manual Differential     Answer:   No     Order Specific Question:   Release to patient     Answer:   Immediate     FOLLOW UP: 3 weeks

## 2022-02-04 ENCOUNTER — APPOINTMENT (OUTPATIENT)
Dept: ONCOLOGY | Facility: HOSPITAL | Age: 47
End: 2022-02-04

## 2022-02-04 ENCOUNTER — OFFICE VISIT (OUTPATIENT)
Dept: GYNECOLOGIC ONCOLOGY | Facility: CLINIC | Age: 47
End: 2022-02-04

## 2022-02-04 ENCOUNTER — HOSPITAL ENCOUNTER (OUTPATIENT)
Dept: RADIATION ONCOLOGY | Facility: HOSPITAL | Age: 47
Discharge: HOME | End: 2022-02-04

## 2022-02-04 ENCOUNTER — HOSPITAL ENCOUNTER (OUTPATIENT)
Dept: ONCOLOGY | Facility: HOSPITAL | Age: 47
Setting detail: INFUSION SERIES
Discharge: HOME | End: 2022-02-04

## 2022-02-04 ENCOUNTER — SPECIALTY PHARMACY (OUTPATIENT)
Dept: ONCOLOGY | Facility: HOSPITAL | Age: 47
End: 2022-02-04

## 2022-02-04 VITALS
DIASTOLIC BLOOD PRESSURE: 71 MMHG | HEIGHT: 62 IN | HEART RATE: 104 BPM | TEMPERATURE: 96 F | WEIGHT: 168 LBS | RESPIRATION RATE: 20 BRPM | BODY MASS INDEX: 30.91 KG/M2 | SYSTOLIC BLOOD PRESSURE: 131 MMHG | OXYGEN SATURATION: 100 %

## 2022-02-04 DIAGNOSIS — C54.1 ENDOMETRIAL CANCER: ICD-10-CM

## 2022-02-04 DIAGNOSIS — S31.109A WOUND OF ABDOMEN: ICD-10-CM

## 2022-02-04 DIAGNOSIS — G62.9 NEUROPATHY: ICD-10-CM

## 2022-02-04 DIAGNOSIS — K74.69 OTHER CIRRHOSIS OF LIVER: ICD-10-CM

## 2022-02-04 DIAGNOSIS — Z79.899 ENCOUNTER FOR LONG-TERM (CURRENT) USE OF OTHER MEDICATIONS: Primary | ICD-10-CM

## 2022-02-04 DIAGNOSIS — Z51.81 ENCOUNTER FOR THERAPEUTIC DRUG MONITORING: ICD-10-CM

## 2022-02-04 DIAGNOSIS — R17 ELEVATED BILIRUBIN: ICD-10-CM

## 2022-02-04 DIAGNOSIS — K52.1 DIARRHEA DUE TO DRUG: ICD-10-CM

## 2022-02-04 DIAGNOSIS — Z79.899 ENCOUNTER FOR LONG-TERM (CURRENT) USE OF OTHER MEDICATIONS: ICD-10-CM

## 2022-02-04 PROBLEM — K59.1 FUNCTIONAL DIARRHEA: Status: ACTIVE | Noted: 2022-02-04

## 2022-02-04 LAB
ALBUMIN SERPL-MCNC: 3.2 G/DL (ref 3.5–5.2)
ALBUMIN/GLOB SERPL: 1.3 G/DL
ALP SERPL-CCNC: 96 U/L (ref 39–117)
ALPHA-FETOPROTEIN: 1.14 NG/ML (ref 0–8.3)
ALT SERPL W P-5'-P-CCNC: 27 U/L (ref 1–33)
ANION GAP SERPL CALCULATED.3IONS-SCNC: 15 MMOL/L (ref 5–15)
AST SERPL-CCNC: 62 U/L (ref 1–32)
BILIRUB SERPL-MCNC: 9.6 MG/DL (ref 0–1.2)
BUN SERPL-MCNC: 18 MG/DL (ref 6–20)
BUN/CREAT SERPL: 18.8 (ref 7–25)
CALCIUM SPEC-SCNC: 9.2 MG/DL (ref 8.6–10.5)
CHLORIDE SERPL-SCNC: 103 MMOL/L (ref 98–107)
CO2 SERPL-SCNC: 22 MMOL/L (ref 22–29)
CREAT SERPL-MCNC: 0.96 MG/DL (ref 0.57–1)
ERYTHROCYTE [DISTWIDTH] IN BLOOD BY AUTOMATED COUNT: 20.4 % (ref 12.3–15.4)
GFR SERPL CREATININE-BSD FRML MDRD: 62 ML/MIN/1.73
GLOBULIN UR ELPH-MCNC: 2.4 GM/DL
GLUCOSE SERPL-MCNC: 137 MG/DL (ref 65–99)
HCT VFR BLD AUTO: 32.8 % (ref 34–46.6)
HGB BLD-MCNC: 11.1 G/DL (ref 12–15.9)
INR PPP: 1.58 (ref 0.85–1.16)
LYMPHOCYTES # BLD AUTO: 0.8 10*3/MM3 (ref 0.7–3.1)
LYMPHOCYTES NFR BLD AUTO: 15.6 % (ref 19.6–45.3)
MCH RBC QN AUTO: 34 PG (ref 26.6–33)
MCHC RBC AUTO-ENTMCNC: 33.8 G/DL (ref 31.5–35.7)
MCV RBC AUTO: 100.7 FL (ref 79–97)
MONOCYTES # BLD AUTO: 0.2 10*3/MM3 (ref 0.1–0.9)
MONOCYTES NFR BLD AUTO: 4 % (ref 5–12)
NEUTROPHILS NFR BLD AUTO: 4.1 10*3/MM3 (ref 1.7–7)
NEUTROPHILS NFR BLD AUTO: 80.4 % (ref 42.7–76)
PLATELET # BLD AUTO: 58 10*3/MM3 (ref 140–450)
PMV BLD AUTO: 8.4 FL (ref 6–12)
POTASSIUM SERPL-SCNC: 4.2 MMOL/L (ref 3.5–5.2)
PROT SERPL-MCNC: 5.6 G/DL (ref 6–8.5)
PROTHROMBIN TIME: 18.3 SECONDS (ref 11.4–14.4)
RBC # BLD AUTO: 3.25 10*6/MM3 (ref 3.77–5.28)
SODIUM SERPL-SCNC: 140 MMOL/L (ref 136–145)
T4 FREE SERPL-MCNC: 1.93 NG/DL (ref 0.93–1.7)
TSH SERPL DL<=0.05 MIU/L-ACNC: 0.79 UIU/ML (ref 0.27–4.2)
WBC NRBC COR # BLD: 5.1 10*3/MM3 (ref 3.4–10.8)

## 2022-02-04 PROCEDURE — 36415 COLL VENOUS BLD VENIPUNCTURE: CPT

## 2022-02-04 PROCEDURE — 82105 ALPHA-FETOPROTEIN SERUM: CPT | Performed by: NURSE PRACTITIONER

## 2022-02-04 PROCEDURE — 85025 COMPLETE CBC W/AUTO DIFF WBC: CPT | Performed by: NURSE PRACTITIONER

## 2022-02-04 PROCEDURE — 77386: CPT | Performed by: RADIOLOGY

## 2022-02-04 PROCEDURE — 99214 OFFICE O/P EST MOD 30 MIN: CPT | Performed by: OBSTETRICS & GYNECOLOGY

## 2022-02-04 PROCEDURE — 85610 PROTHROMBIN TIME: CPT | Performed by: NURSE PRACTITIONER

## 2022-02-04 PROCEDURE — 77336 RADIATION PHYSICS CONSULT: CPT | Performed by: RADIOLOGY

## 2022-02-04 PROCEDURE — 80053 COMPREHEN METABOLIC PANEL: CPT | Performed by: NURSE PRACTITIONER

## 2022-02-04 PROCEDURE — 84443 ASSAY THYROID STIM HORMONE: CPT | Performed by: OBSTETRICS & GYNECOLOGY

## 2022-02-04 PROCEDURE — 84439 ASSAY OF FREE THYROXINE: CPT | Performed by: OBSTETRICS & GYNECOLOGY

## 2022-02-07 ENCOUNTER — HOSPITAL ENCOUNTER (OUTPATIENT)
Dept: RADIATION ONCOLOGY | Facility: HOSPITAL | Age: 47
Discharge: HOME | End: 2022-02-07

## 2022-02-07 PROCEDURE — 77386: CPT | Performed by: RADIOLOGY

## 2022-02-08 ENCOUNTER — HOSPITAL ENCOUNTER (OUTPATIENT)
Dept: RADIATION ONCOLOGY | Facility: HOSPITAL | Age: 47
Discharge: HOME | End: 2022-02-08

## 2022-02-08 VITALS — WEIGHT: 170.6 LBS | BODY MASS INDEX: 31.2 KG/M2

## 2022-02-08 PROCEDURE — 77386: CPT | Performed by: RADIOLOGY

## 2022-02-09 ENCOUNTER — HOSPITAL ENCOUNTER (OUTPATIENT)
Dept: RADIATION ONCOLOGY | Facility: HOSPITAL | Age: 47
Discharge: HOME | End: 2022-02-09

## 2022-02-09 ENCOUNTER — APPOINTMENT (OUTPATIENT)
Dept: CARDIOLOGY | Facility: HOSPITAL | Age: 47
End: 2022-02-09

## 2022-02-09 PROCEDURE — 77386: CPT | Performed by: RADIOLOGY

## 2022-02-10 ENCOUNTER — OFFICE VISIT (OUTPATIENT)
Dept: FAMILY MEDICINE CLINIC | Facility: CLINIC | Age: 47
End: 2022-02-10

## 2022-02-10 ENCOUNTER — HOSPITAL ENCOUNTER (OUTPATIENT)
Dept: RADIATION ONCOLOGY | Facility: HOSPITAL | Age: 47
Discharge: HOME | End: 2022-02-10

## 2022-02-10 VITALS
SYSTOLIC BLOOD PRESSURE: 120 MMHG | TEMPERATURE: 97.7 F | HEIGHT: 62 IN | HEART RATE: 117 BPM | WEIGHT: 173 LBS | BODY MASS INDEX: 31.83 KG/M2 | DIASTOLIC BLOOD PRESSURE: 68 MMHG | OXYGEN SATURATION: 94 %

## 2022-02-10 DIAGNOSIS — L03.116 CELLULITIS AND ABSCESS OF LEFT LEG: Primary | ICD-10-CM

## 2022-02-10 DIAGNOSIS — Z79.4 TYPE 2 DIABETES MELLITUS WITH DIABETIC POLYNEUROPATHY, WITH LONG-TERM CURRENT USE OF INSULIN: ICD-10-CM

## 2022-02-10 DIAGNOSIS — E11.42 TYPE 2 DIABETES MELLITUS WITH DIABETIC POLYNEUROPATHY, WITH LONG-TERM CURRENT USE OF INSULIN: ICD-10-CM

## 2022-02-10 DIAGNOSIS — L02.416 CELLULITIS AND ABSCESS OF LEFT LEG: Primary | ICD-10-CM

## 2022-02-10 LAB
EXPIRATION DATE: NORMAL
HBA1C MFR BLD: 5 %
Lab: NORMAL

## 2022-02-10 PROCEDURE — 99214 OFFICE O/P EST MOD 30 MIN: CPT | Performed by: PHYSICIAN ASSISTANT

## 2022-02-10 PROCEDURE — 87147 CULTURE TYPE IMMUNOLOGIC: CPT | Performed by: PHYSICIAN ASSISTANT

## 2022-02-10 PROCEDURE — 87077 CULTURE AEROBIC IDENTIFY: CPT | Performed by: PHYSICIAN ASSISTANT

## 2022-02-10 PROCEDURE — 87070 CULTURE OTHR SPECIMN AEROBIC: CPT | Performed by: PHYSICIAN ASSISTANT

## 2022-02-10 PROCEDURE — 87186 SC STD MICRODIL/AGAR DIL: CPT | Performed by: PHYSICIAN ASSISTANT

## 2022-02-10 PROCEDURE — 83036 HEMOGLOBIN GLYCOSYLATED A1C: CPT | Performed by: PHYSICIAN ASSISTANT

## 2022-02-10 PROCEDURE — 3044F HG A1C LEVEL LT 7.0%: CPT | Performed by: PHYSICIAN ASSISTANT

## 2022-02-10 PROCEDURE — 77386: CPT | Performed by: RADIOLOGY

## 2022-02-10 PROCEDURE — 87205 SMEAR GRAM STAIN: CPT | Performed by: PHYSICIAN ASSISTANT

## 2022-02-10 RX ORDER — DOXYCYCLINE HYCLATE 100 MG/1
100 CAPSULE ORAL 2 TIMES DAILY
Qty: 28 CAPSULE | Refills: 0 | Status: SHIPPED | OUTPATIENT
Start: 2022-02-10 | End: 2022-02-24

## 2022-02-10 NOTE — PROGRESS NOTES
Chief Complaint   Patient presents with   • Leg Swelling     Pt states she has stage 4 cancer and has noticed some swelling in her left leg about a week ago. She states she's noticed some discharge and blisters around the area. She states she's showed it to her oncologist but wasn't given much information about it.       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 She states she's noticed some discharge and blisters around the area. She states she's showed it to her oncologist but wasn't given much information about it.)    Patient presents with left leg swelling, erythema and wound with drainage. Noticed this a few days ago and it is getting worse.  No fever, chills.  She has stage 4 cancer and is currently undergoing treatment.  Currently established at wound clinic.  Has known diabetes and is compliant on medications.  Allergic to bactrim and penicillin.    Past Medical History:   Diagnosis Date   • Anemia    • Anxiety and depression    • Back pain    • Bronchitis    • Diabetes (HCC)     DX 4-5 YRS AGO, CHECKS BS 4X/DAY, LAST A1C 7.1%   • Endometrial cancer (HCC)     STAGE II   • GERD (gastroesophageal reflux disease)    • Hypertension    • IBS (irritable bowel syndrome)    • MRSA (methicillin resistant staph aureus) culture positive 2018    S/P NECROTIZING FASCITIS IN BILATERAL FEET   • Necrotizing fasciitis (HCC)    • PCOS (polycystic ovarian syndrome)    • PTSD (post-traumatic stress disorder)    • Retinopathy 3/18/2020   • Sepsis (HCC)    • Stage 3 chronic kidney disease (HCC) 2/24/2020   • Subconjunctival hemorrhage        Past Surgical History:   Procedure Laterality Date   • APPENDECTOMY N/A 3/21/2020    Procedure: APPENDECTOMY LAPAROSCOPIC;  Surgeon: Praful Myers MD;  Location: [facility];  Service: General;  Laterality: N/A;   • EXPLORATORY LAPAROTOMY, TOTAL  "ABDOMINAL HYSTERECTOMY SALPINGO OOPHORECTOMY N/A 2/10/2020    Procedure: EXPLORATORY LAPAROTOMY, TOTAL ABDOMINAL HYSTERECTOMY, BILATERAL SALPINGO-OOPHORECTOMY WITH OPTIMAL  STAGING (R-0), OMENTECTOMY;  Surgeon: Celine Rubio MD;  Location: [facility];  Service: Gynecology Oncology;  Laterality: N/A;   • EYE SURGERY Left     BLOOD VESSEL IN EYE REPAIR   • TOE AMPUTATION Left 2019    big toe - unhealing sore   • TOE AMPUTATION Right 2018    big toe and second toe - Necrotizing fasciitis and MRSA        Family History   Problem Relation Age of Onset   • Fibroids Mother    • Diabetes type II Mother    • Hypertension Mother    • Heart attack Mother    • Fibroids Sister         PCOS   • Diabetes type II Sister    • Dementia Maternal Grandmother    • Stroke Maternal Grandmother        Social History     Socioeconomic History   • Marital status:    • Number of children: 1   Tobacco Use   • Smoking status: Former Smoker     Types: Cigarettes     Quit date:      Years since quittin.1   • Smokeless tobacco: Never Used   • Tobacco comment: social MAYBE 1 CIG/DAY   Vaping Use   • Vaping Use: Never used   Substance and Sexual Activity   • Alcohol use: Not Currently   • Drug use: Never   • Sexual activity: Not Currently       Allergies   Allergen Reactions   • Bactrim [Sulfamethoxazole-Trimethoprim] Anaphylaxis   • Promethazine Mental Status Change, Anaphylaxis, Anxiety and Other (See Comments)     IV causes anxiety, oral is fine  IV only   • Penicillins Hives and Rash       ROS    Review of Systems   Constitutional: Negative for chills and fever.   Cardiovascular: Positive for leg swelling.   Skin: Positive for color change and skin lesions.       Vitals:    02/10/22 0837   BP: 120/68   Pulse: 117   Temp: 97.7 °F (36.5 °C)   SpO2: 94%   Weight: 78.5 kg (173 lb)   Height: 157.5 cm (62\")   PainSc: 10-Worst pain ever   PainLoc: Leg     Body mass index is 31.64 kg/m².    Current Outpatient Medications on File " Prior to Visit   Medication Sig Dispense Refill   • Continuous Blood Gluc Sensor (FreeStyle Michelle 14 Day Sensor) misc 1 each Every 14 (Fourteen) Days. 2 each 3   • Continuous Blood Gluc Sensor (FREESTYLE MICHELLE SENSOR SYSTEM) Every 14 (Fourteen) Days. 1 each 1   • gabapentin (NEURONTIN) 600 MG tablet Take 1 tablet by mouth 3 (Three) Times a Day. 90 tablet 3   • insulin aspart (novoLOG) 100 UNIT/ML injection Inject 8 Units under the skin into the appropriate area as directed Every Morning. Then sliding scale after meals.     • Lenvatinib, 14 MG Daily Dose, 10 & 4 MG capsule therapy Take 14 mg by mouth Daily. Take with or without food. 60 each 5   • LORazepam (Ativan) 1 MG tablet Take 1 tablet by mouth Every 6 (Six) Hours As Needed for Anxiety. 30 tablet 3   • meclizine (ANTIVERT) 25 MG tablet Take 1 tablet by mouth 3 (Three) Times a Day As Needed for Dizziness or Nausea. 15 tablet 0   • MULTIPLE VITAMIN PO Multiple Vitamin     • omeprazole (PrilOSEC) 20 MG capsule Take 1 capsule by mouth Daily. 90 capsule 1   • ondansetron (ZOFRAN) 8 MG tablet Take 1 tablet by mouth 3 (Three) Times a Day As Needed for Nausea or Vomiting. 30 tablet 5   • ondansetron ODT (Zofran ODT) 8 MG disintegrating tablet Place 1 tablet on the tongue Every 8 (Eight) Hours As Needed for Nausea or Vomiting. 30 tablet 2   • oxyCODONE (ROXICODONE) 10 MG tablet Take 1 tablet by mouth Every 6 (Six) Hours As Needed for Severe Pain . 60 tablet 0   • Pembrolizumab (Keytruda) 100 MG/4ML solution Infuse 100 mg into a venous catheter.     • Polyethylene Glycol 3350 (MIRALAX PO) Take 17 g by mouth Daily.     • prochlorperazine (COMPAZINE) 10 MG tablet Take 1 tablet by mouth Every 6 (Six) Hours As Needed for Nausea or Vomiting. 30 tablet 3   • ursodiol (ACTIGALL) 500 MG tablet Take 1 tablet by mouth Every Evening. 30 tablet 5   • [DISCONTINUED] ursodiol (ACTIGALL) 250 MG tablet Take 1 tablet by mouth Every Morning. 30 tablet 5     No current  facility-administered medications on file prior to visit.       Results for orders placed or performed in visit on 02/10/22   POC Glycosylated Hemoglobin (Hb A1C)    Specimen: Blood   Result Value Ref Range    Hemoglobin A1C 5.0 %    Lot Number [id number]     Expiration Date 09/28/2023        PE    Physical Exam  Vitals reviewed.   Constitutional:       General: She is not in acute distress.     Appearance: Normal appearance. She is well-developed. She is obese. She is not ill-appearing or diaphoretic.   HENT:      Head: Normocephalic and atraumatic.   Eyes:      Extraocular Movements: Extraocular movements intact.      Conjunctiva/sclera: Conjunctivae normal.   Pulmonary:      Effort: No respiratory distress.   Musculoskeletal:         General: Normal range of motion.      Cervical back: Normal range of motion.      Left lower leg: Swelling, laceration and tenderness present. Edema present.        Legs:    Neurological:      General: No focal deficit present.      Mental Status: She is alert.   Psychiatric:         Attention and Perception: She is attentive.         Mood and Affect: Mood normal.         Speech: Speech normal.         Behavior: Behavior normal. Behavior is cooperative.         Thought Content: Thought content normal.         Judgment: Judgment normal.          A/P    Diagnoses and all orders for this visit:    1. Cellulitis and abscess of left leg (Primary)  -     doxycycline (VIBRAMYCIN) 100 MG capsule; Take 1 capsule by mouth 2 (Two) Times a Day for 14 days.  Dispense: 28 capsule; Refill: 0  -     Ambulatory Referral to Wound Clinic  -     Wound Culture - Wound, Ankle, Left; Future  -     Wound Culture - Wound, Ankle, Left  Allergic to PCN and Bactrim.  Would culture collected.  Referral to wound care.  Start doxycycline.  Call if symptoms are not improving or worsening.    2. Type 2 diabetes mellitus with diabetic polyneuropathy, with long-term current use of insulin (HCC)  -     POC Glycosylated  Hemoglobin (Hb A1C)  Hemoglobin AIC is 5%.  Patient admits she does not take her insulin regular.  She does not take lantus 8 units daily, only when her sugars are high.  She also has novolog and takes about 5 units of this when sugars are high.  Given recent hemoglobin AIC, I recommend she discontinue lantus.  Use novolog prn.  Watch diet and glucose levels, call if elevated on a regular basis.  Has not done well with metformin in the past.  Has never tried a GLP-1 or SGLT-2.  Return in 3 months.         Plan of care reviewed with patient at the conclusion of today's visit. Education was provided regarding diagnosis, management and any prescribed or recommended OTC medications.  Patient verbalizes understanding of and agreement with management plan.    Return in about 3 months (around 5/10/2022) for Recheck, diabetes.     Tania Angeles PA-C

## 2022-02-11 ENCOUNTER — HOSPITAL ENCOUNTER (OUTPATIENT)
Dept: RADIATION ONCOLOGY | Facility: HOSPITAL | Age: 47
Discharge: HOME | End: 2022-02-11

## 2022-02-11 ENCOUNTER — HOSPITAL ENCOUNTER (OUTPATIENT)
Dept: ONCOLOGY | Facility: HOSPITAL | Age: 47
Setting detail: INFUSION SERIES
Discharge: HOME | End: 2022-02-11

## 2022-02-11 ENCOUNTER — APPOINTMENT (OUTPATIENT)
Dept: ONCOLOGY | Facility: HOSPITAL | Age: 47
End: 2022-02-11

## 2022-02-11 ENCOUNTER — OFFICE VISIT (OUTPATIENT)
Dept: GYNECOLOGIC ONCOLOGY | Facility: CLINIC | Age: 47
End: 2022-02-11

## 2022-02-11 VITALS
BODY MASS INDEX: 32.2 KG/M2 | HEART RATE: 108 BPM | RESPIRATION RATE: 20 BRPM | SYSTOLIC BLOOD PRESSURE: 148 MMHG | TEMPERATURE: 97.3 F | HEIGHT: 62 IN | DIASTOLIC BLOOD PRESSURE: 65 MMHG | OXYGEN SATURATION: 100 % | WEIGHT: 175 LBS

## 2022-02-11 DIAGNOSIS — C54.1 ENDOMETRIAL CANCER: ICD-10-CM

## 2022-02-11 DIAGNOSIS — Z79.899 ENCOUNTER FOR LONG-TERM (CURRENT) USE OF OTHER MEDICATIONS: ICD-10-CM

## 2022-02-11 DIAGNOSIS — K52.1 DIARRHEA DUE TO DRUG: ICD-10-CM

## 2022-02-11 DIAGNOSIS — Z51.81 ENCOUNTER FOR THERAPEUTIC DRUG MONITORING: ICD-10-CM

## 2022-02-11 DIAGNOSIS — R17 ELEVATED BILIRUBIN: ICD-10-CM

## 2022-02-11 DIAGNOSIS — G62.9 NEUROPATHY: ICD-10-CM

## 2022-02-11 DIAGNOSIS — R25.2 LEG CRAMPS: Primary | ICD-10-CM

## 2022-02-11 DIAGNOSIS — R25.2 LEG CRAMPS: ICD-10-CM

## 2022-02-11 PROBLEM — L03.116 CELLULITIS OF LEFT LOWER EXTREMITY: Status: ACTIVE | Noted: 2022-02-11

## 2022-02-11 LAB
ALBUMIN SERPL-MCNC: 3 G/DL (ref 3.5–5.2)
ALBUMIN/GLOB SERPL: 1.3 G/DL
ALP SERPL-CCNC: 97 U/L (ref 39–117)
ALT SERPL W P-5'-P-CCNC: 10 U/L (ref 1–33)
ANION GAP SERPL CALCULATED.3IONS-SCNC: 7 MMOL/L (ref 5–15)
AST SERPL-CCNC: 19 U/L (ref 1–32)
BILIRUB SERPL-MCNC: 4.2 MG/DL (ref 0–1.2)
BILIRUB UR QL STRIP: NEGATIVE
BUN SERPL-MCNC: 30 MG/DL (ref 6–20)
BUN/CREAT SERPL: 31.6 (ref 7–25)
CALCIUM SPEC-SCNC: 8.7 MG/DL (ref 8.6–10.5)
CHLORIDE SERPL-SCNC: 97 MMOL/L (ref 98–107)
CLARITY UR: CLEAR
CO2 SERPL-SCNC: 27 MMOL/L (ref 22–29)
COLOR UR: ABNORMAL
CREAT SERPL-MCNC: 0.95 MG/DL (ref 0.57–1)
ERYTHROCYTE [DISTWIDTH] IN BLOOD BY AUTOMATED COUNT: 20.3 % (ref 12.3–15.4)
GFR SERPL CREATININE-BSD FRML MDRD: 63 ML/MIN/1.73
GLOBULIN UR ELPH-MCNC: 2.4 GM/DL
GLUCOSE SERPL-MCNC: 244 MG/DL (ref 65–99)
GLUCOSE UR STRIP-MCNC: ABNORMAL MG/DL
HCT VFR BLD AUTO: 26.6 % (ref 34–46.6)
HGB BLD-MCNC: 9.1 G/DL (ref 12–15.9)
HGB UR QL STRIP.AUTO: ABNORMAL
KETONES UR QL STRIP: NEGATIVE
LEUKOCYTE ESTERASE UR QL STRIP.AUTO: ABNORMAL
LYMPHOCYTES # BLD AUTO: 0.5 10*3/MM3 (ref 0.7–3.1)
LYMPHOCYTES NFR BLD AUTO: 14.8 % (ref 19.6–45.3)
MAGNESIUM SERPL-MCNC: 1.7 MG/DL (ref 1.6–2.6)
MCH RBC QN AUTO: 34.6 PG (ref 26.6–33)
MCHC RBC AUTO-ENTMCNC: 34.1 G/DL (ref 31.5–35.7)
MCV RBC AUTO: 101.5 FL (ref 79–97)
MONOCYTES # BLD AUTO: 0.3 10*3/MM3 (ref 0.1–0.9)
MONOCYTES NFR BLD AUTO: 8.5 % (ref 5–12)
NEUTROPHILS NFR BLD AUTO: 2.7 10*3/MM3 (ref 1.7–7)
NEUTROPHILS NFR BLD AUTO: 76.7 % (ref 42.7–76)
NITRITE UR QL STRIP: NEGATIVE
PH UR STRIP.AUTO: 6 [PH] (ref 5–8)
PLATELET # BLD AUTO: 68 10*3/MM3 (ref 140–450)
PMV BLD AUTO: 8 FL (ref 6–12)
POTASSIUM SERPL-SCNC: 3.8 MMOL/L (ref 3.5–5.2)
PROT SERPL-MCNC: 5.4 G/DL (ref 6–8.5)
PROT UR QL STRIP: NEGATIVE
RBC # BLD AUTO: 2.62 10*6/MM3 (ref 3.77–5.28)
SODIUM SERPL-SCNC: 131 MMOL/L (ref 136–145)
SP GR UR STRIP: 1.02 (ref 1–1.03)
T4 FREE SERPL-MCNC: 1.75 NG/DL (ref 0.93–1.7)
TSH SERPL DL<=0.05 MIU/L-ACNC: 0.29 UIU/ML (ref 0.27–4.2)
UROBILINOGEN UR QL STRIP: ABNORMAL
WBC NRBC COR # BLD: 3.5 10*3/MM3 (ref 3.4–10.8)

## 2022-02-11 PROCEDURE — 85025 COMPLETE CBC W/AUTO DIFF WBC: CPT | Performed by: OBSTETRICS & GYNECOLOGY

## 2022-02-11 PROCEDURE — 80053 COMPREHEN METABOLIC PANEL: CPT | Performed by: OBSTETRICS & GYNECOLOGY

## 2022-02-11 PROCEDURE — 36415 COLL VENOUS BLD VENIPUNCTURE: CPT

## 2022-02-11 PROCEDURE — 77336 RADIATION PHYSICS CONSULT: CPT | Performed by: RADIOLOGY

## 2022-02-11 PROCEDURE — 83735 ASSAY OF MAGNESIUM: CPT | Performed by: OBSTETRICS & GYNECOLOGY

## 2022-02-11 PROCEDURE — 81003 URINALYSIS AUTO W/O SCOPE: CPT | Performed by: OBSTETRICS & GYNECOLOGY

## 2022-02-11 PROCEDURE — 84439 ASSAY OF FREE THYROXINE: CPT | Performed by: OBSTETRICS & GYNECOLOGY

## 2022-02-11 PROCEDURE — 84443 ASSAY THYROID STIM HORMONE: CPT | Performed by: OBSTETRICS & GYNECOLOGY

## 2022-02-11 PROCEDURE — 77386: CPT | Performed by: RADIOLOGY

## 2022-02-11 PROCEDURE — 99214 OFFICE O/P EST MOD 30 MIN: CPT | Performed by: OBSTETRICS & GYNECOLOGY

## 2022-02-11 NOTE — PROGRESS NOTES
Jeana Chung RN  [id number]  1975    Reason for visit:  Recurrent endometrial cancer, consideration of ongoing treatment     History of present illness:  The patient is a 46 y.o. year old female who presents today for treatment and evaluation of the above issues.     Patient was diagnosed with biopsy-proven recurrent endometrial cancer. She is currently receiving Keytruda and Lenvima. Her clinical course has been complicated due to thrombocytopenia (could not undergo Port-A-Cath insertion) and COVID infection 2021. One cycle of Keytruda was held due to COVID.     Today, patient has decreased jaundice, see labs below.  CBC clotted and was not redrawn as patient has developed left lower extremity edema, erythema, and drainage.  She is on doxycycline and has an appointment to see wound care.  She is having pain in that extremity.  She is tearful over her overall health, impending divorce.  She is only having 1 bowel movement daily with a decrease in her ursodiol and having Keytruda Lenvima held.     OBGYN History:  She is a .  She does not use HRT. She has never had a pap smear.    Oncologic History:  Oncology/Hematology History   Endometrial cancer (HCC)   2019 Imaging    Presented to ED in [place] due to progressively heavy vaginal bleeding and severe back pain. Ultrasound revealed uterine and cervical masses.     2020 Biopsy    ED follow-up with gynecologist in [place], told she likely has cervical cancer. Insufficient tissue on attempted cervical biopsy. Patient moved to [place], [place] to be closer to mother and sister for work-up and treatment.      2020 Imaging    Gyn evaluation at [facility]. Repeat TVUS showed cervical mass, right adnexal mass, and large uterine fibroid vs mass. Referred to Gyn Oncology     2020 Initial Diagnosis    Endometrial cancer (CMS/HCC)  Cervical biopsy positive for infiltrating endometrioid adenocarcinoma     2020 Imaging    CT  chest, abdomen, pelvis showed enlarged uterus with multiple masses and 4 cm cystic/solid mass at right ovary. No metastatic disease noted in abdomen or chest.     2/10/2020 Surgery    Exploratory laparotomy, total abdominal hysterectomy, bilateral salpingo-oophorectomy, with optimal debulking (R=0), and omentectomy.    Pelvic washing cytology positive for malignant cells, consistent with adenocarcinoma. Final pathology showed large grade 3 endometrioid tumor with lymphvascular invasion at the uterus. Cervical stromal involvement, vaginal margin negative. Right tube and ovary involved. Left tube and ovary negative, omentum negative. MSI normal. [id number], Stage IIIA grade 3       3/9/2020 - 7/8/2020 Chemotherapy    OP UTERINE PACLitaxel / CARBOplatin (Q21D)  Cycle #1 complicated by pancytopenia, appendicitis, appendectomy, appendiceal abscess  Cycle #2 remarkable for thrombocytopenia, worsening neuropathy.  Dose modification with cycle #3.       3/17/2020 - 3/23/2020 Other Event    Hospital admission with acute appendicitis     5/21/2020 - 5/21/2020 Chemotherapy    OP CENTRAL VENOUS ACCESS DEVICE ACCESS, CARE, AND MAINTENANCE (CVAD)     7/8/2020 - 7/28/2020 Chemotherapy    OP OVARIAN DOCEtaxel / CARBOplatin     12/5/2021 Progression    Complains of vaginal bleeding.  CT scan chest abdomen and pelvis:  IMPRESSION:  Abnormal soft tissue mass seen at the vaginal vault with likely  extension to involve the sigmoid colon adjacent to the mass on the left.  Multiple new lung lesions including 2.5 cm medial right lower lobe nodule, 7 mm right peripheral lung nodule, to left small lung nodules measuring 1.2 cm together and smaller other lung nodules concerning for metastatic disease.  Office biopsy of vaginal mass performed 12/6/2021.  Final Diagnosis   VAGINA, BIOPSY:               Compatible with known endometrioid carcinoma.               See comment.          12/13/2021 -  Chemotherapy    Course complicated by COVID  infection diagnosed 12/20/2021.  Thrombocytopenia, unknown etiology.  OP ENDOMETRIAL Lenvatinib / Pembrolizumab 200 mg           Past Medical History:   Diagnosis Date   • Anemia    • Anxiety and depression    • Back pain    • Bronchitis    • Diabetes (HCC)     DX 4-5 YRS AGO, CHECKS BS 4X/DAY, LAST A1C 7.1%   • Endometrial cancer (HCC)     STAGE II   • GERD (gastroesophageal reflux disease)    • Hypertension    • IBS (irritable bowel syndrome)    • MRSA (methicillin resistant staph aureus) culture positive 2018    S/P NECROTIZING FASCITIS IN BILATERAL FEET   • Necrotizing fasciitis (HCC)    • PCOS (polycystic ovarian syndrome)    • PTSD (post-traumatic stress disorder)    • Retinopathy 3/18/2020   • Sepsis (HCC)    • Stage 3 chronic kidney disease (HCC) 2/24/2020   • Subconjunctival hemorrhage        Past Surgical History:   Procedure Laterality Date   • APPENDECTOMY N/A 3/21/2020    Procedure: APPENDECTOMY LAPAROSCOPIC;  Surgeon: Praful Myers MD;  Location: [facility];  Service: General;  Laterality: N/A;   • EXPLORATORY LAPAROTOMY, TOTAL ABDOMINAL HYSTERECTOMY SALPINGO OOPHORECTOMY N/A 2/10/2020    Procedure: EXPLORATORY LAPAROTOMY, TOTAL ABDOMINAL HYSTERECTOMY, BILATERAL SALPINGO-OOPHORECTOMY WITH OPTIMAL  STAGING (R-0), OMENTECTOMY;  Surgeon: Celine Rubio MD;  Location: [facility];  Service: Gynecology Oncology;  Laterality: N/A;   • EYE SURGERY Left     BLOOD VESSEL IN EYE REPAIR   • TOE AMPUTATION Left 06/2019    big toe - unhealing sore   • TOE AMPUTATION Right 2018    big toe and second toe - Necrotizing fasciitis and MRSA        MEDICATIONS: The current medication list was reviewed with the patient and updated in the EMR this date per the Medical Assistant. Medication dosages and frequencies were confirmed to be accurate.      Allergies:  is allergic to bactrim [sulfamethoxazole-trimethoprim], promethazine, and penicillins.    Social History:   Social History     Socioeconomic History   •  Marital status:    • Number of children: 1   Tobacco Use   • Smoking status: Former Smoker     Types: Cigarettes     Quit date: 2000     Years since quittin.1   • Smokeless tobacco: Never Used   • Tobacco comment: social MAYBE 1 CIG/DAY   Vaping Use   • Vaping Use: Never used   Substance and Sexual Activity   • Alcohol use: Not Currently   • Drug use: Never   • Sexual activity: Not Currently       Family History:    Family History   Problem Relation Age of Onset   • Fibroids Mother    • Diabetes type II Mother    • Hypertension Mother    • Heart attack Mother    • Fibroids Sister         PCOS   • Diabetes type II Sister    • Dementia Maternal Grandmother    • Stroke Maternal Grandmother        Health Maintenance:    Health Maintenance   Topic Date Due   • COLORECTAL CANCER SCREENING  Never done   • Hepatitis B (1 of 3 - Risk 3-dose series) Never done   • TDAP/TD VACCINES (1 - Tdap) Never done   • ANNUAL WELLNESS VISIT  Never done   • MAMMOGRAM  Never done   • Pneumococcal Vaccine 0-64 (2 of 4 - PCV13) 2021   • DIABETIC FOOT EXAM  2021   • DIABETIC EYE EXAM  2021   • COVID-19 Vaccine (2 - Booster for Smiley series) 05/15/2021   • URINE MICROALBUMIN  2021   • INFLUENZA VACCINE  2021   • HEMOGLOBIN A1C  08/10/2022   • HEPATITIS C SCREENING  Completed   • PAP SMEAR  Discontinued       Review of Systems   Constitutional: Negative for activity change, appetite change and fever.   HENT: Positive for sore throat.    Respiratory: Negative for cough and shortness of breath.    Cardiovascular: Positive for leg swelling (left > right). Negative for chest pain.   Gastrointestinal: Positive for diarrhea. Negative for abdominal pain, constipation and vomiting.   Genitourinary: Positive for vaginal bleeding.   Musculoskeletal: Positive for back pain.   Skin: Positive for color change.   Hematological: Bruises/bleeds easily.       Physical Exam    Vitals:    22 1150   BP:  "148/65  Comment: LUE   Pulse: 108   Resp: 20   Temp: 97.3 °F (36.3 °C)   TempSrc: Infrared   SpO2: 100%  Comment: RA   Weight: 79.4 kg (175 lb)   Height: 157.5 cm (62\")   PainSc:   8   PainLoc: Leg  Comment: Left- Cellulitis       Body mass index is 32.01 kg/m².    Wt Readings from Last 3 Encounters:   02/11/22 79.4 kg (175 lb)   02/10/22 78.5 kg (173 lb)   02/08/22 77.4 kg (170 lb 9.6 oz)       GENERAL: Alert,  female in no apparent distress.   HEENT:  Head normocephalic, atraumatic. Mucus membranes moist. Mild pharyngeal injection, no thrush.  Sclera less icteric  CARDIOVASCULAR: Deferred  RESPIRATORY: Normal effort  GASTROINTESTINAL: Deferred  SKIN: Wound to left lower extremity with dressing in place.  Ace bandage was removed, but gauze was left intact.  Associated erythema and edema noted.  PSYCHIATRIC: AO x3, with appropriate affect, normal thought processes.  NEUROLOGIC: No focal deficits. Moves extremities well.  MUSCULOSKELETAL: Uses cane, abnormal gait with mild limping  EXTREMITIES:   No cyanosis, clubbing, symmetric.      PELVIC exam: deferred    ECOG PS 1    PROCEDURES:  none    Diagnostic Data:      Lab Results   Component Value Date    WBC 5.10 02/04/2022    HGB 11.1 (L) 02/04/2022    HCT 32.8 (L) 02/04/2022    .7 (H) 02/04/2022    PLT 58 (L) 02/04/2022    NEUTROABS 4.10 02/04/2022    GLUCOSE 244 (H) 02/11/2022    BUN 30 (H) 02/11/2022    CREATININE 0.95 02/11/2022    EGFRIFNONA 63 02/11/2022     (L) 02/11/2022    K 3.8 02/11/2022    CL 97 (L) 02/11/2022    CO2 27.0 02/11/2022    MG 1.7 02/11/2022    PHOS 2.5 03/20/2020    CALCIUM 8.7 02/11/2022    ALBUMIN 3.00 (L) 02/11/2022    AST 19 02/11/2022    ALT 10 02/11/2022    BILITOT 4.2 (H) 02/11/2022     Lab Results   Component Value Date     8.5 07/08/2020           Assessment/Plan   This is a 46 y.o. woman with recurrent stage IIIA endometrial cancer presenting for treatment monitoring.  Encounter Diagnoses   Name Primary?   • Leg " cramps Yes   • Endometrial cancer (HCC)    • Encounter for long-term (current) use of other medications    • Encounter for therapeutic drug monitoring    • Neuropathy    • Elevated bilirubin    • Diarrhea due to drug        Endometrial cancer: Stage IIIa due to adnexal involvement, now with recurrence  -Patient would like Port-A-Cath, however this has not been placed due to her thrombocytopenia  -Hold Lenvima and Keytruda for one week then follow-up for consideration of restarting. This will allow for evaluation of improvement in her diarrhea while off Lenvima, as it is unclear whether this is exacerbating her diarrhea. Holding Keytruda will allow for evaluation of her liver enzymes as it is unclear whether she has drug-induced liver injury from Keytruda or another primary hepatitis  -Follows with GI for evaluation and management of her chronic hyperbilirubinemia. Per review of documentaiton, differential remains DILI secondary to Keytruda (typically presents with hepatocellular injury rather than hyperbilirubinemia) vs PSC. Plan is for patient to undergo MRCP.    Hyperbilirubinemia  Elevated liver enzymes  Diarrhea  -Diarrhea improved.  Will consider restarting Keytruda in 1 week's time due to actively draining wound left lower extremity.  Will consider restarting Lenvima in 2 weeks time.     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      Emotional distress related to cancer diagnosis  -Ongoing issues  - previously referred for counseling  - Ativan      Complex social issues  - Staying with her family in [place] currently     Chronic Medical Conditions  -T2DM, HTN, steatohepatitis:  ?  Esophageal varicosities and HORNE, history of necrotizing fasciitis.   - Has seen GI and has been told she has IBS.     Chronic  thrombocytopenia.   -Reassess in 1 week     Cellulitis left lower extremity  -On doxycycline, referred to wound care     Pain management.   - In regards to her pain, she was advised that she could take 2 tablets of the oxycodone 5 mg for a total of 10 mg every 4 hours as needed.      Constipation.   - For constipation, she is to continue with Dulcolax and laxatives, including MiraLAX.   - Currently having diarrhea, not constipation     Recent COVID-19.   - She received monoclonal antibodies when she had COVID-19 in 12/2021.     Pain assessment was performed today as a part of patient’s care.  For patients with pain related to surgery, gynecologic malignancy or cancer treatment, the plan is as noted in the assessment/plan.  For patients with pain not related to these issues, they are to seek any further needed care from a more appropriate provider, such as PCP.      Orders Placed This Encounter   Procedures   • Magnesium     Standing Status:   Future     Number of Occurrences:   1     Standing Expiration Date:   2/11/2023     Order Specific Question:   Release to patient     Answer:   Immediate   • Urinalysis without microscopic (no culture) - Urine, Clean Catch     Standing Status:   Future     Standing Expiration Date:   2/18/2023     Order Specific Question:   Release to patient     Answer:   Immediate   • TSH     Standing Status:   Future     Standing Expiration Date:   2/18/2023     Order Specific Question:   Release to patient     Answer:   Immediate   • Comprehensive metabolic panel     Standing Status:   Future     Standing Expiration Date:   2/18/2023     Order Specific Question:   Release to patient     Answer:   Immediate   • T4, free     Standing Status:   Future     Standing Expiration Date:   2/18/2023     Order Specific Question:   Release to patient     Answer:   Immediate   • CBC and Differential     Standing Status:   Future     Standing Expiration Date:   2/18/2023     Order Specific Question:    Manual Differential     Answer:   No     Order Specific Question:   Release to patient     Answer:   Immediate     FOLLOW UP: 1 week at infusion center.  Electronically signed by Celine Rubio MD, 02/11/22, 12:56 PM EST.

## 2022-02-14 ENCOUNTER — SPECIALTY PHARMACY (OUTPATIENT)
Dept: ONCOLOGY | Facility: HOSPITAL | Age: 47
End: 2022-02-14

## 2022-02-14 ENCOUNTER — TELEPHONE (OUTPATIENT)
Dept: FAMILY MEDICINE CLINIC | Facility: CLINIC | Age: 47
End: 2022-02-14

## 2022-02-14 LAB
BACTERIA SPEC AEROBE CULT: ABNORMAL
BACTERIA SPEC AEROBE CULT: ABNORMAL
GRAM STN SPEC: ABNORMAL
GRAM STN SPEC: ABNORMAL

## 2022-02-14 NOTE — TELEPHONE ENCOUNTER
Patient reports it has been draining and developed blisters. Redness and swelling has improved and not as painful     She has upcoming appt on 2/21 with wound care. She reports that her wound has opened so she has been dressing her wound with non adherent guaze.    I scheduled 2 week follow up with PCP

## 2022-02-14 NOTE — TELEPHONE ENCOUNTER
Please check on patient and see if her leg wound is improving.  Advise her that culture is MRSA.  The doxycycline should help cover the infection.  Has she been seen by wound care yet?  Patient needs appointment in office to see me again in the next 1-2 weeks to evaluate leg wound again - please schedule this.

## 2022-02-15 ENCOUNTER — OFFICE VISIT (OUTPATIENT)
Dept: FAMILY MEDICINE CLINIC | Facility: CLINIC | Age: 47
End: 2022-02-15

## 2022-02-15 ENCOUNTER — HOSPITAL ENCOUNTER (OUTPATIENT)
Dept: CARDIOLOGY | Facility: HOSPITAL | Age: 47
Discharge: HOME | End: 2022-02-15
Admitting: PHYSICIAN ASSISTANT

## 2022-02-15 ENCOUNTER — HOSPITAL ENCOUNTER (OUTPATIENT)
Dept: RADIATION ONCOLOGY | Facility: HOSPITAL | Age: 47
Discharge: HOME | End: 2022-02-15

## 2022-02-15 VITALS
SYSTOLIC BLOOD PRESSURE: 136 MMHG | DIASTOLIC BLOOD PRESSURE: 78 MMHG | BODY MASS INDEX: 33.64 KG/M2 | HEIGHT: 62 IN | WEIGHT: 182.8 LBS | RESPIRATION RATE: 14 BRPM | TEMPERATURE: 96.9 F | OXYGEN SATURATION: 97 % | HEART RATE: 102 BPM

## 2022-02-15 VITALS — BODY MASS INDEX: 33.27 KG/M2 | WEIGHT: 180.78 LBS | HEIGHT: 62 IN

## 2022-02-15 VITALS — BODY MASS INDEX: 32.96 KG/M2 | WEIGHT: 180.2 LBS

## 2022-02-15 DIAGNOSIS — L03.116 CELLULITIS OF LEFT LOWER EXTREMITY: ICD-10-CM

## 2022-02-15 DIAGNOSIS — E80.6 HYPERBILIRUBINEMIA: ICD-10-CM

## 2022-02-15 DIAGNOSIS — R06.02 SHORTNESS OF BREATH: ICD-10-CM

## 2022-02-15 DIAGNOSIS — R60.0 BILATERAL LOWER EXTREMITY EDEMA: Primary | ICD-10-CM

## 2022-02-15 DIAGNOSIS — K76.9 CHRONIC LIVER DISEASE: ICD-10-CM

## 2022-02-15 DIAGNOSIS — R60.0 BILATERAL LOWER EXTREMITY EDEMA: ICD-10-CM

## 2022-02-15 LAB
BH CV LOWER VASCULAR LEFT COMMON FEMORAL AUGMENT: NORMAL
BH CV LOWER VASCULAR LEFT COMMON FEMORAL COMPETENT: NORMAL
BH CV LOWER VASCULAR LEFT COMMON FEMORAL COMPRESS: NORMAL
BH CV LOWER VASCULAR LEFT COMMON FEMORAL PHASIC: NORMAL
BH CV LOWER VASCULAR LEFT COMMON FEMORAL SPONT: NORMAL
BH CV LOWER VASCULAR LEFT DISTAL FEMORAL COMPRESS: NORMAL
BH CV LOWER VASCULAR LEFT GASTRONEMIUS COMPRESS: NORMAL
BH CV LOWER VASCULAR LEFT GREATER SAPH AK COMPRESS: NORMAL
BH CV LOWER VASCULAR LEFT GREATER SAPH BK COMPRESS: NORMAL
BH CV LOWER VASCULAR LEFT LESSER SAPH COMPRESS: NORMAL
BH CV LOWER VASCULAR LEFT MID FEMORAL AUGMENT: NORMAL
BH CV LOWER VASCULAR LEFT MID FEMORAL COMPETENT: NORMAL
BH CV LOWER VASCULAR LEFT MID FEMORAL COMPRESS: NORMAL
BH CV LOWER VASCULAR LEFT MID FEMORAL PHASIC: NORMAL
BH CV LOWER VASCULAR LEFT MID FEMORAL SPONT: NORMAL
BH CV LOWER VASCULAR LEFT POPLITEAL AUGMENT: NORMAL
BH CV LOWER VASCULAR LEFT POPLITEAL COMPETENT: NORMAL
BH CV LOWER VASCULAR LEFT POPLITEAL COMPRESS: NORMAL
BH CV LOWER VASCULAR LEFT POPLITEAL PHASIC: NORMAL
BH CV LOWER VASCULAR LEFT POPLITEAL SPONT: NORMAL
BH CV LOWER VASCULAR LEFT POSTERIOR TIBIAL COMPRESS: NORMAL
BH CV LOWER VASCULAR LEFT PROFUNDA FEMORAL COMPRESS: NORMAL
BH CV LOWER VASCULAR LEFT PROXIMAL FEMORAL COMPRESS: NORMAL
BH CV LOWER VASCULAR LEFT SAPHENOFEMORAL JUNCTION COMPRESS: NORMAL
BH CV LOWER VASCULAR RIGHT COMMON FEMORAL AUGMENT: NORMAL
BH CV LOWER VASCULAR RIGHT COMMON FEMORAL COMPETENT: NORMAL
BH CV LOWER VASCULAR RIGHT COMMON FEMORAL COMPRESS: NORMAL
BH CV LOWER VASCULAR RIGHT COMMON FEMORAL PHASIC: NORMAL
BH CV LOWER VASCULAR RIGHT COMMON FEMORAL SPONT: NORMAL
BH CV LOWER VASCULAR RIGHT DISTAL FEMORAL COMPRESS: NORMAL
BH CV LOWER VASCULAR RIGHT GASTRONEMIUS COMPRESS: NORMAL
BH CV LOWER VASCULAR RIGHT GREATER SAPH AK COMPRESS: NORMAL
BH CV LOWER VASCULAR RIGHT GREATER SAPH BK COMPRESS: NORMAL
BH CV LOWER VASCULAR RIGHT LESSER SAPH COMPRESS: NORMAL
BH CV LOWER VASCULAR RIGHT MID FEMORAL AUGMENT: NORMAL
BH CV LOWER VASCULAR RIGHT MID FEMORAL COMPETENT: NORMAL
BH CV LOWER VASCULAR RIGHT MID FEMORAL COMPRESS: NORMAL
BH CV LOWER VASCULAR RIGHT MID FEMORAL PHASIC: NORMAL
BH CV LOWER VASCULAR RIGHT MID FEMORAL SPONT: NORMAL
BH CV LOWER VASCULAR RIGHT POPLITEAL AUGMENT: NORMAL
BH CV LOWER VASCULAR RIGHT POPLITEAL COMPETENT: NORMAL
BH CV LOWER VASCULAR RIGHT POPLITEAL COMPRESS: NORMAL
BH CV LOWER VASCULAR RIGHT POPLITEAL PHASIC: NORMAL
BH CV LOWER VASCULAR RIGHT POPLITEAL SPONT: NORMAL
BH CV LOWER VASCULAR RIGHT POSTERIOR TIBIAL COMPRESS: NORMAL
BH CV LOWER VASCULAR RIGHT PROFUNDA FEMORAL COMPRESS: NORMAL
BH CV LOWER VASCULAR RIGHT PROXIMAL FEMORAL COMPRESS: NORMAL
BH CV LOWER VASCULAR RIGHT SAPHENOFEMORAL JUNCTION COMPRESS: NORMAL
MAXIMAL PREDICTED HEART RATE: 172 BPM
STRESS TARGET HR: 146 BPM

## 2022-02-15 PROCEDURE — 77386: CPT | Performed by: RADIOLOGY

## 2022-02-15 PROCEDURE — 99214 OFFICE O/P EST MOD 30 MIN: CPT | Performed by: PHYSICIAN ASSISTANT

## 2022-02-15 PROCEDURE — 93970 EXTREMITY STUDY: CPT

## 2022-02-15 PROCEDURE — 93970 EXTREMITY STUDY: CPT | Performed by: INTERNAL MEDICINE

## 2022-02-15 RX ORDER — HYDROCHLOROTHIAZIDE 12.5 MG/1
12.5 TABLET ORAL DAILY
Qty: 14 TABLET | Refills: 0 | Status: SHIPPED | OUTPATIENT
Start: 2022-02-15 | End: 2022-09-07

## 2022-02-15 NOTE — PROGRESS NOTES
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 female with a PMH of recurrent endometrial cancer, type 2 diabetes, CKD, hypertension, and hyperbilirubinemia who is here for evaluation of \"chief complaint.\"     The patient saw her PCP on 2/10 when she was treated for left lower extremity cellulitis with doxycycline. Culture was positive for MRSA at that time. She states she has an appointment with wound care next week on Monday. She is a retired travel RN. She has been changing her dressings every day after showering. Her redness has gone down and pain has decreased though she continues to have left leg swelling and wound oozing. She is concerned due to increased swelling swelling in her RLE and skin is starting to be shiny in the past week.  She states her left leg swelling was present last summer but resolved until it returned 3 weeks ago.  She has gained 9 pounds in 5 days but does admit to eating more.  She was planning to resume chemotherapy for her cancer treatment last week but it was not recommended due to cellulitis. CTs of abdomen and chest in December remarkable for lung metastasis. She denies fever, chills, orthopnea, abdominal distension, chest pain. She states she has a follow-up appointment with her oncologist later this week.     Past Medical History:   Diagnosis Date   • Anemia    • Anxiety and depression    • Back pain    • Bronchitis    • Diabetes (HCC)     DX 4-5 YRS AGO, CHECKS BS 4X/DAY, LAST A1C 7.1%   • Endometrial cancer (HCC)     STAGE II   • GERD (gastroesophageal reflux disease)    • Hypertension    • IBS (irritable bowel syndrome)    • MRSA (methicillin resistant staph aureus) culture positive 2018    S/P NECROTIZING FASCITIS IN BILATERAL FEET   • Necrotizing fasciitis (HCC)    • PCOS (polycystic ovarian syndrome)    • PTSD (post-traumatic stress disorder)    • Retinopathy 3/18/2020   • Sepsis (HCC)    • Stage 3 " chronic kidney disease (HCC) 2020   • Subconjunctival hemorrhage        Past Surgical History:   Procedure Laterality Date   • APPENDECTOMY N/A 3/21/2020    Procedure: APPENDECTOMY LAPAROSCOPIC;  Surgeon: Praful Myers MD;  Location: [facility] OR;  Service: General;  Laterality: N/A;   • EXPLORATORY LAPAROTOMY, TOTAL ABDOMINAL HYSTERECTOMY SALPINGO OOPHORECTOMY N/A 2/10/2020    Procedure: EXPLORATORY LAPAROTOMY, TOTAL ABDOMINAL HYSTERECTOMY, BILATERAL SALPINGO-OOPHORECTOMY WITH OPTIMAL  STAGING (R-0), OMENTECTOMY;  Surgeon: Celine Rubio MD;  Location:  [facility] OR;  Service: Gynecology Oncology;  Laterality: N/A;   • EYE SURGERY Left     BLOOD VESSEL IN EYE REPAIR   • TOE AMPUTATION Left 2019    big toe - unhealing sore   • TOE AMPUTATION Right 2018    big toe and second toe - Necrotizing fasciitis and MRSA        Family History   Problem Relation Age of Onset   • Fibroids Mother    • Diabetes type II Mother    • Hypertension Mother    • Heart attack Mother    • Fibroids Sister         PCOS   • Diabetes type II Sister    • Dementia Maternal Grandmother    • Stroke Maternal Grandmother        Social History     Socioeconomic History   • Marital status:    • Number of children: 1   Tobacco Use   • Smoking status: Former Smoker     Types: Cigarettes     Quit date: 2000     Years since quittin.1   • Smokeless tobacco: Never Used   • Tobacco comment: social MAYBE 1 CIG/DAY   Vaping Use   • Vaping Use: Never used   Substance and Sexual Activity   • Alcohol use: Not Currently   • Drug use: Never   • Sexual activity: Not Currently       Allergies   Allergen Reactions   • Bactrim [Sulfamethoxazole-Trimethoprim] Anaphylaxis   • Promethazine Mental Status Change, Anaphylaxis, Anxiety and Other (See Comments)     IV causes anxiety, oral is fine  IV only   • Penicillins Hives and Rash       ROS    Review of Systems   Constitutional: Negative for chills and fever.   Respiratory: Negative for  shortness of breath and wheezing.    Cardiovascular: Positive for leg swelling. Negative for chest pain.   Skin: Positive for color change, rash and wound.       Vitals:    02/15/22 1322   BP: 136/78   Pulse: 102   Resp: 14   Temp: 96.9 °F (36.1 °C)   SpO2: 97%     Body mass index is 33.43 kg/m².      Current Outpatient Medications:   •  Continuous Blood Gluc Sensor (FreeStyle Michelle 14 Day Sensor) misc, 1 each Every 14 (Fourteen) Days., Disp: 2 each, Rfl: 3  •  Continuous Blood Gluc Sensor (FREESTYLE MICHELLE SENSOR SYSTEM), Every 14 (Fourteen) Days., Disp: 1 each, Rfl: 1  •  doxycycline (VIBRAMYCIN) 100 MG capsule, Take 1 capsule by mouth 2 (Two) Times a Day for 14 days., Disp: 28 capsule, Rfl: 0  •  gabapentin (NEURONTIN) 600 MG tablet, Take 1 tablet by mouth 3 (Three) Times a Day., Disp: 90 tablet, Rfl: 3  •  insulin aspart (novoLOG) 100 UNIT/ML injection, Inject 8 Units under the skin into the appropriate area as directed Every Morning. Then sliding scale after meals., Disp: , Rfl:   •  Lenvatinib, 14 MG Daily Dose, 10 & 4 MG capsule therapy, Take 14 mg by mouth Daily. Take with or without food., Disp: 60 each, Rfl: 5  •  LORazepam (Ativan) 1 MG tablet, Take 1 tablet by mouth Every 6 (Six) Hours As Needed for Anxiety., Disp: 30 tablet, Rfl: 3  •  meclizine (ANTIVERT) 25 MG tablet, Take 1 tablet by mouth 3 (Three) Times a Day As Needed for Dizziness or Nausea., Disp: 15 tablet, Rfl: 0  •  MULTIPLE VITAMIN PO, Multiple Vitamin, Disp: , Rfl:   •  omeprazole (PrilOSEC) 20 MG capsule, Take 1 capsule by mouth Daily., Disp: 90 capsule, Rfl: 1  •  ondansetron (ZOFRAN) 8 MG tablet, Take 1 tablet by mouth 3 (Three) Times a Day As Needed for Nausea or Vomiting., Disp: 30 tablet, Rfl: 5  •  ondansetron ODT (Zofran ODT) 8 MG disintegrating tablet, Place 1 tablet on the tongue Every 8 (Eight) Hours As Needed for Nausea or Vomiting., Disp: 30 tablet, Rfl: 2  •  oxyCODONE (ROXICODONE) 10 MG tablet, Take 1 tablet by mouth Every 6  (Six) Hours As Needed for Severe Pain ., Disp: 60 tablet, Rfl: 0  •  Pembrolizumab (Keytruda) 100 MG/4ML solution, Infuse 100 mg into a venous catheter., Disp: , Rfl:   •  Polyethylene Glycol 3350 (MIRALAX PO), Take 17 g by mouth Daily., Disp: , Rfl:   •  prochlorperazine (COMPAZINE) 10 MG tablet, Take 1 tablet by mouth Every 6 (Six) Hours As Needed for Nausea or Vomiting., Disp: 30 tablet, Rfl: 3  •  ursodiol (ACTIGALL) 500 MG tablet, Take 1 tablet by mouth Every Evening., Disp: 30 tablet, Rfl: 5    PE    Physical Exam  Vitals reviewed.   Constitutional:       General: She is not in acute distress.     Appearance: She is well-developed.   HENT:      Head: Normocephalic and atraumatic.   Eyes:      Conjunctiva/sclera: Conjunctivae normal.   Cardiovascular:      Rate and Rhythm: Normal rate and regular rhythm.      Heart sounds: Normal heart sounds. No murmur heard.      Pulmonary:      Effort: Pulmonary effort is normal.      Breath sounds: Normal breath sounds.   Musculoskeletal:      Cervical back: Normal range of motion.        Legs:    Skin:     General: Skin is warm and dry.   Neurological:      Mental Status: She is alert.      Gait: Gait normal.   Psychiatric:         Speech: Speech normal.         Behavior: Behavior normal.         Results    Results for orders placed or performed during the hospital encounter of 02/11/22   Urinalysis without microscopic (no culture) - Urine, Clean Catch    Specimen: Urine, Clean Catch   Result Value Ref Range    Color, UA Dark Yellow (A) Yellow, Straw    Appearance, UA Clear Clear    pH, UA 6.0 5.0 - 8.0    Specific Gravity, UA 1.020 1.005 - 1.030    Glucose,  mg/dL (Trace) (A) Negative    Ketones, UA Negative Negative    Bilirubin, UA Negative Negative    Blood, UA Large (3+) (A) Negative    Protein, UA Negative Negative    Leuk Esterase, UA Trace (A) Negative    Nitrite, UA Negative Negative    Urobilinogen, UA 0.2 E.U./dL 0.2 - 1.0 E.U./dL   TSH    Specimen: Blood    Result Value Ref Range    TSH 0.289 0.270 - 4.200 uIU/mL   Comprehensive Metabolic Panel    Specimen: Blood   Result Value Ref Range    Glucose 244 (H) 65 - 99 mg/dL    BUN 30 (H) 6 - 20 mg/dL    Creatinine 0.95 0.57 - 1.00 mg/dL    Sodium 131 (L) 136 - 145 mmol/L    Potassium 3.8 3.5 - 5.2 mmol/L    Chloride 97 (L) 98 - 107 mmol/L    CO2 27.0 22.0 - 29.0 mmol/L    Calcium 8.7 8.6 - 10.5 mg/dL    Total Protein 5.4 (L) 6.0 - 8.5 g/dL    Albumin 3.00 (L) 3.50 - 5.20 g/dL    ALT (SGPT) 10 1 - 33 U/L    AST (SGOT) 19 1 - 32 U/L    Alkaline Phosphatase 97 39 - 117 U/L    Total Bilirubin 4.2 (H) 0.0 - 1.2 mg/dL    eGFR Non African Amer 63 >60 mL/min/1.73    Globulin 2.4 gm/dL    A/G Ratio 1.3 g/dL    BUN/Creatinine Ratio 31.6 (H) 7.0 - 25.0    Anion Gap 7.0 5.0 - 15.0 mmol/L   T4, Free    Specimen: Blood   Result Value Ref Range    Free T4 1.75 (H) 0.93 - 1.70 ng/dL   CBC Auto Differential    Specimen: Blood   Result Value Ref Range    WBC 3.50 3.40 - 10.80 10*3/mm3    RBC 2.62 (L) 3.77 - 5.28 10*6/mm3    Hemoglobin 9.1 (L) 12.0 - 15.9 g/dL    Hematocrit 26.6 (L) 34.0 - 46.6 %    RDW 20.3 (H) 12.3 - 15.4 %    .5 (H) 79.0 - 97.0 fL    MCH 34.6 (H) 26.6 - 33.0 pg    MCHC 34.1 31.5 - 35.7 g/dL    MPV 8.0 6.0 - 12.0 fL    Platelets 68 (L) 140 - 450 10*3/mm3    Neutrophil % 76.7 (H) 42.7 - 76.0 %    Lymphocyte % 14.8 (L) 19.6 - 45.3 %    Monocyte % 8.5 5.0 - 12.0 %    Neutrophils, Absolute 2.70 1.70 - 7.00 10*3/mm3    Lymphocytes, Absolute 0.50 (L) 0.70 - 3.10 10*3/mm3    Monocytes, Absolute 0.30 0.10 - 0.90 10*3/mm3   Magnesium    Specimen: Blood   Result Value Ref Range    Magnesium 1.7 1.6 - 2.6 mg/dL       A/P    Problem List Items Addressed This Visit        Other    Cellulitis of left lower extremity  -Culture grew MRSA sensitive to tetracycline  -Continue doxycycline and follow-up with wound care      Other Visit Diagnoses     Bilateral lower extremity edema    -  Primary  -Hx of cancer. Order bilateral lower  extremity venous duplex to rule out DVT  -Hx chronic liver disease, hyperbilirubinemia. Follows with GI. MRCP planned later this month  -History of sulfa allergy with anaphylaxis.  Avoid loop diuretics.  Will start on hydrochlorothiazide 12.5 mg daily   -Discussed low-sodium diet and leg elevation  -Return to clinic as previously scheduled for follow-up with PCP on 2/24 or sooner if needed  -Patient will have additional labs drawn this Friday when she sees oncology    Relevant Orders    Duplex Venous Lower Extremity - Bilateral CAR    Hyperbilirubinemia        Chronic liver disease        Relevant Orders    Protime-INR    Shortness of breath        Relevant Orders    proBNP          Plan of care was reviewed with patient at the conclusion of today's visit. Education was provided regarding diagnoses, management, and the importance of keeping follow-up appointments. The patient was counseled regarding the risks, benefits, and possible side-effects of treatment. Patient and/or family express understanding and agreement with the management plan.        BENSON Lee

## 2022-02-16 ENCOUNTER — HOSPITAL ENCOUNTER (OUTPATIENT)
Dept: RADIATION ONCOLOGY | Facility: HOSPITAL | Age: 47
Discharge: HOME | End: 2022-02-16

## 2022-02-16 ENCOUNTER — TELEPHONE (OUTPATIENT)
Dept: GYNECOLOGIC ONCOLOGY | Facility: CLINIC | Age: 47
End: 2022-02-16

## 2022-02-16 PROCEDURE — 77386: CPT | Performed by: RADIOLOGY

## 2022-02-16 NOTE — TELEPHONE ENCOUNTER
RN called pt to speak w/ her about appts tomorrow to have labs drawn and see Dr. Rubio.  Pt stated that she would be there.  Pt stated she is going to try to send us some pictures of her legs through [employer], that the one leg has had a decrease in redness but now the other leg is starting to be swollen, red, and shiny.  She went to her PCP and is having a duplex study done to make sure theres no blood clot.  RN stated she could go over all of that with Dr. Rubio tomorrow.  Pt v/u.

## 2022-02-17 ENCOUNTER — PATIENT MESSAGE (OUTPATIENT)
Dept: FAMILY MEDICINE CLINIC | Facility: CLINIC | Age: 47
End: 2022-02-17

## 2022-02-17 ENCOUNTER — HOSPITAL ENCOUNTER (OUTPATIENT)
Dept: RADIATION ONCOLOGY | Facility: HOSPITAL | Age: 47
Discharge: HOME | End: 2022-02-17

## 2022-02-17 ENCOUNTER — HOSPITAL ENCOUNTER (OUTPATIENT)
Dept: ONCOLOGY | Facility: HOSPITAL | Age: 47
Setting detail: INFUSION SERIES
Discharge: HOME | End: 2022-02-17

## 2022-02-17 ENCOUNTER — OFFICE VISIT (OUTPATIENT)
Dept: GYNECOLOGIC ONCOLOGY | Facility: CLINIC | Age: 47
End: 2022-02-17

## 2022-02-17 ENCOUNTER — TELEPHONE (OUTPATIENT)
Dept: FAMILY MEDICINE CLINIC | Facility: CLINIC | Age: 47
End: 2022-02-17

## 2022-02-17 VITALS
OXYGEN SATURATION: 100 % | WEIGHT: 182 LBS | SYSTOLIC BLOOD PRESSURE: 150 MMHG | HEART RATE: 106 BPM | DIASTOLIC BLOOD PRESSURE: 70 MMHG | TEMPERATURE: 96.6 F | HEIGHT: 62 IN | RESPIRATION RATE: 20 BRPM | BODY MASS INDEX: 33.49 KG/M2

## 2022-02-17 DIAGNOSIS — G62.9 NEUROPATHY: ICD-10-CM

## 2022-02-17 DIAGNOSIS — C54.1 ENDOMETRIAL CANCER: ICD-10-CM

## 2022-02-17 DIAGNOSIS — R60.0 BILATERAL EDEMA OF LOWER EXTREMITY: ICD-10-CM

## 2022-02-17 DIAGNOSIS — K76.9 CHRONIC LIVER DISEASE: ICD-10-CM

## 2022-02-17 DIAGNOSIS — R06.02 SHORTNESS OF BREATH: ICD-10-CM

## 2022-02-17 DIAGNOSIS — Z51.81 ENCOUNTER FOR THERAPEUTIC DRUG MONITORING: ICD-10-CM

## 2022-02-17 DIAGNOSIS — L97.222: ICD-10-CM

## 2022-02-17 DIAGNOSIS — E08.622: ICD-10-CM

## 2022-02-17 DIAGNOSIS — Z79.899 ENCOUNTER FOR LONG-TERM (CURRENT) USE OF OTHER MEDICATIONS: ICD-10-CM

## 2022-02-17 DIAGNOSIS — Z79.899 ENCOUNTER FOR LONG-TERM (CURRENT) USE OF OTHER MEDICATIONS: Primary | ICD-10-CM

## 2022-02-17 LAB
ALBUMIN SERPL-MCNC: 3 G/DL (ref 3.5–5.2)
ALBUMIN/GLOB SERPL: 1.1 G/DL
ALP SERPL-CCNC: 106 U/L (ref 39–117)
ALT SERPL W P-5'-P-CCNC: 11 U/L (ref 1–33)
ANION GAP SERPL CALCULATED.3IONS-SCNC: 9 MMOL/L (ref 5–15)
AST SERPL-CCNC: 33 U/L (ref 1–32)
BILIRUB SERPL-MCNC: 4.2 MG/DL (ref 0–1.2)
BILIRUB UR QL STRIP: NEGATIVE
BUN SERPL-MCNC: 19 MG/DL (ref 6–20)
BUN/CREAT SERPL: 20.4 (ref 7–25)
CALCIUM SPEC-SCNC: 8.9 MG/DL (ref 8.6–10.5)
CHLORIDE SERPL-SCNC: 98 MMOL/L (ref 98–107)
CLARITY UR: ABNORMAL
CO2 SERPL-SCNC: 26 MMOL/L (ref 22–29)
COLOR UR: ABNORMAL
CREAT SERPL-MCNC: 0.93 MG/DL (ref 0.57–1)
ERYTHROCYTE [DISTWIDTH] IN BLOOD BY AUTOMATED COUNT: 19.7 % (ref 12.3–15.4)
GFR SERPL CREATININE-BSD FRML MDRD: 64 ML/MIN/1.73
GLOBULIN UR ELPH-MCNC: 2.7 GM/DL
GLUCOSE SERPL-MCNC: 234 MG/DL (ref 65–99)
GLUCOSE UR STRIP-MCNC: NEGATIVE MG/DL
HCT VFR BLD AUTO: 27.9 % (ref 34–46.6)
HGB BLD-MCNC: 9.5 G/DL (ref 12–15.9)
HGB UR QL STRIP.AUTO: ABNORMAL
INR PPP: 1.19 (ref 0.85–1.16)
KETONES UR QL STRIP: NEGATIVE
LEUKOCYTE ESTERASE UR QL STRIP.AUTO: ABNORMAL
LYMPHOCYTES # BLD AUTO: 0.3 10*3/MM3 (ref 0.7–3.1)
LYMPHOCYTES NFR BLD AUTO: 12.7 % (ref 19.6–45.3)
MCH RBC QN AUTO: 35.2 PG (ref 26.6–33)
MCHC RBC AUTO-ENTMCNC: 34 G/DL (ref 31.5–35.7)
MCV RBC AUTO: 103.7 FL (ref 79–97)
MONOCYTES # BLD AUTO: 0.2 10*3/MM3 (ref 0.1–0.9)
MONOCYTES NFR BLD AUTO: 5.8 % (ref 5–12)
NEUTROPHILS NFR BLD AUTO: 2.2 10*3/MM3 (ref 1.7–7)
NEUTROPHILS NFR BLD AUTO: 81.5 % (ref 42.7–76)
NITRITE UR QL STRIP: NEGATIVE
NT-PROBNP SERPL-MCNC: 574.4 PG/ML (ref 0–450)
PH UR STRIP.AUTO: 6 [PH] (ref 5–8)
PLATELET # BLD AUTO: 63 10*3/MM3 (ref 140–450)
PMV BLD AUTO: 8.1 FL (ref 6–12)
POTASSIUM SERPL-SCNC: 3.5 MMOL/L (ref 3.5–5.2)
PROT SERPL-MCNC: 5.7 G/DL (ref 6–8.5)
PROT UR QL STRIP: ABNORMAL
PROTHROMBIN TIME: 14.7 SECONDS (ref 11.4–14.4)
RBC # BLD AUTO: 2.69 10*6/MM3 (ref 3.77–5.28)
SODIUM SERPL-SCNC: 133 MMOL/L (ref 136–145)
SP GR UR STRIP: 1.03 (ref 1–1.03)
T4 FREE SERPL-MCNC: 1.47 NG/DL (ref 0.93–1.7)
TSH SERPL DL<=0.05 MIU/L-ACNC: 2.08 UIU/ML (ref 0.27–4.2)
UROBILINOGEN UR QL STRIP: ABNORMAL
WBC NRBC COR # BLD: 2.7 10*3/MM3 (ref 3.4–10.8)

## 2022-02-17 PROCEDURE — 83880 ASSAY OF NATRIURETIC PEPTIDE: CPT | Performed by: PHYSICIAN ASSISTANT

## 2022-02-17 PROCEDURE — 11042 DBRDMT SUBQ TIS 1ST 20SQCM/<: CPT | Performed by: OBSTETRICS & GYNECOLOGY

## 2022-02-17 PROCEDURE — 36415 COLL VENOUS BLD VENIPUNCTURE: CPT

## 2022-02-17 PROCEDURE — 77336 RADIATION PHYSICS CONSULT: CPT | Performed by: RADIOLOGY

## 2022-02-17 PROCEDURE — 81003 URINALYSIS AUTO W/O SCOPE: CPT | Performed by: OBSTETRICS & GYNECOLOGY

## 2022-02-17 PROCEDURE — 85025 COMPLETE CBC W/AUTO DIFF WBC: CPT | Performed by: OBSTETRICS & GYNECOLOGY

## 2022-02-17 PROCEDURE — 85610 PROTHROMBIN TIME: CPT | Performed by: PHYSICIAN ASSISTANT

## 2022-02-17 PROCEDURE — 80053 COMPREHEN METABOLIC PANEL: CPT | Performed by: OBSTETRICS & GYNECOLOGY

## 2022-02-17 PROCEDURE — 99214 OFFICE O/P EST MOD 30 MIN: CPT | Performed by: OBSTETRICS & GYNECOLOGY

## 2022-02-17 PROCEDURE — 77386: CPT | Performed by: RADIOLOGY

## 2022-02-17 PROCEDURE — 84443 ASSAY THYROID STIM HORMONE: CPT | Performed by: OBSTETRICS & GYNECOLOGY

## 2022-02-17 PROCEDURE — 84439 ASSAY OF FREE THYROXINE: CPT | Performed by: OBSTETRICS & GYNECOLOGY

## 2022-02-18 ENCOUNTER — APPOINTMENT (OUTPATIENT)
Dept: ONCOLOGY | Facility: HOSPITAL | Age: 47
End: 2022-02-18

## 2022-02-18 ENCOUNTER — HOSPITAL ENCOUNTER (OUTPATIENT)
Dept: RADIATION ONCOLOGY | Facility: HOSPITAL | Age: 47
Discharge: HOME | End: 2022-02-18

## 2022-02-18 PROBLEM — E08.622: Status: ACTIVE | Noted: 2022-02-18

## 2022-02-18 PROBLEM — L97.222: Status: ACTIVE | Noted: 2022-02-18

## 2022-02-18 PROCEDURE — 77386: CPT | Performed by: RADIOLOGY

## 2022-02-18 NOTE — PROGRESS NOTES
Jeana Chung RN  [id number]  1975    Reason for visit:  Recurrent endometrial cancer, consideration of ongoing treatment     History of present illness:  The patient is a 46 y.o. year old female who presents today for treatment and evaluation of the above issues.     Patient was diagnosed with biopsy-proven recurrent endometrial cancer. She is currently receiving Keytruda and Lenvima. Her clinical course has been complicated due to thrombocytopenia (could not undergo Port-A-Cath insertion) and COVID infection 2021. One cycle of Keytruda was held due to COVID.     Today, patient continues to be improved from a lab standpoint.  However, she is on antibiotics for left lower extremity cellulitis and has a wound there.  She has agreed to complete evaluation of this area today.  She also has new onset right lower extremity swelling.  DVT work-up has been negative to date.  She states her primary care provider is considering putting her on a diuretic.     OBGYN History:  She is a .  She does not use HRT. She has never had a pap smear.    Oncologic History:  Oncology/Hematology History   Endometrial cancer (HCC)   2019 Imaging    Presented to ED in [place] due to progressively heavy vaginal bleeding and severe back pain. Ultrasound revealed uterine and cervical masses.     2020 Biopsy    ED follow-up with gynecologist in [place], told she likely has cervical cancer. Insufficient tissue on attempted cervical biopsy. Patient moved to [place], [place] to be closer to mother and sister for work-up and treatment.      2020 Imaging    Gyn evaluation at [facility]. Repeat TVUS showed cervical mass, right adnexal mass, and large uterine fibroid vs mass. Referred to Gyn Oncology     2020 Initial Diagnosis    Endometrial cancer (CMS/HCC)  Cervical biopsy positive for infiltrating endometrioid adenocarcinoma     2020 Imaging    CT chest, abdomen, pelvis showed enlarged uterus with  multiple masses and 4 cm cystic/solid mass at right ovary. No metastatic disease noted in abdomen or chest.     2/10/2020 Surgery    Exploratory laparotomy, total abdominal hysterectomy, bilateral salpingo-oophorectomy, with optimal debulking (R=0), and omentectomy.    Pelvic washing cytology positive for malignant cells, consistent with adenocarcinoma. Final pathology showed large grade 3 endometrioid tumor with lymphvascular invasion at the uterus. Cervical stromal involvement, vaginal margin negative. Right tube and ovary involved. Left tube and ovary negative, omentum negative. MSI normal. [id number], Stage IIIA grade 3       3/9/2020 - 7/8/2020 Chemotherapy    OP UTERINE PACLitaxel / CARBOplatin (Q21D)  Cycle #1 complicated by pancytopenia, appendicitis, appendectomy, appendiceal abscess  Cycle #2 remarkable for thrombocytopenia, worsening neuropathy.  Dose modification with cycle #3.       3/17/2020 - 3/23/2020 Other Event    Hospital admission with acute appendicitis     5/21/2020 - 5/21/2020 Chemotherapy    OP CENTRAL VENOUS ACCESS DEVICE ACCESS, CARE, AND MAINTENANCE (CVAD)     7/8/2020 - 7/28/2020 Chemotherapy    OP OVARIAN DOCEtaxel / CARBOplatin     12/5/2021 Progression    Complains of vaginal bleeding.  CT scan chest abdomen and pelvis:  IMPRESSION:  Abnormal soft tissue mass seen at the vaginal vault with likely  extension to involve the sigmoid colon adjacent to the mass on the left.  Multiple new lung lesions including 2.5 cm medial right lower lobe nodule, 7 mm right peripheral lung nodule, to left small lung nodules measuring 1.2 cm together and smaller other lung nodules concerning for metastatic disease.  Office biopsy of vaginal mass performed 12/6/2021.  Final Diagnosis   VAGINA, BIOPSY:               Compatible with known endometrioid carcinoma.               See comment.          12/13/2021 -  Chemotherapy    Course complicated by COVID infection diagnosed 12/20/2021.  Thrombocytopenia,  unknown etiology.  OP ENDOMETRIAL Lenvatinib / Pembrolizumab 200 mg           Past Medical History:   Diagnosis Date   • Anemia    • Anxiety and depression    • Back pain    • Bronchitis    • Diabetes (HCC)     DX 4-5 YRS AGO, CHECKS BS 4X/DAY, LAST A1C 7.1%   • Endometrial cancer (HCC)     STAGE II   • GERD (gastroesophageal reflux disease)    • Hypertension    • IBS (irritable bowel syndrome)    • MRSA (methicillin resistant staph aureus) culture positive 2018    S/P NECROTIZING FASCITIS IN BILATERAL FEET   • Necrotizing fasciitis (HCC)    • PCOS (polycystic ovarian syndrome)    • PTSD (post-traumatic stress disorder)    • Retinopathy 3/18/2020   • Sepsis (HCC)    • Stage 3 chronic kidney disease (HCC) 2/24/2020   • Subconjunctival hemorrhage        Past Surgical History:   Procedure Laterality Date   • APPENDECTOMY N/A 3/21/2020    Procedure: APPENDECTOMY LAPAROSCOPIC;  Surgeon: Praful Myers MD;  Location:  [facility];  Service: General;  Laterality: N/A;   • EXPLORATORY LAPAROTOMY, TOTAL ABDOMINAL HYSTERECTOMY SALPINGO OOPHORECTOMY N/A 2/10/2020    Procedure: EXPLORATORY LAPAROTOMY, TOTAL ABDOMINAL HYSTERECTOMY, BILATERAL SALPINGO-OOPHORECTOMY WITH OPTIMAL  STAGING (R-0), OMENTECTOMY;  Surgeon: Celine Rubio MD;  Location:  [facility];  Service: Gynecology Oncology;  Laterality: N/A;   • EYE SURGERY Left     BLOOD VESSEL IN EYE REPAIR   • TOE AMPUTATION Left 06/2019    big toe - unhealing sore   • TOE AMPUTATION Right 2018    big toe and second toe - Necrotizing fasciitis and MRSA        MEDICATIONS: The current medication list was reviewed with the patient and updated in the EMR this date per the Medical Assistant. Medication dosages and frequencies were confirmed to be accurate.      Allergies:  is allergic to bactrim [sulfamethoxazole-trimethoprim], promethazine, and penicillins.    Social History:   Social History     Socioeconomic History   • Marital status:    • Number of children: 1    Tobacco Use   • Smoking status: Former Smoker     Types: Cigarettes     Quit date: 2000     Years since quittin.1   • Smokeless tobacco: Never Used   • Tobacco comment: social MAYBE 1 CIG/DAY   Vaping Use   • Vaping Use: Never used   Substance and Sexual Activity   • Alcohol use: Not Currently   • Drug use: Never   • Sexual activity: Not Currently       Family History:    Family History   Problem Relation Age of Onset   • Fibroids Mother    • Diabetes type II Mother    • Hypertension Mother    • Heart attack Mother    • Fibroids Sister         PCOS   • Diabetes type II Sister    • Dementia Maternal Grandmother    • Stroke Maternal Grandmother        Health Maintenance:    Health Maintenance   Topic Date Due   • COLORECTAL CANCER SCREENING  Never done   • Hepatitis B (1 of 3 - Risk 3-dose series) Never done   • TDAP/TD VACCINES (1 - Tdap) Never done   • ANNUAL WELLNESS VISIT  Never done   • MAMMOGRAM  Never done   • Pneumococcal Vaccine 0-64 (2 of 4 - PCV13) 2021   • DIABETIC FOOT EXAM  2021   • DIABETIC EYE EXAM  2021   • COVID-19 Vaccine (2 - Booster for Smiley series) 05/15/2021   • URINE MICROALBUMIN  2021   • INFLUENZA VACCINE  2021   • HEMOGLOBIN A1C  08/10/2022   • HEPATITIS C SCREENING  Completed   • PAP SMEAR  Discontinued       Review of Systems   Constitutional: Negative for activity change, appetite change and fever.   HENT: Positive for sore throat.    Respiratory: Negative for cough and shortness of breath.    Cardiovascular: Positive for leg swelling (left > right). Negative for chest pain.   Gastrointestinal: Positive for diarrhea. Negative for abdominal pain, constipation and vomiting.   Genitourinary: Positive for vaginal bleeding.   Musculoskeletal: Positive for back pain.   Skin: Positive for color change and wound.   Hematological: Bruises/bleeds easily.   Psychiatric/Behavioral: Positive for dysphoric mood. The patient is nervous/anxious.        Physical  "Exam    Vitals:    02/17/22 1135   BP: 150/70  Comment: TAYOE   Pulse: 106   Resp: 20   Temp: 96.6 °F (35.9 °C)   TempSrc: Infrared   SpO2: 100%  Comment: RA   Weight: 82.6 kg (182 lb)   Height: 157.5 cm (62\")   PainSc:   5   PainLoc: Leg  Comment: Left- Cellulitis       Body mass index is 33.29 kg/m².    Wt Readings from Last 3 Encounters:   02/17/22 82.6 kg (182 lb)   02/15/22 82 kg (180 lb 12.4 oz)   02/15/22 81.7 kg (180 lb 3.2 oz)       GENERAL: Alert,  female in no apparent distress.   HEENT:  Head normocephalic, atraumatic. Mucus membranes moist. Mild pharyngeal injection, no thrush.  Sclera less icteric  CARDIOVASCULAR: Deferred  RESPIRATORY: Normal effort  GASTROINTESTINAL: Deferred  SKIN: Wound to left lower extremity with dressing in place.  All bandages removed. 1.3 cm area of necrotic skin with associated drainage.    PSYCHIATRIC: AO x3, with appropriate affect, normal thought processes.  NEUROLOGIC: No focal deficits. Moves extremities well.  MUSCULOSKELETAL: Uses cane, abnormal gait with mild limping  EXTREMITIES:   No cyanosis, clubbing, symmetric.      PELVIC exam: deferred    ECOG PS 1    PROCEDURES: Left lower extremity wound was prepped with Betadine.  Necrotic tissue was debrided sharply with scissors.  Underlying fat was exposed although this also appeared somewhat necrotic.  Dressing was replaced.    Diagnostic Data:      Lab Results   Component Value Date    WBC 2.70 (L) 02/17/2022    HGB 9.5 (L) 02/17/2022    HCT 27.9 (L) 02/17/2022    .7 (H) 02/17/2022    PLT 63 (L) 02/17/2022    NEUTROABS 2.20 02/17/2022    GLUCOSE 234 (H) 02/17/2022    BUN 19 02/17/2022    CREATININE 0.93 02/17/2022    EGFRIFNONA 64 02/17/2022     (L) 02/17/2022    K 3.5 02/17/2022    CL 98 02/17/2022    CO2 26.0 02/17/2022    MG 1.7 02/11/2022    PHOS 2.5 03/20/2020    CALCIUM 8.9 02/17/2022    ALBUMIN 3.00 (L) 02/17/2022    AST 33 (H) 02/17/2022    ALT 11 02/17/2022    BILITOT 4.2 (H) 02/17/2022     Lab " Results   Component Value Date     8.5 07/08/2020           Assessment/Plan   This is a 46 y.o. woman with recurrent stage IIIA endometrial cancer presenting for consideration of resumption of Keytruda Lenvima.  Encounter Diagnoses   Name Primary?   • Encounter for long-term (current) use of other medications Yes   • Encounter for therapeutic drug monitoring    • Neuropathy    • Endometrial cancer (HCC)    • Bilateral edema of lower extremity    • Diabetic ulcer of left calf associated with diabetes mellitus due to underlying condition, with fat layer exposed (HCC)        Endometrial cancer: Stage IIIa due to adnexal involvement, now with recurrence  -Patient would like Port-A-Cath, however this has not been placed due to her thrombocytopenia  -Hold Lenvima and Keytruda again for one week then follow-up for consideration of restarting.  -Follows with GI for evaluation and management of her chronic hyperbilirubinemia. Per review of documentaiton, differential remains DILI secondary to Keytruda (typically presents with hepatocellular injury rather than hyperbilirubinemia) vs PSC. Plan is for patient to undergo MRCP.  -Hopefully can resume treatment next week.  Repeat evaluation then due to cellulitis, extremity swelling, wound debrided today    Hyperbilirubinemia  Elevated liver enzymes  Diarrhea  -Diarrhea improved.  Will consider restarting Keytruda in 1 week's time due to actively draining wound left lower extremity.  Will consider restarting Lenvima in 2 weeks time.     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      Emotional distress related to cancer diagnosis, marital discourse  -Ongoing issues  - previously referred for counseling  - Ativan      Complex social issues  - Staying with her family in [place]  currently     Chronic Medical Conditions  -T2DM, HTN, steatohepatitis:  ?  Esophageal varicosities and HORNE, history of necrotizing fasciitis.   - Has seen GI and has been told she has IBS.     Chronic thrombocytopenia.   -Reassess in 1 week     Cellulitis left lower extremity, swelling bilateral lower extremities  -On doxycycline, referred to wound care  -Wound care unable to see patient until next week.  Wound sharply debrided in clinic today.  -CT of abdomen pelvis ordered to make sure that there is no pelvic thrombus contributing to patient's new bilateral lower extremity edema, left greater than right because this can sometimes happen in cancer patients.     Pain management, pain overall improved.   - In regards to her pain, she was advised that she could take 2 tablets of the oxycodone 5 mg for a total of 10 mg every 4 hours as needed.      Constipation.   - For constipation, she is to continue with Dulcolax and laxatives, including MiraLAX.   - Currently having diarrhea, not constipation     Recent COVID-19.   - She received monoclonal antibodies when she had COVID-19 in 12/2021.     Pain assessment was performed today as a part of patient’s care.  For patients with pain related to surgery, gynecologic malignancy or cancer treatment, the plan is as noted in the assessment/plan.  For patients with pain not related to these issues, they are to seek any further needed care from a more appropriate provider, such as PCP.      Orders Placed This Encounter   Procedures   • CT Abdomen Pelvis With Contrast     Standing Status:   Future     Standing Expiration Date:   2/17/2023     Scheduling Instructions:      Before 2/25 infusion date if possible     Order Specific Question:   Will Oral Contrast be needed for this procedure?     Answer:   Yes     Order Specific Question:   Patient Pregnant     Answer:   No     Order Specific Question:   Release to patient     Answer:   Immediate   • Urinalysis without microscopic (no  culture) - Urine, Clean Catch     Standing Status:   Future     Standing Expiration Date:   2/25/2023     Order Specific Question:   Release to patient     Answer:   Immediate   • TSH     Standing Status:   Future     Standing Expiration Date:   2/25/2023     Order Specific Question:   Release to patient     Answer:   Immediate   • Comprehensive metabolic panel     Standing Status:   Future     Standing Expiration Date:   2/25/2023     Order Specific Question:   Release to patient     Answer:   Immediate   • T4, free     Standing Status:   Future     Standing Expiration Date:   2/25/2023     Order Specific Question:   Release to patient     Answer:   Immediate   • CBC and Differential     Standing Status:   Future     Standing Expiration Date:   2/25/2023     Order Specific Question:   Manual Differential     Answer:   No     Order Specific Question:   Release to patient     Answer:   Immediate     FOLLOW UP: 1 week at infusion center.  Electronically signed by Celine Rubio MD, 02/11/22, 12:56 PM EST.

## 2022-02-20 DIAGNOSIS — A49.02 MRSA INFECTION: ICD-10-CM

## 2022-02-20 DIAGNOSIS — L03.116 CELLULITIS OF LEFT LOWER EXTREMITY: Primary | ICD-10-CM

## 2022-02-21 ENCOUNTER — HOSPITAL ENCOUNTER (OUTPATIENT)
Dept: RADIATION ONCOLOGY | Facility: HOSPITAL | Age: 47
Discharge: HOME | End: 2022-02-21

## 2022-02-21 ENCOUNTER — HOSPITAL ENCOUNTER (OUTPATIENT)
Dept: PHYSICAL THERAPY | Facility: HOSPITAL | Age: 47
Setting detail: THERAPIES SERIES
Discharge: HOME | End: 2022-02-21

## 2022-02-21 ENCOUNTER — APPOINTMENT (OUTPATIENT)
Dept: PHYSICAL THERAPY | Facility: HOSPITAL | Age: 47
End: 2022-02-21

## 2022-02-21 DIAGNOSIS — L02.416 CELLULITIS AND ABSCESS OF LEFT LOWER EXTREMITY: ICD-10-CM

## 2022-02-21 DIAGNOSIS — L03.116 CELLULITIS AND ABSCESS OF LEFT LOWER EXTREMITY: ICD-10-CM

## 2022-02-21 DIAGNOSIS — T21.22XD PARTIAL THICKNESS BURN OF ABDOMEN, SUBSEQUENT ENCOUNTER: Primary | ICD-10-CM

## 2022-02-21 PROCEDURE — 97597 DBRDMT OPN WND 1ST 20 CM/<: CPT

## 2022-02-21 PROCEDURE — 97164 PT RE-EVAL EST PLAN CARE: CPT

## 2022-02-21 PROCEDURE — 77386: CPT | Performed by: RADIOLOGY

## 2022-02-21 NOTE — THERAPY RE-EVALUATION
Outpatient Rehabilitation - Wound/Debridement Re-Tk  [facility]     Patient Name: Jeana Pollack Juliet, RN  : 1975  MRN: [id number]  Today's Date: 2022                  Admit Date: 2022    Visit Dx:    ICD-10-CM ICD-9-CM   1. Partial thickness burn of abdomen, subsequent encounter  T21.22XD V58.89     942.23   2. Cellulitis and abscess of left lower extremity  L03.116 682.6    L02.416    RLQ burn:    L calf wounds/abscess (new):      Patient Active Problem List   Diagnosis   • Acute stress reaction with predominately emotional disturbance   • Sleep disturbance   • Cancer related pain   • Moderate episode of recurrent major depressive disorder (HCC)   • Anxiety   • Iron deficiency anemia   • Type 2 diabetes mellitus with diabetic polyneuropathy, with long-term current use of insulin (HCC)   • Essential hypertension   • Heart murmur   • Family history of cardiac disorder in mother   • Endometrial cancer (HCC)   • Stage 3 chronic kidney disease (HCC)   • Gastroesophageal reflux disease   • Amputation of left great toe (HCC)   • Amputation of right great toe (HCC)   • Neuropathy   • Hypotension due to drugs   • Neutropenia (HCC)   • Acute appendicitis   • Open wound of right foot   • Hyponatremia   • Pancytopenia (HCC)   • Antineoplastic chemotherapy induced pancytopenia (HCC)   • Chemotherapy-induced thrombocytopenia   • Wound healing, delayed   • Dyspareunia, female   • Vaginal stenosis   • Secondary malignant neoplasm of pelvis (HCC)   • Encounter for long-term (current) use of other medications   • Encounter for therapeutic drug monitoring   • Wound of abdomen   • Thrombocytopenia (HCC)   • Elevated bilirubin   • Diarrhea due to drug   • Cellulitis of left lower extremity   • Diabetic ulcer of left calf associated with diabetes mellitus due to underlying condition, with fat layer exposed (HCC)        Past Medical History:   Diagnosis Date   • Anemia    • Anxiety and depression    • Back  pain    • Bronchitis    • Diabetes (HCC)     DX 4-5 YRS AGO, CHECKS BS 4X/DAY, LAST A1C 7.1%   • Endometrial cancer (HCC)     STAGE II   • GERD (gastroesophageal reflux disease)    • Hypertension    • IBS (irritable bowel syndrome)    • MRSA (methicillin resistant staph aureus) culture positive 2018    S/P NECROTIZING FASCITIS IN BILATERAL FEET   • Necrotizing fasciitis (HCC)    • PCOS (polycystic ovarian syndrome)    • PTSD (post-traumatic stress disorder)    • Retinopathy 3/18/2020   • Sepsis (HCC)    • Stage 3 chronic kidney disease (HCC) 2/24/2020   • Subconjunctival hemorrhage         Past Surgical History:   Procedure Laterality Date   • APPENDECTOMY N/A 3/21/2020    Procedure: APPENDECTOMY LAPAROSCOPIC;  Surgeon: Praful Myers MD;  Location: [facility] OR;  Service: General;  Laterality: N/A;   • EXPLORATORY LAPAROTOMY, TOTAL ABDOMINAL HYSTERECTOMY SALPINGO OOPHORECTOMY N/A 2/10/2020    Procedure: EXPLORATORY LAPAROTOMY, TOTAL ABDOMINAL HYSTERECTOMY, BILATERAL SALPINGO-OOPHORECTOMY WITH OPTIMAL  STAGING (R-0), OMENTECTOMY;  Surgeon: Celine Rubio MD;  Location:  [place] [facility];  Service: Gynecology Oncology;  Laterality: N/A;   • EYE SURGERY Left     BLOOD VESSEL IN EYE REPAIR   • TOE AMPUTATION Left 06/2019    big toe - unhealing sore   • TOE AMPUTATION Right 2018    big toe and second toe - Necrotizing fasciitis and MRSA            EVALUATION   PT Ortho     Row Name 02/21/22 0800       Subjective Comments    Subjective Comments Pt reports abdomen burn is about the same, but she now has wounds on her left leg.  States she had sudden onset of pain to LLE with development of redness and swelling, development of necrotic areas.  States DVT was ruled out, she cultured (+) MRSA in wound and is now on oral abx.  Pt also reports Dr. Rubio partially debrided wound during her last office visit.  -TEJA       Subjective Pain    Able to rate subjective pain? yes  -TEJA    Pre-Treatment Pain Level 5  -TEJA     Post-Treatment Pain Level 5  -JM    Subjective Pain Comment LLE  -       Transfers    Sit-Stand [place] (Transfers) independent  -JM    Stand-Sit [place] (Transfers) independent  -    Comment (Transfers) supine for tx, LSL to access E  -       Gait/Stairs (Locomotion)    [place] Level (Gait) independent  -          User Key  (r) = Recorded By, (t) = Taken By, (c) = Cosigned By    Initials Name Provider Type    Leidy Millard, PT Physical Therapist               LDA Wound     Row Name 02/21/22 0800             [REMOVED] Wound Bilateral abdomen Incision    Wound - Properties Group Side: Bilateral  -RB Location: abdomen  -RB Primary Wound Type: Incision  -RB Additional Comments: 3 port sites  -RB Removal Date: 02/21/22  - Wound Outcome: Unknown  -JM      Retired Wound - Properties Group Side: Bilateral  -RB Retired Orientation: upper; medial; lateral  -RB Location: abdomen  -RB Primary Wound Type: Incision  -RB Additional Comments: 3 port sites  -RB Resolution Date: 02/21/22  - Wound Outcome: Unknown  -JM              [REMOVED] Wound 02/10/20 abdomen Incision    Wound - Properties Group Placement Date: 02/10/20  -KD Present on Hospital Admission: N  -KD Location: abdomen  -KD Primary Wound Type: Incision  -KD Removal Date: 02/21/22  - Wound Outcome: Healed  -JM      Retired Wound - Properties Group Date first assessed: 02/10/20  -KD Present on Hospital Admission: N  -KD Retired Orientation: midline  -KD Location: abdomen  -KD Primary Wound Type: Incision  -KD Resolution Date: 02/21/22  - Wound Outcome: Healed  -JM              [REMOVED] Wound 04/07/20 1100 Right abdomen Abcess    Wound - Properties Group Placement Date: 04/07/20  - Placement Time: 1100  -MF Side: Right  -MF Location: abdomen  -MF Primary Wound Type: Abcess  -MF Removal Date: 02/21/22  - Wound Outcome: Healed  -JM      Retired Wound - Properties Group Date first assessed: 04/07/20  - Time first assessed:  1100  -MF Side: Right  -MF Retired Orientation: upper; lateral  - Location: abdomen  -MF Primary Wound Type: Abcess  -MF Resolution Date: 02/21/22  - Wound Outcome: Healed  -JM              [REMOVED] Wound 04/07/20 1100 abdomen Incision    Wound - Properties Group Placement Date: 04/07/20  - Placement Time: 1100  -MF Location: abdomen  -MF Primary Wound Type: Incision  -MF Removal Date: 02/21/22  - Wound Outcome: Healed  -JM      Retired Wound - Properties Group Date first assessed: 04/07/20  - Time first assessed: 1100  -MF Retired Orientation: midline  - Location: abdomen  -MF Primary Wound Type: Incision  -MF Resolution Date: 02/21/22  - Wound Outcome: Healed  -JM              [REMOVED] Wound 07/02/20 1345 Left abdomen Soft Tissue Necrosis    Wound - Properties Group Placement Date: 07/02/20  - Placement Time: 1345  -JM Side: Left  -JM Location: abdomen  -JM Primary Wound Type: Soft tissue  -JM Removal Date: 02/21/22  - Wound Outcome: Healed  -JM      Retired Wound - Properties Group Date first assessed: 07/02/20  - Time first assessed: 1345  -JM Side: Left  -JM Location: abdomen  -JM Primary Wound Type: Soft tissue  -JM Resolution Date: 02/21/22  - Wound Outcome: Healed  -JM              Wound 02/21/22 0800 Left posterior calf Abcess    Wound - Properties Group Placement Date: 02/21/22  - Placement Time: 0800  -JM Side: Left  -JM Orientation: posterior  -JM Location: calf  -JM Primary Wound Type: Abcess  -      Wound Image  View All Images View Images  -      Dressing Appearance intact; moist drainage  gauze and conform roll  -      Base necrotic; yellow; white; subcutaneous; slough  -      Periwound intact; indurated; pink; swelling  -      Periwound Temperature warm  -      Periwound Skin Turgor firm; soft  firm immediate, soft spongy area distally  -      Edges open  -      Wound Length (cm) 1.7 cm  -      Wound Width (cm) 1.5 cm  -      Wound Depth (cm) --   "obscured, at least 2.5cm  -      Drainage Characteristics/Odor yellow; creamy  -JM      Drainage Amount moderate  -      Care, Wound cleansed with; wound cleanser; debrided; honey applied  -      Dressing Care dressing applied; antimicrobial agent applied; silver impregnated; foam; gauze  HFBt to pack, mepilex ag, 4\" conform, size 5 spandage  -JM      Periwound Care cleansed with pH balanced cleanser; dry periwound area maintained  -      Retired Wound - Properties Group Date first assessed: 02/21/22  - Time first assessed: 0800  - Side: Left  - Location: calf  - Primary Wound Type: Abcess  -JM              Wound 02/02/22 0845 Right lateral abdomen Burn    Wound - Properties Group Placement Date: 02/02/22  - Placement Time: 0845  - Present on Hospital Admission: Y  - Side: Right  - Orientation: lateral  - Location: abdomen  - Primary Wound Type: Burn  -      Wound Image  View All Images View Images  -      Dressing Appearance intact; moist drainage  -      Base moist; red; pink; necrotic; yellow; subcutaneous; slough; granulating  -      Periwound intact; dry; pink  -      Periwound Temperature warm  -      Periwound Skin Turgor soft  -      Edges irregular; open  -      Wound Length (cm) 1.3 cm  -      Wound Width (cm) 2.5 cm  -      Wound Depth (cm) --  obscured  -      Drainage Characteristics/Odor serous; yellow; creamy  -JM      Drainage Amount small  -      Care, Wound cleansed with; wound cleanser; debrided; honey applied  -      Dressing Care dressing applied; silver impregnated; foam; silicone; border dressing  mepilex ag, 4\" optifoam  -      Periwound Care cleansed with pH balanced cleanser; dry periwound area maintained  -      Retired Wound - Properties Group Date first assessed: 02/02/22  - Time first assessed: 0845  - Present on Hospital Admission: Y  - Side: Right  - Location: abdomen  - Primary Wound Type: Burn  -            User " "Key  (r) = Recorded By, (t) = Taken By, (c) = Cosigned By    Initials Name Provider Type    Vinny Colindres, PT Physical Therapist    Mary Kay Flores, PT Physical Therapist    Leidy Millard, PT Physical Therapist    Leidy Millard, PT Physical Therapist    Be Godwin, RN Registered Nurse    Bushra Adair, RN Registered Nurse                  WOUND DEBRIDEMENT  Total area of Debridement: 5cmsq  Debridement Site 1  Location- Site 1: RLQ  Selective Debridement- Site 1: Wound Surface <20cmsq  Instruments- Site 1: tweezers  Excised Tissue Description- Site 1: minimum, slough  Bleeding- Site 1: none   Debridement Site 2  Location- Site 2: L calf wound  Selective Debridement- Site 2: Wound Surface <20cmsq  Instruments- Site 2: #15, scapel, tweezers  Excised Tissue Description- Site 2: moderate, slough  Bleeding- Site 2: none          Therapy Education     Row Name 02/21/22 0800             Therapy Education    Education Details Continue therahoney, ag foam, and 4\" optifoam to ABD burn.  Pack L calf wound with small dot of therahoney and HFBt cut into strip, cover all L calf areas with mepilex ag and secure with conform and spandage.  Change dressings every 2-3 days or PRN for drainage/disruption.  -JM      Given Bandaging/dressing change; Symptoms/condition management  -      Program Modified  -      How Provided Verbal; Demonstration  -JM      Provided to Patient  -JM      Level of Understanding Verbalized; Teach back education performed  -            User Key  (r) = Recorded By, (t) = Taken By, (c) = Cosigned By    Initials Name Provider Type    Leidy Millard, PT Physical Therapist                Recommendation and Plan   PT Assessment/Plan     Row Name 02/21/22 0800          PT Assessment    Functional Limitations Other (comment); Performance in self-care ADL  wound mgmt  -JM     Impairments Integumentary integrity; Pain  -JM     Assessment Comments Pt with small " improvement in RLQ burn with decreasing dimensions and increased granulation.  However, pt presenting with new wounds of L calf.  Proximal L calf wound is open with necrotic base at least 2.5cm deep but obscured by necrotic tissue, periwound with firm induration and edema, improving erythema.  Pt has distal soft spongy area that will likely progress to open wound, PT will monitor for now.  PT lightly debrided necrotic tissue, initiated use of therahoney and HFBt to assist with debridement, and covered all areas of L calf with mepilex ag and conform roll gauze.  Pt has [facility] for (+) MRSA of L calf wound.  Pt will require debridement at least 1-2x/week.  Wound healing will be complicated by pt undergoing RXT for cancer, and h/o infection.  -     Rehab Potential Fair  -     Patient/caregiver participated in establishment of treatment plan and goals Yes  -     Patient would benefit from skilled therapy intervention Yes  -            PT Plan    PT Frequency 1x/week; 2x/week  -     Predicted Duration of Therapy Intervention (PT) 24 visits  -     Planned CPT's? PT RE-EVAL: [id number]; PT YESSENIA DEBRIDE OPEN WOUND UP TO 20 CM: 43172; PT NONSELECT DEBRIDE 15 MIN: 93202; PT NLFU MIST: [id number]; PT MULTI LAYER COMP SYS LE; PT SELF CARE/MGMT/TRAIN 15 MIN: [id number]  -     Physical Therapy Interventions (Optional Details) patient/family education; wound care  -     PT Plan Comments debridement ABD and L calf, dressings management, ?pulselavage or MIST if indicated  -           User Key  (r) = Recorded By, (t) = Taken By, (c) = Cosigned By    Initials Name Provider Type    Leidy Millard, PT Physical Therapist                  Goals   PT OP Goals     Row Name 22 0800          PT Short Term Goals    STG Date to Achieve 22  -TEJA     STG 1 Pt to be able to demonstrate home dressing management  -TEJA     STG 1 Progress Ongoing  -TEJA     STG 2 Pt will demonstrate 25% reduction in abdominal  wound area to indicate healing progress.  -     STG 2 Progress Ongoing  -     STG 3 Pt will verbalize s/sx of infection.  -     STG 3 Progress Met  -     STG 4 Reduce L calf wound dimensions by 50% as evidence of wound healing.  -     STG 4 Progress New  -            Long Term Goals    LTG Date to Achieve 05/03/22  -     LTG 1 Pt will demonstrate 75% reduction in abdominal wound area to indicate healing progress.  -     LTG 1 Progress Ongoing  -     LTG 2 Reduce L calf wound dimensions by 75% as evidence of wound healing.  -     LTG 2 Progress New  -            Time Calculation    PT Goal Re-Cert Due Date 05/03/22  -           User Key  (r) = Recorded By, (t) = Taken By, (c) = Cosigned By    Initials Name Provider Type    Leidy Millard, PT Physical Therapist                Time Calculation: Start Time: 0800  Untimed Charges  PT Eval/Re-eval Minutes: 30  [id number] debridement: 30  Total Minutes  Untimed Charges Total Minutes: 60   Total Minutes: 60  Therapy Charges for Today     Code Description Service Date Service Provider Modifiers Qty    56546687043 HC YESSENIA DEBRIDE OPEN WOUND UP TO 20CM 2/21/2022 Leidy Gilliam, PT GP 1    [id number]  PT RE-EVAL ESTABLISHED PLAN 2 2/21/2022 Leidy Gilliam, PT GP 1                Leiyd Gilliam, PT  2/21/2022

## 2022-02-22 ENCOUNTER — HOSPITAL ENCOUNTER (OUTPATIENT)
Dept: ONCOLOGY | Facility: HOSPITAL | Age: 47
Setting detail: INFUSION SERIES
Discharge: HOME | End: 2022-02-22

## 2022-02-22 ENCOUNTER — HOSPITAL ENCOUNTER (OUTPATIENT)
Dept: RADIATION ONCOLOGY | Facility: HOSPITAL | Age: 47
Discharge: HOME | End: 2022-02-22

## 2022-02-22 ENCOUNTER — OFFICE VISIT (OUTPATIENT)
Dept: GYNECOLOGIC ONCOLOGY | Facility: CLINIC | Age: 47
End: 2022-02-22

## 2022-02-22 VITALS
OXYGEN SATURATION: 98 % | HEIGHT: 62 IN | WEIGHT: 183 LBS | RESPIRATION RATE: 20 BRPM | DIASTOLIC BLOOD PRESSURE: 58 MMHG | TEMPERATURE: 96.9 F | SYSTOLIC BLOOD PRESSURE: 126 MMHG | BODY MASS INDEX: 33.68 KG/M2 | HEART RATE: 104 BPM

## 2022-02-22 VITALS — WEIGHT: 183.1 LBS | BODY MASS INDEX: 33.49 KG/M2

## 2022-02-22 DIAGNOSIS — Z51.81 ENCOUNTER FOR THERAPEUTIC DRUG MONITORING: ICD-10-CM

## 2022-02-22 DIAGNOSIS — C54.1 ENDOMETRIAL CANCER: Primary | ICD-10-CM

## 2022-02-22 DIAGNOSIS — Z79.899 ENCOUNTER FOR LONG-TERM (CURRENT) USE OF OTHER MEDICATIONS: ICD-10-CM

## 2022-02-22 DIAGNOSIS — L03.116 CELLULITIS OF LEFT LOWER EXTREMITY: ICD-10-CM

## 2022-02-22 DIAGNOSIS — G62.9 NEUROPATHY: ICD-10-CM

## 2022-02-22 DIAGNOSIS — C54.1 ENDOMETRIAL CANCER: ICD-10-CM

## 2022-02-22 LAB
ALBUMIN SERPL-MCNC: 3.1 G/DL (ref 3.5–5.2)
ALBUMIN/GLOB SERPL: 1.1 G/DL
ALP SERPL-CCNC: 104 U/L (ref 39–117)
ALT SERPL W P-5'-P-CCNC: 12 U/L (ref 1–33)
ANION GAP SERPL CALCULATED.3IONS-SCNC: 10 MMOL/L (ref 5–15)
AST SERPL-CCNC: 36 U/L (ref 1–32)
BILIRUB SERPL-MCNC: 3.6 MG/DL (ref 0–1.2)
BILIRUB UR QL STRIP: ABNORMAL
BUN SERPL-MCNC: 12 MG/DL (ref 6–20)
BUN/CREAT SERPL: 15.4 (ref 7–25)
CALCIUM SPEC-SCNC: 9.1 MG/DL (ref 8.6–10.5)
CHLORIDE SERPL-SCNC: 101 MMOL/L (ref 98–107)
CLARITY UR: ABNORMAL
CO2 SERPL-SCNC: 28 MMOL/L (ref 22–29)
COLOR UR: ABNORMAL
CREAT SERPL-MCNC: 0.78 MG/DL (ref 0.57–1)
ERYTHROCYTE [DISTWIDTH] IN BLOOD BY AUTOMATED COUNT: 18.5 % (ref 12.3–15.4)
GFR SERPL CREATININE-BSD FRML MDRD: 79 ML/MIN/1.73
GLOBULIN UR ELPH-MCNC: 2.7 GM/DL
GLUCOSE SERPL-MCNC: 133 MG/DL (ref 65–99)
GLUCOSE UR STRIP-MCNC: NEGATIVE MG/DL
HCT VFR BLD AUTO: 29.5 % (ref 34–46.6)
HGB BLD-MCNC: 9.9 G/DL (ref 12–15.9)
HGB UR QL STRIP.AUTO: ABNORMAL
KETONES UR QL STRIP: NEGATIVE
LEUKOCYTE ESTERASE UR QL STRIP.AUTO: ABNORMAL
LYMPHOCYTES # BLD AUTO: 0.5 10*3/MM3 (ref 0.7–3.1)
LYMPHOCYTES NFR BLD AUTO: 14.7 % (ref 19.6–45.3)
MCH RBC QN AUTO: 34.2 PG (ref 26.6–33)
MCHC RBC AUTO-ENTMCNC: 33.7 G/DL (ref 31.5–35.7)
MCV RBC AUTO: 101.6 FL (ref 79–97)
MONOCYTES # BLD AUTO: 0.1 10*3/MM3 (ref 0.1–0.9)
MONOCYTES NFR BLD AUTO: 4.3 % (ref 5–12)
NEUTROPHILS NFR BLD AUTO: 2.6 10*3/MM3 (ref 1.7–7)
NEUTROPHILS NFR BLD AUTO: 81 % (ref 42.7–76)
NITRITE UR QL STRIP: NEGATIVE
PH UR STRIP.AUTO: 6 [PH] (ref 5–8)
PLATELET # BLD AUTO: 91 10*3/MM3 (ref 140–450)
PMV BLD AUTO: 6.7 FL (ref 6–12)
POTASSIUM SERPL-SCNC: 3.1 MMOL/L (ref 3.5–5.2)
PROT SERPL-MCNC: 5.8 G/DL (ref 6–8.5)
PROT UR QL STRIP: ABNORMAL
RBC # BLD AUTO: 2.9 10*6/MM3 (ref 3.77–5.28)
SODIUM SERPL-SCNC: 139 MMOL/L (ref 136–145)
SP GR UR STRIP: 1.03 (ref 1–1.03)
T4 FREE SERPL-MCNC: 1.36 NG/DL (ref 0.93–1.7)
TSH SERPL DL<=0.05 MIU/L-ACNC: 3.44 UIU/ML (ref 0.27–4.2)
UROBILINOGEN UR QL STRIP: ABNORMAL
WBC NRBC COR # BLD: 3.2 10*3/MM3 (ref 3.4–10.8)

## 2022-02-22 PROCEDURE — 85025 COMPLETE CBC W/AUTO DIFF WBC: CPT | Performed by: OBSTETRICS & GYNECOLOGY

## 2022-02-22 PROCEDURE — 84443 ASSAY THYROID STIM HORMONE: CPT | Performed by: OBSTETRICS & GYNECOLOGY

## 2022-02-22 PROCEDURE — 77386: CPT | Performed by: RADIOLOGY

## 2022-02-22 PROCEDURE — 84439 ASSAY OF FREE THYROXINE: CPT | Performed by: OBSTETRICS & GYNECOLOGY

## 2022-02-22 PROCEDURE — 80053 COMPREHEN METABOLIC PANEL: CPT | Performed by: OBSTETRICS & GYNECOLOGY

## 2022-02-22 PROCEDURE — 99213 OFFICE O/P EST LOW 20 MIN: CPT | Performed by: OBSTETRICS & GYNECOLOGY

## 2022-02-22 PROCEDURE — 81003 URINALYSIS AUTO W/O SCOPE: CPT | Performed by: OBSTETRICS & GYNECOLOGY

## 2022-02-22 PROCEDURE — 36415 COLL VENOUS BLD VENIPUNCTURE: CPT

## 2022-02-22 RX ORDER — SODIUM CHLORIDE 9 MG/ML
250 INJECTION, SOLUTION INTRAVENOUS ONCE
Status: CANCELLED | OUTPATIENT
Start: 2022-02-23

## 2022-02-22 NOTE — PROGRESS NOTES
Jeana Chung RN  [id number]  1975    Reason for visit:  Recurrent endometrial cancer, consideration of ongoing treatment     History of present illness:  The patient is a 46 y.o. year old female who presents today for treatment and evaluation of the above issues.     Patient was diagnosed with biopsy-proven recurrent endometrial cancer. She is currently receiving Keytruda and Lenvima. Her clinical course has been complicated due to thrombocytopenia (could not undergo Port-A-Cath insertion) and COVID infection 2021. One cycle of Keytruda was held due to COVID.     Today, patient again presents for evaluation due to left lower extremity cellulitis.  She is scheduled see Dr. Saha tomorrow.  She reports MRSA culture positive.  She reports she feels dramatically better.  She is seeing wound care and has a treatment plan for her left lower extremity.  Swelling modestly improved.  Erythemata modestly improved.     OBGYN History:  She is a .  She does not use HRT. She has never had a pap smear.    Oncologic History:  Oncology/Hematology History   Endometrial cancer (HCC)   2019 Imaging    Presented to ED in [place] due to progressively heavy vaginal bleeding and severe back pain. Ultrasound revealed uterine and cervical masses.     2020 Biopsy    ED follow-up with gynecologist in [place], told she likely has cervical cancer. Insufficient tissue on attempted cervical biopsy. Patient moved to [place], [place] to be closer to mother and sister for work-up and treatment.      2020 Imaging    Gyn evaluation at [facility]. Repeat TVUS showed cervical mass, right adnexal mass, and large uterine fibroid vs mass. Referred to Gyn Oncology     2020 Initial Diagnosis    Endometrial cancer (CMS/HCC)  Cervical biopsy positive for infiltrating endometrioid adenocarcinoma     2020 Imaging    CT chest, abdomen, pelvis showed enlarged uterus with multiple masses and 4 cm cystic/solid  mass at right ovary. No metastatic disease noted in abdomen or chest.     2/10/2020 Surgery    Exploratory laparotomy, total abdominal hysterectomy, bilateral salpingo-oophorectomy, with optimal debulking (R=0), and omentectomy.    Pelvic washing cytology positive for malignant cells, consistent with adenocarcinoma. Final pathology showed large grade 3 endometrioid tumor with lymphvascular invasion at the uterus. Cervical stromal involvement, vaginal margin negative. Right tube and ovary involved. Left tube and ovary negative, omentum negative. MSI normal. [id number], Stage IIIA grade 3       3/9/2020 - 7/8/2020 Chemotherapy    OP UTERINE PACLitaxel / CARBOplatin (Q21D)  Cycle #1 complicated by pancytopenia, appendicitis, appendectomy, appendiceal abscess  Cycle #2 remarkable for thrombocytopenia, worsening neuropathy.  Dose modification with cycle #3.       3/17/2020 - 3/23/2020 Other Event    Hospital admission with acute appendicitis     5/21/2020 - 5/21/2020 Chemotherapy    OP CENTRAL VENOUS ACCESS DEVICE ACCESS, CARE, AND MAINTENANCE (CVAD)     7/8/2020 - 7/28/2020 Chemotherapy    OP OVARIAN DOCEtaxel / CARBOplatin     12/5/2021 Progression    Complains of vaginal bleeding.  CT scan chest abdomen and pelvis:  IMPRESSION:  Abnormal soft tissue mass seen at the vaginal vault with likely  extension to involve the sigmoid colon adjacent to the mass on the left.  Multiple new lung lesions including 2.5 cm medial right lower lobe nodule, 7 mm right peripheral lung nodule, to left small lung nodules measuring 1.2 cm together and smaller other lung nodules concerning for metastatic disease.  Office biopsy of vaginal mass performed 12/6/2021.  Final Diagnosis   VAGINA, BIOPSY:               Compatible with known endometrioid carcinoma.               See comment.          12/13/2021 -  Chemotherapy    Course complicated by COVID infection diagnosed 12/20/2021.  Thrombocytopenia, unknown etiology.  OP ENDOMETRIAL Lenvatinib  / Pembrolizumab 200 mg           Past Medical History:   Diagnosis Date   • Anemia    • Anxiety and depression    • Back pain    • Bronchitis    • Diabetes (HCC)     DX 4-5 YRS AGO, CHECKS BS 4X/DAY, LAST A1C 7.1%   • Endometrial cancer (HCC)     STAGE II   • GERD (gastroesophageal reflux disease)    • Hypertension    • IBS (irritable bowel syndrome)    • MRSA (methicillin resistant staph aureus) culture positive 2018    S/P NECROTIZING FASCITIS IN BILATERAL FEET   • Necrotizing fasciitis (HCC)    • PCOS (polycystic ovarian syndrome)    • PTSD (post-traumatic stress disorder)    • Retinopathy 3/18/2020   • Sepsis (HCC)    • Stage 3 chronic kidney disease (HCC) 2/24/2020   • Subconjunctival hemorrhage        Past Surgical History:   Procedure Laterality Date   • APPENDECTOMY N/A 3/21/2020    Procedure: APPENDECTOMY LAPAROSCOPIC;  Surgeon: Praful Myers MD;  [facility];  Service: General;  Laterality: N/A;   • EXPLORATORY LAPAROTOMY, TOTAL ABDOMINAL HYSTERECTOMY SALPINGO OOPHORECTOMY N/A 2/10/2020    Procedure: EXPLORATORY LAPAROTOMY, TOTAL ABDOMINAL HYSTERECTOMY, BILATERAL SALPINGO-OOPHORECTOMY WITH OPTIMAL  STAGING (R-0), OMENTECTOMY;  Surgeon: Celine Rubio MD;  Location: [facility];  Service: Gynecology Oncology;  Laterality: N/A;   • EYE SURGERY Left     BLOOD VESSEL IN EYE REPAIR   • TOE AMPUTATION Left 06/2019    big toe - unhealing sore   • TOE AMPUTATION Right 2018    big toe and second toe - Necrotizing fasciitis and MRSA        MEDICATIONS: The current medication list was reviewed with the patient and updated in the EMR this date per the Medical Assistant. Medication dosages and frequencies were confirmed to be accurate.      Allergies:  is allergic to bactrim [sulfamethoxazole-trimethoprim], promethazine, and penicillins.    Social History:   Social History     Socioeconomic History   • Marital status:    • Number of children: 1   Tobacco Use   • Smoking status: Former Smoker  "    Types: Cigarettes     Quit date:      Years since quittin.   • Smokeless tobacco: Never Used   • Tobacco comment: social MAYBE 1 CIG/DAY   Vaping Use   • Vaping Use: Never used   Substance and Sexual Activity   • Alcohol use: Not Currently   • Drug use: Never   • Sexual activity: Not Currently       Family History:    Family History   Problem Relation Age of Onset   • Fibroids Mother    • Diabetes type II Mother    • Hypertension Mother    • Heart attack Mother    • Fibroids Sister         PCOS   • Diabetes type II Sister    • Dementia Maternal Grandmother    • Stroke Maternal Grandmother        Health Maintenance:    Health Maintenance   Topic Date Due   • COLORECTAL CANCER SCREENING  Never done   • Hepatitis B (1 of 3 - Risk 3-dose series) Never done   • TDAP/TD VACCINES (1 - Tdap) Never done   • ANNUAL WELLNESS VISIT  Never done   • MAMMOGRAM  Never done   • Pneumococcal Vaccine 0-64 (2 of 4 - PCV13) 2021   • DIABETIC FOOT EXAM  2021   • DIABETIC EYE EXAM  2021   • COVID-19 Vaccine (2 - Booster for Smiley series) 05/15/2021   • URINE MICROALBUMIN  2021   • INFLUENZA VACCINE  2021   • HEMOGLOBIN A1C  08/10/2022   • HEPATITIS C SCREENING  Completed   • PAP SMEAR  Discontinued       Review of Systems   Cardiovascular: Positive for leg swelling (left > right).   Genitourinary: Positive for vaginal bleeding.   Musculoskeletal: Positive for back pain.   Skin: Positive for color change and wound.   Psychiatric/Behavioral: Positive for dysphoric mood. The patient is nervous/anxious.        Physical Exam    Vitals:    22 1304   BP: 126/58  Comment: LUE   Pulse: 104   Resp: 20   Temp: 96.9 °F (36.1 °C)   TempSrc: Infrared   SpO2: 98%  Comment: RA   Weight: 83 kg (183 lb)   Height: 157.5 cm (62\")   PainSc:   3   PainLoc: Leg  Comment: Bilateral       Body mass index is 33.47 kg/m².    Wt Readings from Last 3 Encounters:   22 83 kg (183 lb)   22 83.1 kg (183 lb " 1.6 oz)   02/17/22 82.6 kg (182 lb)       GENERAL: Alert,  female in no apparent distress.   HEENT:  Head normocephalic, atraumatic. Mucus membranes moist. Mild pharyngeal injection, no thrush.  Sclera less icteric  CARDIOVASCULAR: Deferred  RESPIRATORY: Normal effort  GASTROINTESTINAL: Deferred  SKIN: Wound to left lower extremity with dressing in place.  Bandages adjusted.  Blue foam over wound with silver impregnated dressing over that.  No Ace bandage, more appropriate loose bandaging  PSYCHIATRIC: AO x3, with appropriate affect, normal thought processes.  NEUROLOGIC: No focal deficits. Moves extremities well.  MUSCULOSKELETAL: Uses cane, abnormal gait with mild limping  EXTREMITIES:   No cyanosis, clubbing, symmetric.      PELVIC exam: deferred    ECOG PS 2    PROCEDURES: Left lower extremity wound was prepped with Betadine.  Necrotic tissue was debrided sharply with scissors.  Underlying fat was exposed although this also appeared somewhat necrotic.  Dressing was replaced.    Diagnostic Data:      Lab Results   Component Value Date    WBC 3.20 (L) 02/22/2022    HGB 9.9 (L) 02/22/2022    HCT 29.5 (L) 02/22/2022    .6 (H) 02/22/2022    PLT 91 (L) 02/22/2022    NEUTROABS 2.60 02/22/2022    GLUCOSE 133 (H) 02/22/2022    BUN 12 02/22/2022    CREATININE 0.78 02/22/2022    EGFRIFNONA 79 02/22/2022     02/22/2022    K 3.1 (L) 02/22/2022     02/22/2022    CO2 28.0 02/22/2022    MG 1.7 02/11/2022    PHOS 2.5 03/20/2020    CALCIUM 9.1 02/22/2022    ALBUMIN 3.10 (L) 02/22/2022    AST 36 (H) 02/22/2022    ALT 12 02/22/2022    BILITOT 3.6 (H) 02/22/2022     Lab Results   Component Value Date     8.5 07/08/2020           Assessment/Plan   This is a 46 y.o. woman with recurrent stage IIIA endometrial cancer presenting for consideration of resumption of Keytruda Lenvima.  Encounter Diagnoses   Name Primary?   • Endometrial cancer (HCC) Yes   • Cellulitis of left lower extremity        Endometrial  cancer: Stage IIIa due to adnexal involvement, now with recurrence  -Patient would like Port-A-Cath, however this has not been placed due to her thrombocytopenia  -Keytruda infusion 2/23/2022, will consider restarting Lenvima in 1 week's time depending on how patient is doing.  -Follows with GI for evaluation and management of her chronic hyperbilirubinemia. Per review of documentaiton, differential remains DILI secondary to Keytruda (typically presents with hepatocellular injury rather than hyperbilirubinemia) vs PSC. Plan is for patient to undergo MRCP.  -CT imaging 2/25/2022    Hyperbilirubinemia  Elevated liver enzymes  Diarrhea  -Diarrhea improved.  Will consider restarting Keytruda tomorrow.  Will consider restarting Lenvima in 1 weeks time.     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      Emotional distress related to cancer diagnosis, marital discourse  -Ongoing issues  - previously referred for counseling  - Ativan      Complex social issues  - Staying with her family in [place] currently     Chronic Medical Conditions  -T2DM, HTN, steatohepatitis:  ?  Esophageal varicosities and HORNE, history of necrotizing fasciitis.   - Has seen GI and has been told she has IBS.     Chronic thrombocytopenia.   -Reassess in 1 week, modestly improved     Cellulitis left lower extremity, swelling bilateral lower extremities  -On doxycycline, seen by wound care and now has plan, being seen by infectious disease tomorrow  -CT of abdomen pelvis ordered to make sure that there is no pelvic thrombus contributing to patient's new bilateral lower extremity edema, left greater than right because this can sometimes happen in cancer patients.     Pain management, pain overall improved.   - In regards to her pain, she was advised  that she could take 2 tablets of the oxycodone 5 mg for a total of 10 mg every 4 hours as needed.      Constipation.   - For constipation, she is to continue with Dulcolax and laxatives, including MiraLAX.   - Currently having diarrhea, not constipation     Recent COVID-19.   - She received monoclonal antibodies when she had COVID-19 in 12/2021.     Pain assessment was performed today as a part of patient’s care.  For patients with pain related to surgery, gynecologic malignancy or cancer treatment, the plan is as noted in the assessment/plan.  For patients with pain not related to these issues, they are to seek any further needed care from a more appropriate provider, such as PCP.      No orders of the defined types were placed in this encounter.    FOLLOW UP: 1 week conversation, repeat laboratory evaluation.  Electronically signed by Celine Rubio MD, 02/22/22, 1:29 PM EST.

## 2022-02-23 ENCOUNTER — HOSPITAL ENCOUNTER (OUTPATIENT)
Dept: ONCOLOGY | Facility: HOSPITAL | Age: 47
Setting detail: INFUSION SERIES
Discharge: HOME | End: 2022-02-23

## 2022-02-23 ENCOUNTER — HOSPITAL ENCOUNTER (OUTPATIENT)
Dept: RADIATION ONCOLOGY | Facility: HOSPITAL | Age: 47
Discharge: HOME | End: 2022-02-23

## 2022-02-23 VITALS
HEART RATE: 92 BPM | WEIGHT: 183.8 LBS | RESPIRATION RATE: 20 BRPM | TEMPERATURE: 97.1 F | BODY MASS INDEX: 33.62 KG/M2 | DIASTOLIC BLOOD PRESSURE: 60 MMHG | SYSTOLIC BLOOD PRESSURE: 122 MMHG

## 2022-02-23 DIAGNOSIS — G62.9 NEUROPATHY: ICD-10-CM

## 2022-02-23 DIAGNOSIS — C54.1 ENDOMETRIAL CANCER: Primary | ICD-10-CM

## 2022-02-23 DIAGNOSIS — Z51.81 ENCOUNTER FOR THERAPEUTIC DRUG MONITORING: ICD-10-CM

## 2022-02-23 DIAGNOSIS — C54.1 ENDOMETRIAL CANCER: ICD-10-CM

## 2022-02-23 DIAGNOSIS — Z79.899 ENCOUNTER FOR LONG-TERM (CURRENT) USE OF OTHER MEDICATIONS: Primary | ICD-10-CM

## 2022-02-23 PROCEDURE — 96413 CHEMO IV INFUSION 1 HR: CPT

## 2022-02-23 PROCEDURE — 77386: CPT | Performed by: RADIOLOGY

## 2022-02-23 PROCEDURE — 25010000002 PEMBROLIZUMAB 100 MG/4ML SOLUTION 4 ML VIAL: Performed by: OBSTETRICS & GYNECOLOGY

## 2022-02-23 RX ORDER — SODIUM CHLORIDE 9 MG/ML
250 INJECTION, SOLUTION INTRAVENOUS ONCE
Status: DISCONTINUED | OUTPATIENT
Start: 2022-02-23 | End: 2022-02-24 | Stop reason: HOSPADM

## 2022-02-23 RX ADMIN — SODIUM CHLORIDE 200 MG: 9 INJECTION, SOLUTION INTRAVENOUS at 13:30

## 2022-02-24 ENCOUNTER — HOSPITAL ENCOUNTER (OUTPATIENT)
Dept: RADIATION ONCOLOGY | Facility: HOSPITAL | Age: 47
Discharge: HOME | End: 2022-02-24

## 2022-02-24 ENCOUNTER — TELEPHONE (OUTPATIENT)
Dept: FAMILY MEDICINE CLINIC | Facility: CLINIC | Age: 47
End: 2022-02-24

## 2022-02-24 PROCEDURE — 77386: CPT | Performed by: RADIOLOGY

## 2022-02-24 NOTE — TELEPHONE ENCOUNTER
Spoke with patient.  She is in traffic and actually has an appointment with infectious disease at 10am.  Will call to reschedule if she wants to be seen.

## 2022-02-25 ENCOUNTER — HOSPITAL ENCOUNTER (OUTPATIENT)
Dept: MRI IMAGING | Facility: HOSPITAL | Age: 47
Discharge: HOME | End: 2022-02-25
Admitting: NURSE PRACTITIONER

## 2022-02-25 ENCOUNTER — HOSPITAL ENCOUNTER (OUTPATIENT)
Dept: RADIATION ONCOLOGY | Facility: HOSPITAL | Age: 47
Discharge: HOME | End: 2022-02-25

## 2022-02-25 DIAGNOSIS — K74.69 OTHER CIRRHOSIS OF LIVER: ICD-10-CM

## 2022-02-25 PROCEDURE — 77336 RADIATION PHYSICS CONSULT: CPT | Performed by: RADIOLOGY

## 2022-02-25 PROCEDURE — 77386: CPT | Performed by: RADIOLOGY

## 2022-02-25 PROCEDURE — 74181 MRI ABDOMEN W/O CONTRAST: CPT

## 2022-02-28 ENCOUNTER — HOSPITAL ENCOUNTER (OUTPATIENT)
Dept: PHYSICAL THERAPY | Facility: HOSPITAL | Age: 47
Setting detail: THERAPIES SERIES
Discharge: HOME | End: 2022-02-28

## 2022-02-28 ENCOUNTER — TELEPHONE (OUTPATIENT)
Dept: GYNECOLOGIC ONCOLOGY | Facility: CLINIC | Age: 47
End: 2022-02-28

## 2022-02-28 ENCOUNTER — HOSPITAL ENCOUNTER (OUTPATIENT)
Dept: RADIATION ONCOLOGY | Facility: HOSPITAL | Age: 47
Discharge: HOME | End: 2022-02-28

## 2022-02-28 DIAGNOSIS — L02.416 CELLULITIS AND ABSCESS OF LEFT LOWER EXTREMITY: ICD-10-CM

## 2022-02-28 DIAGNOSIS — T21.22XD PARTIAL THICKNESS BURN OF ABDOMEN, SUBSEQUENT ENCOUNTER: Primary | ICD-10-CM

## 2022-02-28 DIAGNOSIS — L03.116 CELLULITIS AND ABSCESS OF LEFT LOWER EXTREMITY: ICD-10-CM

## 2022-02-28 DIAGNOSIS — K74.69 OTHER CIRRHOSIS OF LIVER: Primary | ICD-10-CM

## 2022-02-28 PROCEDURE — 97597 DBRDMT OPN WND 1ST 20 CM/<: CPT

## 2022-02-28 PROCEDURE — 77386: CPT | Performed by: RADIOLOGY

## 2022-02-28 RX ORDER — SPIRONOLACTONE 100 MG/1
100 TABLET, FILM COATED ORAL DAILY
Qty: 90 TABLET | Refills: 0 | Status: SHIPPED | OUTPATIENT
Start: 2022-02-28 | End: 2022-03-07

## 2022-02-28 NOTE — TELEPHONE ENCOUNTER
Pt called asking when she should restart her Lenvima.  Pt stated that she is still dealing with a lot of swelling in her legs and that she had an MRI done and it showed anasarca.  Pt stated that she is f/u with her PCP re this.  Pt stated that she will also be seeing her hepatologist sometime soon.  Pt stated that she has still been getting IV infusions for the cellulitis from infectious disease.   RN told her that we were not restarting her Lenvima until next week per last conversation with Dr. Rubio.  Pt v/u.

## 2022-03-01 ENCOUNTER — HOSPITAL ENCOUNTER (OUTPATIENT)
Dept: RADIATION ONCOLOGY | Facility: HOSPITAL | Age: 47
Setting detail: RADIATION/ONCOLOGY SERIES
Discharge: HOME | End: 2022-03-01

## 2022-03-01 ENCOUNTER — HOSPITAL ENCOUNTER (OUTPATIENT)
Dept: RADIATION ONCOLOGY | Facility: HOSPITAL | Age: 47
Discharge: HOME | End: 2022-03-01

## 2022-03-01 VITALS — BODY MASS INDEX: 36.01 KG/M2 | WEIGHT: 196.9 LBS

## 2022-03-01 DIAGNOSIS — E80.6 HYPERBILIRUBINEMIA: Primary | ICD-10-CM

## 2022-03-01 PROCEDURE — 77386: CPT | Performed by: RADIOLOGY

## 2022-03-02 ENCOUNTER — TELEPHONE (OUTPATIENT)
Dept: GYNECOLOGIC ONCOLOGY | Facility: CLINIC | Age: 47
End: 2022-03-02

## 2022-03-02 ENCOUNTER — HOSPITAL ENCOUNTER (OUTPATIENT)
Dept: RADIATION ONCOLOGY | Facility: HOSPITAL | Age: 47
Discharge: HOME | End: 2022-03-02

## 2022-03-02 DIAGNOSIS — C54.1 ENDOMETRIAL CANCER: Primary | ICD-10-CM

## 2022-03-02 DIAGNOSIS — D61.818 PANCYTOPENIA: ICD-10-CM

## 2022-03-02 PROCEDURE — 77386: CPT | Performed by: RADIOLOGY

## 2022-03-02 NOTE — TELEPHONE ENCOUNTER
RN called pt and asked if she could get some repeat labs drawn just sometime before Friday to be re-evaluated to start her lenvima back.  Pt stated that she could.  Pt gave an update that she will be receiving 1 more week of IV abx from ID for the cellulitis, that her PCP has switched her from HCTZ to spironolactone because the HCTZ was not diuresing her like they had wanted.  Pt states the spironolactone doesn't seem to be doing much either and is going to speak w/ her PCP about that.  Pt stated that her legs are very swollen, to the point she can hardly put her pants on.  Pt did state that she finishes radiation today as well.  RN stated that she would make sure Dr. Rubio was informed of her situation and when we got her lab work back we would give her a call.  Pt v/u.    80

## 2022-03-03 ENCOUNTER — LAB (OUTPATIENT)
Dept: LAB | Facility: HOSPITAL | Age: 47
End: 2022-03-03

## 2022-03-03 DIAGNOSIS — K74.69 OTHER CIRRHOSIS OF LIVER: ICD-10-CM

## 2022-03-03 DIAGNOSIS — E80.6 HYPERBILIRUBINEMIA: ICD-10-CM

## 2022-03-03 DIAGNOSIS — C54.1 ENDOMETRIAL CANCER: ICD-10-CM

## 2022-03-03 DIAGNOSIS — D61.818 PANCYTOPENIA: ICD-10-CM

## 2022-03-03 LAB
ALBUMIN SERPL-MCNC: 2.8 G/DL (ref 3.5–5.2)
ALBUMIN/GLOB SERPL: 1.2 G/DL
ALP SERPL-CCNC: 126 U/L (ref 39–117)
ALT SERPL W P-5'-P-CCNC: 11 U/L (ref 1–33)
ANION GAP SERPL CALCULATED.3IONS-SCNC: 8 MMOL/L (ref 5–15)
AST SERPL-CCNC: 32 U/L (ref 1–32)
BILIRUB CONJ SERPL-MCNC: 1.3 MG/DL (ref 0–0.3)
BILIRUB SERPL-MCNC: 2.4 MG/DL (ref 0–1.2)
BUN SERPL-MCNC: 16 MG/DL (ref 6–20)
BUN/CREAT SERPL: 21.3 (ref 7–25)
CALCIUM SPEC-SCNC: 9 MG/DL (ref 8.6–10.5)
CHLORIDE SERPL-SCNC: 100 MMOL/L (ref 98–107)
CO2 SERPL-SCNC: 26 MMOL/L (ref 22–29)
CREAT SERPL-MCNC: 0.75 MG/DL (ref 0.57–1)
EGFRCR SERPLBLD CKD-EPI 2021: 98.3 ML/MIN/1.73
ERYTHROCYTE [DISTWIDTH] IN BLOOD BY AUTOMATED COUNT: 17.1 % (ref 12.3–15.4)
GLOBULIN UR ELPH-MCNC: 2.3 GM/DL
GLUCOSE SERPL-MCNC: 297 MG/DL (ref 65–99)
HCT VFR BLD AUTO: 28.1 % (ref 34–46.6)
HGB BLD-MCNC: 9.3 G/DL (ref 12–15.9)
LYMPHOCYTES # BLD AUTO: 0.3 10*3/MM3 (ref 0.7–3.1)
LYMPHOCYTES NFR BLD AUTO: 10.4 % (ref 19.6–45.3)
MCH RBC QN AUTO: 33.9 PG (ref 26.6–33)
MCHC RBC AUTO-ENTMCNC: 33.2 G/DL (ref 31.5–35.7)
MCV RBC AUTO: 102 FL (ref 79–97)
MONOCYTES # BLD AUTO: 0.1 10*3/MM3 (ref 0.1–0.9)
MONOCYTES NFR BLD AUTO: 4.3 % (ref 5–12)
NEUTROPHILS NFR BLD AUTO: 2.6 10*3/MM3 (ref 1.7–7)
NEUTROPHILS NFR BLD AUTO: 85.3 % (ref 42.7–76)
PLATELET # BLD AUTO: 67 10*3/MM3 (ref 140–450)
PMV BLD AUTO: 6.9 FL (ref 6–12)
POTASSIUM SERPL-SCNC: 3.7 MMOL/L (ref 3.5–5.2)
PROT SERPL-MCNC: 5.1 G/DL (ref 6–8.5)
RBC # BLD AUTO: 2.75 10*6/MM3 (ref 3.77–5.28)
SODIUM SERPL-SCNC: 134 MMOL/L (ref 136–145)
WBC NRBC COR # BLD: 3.1 10*3/MM3 (ref 3.4–10.8)

## 2022-03-03 PROCEDURE — 85025 COMPLETE CBC W/AUTO DIFF WBC: CPT

## 2022-03-03 PROCEDURE — 36415 COLL VENOUS BLD VENIPUNCTURE: CPT

## 2022-03-03 PROCEDURE — 82248 BILIRUBIN DIRECT: CPT

## 2022-03-03 PROCEDURE — 80053 COMPREHEN METABOLIC PANEL: CPT

## 2022-03-04 ENCOUNTER — SPECIALTY PHARMACY (OUTPATIENT)
Dept: ONCOLOGY | Facility: HOSPITAL | Age: 47
End: 2022-03-04

## 2022-03-04 DIAGNOSIS — C54.1 ENDOMETRIAL CANCER: ICD-10-CM

## 2022-03-04 DIAGNOSIS — G62.9 NEUROPATHY: ICD-10-CM

## 2022-03-04 NOTE — PROGRESS NOTES
Notified by Dr. Rubio to reduce the patient's dose of lenvatinib due to edema. Patient was previously taking 14mg and will be reducing to 10mg.  Patient will take 10mg capsules only that she has at home until the new dosage is mailed to her.     Drug: lenvatinib  Strength: 10mg  Directions: Take 1 capsule PO daily  QTY: 30  RF:11    Released to pharmacy: [facility]

## 2022-03-06 ENCOUNTER — DOCUMENTATION (OUTPATIENT)
Dept: GYNECOLOGIC ONCOLOGY | Facility: CLINIC | Age: 47
End: 2022-03-06

## 2022-03-06 NOTE — PROGRESS NOTES
Patient called on 3/5/2022. She had two concerns:    1) She reports that she was told to start her Lenvima back. She is now having nausea. She took zofran with minimal effect. She then took a different antiemetic and got some relief. She has been able to eat and drink today. Is passing gas and having bowel movements. Patient instructed to continue antiemetics as needed. Precautions discussed.  2) She also reports an episode of a temperature to 100.2. This spontaneously resolved down to 99. Of note the patient has a history of MRSA cellulitis and is currently getting IV antibiotic. She was unable to attend her appointment on 3/5/2022 due to her nausea. She discussed this with her infectious disease doctor. She denies any changes in her cellulitis. She does plan to go tomorrow for her infusion. I instructed the patient to continue to monitor her temperature. she was instructed to call if she has temperatures greater than 100.4. I did also recommend that the patient discuss with her infectious disease doctor about drawing a CBC with her infusions to assess WBC. The patient verbalizes understanding. She will call the office if she has any additional problems or concerns.    Cassy Maciel MD  Gynecologic Oncology

## 2022-03-07 ENCOUNTER — LAB (OUTPATIENT)
Dept: LAB | Facility: HOSPITAL | Age: 47
End: 2022-03-07

## 2022-03-07 ENCOUNTER — TRANSCRIBE ORDERS (OUTPATIENT)
Dept: LAB | Facility: HOSPITAL | Age: 47
End: 2022-03-07

## 2022-03-07 ENCOUNTER — TELEPHONE (OUTPATIENT)
Dept: GYNECOLOGIC ONCOLOGY | Facility: CLINIC | Age: 47
End: 2022-03-07

## 2022-03-07 DIAGNOSIS — K75.81 NASH (NONALCOHOLIC STEATOHEPATITIS): Primary | ICD-10-CM

## 2022-03-07 DIAGNOSIS — L03.311 CELLULITIS OF ABDOMINAL WALL: ICD-10-CM

## 2022-03-07 DIAGNOSIS — K35.32 APPENDICITIS WITH PERFORATION: ICD-10-CM

## 2022-03-07 DIAGNOSIS — T45.1X5A PANCYTOPENIA DUE TO ANTINEOPLASTIC CHEMOTHERAPY: Primary | ICD-10-CM

## 2022-03-07 DIAGNOSIS — T81.31XD DISRUPTION OF EXTERNAL SURGICAL WOUND, SUBSEQUENT ENCOUNTER: ICD-10-CM

## 2022-03-07 DIAGNOSIS — D61.810 PANCYTOPENIA DUE TO ANTINEOPLASTIC CHEMOTHERAPY: Primary | ICD-10-CM

## 2022-03-07 LAB
ALBUMIN SERPL-MCNC: 2.9 G/DL (ref 3.5–5.2)
ALBUMIN/GLOB SERPL: 1.1 G/DL
ALP SERPL-CCNC: 116 U/L (ref 39–117)
ALT SERPL W P-5'-P-CCNC: 16 U/L (ref 1–33)
ANION GAP SERPL CALCULATED.3IONS-SCNC: 7 MMOL/L (ref 5–15)
AST SERPL-CCNC: 49 U/L (ref 1–32)
BASOPHILS # BLD MANUAL: 0 10*3/MM3 (ref 0–0.2)
BASOPHILS NFR BLD MANUAL: 0 % (ref 0–1.5)
BILIRUB SERPL-MCNC: 4.7 MG/DL (ref 0–1.2)
BUN SERPL-MCNC: 12 MG/DL (ref 6–20)
BUN/CREAT SERPL: 15.2 (ref 7–25)
CALCIUM SPEC-SCNC: 8.8 MG/DL (ref 8.6–10.5)
CHLORIDE SERPL-SCNC: 100 MMOL/L (ref 98–107)
CK SERPL-CCNC: 188 U/L (ref 20–180)
CO2 SERPL-SCNC: 28 MMOL/L (ref 22–29)
CREAT SERPL-MCNC: 0.79 MG/DL (ref 0.57–1)
CRP SERPL-MCNC: 1.34 MG/DL (ref 0–0.5)
DACRYOCYTES BLD QL SMEAR: ABNORMAL
DEPRECATED RDW RBC AUTO: 58.4 FL (ref 37–54)
EGFRCR SERPLBLD CKD-EPI 2021: 92.4 ML/MIN/1.73
ELLIPTOCYTES BLD QL SMEAR: ABNORMAL
EOSINOPHIL # BLD MANUAL: 0.08 10*3/MM3 (ref 0–0.4)
EOSINOPHIL NFR BLD MANUAL: 2 % (ref 0.3–6.2)
ERYTHROCYTE [DISTWIDTH] IN BLOOD BY AUTOMATED COUNT: 15.5 % (ref 12.3–15.4)
ERYTHROCYTE [SEDIMENTATION RATE] IN BLOOD: 10 MM/HR (ref 0–20)
GLOBULIN UR ELPH-MCNC: 2.6 GM/DL
GLUCOSE SERPL-MCNC: 185 MG/DL (ref 65–99)
HCT VFR BLD AUTO: 30.4 % (ref 34–46.6)
HGB BLD-MCNC: 10.2 G/DL (ref 12–15.9)
LYMPHOCYTES # BLD MANUAL: 0.2 10*3/MM3 (ref 0.7–3.1)
LYMPHOCYTES NFR BLD MANUAL: 5 % (ref 5–12)
MACROCYTES BLD QL SMEAR: ABNORMAL
MCH RBC QN AUTO: 34.7 PG (ref 26.6–33)
MCHC RBC AUTO-ENTMCNC: 33.6 G/DL (ref 31.5–35.7)
MCV RBC AUTO: 103.4 FL (ref 79–97)
MONOCYTES # BLD: 0.2 10*3/MM3 (ref 0.1–0.9)
MYELOCYTES NFR BLD MANUAL: 1 % (ref 0–0)
NEUTROPHILS # BLD AUTO: 3.4 10*3/MM3 (ref 1.7–7)
NEUTROPHILS NFR BLD MANUAL: 81 % (ref 42.7–76)
NEUTS BAND NFR BLD MANUAL: 6 % (ref 0–5)
PLAT MORPH BLD: NORMAL
PLATELET # BLD AUTO: 48 10*3/MM3 (ref 140–450)
PMV BLD AUTO: 11 FL (ref 6–12)
POLYCHROMASIA BLD QL SMEAR: ABNORMAL
POTASSIUM SERPL-SCNC: 5 MMOL/L (ref 3.5–5.2)
PROT SERPL-MCNC: 5.5 G/DL (ref 6–8.5)
RBC # BLD AUTO: 2.94 10*6/MM3 (ref 3.77–5.28)
SODIUM SERPL-SCNC: 135 MMOL/L (ref 136–145)
VARIANT LYMPHS NFR BLD MANUAL: 5 % (ref 19.6–45.3)
WBC MORPH BLD: NORMAL
WBC NRBC COR # BLD: 3.91 10*3/MM3 (ref 3.4–10.8)

## 2022-03-07 PROCEDURE — 36415 COLL VENOUS BLD VENIPUNCTURE: CPT | Performed by: INTERNAL MEDICINE

## 2022-03-07 PROCEDURE — 86140 C-REACTIVE PROTEIN: CPT | Performed by: INTERNAL MEDICINE

## 2022-03-07 PROCEDURE — 82550 ASSAY OF CK (CPK): CPT | Performed by: INTERNAL MEDICINE

## 2022-03-07 PROCEDURE — 80053 COMPREHEN METABOLIC PANEL: CPT | Performed by: INTERNAL MEDICINE

## 2022-03-07 PROCEDURE — 85007 BL SMEAR W/DIFF WBC COUNT: CPT | Performed by: INTERNAL MEDICINE

## 2022-03-07 PROCEDURE — 85025 COMPLETE CBC W/AUTO DIFF WBC: CPT | Performed by: INTERNAL MEDICINE

## 2022-03-07 PROCEDURE — 85652 RBC SED RATE AUTOMATED: CPT | Performed by: INTERNAL MEDICINE

## 2022-03-07 RX ORDER — SPIRONOLACTONE 50 MG/1
150 TABLET, FILM COATED ORAL DAILY
Qty: 90 TABLET | Refills: 1 | Status: SHIPPED | OUTPATIENT
Start: 2022-03-07 | End: 2022-05-23 | Stop reason: SDUPTHER

## 2022-03-07 NOTE — TELEPHONE ENCOUNTER
I called patient per Dr. Maciel with Critical results.     Platelets are 48 and she was told to hold her Lenvima and re-check again in a week. She was instructed on fall precautions.  She will follow up next week before her keytruda infusion.

## 2022-03-07 NOTE — PROGRESS NOTES
I completed an independent review of the medication order and prescription. I agree with Dr. Brito' assessment and what is in her note.  I confirm she sent the prescription to [facility] specialty pharmacy as described.

## 2022-03-07 NOTE — RADIATION COMPLETION NOTES
COMPLETION NOTE    PATIENT:   Jeana Pollack SAMI Chung  :    1975  COMPLETION DATE:   2022  DIAGNOSIS:     Endometrial cancer (HCC)  - FIGO Stage IIIA, calculated as Stage Unknown (pT3a, pNX, cM0)  1/3/22 patient has recurrent tumor in the pelvis           Subjective      BRIEF HISTORY:  Jeana Pollack ChrisdianeSAMI  is a very pleasant 46 y.o. female  who initiallhy presented with abnormal uterine bleeding and was found on ultrasound to have uterine and cervical masses.  Biopsy of the cervix was insufficient and CT of abdomen pelvis revealed an enlarged uterus with multiple masses present as well as a 4 cm cystic ovary.  Repeat biopsy revealed infiltrating endometrioid adenocarcinoma.  On 2020 she underwent exploratory laparotomy, total abdominal hysterectomy, bilateral salpingo-oophorectomy and omentectomy for advanced endometrial carcinoma.  Final pathology revealed endometrioid carcinoma with lymphovascular invasion.  The endometrioid carcinoma involve the cervical stroma.  Carcinoma involved the right ovary and fallopian tube in the left was negative.  The omentum was negative.  The tumor was FIGO grade 3 with invasion of over 50% of the myometrium.  The right parametrium was involved.  The operative note indicated the tumor at the uterus ruptured on the field during surgery.  She was presented at our tumor conference.  In addition to chemotherapy whole pelvis radiotherapy and vaginal brachytherapy were recommended. Postoperatively she had wound dehiscence and receivied wound care treatment through physical therapy.      She never returned for treatment  due to multiple personal problems, [place] and stated she did not want to undergo radiation.     Recently she developed postcoital bleeding and was found to have a 7.3 x 5.3 cm soft tissue mass extending in the vaginal vault that is concerning for recurrence.  She also had adenopathy in the iliac region on the left.  Small inguinal  regional lymph nodes bilaterally.  Soft tissue mass: And likely extend into the wall of the sigmoid colon.  CT of the chest showed a new nodule identified in the medial aspect of the right lower lobe concerning for ligament.  This nodule measured 2.5 cm.  There was a small nodule identified right lung base concerning for malignancy as well measuring 7 mm.  There were 2 nodules identified abutting each other in the left lung base measuring 1.2 cm together.  Biopsy was compatible with known endometrioid carcinoma. She received palliative radiotherapy in our department as follows:    TREATMENT COURSE:   1/25-3/2/2022 the pelvis received 45 Gray in 25 fractions with 10 MV photons.   TOLERANCE:    The radiation she tolerated well.  She had multiple other issues including cirrhosis of the liver and lower extremity edema.  Unrelated to radiation she developed cellulitis of the left lower extremity and was seen by wound care for an abscess in the groin.        DISPOSITION:  At the completion of therapy an appointment was made for her to return on 4/4/2022 at 10:30 AM.  She knows to call if she has any problems sooner.        Azul Coyle MD    Dictated using dragon dictation

## 2022-03-07 NOTE — PROGRESS NOTES
GASTROENTEROLOGY OFFICE NOTE  Jeana Chung, SAMI  [id number]  1975    Patient called with continued complaints of lower extremity edema asking if she could increase spironolactone.  Recent blood work was reviewed and sodium slightly low.  She is unable to take Lasix or Bumex due to an allergy to sulfa.  We will plan to increase spironolactone to 150 mg daily and recheck CMP again in 1 week.    Dustin Stewart APRN

## 2022-03-08 ENCOUNTER — HOSPITAL ENCOUNTER (OUTPATIENT)
Dept: CT IMAGING | Facility: HOSPITAL | Age: 47
Discharge: HOME | End: 2022-03-08

## 2022-03-08 DIAGNOSIS — G89.3 CANCER RELATED PAIN: Primary | ICD-10-CM

## 2022-03-08 RX ORDER — OXYCODONE HYDROCHLORIDE 5 MG/1
5 TABLET ORAL EVERY 4 HOURS PRN
Qty: 15 TABLET | Refills: 0 | Status: SHIPPED | OUTPATIENT
Start: 2022-03-08 | End: 2022-03-15 | Stop reason: SDUPTHER

## 2022-03-08 NOTE — TELEPHONE ENCOUNTER
RN called to f/u from the weekend call.  Pt stated that she is feeling much better and even go to go visit with her sister and her new twins which made her day.  Pt stated that she has not had any other episodes of fever.  Pt stated that she is still having the back pain intermittently but thinks the previous oxycodone 10mg prescription is too much for her on these days her back is hurting because it makes her too loopy.  Pt asked if Dr. Rubio would be ok to prescribe the 5mg tabs or even something else.  RN stated that she would speak with her and let pt know.  RN also spoke w/ pt about palliative care managing her chronic pain and a referral to them which pt stated she was ok with.  RN let her know that she would put that referrral in and then send a message on [employer] about the pain medication.  Pt v/u.

## 2022-03-14 NOTE — PROGRESS NOTES
Palliative Clinic Note      Name: Jeana Jaki Chung RN  Age: 46 y.o.  Sex: female  : 1975  MRN: [id number]  Date of Service: 03/15/22  Referring Physician: Celine Rubio MD    Subjective:    Chief Complaint: Generalized pain    History of Present Illness: Jeana Jaki Chung RN is a 46 y.o. female with past medical history significant for HTN, KIKA, T2DM, CKD stage III, cirrhosis, endometrial cancer, mood disorder who presents to the palliative clinic today to establish care.     Treatment summary: Patient initially diagnosed with endometrial cancer in 2020. She underwent an exploratory laparotomy, total abdominal hysterectomy, bilateral salpingo-oophorectomy with optimal debulking on 2/10/20. She completed 3 cycles of chemotherapy in 2020. Progression found in 2021 and she restarted chemotherapy in 2021 with Keytruda and Lenvima. Treatment complicated by recent COVID-19 infection and thrombocytopenia.     Symptoms: The patient complains of pain all over. Upon further questioning, she reports pain in her LLE secondary to an open wound and pain in her pelvis related to cancer that occasionally radiates into her back. She also reports significant neuropathic pain in her feet and hands secondary to diabetes and chemotherapy. She is currently on Gabapentin 600 mg three times a day as well as oxycodone 5 mg. She shares that she is hesitant to take the oxycodone due to affects on her balance. She currently uses a cane for assistance. The patient usually takes it at night to try and get enough relief to fall asleep. She also uses a heating pad. Nausea and vomiting is rare. Her appetite fluctuates. Her weight is up and down due to peripheral edema related to her cirrhosis. She follows with a specialist at [facility]. Bowels are irregular. Patient has a wound on her LLE that is followed by wound care at [facility]. She is currently on oral antibiotics for this.  "    Pyschosocial: The patient is currently living with her mom and dad. She is  and her  lives in [place]. She moved back to [place] for support and better medical care. No children. She is a retired nurse. The patient enjoys crafting and laying out by the pool. She endorses a strong support system and great friends. The patient admits to increased anxiety and depression related to her worsening disease but feels she is able to manage on her own. She was prescribed anxiety medication in the past that she has not needed to use.     Spiritual: Spiritual rather than Adventism.    Goals: Travel while she feels up to it.     The following portions of the patient's history were reviewed and updated as appropriate: allergies, current medications, past family history, past medical history, past social history, past surgical history and problem list.    Decisional capacity:Full  ORT-R: Low risk   PHQ-9: 10-14 (Moderate Depression)  JACOBO: 14  ECOG: (2) Ambulatory and capable of self care, unable to carry out work activity, up and about > 50% or waking hours   Palliative Performance Scale Score: 70%     Objective:    /51   Pulse 89   Temp 98.3 °F (36.8 °C) (Temporal)   Resp 12   Ht 157.5 cm (62.01\")   Wt 85.1 kg (187 lb 11.2 oz)   SpO2 98%   BMI 34.32 kg/m²     Constitutional: Awake, alert, uses cane   Eyes: PERRLA, EOMS intact  HENT: NCAT, face symmetric  Neck: Supple, trachea midline  Respiratory: Clear to auscultation bilaterally, nonlabored respirations  Cardiovascular: RRR, no murmurs appreciated  Gastrointestinal: Positive bowel sounds, soft, nontender, no guarding  Musculoskeletal: Bilateral ankle edema, moves all extremities   Psychiatric: Appropriate affect, cooperative  Neurologic: Oriented x 3, Cranial Nerves grossly intact to confrontation, speech clear  Skin: LLE wound wrapped    Medication Counts: Instructed to bring controlled medications to all appointments.   OLENA: " #[id number]. Two year report reviewed.   UDS: Collected today. Results pending.    Assessment & Plan:    1. Endometrial cancer (HCC)  --Progression of cancer found in December of 2021. She is currently recieving chemotherapy and following closely with Dr. Rubio.     2. Cancer related pain  --Complicated etiology. Pain due to diabetic neuropathy exacerbated by chemotherapy along with pain from open wounds and pelvic disease. Will continue Gabapentin 600 mg three times and day and increase the frequency of oxycodone 5 mg to 1-2 tablets q4h PRN. Refills for all controlled medications placed today.     --We discussed the goals for pain relief (mild, tolerable pain that allows the patient to function), risks (physiologic dependence, addiction, overdose, side effects including nausea, dizziness, drowsiness, constipation), the importance of additional or alternative treatment to reduce the required dose, and specific instructions on taking, stopping, storing, and disposing of medication. Opioids should only be used when non-opioid pain medications/treatments fail to provide adequate pain relief, and opioids should be the first medication weaned as pain resolves. Opioids should be stored  in secure locations and excess pills should be disposed of properly, to minimize the risk of diversion and inappropriate use. Patient should be supervised for first few days while on new medication or dose and should always start new medication or dose in the morning. The controlled medication agreement was reviewed, signed, and scanned into the chart. The patient was given the controlled substance education handout.     --Educated on opioid reversal and importance of access to naloxone dose at all times. Instructed patient on administration of the rescue medication and to call 911. Explained that she can use on anyone who becomes unresponsive.  Prescription sent to pharmacy.    4. Therapeutic drug monitoring  - Urine drug screen  collected today. Results were appropriate. ORT-R low risk. Annual monitoring.     5. Mood disorder  --Increase anxiety/depression related to worsening disease. Patient screened positive for depression based on a PHQ-9 score of 10 on 3/15/2022. Follow-up recommendations include: Elevated PHQ score reflective of acute illness, not depression. Patient feels she is managing it okay on her own and does not want to take any mood stabilizers at this time. She endorses a strong support system. Will continue to monitor closely.     Code status: Full code  Medical interventions: Full  Advanced directives: Patient interested    Return in about 3 weeks (around 4/5/2022) for Office Visit.    I spent 75 minutes caring for Jeana Chung RN on this date of service. This time includes time spent by me in the following activities: preparing for the visit, reviewing tests, obtaining and/or reviewing a separately obtained history, performing a medically appropriate examination and/or evaluation , counseling and educating the patient/family/caregiver, ordering medications, tests, or procedures, documenting information in the medical record, independently interpreting results and communicating that information with the patient/family/caregiver, and care coordination    Enedina Guillen PA-C  03/15/2022    Medication Date Filled # Filled Count Used # Days  TAHIR   Oxycodone 5 3/8/22 15       Gabapentin 600 2/25/22 90

## 2022-03-14 NOTE — PATIENT INSTRUCTIONS
Continue oxycodone 5 mg, take 1-2 tablets q4h PRN for pain. Continue Gabapentin 600 mg three times a day.   Scheduled to follow up on 4/4/22 at 10:00.  Always bring your medications prescribed by the clinic to every appointment. If telemedicine appointment, be prepared to give and show medication counts. This assists us with managing your refill needs.   Call the Palliative Clinic for any questions or concerns at [phone] or reach out to us on [employer] via the Palliative Pool.   Please give us 3-4 business days in advance for routine refill requests. Prescriptions for controlled medications will take at least 24 hours to be sent to your pharmacy. Be aware of additional insurance prior authorization processing time required for some medications.

## 2022-03-15 ENCOUNTER — OFFICE VISIT (OUTPATIENT)
Dept: PALLIATIVE CARE | Facility: CLINIC | Age: 47
End: 2022-03-15

## 2022-03-15 ENCOUNTER — HOSPITAL ENCOUNTER (OUTPATIENT)
Dept: ONCOLOGY | Facility: HOSPITAL | Age: 47
Setting detail: INFUSION SERIES
Discharge: HOME | End: 2022-03-15

## 2022-03-15 VITALS
WEIGHT: 187.7 LBS | TEMPERATURE: 98.3 F | SYSTOLIC BLOOD PRESSURE: 102 MMHG | HEIGHT: 62 IN | DIASTOLIC BLOOD PRESSURE: 51 MMHG | HEART RATE: 89 BPM | RESPIRATION RATE: 12 BRPM | BODY MASS INDEX: 34.54 KG/M2 | OXYGEN SATURATION: 98 %

## 2022-03-15 DIAGNOSIS — F11.90 CHRONIC, CONTINUOUS USE OF OPIOIDS: ICD-10-CM

## 2022-03-15 DIAGNOSIS — Z51.81 THERAPEUTIC DRUG MONITORING: ICD-10-CM

## 2022-03-15 DIAGNOSIS — T45.1X5A CHEMOTHERAPY-INDUCED THROMBOCYTOPENIA: ICD-10-CM

## 2022-03-15 DIAGNOSIS — Z79.899 ENCOUNTER FOR LONG-TERM (CURRENT) USE OF OTHER MEDICATIONS: Primary | ICD-10-CM

## 2022-03-15 DIAGNOSIS — D69.59 CHEMOTHERAPY-INDUCED THROMBOCYTOPENIA: ICD-10-CM

## 2022-03-15 DIAGNOSIS — G89.3 CANCER RELATED PAIN: ICD-10-CM

## 2022-03-15 DIAGNOSIS — C54.1 ENDOMETRIAL CANCER: ICD-10-CM

## 2022-03-15 DIAGNOSIS — G62.9 NEUROPATHY: ICD-10-CM

## 2022-03-15 DIAGNOSIS — Z51.81 ENCOUNTER FOR THERAPEUTIC DRUG MONITORING: ICD-10-CM

## 2022-03-15 DIAGNOSIS — C54.1 ENDOMETRIAL CANCER: Primary | ICD-10-CM

## 2022-03-15 DIAGNOSIS — Z79.899 ENCOUNTER FOR LONG-TERM (CURRENT) USE OF OTHER MEDICATIONS: ICD-10-CM

## 2022-03-15 PROBLEM — Z89.411 HISTORY OF AMPUTATION OF RIGHT GREAT TOE (HCC): Status: ACTIVE | Noted: 2021-04-08

## 2022-03-15 PROBLEM — L97.412 DIABETIC ULCER OF RIGHT MIDFOOT ASSOCIATED WITH TYPE 2 DIABETES MELLITUS, WITH FAT LAYER EXPOSED: Status: ACTIVE | Noted: 2020-07-24

## 2022-03-15 PROBLEM — L97.509 FOOT ULCER: Status: ACTIVE | Noted: 2019-05-29

## 2022-03-15 PROBLEM — E11.621 DIABETIC ULCER OF RIGHT MIDFOOT ASSOCIATED WITH TYPE 2 DIABETES MELLITUS, WITH FAT LAYER EXPOSED (HCC): Status: ACTIVE | Noted: 2020-07-24

## 2022-03-15 LAB
ALBUMIN SERPL-MCNC: 2.7 G/DL (ref 3.5–5.2)
ALBUMIN/GLOB SERPL: 1.1 G/DL
ALP SERPL-CCNC: 92 U/L (ref 39–117)
ALT SERPL W P-5'-P-CCNC: 17 U/L (ref 1–33)
AMPHET+METHAMPHET UR QL: NEGATIVE
AMPHETAMINES UR QL: NEGATIVE
ANION GAP SERPL CALCULATED.3IONS-SCNC: 8 MMOL/L (ref 5–15)
AST SERPL-CCNC: 44 U/L (ref 1–32)
BARBITURATES UR QL SCN: NEGATIVE
BENZODIAZ UR QL SCN: NEGATIVE
BILIRUB SERPL-MCNC: 3.2 MG/DL (ref 0–1.2)
BILIRUB UR QL STRIP: ABNORMAL
BUN SERPL-MCNC: 13 MG/DL (ref 6–20)
BUN/CREAT SERPL: 16.7 (ref 7–25)
BUPRENORPHINE SERPL-MCNC: NEGATIVE NG/ML
CALCIUM SPEC-SCNC: 8.7 MG/DL (ref 8.6–10.5)
CANNABINOIDS SERPL QL: POSITIVE
CHLORIDE SERPL-SCNC: 102 MMOL/L (ref 98–107)
CLARITY UR: CLEAR
CO2 SERPL-SCNC: 26 MMOL/L (ref 22–29)
COCAINE UR QL: NEGATIVE
COLOR UR: ABNORMAL
CREAT SERPL-MCNC: 0.78 MG/DL (ref 0.57–1)
EGFRCR SERPLBLD CKD-EPI 2021: 93.8 ML/MIN/1.73
ERYTHROCYTE [DISTWIDTH] IN BLOOD BY AUTOMATED COUNT: 18.2 % (ref 12.3–15.4)
GLOBULIN UR ELPH-MCNC: 2.4 GM/DL
GLUCOSE SERPL-MCNC: 161 MG/DL (ref 65–99)
GLUCOSE UR STRIP-MCNC: NEGATIVE MG/DL
HCT VFR BLD AUTO: 24.8 % (ref 34–46.6)
HGB BLD-MCNC: 8.3 G/DL (ref 12–15.9)
HGB UR QL STRIP.AUTO: NEGATIVE
KETONES UR QL STRIP: NEGATIVE
LEUKOCYTE ESTERASE UR QL STRIP.AUTO: ABNORMAL
LYMPHOCYTES # BLD AUTO: 0.4 10*3/MM3 (ref 0.7–3.1)
LYMPHOCYTES NFR BLD AUTO: 12.7 % (ref 19.6–45.3)
MCH RBC QN AUTO: 33.7 PG (ref 26.6–33)
MCHC RBC AUTO-ENTMCNC: 33.6 G/DL (ref 31.5–35.7)
MCV RBC AUTO: 100.1 FL (ref 79–97)
METHADONE UR QL SCN: NEGATIVE
MONOCYTES # BLD AUTO: 0.3 10*3/MM3 (ref 0.1–0.9)
MONOCYTES NFR BLD AUTO: 9.5 % (ref 5–12)
NEUTROPHILS NFR BLD AUTO: 2.5 10*3/MM3 (ref 1.7–7)
NEUTROPHILS NFR BLD AUTO: 77.8 % (ref 42.7–76)
NITRITE UR QL STRIP: NEGATIVE
OPIATES UR QL: NEGATIVE
OXYCODONE UR QL SCN: POSITIVE
PCP UR QL SCN: NEGATIVE
PH UR STRIP.AUTO: 7.5 [PH] (ref 5–8)
PLATELET # BLD AUTO: 72 10*3/MM3 (ref 140–450)
PMV BLD AUTO: 6.9 FL (ref 6–12)
POTASSIUM SERPL-SCNC: 4.3 MMOL/L (ref 3.5–5.2)
PROPOXYPH UR QL: NEGATIVE
PROT SERPL-MCNC: 5.1 G/DL (ref 6–8.5)
PROT UR QL STRIP: NEGATIVE
RBC # BLD AUTO: 2.48 10*6/MM3 (ref 3.77–5.28)
SODIUM SERPL-SCNC: 136 MMOL/L (ref 136–145)
SP GR UR STRIP: 1.01 (ref 1–1.03)
T4 FREE SERPL-MCNC: 1.22 NG/DL (ref 0.93–1.7)
TRICYCLICS UR QL SCN: NEGATIVE
TSH SERPL DL<=0.05 MIU/L-ACNC: 4.41 UIU/ML (ref 0.27–4.2)
UROBILINOGEN UR QL STRIP: ABNORMAL
WBC NRBC COR # BLD: 3.2 10*3/MM3 (ref 3.4–10.8)

## 2022-03-15 PROCEDURE — 84439 ASSAY OF FREE THYROXINE: CPT | Performed by: OBSTETRICS & GYNECOLOGY

## 2022-03-15 PROCEDURE — 99205 OFFICE O/P NEW HI 60 MIN: CPT | Performed by: PHYSICIAN ASSISTANT

## 2022-03-15 PROCEDURE — 36415 COLL VENOUS BLD VENIPUNCTURE: CPT

## 2022-03-15 PROCEDURE — 85025 COMPLETE CBC W/AUTO DIFF WBC: CPT | Performed by: OBSTETRICS & GYNECOLOGY

## 2022-03-15 PROCEDURE — 81003 URINALYSIS AUTO W/O SCOPE: CPT | Performed by: OBSTETRICS & GYNECOLOGY

## 2022-03-15 PROCEDURE — 80053 COMPREHEN METABOLIC PANEL: CPT | Performed by: OBSTETRICS & GYNECOLOGY

## 2022-03-15 PROCEDURE — 80306 DRUG TEST PRSMV INSTRMNT: CPT | Performed by: PHYSICIAN ASSISTANT

## 2022-03-15 PROCEDURE — 84443 ASSAY THYROID STIM HORMONE: CPT | Performed by: OBSTETRICS & GYNECOLOGY

## 2022-03-15 RX ORDER — GABAPENTIN 600 MG/1
600 TABLET ORAL 3 TIMES DAILY
Qty: 90 TABLET | Refills: 3 | Status: SHIPPED | OUTPATIENT
Start: 2022-03-15 | End: 2022-05-16 | Stop reason: SDUPTHER

## 2022-03-15 RX ORDER — NALOXONE HYDROCHLORIDE 4 MG/.1ML
1 SPRAY NASAL AS NEEDED
Qty: 1 EACH | Refills: 0 | Status: SHIPPED | OUTPATIENT
Start: 2022-03-15

## 2022-03-15 RX ORDER — OXYCODONE HYDROCHLORIDE 5 MG/1
5-10 TABLET ORAL EVERY 4 HOURS PRN
Qty: 168 TABLET | Refills: 0 | Status: SHIPPED | OUTPATIENT
Start: 2022-03-15 | End: 2022-03-29

## 2022-03-15 NOTE — TELEPHONE ENCOUNTER
[facility]# [id number] appropriate. Refills placed for gabapentin 600 mg TID and Oxycodone 5 mg tablets. The patient is scheduled for a follow up in 3 weeks.

## 2022-03-16 ENCOUNTER — HOSPITAL ENCOUNTER (OUTPATIENT)
Dept: ONCOLOGY | Facility: HOSPITAL | Age: 47
Setting detail: INFUSION SERIES
Discharge: HOME | End: 2022-03-16

## 2022-03-16 ENCOUNTER — OFFICE VISIT (OUTPATIENT)
Dept: GYNECOLOGIC ONCOLOGY | Facility: CLINIC | Age: 47
End: 2022-03-16

## 2022-03-16 VITALS
SYSTOLIC BLOOD PRESSURE: 130 MMHG | TEMPERATURE: 96.9 F | WEIGHT: 189.7 LBS | HEART RATE: 100 BPM | HEIGHT: 62 IN | OXYGEN SATURATION: 100 % | BODY MASS INDEX: 34.91 KG/M2 | DIASTOLIC BLOOD PRESSURE: 59 MMHG | RESPIRATION RATE: 15 BRPM

## 2022-03-16 DIAGNOSIS — D69.6 THROMBOCYTOPENIA: ICD-10-CM

## 2022-03-16 DIAGNOSIS — C54.1 ENDOMETRIAL CANCER: Primary | ICD-10-CM

## 2022-03-16 DIAGNOSIS — C54.1 ENDOMETRIAL CANCER: ICD-10-CM

## 2022-03-16 DIAGNOSIS — Z51.81 ENCOUNTER FOR THERAPEUTIC DRUG MONITORING: ICD-10-CM

## 2022-03-16 DIAGNOSIS — Z79.899 ENCOUNTER FOR LONG-TERM (CURRENT) USE OF OTHER MEDICATIONS: Primary | ICD-10-CM

## 2022-03-16 DIAGNOSIS — G62.9 NEUROPATHY: ICD-10-CM

## 2022-03-16 DIAGNOSIS — T45.1X5A CHEMOTHERAPY-INDUCED THROMBOCYTOPENIA: ICD-10-CM

## 2022-03-16 DIAGNOSIS — D69.59 CHEMOTHERAPY-INDUCED THROMBOCYTOPENIA: ICD-10-CM

## 2022-03-16 DIAGNOSIS — R17 ELEVATED BILIRUBIN: ICD-10-CM

## 2022-03-16 PROCEDURE — 25010000002 PEMBROLIZUMAB 100 MG/4ML SOLUTION 4 ML VIAL: Performed by: OBSTETRICS & GYNECOLOGY

## 2022-03-16 PROCEDURE — 99214 OFFICE O/P EST MOD 30 MIN: CPT | Performed by: OBSTETRICS & GYNECOLOGY

## 2022-03-16 PROCEDURE — 96413 CHEMO IV INFUSION 1 HR: CPT

## 2022-03-16 RX ORDER — SODIUM CHLORIDE 9 MG/ML
250 INJECTION, SOLUTION INTRAVENOUS ONCE
Status: CANCELLED | OUTPATIENT
Start: 2022-03-16

## 2022-03-16 RX ORDER — SODIUM CHLORIDE 9 MG/ML
250 INJECTION, SOLUTION INTRAVENOUS ONCE
Status: DISCONTINUED | OUTPATIENT
Start: 2022-03-16 | End: 2022-03-17 | Stop reason: HOSPADM

## 2022-03-16 RX ADMIN — SODIUM CHLORIDE 200 MG: 9 INJECTION, SOLUTION INTRAVENOUS at 12:55

## 2022-03-16 NOTE — PROGRESS NOTES
Jeana Chung RN  [id number]  1975    Reason for visit:  Recurrent endometrial cancer, consideration of ongoing treatment     History of present illness:  The patient is a 46 y.o. year old female who presents today for treatment and evaluation of the above issues.     Patient was diagnosed with biopsy-proven recurrent endometrial cancer. She is currently receiving Keytruda and Lenvima. Her clinical course has been complicated due to thrombocytopenia (could not undergo Port-A-Cath insertion) and COVID infection 2021. One cycle of Keytruda was held due to COVID.  She attempted restarting Lenvima however held again due to platelets of 48.     She reports her left lower extremity cellulitis is improving.  The edema is better and patient notes about a 20 pound weight loss with improving bilateral lower extremity edema.  She is seeing a new wound care facility and is going to get a CT scan of her lower extremity to evaluate for tunneling.  This wound care is physician and team is in [facility].  She has a trip planned back home to the [facility] at the end of April.  She has elevated bilirubin although this is better than it had been previously when it was almost 10.  AST is mildly elevated.  Appetite is intermittently good.  Her neuropathy is stable.  She is still on doxycycline p.o. and has follow-up with infectious disease next week.  She has urinary urgency but is on Aldactone.  She not feeling a more tired than usual and has been doing a lot around the house.  She is moving around a little bit better and is taking oxycodone for pain.  She has held her Lenvima since thrombocytopenia and issues with nausea and diarrhea which occurred immediately after attempt to re initiate.  In regards to her pain, received referral to palliative care.     OBGYN History:  She is a .  She does not use HRT. She has never had a pap smear.    Oncologic History:  Oncology/Hematology History   Endometrial cancer  (HCC)   12/14/2019 Imaging    Presented to ED in [place] due to progressively heavy vaginal bleeding and severe back pain. Ultrasound revealed uterine and cervical masses.     1/2020 Biopsy    ED follow-up with gynecologist in [place], told she likely has cervical cancer. Insufficient tissue on attempted cervical biopsy. Patient moved to [place], [place] to be closer to mother and sister for work-up and treatment.      1/21/2020 Imaging    Gyn evaluation at [facility]. Repeat TVUS showed cervical mass, right adnexal mass, and large uterine fibroid vs mass. Referred to Gyn Oncology     1/23/2020 Initial Diagnosis    Endometrial cancer (CMS/HCC)  Cervical biopsy positive for infiltrating endometrioid adenocarcinoma     1/30/2020 Imaging    CT chest, abdomen, pelvis showed enlarged uterus with multiple masses and 4 cm cystic/solid mass at right ovary. No metastatic disease noted in abdomen or chest.     2/10/2020 Surgery    Exploratory laparotomy, total abdominal hysterectomy, bilateral salpingo-oophorectomy, with optimal debulking (R=0), and omentectomy.    Pelvic washing cytology positive for malignant cells, consistent with adenocarcinoma. Final pathology showed large grade 3 endometrioid tumor with lymphvascular invasion at the uterus. Cervical stromal involvement, vaginal margin negative. Right tube and ovary involved. Left tube and ovary negative, omentum negative. MSI normal. [id number], Stage IIIA grade 3       3/9/2020 - 7/8/2020 Chemotherapy    OP UTERINE PACLitaxel / CARBOplatin (Q21D)  Cycle #1 complicated by pancytopenia, appendicitis, appendectomy, appendiceal abscess  Cycle #2 remarkable for thrombocytopenia, worsening neuropathy.  Dose modification with cycle #3.       3/17/2020 - 3/23/2020 Other Event    Hospital admission with acute appendicitis     5/21/2020 - 5/21/2020 Chemotherapy    OP CENTRAL VENOUS ACCESS DEVICE ACCESS, CARE, AND MAINTENANCE (CVAD)     7/8/2020 - 7/28/2020 Chemotherapy     OP OVARIAN DOCEtaxel / CARBOplatin     12/5/2021 Progression    Complains of vaginal bleeding.  CT scan chest abdomen and pelvis:  IMPRESSION:  Abnormal soft tissue mass seen at the vaginal vault with likely  extension to involve the sigmoid colon adjacent to the mass on the left.  Multiple new lung lesions including 2.5 cm medial right lower lobe nodule, 7 mm right peripheral lung nodule, to left small lung nodules measuring 1.2 cm together and smaller other lung nodules concerning for metastatic disease.  Office biopsy of vaginal mass performed 12/6/2021.  Final Diagnosis   VAGINA, BIOPSY:               Compatible with known endometrioid carcinoma.               See comment.          12/13/2021 -  Chemotherapy    Course complicated by COVID infection diagnosed 12/20/2021.  Thrombocytopenia, unknown etiology.  OP ENDOMETRIAL Lenvatinib / Pembrolizumab 200 mg     1/25/2022 - 3/2/2022 Radiation    Radiation OncologyTreatment Course:  Jeana Chung RN received 4500 cGy in 25 fractions to pelvis via External Beam Radiation - EBRT.           Past Medical History:   Diagnosis Date   • Anemia    • Anxiety and depression    • Back pain    • Bronchitis    • Diabetes (HCC)     DX 4-5 YRS AGO, CHECKS BS 4X/DAY, LAST A1C 7.1%   • Endometrial cancer (HCC)     STAGE II   • GERD (gastroesophageal reflux disease)    • Hypertension    • IBS (irritable bowel syndrome)    • MRSA (methicillin resistant staph aureus) culture positive 2018    S/P NECROTIZING FASCITIS IN BILATERAL FEET   • Necrotizing fasciitis (HCC)    • PCOS (polycystic ovarian syndrome)    • PTSD (post-traumatic stress disorder)    • Retinopathy 3/18/2020   • Sepsis (HCC)    • Stage 3 chronic kidney disease (HCC) 2/24/2020   • Subconjunctival hemorrhage        Past Surgical History:   Procedure Laterality Date   • APPENDECTOMY N/A 3/21/2020    Procedure: APPENDECTOMY LAPAROSCOPIC;  Surgeon: Praful Myers MD;  [facility];  Service: General;   Laterality: N/A;   • EXPLORATORY LAPAROTOMY, TOTAL ABDOMINAL HYSTERECTOMY SALPINGO OOPHORECTOMY N/A 2/10/2020    Procedure: EXPLORATORY LAPAROTOMY, TOTAL ABDOMINAL HYSTERECTOMY, BILATERAL SALPINGO-OOPHORECTOMY WITH OPTIMAL  STAGING (R-0), OMENTECTOMY;  Surgeon: Celine Rubio MD;  Location: [facility];  Service: Gynecology Oncology;  Laterality: N/A;   • EYE SURGERY Left     BLOOD VESSEL IN EYE REPAIR   • TOE AMPUTATION Left 2019    big toe - unhealing sore   • TOE AMPUTATION Right 2018    big toe and second toe - Necrotizing fasciitis and MRSA        MEDICATIONS: The current medication list was reviewed with the patient and updated in the EMR this date per the Medical Assistant. Medication dosages and frequencies were confirmed to be accurate.      Allergies:  is allergic to bactrim [sulfamethoxazole-trimethoprim], promethazine, and penicillins.    Social History:   Social History     Socioeconomic History   • Marital status:    • Number of children: 1   Tobacco Use   • Smoking status: Former Smoker     Types: Cigarettes     Quit date:      Years since quittin.2   • Smokeless tobacco: Never Used   • Tobacco comment: social MAYBE 1 CIG/DAY   Vaping Use   • Vaping Use: Never used   Substance and Sexual Activity   • Alcohol use: Not Currently   • Drug use: Never   • Sexual activity: Not Currently       Family History:    Family History   Problem Relation Age of Onset   • Fibroids Mother    • Diabetes type II Mother    • Hypertension Mother    • Heart attack Mother    • Fibroids Sister         PCOS   • Diabetes type II Sister    • Dementia Maternal Grandmother    • Stroke Maternal Grandmother        Health Maintenance:    Health Maintenance   Topic Date Due   • COLORECTAL CANCER SCREENING  Never done   • Hepatitis B (1 of 3 - Risk 3-dose series) Never done   • TDAP/TD VACCINES (1 - Tdap) Never done   • ANNUAL WELLNESS VISIT  Never done   • MAMMOGRAM  Never done   • Pneumococcal Vaccine 0-64 (2 of  "4 - PCV13) 02/24/2021   • DIABETIC FOOT EXAM  02/24/2021   • DIABETIC EYE EXAM  02/24/2021   • COVID-19 Vaccine (2 - Booster for Smiley series) 05/15/2021   • URINE MICROALBUMIN  07/24/2021   • INFLUENZA VACCINE  08/01/2021   • HEMOGLOBIN A1C  08/10/2022   • HEPATITIS C SCREENING  Completed   • PAP SMEAR  Discontinued       Review of Systems   Cardiovascular: Positive for leg swelling (left > right).   Genitourinary: Positive for vaginal bleeding.   Musculoskeletal: Positive for back pain.   Skin: Positive for color change and wound.   Psychiatric/Behavioral: Positive for dysphoric mood. The patient is nervous/anxious.    Please refer to history of present illness, remainder of review of systems negative    Physical Exam    Vitals:    03/16/22 1141   BP: 130/59   Pulse: 100   Resp: 15   Temp: 96.9 °F (36.1 °C)   TempSrc: Temporal   SpO2: 100%   Weight: 86 kg (189 lb 11.2 oz)   Height: 157.5 cm (62.01\")   PainSc: 0-No pain       Body mass index is 34.69 kg/m².    Wt Readings from Last 3 Encounters:   03/16/22 86 kg (189 lb 11.2 oz)   03/15/22 85.1 kg (187 lb 11.2 oz)   03/01/22 89.3 kg (196 lb 14.4 oz)       GENERAL: Alert,  female in no apparent distress.   HEENT:  Head normocephalic, atraumatic. Mucus membranes moist. Mild pharyngeal injection, no thrush.  Nonicteric sclera  CARDIOVASCULAR: Heart regular rate and rhythm without murmur rub or gallop, improved bilateral lower extremity edema  RESPIRATORY: Normal effort, clear to auscultation bilaterally  GASTROINTESTINAL: Deferred  SKIN: Wound left lower abdomen healing appropriately, see below for left lower extremity wound  PSYCHIATRIC: AO x3, with appropriate affect, normal thought processes.  NEUROLOGIC: No focal deficits. Moves extremities well.  MUSCULOSKELETAL: Not using cane today, good mobility  EXTREMITIES:   No cyanosis, clubbing, symmetric.  Decreased edema right lower extremity, dressing in place left lower extremity     PELVIC exam: deferred    ECOG PS " 2    PROCEDURES: None    Diagnostic Data:      MR Abdomen without contrast: 2/25/2022  IMPRESSION:  1. Normal caliber common bile duct. No choledocholithiasis or obstructive mass. No intrahepatic biliary ductal dilatation.  2. Mild periportal edema is nonspecific but can be seen in the setting of hepatitis, correlate with clinical / laboratory findings.  3. Splenomegaly and splenorenal shunt again noted.  4. Anasarca with small upper abdominal ascites and small right-sided pleural effusion.  5. Right lower lobe 2.3 cm pulmonary nodule which again may relate to pulmonary metastatic disease, given history of endometrial cancer, better assessed on prior chest CT      Lab Results   Component Value Date    WBC 3.20 (L) 03/15/2022    HGB 8.3 (L) 03/15/2022    HCT 24.8 (L) 03/15/2022    .1 (H) 03/15/2022    PLT 72 (L) 03/15/2022    NEUTROABS 2.50 03/15/2022    GLUCOSE 161 (H) 03/15/2022    BUN 13 03/15/2022    CREATININE 0.78 03/15/2022    EGFRIFNONA 79 02/22/2022     03/15/2022    K 4.3 03/15/2022     03/15/2022    CO2 26.0 03/15/2022    MG 1.7 02/11/2022    PHOS 2.5 03/20/2020    CALCIUM 8.7 03/15/2022    ALBUMIN 2.70 (L) 03/15/2022    AST 44 (H) 03/15/2022    ALT 17 03/15/2022    BILITOT 3.2 (H) 03/15/2022     Lab Results   Component Value Date     8.5 07/08/2020         Assessment/Plan   This is a 46 y.o. woman with recurrent stage IIIA endometrial cancer presenting for consideration of resumption of Keytruda Lenvima.  Encounter Diagnoses   Name Primary?   • Endometrial cancer (HCC) Yes   • Chemotherapy-induced thrombocytopenia    • Neuropathy    • Thrombocytopenia (HCC)    • Elevated bilirubin      Endometrial cancer: Stage IIIa due to adnexal involvement, now with recurrence  -Patient would like Port-A-Cath, however this has not been placed due to her thrombocytopenia  -Patient undergo Keytruda today.  Lenvima will no longer be an option for this patient as she has increases in bilirubin and  liver function each time she tries to reinitiate despite dose reduction.  -MR abdomen 2/25/2022 results as above   -Repeat labs in 1 week's time 3/22/2022 as patient is seeing infectious disease that date and I like to see what her thrombocytopenia and liver function is doing  Hyperbilirubinemia  Elevated liver enzymes  Diarrhea  -See the above, discontinue Lenvima     Neuropathy: Chronic diabetes related with superimposed chemotherapy-induced neuropathy, pain  - On gabapentin, now seeing palliative care for management of neuropathy and pain     Emotional distress related to cancer diagnosis, marital discourse  -Ongoing issues  - previously referred for counseling  - Ativan   -Seeing palliative care     Complex social issues  - Staying with her family in [place] currently     Cellulitis left lower extremity, swelling bilateral lower extremities  -On doxycycline, seen by wound care and now has plan, being seen by infectious disease tomorrow  -CT of abdomen pelvis ordered to make sure that there is no pelvic thrombus contributing to patient's new bilateral lower extremity edema, left greater than right because this can sometimes happen in cancer patients.     COVID-19.   - She received monoclonal antibodies when she had COVID-19 in 12/2021.     Pain assessment was performed today as a part of patient’s care.  For patients with pain related to surgery, gynecologic malignancy or cancer treatment, the plan is as noted in the assessment/plan.  For patients with pain not related to these issues, they are to seek any further needed care from a more appropriate provider, such as PCP.      Orders Placed This Encounter   Procedures   • Comprehensive Metabolic Panel     Standing Status:   Future     Standing Expiration Date:   3/16/2023     Order Specific Question:   Release to patient     Answer:   Immediate   • C-reactive Protein     Standing Status:   Future     Standing Expiration Date:   3/16/2023     Order Specific  Question:   Release to patient     Answer:   Immediate   • CBC & Differential     Standing Status:   Future     Standing Expiration Date:   3/16/2023     Order Specific Question:   Manual Differential     Answer:   No     Order Specific Question:   Release to patient     Answer:   Immediate     FOLLOW UP: 3 weeks, labs in 1 week  Electronically signed by Celine Rubio MD, 03/16/22, 6:14 PM EDT.

## 2022-03-17 ENCOUNTER — SPECIALTY PHARMACY (OUTPATIENT)
Dept: ONCOLOGY | Facility: HOSPITAL | Age: 47
End: 2022-03-17

## 2022-03-21 ENCOUNTER — LAB (OUTPATIENT)
Dept: LAB | Facility: HOSPITAL | Age: 47
End: 2022-03-21

## 2022-03-21 ENCOUNTER — HOSPITAL ENCOUNTER (OUTPATIENT)
Dept: CT IMAGING | Facility: HOSPITAL | Age: 47
Discharge: HOME | End: 2022-03-21

## 2022-03-21 DIAGNOSIS — R60.0 BILATERAL EDEMA OF LOWER EXTREMITY: ICD-10-CM

## 2022-03-21 DIAGNOSIS — C54.1 ENDOMETRIAL CANCER: ICD-10-CM

## 2022-03-21 LAB
ALBUMIN SERPL-MCNC: 2.6 G/DL (ref 3.5–5.2)
ALBUMIN/GLOB SERPL: 1 G/DL
ALP SERPL-CCNC: 85 U/L (ref 39–117)
ALT SERPL W P-5'-P-CCNC: 17 U/L (ref 1–33)
ANION GAP SERPL CALCULATED.3IONS-SCNC: 8 MMOL/L (ref 5–15)
AST SERPL-CCNC: 37 U/L (ref 1–32)
BASOPHILS # BLD AUTO: 0.03 10*3/MM3 (ref 0–0.2)
BASOPHILS NFR BLD AUTO: 0.8 % (ref 0–1.5)
BILIRUB SERPL-MCNC: 2.2 MG/DL (ref 0–1.2)
BUN SERPL-MCNC: 14 MG/DL (ref 6–20)
BUN/CREAT SERPL: 16.9 (ref 7–25)
CALCIUM SPEC-SCNC: 8.9 MG/DL (ref 8.6–10.5)
CHLORIDE SERPL-SCNC: 101 MMOL/L (ref 98–107)
CO2 SERPL-SCNC: 27 MMOL/L (ref 22–29)
CREAT BLDA-MCNC: 0.8 MG/DL (ref 0.6–1.3)
CREAT SERPL-MCNC: 0.83 MG/DL (ref 0.57–1)
CRP SERPL-MCNC: 0.85 MG/DL (ref 0–0.5)
DEPRECATED RDW RBC AUTO: 64.3 FL (ref 37–54)
EGFRCR SERPLBLD CKD-EPI 2021: 87.1 ML/MIN/1.73
EOSINOPHIL # BLD AUTO: 0.15 10*3/MM3 (ref 0–0.4)
EOSINOPHIL NFR BLD AUTO: 4.2 % (ref 0.3–6.2)
ERYTHROCYTE [DISTWIDTH] IN BLOOD BY AUTOMATED COUNT: 15.9 % (ref 12.3–15.4)
GLOBULIN UR ELPH-MCNC: 2.7 GM/DL
GLUCOSE SERPL-MCNC: 248 MG/DL (ref 65–99)
HCT VFR BLD AUTO: 28 % (ref 34–46.6)
HGB BLD-MCNC: 9 G/DL (ref 12–15.9)
IMM GRANULOCYTES # BLD AUTO: 0.03 10*3/MM3 (ref 0–0.05)
IMM GRANULOCYTES NFR BLD AUTO: 0.8 % (ref 0–0.5)
LYMPHOCYTES # BLD AUTO: 0.39 10*3/MM3 (ref 0.7–3.1)
LYMPHOCYTES NFR BLD AUTO: 10.9 % (ref 19.6–45.3)
MCH RBC QN AUTO: 34.9 PG (ref 26.6–33)
MCHC RBC AUTO-ENTMCNC: 32.1 G/DL (ref 31.5–35.7)
MCV RBC AUTO: 108.5 FL (ref 79–97)
MONOCYTES # BLD AUTO: 0.25 10*3/MM3 (ref 0.1–0.9)
MONOCYTES NFR BLD AUTO: 7 % (ref 5–12)
NEUTROPHILS NFR BLD AUTO: 2.74 10*3/MM3 (ref 1.7–7)
NEUTROPHILS NFR BLD AUTO: 76.3 % (ref 42.7–76)
NRBC BLD AUTO-RTO: 0 /100 WBC (ref 0–0.2)
PLATELET # BLD AUTO: 85 10*3/MM3 (ref 140–450)
PMV BLD AUTO: 10 FL (ref 6–12)
POTASSIUM SERPL-SCNC: 5.1 MMOL/L (ref 3.5–5.2)
PROT SERPL-MCNC: 5.3 G/DL (ref 6–8.5)
RBC # BLD AUTO: 2.58 10*6/MM3 (ref 3.77–5.28)
SODIUM SERPL-SCNC: 136 MMOL/L (ref 136–145)
WBC NRBC COR # BLD: 3.59 10*3/MM3 (ref 3.4–10.8)

## 2022-03-21 PROCEDURE — 36415 COLL VENOUS BLD VENIPUNCTURE: CPT

## 2022-03-21 PROCEDURE — 80053 COMPREHEN METABOLIC PANEL: CPT

## 2022-03-21 PROCEDURE — 85025 COMPLETE CBC W/AUTO DIFF WBC: CPT

## 2022-03-21 PROCEDURE — 82565 ASSAY OF CREATININE: CPT

## 2022-03-21 PROCEDURE — 25010000002 IOPAMIDOL 61 % SOLUTION: Performed by: OBSTETRICS & GYNECOLOGY

## 2022-03-21 PROCEDURE — 74177 CT ABD & PELVIS W/CONTRAST: CPT

## 2022-03-21 PROCEDURE — 86140 C-REACTIVE PROTEIN: CPT

## 2022-03-21 RX ADMIN — IOPAMIDOL 80 ML: 612 INJECTION, SOLUTION INTRAVENOUS at 13:45

## 2022-03-22 ENCOUNTER — DOCUMENTATION (OUTPATIENT)
Dept: PHYSICAL THERAPY | Facility: HOSPITAL | Age: 47
End: 2022-03-22

## 2022-03-22 ENCOUNTER — APPOINTMENT (OUTPATIENT)
Dept: ONCOLOGY | Facility: HOSPITAL | Age: 47
End: 2022-03-22

## 2022-03-22 DIAGNOSIS — L03.116 CELLULITIS AND ABSCESS OF LEFT LOWER EXTREMITY: ICD-10-CM

## 2022-03-22 DIAGNOSIS — T21.22XD PARTIAL THICKNESS BURN OF ABDOMEN, SUBSEQUENT ENCOUNTER: Primary | ICD-10-CM

## 2022-03-22 DIAGNOSIS — L02.416 CELLULITIS AND ABSCESS OF LEFT LOWER EXTREMITY: ICD-10-CM

## 2022-03-22 NOTE — THERAPY DISCHARGE NOTE
Outpatient Rehabilitation - Wound/Debridement Discharge Summary       Patient Name: Jeana Pollack SAMI Chung  : 1975  MRN: [id number]  Today's Date: 3/22/2022                  Admit Date: (Not on file)    Visit Dx:    ICD-10-CM ICD-9-CM   1. Partial thickness burn of abdomen, subsequent encounter  T21.22XD V58.89     942.23   2. Cellulitis and abscess of left lower extremity  L03.116 682.6    L02.416        Patient Active Problem List   Diagnosis   • Acute stress reaction with predominately emotional disturbance   • Sleep disturbance   • Cancer related pain   • Moderate episode of recurrent major depressive disorder (HCC)   • Anxiety   • Iron deficiency anemia   • Type 2 diabetes mellitus with diabetic polyneuropathy, with long-term current use of insulin (HCC)   • Essential hypertension   • Heart murmur   • Family history of cardiac disorder in mother   • Endometrial cancer (HCC)   • Stage 3 chronic kidney disease (HCC)   • Gastroesophageal reflux disease   • Amputation of left great toe (HCC)   • Amputation of right great toe (HCC)   • Neuropathy   • Hypotension due to drugs   • Neutropenia (HCC)   • Acute appendicitis   • Open wound of right foot   • Hyponatremia   • Pancytopenia (HCC)   • Antineoplastic chemotherapy induced pancytopenia (HCC)   • Chemotherapy-induced thrombocytopenia   • Wound healing, delayed   • Dyspareunia, female   • Vaginal stenosis   • Secondary malignant neoplasm of pelvis (HCC)   • Encounter for long-term (current) use of other medications   • Encounter for therapeutic drug monitoring   • Wound of abdomen   • Thrombocytopenia (HCC)   • Elevated bilirubin   • Diarrhea due to drug   • Cellulitis of left lower extremity   • Diabetic ulcer of left calf associated with diabetes mellitus due to underlying condition, with fat layer exposed (HCC)   • Diabetic ulcer of right midfoot associated with type 2 diabetes mellitus, with fat layer exposed (HCC)   • Foot ulcer (HCC)   •  History of amputation of right great toe (HCC)        Past Medical History:   Diagnosis Date   • Anemia    • Anxiety and depression    • Back pain    • Bronchitis    • Diabetes (HCC)     DX 4-5 YRS AGO, CHECKS BS 4X/DAY, LAST A1C 7.1%   • Endometrial cancer (HCC)     STAGE II   • GERD (gastroesophageal reflux disease)    • Hypertension    • IBS (irritable bowel syndrome)    • MRSA (methicillin resistant staph aureus) culture positive 2018    S/P NECROTIZING FASCITIS IN BILATERAL FEET   • Necrotizing fasciitis (HCC)    • PCOS (polycystic ovarian syndrome)    • PTSD (post-traumatic stress disorder)    • Retinopathy 3/18/2020   • Sepsis (HCC)    • Stage 3 chronic kidney disease (HCC) 2/24/2020   • Subconjunctival hemorrhage         Past Surgical History:   Procedure Laterality Date   • APPENDECTOMY N/A 3/21/2020    Procedure: APPENDECTOMY LAPAROSCOPIC;  Surgeon: Praful Myers MD;  Location:  [facility];  Service: General;  Laterality: N/A;   • EXPLORATORY LAPAROTOMY, TOTAL ABDOMINAL HYSTERECTOMY SALPINGO OOPHORECTOMY N/A 2/10/2020    Procedure: EXPLORATORY LAPAROTOMY, TOTAL ABDOMINAL HYSTERECTOMY, BILATERAL SALPINGO-OOPHORECTOMY WITH OPTIMAL  STAGING (R-0), OMENTECTOMY;  Surgeon: Celine Rubio MD;  Location:  [facility];  Service: Gynecology Oncology;  Laterality: N/A;   • EYE SURGERY Left     BLOOD VESSEL IN EYE REPAIR   • TOE AMPUTATION Left 06/2019    big toe - unhealing sore   • TOE AMPUTATION Right 2018    big toe and second toe - Necrotizing fasciitis and MRSA            Goals   PT OP Goals     Row Name 03/22/22 1045          PT Short Term Goals    STG Date to Achieve 04/03/22  -MW     STG 1 Pt to be able to demonstrate home dressing management  -MW     STG 1 Progress Ongoing  -MW     STG 2 Pt will demonstrate 25% reduction in abdominal wound area to indicate healing progress.  -MW     STG 2 Progress Ongoing  -MW     STG 3 Pt will verbalize s/sx of infection.  -MW     STG 3 Progress Met  -MW     STG 4  Reduce L calf wound dimensions by 50% as evidence of wound healing.  -MW     STG 4 Progress New  -MW            Long Term Goals    LTG Date to Achieve 05/03/22  -MW     LTG 1 Pt will demonstrate 75% reduction in abdominal wound area to indicate healing progress.  -MW     LTG 1 Progress Ongoing  -MW     LTG 2 Reduce L calf wound dimensions by 75% as evidence of wound healing.  -MW     LTG 2 Progress New  -MW           User Key  (r) = Recorded By, (t) = Taken By, (c) = Cosigned By    Initials Name Provider Type    Yvette Dubois, PT Physical Therapist                       OP Discharge Summary     Row Name 03/22/22 1045             OP PT Discharge Summary    Date of Discharge 03/22/22  -MW      Reason for Discharge Patient/Caregiver request;Transfer to other facility/level of care  -MW      Outcomes Achieved Patient able to partially acheive established goals  -MW      Discharge Destination Other (comment)  -MW      Discharge Instructions/Additional Comments Transfered care to facility closer to her home.  -MW            User Key  (r) = Recorded By, (t) = Taken By, (c) = Cosigned By    Initials Name Provider Type    Yvette Dubois, PT Physical Therapist                Yvette Ribeiro, PT  3/22/2022

## 2022-03-23 ENCOUNTER — HOSPITAL ENCOUNTER (OUTPATIENT)
Dept: MRI IMAGING | Facility: HOSPITAL | Age: 47
Discharge: HOME | End: 2022-03-23
Admitting: RADIOLOGY

## 2022-03-23 DIAGNOSIS — C54.1 ENDOMETRIAL CANCER: ICD-10-CM

## 2022-03-23 PROCEDURE — 0 GADOBENATE DIMEGLUMINE 529 MG/ML SOLUTION: Performed by: RADIOLOGY

## 2022-03-23 PROCEDURE — 82565 ASSAY OF CREATININE: CPT

## 2022-03-23 PROCEDURE — A9577 INJ MULTIHANCE: HCPCS | Performed by: RADIOLOGY

## 2022-03-23 PROCEDURE — 72197 MRI PELVIS W/O & W/DYE: CPT

## 2022-03-23 RX ADMIN — GADOBENATE DIMEGLUMINE 15 ML: 529 INJECTION, SOLUTION INTRAVENOUS at 19:38

## 2022-03-27 NOTE — PROGRESS NOTES
Jeana Chung RN  [id number]  1975    Reason for visit:  Recurrent endometrial cancer, consideration of ongoing treatment     History of present illness:  The patient is a 46 y.o. year old female who presents today for treatment and evaluation of the above issues.     Patient was diagnosed with biopsy-proven recurrent endometrial cancer. She is currently receiving Keytruda, discontinued Lenvima due to thrombocytopenia, transaminitis and elevated bilirubin.  Her clinical course has been complicated due to thrombocytopenia (could not undergo Port-A-Cath insertion) and COVID infection 2021. One cycle of Keytruda was held due to COVID.      Today presents to clinic to discuss treatment options and imaging results. Patient reports significant fatigue and feeling like she is unable to do regular levels of activity. Reports shortness of breath with walking up stairs. Feels like swelling of left leg has improved overall. Following with wound care in [facility] and has been continuing to take doxycycline. Still waiting to see infectious disease as she had to reschedule appointment. Continues to have significant neuropathy which has not worsened. Would like to know plan of care.     OBGYN History:  She is a .  She does not use HRT. She has never had a pap smear.    Oncologic History:  Oncology/Hematology History   Endometrial cancer (HCC)   2019 Imaging    Presented to ED in [place] due to progressively heavy vaginal bleeding and severe back pain. Ultrasound revealed uterine and cervical masses.     2020 Biopsy    ED follow-up with gynecologist in [place], told she likely has cervical cancer. Insufficient tissue on attempted cervical biopsy. Patient moved to [place], [place] to be closer to mother and sister for work-up and treatment.      2020 Imaging    Gyn evaluation at [facility]. Repeat TVUS showed cervical mass, right adnexal mass, and large uterine fibroid vs mass. Referred  to Gyn Oncology     1/23/2020 Initial Diagnosis    Endometrial cancer (CMS/HCC)  Cervical biopsy positive for infiltrating endometrioid adenocarcinoma     1/30/2020 Imaging    CT chest, abdomen, pelvis showed enlarged uterus with multiple masses and 4 cm cystic/solid mass at right ovary. No metastatic disease noted in abdomen or chest.     2/10/2020 Surgery    Exploratory laparotomy, total abdominal hysterectomy, bilateral salpingo-oophorectomy, with optimal debulking (R=0), and omentectomy.    Pelvic washing cytology positive for malignant cells, consistent with adenocarcinoma. Final pathology showed large grade 3 endometrioid tumor with lymphvascular invasion at the uterus. Cervical stromal involvement, vaginal margin negative. Right tube and ovary involved. Left tube and ovary negative, omentum negative. MSI normal. [id number], Stage IIIA grade 3       3/9/2020 - 7/8/2020 Chemotherapy    OP UTERINE PACLitaxel / CARBOplatin (Q21D)  Cycle #1 complicated by pancytopenia, appendicitis, appendectomy, appendiceal abscess  Cycle #2 remarkable for thrombocytopenia, worsening neuropathy.  Dose modification with cycle #3.       3/17/2020 - 3/23/2020 Other Event    Hospital admission with acute appendicitis     5/21/2020 - 5/21/2020 Chemotherapy    OP CENTRAL VENOUS ACCESS DEVICE ACCESS, CARE, AND MAINTENANCE (CVAD)     7/8/2020 - 7/28/2020 Chemotherapy    OP OVARIAN DOCEtaxel / CARBOplatin     12/5/2021 Progression    Complains of vaginal bleeding.  CT scan chest abdomen and pelvis:  IMPRESSION:  Abnormal soft tissue mass seen at the vaginal vault with likely  extension to involve the sigmoid colon adjacent to the mass on the left.  Multiple new lung lesions including 2.5 cm medial right lower lobe nodule, 7 mm right peripheral lung nodule, to left small lung nodules measuring 1.2 cm together and smaller other lung nodules concerning for metastatic disease.  Office biopsy of vaginal mass performed 12/6/2021.  Final Diagnosis    VAGINA, BIOPSY:               Compatible with known endometrioid carcinoma.               See comment.          12/13/2021 -  Chemotherapy    Course complicated by COVID infection diagnosed 12/20/2021.  Thrombocytopenia, unknown etiology.  OP ENDOMETRIAL Lenvatinib / Pembrolizumab 200 mg     1/25/2022 - 3/2/2022 Radiation    Radiation OncologyTreatment Course:  Jeana Chung RN received 4500 cGy in 25 fractions to pelvis via External Beam Radiation - EBRT.           Past Medical History:   Diagnosis Date   • Anemia    • Anxiety and depression    • Back pain    • Bronchitis    • Diabetes (HCC)     DX 4-5 YRS AGO, CHECKS BS 4X/DAY, LAST A1C 7.1%   • Endometrial cancer (HCC)     STAGE II   • GERD (gastroesophageal reflux disease)    • Hypertension    • IBS (irritable bowel syndrome)    • MRSA (methicillin resistant staph aureus) culture positive 2018    S/P NECROTIZING FASCITIS IN BILATERAL FEET   • Necrotizing fasciitis (HCC)    • PCOS (polycystic ovarian syndrome)    • PTSD (post-traumatic stress disorder)    • Retinopathy 3/18/2020   • Sepsis (HCC)    • Stage 3 chronic kidney disease (HCC) 2/24/2020   • Subconjunctival hemorrhage        Past Surgical History:   Procedure Laterality Date   • APPENDECTOMY N/A 3/21/2020    Procedure: APPENDECTOMY LAPAROSCOPIC;  Surgeon: Praful Myers MD;  Location: [facility] OR;  Service: General;  Laterality: N/A;   • EXPLORATORY LAPAROTOMY, TOTAL ABDOMINAL HYSTERECTOMY SALPINGO OOPHORECTOMY N/A 2/10/2020    Procedure: EXPLORATORY LAPAROTOMY, TOTAL ABDOMINAL HYSTERECTOMY, BILATERAL SALPINGO-OOPHORECTOMY WITH OPTIMAL  STAGING (R-0), OMENTECTOMY;  Surgeon: Celine Rubio MD;  Location: [facility] OR;  Service: Gynecology Oncology;  Laterality: N/A;   • EYE SURGERY Left     BLOOD VESSEL IN EYE REPAIR   • TOE AMPUTATION Left 06/2019    big toe - unhealing sore   • TOE AMPUTATION Right 2018    big toe and second toe - Necrotizing fasciitis and MRSA        MEDICATIONS: The  current medication list was reviewed with the patient and updated in the EMR this date per the Medical Assistant. Medication dosages and frequencies were confirmed to be accurate.      Allergies:  is allergic to bactrim [sulfamethoxazole-trimethoprim], promethazine, and penicillins.    Social History:   Social History     Socioeconomic History   • Marital status:    • Number of children: 1   Tobacco Use   • Smoking status: Former Smoker     Types: Cigarettes     Quit date: 2000     Years since quittin.2   • Smokeless tobacco: Never Used   • Tobacco comment: social MAYBE 1 CIG/DAY   Vaping Use   • Vaping Use: Never used   Substance and Sexual Activity   • Alcohol use: Not Currently   • Drug use: Never   • Sexual activity: Not Currently       Family History:    Family History   Problem Relation Age of Onset   • Fibroids Mother    • Diabetes type II Mother    • Hypertension Mother    • Heart attack Mother    • Fibroids Sister         PCOS   • Diabetes type II Sister    • Dementia Maternal Grandmother    • Stroke Maternal Grandmother        Health Maintenance:    Health Maintenance   Topic Date Due   • COLORECTAL CANCER SCREENING  Never done   • Hepatitis B (1 of 3 - Risk 3-dose series) Never done   • TDAP/TD VACCINES (1 - Tdap) Never done   • ANNUAL WELLNESS VISIT  Never done   • MAMMOGRAM  Never done   • Pneumococcal Vaccine 0-64 (2 of 4 - PCV13) 2021   • DIABETIC FOOT EXAM  2021   • DIABETIC EYE EXAM  2021   • COVID-19 Vaccine (2 - Booster for Smiley series) 05/15/2021   • URINE MICROALBUMIN  2021   • INFLUENZA VACCINE  2021   • HEMOGLOBIN A1C  08/10/2022   • HEPATITIS C SCREENING  Completed   • PAP SMEAR  Discontinued       Review of Systems   Constitutional: Positive for activity change and fatigue.   Respiratory: Positive for shortness of breath.    Cardiovascular: Positive for leg swelling (left > right).   Genitourinary: Negative for vaginal bleeding.   Musculoskeletal:  "Positive for back pain.   Psychiatric/Behavioral: The patient is nervous/anxious.    Please refer to history of present illness, remainder of review of systems negative      Vitals:    03/28/22 1354   BP: 139/64  Comment: RUE   Pulse: 90   Resp: 16   Temp: 96.8 °F (36 °C)   TempSrc: Infrared   SpO2: 100%  Comment: RA   Weight: 83 kg (183 lb)   Height: 157.5 cm (62\")   PainSc:   3   PainLoc: Back  Comment: Right       Body mass index is 33.47 kg/m².    Wt Readings from Last 3 Encounters:   03/28/22 83 kg (183 lb)   03/16/22 86 kg (189 lb 11.2 oz)   03/15/22 85.1 kg (187 lb 11.2 oz)       GENERAL: Alert,  female in no apparent distress.   HEENT:  Head normocephalic, atraumatic. Mucus membranes moist.   CARDIOVASCULAR: Heart regular rate, improved bilateral lower extremity edema  RESPIRATORY: Normal effort  GASTROINTESTINAL: Deferred  PSYCHIATRIC: AO x3, with appropriate affect, normal thought processes.  NEUROLOGIC: No focal deficits. Moves extremities well.  MUSCULOSKELETAL: Not using cane today, good mobility     PELVIC exam: deferred    ECOG PS 2    PROCEDURES: None    Diagnostic Data:    MR Abdomen without contrast: 2/25/2022  IMPRESSION:  1. Normal caliber common bile duct. No choledocholithiasis or obstructive mass. No intrahepatic biliary ductal dilatation.  2. Mild periportal edema is nonspecific but can be seen in the setting of hepatitis, correlate with clinical / laboratory findings.  3. Splenomegaly and splenorenal shunt again noted.  4. Anasarca with small upper abdominal ascites and small right-sided pleural effusion.  5. Right lower lobe 2.3 cm pulmonary nodule which again may relate to pulmonary metastatic disease, given history of endometrial cancer, better assessed on prior chest CT    CT Abdomen Pelvis With Contrast    Result Date: 3/22/2022  DATE OF EXAM: 3/21/2022 1:28 PM  PROCEDURE: CT ABDOMEN PELVIS W CONTRAST-  INDICATIONS: endometrial cancer / bilateral extremity swelling; C54.1-Malignant " neoplasm of endometrium; R60.0-Localized edema  COMPARISON: 12/5/2021  TECHNIQUE: Routine transaxial slices were obtained through the abdomen and pelvis after the intravenous administration of contrast. Reconstructed coronal and sagittal images were also obtained. Automated exposure control and iterative construction methods were used.  The radiation dose reduction device was turned on for each scan per the ALARA (As Low as Reasonably Achievable) protocol.  FINDINGS: Evaluation of the lung bases redemonstrates multiple pulmonary nodules, including a 2.6 cm mass located medially near the right lung base with adjacent presumed postobstructive volume loss, concerning for metastatic involvement. There is also a new small right pleural effusion. Evaluation of the body wall soft tissues demonstrates diffuse anasarca. Additionally, the previously noted ventral body wall hernia is increased in size, now containing some free fluid and a segment of nonobstructed colon. The liver demonstrates homogeneous attenuation without evidence of suspicious focal lesion. The gallbladder is unremarkable. The spleen, pancreas and bilateral adrenal glands demonstrate homogeneous enhancement without evidence of new suspicious focal lesion. Unchanged perisplenic and gastric varices. There is mild right-sided hydronephrosis with delayed contrast excretion, new from comparison, with ureteral dilatation and concern for possible adhesion/obstruction above the UVJ adjacent to the pelvic mass. There is no new bulky retroperitoneal or iliac adenopathy. Atherosclerotic nonaneurysmal abdominal aorta. Moderate fecal loading of the colon is present. There is otherwise no evidence of obstruction or suspicious bowel wall thickening. Trace free fluid noted dependently and within the previously described ventral body wall hernia. Evaluation of the pelvic viscera redemonstrates ill-defined soft tissue and increased central fluid attenuation involving the  vaginal vault, however appearing somewhat decreased in size currently measuring 5.5 x 4.2 cm, previously 7.3 x 5.6 cm. There is unchanged abutment and unclear invasion of the adjacent sigmoid colon. No new pelvic nodularity otherwise.      Impression: There is new mild right-sided hydronephrosis, with delayed contrast, with likely obstruction at the level of the distal right ureter which appears adherent to or obstructed by the previously described vaginal vault/pelvic mass.  Evidence of potentially associated fluid overload with small right pleural effusion and diffuse anasarca.  Findings possibly representing treatment response with regard to the presumed recurrent pelvic lesion, with decreased size of fluid containing circumferential soft tissue mass involving the vaginal cuff.  Redemonstrated and partially characterized pulmonary nodules concerning for metastatic involvement.  Findings discussed with Dr. Rubio by Carlos Day via phone at 10:53 AM 3/22/2022  This report was finalized on 3/22/2022 10:54 AM by Carlos Day.          Lab Results   Component Value Date    WBC 3.59 03/21/2022    HGB 9.0 (L) 03/21/2022    HCT 28.0 (L) 03/21/2022    .5 (H) 03/21/2022    PLT 85 (L) 03/21/2022    NEUTROABS 2.74 03/21/2022    GLUCOSE 248 (H) 03/21/2022    BUN 14 03/21/2022    CREATININE 0.80 03/23/2022    EGFRIFNONA 79 02/22/2022     03/21/2022    K 5.1 03/21/2022     03/21/2022    CO2 27.0 03/21/2022    MG 1.7 02/11/2022    PHOS 2.5 03/20/2020    CALCIUM 8.9 03/21/2022    ALBUMIN 2.60 (L) 03/21/2022    AST 37 (H) 03/21/2022    ALT 17 03/21/2022    BILITOT 2.2 (H) 03/21/2022     Lab Results   Component Value Date     8.5 07/08/2020         Assessment/Plan   This is a 46 y.o. woman with recurrent stage IIIA endometrial cancer presenting for discussion regarding treatment     Encounter Diagnoses   Name Primary?   • Endometrial cancer (HCC) Yes   • Elevated bilirubin    •  Chemotherapy-induced thrombocytopenia      Endometrial cancer: Stage IIIa due to adnexal involvement, now with recurrence  - Patient would like Port-A-Cath, however this has not been placed due to her thrombocytopenia  - Has been on Keytruda. Lenvima discontinued due to increases in bilirubin and liver function each time she tries to reinitiate despite dose reduction   - Continues to have thrombocytopenia with platelets of 85 on 3/21/22, elevated AST at 37 and ALT 17.   - MR abdomen 2/25/2022 results as above. CT abdomen/pelvis 3/22/22 with new right hydronephrosis, right pleural effusion, pulmonary nodules and diffuse anasarca.  Imaging was reviewed.  The hydronephrosis and pleural effusion reported out to patient and her mother and are new findings and these are indicative of likely progression.  Further, patient has not tolerated this treatment regimen well.  - Discussed with patient that she has not shown significant response to Keytruda and imaging findings are concerning  for possible progression of disease  - Options for treatment limited by patient's severe neuropathy as well as current thrombocytopenia. Recommend taking a break from treatment and waiting for platelet count > 100 prior to restarting treatment  - Once patient shows improvement in lab values, will plan to start Carboplatin/Docetaxol/Bevacizumab for treatment.    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   - She received monoclonal antibodies when she had COVID-19 in 12/2021.     Pain assessment was performed today as a part of patient’s care.  For patients with pain related to surgery, gynecologic malignancy or cancer treatment, the plan is as noted in the assessment/plan.  For patients with pain not related to these issues, they are to seek any further needed care from a more appropriate provider, such as PCP.      No orders of the defined types were placed in this encounter.    FOLLOW UP: for re-evaluation in 3 weeks     Trinidad Rocha M.D  Obstetrics & Gynecology, PGY3    I saw and evaluated the patient. I agree with the findings and the plan of care as documented in the note.    Celine Rubio MD  03/29/22  14:59 EDT

## 2022-03-28 ENCOUNTER — OFFICE VISIT (OUTPATIENT)
Dept: GYNECOLOGIC ONCOLOGY | Facility: CLINIC | Age: 47
End: 2022-03-28

## 2022-03-28 VITALS
WEIGHT: 183 LBS | HEIGHT: 62 IN | SYSTOLIC BLOOD PRESSURE: 139 MMHG | RESPIRATION RATE: 16 BRPM | OXYGEN SATURATION: 100 % | HEART RATE: 90 BPM | TEMPERATURE: 96.8 F | BODY MASS INDEX: 33.68 KG/M2 | DIASTOLIC BLOOD PRESSURE: 64 MMHG

## 2022-03-28 DIAGNOSIS — R17 ELEVATED BILIRUBIN: ICD-10-CM

## 2022-03-28 DIAGNOSIS — T45.1X5A CHEMOTHERAPY-INDUCED THROMBOCYTOPENIA: ICD-10-CM

## 2022-03-28 DIAGNOSIS — D69.59 CHEMOTHERAPY-INDUCED THROMBOCYTOPENIA: ICD-10-CM

## 2022-03-28 DIAGNOSIS — C54.1 ENDOMETRIAL CANCER: Primary | ICD-10-CM

## 2022-03-28 PROCEDURE — 99215 OFFICE O/P EST HI 40 MIN: CPT | Performed by: OBSTETRICS & GYNECOLOGY

## 2022-03-29 LAB — CREAT BLDA-MCNC: 0.8 MG/DL (ref 0.6–1.3)

## 2022-03-30 ENCOUNTER — TELEPHONE (OUTPATIENT)
Dept: RADIATION ONCOLOGY | Facility: HOSPITAL | Age: 47
End: 2022-03-30

## 2022-03-30 ENCOUNTER — TELEPHONE (OUTPATIENT)
Dept: GYNECOLOGIC ONCOLOGY | Facility: CLINIC | Age: 47
End: 2022-03-30

## 2022-03-30 NOTE — TELEPHONE ENCOUNTER
RN called pt to go over new plan with her.  Pt asked about platelet count and RN stated that we would see what it was at her next lab draw to decide if we were moving forward with treatment or delaying.  RN also let pt know that there was the chance the insurance was going to deny it and that we might have to do peer to peer and that might require the date being moved.  Pt v/u.

## 2022-03-30 NOTE — TELEPHONE ENCOUNTER
I called Ms. Chung to let her know the tumor was still too large for my partner to do an interstitial implant.  I offered to give her additional external beam radiation.  She declines at this time but will call me if she changes her mind.  She still has issues with her legs and said she now has hydronephrosis.  We will be glad to be of service to Ms. Chung if she would like to return for further external beam radiation

## 2022-04-04 ENCOUNTER — HOSPITAL ENCOUNTER (OUTPATIENT)
Dept: RADIATION ONCOLOGY | Facility: HOSPITAL | Age: 47
Setting detail: RADIATION/ONCOLOGY SERIES
Discharge: HOME | End: 2022-04-04

## 2022-04-04 ENCOUNTER — OFFICE VISIT (OUTPATIENT)
Dept: RADIATION ONCOLOGY | Facility: HOSPITAL | Age: 47
End: 2022-04-04

## 2022-04-04 DIAGNOSIS — Z51.81 ENCOUNTER FOR THERAPEUTIC DRUG MONITORING: ICD-10-CM

## 2022-04-04 DIAGNOSIS — C54.1 ENDOMETRIAL CANCER: ICD-10-CM

## 2022-04-04 DIAGNOSIS — G62.9 NEUROPATHY: ICD-10-CM

## 2022-04-04 DIAGNOSIS — Z79.899 ENCOUNTER FOR LONG-TERM (CURRENT) USE OF OTHER MEDICATIONS: Primary | ICD-10-CM

## 2022-04-04 DIAGNOSIS — C54.1 ENDOMETRIAL CANCER: Primary | ICD-10-CM

## 2022-04-04 RX ORDER — DEXAMETHASONE 4 MG/1
TABLET ORAL
Qty: 12 TABLET | Refills: 5 | Status: SHIPPED | OUTPATIENT
Start: 2022-04-04 | End: 2022-09-07

## 2022-04-04 RX ORDER — ONDANSETRON HYDROCHLORIDE 8 MG/1
8 TABLET, FILM COATED ORAL 3 TIMES DAILY PRN
Qty: 30 TABLET | Refills: 5 | Status: CANCELLED | OUTPATIENT
Start: 2022-04-05

## 2022-04-04 NOTE — PROGRESS NOTES
TELEMEDICINE FOLLOW UP NOTE    PATIENT:                                                      Jeana Pollack SAMI Chung  MEDICAL RECORD #:                        [id number]  :                                                          1975  COMPLETION DATE:   3/2/2022  DIAGNOSIS:     Endometrial cancer (HCC)  - Initial: FIGO Stage IIIA, calculated as Stage Unknown (pT3a, pNX, cM0)    This visit has been rescheduled as a phone visit to comply with patient safety concerns in accordance with CDC recommendations in light of the COVID-19 pandemic and at the request of the patient.  Total time of discussion was 16 minutes.  Verbal consent given.    COVID-19 RISK SCREEN     1. Has the patient had close contact or exposure without PPE with a lab confirmed COVID-19 (+) person or a person under investigation (PUI) for COVID-19 infection?  -- No     2. Has the patient had respiratory symptoms, worsened/new cough and/or SOA, unexplained fever, or sudden loss of smell and/or taste in the past week? --  No    3. Has the patient completed COVID vaccination? --  Yes       BRIEF HISTORY:  Jeana Chung is a 46 y.o. female who requests conversion of this visit to telemedicine, as she was unaware of today's in-person follow-up visit.  In early , she underwent exploratory laparotomy, REGINALDO, BSO, and omentectomy for advanced endometrial carcinoma, stage IIIA due to adnexal involvement.  In addition to chemotherapy, she was recommended to undergo whole pelvis radiotherapy and adjuvant vaginal brachytherapy.  She completed 6 cycles of chemotherapy in 2020, though never returned for radiotherapy and moved out of [facility] to [place] due to multiple personal problems.    More recently, the patient presents with bleeding soft tissue mass extending in the vaginal vault with biopsy consistent with known endometrioid carcinoma.  She underwent a course of palliative external beam radiotherapy to the pelvis, consisting of 45 Gy in 25  fractions.    She tolerated treatment fairly well, though had multiple other issues including cirrhosis of the liver, lower extremity edema, left lower extremity cellulitis, and abscess in the groin which were unrelated to her radiation treatments.    From a symptom standpoint, she remains quite fatigued and becomes dyspneic with exertional activities.  She reports occasional clear vaginal discharge, and otherwise denies vaginal bleeding.  She notes intermittent pelvic and lower abdominal pains.  She notes prn oxycodone helps.  She reports occasional nausea without vomiting, as well as irregular bowel movements with predominance of constipation managed with daily MiraLax.  She does report occasional diarrhea as well.  She notes poor appetite and aversion to meat.  She is trying to find other appetizing sources of protein.  Weight is stable around 180, but she notes this can vary related to fluctuations in peripheral edema and generalized anasarca.  She is on spironolactone and hydrochlorothiazide for this.  She notes increased urinary frequency and urgency, which she relates to diuretic use.  Dysuria present throughout radiation has nearly resolved at this point.  She does, however, note progressive straining upon urination and inability to empty bladder completely.  No hematuria.  She complains of peripheral neuropathy and continues gabapentin as well as pain management with palliative medicine.  She reports left groin and left lower extremity are no longer erythematous or warm.  She remains on doxycycline and continues to see Dr. Calloway weekly with [facility] wound care.  She is also followed by infectious disease.    She has evidence of possible progressive disease on most recent CT scan of pelvis and MRI of pelvis, and has had difficulty tolerating immunotherapy.  Levatinib was recently discontinued due to hyperbilirubinemia, thrombocytopenia, and elevated liver enzymes.  Keytruda is also on hold at present  due to toxicities and abnormal lab work.    She presents remotely for follow up and to discuss potential for additional palliative radiation therapy.        MEDICATIONS: Medication reconciliation for the patient was reviewed and confirmed in the electronic medical record.    Review of Systems   Constitutional: Positive for appetite change and fatigue.   Respiratory: Positive for shortness of breath.    Cardiovascular: Positive for leg swelling (BLE (L>R)).   Gastrointestinal: Positive for abdominal pain and constipation.   Genitourinary: Positive for difficulty urinating and frequency.    Musculoskeletal: Positive for back pain.   Psychiatric/Behavioral: The patient is nervous/anxious.    All other systems reviewed and are negative.             KPS 80%      Physical Exam  Pulmonary:      Respirations even, unlabored. No audible wheezing or cough.  Neurological:      A+Ox4, conversant, answers questions appropriately.  Psychiatric:     Judgement, affect, and decision-making WNL.    Limited physical exam as visit was conducted remotely via telephone in light of the COVID-19 pandemic.      IMAGING:  Narrative & Impression   DATE OF EXAM: 3/21/2022 1:28 PM     PROCEDURE: CT ABDOMEN PELVIS W CONTRAST-     INDICATIONS: endometrial cancer / bilateral extremity swelling;  C54.1-Malignant neoplasm of endometrium; R60.0-Localized edema     COMPARISON: 12/5/2021     TECHNIQUE: Routine transaxial slices were obtained through the abdomen  and pelvis after the intravenous administration of contrast.  Reconstructed coronal and sagittal images were also obtained. Automated  exposure control and iterative construction methods were used.     The radiation dose reduction device was turned on for each scan per the  ALARA (As Low as Reasonably Achievable) protocol.     FINDINGS:  Evaluation of the lung bases redemonstrates multiple pulmonary nodules,  including a 2.6 cm mass located medially near the right lung base with  adjacent  presumed postobstructive volume loss, concerning for metastatic  involvement. There is also a new small right pleural effusion.  Evaluation of the body wall soft tissues demonstrates diffuse anasarca.  Additionally, the previously noted ventral body wall hernia is increased  in size, now containing some free fluid and a segment of nonobstructed  colon. The liver demonstrates homogeneous attenuation without evidence  of suspicious focal lesion. The gallbladder is unremarkable. The spleen,  pancreas and bilateral adrenal glands demonstrate homogeneous  enhancement without evidence of new suspicious focal lesion. Unchanged  perisplenic and gastric varices. There is mild right-sided  hydronephrosis with delayed contrast excretion, new from comparison,  with ureteral dilatation and concern for possible adhesion/obstruction  above the UVJ adjacent to the pelvic mass. There is no new bulky  retroperitoneal or iliac adenopathy. Atherosclerotic nonaneurysmal  abdominal aorta. Moderate fecal loading of the colon is present. There  is otherwise no evidence of obstruction or suspicious bowel wall  thickening. Trace free fluid noted dependently and within the previously  described ventral body wall hernia. Evaluation of the pelvic viscera  redemonstrates ill-defined soft tissue and increased central fluid  attenuation involving the vaginal vault, however appearing somewhat  decreased in size currently measuring 5.5 x 4.2 cm, previously 7.3 x 5.6  cm. There is unchanged abutment and unclear invasion of the adjacent  sigmoid colon. No new pelvic nodularity otherwise.      IMPRESSION:  There is new mild right-sided hydronephrosis, with delayed contrast,  with likely obstruction at the level of the distal right ureter which  appears adherent to or obstructed by the previously described vaginal  vault/pelvic mass.     Evidence of potentially associated fluid overload with small right  pleural effusion and diffuse anasarca.      Findings possibly representing treatment response with regard to the  presumed recurrent pelvic lesion, with decreased size of fluid  containing circumferential soft tissue mass involving the vaginal cuff.     Redemonstrated and partially characterized pulmonary nodules concerning  for metastatic involvement.     Findings discussed with Dr. Rubio by Carlos Day via phone at  10:53 AM 3/22/2022     This report was finalized on 3/22/2022 10:54 AM by Carlos Day.       Narrative & Impression   DATE OF EXAM: 3/23/2022 5:33 PM     PROCEDURE: MRI PELVIS W WO CONTRAST-     INDICATIONS: Recurrent Endometrial Cancer; C54.1-Malignant neoplasm of  endometrium     COMPARISON: CT abdomen and pelvis 12/05/2021, MRI abdomen without  contrast with MRCP 02/25/2022, CT abdomen and pelvis with contrast  03/21/2022     TECHNIQUE: Routine magnetic resonance imaging was obtained of the pelvis  after the administration of 15 ml of MultiHance contrast intravenously.     FINDINGS:  Postsurgical changes noted related to prior hysterectomy. There is  redemonstration of an abnormal rounded, soft tissue mass arising from  the right vaginal cuff extending cranially which displaces the sigmoid  colon to the left.  The mass is T1 hypointense, T2 hyperintense  centrally and measures 5.6 x 4.9 x 5.3 cm. There is central  non-enhancement likely related to necrosis. The mass closely abuts the  serosal margin of adjacent sigmoid colon without residual fat plane can  concern for involvement of the right serosal margin of the sigmoid colon  (series 17, image 26). No bowel obstruction. Mass closely abuts the  posterior margin of the bladder without evidence of direct invasion.  Mass involves the right ureter which is adherent to the mass (series 12  image 55). Mild right hydronephrosis better assessed on the recent  abdominal CT with the kidney not within the field-of-view of this exam.      There is diffuse anasarca again noted. There is  no pelvic sidewall  adenopathy by size criteria. Small bilateral inguinal nodes appear  stable from March 2020 suggests benign etiology. Redemonstration of  diastases of the lower rectus musculature with bilateral fat-containing  ventral hernias along the lower ventral abdominal wall in the right  paracentral left paracentral region. Presacral edema without organized  fluid collection. No definite evidence of aggressive osseous lesion  within the pelvis.     IMPRESSION:  1. Redemonstration of 5.6 x 5.3 cm necrotic soft tissue mass at the  right vaginal cuff consistent with recurrent malignancy. Mass abuts the  serosal margin of sigmoid colon which is displaced to the left with  likely serosal involvement. There is involvement of the distal right  ureter which is adherent to the mass with mild right hydronephrosis  better assessed on recent abdominal CT. Mass contacts posterior margin  of the bladder without definite evidence of invasion.  2. Scattered small pelvic lymph nodes are below size criteria.  3. Diffuse anasarca and additional incidental findings above.     This report was finalized on 3/25/2022 10:32 AM by Joshua Huffman MD.            The following portions of the patient's history were reviewed and updated as appropriate: allergies, current medications, past family history, past medical history, past social history, past surgical history and problem list.         Diagnoses and all orders for this visit:    1. Endometrial cancer (HCC) (Primary)         IMPRESSION:  Jeana Chung is a 46 y.o. female with biopsy-proven recurrent endometrial cancer.  She is now 1 month out from completing a palliative course of external beam radiotherapy 45 Gy in 25 fractions to the recurrent tumor in the pelvis.  She tolerated treatment fairly well, in spite of multiple other medical issues independent of her radiation treatments.  Repeat imaging of the pelvis including CT scan 3/21/2022 and MRI 3/23/2022 redemonstrate the  patient's known necrotic vaginal vault/pelvic mass.  However, there is involvement of the distal right ureter which is adherent to or obstructed by the mass with new mild right-sided hydronephrosis.  Additionally, there is evidence of diffuse anasarca, right pleural effusion, and numerous pulmonary nodules within the base of lungs, all findings worrisome for progression/metastatic disease.  We discussed the impact of these findings as they relate to the patient's bowel and bladder symptoms, as well as in the context of treatment planning.  We discussed high-protein, high-calorie, high-fiber diet recommendations for weight and bowel management.  If she develops more prominent urinary obstructive symptoms, consider referral to urology for possible stent.  Recommend UA to also rule out UTI at upcoming appointments tomorrow as she is unable to come in today to provide a specimen.  We discussed the use of a vaginal dilator following pelvic radiation.  A vaginal dilator and printed instructions for use have been mailed to the patient.  Review of prior telephone conversation between Dr. Coyle and the patient outlines inability to offer the patient an interstitial implant as boost to the gross disease at this time, as the tumor is still too large.  The patient previously declined additional external beam radiation therapy, which would potentially be 5 additional fractions to further palliate symptoms, prevent obstruction, and provide some additional local tumor control.  We discussed potential risks and benefits of additional palliative pelvic radiation, similar to prior.  She remains uninterested in additional radiation and she was instructed to notify clinic if she changes her mind.  Ultimately, the patient is hopeful to begin new chemotherapy and targeted therapy regimen if labs improve since she has been on a short break from systemic therapy.  She sees Dr. Rubio on 4/6/2022.        RECOMMENDATIONS:  Jeana Landa  continues cancer management in the gynecologic oncology clinic, under the care of Dr. Rubio.  Additionally, she is followed by several other specialties, including palliative medicine, GI, wound care, and infectious disease.  If the patient reconsiders radiation, or should she have a good response to new systemic therapy regimen, we would be happy to see the patient back in our clinic for consideration of additional palliative treatment.      I spent a total of 35 minutes on today's visit, with more than 16 minutes in virtual communication with the patient via telephone, and the remainder of the time spent in reviewing the relevant history, records, available imaging, and for documentation.      Return if symptoms worsen or fail to improve, for Office Visit.    Marivel Cornelius, APRN

## 2022-04-05 ENCOUNTER — HOSPITAL ENCOUNTER (OUTPATIENT)
Dept: ONCOLOGY | Facility: HOSPITAL | Age: 47
Setting detail: INFUSION SERIES
Discharge: HOME | End: 2022-04-05

## 2022-04-05 ENCOUNTER — TELEPHONE (OUTPATIENT)
Dept: GYNECOLOGIC ONCOLOGY | Facility: CLINIC | Age: 47
End: 2022-04-05

## 2022-04-05 DIAGNOSIS — C54.1 ENDOMETRIAL CANCER: ICD-10-CM

## 2022-04-05 DIAGNOSIS — Z79.899 ENCOUNTER FOR LONG-TERM (CURRENT) USE OF OTHER MEDICATIONS: Primary | ICD-10-CM

## 2022-04-05 DIAGNOSIS — Z51.81 ENCOUNTER FOR THERAPEUTIC DRUG MONITORING: ICD-10-CM

## 2022-04-05 DIAGNOSIS — G62.9 NEUROPATHY: ICD-10-CM

## 2022-04-05 RX ORDER — SODIUM, POTASSIUM,MAG SULFATES 17.5-3.13G
SOLUTION, RECONSTITUTED, ORAL ORAL
Qty: 354 ML | Refills: 0 | Status: SHIPPED | OUTPATIENT
Start: 2022-04-05 | End: 2022-05-25

## 2022-04-05 NOTE — TELEPHONE ENCOUNTER
Pt called stating that she has been vomiting this morning and is not feeling well at all.  Pt states that she will not be able to make her palliative or lab appt today and when asked if she feels like we should push her infusion appt out a week pt agreed.  RN counseled on staying hydrated and if the potential stomach virus became worse to let us know.  We will push out pt to next week.  Pt v/u.

## 2022-04-06 ENCOUNTER — APPOINTMENT (OUTPATIENT)
Dept: ONCOLOGY | Facility: HOSPITAL | Age: 47
End: 2022-04-06

## 2022-04-09 ASSESSMENT — TONOMETRY
OD_IOP_MMHG: 16
OS_IOP_MMHG: 14

## 2022-04-09 ASSESSMENT — VISUAL ACUITY
OS_SC: 20/400
OD_SC: 20/400
OS_CC: 20/200
OD_CC: CF2'
OU_CC: 20/100-1

## 2022-04-11 ENCOUNTER — APPOINTMENT (OUTPATIENT)
Dept: PREADMISSION TESTING | Facility: HOSPITAL | Age: 47
End: 2022-04-11

## 2022-04-11 DIAGNOSIS — Z01.812 ENCOUNTER FOR PREPROCEDURE SCREENING LABORATORY TESTING FOR COVID-19: Primary | ICD-10-CM

## 2022-04-11 DIAGNOSIS — Z20.822 ENCOUNTER FOR PREPROCEDURE SCREENING LABORATORY TESTING FOR COVID-19: Primary | ICD-10-CM

## 2022-04-12 NOTE — PROGRESS NOTES
Jeana Chung RN  [id number]  1975    Reason for visit:  Recurrent endometrial cancer, consideration of ongoing treatment     History of present illness:  The patient is a 46 y.o. year old female who presents today for treatment and evaluation of the above issues.     Patient was diagnosed with biopsy-proven recurrent endometrial cancer. Attempted Keytruda however pt showed imaging findings concerning for progression, discontinued Lenvima due to thrombocytopenia, transaminitis and elevated bilirubin.  Her clinical course has been complicated due to thrombocytopenia (could not undergo Port-A-Cath insertion) and COVID infection 2021.Will plan to start Carboplatin/Docetaxol/Bevacizumab for treatment once labs stabilize      Today patient reports feeling unwell. States that she has had several panic attacks lately and overall does not feel like she is okay. Thinks it is more due to mental health but does not significant pain in her back, right lower abdomen, and pelvic pain. Feels like her neuropathy has worsened and reports difficulty walking at times due to numbness/tingling of bilateral feet. Feels like neuropathy of hands is stable, able to do regular activities. Does note shortness of breath and chest tightening.   In regards to her leg, continues to follow with wound care in [facility]. Has finished course of doxycycline and recently has seen surgeon who debrided wound last week. Feels like she is healing well.     Would like to know plan of care, no other concerns.     OBGYN History:  She is a .  She does not use HRT. She has never had a pap smear.    Oncologic History:  Oncology/Hematology History   Endometrial cancer (HCC)   2019 Imaging    Presented to ED in [place] due to progressively heavy vaginal bleeding and severe back pain. Ultrasound revealed uterine and cervical masses.     2020 Biopsy    ED follow-up with gynecologist in [place], told she likely has cervical cancer.  Insufficient tissue on attempted cervical biopsy. Patient moved to [place], [place] to be closer to mother and sister for work-up and treatment.      1/21/2020 Imaging    Gyn evaluation at [facility]. Repeat TVUS showed cervical mass, right adnexal mass, and large uterine fibroid vs mass. Referred to Gyn Oncology     1/23/2020 Initial Diagnosis    Endometrial cancer (CMS/HCC)  Cervical biopsy positive for infiltrating endometrioid adenocarcinoma     1/30/2020 Imaging    CT chest, abdomen, pelvis showed enlarged uterus with multiple masses and 4 cm cystic/solid mass at right ovary. No metastatic disease noted in abdomen or chest.     2/10/2020 Surgery    Exploratory laparotomy, total abdominal hysterectomy, bilateral salpingo-oophorectomy, with optimal debulking (R=0), and omentectomy.    Pelvic washing cytology positive for malignant cells, consistent with adenocarcinoma. Final pathology showed large grade 3 endometrioid tumor with lymphvascular invasion at the uterus. Cervical stromal involvement, vaginal margin negative. Right tube and ovary involved. Left tube and ovary negative, omentum negative. MSI normal. [id number], Stage IIIA grade 3       3/9/2020 - 7/8/2020 Chemotherapy    OP UTERINE PACLitaxel / CARBOplatin (Q21D)  Cycle #1 complicated by pancytopenia, appendicitis, appendectomy, appendiceal abscess  Cycle #2 remarkable for thrombocytopenia, worsening neuropathy.  Dose modification with cycle #3.       3/17/2020 - 3/23/2020 Other Event    Hospital admission with acute appendicitis     5/21/2020 - 5/21/2020 Chemotherapy    OP CENTRAL VENOUS ACCESS DEVICE ACCESS, CARE, AND MAINTENANCE (CVAD)     7/8/2020 - 7/28/2020 Chemotherapy    OP OVARIAN DOCEtaxel / CARBOplatin     12/5/2021 Progression    Complains of vaginal bleeding.  CT scan chest abdomen and pelvis:  IMPRESSION:  Abnormal soft tissue mass seen at the vaginal vault with likely  extension to involve the sigmoid colon adjacent to the mass on the  left.  Multiple new lung lesions including 2.5 cm medial right lower lobe nodule, 7 mm right peripheral lung nodule, to left small lung nodules measuring 1.2 cm together and smaller other lung nodules concerning for metastatic disease.  Office biopsy of vaginal mass performed 12/6/2021.  Final Diagnosis   VAGINA, BIOPSY:               Compatible with known endometrioid carcinoma.               See comment.          12/13/2021 - 3/16/2022 Chemotherapy    Course complicated by COVID infection diagnosed 12/20/2021.  Thrombocytopenia, unknown etiology.  OP ENDOMETRIAL Lenvatinib / Pembrolizumab 200 mg     1/25/2022 - 3/2/2022 Radiation    Radiation OncologyTreatment Course:  Jeana Jaki Chung RN received 4500 cGy in 25 fractions to pelvis via External Beam Radiation - EBRT.     4/6/2022 -  Chemotherapy    OP OVARIAN DOCEtaxel / CARBOplatin AUC=6 / Bevacizumab 15 mg/kg           Past Medical History:   Diagnosis Date   • Anemia    • Anxiety and depression    • Back pain    • Bronchitis    • Diabetes (HCC)     DX 4-5 YRS AGO, CHECKS BS 4X/DAY, LAST A1C 7.1%   • Endometrial cancer (HCC)     STAGE II   • GERD (gastroesophageal reflux disease)    • History of radiation therapy 03/02/2022    pelvic mass   • Hypertension    • IBS (irritable bowel syndrome)    • MRSA (methicillin resistant staph aureus) culture positive 2018    S/P NECROTIZING FASCITIS IN BILATERAL FEET   • Necrotizing fasciitis (HCC)    • PCOS (polycystic ovarian syndrome)    • PTSD (post-traumatic stress disorder)    • Retinopathy 03/18/2020   • Sepsis (HCC)    • Stage 3 chronic kidney disease (HCC) 02/24/2020   • Subconjunctival hemorrhage        Past Surgical History:   Procedure Laterality Date   • APPENDECTOMY N/A 3/21/2020    Procedure: APPENDECTOMY LAPAROSCOPIC;  Surgeon: Praful Myers MD;  Location: [facility];  Service: General;  Laterality: N/A;   • EXPLORATORY LAPAROTOMY, TOTAL ABDOMINAL HYSTERECTOMY SALPINGO OOPHORECTOMY N/A 2/10/2020     Procedure: EXPLORATORY LAPAROTOMY, TOTAL ABDOMINAL HYSTERECTOMY, BILATERAL SALPINGO-OOPHORECTOMY WITH OPTIMAL  STAGING (R-0), OMENTECTOMY;  Surgeon: Celine Rubio MD;  Location: [facility];  Service: Gynecology Oncology;  Laterality: N/A;   • EYE SURGERY Left     BLOOD VESSEL IN EYE REPAIR   • TOE AMPUTATION Left 2019    big toe - unhealing sore   • TOE AMPUTATION Right     big toe and second toe - Necrotizing fasciitis and MRSA        MEDICATIONS: The current medication list was reviewed with the patient and updated in the EMR this date per the Medical Assistant. Medication dosages and frequencies were confirmed to be accurate.      Allergies:  is allergic to bactrim [sulfamethoxazole-trimethoprim], promethazine, and penicillins.    Social History:   Social History     Socioeconomic History   • Marital status:    • Number of children: 1   Tobacco Use   • Smoking status: Former Smoker     Types: Cigarettes     Quit date:      Years since quittin.2   • Smokeless tobacco: Never Used   • Tobacco comment: social MAYBE 1 CIG/DAY   Vaping Use   • Vaping Use: Never used   Substance and Sexual Activity   • Alcohol use: Not Currently   • Drug use: Never   • Sexual activity: Not Currently       Family History:    Family History   Problem Relation Age of Onset   • Fibroids Mother    • Diabetes type II Mother    • Hypertension Mother    • Heart attack Mother    • Fibroids Sister         PCOS   • Diabetes type II Sister    • Dementia Maternal Grandmother    • Stroke Maternal Grandmother        Health Maintenance:    Health Maintenance   Topic Date Due   • COLORECTAL CANCER SCREENING  Never done   • Hepatitis B (1 of 3 - Risk 3-dose series) Never done   • TDAP/TD VACCINES (1 - Tdap) Never done   • ANNUAL WELLNESS VISIT  Never done   • MAMMOGRAM  Never done   • Pneumococcal Vaccine 0-64 (2 - PCV) 2021   • DIABETIC FOOT EXAM  2021   • DIABETIC EYE EXAM  2021   • COVID-19 Vaccine (2 -  "Smiley risk 3-dose series) 04/17/2021   • URINE MICROALBUMIN  07/24/2021   • INFLUENZA VACCINE  08/01/2022   • HEMOGLOBIN A1C  08/10/2022   • HEPATITIS C SCREENING  Completed   • PAP SMEAR  Discontinued       Objective:  Review of Systems   Constitutional: Positive for activity change, appetite change and fatigue. Negative for chills and fever.   Respiratory: Positive for chest tightness, shortness of breath and wheezing.    Cardiovascular: Positive for chest pain. Negative for palpitations.   Gastrointestinal: Positive for abdominal distention, abdominal pain and constipation. Negative for diarrhea, nausea and vomiting.   Genitourinary: Positive for pelvic pain. Negative for difficulty urinating, hematuria, vaginal bleeding, vaginal discharge and vaginal pain.   Musculoskeletal: Positive for arthralgias, back pain and myalgias.   Skin: Positive for wound. Negative for rash.   Neurological: Positive for weakness and numbness. Negative for dizziness and light-headedness.   Psychiatric/Behavioral: Negative for agitation, behavioral problems and confusion. The patient is nervous/anxious.          Vitals:    04/13/22 1117   BP: 156/70   Pulse: 96   Resp: 18   Temp: 97.1 °F (36.2 °C)   TempSrc: Infrared   SpO2: 98%  Comment: RA   Weight: 77.1 kg (170 lb)   Height: 157.5 cm (62\")   PainSc:   3       Body mass index is 31.09 kg/m².    Wt Readings from Last 3 Encounters:   04/13/22 77.1 kg (170 lb)   04/13/22 77.4 kg (170 lb 9.6 oz)   03/28/22 83 kg (183 lb)       GENERAL: Alert,  female in moderate distress, tearful   HEENT:  Head normocephalic, atraumatic. Mucus membranes moist.   CARDIOVASCULAR: Heart regular rate,rhythm  improved bilateral lower extremity tova  RESPIRATORY: Normal effort  GASTROINTESTINAL: Soft, no tenderness to palpation, healed vertical midline incision, no HSM, no masses   PSYCHIATRIC: AO x3, with appropriate affect, normal thought processes.   NEUROLOGIC: No focal deficits. Moves extremities " well.  MUSCULOSKELETAL: Not using cane today, good mobility  EXTREMITIES: right ankle wrapped      PELVIC exam: deferred    ECOG PS 2    PROCEDURES: None    Diagnostic Data:    MR Abdomen without contrast: 2/25/2022  IMPRESSION:  1. Normal caliber common bile duct. No choledocholithiasis or obstructive mass. No intrahepatic biliary ductal dilatation.  2. Mild periportal edema is nonspecific but can be seen in the setting of hepatitis, correlate with clinical / laboratory findings.  3. Splenomegaly and splenorenal shunt again noted.  4. Anasarca with small upper abdominal ascites and small right-sided pleural effusion.  5. Right lower lobe 2.3 cm pulmonary nodule which again may relate to pulmonary metastatic disease, given history of endometrial cancer, better assessed on prior chest CT           Lab Results   Component Value Date    WBC 5.80 04/13/2022    HGB 11.6 (L) 04/13/2022    HCT 37.0 04/13/2022    MCV 99.9 (H) 04/13/2022    PLT 61 (L) 04/13/2022    NEUTROABS 5.40 04/13/2022    GLUCOSE 446 (C) 04/13/2022    BUN 15 04/13/2022    CREATININE 1.00 04/13/2022    EGFRIFNONA 79 02/22/2022     (L) 04/13/2022    K 4.3 04/13/2022    CL 99 04/13/2022    CO2 25.0 04/13/2022    MG 1.7 02/11/2022    PHOS 2.5 03/20/2020    CALCIUM 9.2 04/13/2022    ALBUMIN 3.20 (L) 04/13/2022    AST 28 04/13/2022    ALT 12 04/13/2022    BILITOT 3.2 (H) 04/13/2022     Lab Results   Component Value Date     8.5 07/08/2020         Assessment/Plan   This is a 46 y.o. woman with recurrent stage IIIA endometrial cancer presenting for discussion regarding treatment     Encounter Diagnoses   Name Primary?   • Endometrial cancer (HCC) Yes   • Acute stress reaction with predominately emotional disturbance    • Thrombocytopenia (HCC)    • Elevated bilirubin    • Neuropathy    • Wound healing, delayed      Endometrial cancer: Stage IIIa due to adnexal involvement, now with recurrence  - CT abdomen/pelvis 3/22/22 with new right  hydronephrosis, right pleural effusion, pulmonary nodules and diffuse anasarca. Findings indicative of likely progression  - Discontinued Keytruda due to concern for progression. Lenvima discontinued due to increases in bilirubin and liver function each time she tries to reinitiate despite dose reduction   - Options for treatment limited by patient's severe neuropathy as well as current thrombocytopenia. Plan to start Carboplatin/Docetaxol/Bevacizumab for treatment once labs stabilize   - As platelets continue to be low, will plan to treat with letrozole in the interim. Patient agreeable to this plan.  - Will continue to trend labs, once platelet count > 100 can consider Carboplatin/Docetaxol/Bevacizumab for treatment.    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 Counseled patient that that is okay and to focus on improving quality of life by doing meaningful activities with loved ones. Planning a trip to [place] with family for her birthday.  -continue to monitor mood      Complex social issues  - Staying with her family in [place] currently     Cellulitis left lower extremity, swelling bilateral lower extremities  - status post debridement, followed by wound care MD     COVID-19.   - She received monoclonal antibodies when she had COVID-Paul in 12/2021.     Pain assessment was performed today as a part of patient’s  care.  For patients with pain related to surgery, gynecologic malignancy or cancer treatment, the plan is as noted in the assessment/plan.  For patients with pain not related to these issues, they are to seek any further needed care from a more appropriate provider, such as PCP.      No orders of the defined types were placed in this encounter.    FOLLOW UP: for re-evaluation in 1 month     Trinidad Rocha M.D  Obstetrics & Gynecology, PGY3  I saw and evaluated the patient. I agree with the findings and the plan of care as documented in the note.    Celine Rubio MD  04/13/22  15:05 EDT

## 2022-04-13 ENCOUNTER — OFFICE VISIT (OUTPATIENT)
Dept: GYNECOLOGIC ONCOLOGY | Facility: CLINIC | Age: 47
End: 2022-04-13

## 2022-04-13 ENCOUNTER — OFFICE VISIT (OUTPATIENT)
Dept: PALLIATIVE CARE | Facility: CLINIC | Age: 47
End: 2022-04-13

## 2022-04-13 ENCOUNTER — HOSPITAL ENCOUNTER (OUTPATIENT)
Dept: ONCOLOGY | Facility: HOSPITAL | Age: 47
Setting detail: INFUSION SERIES
Discharge: HOME | End: 2022-04-13

## 2022-04-13 ENCOUNTER — EDUCATION (OUTPATIENT)
Dept: ONCOLOGY | Facility: HOSPITAL | Age: 47
End: 2022-04-13

## 2022-04-13 ENCOUNTER — HOSPITAL ENCOUNTER (OUTPATIENT)
Dept: ONCOLOGY | Facility: HOSPITAL | Age: 47
Discharge: HOME | End: 2022-04-13

## 2022-04-13 VITALS
SYSTOLIC BLOOD PRESSURE: 156 MMHG | HEART RATE: 96 BPM | DIASTOLIC BLOOD PRESSURE: 70 MMHG | BODY MASS INDEX: 31.2 KG/M2 | TEMPERATURE: 97.1 F | OXYGEN SATURATION: 98 % | WEIGHT: 170.6 LBS

## 2022-04-13 VITALS
SYSTOLIC BLOOD PRESSURE: 156 MMHG | TEMPERATURE: 97.1 F | HEIGHT: 62 IN | DIASTOLIC BLOOD PRESSURE: 70 MMHG | OXYGEN SATURATION: 98 % | WEIGHT: 170 LBS | BODY MASS INDEX: 31.28 KG/M2 | HEART RATE: 96 BPM | RESPIRATION RATE: 18 BRPM

## 2022-04-13 DIAGNOSIS — G62.9 NEUROPATHY: ICD-10-CM

## 2022-04-13 DIAGNOSIS — T14.8XXD WOUND HEALING, DELAYED: ICD-10-CM

## 2022-04-13 DIAGNOSIS — Z79.899 ENCOUNTER FOR LONG-TERM (CURRENT) USE OF OTHER MEDICATIONS: ICD-10-CM

## 2022-04-13 DIAGNOSIS — F43.0 ACUTE STRESS REACTION WITH PREDOMINATELY EMOTIONAL DISTURBANCE: ICD-10-CM

## 2022-04-13 DIAGNOSIS — R17 ELEVATED BILIRUBIN: ICD-10-CM

## 2022-04-13 DIAGNOSIS — C54.1 ENDOMETRIAL CANCER: ICD-10-CM

## 2022-04-13 DIAGNOSIS — G89.3 CANCER RELATED PAIN: Primary | ICD-10-CM

## 2022-04-13 DIAGNOSIS — C54.1 ENDOMETRIAL CANCER: Primary | ICD-10-CM

## 2022-04-13 DIAGNOSIS — Z51.81 ENCOUNTER FOR THERAPEUTIC DRUG MONITORING: ICD-10-CM

## 2022-04-13 DIAGNOSIS — F41.9 ANXIETY: ICD-10-CM

## 2022-04-13 DIAGNOSIS — D69.6 THROMBOCYTOPENIA: ICD-10-CM

## 2022-04-13 DIAGNOSIS — G89.3 CANCER RELATED PAIN: ICD-10-CM

## 2022-04-13 LAB
ALBUMIN SERPL-MCNC: 3.2 G/DL (ref 3.5–5.2)
ALBUMIN/GLOB SERPL: 0.9 G/DL
ALP SERPL-CCNC: 119 U/L (ref 39–117)
ALT SERPL W P-5'-P-CCNC: 12 U/L (ref 1–33)
ANION GAP SERPL CALCULATED.3IONS-SCNC: 11 MMOL/L (ref 5–15)
AST SERPL-CCNC: 28 U/L (ref 1–32)
BILIRUB SERPL-MCNC: 3.2 MG/DL (ref 0–1.2)
BILIRUB UR QL STRIP: NEGATIVE
BUN SERPL-MCNC: 15 MG/DL (ref 6–20)
BUN/CREAT SERPL: 15 (ref 7–25)
CALCIUM SPEC-SCNC: 9.2 MG/DL (ref 8.6–10.5)
CANCER AG125 SERPL QL: 58 U/ML (ref 0–38.1)
CHLORIDE SERPL-SCNC: 99 MMOL/L (ref 98–107)
CLARITY UR: CLEAR
CO2 SERPL-SCNC: 25 MMOL/L (ref 22–29)
COLOR UR: YELLOW
CREAT SERPL-MCNC: 1 MG/DL (ref 0.57–1)
EGFRCR SERPLBLD CKD-EPI 2021: 69.6 ML/MIN/1.73
ERYTHROCYTE [DISTWIDTH] IN BLOOD BY AUTOMATED COUNT: 14.8 % (ref 12.3–15.4)
GLOBULIN UR ELPH-MCNC: 3.4 GM/DL
GLUCOSE SERPL-MCNC: 446 MG/DL (ref 65–99)
GLUCOSE UR STRIP-MCNC: ABNORMAL MG/DL
HCT VFR BLD AUTO: 37 % (ref 34–46.6)
HGB BLD-MCNC: 11.6 G/DL (ref 12–15.9)
HGB UR QL STRIP.AUTO: NEGATIVE
KETONES UR QL STRIP: NEGATIVE
LEUKOCYTE ESTERASE UR QL STRIP.AUTO: NEGATIVE
LYMPHOCYTES # BLD AUTO: 0.3 10*3/MM3 (ref 0.7–3.1)
LYMPHOCYTES NFR BLD AUTO: 5.8 % (ref 19.6–45.3)
MCH RBC QN AUTO: 31.3 PG (ref 26.6–33)
MCHC RBC AUTO-ENTMCNC: 31.3 G/DL (ref 31.5–35.7)
MCV RBC AUTO: 99.9 FL (ref 79–97)
MONOCYTES # BLD AUTO: 0.1 10*3/MM3 (ref 0.1–0.9)
MONOCYTES NFR BLD AUTO: 1.3 % (ref 5–12)
NEUTROPHILS NFR BLD AUTO: 5.4 10*3/MM3 (ref 1.7–7)
NEUTROPHILS NFR BLD AUTO: 92.9 % (ref 42.7–76)
NITRITE UR QL STRIP: NEGATIVE
PH UR STRIP.AUTO: 6.5 [PH] (ref 5–8)
PLATELET # BLD AUTO: 61 10*3/MM3 (ref 140–450)
PMV BLD AUTO: 7.8 FL (ref 6–12)
POTASSIUM SERPL-SCNC: 4.3 MMOL/L (ref 3.5–5.2)
PROT SERPL-MCNC: 6.6 G/DL (ref 6–8.5)
PROT UR QL STRIP: NEGATIVE
RBC # BLD AUTO: 3.7 10*6/MM3 (ref 3.77–5.28)
SODIUM SERPL-SCNC: 135 MMOL/L (ref 136–145)
SP GR UR STRIP: 1.01 (ref 1–1.03)
UROBILINOGEN UR QL STRIP: ABNORMAL
WBC NRBC COR # BLD: 5.8 10*3/MM3 (ref 3.4–10.8)

## 2022-04-13 PROCEDURE — G0463 HOSPITAL OUTPT CLINIC VISIT: HCPCS

## 2022-04-13 PROCEDURE — 80053 COMPREHEN METABOLIC PANEL: CPT | Performed by: OBSTETRICS & GYNECOLOGY

## 2022-04-13 PROCEDURE — 99215 OFFICE O/P EST HI 40 MIN: CPT | Performed by: PHYSICIAN ASSISTANT

## 2022-04-13 PROCEDURE — 86304 IMMUNOASSAY TUMOR CA 125: CPT | Performed by: OBSTETRICS & GYNECOLOGY

## 2022-04-13 PROCEDURE — 85025 COMPLETE CBC W/AUTO DIFF WBC: CPT | Performed by: OBSTETRICS & GYNECOLOGY

## 2022-04-13 PROCEDURE — 99214 OFFICE O/P EST MOD 30 MIN: CPT | Performed by: OBSTETRICS & GYNECOLOGY

## 2022-04-13 PROCEDURE — 81003 URINALYSIS AUTO W/O SCOPE: CPT | Performed by: OBSTETRICS & GYNECOLOGY

## 2022-04-13 RX ORDER — OXYCODONE HYDROCHLORIDE 5 MG/1
5 TABLET ORAL EVERY 6 HOURS PRN
Refills: 0 | Status: CANCELLED | OUTPATIENT
Start: 2022-04-13

## 2022-04-13 RX ORDER — LETROZOLE 2.5 MG/1
2.5 TABLET, FILM COATED ORAL DAILY
Qty: 30 TABLET | Refills: 3 | Status: SHIPPED | OUTPATIENT
Start: 2022-04-13

## 2022-04-13 RX ORDER — LORAZEPAM 0.5 MG/1
0.5 TABLET ORAL DAILY PRN
Qty: 30 TABLET | Refills: 0 | Status: SHIPPED | OUTPATIENT
Start: 2022-04-13 | End: 2022-05-25 | Stop reason: SDUPTHER

## 2022-04-13 RX ORDER — OXYCODONE HYDROCHLORIDE 5 MG/1
5 TABLET ORAL EVERY 4 HOURS PRN
Qty: 180 TABLET | Refills: 0 | Status: SHIPPED | OUTPATIENT
Start: 2022-04-16 | End: 2022-05-18 | Stop reason: SDUPTHER

## 2022-04-13 NOTE — TELEPHONE ENCOUNTER
Mal # [id number] and pill counts appropriate. Refill for oxycodone 5 mg tablets sent to [facility]. The patient will follow up in 1 month.

## 2022-04-13 NOTE — PROGRESS NOTES
Palliative Clinic Note      Name: Jeana Chung RN  Age: 46 y.o.  Sex: female  : 1975  MRN: [id number]  Date of Service: 22  Medical Oncologist: Dr. Rubio    Subjective:    Chief Complaint: Anxiety, panic attacks    History of Present Illness: Jeana Jaki Chung RN is a 46 y.o. female with past medical history significant for HTN, KIKA, T2DM, CKD stage III, cirrhosis, endometrial cancer, mood disorder who presents to the palliative clinic today as a follow up for pain and symptom management.     Treatment summary: Patient initially diagnosed with endometrial cancer in 2020. She underwent an exploratory laparotomy, total abdominal hysterectomy, bilateral salpingo-oophorectomy with optimal debulking on 2/10/20. She completed 3 cycles of chemotherapy in 2020. Progression found in 2021 and she restarted chemotherapy in 2021 with Keytruda and Lenvima. Treatment complicated by recent COVID-19 infection and thrombocytopenia. Repeat imaging concerning for progression. Will try Carboplatin/Docetaxol/Bevacizumab once patient's platelets normalize. Patient uninterested in additional palliative radiation.      Symptoms: The patient complains of increased anxiety today. She reports having several panic attacks over the last week. She took Lorazepam 1 mg tablet and felt better but had prolonged drowsiness/sedation the following day after taking it. She does not take any other mood stabilizers. The patient states she has tried several SSRIs and SNRIs in the past but they made her feel like a zombie. The patient does not know of any specific triggers that could be causing these panic attacks. She admits to increased family stress. [place] if [place] will happen. The patient reports pain in her LLE secondary to an open wound and pain in her pelvis related to cancer that occasionally radiates into her back. She alsohas neuropathy in her  feet and hands. She is currently on Gabapentin 600 mg three times a day as well as oxycodone 5 mg PRN. She shares that she is hesitant to take the oxycodone due to affects on her balance. She currently uses a cane for assistance. The patient usually takes it at night to try and get enough relief to fall asleep. She also uses a heating pad. Minimal nausea. Appetite has decreased some. She tries to eat protein when she can. Her weight is up and down due to peripheral edema related to her cirrhosis. She follows with a specialist at [facility].Patient has a wound on her LLE that is followed by wound care at [facility].     Pyschosocial: The patient is currently living with her mom and dad. She is  and her  lives in [place]. She moved back to [place] for support and better medical care. No children. She is a retired nurse. The patient enjoys crafting and laying out by the pool. She endorses a strong support system and great friends. The patient admits to increased anxiety and depression as detailed above. She has met with behavioral health therapist, Angelica Nolen in the past.      Spiritual: Spiritual rather than Cheondoism.     Goals: Travel while she feels up to it.     The following portions of the patient's history were reviewed and updated as appropriate: allergies, current medications, past family history, past medical history, past social history, past surgical history and problem list.    ORT-R: Low risk  Decisional capacity: Full  ECOG: (2) Ambulatory and capable of self care, unable to carry out work activity, up and about > 50% or waking hours   Palliative Performance Scale Score: 70%     Objective:    /70   Pulse 96   Temp 97.1 °F (36.2 °C)   Wt 77.4 kg (170 lb 9.6 oz)   SpO2 98%   BMI 31.20 kg/m²     Constitutional: Awake, alert, sitting up in the exam chair, abnormal gait  Eyes: PERRLA, EOMS intact  HENT: NCAT, face symmetric  Neck: Supple, trachea  midline  Respiratory: Clear to auscultation bilaterally, increased work of breathing with ambulation  Cardiovascular: RRR, no murmurs appreciated  Gastrointestinal: Positive bowel sounds, soft, nontender, no guarding  Musculoskeletal: Bilateral ankle edema, moves all extremities   Psychiatric: Appropriate affect, cooperative, tearful  Neurologic: Oriented x 3, Cranial Nerves grossly intact to confrontation, speech clear  Skin: LLE wound wrapped up    Medication Counts: Reviewed. See bottom of note for details. Brought medication.  No overuse or misuse evident.  OLENA:  #[id number]. Reviewed. All providers are part of the care team.  UDS (Y): Last 3/15/22. Reviewed. Appropriate.     Assessment & Plan:    1. Endometrial cancer (HCC)  --Imaging concerning for progression. Will try Carboplatin/Docetaxol/Bevacizumab once patient's platelets normalize. Patient follows closely with Dr. Rubio.    2. Cancer related pain  --Continue current regimen of gabapentin 600 mg TID and Oxycodone 5 mg PRN. Future refill for the Oxycodone sent to her pharmacy today.     3. Anxiety  --JACOBO-7 score of 13 and PHQ-9 score of 11 today. Will decrease Ativan dose to 0.5 mg tablets in an effort to cut down on the sedation. Unable to tolerate SSRIs/SNRIs in the past. Will get patient scheduled with Angelica Nolen.     Code status: Full code  Medical interventions: Full  Advanced directives: Patient interested    Return in about 1 month (around 5/13/2022), or Telephone.    I spent 40 minutes caring for Jeana Chung RN on this date of service. This time includes time spent by me in the following activities: preparing for the visit, reviewing tests, obtaining and/or reviewing a separately obtained history, performing a medically appropriate examination and/or evaluation , counseling and educating the patient/family/caregiver, ordering medications, tests, or procedures, documenting information in the medical record, independently interpreting  results and communicating that information with the patient/family/caregiver, and care coordination    Enedina Guillen PA-C  04/13/2022    Medication Date Filled # Filled Count Used # Days  TAHIR   Oxycodone 5 3/15/22 168 25 143 29 4-5   Gabapentin 600 3/20/22 90 80 10 NA 2-3

## 2022-04-13 NOTE — PATIENT INSTRUCTIONS
Continue oxycodone 5 mg as needed for break through pain. Continue Gabapentin 600 mg three times a day.   Scheduled to follow up in 1 month over the telephone  Always bring your medications prescribed by the clinic to every appointment. If telemedicine appointment, be prepared to give medication counts. This helps with managing your refill needs.   Call the Palliative Clinic for any questions or concerns at [phone] or reach out to us on [employer].  Please give us 2-3 business days in advance for refill requests. Be aware of additional insurance related delays for some medications.

## 2022-04-14 ENCOUNTER — TELEPHONE (OUTPATIENT)
Dept: GYNECOLOGIC ONCOLOGY | Facility: CLINIC | Age: 47
End: 2022-04-14

## 2022-04-14 NOTE — TELEPHONE ENCOUNTER
----- Message from Celine Rubio MD sent at 4/13/2022  3:47 PM EDT -----  Please notify patient of elevated CA-125.  This is all consistent with her findings on imaging (effusion, anasarca, volume overload) and I would not change our plan right now particularly as she is starting a new medication (letrozole), and has continued thrombocytopenia.  Follow-up in 1 month as scheduled.  Thanks      ----- Message -----  From: Lab, Background User  Sent: 4/13/2022  10:59 AM EDT  To: Celine Rubio MD

## 2022-05-04 ENCOUNTER — TELEPHONE (OUTPATIENT)
Dept: GYNECOLOGIC ONCOLOGY | Facility: CLINIC | Age: 47
End: 2022-05-04

## 2022-05-04 DIAGNOSIS — D69.59 CHEMOTHERAPY-INDUCED THROMBOCYTOPENIA: Primary | ICD-10-CM

## 2022-05-04 DIAGNOSIS — Z51.81 ENCOUNTER FOR THERAPEUTIC DRUG MONITORING: ICD-10-CM

## 2022-05-04 DIAGNOSIS — C56.9 MALIGNANT NEOPLASM OF OVARY, UNSPECIFIED LATERALITY: ICD-10-CM

## 2022-05-04 DIAGNOSIS — T45.1X5A CHEMOTHERAPY-INDUCED THROMBOCYTOPENIA: Primary | ICD-10-CM

## 2022-05-04 NOTE — TELEPHONE ENCOUNTER
Caller: Jeana Chung, RN    Relationship: Self    Best call back number: [phone]      What was the call regarding: PATIENT CALLED TO SEE IF SHE COULD HAVE LAB ORDER SENT TO WHERE SHE IS AND THEN DO A TELEHEALTH APPOINTMENT WITH DR REYNOSO    Do you require a callback: YES PLEASE CALL TO ADVISE, PATIENT WILL PROVIDE INFORMATION TO FACILITY FOR LABS

## 2022-05-05 ENCOUNTER — TELEPHONE (OUTPATIENT)
Dept: GYNECOLOGIC ONCOLOGY | Facility: CLINIC | Age: 47
End: 2022-05-05

## 2022-05-05 NOTE — TELEPHONE ENCOUNTER
Orders faxed. Informed patient that insurance wouldn't pay for video visit if she was out of. State. Appt moved.

## 2022-05-05 NOTE — TELEPHONE ENCOUNTER
Caller: Jeana Chung, RN    Relationship: Self    Best call back number: [phone]    What is the best time to reach you: ASAP    Who are you requesting to speak with (clinical staff, provider,  specific staff member):     Do you know the name of the person who called:     What was the call regarding: PT WANTS TO DO BLOOD WORK OUT OF STATE AND HAVE VIDEO VISIT    Do you require a callback: YES

## 2022-05-16 ENCOUNTER — OFFICE VISIT (OUTPATIENT)
Dept: PSYCHIATRY | Facility: CLINIC | Age: 47
End: 2022-05-16

## 2022-05-16 ENCOUNTER — OFFICE VISIT (OUTPATIENT)
Dept: PALLIATIVE CARE | Facility: CLINIC | Age: 47
End: 2022-05-16

## 2022-05-16 DIAGNOSIS — G89.3 CANCER RELATED PAIN: ICD-10-CM

## 2022-05-16 DIAGNOSIS — G62.9 NEUROPATHY: ICD-10-CM

## 2022-05-16 DIAGNOSIS — F41.9 ANXIETY: ICD-10-CM

## 2022-05-16 DIAGNOSIS — C54.1 ENDOMETRIAL CANCER: Primary | ICD-10-CM

## 2022-05-16 DIAGNOSIS — F33.1 MODERATE EPISODE OF RECURRENT MAJOR DEPRESSIVE DISORDER: Primary | ICD-10-CM

## 2022-05-16 PROCEDURE — 99443 PR PHYS/QHP TELEPHONE EVALUATION 21-30 MIN: CPT | Performed by: NURSE PRACTITIONER

## 2022-05-16 PROCEDURE — 99214 OFFICE O/P EST MOD 30 MIN: CPT | Performed by: PHYSICIAN ASSISTANT

## 2022-05-16 RX ORDER — GABAPENTIN 600 MG/1
600 TABLET ORAL 3 TIMES DAILY
Qty: 90 TABLET | Refills: 3 | Status: SHIPPED | OUTPATIENT
Start: 2022-05-16 | End: 2022-05-18

## 2022-05-16 NOTE — PROGRESS NOTES
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 Do you consent to use a telephone visit for your medical care today? Yes    Chief Complaint: MDD, recurrent mod    Therapy:  Start Time:140    Stop Time: 210    (30 ) minutes was spent for psychotherapy. Assisted patient in processing patient's depression Acknowledged and normalized patient's thoughts, feelings, and concerns. Utilized cognitive behavioral therapy to assist the patient in recognizing more appropriate coping mechanisms when she becomes agitated/anxious which are proven effective in reducing the severity of frequency of symptoms.     CLINICAL MANEUVERING/INTERVENTION:   Patient talked about current stressors, primarily having a difficult time dealing with metastatic cancer stage IV and treatment not helping control it. The patient reports depressive symptoms including depressed mood, crying spells, hypersomnia, decreased appetite, anhedonia, feelings of hopelessness, feelings of helplessness, feelings of worthlessness, low energy and difficulty concentrating, present on most days for the past 6 week(s)  and have caused impairment in important areas of functioning. Depression rated 5/10 with 10 being the worst.  She was offered hospice but trying to process it all. She is living in [place] with her  but stays in [place] with her parents when seeing Dr. Rubio her GYN/ONC surgeon. She states her  has been very supportive and caring . Venting of frustrations over cancer and wanting to stay with her  not her parents was conducted. Feelings were processed and validated, both negative and positive. Flushing out worries and concerns was conducted in order to diminish emotional tension. Processing the idea she is dying.  Ways in which patient may live in this  situation in a purposeful manner was discussed. Reviewed with pt what hospice does in supporting her goals.        Appearance: na  Hygiene: reports   good  Cooperation:  Cooperative  Eye Contact:  na  Psychomotor Behavior:  No psychomotor agitation/retardation, No EPS, No motor tics  Mood sad  Voice Affect:  Congruent   Hopelessness: yes  Speech:  Normal  Thought Process:  Linear  Thought Content:  Normal  Concentration: Normal   Suicidal:  None  Homicidal:  None  Hallucinations:  None  Delusion:  None  Memory:  Intact  Orientation:  Person, Place, Time and Situation  Reliability:  good  Insight:  Fair  Judgement: good  Impulse Control: good  Estimated Intelligence: average range      ASSESSMENT:  Depression over stage IV diagnosis and being treatment resistant   PATIENTS SUPPORT NETWORK INCLUDES: , parents  FUNCTIONAL STATUS: 70%  PROGNOSIS: FAIR WITH ONGOING TREATMENT    PLAN: pt looking at hospice and is living in [place] currently     Patient may continue therapy. Provide Cognitive Behavioral Therapy and Solution Focused Therapy to improve functioning, maintain stability, and avoid decompensation and the need for higher level of care. Instructed to call for appt as she would like or concerns and return early if necessary.      Return if symptoms worsen or fail to improve.

## 2022-05-16 NOTE — PROGRESS NOTES
Palliative Clinic Note      Name: Jeana Chung RN  Age: 47 y.o.  Sex: female  : 1975  MRN: [id number]  Date of Service: 22  Medical Oncologist: Dr. Rubio  Mode of visit: Telephone  Location of patient: Home    The patient has chosen to receive care through a telehealth visit.   Does the patient consent to using video/audio connection for their medical care today? Yes   No technical issues occurred during this visit.     Subjective:    Chief Complaint: Anxiety     History of Present Illness: Jeana Chung RN is a 47 y.o. female with past medical history significant for HTN, KIKA, T2DM, CKD stage III, cirrhosis, endometrial cancer, mood disorder who presents via telephone today as a follow up for pain and symptom management.     Treatment summary: Patient initially diagnosed with endometrial cancer in 2020. She underwent an exploratory laparotomy, total abdominal hysterectomy, bilateral salpingo-oophorectomy with optimal debulking on 2/10/20. She completed 3 cycles of chemotherapy in 2020. Progression found in 2021 and she restarted chemotherapy in 2021 with Keytruda and Lenvima. Treatment complicated by recent COVID-19 infection and thrombocytopenia. Repeat imaging concerning for progression. Will try Carboplatin/Docetaxol/Bevacizumab once patient's platelets normalize. Patient uninterested in additional palliative radiation.      Symptoms: The patient continues to complain of anxiety. She is currently in [place] visiting her  and friends but returns to [place] clinic this weekend.  She attributes some anxiety to having to return to [place] for treatment.  Patient's pain seems to be worse when her anxiety is elevated.  The patient reports pain in her LLE secondary to an open wound and pain in her pelvis related to cancer that occasionally radiates into her back. She also has neuropathy in her feet and hands. She is currently taking oxycodone 5 mg  tablets 3-4 times a day in addition to the gabapentin 600 mg 3 times a day. Her pain is worse at night. Patient uses cane to ambulate. No issues with nausea or vomiting.  Her appetite has improved while on vacation. Regular bowel movements. Her weight is up and down due to peripheral edema related to her cirrhosis. She follows with a specialist at [facility]. Patient has a wound on her LLE that is followed by wound care at [facility].    Pyschosocial: The patient is currently living with her mom and dad. She is  and her  lives in [place]. She moved back to [place] for support and better medical care. No children. She is a retired nurse. The patient enjoys crafting and laying out by the pool. She endorses a strong support system and great friends. The patient admits to increased anxiety and depression as detailed above. Patient complains of prolonged drowsiness with Lorazepam 1 mg tablet. She has tried several SSRIs and SNRIs in the past but they made her feel like a zombie.  She is scheduled to talk with behavioral health therapist, Angelica Nolen today.      Spiritual: Spiritual rather than Muslim.     Goals: Travel while she feels up to it.     The following portions of the patient's history were reviewed and updated as appropriate: allergies, current medications, past family history, past medical history, past social history, past surgical history and problem list.    ORT-R: Low risk  Decisional capacity: Full  ECOG: (2) Ambulatory and capable of self care, unable to carry out work activity, up and about > 50% or waking hours   Palliative Performance Scale Score: 70%     Objective:    Psychiatric: Appropriate affect, cooperative, tearful  Neurologic: Oriented x 3, Cranial Nerves grossly intact to confrontation, speech clear    Medication Counts: Collected over the phone. See bottom of note for details. No misuse or overuse evident.   OLENA:  #[id number]. Reviewed. All  providers are part of the care team.  UDS(Y): Last 3/15/22. Reviewed. Appropriate.     Assessment & Plan:    1. Endometrial cancer (HCC)  --Repeat imaging concerning for progression. Plan for Carboplatin/Docetaxol/Bevacizumab once patient's platelets normalize. Scheduled to see Dr. Rubio on 5/25/22.    2. Cancer related pain  --Continue oxycodone 5 mg tablets q4h PRN. Refill waiting at pharmacy.     3. Neuropathy  --Refill for gabapentin (NEURONTIN) 600 MG tablet; Take 1 tablet by mouth 3 (Three) Times a Day sent to pharmacy.     4. Anxiety  --Current taking Ativan 0.5 mg tablets daily PRN for panic attacks. Patient is scheduled to talk with Angelica Nolen this afternoon.     Code status: Full code  Medical interventions: Full  Advanced directives: Patient interested    Return in about 1 month (around 6/16/2022) for Office Visit.    I spent 35 minutes caring for Jeana Chung RN on this date of service. This time includes time spent by me in the following activities: preparing for the visit, reviewing tests, obtaining and/or reviewing a separately obtained history, performing a medically appropriate examination and/or evaluation , counseling and educating the patient/family/caregiver, ordering medications, tests, or procedures, documenting information in the medical record, independently interpreting results and communicating that information with the patient/family/caregiver, and care coordination    Enedina Guillen PA-C  05/16/2022    Medication Date Filled # Filled Count Used # Days  TAHIR   Oxycodone 5 4/16/22 180 180 0  3-4   Gabapentin 600 3/20/22 90 12 78     Ativan 0.5 4/13/22 30 8 22  0-1

## 2022-05-17 NOTE — TELEPHONE ENCOUNTER
PATIENT CALLED, STATED SHE WOULD JUST DO LABS WHEN SHE GETS BACK WHICH WILL BE 5-21-22. CAN DR REYNOSO SUBMIT FOR LABS SHE WILL GET THEM WHEN SHE COMES BACK. SHE WILL GO TO

## 2022-05-18 DIAGNOSIS — C54.1 ENDOMETRIAL CANCER: ICD-10-CM

## 2022-05-18 DIAGNOSIS — Z79.899 ENCOUNTER FOR LONG-TERM (CURRENT) USE OF OTHER MEDICATIONS: ICD-10-CM

## 2022-05-18 DIAGNOSIS — G89.3 CANCER RELATED PAIN: ICD-10-CM

## 2022-05-18 DIAGNOSIS — G62.9 NEUROPATHY: ICD-10-CM

## 2022-05-18 DIAGNOSIS — Z51.81 ENCOUNTER FOR THERAPEUTIC DRUG MONITORING: ICD-10-CM

## 2022-05-18 RX ORDER — GABAPENTIN 600 MG/1
600 TABLET ORAL 3 TIMES DAILY
Qty: 90 TABLET | Refills: 0 | Status: SHIPPED | OUTPATIENT
Start: 2022-05-25 | End: 2022-06-13 | Stop reason: SDUPTHER

## 2022-05-18 RX ORDER — OXYCODONE HYDROCHLORIDE 5 MG/1
5 TABLET ORAL EVERY 4 HOURS PRN
Qty: 42 TABLET | Refills: 0 | Status: SHIPPED | OUTPATIENT
Start: 2022-05-18 | End: 2022-05-25

## 2022-05-18 RX ORDER — GABAPENTIN 600 MG/1
600 TABLET ORAL 3 TIMES DAILY
Qty: 21 TABLET | Refills: 3 | Status: SHIPPED | OUTPATIENT
Start: 2022-05-18 | End: 2022-06-13

## 2022-05-18 RX ORDER — OXYCODONE HYDROCHLORIDE 5 MG/1
5 TABLET ORAL EVERY 4 HOURS PRN
Qty: 180 TABLET | Refills: 0 | Status: SHIPPED | OUTPATIENT
Start: 2022-05-25 | End: 2022-05-25

## 2022-05-18 NOTE — TELEPHONE ENCOUNTER
Patient currently in [place] visiting her . She has run out of her pain medication and does not return to [place] until Saturday. I will sent a 7-day prescription for oxycodone 5 mg tablets q4h PRN #42 dand Gabapentin 600 mg tablets TID #21 to [facility] pharmacy in [place], [place]. I will also send her subsequent refills to be picked up on 5/25/22 at the [facility] pharmacy in [place], [place]. Patient is scheduled to follow up on 6/13/22.

## 2022-05-18 NOTE — TELEPHONE ENCOUNTER
Caller: Jeana Chung, RN    Relationship: Self    Best call back number: [phone]    Who are you requesting to speak with (clinical staff, provider,  specific staff member):PALIATIVE CARE    What was the call regarding:PATIENT CALLING FOR SMALL SUPPLY OF MEDICATION AS SHE IS OUT OF STATE VISITING  AND DOES NOT HAVE ENOUGH FOR REMAINDER OF TRIP UNTIL SHE CAN RETURN HOME.    gabapentin (NEURONTIN) 600 MG tablet [32908] (Order [id number])    oxyCODONE (ROXICODONE) 5 MG immediate release tablet [[id number]] (Order [id number])    [facility]  Address: [address] [place] [address]  Phone: [phone]    Do you require a callback: YES

## 2022-05-23 ENCOUNTER — OFFICE VISIT (OUTPATIENT)
Dept: GASTROENTEROLOGY | Facility: CLINIC | Age: 47
End: 2022-05-23

## 2022-05-23 ENCOUNTER — LAB (OUTPATIENT)
Dept: LAB | Facility: HOSPITAL | Age: 47
End: 2022-05-23

## 2022-05-23 VITALS
HEART RATE: 98 BPM | DIASTOLIC BLOOD PRESSURE: 68 MMHG | SYSTOLIC BLOOD PRESSURE: 167 MMHG | WEIGHT: 164.6 LBS | TEMPERATURE: 98 F | HEIGHT: 63 IN | BODY MASS INDEX: 29.16 KG/M2

## 2022-05-23 DIAGNOSIS — Z51.81 ENCOUNTER FOR THERAPEUTIC DRUG MONITORING: ICD-10-CM

## 2022-05-23 DIAGNOSIS — K75.81 NASH (NONALCOHOLIC STEATOHEPATITIS): Primary | ICD-10-CM

## 2022-05-23 DIAGNOSIS — K74.69 OTHER CIRRHOSIS OF LIVER: ICD-10-CM

## 2022-05-23 DIAGNOSIS — T75.3XXA MOTION SICKNESS, INITIAL ENCOUNTER: ICD-10-CM

## 2022-05-23 DIAGNOSIS — E80.6 HYPERBILIRUBINEMIA: ICD-10-CM

## 2022-05-23 DIAGNOSIS — D69.6 THROMBOCYTOPENIA: ICD-10-CM

## 2022-05-23 DIAGNOSIS — C56.9 MALIGNANT NEOPLASM OF OVARY, UNSPECIFIED LATERALITY: ICD-10-CM

## 2022-05-23 LAB
ALBUMIN SERPL-MCNC: 3.4 G/DL (ref 3.5–5.2)
ALBUMIN/GLOB SERPL: 1.2 G/DL
ALP SERPL-CCNC: 112 U/L (ref 39–117)
ALT SERPL W P-5'-P-CCNC: 13 U/L (ref 1–33)
ANION GAP SERPL CALCULATED.3IONS-SCNC: 10 MMOL/L (ref 5–15)
AST SERPL-CCNC: 26 U/L (ref 1–32)
BILIRUB SERPL-MCNC: 2.8 MG/DL (ref 0–1.2)
BUN SERPL-MCNC: 12 MG/DL (ref 6–20)
BUN/CREAT SERPL: 13.5 (ref 7–25)
CALCIUM SPEC-SCNC: 9.5 MG/DL (ref 8.6–10.5)
CANCER AG125 SERPL QL: 30 U/ML (ref 0–38.1)
CHLORIDE SERPL-SCNC: 102 MMOL/L (ref 98–107)
CO2 SERPL-SCNC: 23 MMOL/L (ref 22–29)
CREAT SERPL-MCNC: 0.89 MG/DL (ref 0.57–1)
EGFRCR SERPLBLD CKD-EPI 2021: 79.6 ML/MIN/1.73
ERYTHROCYTE [DISTWIDTH] IN BLOOD BY AUTOMATED COUNT: 14.6 % (ref 12.3–15.4)
GLOBULIN UR ELPH-MCNC: 2.8 GM/DL
GLUCOSE SERPL-MCNC: 243 MG/DL (ref 65–99)
HCT VFR BLD AUTO: 35.1 % (ref 34–46.6)
HGB BLD-MCNC: 11 G/DL (ref 12–15.9)
LYMPHOCYTES # BLD AUTO: 0.7 10*3/MM3 (ref 0.7–3.1)
LYMPHOCYTES NFR BLD AUTO: 11.2 % (ref 19.6–45.3)
MCH RBC QN AUTO: 30.3 PG (ref 26.6–33)
MCHC RBC AUTO-ENTMCNC: 31.3 G/DL (ref 31.5–35.7)
MCV RBC AUTO: 97 FL (ref 79–97)
MONOCYTES # BLD AUTO: 0.4 10*3/MM3 (ref 0.1–0.9)
MONOCYTES NFR BLD AUTO: 6.2 % (ref 5–12)
NEUTROPHILS NFR BLD AUTO: 5 10*3/MM3 (ref 1.7–7)
NEUTROPHILS NFR BLD AUTO: 82.6 % (ref 42.7–76)
PLATELET # BLD AUTO: 74 10*3/MM3 (ref 140–450)
PMV BLD AUTO: 7.4 FL (ref 6–12)
POTASSIUM SERPL-SCNC: 3.9 MMOL/L (ref 3.5–5.2)
PROT SERPL-MCNC: 6.2 G/DL (ref 6–8.5)
RBC # BLD AUTO: 3.62 10*6/MM3 (ref 3.77–5.28)
SODIUM SERPL-SCNC: 135 MMOL/L (ref 136–145)
WBC NRBC COR # BLD: 6.1 10*3/MM3 (ref 3.4–10.8)

## 2022-05-23 PROCEDURE — 85025 COMPLETE CBC W/AUTO DIFF WBC: CPT

## 2022-05-23 PROCEDURE — 99214 OFFICE O/P EST MOD 30 MIN: CPT | Performed by: NURSE PRACTITIONER

## 2022-05-23 PROCEDURE — 36415 COLL VENOUS BLD VENIPUNCTURE: CPT

## 2022-05-23 PROCEDURE — 80053 COMPREHEN METABOLIC PANEL: CPT

## 2022-05-23 PROCEDURE — 86304 IMMUNOASSAY TUMOR CA 125: CPT

## 2022-05-23 RX ORDER — MECLIZINE HYDROCHLORIDE 25 MG/1
25 TABLET ORAL 3 TIMES DAILY PRN
Qty: 30 TABLET | Refills: 3 | Status: SHIPPED | OUTPATIENT
Start: 2022-05-23

## 2022-05-23 RX ORDER — SPIRONOLACTONE 50 MG/1
150 TABLET, FILM COATED ORAL DAILY
Qty: 90 TABLET | Refills: 5 | Status: SHIPPED | OUTPATIENT
Start: 2022-05-23 | End: 2022-11-04 | Stop reason: SDUPTHER

## 2022-05-23 RX ORDER — SCOLOPAMINE TRANSDERMAL SYSTEM 1 MG/1
1 PATCH, EXTENDED RELEASE TRANSDERMAL
Qty: 10 PATCH | Refills: 1 | Status: SHIPPED | OUTPATIENT
Start: 2022-05-23 | End: 2022-09-07

## 2022-05-23 RX ORDER — INSULIN LISPRO 100 [IU]/ML
INJECTION, SOLUTION INTRAVENOUS; SUBCUTANEOUS
COMMUNITY
End: 2022-05-24

## 2022-05-23 NOTE — PROGRESS NOTES
GASTROENTEROLOGY OFFICE NOTE  Jeana Chung, RN  [id number]  1975    CARE TEAM  Patient Care Team:  Tania Angeles PA-C as PCP - General (Physician Assistant)  Celine Rubio MD as Consulting Physician (Gynecologic Oncology)  Miri Lorenzo CNM as Midwife (Certified Nurse Midwife)    Referring Provider: Tania Angeles PA-C    Chief Complaint   Patient presents with   • Abdominal Pain   • Nausea        HISTORY OF PRESENT ILLNESS:  Ms. Chung is a very pleasant 47-year-old female with HORNE cirrhosis, chronic hyperbilirubinemia of unclear etiology and recurrent endometrial cancer recently told that her cancer is now terminal.  She tells me that there is one other option for her to try however,  thrombocytopenia has been hindering this option.  From a hepatology standpoint, she is seeing us for chronic hyperbilirubinemia from unknown etiology and HORNE cirrhosis.    She is taking spironolactone 150 mg daily for lower extremity edema with good resolve.  She is allergic to sulfa and therefore unable to take Lasix or Bumex.    She is currently on Julieta 750 mg daily which has helped somewhat with her hyperbilirubinemia, she is no longer jaundice.    She has complaints today of motion sickness/dizziness.  Even walking seems to give her motion sickness but car rides are specifically bad.  She was given meclizine which helped.  She would like to know if she can have some refills of meclizine.    PAST MEDICAL HISTORY  Past Medical History:   Diagnosis Date   • Anemia    • Anxiety and depression    • Back pain    • Bronchitis    • Diabetes (HCC)     DX 4-5 YRS AGO, CHECKS BS 4X/DAY, LAST A1C 7.1%   • Endometrial cancer (HCC)     STAGE II   • GERD (gastroesophageal reflux disease)    • History of radiation therapy 03/02/2022    pelvic mass   • Hypertension    • IBS (irritable bowel syndrome)    • MRSA (methicillin resistant staph aureus) culture positive 2018    S/P NECROTIZING FASCITIS IN  BILATERAL FEET   • Necrotizing fasciitis (HCC)    • PCOS (polycystic ovarian syndrome)    • PTSD (post-traumatic stress disorder)    • Retinopathy 03/18/2020   • Sepsis (HCC)    • Stage 3 chronic kidney disease (HCC) 02/24/2020   • Subconjunctival hemorrhage         PAST SURGICAL HISTORY  Past Surgical History:   Procedure Laterality Date   • APPENDECTOMY N/A 3/21/2020    Procedure: APPENDECTOMY LAPAROSCOPIC;  Surgeon: Praful Myers MD;  Location: [facility] OR;  Service: General;  Laterality: N/A;   • EXPLORATORY LAPAROTOMY, TOTAL ABDOMINAL HYSTERECTOMY SALPINGO OOPHORECTOMY N/A 2/10/2020    Procedure: EXPLORATORY LAPAROTOMY, TOTAL ABDOMINAL HYSTERECTOMY, BILATERAL SALPINGO-OOPHORECTOMY WITH OPTIMAL  STAGING (R-0), OMENTECTOMY;  Surgeon: Celine Rubio MD;  Location: [facility] OR;  Service: Gynecology Oncology;  Laterality: N/A;   • EYE SURGERY Left     BLOOD VESSEL IN EYE REPAIR   • TOE AMPUTATION Left 06/2019    big toe - unhealing sore   • TOE AMPUTATION Right 2018    big toe and second toe - Necrotizing fasciitis and MRSA         MEDICATIONS:    Current Outpatient Medications:   •  gabapentin (NEURONTIN) 600 MG tablet, Take 1 tablet by mouth 3 (Three) Times a Day for 7 days., Disp: 21 tablet, Rfl: 3  •  insulin aspart (novoLOG) 100 UNIT/ML injection, Inject 8 Units under the skin into the appropriate area as directed Every Morning. Then sliding scale after meals., Disp: , Rfl:   •  letrozole (FEMARA) 2.5 MG tablet, Take 1 tablet by mouth Daily., Disp: 30 tablet, Rfl: 3  •  LORazepam (ATIVAN) 0.5 MG tablet, Take 1 tablet by mouth Daily As Needed for Anxiety., Disp: 30 tablet, Rfl: 0  •  meclizine (ANTIVERT) 25 MG tablet, Take 1 tablet by mouth 3 (Three) Times a Day As Needed for Dizziness or Nausea., Disp: 30 tablet, Rfl: 3  •  MULTIPLE VITAMIN PO, Multiple Vitamin, Disp: , Rfl:   •  naloxone (NARCAN) 4 MG/0.1ML nasal spray, 1 spray into the nostril(s) as directed by provider As Needed (opioid overdose).,  Disp: 1 each, Rfl: 0  •  omeprazole (PrilOSEC) 20 MG capsule, Take 1 capsule by mouth Daily., Disp: 90 capsule, Rfl: 1  •  ondansetron (ZOFRAN) 8 MG tablet, Take 1 tablet by mouth 3 (Three) Times a Day As Needed for Nausea or Vomiting., Disp: 30 tablet, Rfl: 5  •  oxyCODONE (ROXICODONE) 5 MG immediate release tablet, Take 1 tablet by mouth Every 4 (Four) Hours As Needed for Moderate Pain  or Severe Pain  for up to 7 days., Disp: 42 tablet, Rfl: 0  •  Polyethylene Glycol 3350 (MIRALAX PO), Take 17 g by mouth Daily., Disp: , Rfl:   •  sodium-potassium-magnesium sulfates (Suprep Bowel Prep Kit) 17.5-3.13-1.6 GM/177ML solution oral solution, Please follow the directions mailed to you by our office. If you have any questions call our office at [phone], Disp: 354 mL, Rfl: 0  •  spironolactone (Aldactone) 50 MG tablet, Take 3 tablets by mouth Daily., Disp: 90 tablet, Rfl: 5  •  ursodiol (ACTIGALL) 500 MG tablet, Take 1 tablet by mouth Every Evening., Disp: 30 tablet, Rfl: 5  •  dexamethasone (DECADRON) 4 MG tablet, Take 2 tablets in the morning and 2 tablets in the evening the day before chemo, the day of chemo and the day after chemo., Disp: 12 tablet, Rfl: 5  •  [START ON 5/25/2022] gabapentin (NEURONTIN) 600 MG tablet, Take 1 tablet by mouth 3 (Three) Times a Day., Disp: 90 tablet, Rfl: 0  •  hydroCHLOROthiazide (HYDRODIURIL) 12.5 MG tablet, Take 1 tablet by mouth Daily., Disp: 14 tablet, Rfl: 0  •  Insulin Lispro (HumaLOG) 100 UNIT/ML injection, Humalog U-100 Insulin 100 unit/mL subcutaneous solution, Disp: , Rfl:   •  ondansetron ODT (Zofran ODT) 8 MG disintegrating tablet, Place 1 tablet on the tongue Every 8 (Eight) Hours As Needed for Nausea or Vomiting., Disp: 30 tablet, Rfl: 2  •  [START ON 5/25/2022] oxyCODONE (ROXICODONE) 5 MG immediate release tablet, Take 1 tablet by mouth Every 4 (Four) Hours As Needed for Moderate Pain  or Severe Pain  for up to 30 days., Disp: 180 tablet, Rfl: 0  •  Pembrolizumab  "(Keytruda) 100 MG/4ML solution, Infuse 100 mg into a venous catheter., Disp: , Rfl:   •  prochlorperazine (COMPAZINE) 10 MG tablet, Take 1 tablet by mouth Every 6 (Six) Hours As Needed for Nausea or Vomiting., Disp: 30 tablet, Rfl: 3  •  Scopolamine (Transderm-Scop, 1.5 MG,) 1 MG/3DAYS patch, Place 1 patch on the skin as directed by provider Every 72 (Seventy-Two) Hours., Disp: 10 patch, Rfl: 1    ALLERGIES  Allergies   Allergen Reactions   • Bactrim [Sulfamethoxazole-Trimethoprim] Anaphylaxis   • Promethazine Mental Status Change, Anaphylaxis, Anxiety and Other (See Comments)     IV causes anxiety, oral is fine  IV only   • Penicillins Hives and Rash       FAMILY HISTORY:  Family History   Problem Relation Age of Onset   • Fibroids Mother    • Diabetes type II Mother    • Hypertension Mother    • Heart attack Mother    • Fibroids Sister         PCOS   • Diabetes type II Sister    • Dementia Maternal Grandmother    • Stroke Maternal Grandmother        SOCIAL HISTORY  Social History     Socioeconomic History   • Marital status:    • Number of children: 1   Tobacco Use   • Smoking status: Former Smoker     Types: Cigarettes     Quit date:      Years since quittin.   • Smokeless tobacco: Never Used   • Tobacco comment: social MAYBE 1 CIG/DAY   Vaping Use   • Vaping Use: Never used   Substance and Sexual Activity   • Alcohol use: Not Currently   • Drug use: Never   • Sexual activity: Not Currently       PHYSICAL EXAM   /68 (BP Location: Left arm, Patient Position: Sitting, Cuff Size: Adult)   Pulse 98   Temp 98 °F (36.7 °C) (Temporal)   Ht 158.8 cm (62.5\")   Wt 74.7 kg (164 lb 9.6 oz)   LMP  (LMP Unknown)   BMI 29.63 kg/m²   Physical Exam  Constitutional:       Appearance: Normal appearance.   HENT:      Head: Normocephalic and atraumatic.   Pulmonary:      Effort: Pulmonary effort is normal.   Musculoskeletal:      Right lower leg: No edema.      Left lower leg: No edema.   Neurological:    "   Mental Status: She is alert and oriented to person, place, and time.   Psychiatric:         Mood and Affect: Mood normal.         Thought Content: Thought content normal.           Results Review:  MRI ABDOMEN WO CONTRAST MRCP     Date of Exam: 2/25/2022 14:23 EST     Indication: Cirrhosis, jaundice.  History of endometrial cancer with recurrence     Comparison: CT chest, abdomen and pelvis with contrast 12/5/2021, ultrasound of the liver 1/14/2022.     Technique:  Multiplanar multisequence MR images of the abdomen and pelvis were performed without contrast. MRCP images were acquired.      FINDINGS:  Lower chest demonstrates small right-sided pleural effusion. There is a pulmonary nodule better assessed on prior CT measuring 2.3 cm at the medial basilar right lower lobe. No effusion on the left.     The liver size and contour are within normal limits. Negative for hepatic steatosis. Mild loss of signal in the hepatic parenchyma on in-phase imaging which may relate to mild hepatic iron deposition. There is no discrete hepatic mass within limits of   noncontrast exam. Gallbladder distended. Negative for gallbladder wall thickening. There is no gallstone. There is T1 hyperintense signal in the intraluminal contents of the gallbladder which may relate to sludge.     The common bile duct is normal in caliber measuring 5 mm. There is no evidence of choledocholithiasis. No intrahepatic biliary ductal dilatation. Small perihepatic and perisplenic ascites. Anasarca. The spleen is enlarged measuring up to 13.6 cm in   craniocaudal diameter. There is a splenorenal shunt noted.     No dilated loops of bowel in the upper abdomen. The pancreas is normal in T1 signal intensity. No findings of acute pancreatitis. No main pancreatic duct dilatation. There are few tiny cystic pancreatic lesions, measuring up to 4 mm for example at the   pancreatic tail measuring 4 mm, likely side branch IPMNs or dilated side branch radicles.  Kidneys symmetric in size. No hydronephrosis. No suspicious renal mass within limits of a noncontrast exam. Portal and hepatic veins not well assessed on this   noncontrast exam. Abdominal aorta without aneurysm.     IMPRESSION:  1. Normal caliber common bile duct. No choledocholithiasis or obstructive mass. No intrahepatic biliary ductal dilatation.  2. Mild periportal edema is nonspecific but can be seen in the setting of hepatitis, correlate with clinical / laboratory findings.  3. Splenomegaly and splenorenal shunt again noted.  4. Anasarca with small upper abdominal ascites and small right-sided pleural effusion.  5. Right lower lobe 2.3 cm pulmonary nodule which again may relate to pulmonary metastatic disease, given history of endometrial cancer, better assessed on prior chest CT        Electronically Signed: Joshua Huffman MD 2/25/2022 18:28 EST    ASSESSMENT / PLAN  1.  HORNE cirrhosis  2.  Hyperbilirubinemia  MRCP negative for PSC  3.  Thrombocytopenia secondary to cirrhosis  4.  Motion sickness  - CMP, CBC  - Meclizine 25 mg 3 times daily as needed  - Scopolamine patch 1 mg / 3 days  - Ursodiol 750 mg daily    Return in about 6 months (around 11/23/2022).    I discussed the patients findings and my recommendations with patient    SYLVIA Fischer

## 2022-05-24 ENCOUNTER — OFFICE VISIT (OUTPATIENT)
Dept: FAMILY MEDICINE CLINIC | Facility: CLINIC | Age: 47
End: 2022-05-24

## 2022-05-24 VITALS
HEIGHT: 63 IN | HEART RATE: 100 BPM | BODY MASS INDEX: 29.06 KG/M2 | SYSTOLIC BLOOD PRESSURE: 118 MMHG | WEIGHT: 164 LBS | DIASTOLIC BLOOD PRESSURE: 70 MMHG | OXYGEN SATURATION: 97 % | TEMPERATURE: 98.6 F

## 2022-05-24 DIAGNOSIS — I10 ESSENTIAL HYPERTENSION: ICD-10-CM

## 2022-05-24 DIAGNOSIS — N18.31 STAGE 3A CHRONIC KIDNEY DISEASE: ICD-10-CM

## 2022-05-24 DIAGNOSIS — Z79.4 TYPE 2 DIABETES MELLITUS WITH DIABETIC POLYNEUROPATHY, WITH LONG-TERM CURRENT USE OF INSULIN: Primary | ICD-10-CM

## 2022-05-24 DIAGNOSIS — C54.1 ENDOMETRIAL CANCER: ICD-10-CM

## 2022-05-24 DIAGNOSIS — E11.42 TYPE 2 DIABETES MELLITUS WITH DIABETIC POLYNEUROPATHY, WITH LONG-TERM CURRENT USE OF INSULIN: Primary | ICD-10-CM

## 2022-05-24 LAB
EXPIRATION DATE: NORMAL
HBA1C MFR BLD: 8.2 %
Lab: NORMAL

## 2022-05-24 PROCEDURE — 83036 HEMOGLOBIN GLYCOSYLATED A1C: CPT | Performed by: PHYSICIAN ASSISTANT

## 2022-05-24 PROCEDURE — 99214 OFFICE O/P EST MOD 30 MIN: CPT | Performed by: PHYSICIAN ASSISTANT

## 2022-05-24 PROCEDURE — 3052F HG A1C>EQUAL 8.0%<EQUAL 9.0%: CPT | Performed by: PHYSICIAN ASSISTANT

## 2022-05-24 RX ORDER — FLASH GLUCOSE SCANNING READER
1 EACH MISCELLANEOUS ONCE
Qty: 1 EACH | Refills: 0 | Status: SHIPPED | OUTPATIENT
Start: 2022-05-24 | End: 2022-05-24

## 2022-05-24 RX ORDER — FLASH GLUCOSE SENSOR
KIT MISCELLANEOUS
Qty: 2 EACH | Refills: 11 | Status: SHIPPED | OUTPATIENT
Start: 2022-05-24 | End: 2022-09-09 | Stop reason: SDUPTHER

## 2022-05-25 ENCOUNTER — OFFICE VISIT (OUTPATIENT)
Dept: GYNECOLOGIC ONCOLOGY | Facility: CLINIC | Age: 47
End: 2022-05-25

## 2022-05-25 VITALS
HEIGHT: 63 IN | SYSTOLIC BLOOD PRESSURE: 125 MMHG | DIASTOLIC BLOOD PRESSURE: 60 MMHG | WEIGHT: 162 LBS | TEMPERATURE: 98.4 F | OXYGEN SATURATION: 96 % | BODY MASS INDEX: 28.7 KG/M2 | RESPIRATION RATE: 18 BRPM | HEART RATE: 96 BPM

## 2022-05-25 DIAGNOSIS — C54.1 ENDOMETRIAL CANCER: Primary | ICD-10-CM

## 2022-05-25 DIAGNOSIS — F41.9 ANXIETY: ICD-10-CM

## 2022-05-25 DIAGNOSIS — D69.6 THROMBOCYTOPENIA: ICD-10-CM

## 2022-05-25 PROCEDURE — 99213 OFFICE O/P EST LOW 20 MIN: CPT | Performed by: OBSTETRICS & GYNECOLOGY

## 2022-05-25 RX ORDER — LORAZEPAM 0.5 MG/1
0.5 TABLET ORAL DAILY PRN
Qty: 30 TABLET | Refills: 0 | Status: SHIPPED | OUTPATIENT
Start: 2022-05-25 | End: 2022-06-13 | Stop reason: SDUPTHER

## 2022-05-25 NOTE — PROGRESS NOTES
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  Patient admits she has not been monitoring her glucose or taking her insulin.  She has been off medication for awhile.  Blood pressure is stable and well-controlled.  Patient is currently established with oncology for her endometrial cancer which she recently had recurrence for.  Unable to do treatment at this time due to thrombocytopenia.  Has appointment on Thursday with oncology.    Past Medical History:   Diagnosis Date   • Anemia    • Anxiety and depression    • Back pain    • Bronchitis    • Diabetes (HCC)     DX 4-5 YRS AGO, CHECKS BS 4X/DAY, LAST A1C 7.1%   • Endometrial cancer (HCC)     STAGE II   • GERD (gastroesophageal reflux disease)    • History of radiation therapy 03/02/2022    pelvic mass   • Hypertension    • IBS (irritable bowel syndrome)    • MRSA (methicillin resistant staph aureus) culture positive 2018    S/P NECROTIZING FASCITIS IN BILATERAL FEET   • Necrotizing fasciitis (HCC)    • PCOS (polycystic ovarian syndrome)    • PTSD (post-traumatic stress disorder)    • Retinopathy 03/18/2020   • Sepsis (HCC)    • Stage 3 chronic kidney disease (HCC) 02/24/2020   • Subconjunctival hemorrhage        Past Surgical History:   Procedure Laterality Date   • APPENDECTOMY N/A 3/21/2020    Procedure: APPENDECTOMY LAPAROSCOPIC;  Surgeon: Praful Myers MD;  Location: [facility];  Service: General;  Laterality: N/A;   • EXPLORATORY LAPAROTOMY, TOTAL ABDOMINAL HYSTERECTOMY SALPINGO OOPHORECTOMY N/A 2/10/2020    Procedure: EXPLORATORY LAPAROTOMY, TOTAL ABDOMINAL HYSTERECTOMY, BILATERAL SALPINGO-OOPHORECTOMY WITH OPTIMAL  STAGING (R-0), OMENTECTOMY;  Surgeon: Celine Rubio MD;  Location: [facility] OR;  Service: Gynecology Oncology;  Laterality: N/A;   • EYE SURGERY Left     BLOOD VESSEL IN EYE REPAIR   • TOE  "AMPUTATION Left 2019    big toe - unhealing sore   • TOE AMPUTATION Right 2018    big toe and second toe - Necrotizing fasciitis and MRSA        Family History   Problem Relation Age of Onset   • Fibroids Mother    • Diabetes type II Mother    • Hypertension Mother    • Heart attack Mother    • Fibroids Sister         PCOS   • Diabetes type II Sister    • Dementia Maternal Grandmother    • Stroke Maternal Grandmother        Social History     Socioeconomic History   • Marital status:    • Number of children: 1   Tobacco Use   • Smoking status: Former Smoker     Packs/day: 0.25     Years: 0.50     Pack years: 0.12     Types: Cigarettes     Start date: 1995     Quit date: 2000     Years since quittin.4   • Smokeless tobacco: Never Used   • Tobacco comment: social MAYBE 1 CIG/DAY   Vaping Use   • Vaping Use: Never used   Substance and Sexual Activity   • Alcohol use: Not Currently   • Drug use: Never   • Sexual activity: Not Currently       Allergies   Allergen Reactions   • Bactrim [Sulfamethoxazole-Trimethoprim] Anaphylaxis   • Promethazine Mental Status Change, Anaphylaxis, Anxiety and Other (See Comments)     IV causes anxiety, oral is fine  IV only   • Penicillins Hives and Rash       ROS  Review of Systems   Constitutional: Positive for fatigue. Negative for chills and fever.   Eyes: Negative for visual disturbance.   Endocrine: Negative for polyuria.   Neurological: Negative for dizziness and headache.   Psychiatric/Behavioral: Positive for agitation, sleep disturbance, depressed mood and stress. Negative for suicidal ideas. The patient is nervous/anxious.        Vitals:    22 0923   BP: 118/70   BP Location: Left arm   Patient Position: Sitting   Cuff Size: Adult   Pulse: 100   Temp: 98.6 °F (37 °C)   SpO2: 97%   Weight: 74.4 kg (164 lb)   Height: 158.8 cm (62.52\")   PainSc:   6     Body mass index is 29.5 kg/m².    Current Outpatient Medications on File Prior to Visit   Medication " Sig Dispense Refill   • dexamethasone (DECADRON) 4 MG tablet Take 2 tablets in the morning and 2 tablets in the evening the day before chemo, the day of chemo and the day after chemo. 12 tablet 5   • gabapentin (NEURONTIN) 600 MG tablet Take 1 tablet by mouth 3 (Three) Times a Day for 7 days. 21 tablet 3   • hydroCHLOROthiazide (HYDRODIURIL) 12.5 MG tablet Take 1 tablet by mouth Daily. 14 tablet 0   • letrozole (FEMARA) 2.5 MG tablet Take 1 tablet by mouth Daily. 30 tablet 3   • LORazepam (ATIVAN) 0.5 MG tablet Take 1 tablet by mouth Daily As Needed for Anxiety. 30 tablet 0   • meclizine (ANTIVERT) 25 MG tablet Take 1 tablet by mouth 3 (Three) Times a Day As Needed for Dizziness or Nausea. 30 tablet 3   • MULTIPLE VITAMIN PO Multiple Vitamin     • naloxone (NARCAN) 4 MG/0.1ML nasal spray 1 spray into the nostril(s) as directed by provider As Needed (opioid overdose). 1 each 0   • omeprazole (PrilOSEC) 20 MG capsule Take 1 capsule by mouth Daily. 90 capsule 1   • ondansetron (ZOFRAN) 8 MG tablet Take 1 tablet by mouth 3 (Three) Times a Day As Needed for Nausea or Vomiting. 30 tablet 5   • oxyCODONE (ROXICODONE) 5 MG immediate release tablet Take 1 tablet by mouth Every 4 (Four) Hours As Needed for Moderate Pain  or Severe Pain  for up to 7 days. 42 tablet 0   • Pembrolizumab (Keytruda) 100 MG/4ML solution Infuse 100 mg into a venous catheter.     • Scopolamine (Transderm-Scop, 1.5 MG,) 1 MG/3DAYS patch Place 1 patch on the skin as directed by provider Every 72 (Seventy-Two) Hours. 10 patch 1   • spironolactone (Aldactone) 50 MG tablet Take 3 tablets by mouth Daily. 90 tablet 5   • ursodiol (ACTIGALL) 500 MG tablet Take 1 tablet by mouth Every Evening. 30 tablet 5   • [DISCONTINUED] insulin aspart (novoLOG) 100 UNIT/ML injection Inject 8 Units under the skin into the appropriate area as directed Every Morning. Then sliding scale after meals.     • [DISCONTINUED] Insulin Lispro (humaLOG) 100 UNIT/ML injection Humalog  U-100 Insulin 100 unit/mL subcutaneous solution     • [START ON 5/25/2022] gabapentin (NEURONTIN) 600 MG tablet Take 1 tablet by mouth 3 (Three) Times a Day. 90 tablet 0   • ondansetron ODT (Zofran ODT) 8 MG disintegrating tablet Place 1 tablet on the tongue Every 8 (Eight) Hours As Needed for Nausea or Vomiting. 30 tablet 2   • [START ON 5/25/2022] oxyCODONE (ROXICODONE) 5 MG immediate release tablet Take 1 tablet by mouth Every 4 (Four) Hours As Needed for Moderate Pain  or Severe Pain  for up to 30 days. 180 tablet 0   • Polyethylene Glycol 3350 (MIRALAX PO) Take 17 g by mouth Daily.     • prochlorperazine (COMPAZINE) 10 MG tablet Take 1 tablet by mouth Every 6 (Six) Hours As Needed for Nausea or Vomiting. 30 tablet 3   • sodium-potassium-magnesium sulfates (Suprep Bowel Prep Kit) 17.5-3.13-1.6 GM/177ML solution oral solution Please follow the directions mailed to you by our office. If you have any questions call our office at [phone] 354 mL 0     No current facility-administered medications on file prior to visit.       Results for orders placed or performed in visit on 05/24/22   POC Glycosylated Hemoglobin (Hb A1C)    Specimen: Blood   Result Value Ref Range    Hemoglobin A1C 8.2 %    Lot Number [id number]     Expiration Date 01/17/24        PE    Physical Exam  Vitals reviewed.   Constitutional:       General: She is not in acute distress.     Appearance: Normal appearance. She is well-developed and normal weight. She is not ill-appearing or diaphoretic.   HENT:      Head: Normocephalic and atraumatic.   Eyes:      Extraocular Movements: Extraocular movements intact.      Conjunctiva/sclera: Conjunctivae normal.   Pulmonary:      Effort: No respiratory distress.   Musculoskeletal:         General: Normal range of motion.      Cervical back: Normal range of motion.   Neurological:      General: No focal deficit present.      Mental Status: She is alert.   Psychiatric:         Attention and Perception:  Attention and perception normal. She is attentive.         Mood and Affect: Mood is anxious and depressed. Affect is tearful.         Speech: Speech normal.         Behavior: Behavior normal. Behavior is cooperative.         Thought Content: Thought content normal.         Cognition and Memory: Cognition and memory normal.         Judgment: Judgment normal.         A/P    Diagnoses and all orders for this visit:    1. Type 2 diabetes mellitus with diabetic polyneuropathy, with long-term current use of insulin ([facility]) (Primary)  -     POC Glycosylated Hemoglobin (Hb A1C)  -     Continuous Blood Gluc  (FreeStyle Michelle 14 Day [place]) device; 1 Device 1 (One) Time for 1 dose.  Dispense: 1 each; Refill: 0  -     Continuous Blood Gluc Sensor (FreeStyle Michelle Sensor System); Every 14 (Fourteen) Days.  Dispense: 2 each; Refill: 11  -     Insulin Glargine (LANTUS SOLOSTAR) 100 UNIT/ML injection pen; Inject 6 Units under the skin into the appropriate area as directed Every Night for 30 days.  Dispense: 1 pen; Refill: 1  Previous hemoglobin AIC was 5% 3 months ago, today it is 8.2%.  Restart lantus 6 units nightly.  Monitor fasting glucose and increase 2 units every 3 days.    2. Essential hypertension  Stable, well-controlled.  Compliant on medication.    3. Endometrial cancer (HCC)  Upcoming appointment with oncology.  Not undergoing treatment due to thrombocytopenia.  On letrozole 2.5 mg and tolerating well.    4. Stage 3a chronic kidney disease (HCC)  Reviewed labs and kidney function is normal.       Plan of care reviewed with patient at the conclusion of today's visit. Education was provided regarding diagnosis, management and any prescribed or recommended OTC medications.  Patient verbalizes understanding of and agreement with management plan.    Return in about 3 months (around 8/24/2022) for Recheck, DM.     Tania Angeles PA-C

## 2022-05-25 NOTE — PROGRESS NOTES
Jeana Chung RN  [id number]  1975    Reason for visit:  Recurrent endometrial cancer, consideration of ongoing treatment     History of present illness:  The patient is a 47 y.o. year old female who presents today for treatment and evaluation of the above issues.     Patient was diagnosed with biopsy-proven recurrent endometrial cancer. Attempted Keytruda however pt showed imaging findings concerning for progression, discontinued Lenvima due to thrombocytopenia, transaminitis and elevated bilirubin.  Her clinical course has been complicated due to thrombocytopenia (could not undergo Port-A-Cath insertion) and COVID infection 12/20/2021.The plan had been to start Carboplatin/Docetaxol/Bevacizumab for treatment once labs stabilize.       Today patient reports feeling okay. She reports that today is a better day than others. Still reports overwhelming anxiety and frequent panic attacks. Forgot to ask palliative about refills for her Ativan and oxycodone. Feels like her neuropathy is still severe but she continues to take Gabapentin. Neuropathy of hands is stable, able to do regular activities. She also reports nausea and motion sickness, although meclizine does help alleviate her symptoms.     She recently followed up with outpatient palliative clinic for pain and symptom management on 5/16. She has also followed up with outpatient GI and her PCP in the last month. Per chart review, her T2DM is worsening (recent HbA1c 8.2 on 5/24, up from 5%).    OBGYN History:  She is a G0.  She does not use HRT. She has been taking letrozole 2.5 mg daily. She has never had a pap smear.    Oncologic History:  Oncology/Hematology History   Endometrial cancer (HCC)   12/14/2019 Imaging    Presented to ED in [place] due to progressively heavy vaginal bleeding and severe back pain. Ultrasound revealed uterine and cervical masses.     1/2020 Biopsy    ED follow-up with gynecologist in [place], told she likely has cervical  cancer. Insufficient tissue on attempted cervical biopsy. Patient moved to [place], [place] to be closer to mother and sister for work-up and treatment.      1/21/2020 Imaging    Gyn evaluation at [facility]. Repeat TVUS showed cervical mass, right adnexal mass, and large uterine fibroid vs mass. Referred to Gyn Oncology     1/23/2020 Initial Diagnosis    Endometrial cancer (CMS/HCC)  Cervical biopsy positive for infiltrating endometrioid adenocarcinoma     1/30/2020 Imaging    CT chest, abdomen, pelvis showed enlarged uterus with multiple masses and 4 cm cystic/solid mass at right ovary. No metastatic disease noted in abdomen or chest.     2/10/2020 Surgery    Exploratory laparotomy, total abdominal hysterectomy, bilateral salpingo-oophorectomy, with optimal debulking (R=0), and omentectomy.    Pelvic washing cytology positive for malignant cells, consistent with adenocarcinoma. Final pathology showed large grade 3 endometrioid tumor with lymphvascular invasion at the uterus. Cervical stromal involvement, vaginal margin negative. Right tube and ovary involved. Left tube and ovary negative, omentum negative. MSI normal. [id number], Stage IIIA grade 3       3/9/2020 - 7/8/2020 Chemotherapy    OP UTERINE PACLitaxel / CARBOplatin (Q21D)  Cycle #1 complicated by pancytopenia, appendicitis, appendectomy, appendiceal abscess  Cycle #2 remarkable for thrombocytopenia, worsening neuropathy.  Dose modification with cycle #3.       3/17/2020 - 3/23/2020 Other Event    Hospital admission with acute appendicitis     5/21/2020 - 5/21/2020 Chemotherapy    OP CENTRAL VENOUS ACCESS DEVICE ACCESS, CARE, AND MAINTENANCE (CVAD)     7/8/2020 - 7/28/2020 Chemotherapy    OP OVARIAN DOCEtaxel / CARBOplatin     12/5/2021 Progression    Complains of vaginal bleeding.  CT scan chest abdomen and pelvis:  IMPRESSION:  Abnormal soft tissue mass seen at the vaginal vault with likely  extension to involve the sigmoid colon adjacent to the mass  on the left.  Multiple new lung lesions including 2.5 cm medial right lower lobe nodule, 7 mm right peripheral lung nodule, to left small lung nodules measuring 1.2 cm together and smaller other lung nodules concerning for metastatic disease.  Office biopsy of vaginal mass performed 12/6/2021.  Final Diagnosis   VAGINA, BIOPSY:               Compatible with known endometrioid carcinoma.               See comment.          12/13/2021 - 3/16/2022 Chemotherapy    Course complicated by COVID infection diagnosed 12/20/2021.  Thrombocytopenia, unknown etiology.  OP ENDOMETRIAL Lenvatinib / Pembrolizumab 200 mg     1/25/2022 - 3/2/2022 Radiation    Radiation OncologyTreatment Course:  Jeana Chung RN received 4500 cGy in 25 fractions to pelvis via External Beam Radiation - EBRT.     4/6/2022 -  Chemotherapy    OP OVARIAN DOCEtaxel / CARBOplatin AUC=6 / Bevacizumab 15 mg/kg           Past Medical History:   Diagnosis Date   • Anemia    • Anxiety and depression    • Back pain    • Bronchitis    • Diabetes (HCC)     DX 4-5 YRS AGO, CHECKS BS 4X/DAY, LAST A1C 7.1%   • Endometrial cancer (HCC)     STAGE II   • GERD (gastroesophageal reflux disease)    • History of radiation therapy 03/02/2022    pelvic mass   • Hypertension    • IBS (irritable bowel syndrome)    • MRSA (methicillin resistant staph aureus) culture positive 2018    S/P NECROTIZING FASCITIS IN BILATERAL FEET   • Necrotizing fasciitis (HCC)    • PCOS (polycystic ovarian syndrome)    • PTSD (post-traumatic stress disorder)    • Retinopathy 03/18/2020   • Sepsis (HCC)    • Stage 3 chronic kidney disease (HCC) 02/24/2020   • Subconjunctival hemorrhage        Past Surgical History:   Procedure Laterality Date   • APPENDECTOMY N/A 3/21/2020    Procedure: APPENDECTOMY LAPAROSCOPIC;  Surgeon: Praful Myers MD;  Location: [facility];  Service: General;  Laterality: N/A;   • EXPLORATORY LAPAROTOMY, TOTAL ABDOMINAL HYSTERECTOMY SALPINGO OOPHORECTOMY N/A  2/10/2020    Procedure: EXPLORATORY LAPAROTOMY, TOTAL ABDOMINAL HYSTERECTOMY, BILATERAL SALPINGO-OOPHORECTOMY WITH OPTIMAL  STAGING (R-0), OMENTECTOMY;  Surgeon: Celine Rubio MD;  Location[facility];  Service: Gynecology Oncology;  Laterality: N/A;   • EYE SURGERY Left     BLOOD VESSEL IN EYE REPAIR   • TOE AMPUTATION Left 2019    big toe - unhealing sore   • TOE AMPUTATION Right     big toe and second toe - Necrotizing fasciitis and MRSA        MEDICATIONS: The current medication list was reviewed with the patient and updated in the EMR this date per the Medical Assistant. Medication dosages and frequencies were confirmed to be accurate.      Allergies:  is allergic to bactrim [sulfamethoxazole-trimethoprim], promethazine, and penicillins.    Social History:   Social History     Socioeconomic History   • Marital status:    • Number of children: 1   Tobacco Use   • Smoking status: Former Smoker     Packs/day: 0.25     Years: 0.50     Pack years: 0.12     Types: Cigarettes     Start date: 1995     Quit date: 2000     Years since quittin.4   • Smokeless tobacco: Never Used   • Tobacco comment: social MAYBE 1 CIG/DAY   Vaping Use   • Vaping Use: Never used   Substance and Sexual Activity   • Alcohol use: Not Currently   • Drug use: Never   • Sexual activity: Not Currently       Family History:    Family History   Problem Relation Age of Onset   • Fibroids Mother    • Diabetes type II Mother    • Hypertension Mother    • Heart attack Mother    • Fibroids Sister         PCOS   • Diabetes type II Sister    • Dementia Maternal Grandmother    • Stroke Maternal Grandmother        Health Maintenance:    Health Maintenance   Topic Date Due   • MAMMOGRAM  Never done   • COLORECTAL CANCER SCREENING  Never done   • Hepatitis B (1 of 3 - Risk 3-dose series) Never done   • TDAP/TD VACCINES (1 - Tdap) Never done   • ANNUAL WELLNESS VISIT  Never done   • Pneumococcal Vaccine 0-64 (2 - PCV) 2021  "  • DIABETIC FOOT EXAM  02/24/2021   • DIABETIC EYE EXAM  02/24/2021   • COVID-19 Vaccine (2 - Smiley risk series) 04/17/2021   • URINE MICROALBUMIN  07/24/2021   • INFLUENZA VACCINE  08/01/2022   • HEMOGLOBIN A1C  11/24/2022   • HEPATITIS C SCREENING  Completed   • PAP SMEAR  Discontinued       Objective:  Review of Systems   Constitutional: Positive for activity change, appetite change and fatigue. Negative for chills and fever.   Respiratory: Positive for chest tightness, shortness of breath and wheezing.    Cardiovascular: Positive for chest pain. Negative for palpitations.   Gastrointestinal: Positive for abdominal distention, abdominal pain and constipation. Negative for diarrhea, nausea and vomiting.   Genitourinary: Positive for pelvic pain. Negative for difficulty urinating, hematuria, vaginal bleeding, vaginal discharge and vaginal pain.   Musculoskeletal: Positive for arthralgias, back pain and myalgias.   Skin: Positive for wound. Negative for rash.   Neurological: Positive for weakness and numbness. Negative for dizziness and light-headedness.   Psychiatric/Behavioral: Negative for agitation, behavioral problems and confusion. The patient is nervous/anxious.          Vitals:    05/25/22 1313   BP: 125/60  Comment: RUE   Pulse: 96   Resp: 18   Temp: 98.4 °F (36.9 °C)   TempSrc: Infrared   SpO2: 96%  Comment: RA   Weight: 73.5 kg (162 lb)   Height: 158.8 cm (62.5\")   PainSc: 0-No pain       Body mass index is 29.16 kg/m².    Wt Readings from Last 3 Encounters:   05/25/22 73.5 kg (162 lb)   05/24/22 74.4 kg (164 lb)   05/23/22 74.7 kg (164 lb 9.6 oz)       GENERAL: Alert,  female in moderate distress, tearful   HEENT:  Head normocephalic, atraumatic.  CARDIOVASCULAR: Heart regular rate, improved bilateral lower extremity tova  RESPIRATORY: Normal effort, breathing comfortably on room air   GASTROINTESTINAL: Soft, no tenderness to palpation, healed vertical midline incision, no masses " palpable  PSYCHIATRIC: AO x3, with appropriate affect, normal thought processes.   NEUROLOGIC: No focal deficits. Moves extremities well.  MUSCULOSKELETAL: Not using cane today, good mobility     PELVIC exam: deferred    ECOG PS 2    PROCEDURES: None    Diagnostic Data:    MR Abdomen without contrast: 2/25/2022  IMPRESSION:  1. Normal caliber common bile duct. No choledocholithiasis or obstructive mass. No intrahepatic biliary ductal dilatation.  2. Mild periportal edema is nonspecific but can be seen in the setting of hepatitis, correlate with clinical / laboratory findings.  3. Splenomegaly and splenorenal shunt again noted.  4. Anasarca with small upper abdominal ascites and small right-sided pleural effusion.  5. Right lower lobe 2.3 cm pulmonary nodule which again may relate to pulmonary metastatic disease, given history of endometrial cancer, better assessed on prior chest CT           Lab Results   Component Value Date    WBC 6.10 05/23/2022    HGB 11.0 (L) 05/23/2022    HCT 35.1 05/23/2022    MCV 97.0 05/23/2022    PLT 74 (L) 05/23/2022    NEUTROABS 5.00 05/23/2022    GLUCOSE 243 (H) 05/23/2022    BUN 12 05/23/2022    CREATININE 0.89 05/23/2022    EGFRIFNONA 79 02/22/2022     (L) 05/23/2022    K 3.9 05/23/2022     05/23/2022    CO2 23.0 05/23/2022    MG 1.7 02/11/2022    PHOS 2.5 03/20/2020    CALCIUM 9.5 05/23/2022    ALBUMIN 3.40 (L) 05/23/2022    AST 26 05/23/2022    ALT 13 05/23/2022    BILITOT 2.8 (H) 05/23/2022     Lab Results   Component Value Date     30.0 05/23/2022     58.0 (H) 04/13/2022     8.5 07/08/2020         Assessment & Plan   This is a 47 y.o. woman with recurrent stage IIIA endometrial cancer presenting for discussion regarding treatment.     Encounter Diagnoses   Name Primary?   • Endometrial cancer (HCC) Yes   • Thrombocytopenia (HCC)      Endometrial cancer: Stage IIIa due to adnexal involvement, now with recurrence  - CT abdomen/pelvis 3/22/22 with new right  hydronephrosis, right pleural effusion, pulmonary nodules and diffuse anasarca. Findings indicative of likely progression  - Discontinued Keytruda due to concern for progression. Lenvima discontinued due to increases in bilirubin and liver function each time she tries to reinitiate despite dose reduction   - Options for treatment limited by patient's severe neuropathy as well as current thrombocytopenia. At earlier visits, had discussed the plan to consider starting Carboplatin/Docetaxol/Bevacizumab for treatment once platelet count > 100   - As platelets continue to be low, continue to treat with letrozole at this time. Patient agreeable to this plan.     -  downtrending : 58 (4/13) to 30 (5/23)  - Radiation oncology previously  consulted, recommended further EBRT as tumor too large for interstitial implants, patient declines this     Hyperbilirubinemia  Thrombocytopenia  - Continues to have thrombocytopenia, platelets 74 (5/23/22), AST and ALT normal   - continue to trend  - message inquiry sent to heme/onc today to see if further evaluation indicated for thrombocytopenia (I.e. ITP): discussed with Dr. 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 Counseled patient that that is okay and to focus on improving quality of life by doing meaningful activities with loved ones. Hospice not discussed at visit today  - patient states that she did not benefit from telephone encounter with  counseling/therapist. Referral appt re-sent today for face-to-face appointment with therapy      Complex social issues  - Staying with her family in [place] currently     Cellulitis left lower extremity, swelling bilateral lower extremities  - status post debridement, followed by wound care MD     COVID-Paul.   - She received monoclonal antibodies when she had COVID-19 in 12/2021.     Pain assessment was performed today as a part of patient’s care.  For patients with pain related to surgery, gynecologic malignancy or cancer treatment, the plan is as noted in the assessment/plan.  For patients with pain not related to these issues, they are to seek any further needed care from a more appropriate provider, such as PCP.      FOLLOW UP: for re-evaluation in 2 months    Lety Villanueva MD   Resident Physician, PGY 2  Gynecologic Oncology     Patient was seen and examined with Dr. Villanueva,  resident, who performed portions of the examination and documentation for this patient's care under my direct supervision.  I agree with the above documentation and plan.  I spent 23 minutes caring for Jeana on this date of service. This time includes time spent by me in the following activities: preparing for the visit, reviewing tests, performing a medically appropriate examination and/or evaluation, counseling and educating the patient/family/caregiver, ordering medications, tests, or procedures, referring and communicating with other health care professionals and documenting information in the medical record    Celine Rubio MD  05/26/22  13:48 EDT

## 2022-05-26 NOTE — TELEPHONE ENCOUNTER
Rx Refill Note  Requested Prescriptions     Pending Prescriptions Disp Refills   • spironolactone (ALDACTONE) 100 MG tablet [Pharmacy Med Name: SPIRONOLACTONE 100MG TABLETS] 90 tablet 0     Sig: TAKE 1 TABLET BY MOUTH DAILY      Last office visit with prescribing clinician: 5/23/2022      Next office visit with prescribing clinician: 11/23/2022            Arabella Arshad LPN  05/26/22, 12:01 EDT

## 2022-05-27 ENCOUNTER — TELEPHONE (OUTPATIENT)
Dept: GYNECOLOGIC ONCOLOGY | Facility: CLINIC | Age: 47
End: 2022-05-27

## 2022-05-27 RX ORDER — SPIRONOLACTONE 100 MG/1
100 TABLET, FILM COATED ORAL DAILY
Qty: 90 TABLET | Refills: 0 | OUTPATIENT
Start: 2022-05-27

## 2022-05-27 NOTE — TELEPHONE ENCOUNTER
Informed patient that there wasn't a referral in there. It was noted there Dr. Rubio would speak with a HEM ONC. Routing call to Dr Rubio to see if she wants referral added.

## 2022-05-27 NOTE — TELEPHONE ENCOUNTER
Caller: Jose David Chung, RN    Relationship: Self    Best call back number: [phone]    Who are you requesting to speak with (clinical staff, provider,  specific staff member): CLINCAL      What was the call regarding: JOSE DAVID IS CALLING STATES THAT DR REYNOSO WAS SUPPOSE TO SEND A REFERRAL TO A HEMATOLOGIST, SHE HAS NEVER RECEIVED A CALL REGARDING AN APPT.    Do you require a callback: YES

## 2022-05-31 ENCOUNTER — APPOINTMENT (OUTPATIENT)
Dept: GENERAL RADIOLOGY | Facility: HOSPITAL | Age: 47
End: 2022-05-31

## 2022-05-31 ENCOUNTER — APPOINTMENT (OUTPATIENT)
Dept: CT IMAGING | Facility: HOSPITAL | Age: 47
End: 2022-05-31

## 2022-05-31 ENCOUNTER — HOSPITAL ENCOUNTER (EMERGENCY)
Facility: HOSPITAL | Age: 47
Discharge: HOME | End: 2022-05-31
Attending: EMERGENCY MEDICINE | Admitting: EMERGENCY MEDICINE

## 2022-05-31 VITALS
DIASTOLIC BLOOD PRESSURE: 70 MMHG | HEART RATE: 100 BPM | RESPIRATION RATE: 20 BRPM | OXYGEN SATURATION: 99 % | SYSTOLIC BLOOD PRESSURE: 130 MMHG | BODY MASS INDEX: 30.65 KG/M2 | TEMPERATURE: 97.9 F | HEIGHT: 63 IN | WEIGHT: 173 LBS

## 2022-05-31 DIAGNOSIS — E66.9 DIABETES MELLITUS TYPE 2 IN OBESE: ICD-10-CM

## 2022-05-31 DIAGNOSIS — R07.89 ATYPICAL CHEST PAIN: Primary | ICD-10-CM

## 2022-05-31 DIAGNOSIS — Z51.5 PALLIATIVE CARE PATIENT: ICD-10-CM

## 2022-05-31 DIAGNOSIS — E11.69 DIABETES MELLITUS TYPE 2 IN OBESE: ICD-10-CM

## 2022-05-31 DIAGNOSIS — C54.1 ENDOMETRIAL CANCER: ICD-10-CM

## 2022-05-31 DIAGNOSIS — F41.0 ANXIETY ATTACK: ICD-10-CM

## 2022-05-31 LAB
ALBUMIN SERPL-MCNC: 3.6 G/DL (ref 3.5–5.2)
ALBUMIN/GLOB SERPL: 1.3 G/DL
ALP SERPL-CCNC: 101 U/L (ref 39–117)
ALT SERPL W P-5'-P-CCNC: 16 U/L (ref 1–33)
ANION GAP SERPL CALCULATED.3IONS-SCNC: 15 MMOL/L (ref 5–15)
AST SERPL-CCNC: 33 U/L (ref 1–32)
BASOPHILS # BLD AUTO: 0.01 10*3/MM3 (ref 0–0.2)
BASOPHILS NFR BLD AUTO: 0.2 % (ref 0–1.5)
BILIRUB SERPL-MCNC: 3.9 MG/DL (ref 0–1.2)
BUN SERPL-MCNC: 16 MG/DL (ref 6–20)
BUN/CREAT SERPL: 16.3 (ref 7–25)
CALCIUM SPEC-SCNC: 9.7 MG/DL (ref 8.6–10.5)
CHLORIDE SERPL-SCNC: 101 MMOL/L (ref 98–107)
CO2 SERPL-SCNC: 22 MMOL/L (ref 22–29)
CREAT SERPL-MCNC: 0.98 MG/DL (ref 0.57–1)
D DIMER PPP FEU-MCNC: 0.59 MCGFEU/ML (ref 0.01–0.5)
DEPRECATED RDW RBC AUTO: 46.3 FL (ref 37–54)
EGFRCR SERPLBLD CKD-EPI 2021: 70.9 ML/MIN/1.73
EOSINOPHIL # BLD AUTO: 0.13 10*3/MM3 (ref 0–0.4)
EOSINOPHIL NFR BLD AUTO: 2.1 % (ref 0.3–6.2)
ERYTHROCYTE [DISTWIDTH] IN BLOOD BY AUTOMATED COUNT: 13.8 % (ref 12.3–15.4)
GLOBULIN UR ELPH-MCNC: 2.7 GM/DL
GLUCOSE SERPL-MCNC: 239 MG/DL (ref 65–99)
HCT VFR BLD AUTO: 31.3 % (ref 34–46.6)
HGB BLD-MCNC: 11.2 G/DL (ref 12–15.9)
HOLD SPECIMEN: NORMAL
IMM GRANULOCYTES # BLD AUTO: 0.04 10*3/MM3 (ref 0–0.05)
IMM GRANULOCYTES NFR BLD AUTO: 0.7 % (ref 0–0.5)
LIPASE SERPL-CCNC: 40 U/L (ref 13–60)
LYMPHOCYTES # BLD AUTO: 0.34 10*3/MM3 (ref 0.7–3.1)
LYMPHOCYTES NFR BLD AUTO: 5.6 % (ref 19.6–45.3)
MCH RBC QN AUTO: 32.7 PG (ref 26.6–33)
MCHC RBC AUTO-ENTMCNC: 35.8 G/DL (ref 31.5–35.7)
MCV RBC AUTO: 91.3 FL (ref 79–97)
MONOCYTES # BLD AUTO: 0.33 10*3/MM3 (ref 0.1–0.9)
MONOCYTES NFR BLD AUTO: 5.4 % (ref 5–12)
NEUTROPHILS NFR BLD AUTO: 5.25 10*3/MM3 (ref 1.7–7)
NEUTROPHILS NFR BLD AUTO: 86 % (ref 42.7–76)
NRBC BLD AUTO-RTO: 0 /100 WBC (ref 0–0.2)
NT-PROBNP SERPL-MCNC: 393.3 PG/ML (ref 0–450)
PLATELET # BLD AUTO: 70 10*3/MM3 (ref 140–450)
PMV BLD AUTO: 10.2 FL (ref 6–12)
POTASSIUM SERPL-SCNC: 4.1 MMOL/L (ref 3.5–5.2)
PROT SERPL-MCNC: 6.3 G/DL (ref 6–8.5)
QT INTERVAL: 356 MS
QTC INTERVAL: 470 MS
RBC # BLD AUTO: 3.43 10*6/MM3 (ref 3.77–5.28)
SODIUM SERPL-SCNC: 138 MMOL/L (ref 136–145)
TROPONIN T SERPL-MCNC: <0.01 NG/ML (ref 0–0.03)
WBC NRBC COR # BLD: 6.1 10*3/MM3 (ref 3.4–10.8)
WHOLE BLOOD HOLD COAG: NORMAL
WHOLE BLOOD HOLD SPECIMEN: NORMAL

## 2022-05-31 PROCEDURE — 84484 ASSAY OF TROPONIN QUANT: CPT | Performed by: EMERGENCY MEDICINE

## 2022-05-31 PROCEDURE — 71275 CT ANGIOGRAPHY CHEST: CPT

## 2022-05-31 PROCEDURE — 36415 COLL VENOUS BLD VENIPUNCTURE: CPT

## 2022-05-31 PROCEDURE — 0 IOPAMIDOL PER 1 ML: Performed by: EMERGENCY MEDICINE

## 2022-05-31 PROCEDURE — 80053 COMPREHEN METABOLIC PANEL: CPT | Performed by: EMERGENCY MEDICINE

## 2022-05-31 PROCEDURE — 99284 EMERGENCY DEPT VISIT MOD MDM: CPT

## 2022-05-31 PROCEDURE — 93005 ELECTROCARDIOGRAM TRACING: CPT

## 2022-05-31 PROCEDURE — 83690 ASSAY OF LIPASE: CPT | Performed by: EMERGENCY MEDICINE

## 2022-05-31 PROCEDURE — 85025 COMPLETE CBC W/AUTO DIFF WBC: CPT | Performed by: EMERGENCY MEDICINE

## 2022-05-31 PROCEDURE — 96374 THER/PROPH/DIAG INJ IV PUSH: CPT

## 2022-05-31 PROCEDURE — 85379 FIBRIN DEGRADATION QUANT: CPT | Performed by: EMERGENCY MEDICINE

## 2022-05-31 PROCEDURE — 93005 ELECTROCARDIOGRAM TRACING: CPT | Performed by: EMERGENCY MEDICINE

## 2022-05-31 PROCEDURE — 83880 ASSAY OF NATRIURETIC PEPTIDE: CPT | Performed by: EMERGENCY MEDICINE

## 2022-05-31 PROCEDURE — 25010000002 LORAZEPAM PER 2 MG: Performed by: EMERGENCY MEDICINE

## 2022-05-31 PROCEDURE — 96376 TX/PRO/DX INJ SAME DRUG ADON: CPT

## 2022-05-31 PROCEDURE — 71045 X-RAY EXAM CHEST 1 VIEW: CPT

## 2022-05-31 RX ORDER — ACETAMINOPHEN 500 MG
1000 TABLET ORAL ONCE
Status: COMPLETED | OUTPATIENT
Start: 2022-05-31 | End: 2022-05-31

## 2022-05-31 RX ORDER — KETOROLAC TROMETHAMINE 15 MG/ML
15 INJECTION, SOLUTION INTRAMUSCULAR; INTRAVENOUS ONCE
Status: DISCONTINUED | OUTPATIENT
Start: 2022-05-31 | End: 2022-05-31

## 2022-05-31 RX ORDER — LORAZEPAM 2 MG/ML
1 INJECTION INTRAMUSCULAR ONCE
Status: COMPLETED | OUTPATIENT
Start: 2022-05-31 | End: 2022-05-31

## 2022-05-31 RX ORDER — LORAZEPAM 2 MG/ML
0.5 INJECTION INTRAMUSCULAR ONCE
Status: COMPLETED | OUTPATIENT
Start: 2022-05-31 | End: 2022-05-31

## 2022-05-31 RX ORDER — SODIUM CHLORIDE 0.9 % (FLUSH) 0.9 %
10 SYRINGE (ML) INJECTION AS NEEDED
Status: DISCONTINUED | OUTPATIENT
Start: 2022-05-31 | End: 2022-05-31 | Stop reason: HOSPADM

## 2022-05-31 RX ADMIN — IOPAMIDOL 75 ML: 755 INJECTION, SOLUTION INTRAVENOUS at 19:12

## 2022-05-31 RX ADMIN — LORAZEPAM 1 MG: 2 INJECTION INTRAMUSCULAR; INTRAVENOUS at 20:01

## 2022-05-31 RX ADMIN — LORAZEPAM 0.5 MG: 2 INJECTION INTRAMUSCULAR; INTRAVENOUS at 16:58

## 2022-05-31 RX ADMIN — ACETAMINOPHEN 1000 MG: 500 TABLET ORAL at 18:06

## 2022-06-01 ENCOUNTER — TELEPHONE (OUTPATIENT)
Dept: GYNECOLOGIC ONCOLOGY | Facility: CLINIC | Age: 47
End: 2022-06-01

## 2022-06-01 ENCOUNTER — TELEPHONE (OUTPATIENT)
Dept: PALLIATIVE CARE | Facility: CLINIC | Age: 47
End: 2022-06-01

## 2022-06-01 NOTE — TELEPHONE ENCOUNTER
Attempted to check on patient. Voicemail box full. Patient seen in ED yesterday for chest pain and anxiety. She is scheduled for a follow-up with psychiatrist, Angelica Nolen later today.

## 2022-06-01 NOTE — TELEPHONE ENCOUNTER
Caller: Rosa Sloan    Relationship: Mother    Best call back number: [phone]    Who are you requesting to speak with (clinical staff, provider,  specific staff member): CLINICAL      What was the call regarding: PATIENTS  MOTHER CALLED, STATED PATIENTS PHONE IS LOCKED AND THEY CAN NOT GET INTO IT, THEY WERE WAITING ON  CALL FROM PALLIATIVE CARE REGARDING HER MEDICATIONS. PLEASE CALL MOTHERS PHONE. PATIENTS PHONE SHOULD BE FIXED TOMORROW. PATIENTS MOTHER STATED PATIENT IS DISORENTED AND SHE IS SAYING THINGS THAT DON'T MAKE SENSE. HOSPITAL SENT HER HOME LAST NIGHT, PATIENT WAS AT [facility]. MOTHER IS UPSET AND NOT SURE WHAT TO DO TO HELP HER RIGHT NOW. PLEASE CALL TO ADVISE.         Do you require a callback: YES    PALLIATIVE CARE DID TRY AND REACH PATIENT PER OTHER MESSAGE, BUT CALLED THE PATIENTS PHONE WHICH THEY ARE NOT ABLE TO UNLOCK AND GET ANY MESSAGES. CAN YOU GIVE MOTHERS NUMBER TO PALLIATIVE CARE, SO THEY CAN REACH PATIENT

## 2022-06-01 NOTE — TELEPHONE ENCOUNTER
Spoke with patient's mother over the phone. She has several concerns regarding her daugther. The patient has been restless and anxious over the past several days. Yesterday, the patient c/o chest pain so the mother brought her to the ED.  The patient became aggravated in the car ride over and tryied to get out of a moving car. The mother called EMS to take her to the hospital. While in the ED, the patient's chest pain work up was negative. She received fluids and IV Ativan and calmed down some. Today the patient has been in bed most of the day.      Spoke with the patient briefly. She was unable to articulate how she is feeling. She stated that she did not feel well but would not give details. She missed her appointment with psychiatrist, Angelica Nolen today and was not interested in rescheduling.     Recommend the patient's mother administer patient's pain and anxiety medications to make sure she is taking them correctly. If patient develops new or worsening symptoms she should return to the ED for evaluation.

## 2022-06-02 ENCOUNTER — PATIENT OUTREACH (OUTPATIENT)
Dept: CASE MANAGEMENT | Facility: OTHER | Age: 47
End: 2022-06-02

## 2022-06-02 LAB
QT INTERVAL: 370 MS
QTC INTERVAL: 477 MS

## 2022-06-02 NOTE — OUTREACH NOTE
AMBULATORY CASE MANAGEMENT NOTE    Name and Relationship of Patient/Support Person: Rosa Sloan - Mother    Patient Outreach    Spoke briefly with mother, she and Gia were out getting cell phone password reset.  States today is better than yesterday.  Gia is very anxious but in control.  Mother unable to talk at this time, ACM to call back next week. Patient aware of my call but did not wish to talk at this time.        AMRIT KEITA  Ambulatory Case Management    6/2/2022, 13:23 EDT

## 2022-06-10 ENCOUNTER — TELEPHONE (OUTPATIENT)
Dept: GYNECOLOGIC ONCOLOGY | Facility: CLINIC | Age: 47
End: 2022-06-10

## 2022-06-10 NOTE — TELEPHONE ENCOUNTER
Caller: VANDANA    Relationship to patient: [facility]    Best call back number: [phone]    Patient is needing: RECORDS FAXED TO [facility] IN [place] [phone] FOR SECOND OPINION. PLEASE FAX ATTN: DR. LOPEZ AND INCLUDE PATH REPORT AND ANY IMAGING THAT HAS BEEN DONE AS WELL AS OFFICE NOTES.

## 2022-06-10 NOTE — PROGRESS NOTES
Palliative Clinic Note      Name: Jeana Chung RN  Age: 47 y.o.  Sex: female  : 1975  MRN: [id number]  Date of Service: 22  Medical Oncologist: Dr. Rubio  Mode of visit: Telephone  Location of patient: Home    The patient has chosen to receive care through a telehealth visit.   Does the patient consent to using video/audio connection for their medical care today? Yes   No technical issues occurred during this visit.     Subjective:    Chief Complaint: Anxiety, neuropathy      History of Present Illness: Jeana Chung RN is a 47 y.o. female with past medical history significant for HTN, KIKA, T2DM, CKD stage III, cirrhosis, endometrial cancer, mood disorder who presents via telephone today as a follow up for pain and symptom management.     Treatment summary: Patient initially diagnosed with endometrial cancer in 2020. She underwent an exploratory laparotomy, total abdominal hysterectomy, bilateral salpingo-oophorectomy with optimal debulking on 2/10/20. She completed 3 cycles of chemotherapy in 2020. Progression found in 2021 and she restarted chemotherapy in 2021 with Keytruda and Lenvima. Treatment complicated by recent COVID-19 infection and thrombocytopenia. Repeat imaging concerning for progression. Unable to pursue chemotherapy due to low platelet count. Patient uninterested in additional palliative radiation. She is getting a second opinion at [facility]. ED visit on 22 for anxiety with panic attack.      Symptoms: The patient's main complaints today include anxiety and neuropathy. Since the ED visit last month, the patient reports intermittent anxiety with associated somatic symptoms including palpitations, dyspnea and chest pain. She has been taking Lorazepam 0.5 mg tab ~ 2-3 x a day for this. She missed her last appointment with behavioral health APRNAngelica and requests a refill today. She feels that the anxiety has been worse due to the stress of her  living situation. She shares that she is torn between staying in [place] with her mom and staying with in [place] with her  and friends. The anxiety affects her sleep, appetite and level of pain.  Patient's pain seems to be worse when her anxiety is elevated.  The patient reports pain in her LLE secondary to an open wound and pain in her pelvis related to cancer that occasionally radiates into her back. The neuropathy in her feet is her biggest concern. She is currently taking oxycodone 5 mg tablets 1-2 times a day in addition to the gabapentin 600 mg 3 times a day. Her pain is worse at night. Patient uses cane to ambulate. No complaints of nausea, vomiting, or constipation.. Her weight is up and down due to peripheral edema related to her cirrhosis. She follows with a specialist at [facility]. Patient has a wound on her LLE that is followed by wound care at [facility].     Pyschosocial: The patient travels back and forth between her  in [place] and her parents in [place]. She has no children. She is a retired nurse. The patient enjoys crafting and laying out by the pool. She endorses a strong support system and great friends. The patient admits to increased anxiety and depression as detailed above. Patient complains of prolonged drowsiness with Lorazepam 1 mg tablet. She has tried several SSRIs and SNRIs in the past but they made her feel like a zombie.     Spiritual: Spiritual rather than Restorationist.     Goals: Travel while she feels up to it.     The following portions of the patient's history were reviewed and updated as appropriate: allergies, current medications, past family history, past medical history, past social history, past surgical history and problem list.    ORT-R: Low risk  Decisional capacity: Full  ECOG: (2) Ambulatory and capable of self care, unable to carry out work activity, up and about > 50% or waking hours   Palliative Performance Scale Score: 70%      Objective:    Psychiatric: Appropriate affect, cooperative  Neurologic: Oriented x 3, Cranial Nerves grossly intact to confrontation, speech clear    Medication Counts: Collected over the phone. See bottom of note for details. No misuse or overuse evident.   OLENA:  #[id number]. Reviewed. All providers are part of the care team.  UDS(Y): Last 3/15/22. Reviewed. Appropriate.      Assessment & Plan:    1. Endometrial cancer (HCC)  --Currently unable to recieve chemotherapy due to low platelet count. Patient uninterested in additional palliative radiation. She is getting a second opinion at [facility].    2. Cancer related pain  --Continue oxycodone 5 mg tab q4h PRN. Next refill sent to pharmacy in [place].     3. Neuropathy  --Patient asked about increasing dose of gabapentin however we discussed the side effects of the medication and she does not want to worsen her kidney function. Patient would like to continue 600 mg TID. Next refill sent to pharmacy in [place].     4. Anxiety  --Encouraged the patient to live wherever she has the highest quality of life. Encouraged patient to keep appointment with Angelica Nolen to discuss worsening anxiety.  In the meantime, will send a refill for LORazepam (ATIVAN) 0.5 MG tablet; Take 1 tablet by mouth Daily As Needed for Anxiety.    Code status: Full code  Medical interventions: Full  Advanced directives: Patient interested    Return in about 1 month (around 7/13/2022) for telephone.    I spent 40 minutes caring for Jeana Chung RN on this date of service. This time includes time spent by me in the following activities: preparing for the visit, reviewing tests, obtaining and/or reviewing a separately obtained history, performing a medically appropriate examination and/or evaluation , counseling and educating the patient/family/caregiver, ordering medications, tests, or procedures, documenting information in the medical record, independently interpreting results and communicating that  information with the patient/family/caregiver, and care coordination    Enedina Guillen PA-C  06/13/2022    Medication Date Filled # Filled Count Used # Days  TAHIR   Lorazepam 0.5 5/25/22 30 4 26 19 1-2   Gabapentin 600 6/2/22 90   11 3   Oxycodone 5 6/2/22 180   11 1-2

## 2022-06-13 ENCOUNTER — OFFICE VISIT (OUTPATIENT)
Dept: PALLIATIVE CARE | Facility: CLINIC | Age: 47
End: 2022-06-13

## 2022-06-13 DIAGNOSIS — G89.3 CANCER RELATED PAIN: Primary | ICD-10-CM

## 2022-06-13 DIAGNOSIS — F41.9 ANXIETY: ICD-10-CM

## 2022-06-13 DIAGNOSIS — G89.3 CANCER RELATED PAIN: ICD-10-CM

## 2022-06-13 DIAGNOSIS — C54.1 ENDOMETRIAL CANCER: Primary | ICD-10-CM

## 2022-06-13 DIAGNOSIS — G62.9 NEUROPATHY: ICD-10-CM

## 2022-06-13 PROCEDURE — 99215 OFFICE O/P EST HI 40 MIN: CPT | Performed by: PHYSICIAN ASSISTANT

## 2022-06-13 RX ORDER — LORAZEPAM 0.5 MG/1
0.5 TABLET ORAL DAILY PRN
Qty: 30 TABLET | OUTPATIENT
Start: 2022-06-13

## 2022-06-13 RX ORDER — GABAPENTIN 600 MG/1
600 TABLET ORAL 3 TIMES DAILY
Qty: 90 TABLET | Refills: 0 | Status: SHIPPED | OUTPATIENT
Start: 2022-07-02 | End: 2022-08-22 | Stop reason: SDUPTHER

## 2022-06-13 RX ORDER — LORAZEPAM 0.5 MG/1
0.5 TABLET ORAL DAILY PRN
Qty: 30 TABLET | Refills: 0 | Status: SHIPPED | OUTPATIENT
Start: 2022-06-13 | End: 2022-08-24

## 2022-06-13 RX ORDER — OXYCODONE HYDROCHLORIDE 5 MG/1
5 TABLET ORAL EVERY 4 HOURS PRN
Qty: 180 TABLET | Refills: 0 | Status: SHIPPED | OUTPATIENT
Start: 2022-07-02 | End: 2022-07-12 | Stop reason: DRUGHIGH

## 2022-06-13 NOTE — TELEPHONE ENCOUNTER
Mal # [id number] reviewed. Next refill for oxycodone 5 mg tab sent to Pharmacy in [place]. The patient will follow up in 1 month.

## 2022-06-14 ENCOUNTER — OFFICE VISIT (OUTPATIENT)
Dept: PSYCHIATRY | Facility: CLINIC | Age: 47
End: 2022-06-14

## 2022-06-14 DIAGNOSIS — F41.1 GENERALIZED ANXIETY DISORDER: ICD-10-CM

## 2022-06-14 DIAGNOSIS — F41.0 PANIC DISORDER: ICD-10-CM

## 2022-06-14 DIAGNOSIS — F33.1 MODERATE EPISODE OF RECURRENT MAJOR DEPRESSIVE DISORDER: Primary | ICD-10-CM

## 2022-06-14 PROCEDURE — 99443 PR PHYS/QHP TELEPHONE EVALUATION 21-30 MIN: CPT | Performed by: NURSE PRACTITIONER

## 2022-06-14 NOTE — PROGRESS NOTES
Subjective   Jeana Chung RN is a 47 y.o. female who is here for therapy, You have chosen to receive care through a telephone visit. Do you consent to use a telephone visit for your medical care today? Yes.     TIME IN:0254  TIME OUT: 325    I spent 30 minutes in patient care: reviewing records prior to the visit, assessing the patient, entering orders and documentation.    PATIENT AT: THEIR PLACE OF RESIDENCE in [place], [place]    PROVIDER AT:   [facility]/BEHAVIORAL HEALTH  [facility] [facility], [address]  [place] [address]      Therapy:  Start Time:254    Stop Time: 325    (30 ) minutes was spent for psychotherapy. Assisted patient in processing patient's JACOBO, MDD. Acknowledged and normalized patient's thoughts, feelings, and concerns. Utilized cognitive behavioral therapy to assist the patient in recognizing more appropriate coping mechanisms when she becomes agitated/anxious/sad which are proven effective in reducing the severity of frequency of symptoms.     CLINICAL MANUEVERING/INTERVENTION:   Patient talked about current stressors, primarily having a difficult time dealing with her mother and sister insisting she live in [place], [place] while she is  and  and her home are in [place], [place]. She has been home with her  for past two weeks and going well. She is also going to get  A second opinion at [facility] which has a medical college and is in [place], [place] about an hour from her.  Venting of frustrations and feeling like she is pulled in two different directions was conducted. Feelings were processed and validated, both negative and positive. Flushing out worries and concerns about stating what she wants was conducted in order to diminish emotional tension. Rehearsed script of what patient could say to her mother and setting boundaries was reviewed and verbalized out loud.  Processing physical condition was  conducted. Ways in which patient may take stress off herself in a purposeful manner was discussed. Patient was assisted in 'talking out' what she may do if health continues to be a challenge, keeping in mind the notion that there is typically a solution to any given problem such as comfort measures, social support, relational strengthening. Utilized motivational interviewing techniques including complex reflections to attempt to assist the patient and focusing on the positive and to maintain and encourage calm outlook.  The patient expressed gratitude for today's session and said that counseling helps her feel better.      The following portions of the patient's history were reviewed and updated as appropriate: allergies, current medications, past family history, past medical history, past social history, past surgical history and problem list.    Review of Systems  A 14 point review of systems was performed and is negative except as noted above.    Objective   Physical Exam  There were no vitals taken for this visit.    Allergies   Allergen Reactions   • Bactrim [Sulfamethoxazole-Trimethoprim] Anaphylaxis   • Promethazine Mental Status Change, Anaphylaxis, Anxiety and Other (See Comments)     IV causes anxiety, oral is fine  IV only   • Penicillins Hives and Rash       Current Medications:   Current Outpatient Medications   Medication Sig Dispense Refill   • Continuous Blood Gluc Sensor (Dudayle Michelle Sensor System) Every 14 (Fourteen) Days. 2 each 11   • dexamethasone (DECADRON) 4 MG tablet Take 2 tablets in the morning and 2 tablets in the evening the day before chemo, the day of chemo and the day after chemo. 12 tablet 5   • [START ON 7/2/2022] gabapentin (NEURONTIN) 600 MG tablet Take 1 tablet by mouth 3 (Three) Times a Day. 90 tablet 0   • hydroCHLOROthiazide (HYDRODIURIL) 12.5 MG tablet Take 1 tablet by mouth Daily. 14 tablet 0   • Insulin Glargine (LANTUS SOLOSTAR) 100 UNIT/ML injection pen Inject 6 Units  under the skin into the appropriate area as directed Every Night for 30 days. 1 pen 1   • letrozole (FEMARA) 2.5 MG tablet Take 1 tablet by mouth Daily. 30 tablet 3   • LORazepam (ATIVAN) 0.5 MG tablet Take 1 tablet by mouth Daily As Needed for Anxiety. 30 tablet 0   • meclizine (ANTIVERT) 25 MG tablet Take 1 tablet by mouth 3 (Three) Times a Day As Needed for Dizziness or Nausea. 30 tablet 3   • MULTIPLE VITAMIN PO Multiple Vitamin     • naloxone (NARCAN) 4 MG/0.1ML nasal spray 1 spray into the nostril(s) as directed by provider As Needed (opioid overdose). 1 each 0   • omeprazole (PrilOSEC) 20 MG capsule Take 1 capsule by mouth Daily. 90 capsule 1   • ondansetron (ZOFRAN) 8 MG tablet Take 1 tablet by mouth 3 (Three) Times a Day As Needed for Nausea or Vomiting. 30 tablet 5   • [START ON 7/2/2022] oxyCODONE (ROXICODONE) 5 MG immediate release tablet Take 1 tablet by mouth Every 4 (Four) Hours As Needed for Moderate Pain  or Severe Pain  for up to 30 days. 180 tablet 0   • Polyethylene Glycol 3350 (MIRALAX PO) Take 17 g by mouth Daily.     • Scopolamine (Transderm-Scop, 1.5 MG,) 1 MG/3DAYS patch Place 1 patch on the skin as directed by provider Every 72 (Seventy-Two) Hours. 10 patch 1   • spironolactone (Aldactone) 50 MG tablet Take 3 tablets by mouth Daily. 90 tablet 5   • ursodiol (ACTIGALL) 500 MG tablet Take 1 tablet by mouth Every Evening. 30 tablet 5     No current facility-administered medications for this visit.       Lab Results: reviewed most recent      Appearance: na  Hygiene:  REPORTS good  Cooperation:  Cooperative  Eye Contact:  na  Psychomotor Behavior:  denies psychomotor agitation/retardation, No EPS, No motor tics  Mood: anxious sad  Affect:  Congruent   Hopelessness: Denies  Speech:  Normal  Thought Process:  Linear  Thought Content:  Normal  Concentration: Normal   Suicidal: denies  Homicidal:  None  Hallucinations:  None  Delusion:  None  Memory:  Intact  Orientation:  Person, Place, Time and  Situation  Reliability:  good  Insight:  Fair to poor   Judgement: good  Impulse Control: good  Estimated Intelligence: average range    OLENA REVIEWED NO RED FLAGS    Assessment & Plan   Diagnoses and all orders for this visit:    1. Moderate episode of recurrent major depressive disorder (HCC) (Primary)    2. Generalized anxiety disorder    3. Panic disorder      IMPRESSION:  Has a lot of conflict between her mother wanting her to move to [place], [place] and patient wanting to live with her  in [place]      PLAN:  Confronted pt about where she wants to live and make it known and setting her boundaries.       Provide Cognitive Behavioral Therapy and Solution Focused Therapy to improve functioning, maintain stability, and avoid decompensation and the need for higher level of care.    Assisted patient in identifying risk factors which would indicate the need for higher level of care including thoughts to harm self or others and/or self-harming behavior and encouraged patient to contact this office, call 911, or present to the nearest emergency room should any of these events occur. Discussed crisis intervention services and means to access.  Patient adamantly and convincingly denies current suicidal or homicidal ideation or perceptual disturbance.    Treatment Plan: stabilize mood, patient will stay out of psychiatric hospital and be at optimal level of functioning with therapy and take all medication as prescribed. Patient verbalized  understanding and agreement to plan.    Instructed to call for questions or concerns and return early if necessary.     Greater than 50% time was spent in coordination of care, and counseling the patient regarding current assessment, symptoms, plan of care going forward, supportive therapy.  Answered any questions patient had regarding medications and plan of care.    No follow-ups on file.

## 2022-06-22 ENCOUNTER — TELEPHONE (OUTPATIENT)
Dept: ONCOLOGY | Facility: OTHER | Age: 47
End: 2022-06-22

## 2022-06-22 NOTE — TELEPHONE ENCOUNTER
[facility] CALLING TO CHECK ON REQ FOR PTS MED RECS - GAVE RELEASE OF INFO DEPT PH AND FAX NUMBER.

## 2022-07-01 ENCOUNTER — PATIENT OUTREACH (OUTPATIENT)
Dept: CASE MANAGEMENT | Facility: OTHER | Age: 47
End: 2022-07-01

## 2022-07-01 NOTE — OUTREACH NOTE
AMBULATORY CASE MANAGEMENT NOTE    Name and Relationship of Patient/Support Person: Rosa Sloan - Mother    Patient Outreach    Unable to reach patient X 4, service declined.    AMRIT KEITA  Ambulatory Case Management    7/1/2022, 10:48 EDT

## 2022-07-12 ENCOUNTER — OFFICE VISIT (OUTPATIENT)
Dept: PALLIATIVE CARE | Facility: CLINIC | Age: 47
End: 2022-07-12

## 2022-07-12 VITALS — HEIGHT: 62 IN | BODY MASS INDEX: 51.53 KG/M2 | WEIGHT: 280 LBS

## 2022-07-12 DIAGNOSIS — F41.9 ANXIETY: ICD-10-CM

## 2022-07-12 DIAGNOSIS — C54.1 ENDOMETRIAL CANCER: Primary | ICD-10-CM

## 2022-07-12 DIAGNOSIS — G89.3 CANCER RELATED PAIN: ICD-10-CM

## 2022-07-12 DIAGNOSIS — G89.3 CANCER RELATED PAIN: Primary | ICD-10-CM

## 2022-07-12 DIAGNOSIS — G62.9 NEUROPATHY: ICD-10-CM

## 2022-07-12 PROCEDURE — 99214 OFFICE O/P EST MOD 30 MIN: CPT | Performed by: PHYSICIAN ASSISTANT

## 2022-07-12 RX ORDER — OXYCODONE HYDROCHLORIDE 10 MG/1
10 TABLET ORAL EVERY 8 HOURS PRN
Qty: 90 TABLET | Refills: 0 | Status: SHIPPED | OUTPATIENT
Start: 2022-07-12 | End: 2022-08-11

## 2022-07-12 NOTE — TELEPHONE ENCOUNTER
I have reviewed patient's OLENA report prior to prescribing Schedule II, III, and IV medications. Request # [id number]. Refill for oxycodone 10 mg tab q8h PRN sent to pharmacy. Pt will follow up in 1 month.

## 2022-07-12 NOTE — PROGRESS NOTES
Palliative Clinic Note      Name: Jeana Chung RN  Age: 47 y.o.  Sex: female  : 1975  MRN: [id number]  Date of Service: 22  Medical Oncologist: Joanne   Mode of visit: Telephone  Location of patient: Home    The patient has chosen to receive care through a telehealth visit.   Does the patient consent to using video/audio connection for their medical care today? Yes   No technical issues occurred during this visit.     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     Treatment summary: Patient initially diagnosed with endometrial cancer in 2020. She underwent an exploratory laparotomy, total abdominal hysterectomy, bilateral salpingo-oophorectomy with optimal debulking on 2/10/20. She completed 3 cycles of chemotherapy in 2020. Progression found in 2021 and she restarted chemotherapy in 2021 with Keytruda and Lenvima. Treatment complicated by recent COVID-19 infection and thrombocytopenia. Repeat imaging concerning for progression. Unable to pursue chemotherapy due to low platelet count. Patient uninterested in additional radiation. She is in the process of getting a second opinion at [facility].    Pain: Patient complains of increased pain in her back and right hip.  A pain is impacting her ability to ambulate and she is now using a cane.  On days when the pain is more severe she has taken 2 tablets of oxycodone 5 mg and noticed improvement in her function.  She continues to take gabapentin 600 mg 3 times a day for her neuropathy.     Symptoms: Patient's anxiety is about the same.  She takes 0.5 mg tablets as needed for this prescribed by behavioral health APRN, Angelica Nolen. Patient received cognitive behavioral  therapy 6/14/22.  Patient requested an increase in her Ativan dose today.  She also complains of nausea and vomiting.  She has an upcoming appointment with her GI specialist at [facility] to discuss this.  She is currently taking Zofran, meclizine and scopolamine for the nausea.  Patient's appetite has decreased.  She has been eating small portions due to having increased nausea.  She attributes some of this to her cirrhosis and abdominal distention. Patient also follows with wound care at [facility].     Pyschosocial: The patient travels back and forth between her  in [place] and her parents in [place]. She has no children. She is a retired nurse. The patient enjoys crafting and laying out by the pool. She endorses a strong support system and great friends. The patient admits to increased anxiety and depression as detailed above. The patient reports trying several SSRIs and SNRIs in the past however unable to tolerate due to feeling like a zombie.      Spiritual: Spiritual rather than Voodoo.     Goals: Travel while she feels up to it.    The following portions of the patient's history were reviewed and updated as appropriate: allergies, current medications, past family history, past medical history, past social history, past surgical history and problem list.    ORT-R: Low   Decisional capacity: Full  ECOG: (1) Restricted in physically strenuous activity, ambulatory and able to do work of light nature   Palliative Performance Scale Score: 70%     Objective:    Psychiatric: Appropriate affect, cooperative  Neurologic: Oriented x 3, Cranial Nerves grossly intact to confrontation, speech clear    Medication Counts: Collected over the phone. See bottom of note for details. No misuse or overuse evident.   I have reviewed the patient's [place] PDMP. [facility] Req #[id number].   UDS(Y): Last 3/15/22. Reviewed. Appropriate.      Assessment & Plan:    1. Endometrial cancer (HCC)  - Patient is  in the process of getting a second opinion at [facility]. She plans to return to [place] for appointment with Dr. Rubio on 7/27/22.    2. Cancer related pain  - Side effects of the medication discussed at every visit. Patient is appropriate for opioid therapy due to cancer related pain. Improved function and quality of life with oxycodone 10 mg every 8-12 hours as needed. Refill sent to pharmacy in [place].     3. Neuropathy  - Continue gabapentin 600 mg TID.     4. Anxiety  - Defer changes to mood stabilizers to behavioral health. Encouraged patient to schedule follow up with Angelica Nolen to discuss this and continue CBT.     Code status: Full code  Medical interventions: Full  Advanced directives: Patient interested    Return in about 1 month (around 8/12/2022) for telephone .    I spent 30 minutes caring for Jeana Chung RN on this date of service. This time includes time spent by me in the following activities: preparing for the visit, reviewing tests, obtaining and/or reviewing a separately obtained history, performing a medically appropriate examination and/or evaluation , counseling and educating the patient/family/caregiver, ordering medications, tests, or procedures, documenting information in the medical record, independently interpreting results and communicating that information with the patient/family/caregiver, and care coordination    Enedina Guillen PA-C  07/12/2022    Medication Date Filled # Filled Count Used # Days  TAHIR   Oxycodone 5 6/2/22 180 38 142 40 3-4   Gabapentin 600 7/2/22 90 --

## 2022-07-18 ENCOUNTER — TELEPHONE (OUTPATIENT)
Dept: FAMILY MEDICINE CLINIC | Facility: CLINIC | Age: 47
End: 2022-07-18

## 2022-07-18 DIAGNOSIS — G62.9 NEUROPATHY: ICD-10-CM

## 2022-07-18 DIAGNOSIS — C54.1 ENDOMETRIAL CANCER: ICD-10-CM

## 2022-07-18 RX ORDER — ONDANSETRON HYDROCHLORIDE 8 MG/1
8 TABLET, FILM COATED ORAL 3 TIMES DAILY PRN
Qty: 30 TABLET | Refills: 5 | Status: SHIPPED | OUTPATIENT
Start: 2022-07-18 | End: 2022-09-07 | Stop reason: SDUPTHER

## 2022-07-18 NOTE — TELEPHONE ENCOUNTER
Patient called to request medical notes be sent ot [facility] at [phone]. Also requested Jolly to call her to discuss medications. I informed her that jolly is not in this week and she verbalized understanding. Sent records to number.

## 2022-07-22 RX ORDER — LACTULOSE 10 G/15ML
20 SOLUTION ORAL 2 TIMES DAILY PRN
Qty: 946 ML | Refills: 2 | Status: SHIPPED | OUTPATIENT
Start: 2022-07-22

## 2022-07-26 ENCOUNTER — TELEPHONE (OUTPATIENT)
Dept: FAMILY MEDICINE CLINIC | Facility: CLINIC | Age: 47
End: 2022-07-26

## 2022-07-26 NOTE — TELEPHONE ENCOUNTER
Caller: Jeana Chung, RN    Relationship: Self    Best call back number: [phone]    What is the medical concern/diagnosis: WOUND ON RIGHT BIG TOE    What specialty or service is being requested: WOUND CARE    What is the office location: [facility]    What is the office phone number: FAX #: [phone]

## 2022-07-27 NOTE — TELEPHONE ENCOUNTER
Patient is seeing oncologist today in [place].  Hopefully, she can get referral from her office.  If not, recommend she make appointment with someone in our office or go to [facility].

## 2022-07-27 NOTE — TELEPHONE ENCOUNTER
Contacted patient to discuss further, she states she has moved 9 hours away, still in the state of [place]. She has not been able to establish with a new provider yet and is panicked about her worsening wound.     I advised her that PCP is unable to assess the wound adequately via telehealth. She states she will follow up with her oncologist and request wound care from the specialist.

## 2022-07-27 NOTE — TELEPHONE ENCOUNTER
Contacted patient to notify. Verbalized understanding, she will see her oncologist tomorrow to discuss referral    She is unable to come in office she states she moved 9 hours away

## 2022-08-22 ENCOUNTER — TELEPHONE (OUTPATIENT)
Dept: ONCOLOGY | Facility: CLINIC | Age: 47
End: 2022-08-22

## 2022-08-22 DIAGNOSIS — G62.9 NEUROPATHY: ICD-10-CM

## 2022-08-22 DIAGNOSIS — G89.3 CANCER RELATED PAIN: Primary | ICD-10-CM

## 2022-08-22 RX ORDER — OXYCODONE HYDROCHLORIDE 5 MG/1
5 TABLET ORAL EVERY 6 HOURS PRN
Qty: 28 TABLET | Refills: 0 | Status: SHIPPED | OUTPATIENT
Start: 2022-08-22 | End: 2022-08-24 | Stop reason: SDUPTHER

## 2022-08-22 RX ORDER — OXYCODONE HYDROCHLORIDE 5 MG/1
5 TABLET ORAL EVERY 4 HOURS PRN
Qty: 180 TABLET | Refills: 0 | Status: CANCELLED | OUTPATIENT
Start: 2022-08-22

## 2022-08-22 RX ORDER — GABAPENTIN 600 MG/1
600 TABLET ORAL 3 TIMES DAILY
Qty: 90 TABLET | Refills: 0 | Status: SHIPPED | OUTPATIENT
Start: 2022-08-22 | End: 2022-10-05 | Stop reason: SDUPTHER

## 2022-08-22 NOTE — TELEPHONE ENCOUNTER
The patient called and left a message regarding refills.  She has 2 prescriptions at the [facility] in [place] that she is needing to be transferred to the [facility] in [place], Oxycodone and Gabapentin. Patient states that she will be moving back to [place] but she is unsure when that will be.   I discussed with her that she had an appointment that was missed with Palliative care and we need to keep those appointments in order for treatment to continue. She states she did not know of her last appointment but that she has an appointment for 2 days from now 08/24/2022 that she will keep.    [facility] #:[id number]    Medication requested: Oxycodone and Gabapentin    Last fill date: 06/02/2022    Last appointment: 07/12/2022    Next appointment: 08/24/2022

## 2022-08-22 NOTE — TELEPHONE ENCOUNTER
PATIENT CALLED AND STATED THAT SHE SPOKE W/ [facility] LOCATED NEAR HER AND THAT THEY WOULD NOT BE ABLE TO TRANSFER SCRIPTS FOR DUSTIN LEONARD AS THEY WERE CONTROLLED. PATIENT HAD REFILLS TO BE PICKED UP BUT THEY WERE AT [facility] IN [place]. PLEASE CONTACT PATIENT ONCE THIS IS COMPLETE. PATIENT SCHEDULED A TELEVISIT FOR 08/24. PLEASE ADVISE.

## 2022-08-23 NOTE — PROGRESS NOTES
Palliative Clinic Note      Name: Jeana Chung RN  Age: 47 y.o.  Sex: female  : 1975  MRN: [id number]  Date of Service: 22  Medical Oncologist: Joanne  Mode of visit: Telephone  Location of patient: Home    The patient has chosen to receive care through a telehealth visit.   Does the patient consent to using video/audio connection for their medical care today? Yes   No technical issues occurred during this visit.     Subjective:    Chief Complaint: Anorexia     History of Present Illness: Jeana Chung RN is a 47 y.o. female with past medical history significant for HTN, KIKA, T2DM, CKD stage III, cirrhosis, endometrial cancer, mood disorder who presents via telephone today as a follow up for pain and symptom management.     Treatment summary: Patient initially diagnosed with endometrial cancer in 2020. She underwent an exploratory laparotomy, total abdominal hysterectomy, bilateral salpingo-oophorectomy with optimal debulking on 2/10/20. She completed 3 cycles of chemotherapy in 2020. Progression found in 2021 and she restarted chemotherapy in 2021 with Keytruda and Lenvima. Treatment held for several months due to thrombocytopenia. Progression seen on imaging in 2022. Patient uninterested in additional radiation. [facility] with [facility] provider [facility] and has a follow-up scheduled for 22.      Pain: Patient complains of pain in her back and right hip. The pain affects her mobility and she is now using a cane for assistance. She states her pain gets worse as the day goes on. She has been taking approximately 2-3 oxycodone 5 mg tablets per day. Patient feels she is able to maintain her daily function and quality of life with this regimen. She also takes gabapentin 600 mg 3 times a day for neuropathy.     Other symptoms: The patient is recovering from COVID-19. She reports significant symptoms for 3 weeks. She is finally feeling back to normal.  The nausea and vomiting  has resolved however she continues to struggle with a poor appetite. She states she has lost a lot of weight since moving to [place] at the beginning of the summer. She also complains of constipation and attributes this to noncompliance with her bowel regimen. She has had to take lactulose to stimulate a BM. Her sleep schedule varies and she occasionally takes naps in the afternoon.      Pyschosocial: The patient is currently living in [place] with her  but is planning on moving back to [place]. She recently found out her  was unfaithful. They have no children. She is a retired nurse. The patient enjoys crafting and laying out by the pool. She endorses a strong support system and great friends. The patient admits to anxiety and depression. The patient reports trying several SSRIs and SNRIs in the past however unable to tolerate due to feeling like a zombie. The patient feels she is managing her mood well on her own despite increased marital stress. She has stopped the Lorazepam. She did not like being dependent on the medication.      Spiritual: Spiritual rather than Quaker.     Goals: Travel while she feels up to it.    The following portions of the patient's history were reviewed and updated as appropriate: allergies, current medications, past family history, past medical history, past social history, past surgical history and problem list.    ORT-R: Low   Decisional capacity: Full  ECOG: (1) Restricted in physically strenuous activity, ambulatory and able to do work of light nature   Palliative Performance Scale Score: 70%     Objective:    Psychiatric: Appropriate affect, cooperative  Neurologic: Oriented x 3, Cranial Nerves grossly intact to confrontation, speech clear    Medication Counts: Collected over the phone. See bottom of note for details. No misuse or overuse evident.   I have reviewed the patient's [place] PDMP. OLENA Req #[id number].   UDS(Y): Last 3/15/22. Reviewed. Appropriate.    Assessment &  Plan:    1. Endometrial cancer (HCC)  - Met with a GynOnc provider at [facility] and has a follow-up scheduled for 9/1/22.     2. Cancer related pain  - Side effects of the medication discussed at every visit. Patient is appropriate for opioid therapy due to cancer related pain. Daily function and QoL maintained with oxycodone 5 mg q6h PRN. Next refill sent to pharmacy in [place].     3. Anorexia  - Patient interested in starting an appetite stimulant for this. Will start patient on Megace 20 mL daily. Encouraged patient to eat protein-rich foods and supplement when necessary.     4. Constipation due to pain medication  - Recommend patient restart bowel regimen and encouraged compliance.     Code status: Full code  Medical interventions: Full  Advanced directives: Patient interested    Return in about 1 month (around 9/24/2022) for Office Visit.    I spent 30 minutes caring for Jeana Chung RN on this date of service. This time includes time spent by me in the following activities: preparing for the visit, reviewing tests, obtaining and/or reviewing a separately obtained history, performing a medically appropriate examination and/or evaluation , counseling and educating the patient/family/caregiver, ordering medications, tests, or procedures, documenting information in the medical record, independently interpreting results and communicating that information with the patient/family/caregiver, and care coordination    Enedina Guillen PA-C  08/24/2022    Medication Date Filled # Filled Count Used # Days  TAHIR   Oxycodone 5 8/22/22 28 12 (28) -- -- 2   Gabapentin 600 8/22/22 90 22 (90) -- -- 3   Lorazepam 0.5 5/25/22 30 0 -- -- 0

## 2022-08-24 ENCOUNTER — OFFICE VISIT (OUTPATIENT)
Dept: PALLIATIVE CARE | Facility: CLINIC | Age: 47
End: 2022-08-24

## 2022-08-24 VITALS — WEIGHT: 152 LBS | HEIGHT: 63 IN | BODY MASS INDEX: 26.93 KG/M2

## 2022-08-24 DIAGNOSIS — R63.0 ANOREXIA: ICD-10-CM

## 2022-08-24 DIAGNOSIS — K59.03 CONSTIPATION DUE TO PAIN MEDICATION: ICD-10-CM

## 2022-08-24 DIAGNOSIS — C54.1 ENDOMETRIAL CANCER: Primary | ICD-10-CM

## 2022-08-24 DIAGNOSIS — G89.3 CANCER RELATED PAIN: ICD-10-CM

## 2022-08-24 PROCEDURE — 99214 OFFICE O/P EST MOD 30 MIN: CPT | Performed by: PHYSICIAN ASSISTANT

## 2022-08-24 RX ORDER — MEGESTROL ACETATE 40 MG/ML
800 SUSPENSION ORAL DAILY
Qty: 480 ML | Refills: 1 | Status: SHIPPED | OUTPATIENT
Start: 2022-08-24 | End: 2022-08-24

## 2022-08-24 RX ORDER — MEGESTROL ACETATE 40 MG/ML
SUSPENSION ORAL
Qty: 1800 ML | Refills: 1 | Status: SHIPPED | OUTPATIENT
Start: 2022-08-24

## 2022-08-24 RX ORDER — OXYCODONE HYDROCHLORIDE 5 MG/1
5 TABLET ORAL EVERY 6 HOURS PRN
Qty: 84 TABLET | Refills: 0 | Status: SHIPPED | OUTPATIENT
Start: 2022-08-29 | End: 2022-09-07 | Stop reason: SDUPTHER

## 2022-08-24 NOTE — TELEPHONE ENCOUNTER
I have reviewed patient's OLENA report prior to prescribing Schedule II, III, and IV medications. Request # [id number]. Sending the rest of this month's oxycodone prescription to [facility] in [place], [place]. Patient is scheduled to follow up in 1 month via telephone.

## 2022-08-30 ENCOUNTER — TELEPHONE (OUTPATIENT)
Dept: GYNECOLOGIC ONCOLOGY | Facility: CLINIC | Age: 47
End: 2022-08-30

## 2022-08-30 NOTE — TELEPHONE ENCOUNTER
Caller: Jose David Chung, RN    Relationship: Self    Best call back number: [phone]    Who are you requesting to speak with (clinical staff, provider,  specific staff member): CLINICAL    What was the call regarding: JOSE DAVID IS MOVING BACK AND NEEDS TO RE ESTABLISH WITH DR REYNOSO  SHE DID SEE DR LOPEZ IN JUNE    Do you require a callback: YES

## 2022-09-06 DIAGNOSIS — G89.3 CANCER RELATED PAIN: ICD-10-CM

## 2022-09-06 RX ORDER — OXYCODONE HYDROCHLORIDE 5 MG/1
5 TABLET ORAL EVERY 6 HOURS PRN
Qty: 84 TABLET | Refills: 0 | Status: CANCELLED | OUTPATIENT
Start: 2022-09-06 | End: 2022-09-27

## 2022-09-06 NOTE — TELEPHONE ENCOUNTER
PATIENT CALLED AND STATED THAT ONLY 28 PILLS WERE CALLED IN OF THE OXYCODONE AND PATIENT IS DUE TO SEE VINAY LEONARD AFTER REFILL IS NEEDED. PLEASE ADVISE.

## 2022-09-06 NOTE — TELEPHONE ENCOUNTER
Patient will see us in clinic on 09/07/22 while here for another appointment. Refill will be discussed at that visit

## 2022-09-07 ENCOUNTER — OFFICE VISIT (OUTPATIENT)
Dept: GYNECOLOGIC ONCOLOGY | Facility: CLINIC | Age: 47
End: 2022-09-07

## 2022-09-07 ENCOUNTER — OFFICE VISIT (OUTPATIENT)
Dept: PALLIATIVE CARE | Facility: CLINIC | Age: 47
End: 2022-09-07

## 2022-09-07 VITALS
HEART RATE: 111 BPM | DIASTOLIC BLOOD PRESSURE: 59 MMHG | HEIGHT: 63 IN | OXYGEN SATURATION: 99 % | WEIGHT: 149 LBS | TEMPERATURE: 97.5 F | SYSTOLIC BLOOD PRESSURE: 116 MMHG | BODY MASS INDEX: 26.4 KG/M2

## 2022-09-07 VITALS
BODY MASS INDEX: 26.39 KG/M2 | OXYGEN SATURATION: 99 % | TEMPERATURE: 97.5 F | HEART RATE: 111 BPM | WEIGHT: 149 LBS | RESPIRATION RATE: 15 BRPM | DIASTOLIC BLOOD PRESSURE: 59 MMHG | SYSTOLIC BLOOD PRESSURE: 116 MMHG

## 2022-09-07 DIAGNOSIS — G62.9 NEUROPATHY: ICD-10-CM

## 2022-09-07 DIAGNOSIS — D69.6 THROMBOCYTOPENIA: ICD-10-CM

## 2022-09-07 DIAGNOSIS — G89.3 CANCER RELATED PAIN: ICD-10-CM

## 2022-09-07 DIAGNOSIS — C54.1 ENDOMETRIAL CANCER: Primary | ICD-10-CM

## 2022-09-07 DIAGNOSIS — Z71.89 COUNSELING REGARDING END OF LIFE DECISION MAKING: ICD-10-CM

## 2022-09-07 DIAGNOSIS — F41.9 ANXIETY: ICD-10-CM

## 2022-09-07 DIAGNOSIS — R63.4 UNINTENDED WEIGHT LOSS: ICD-10-CM

## 2022-09-07 PROCEDURE — 99214 OFFICE O/P EST MOD 30 MIN: CPT | Performed by: OBSTETRICS & GYNECOLOGY

## 2022-09-07 PROCEDURE — 99214 OFFICE O/P EST MOD 30 MIN: CPT | Performed by: PHYSICIAN ASSISTANT

## 2022-09-07 RX ORDER — PROCHLORPERAZINE MALEATE 10 MG
10 TABLET ORAL EVERY 6 HOURS PRN
COMMUNITY
Start: 2022-07-18

## 2022-09-07 RX ORDER — OXYCODONE HYDROCHLORIDE 5 MG/1
5 TABLET ORAL EVERY 4 HOURS PRN
Qty: 180 TABLET | Refills: 0 | Status: SHIPPED | OUTPATIENT
Start: 2022-09-07 | End: 2022-10-05 | Stop reason: SDUPTHER

## 2022-09-07 RX ORDER — ESCITALOPRAM OXALATE 10 MG/1
10 TABLET ORAL DAILY
COMMUNITY
Start: 2022-08-01 | End: 2022-10-05 | Stop reason: SINTOL

## 2022-09-07 RX ORDER — LORAZEPAM 0.5 MG/1
0.5 TABLET ORAL EVERY 6 HOURS PRN
Qty: 60 TABLET | Refills: 5 | Status: SHIPPED | OUTPATIENT
Start: 2022-09-07 | End: 2022-09-09 | Stop reason: SDUPTHER

## 2022-09-07 RX ORDER — ONDANSETRON HYDROCHLORIDE 8 MG/1
8 TABLET, FILM COATED ORAL EVERY 8 HOURS PRN
COMMUNITY

## 2022-09-07 RX ORDER — INSULIN LISPRO 100 [IU]/ML
INJECTION, SOLUTION INTRAVENOUS; SUBCUTANEOUS
COMMUNITY
Start: 2022-08-02

## 2022-09-07 RX ORDER — URSODIOL 250 MG/1
250 TABLET, FILM COATED ORAL
COMMUNITY
End: 2022-09-07 | Stop reason: SDUPTHER

## 2022-09-07 NOTE — PATIENT INSTRUCTIONS
Check-out instructions:  Continue oxycodone 10 mg every 4 hours as needed for cancer related pain.   Scheduled to follow up in 1 month.     Medication Policy: We ask that you bring all of the medications prescribed by this clinic to every appointment. For telemedicine appointments, be prepared to give medication counts. This will assist us with managing your refill needs.      Refill Policy: You must notify us at least 3-5 business days in advance for routine refill requests. Call [phone] or send [employer] message to the Palliative Pool. Some prescriptions will need to be signed by the physician and will take longer to be sent to the pharmacy. Please also be aware of additional insurance prior authorization processing time required for many medications. Try to communicate with your pharmacy first to look for scripts signed in advance.     Communication: The [facility] Palliative [facility] days are Monday-Friday 8:30-4:30 PM. Call [phone] or send [employer] message to the Palliative Pool. You will not be routed to speak directly to the palliative provider during clinic hours, so that we may provide the best care and attention to our patients in the office. If you require immediate communication, please also consider contacting your primary care office or appropriate specialist office.

## 2022-09-07 NOTE — TELEPHONE ENCOUNTER
I have reviewed patient's OLENA report prior to prescribing Schedule II, III, and IV medications. Request # [id number]. Refill for oxycodone 10 mg tab q4h PRN #[id number] sent to pharmacy. Patient will follow up in 1 month.

## 2022-09-07 NOTE — PROGRESS NOTES
Jeana Chung RN  [id number]  1975    Reason for visit:  Recurrent endometrial cancer    History of present illness:  The patient is a 47 y.o. year old female who presents today for treatment and evaluation of the above issues.     Patient was diagnosed with biopsy-proven recurrent endometrial cancer. Attempted Keytruda however pt showed imaging findings concerning for progression, discontinued Lenvima due to thrombocytopenia, transaminitis and elevated bilirubin.  Her clinical course has been complicated due to thrombocytopenia (could not undergo Port-A-Cath insertion) and COVID infection 12/20/2021.The plan had been to start Carboplatin/Docetaxol/Bevacizumab for treatment once labs stabilize. Started on Letrozole in the meantime.     Since last visit, patient moved to [place] where she established with another Gyn Onc. Her [place] provider continued her on Letrozole for the same reasons above.   She has now relocated back to [place] due to her social situation. Today, she reports a 20 lb unintentional weight loss over 2 months, loss of appetite, early satiety, nausea, severe anxiety/ episode of panic attack, and low energy. States she has continued to take Letrozole. She understands that her illness is terminal and is aware of the option for a hospice consult in the near future. She follows with Palliative care (last seen in August) and has an additional appointment today. Palliative care provider started her on Megace for appetite, however, patient states she is not taking it as it makes her nauseated. Requesting Ativan for anxiety.       OBGYN History:  She is a G0.  She does not use HRT. She has been taking letrozole 2.5 mg daily. She has never had a pap smear.    Oncologic History:  Oncology/Hematology History   Endometrial cancer (HCC)   12/14/2019 Imaging    Presented to ED in [place] due to progressively heavy vaginal bleeding and severe back pain. Ultrasound revealed uterine and cervical masses.      1/2020 Biopsy    ED follow-up with gynecologist in [place], told she likely has cervical cancer. Insufficient tissue on attempted cervical biopsy. Patient moved to [place], [place] to be closer to mother and sister for work-up and treatment.      1/21/2020 Imaging    Gyn evaluation at [facility]. Repeat TVUS showed cervical mass, right adnexal mass, and large uterine fibroid vs mass. Referred to Gyn Oncology     1/23/2020 Initial Diagnosis    Endometrial cancer (CMS/HCC)  Cervical biopsy positive for infiltrating endometrioid adenocarcinoma     1/30/2020 Imaging    CT chest, abdomen, pelvis showed enlarged uterus with multiple masses and 4 cm cystic/solid mass at right ovary. No metastatic disease noted in abdomen or chest.     2/10/2020 Surgery    Exploratory laparotomy, total abdominal hysterectomy, bilateral salpingo-oophorectomy, with optimal debulking (R=0), and omentectomy.    Pelvic washing cytology positive for malignant cells, consistent with adenocarcinoma. Final pathology showed large grade 3 endometrioid tumor with lymphvascular invasion at the uterus. Cervical stromal involvement, vaginal margin negative. Right tube and ovary involved. Left tube and ovary negative, omentum negative. MSI normal. [id number], Stage IIIA grade 3       3/9/2020 - 7/8/2020 Chemotherapy    OP UTERINE PACLitaxel / CARBOplatin (Q21D)  Cycle #1 complicated by pancytopenia, appendicitis, appendectomy, appendiceal abscess  Cycle #2 remarkable for thrombocytopenia, worsening neuropathy.  Dose modification with cycle #3.       3/17/2020 - 3/23/2020 Other Event    Hospital admission with acute appendicitis     5/21/2020 - 5/21/2020 Chemotherapy    OP CENTRAL VENOUS ACCESS DEVICE ACCESS, CARE, AND MAINTENANCE (CVAD)     7/8/2020 - 7/28/2020 Chemotherapy    OP OVARIAN DOCEtaxel / CARBOplatin     12/5/2021 Progression    Complains of vaginal bleeding.  CT scan chest abdomen and pelvis:  IMPRESSION:  Abnormal soft tissue mass seen at the  vaginal vault with likely  extension to involve the sigmoid colon adjacent to the mass on the left.  Multiple new lung lesions including 2.5 cm medial right lower lobe nodule, 7 mm right peripheral lung nodule, to left small lung nodules measuring 1.2 cm together and smaller other lung nodules concerning for metastatic disease.  Office biopsy of vaginal mass performed 12/6/2021.  Final Diagnosis   VAGINA, BIOPSY:               Compatible with known endometrioid carcinoma.               See comment.          12/13/2021 - 3/16/2022 Chemotherapy    Course complicated by COVID infection diagnosed 12/20/2021.  Thrombocytopenia, unknown etiology.  OP ENDOMETRIAL Lenvatinib / Pembrolizumab 200 mg     1/25/2022 - 3/2/2022 Radiation    Radiation OncologyTreatment Course:  Jeana Chung RN received 4500 cGy in 25 fractions to pelvis via External Beam Radiation - EBRT.     4/6/2022 - 4/6/2022 Chemotherapy    OP OVARIAN DOCEtaxel / CARBOplatin AUC=6 / Bevacizumab 15 mg/kg           Past Medical History:   Diagnosis Date   • Anemia    • Anxiety and depression    • Back pain    • Bronchitis    • Diabetes (HCC)     DX 4-5 YRS AGO, CHECKS BS 4X/DAY, LAST A1C 7.1%   • Endometrial cancer (HCC)     STAGE II   • GERD (gastroesophageal reflux disease)    • History of radiation therapy 03/02/2022    pelvic mass   • Hypertension    • IBS (irritable bowel syndrome)    • MRSA (methicillin resistant staph aureus) culture positive 2018    S/P NECROTIZING FASCITIS IN BILATERAL FEET   • Necrotizing fasciitis (HCC)    • PCOS (polycystic ovarian syndrome)    • PTSD (post-traumatic stress disorder)    • Retinopathy 03/18/2020   • Sepsis (HCC)    • Stage 3 chronic kidney disease (HCC) 02/24/2020   • Subconjunctival hemorrhage        Past Surgical History:   Procedure Laterality Date   • APPENDECTOMY N/A 3/21/2020    Procedure: APPENDECTOMY LAPAROSCOPIC;  Surgeon: Praful Myers MD;  Location: [facility] OR;  Service: General;   Laterality: N/A;   • EXPLORATORY LAPAROTOMY, TOTAL ABDOMINAL HYSTERECTOMY SALPINGO OOPHORECTOMY N/A 2/10/2020    Procedure: EXPLORATORY LAPAROTOMY, TOTAL ABDOMINAL HYSTERECTOMY, BILATERAL SALPINGO-OOPHORECTOMY WITH OPTIMAL  STAGING (R-0), OMENTECTOMY;  Surgeon: Celine Rubio MD;  Location: [facility];  Service: Gynecology Oncology;  Laterality: N/A;   • EYE SURGERY Left     BLOOD VESSEL IN EYE REPAIR   • TOE AMPUTATION Left 2019    big toe - unhealing sore   • TOE AMPUTATION Right 2018    big toe and second toe - Necrotizing fasciitis and MRSA        MEDICATIONS: The current medication list was reviewed with the patient and updated in the EMR this date per the Medical Assistant. Medication dosages and frequencies were confirmed to be accurate.      Allergies:  is allergic to bactrim [sulfamethoxazole-trimethoprim], promethazine, penicillins, and scopolamine.    Social History:   Social History     Socioeconomic History   • Marital status:    • Number of children: 1   Tobacco Use   • Smoking status: Former Smoker     Packs/day: 0.25     Years: 0.50     Pack years: 0.12     Types: Cigarettes     Start date: 1995     Quit date: 2000     Years since quittin.7   • Smokeless tobacco: Never Used   • Tobacco comment: social MAYBE 1 CIG/DAY   Vaping Use   • Vaping Use: Never used   Substance and Sexual Activity   • Alcohol use: Not Currently   • Drug use: Never   • Sexual activity: Not Currently       Family History:    Family History   Problem Relation Age of Onset   • Fibroids Mother    • Diabetes type II Mother    • Hypertension Mother    • Heart attack Mother    • Fibroids Sister         PCOS   • Diabetes type II Sister    • Dementia Maternal Grandmother    • Stroke Maternal Grandmother        Health Maintenance:    Health Maintenance   Topic Date Due   • MAMMOGRAM  Never done   • COLORECTAL CANCER SCREENING  Never done   • Hepatitis B (1 of 3 - Risk 3-dose series) Never done   • TDAP/TD  VACCINES (1 - Tdap) Never done   • ANNUAL WELLNESS VISIT  Never done   • Pneumococcal Vaccine 0-64 (2 - PCV) 02/24/2021   • DIABETIC FOOT EXAM  02/24/2021   • DIABETIC EYE EXAM  02/24/2021   • COVID-19 Vaccine (2 - Smiley risk series) 04/17/2021   • URINE MICROALBUMIN  07/24/2021   • INFLUENZA VACCINE  10/01/2022   • HEMOGLOBIN A1C  11/24/2022   • HEPATITIS C SCREENING  Completed   • PAP SMEAR  Discontinued       Objective:  Arthralgias, back pain, myalgias, weakness, appetite suppression, fatigue, anxiety  Please refer to history of present illness.  Review of systems otherwise negative.    Physical exam:  Vitals:    09/07/22 1110   BP: 116/59   Pulse: 111   Resp: 15   Temp: 97.5 °F (36.4 °C)   SpO2: 99%   Weight: 67.6 kg (149 lb)   PainSc:   6   PainLoc: Back       Body mass index is 26.39 kg/m².    Wt Readings from Last 3 Encounters:   09/07/22 67.6 kg (149 lb)   09/07/22 67.6 kg (149 lb)   08/24/22 68.9 kg (152 lb)       GENERAL: Alert,  female in moderate distress, tearful   HEENT:  Head normocephalic, atraumatic.  CARDIOVASCULAR:  Tachycardia, No lower extremity edema  RESPIRATORY: Normal effort, breathing comfortably on room air, Clear to ascultation bilaterally  GASTROINTESTINAL: Soft, no tenderness to palpation  PSYCHIATRIC: AO x3, with appropriate affect, normal thought processes.   NEUROLOGIC: No focal deficits. Moves extremities well.  MUSCULOSKELETAL: good mobility     PELVIC exam: deferred    ECOG PS 2    PROCEDURES: None    Imaging  ED CT HEAD NO CONTRAST    Result Date: 8/8/2022  No acute intracranial abnormality. Signed By: Edilma Barrientos MD on 8/8/2022 11:32 PM    CT ABD/PELVIS-NO ORAL/IV    Result Date: 8/8/2022  1. Numerous bibasilar lung nodules suspected to reflect pulmonary metastatic disease, new since September 2020. Consider metastatic endometrial carcinoma in this clinical setting. 2. Coronary artery and aortic atherosclerotic disease. 3. Thickening of the ascending colon through mid  transverse colon may reflect a regional colitis which may be infectious, inflammatory, or possibly ischemic. Clinical correlation suggested. 4. Status post hysterectomy. 5. Moderate splenomegaly. 6. Anterior abdominal wall ventral hernia containing loops of small bowel and fat without obstruction. 7. Other findings and description as above. Signed By: Damon Dudley MD on 8/8/2022 10:28 PM    CHEST PORTABLE    Result Date: 8/8/2022  A prior chest radiograph from 7/15/2020 is reviewed. There are low lung volumes with crowding of bronchovascular markings. No acute airspace disease or large pleural effusion. No pneumothorax. Stable cardiomediastinal silhouette is within normal limits. Mild atherosclerosis thoracic aorta. No acute osseous abnormalities. Signed By: Damon Dudley MD on 8/8/2022 10:21 PM             Lab Results   Component Value Date    WBC 6.10 05/31/2022    HGB 11.2 (L) 05/31/2022    HCT 31.3 (L) 05/31/2022    MCV 91.3 05/31/2022    PLT 70 (L) 05/31/2022    NEUTROABS 5.25 05/31/2022    GLUCOSE 239 (H) 05/31/2022    BUN 20 (H) 07/28/2022    CREATININE 1.26 (H) 07/28/2022    EGFRIFNONA 79 02/22/2022     07/28/2022    K 4.7 (H) 07/28/2022     07/28/2022    CO2 26 07/28/2022    MG 1.7 02/11/2022    PHOS 2.5 03/20/2020    CALCIUM 9.7 07/28/2022    ALBUMIN 3.5 07/28/2022    AST 22 07/28/2022    ALT 9 07/28/2022    BILITOT 3.6 (H) 07/28/2022     Lab Results   Component Value Date     30.0 05/23/2022     58.0 (H) 04/13/2022     8.5 07/08/2020         Assessment & Plan   This is a 47 y.o. woman with recurrent stage IIIA endometrial cancer presenting for discussion regarding treatment.     Encounter Diagnoses   Name Primary?   • Endometrial cancer (HCC) Yes   • Thrombocytopenia (HCC)    • Unintended weight loss    • Anxiety    • Counseling regarding end of life decision making      Endometrial cancer: Stage IIIa due to adnexal involvement, now with recurrence  - Discontinued  Keytruda due to concern for progression. Lenvima discontinued due to increases in bilirubin and liver function each time she tries to reinitiate despite dose reduction   - Options for treatment limited by patient's severe neuropathy as well as current thrombocytopenia. At earlier visits, had discussed the plan to consider starting Carboplatin/Docetaxol/Bevacizumab for treatment once platelet count > 100   - As platelets continue to be low, continue to treat with letrozole at this time. Patient agreeable to this plan.  - Discussed role of hospice. Patient is aware of option to meet with Hospice and learn about their services.  - Following with Palliative care for symptom management.  Discussed prescription of benzodiazepine with palliative care team.  They are agreeable to have made take care of this prescription.    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  For patients with pain related to surgery, gynecologic malignancy or cancer treatment, the plan is as noted in the assessment/plan.  For patients with pain not related to these issues, they are to seek any further needed care from a more appropriate provider, such as PCP.      FOLLOW UP: for re-evaluation in 1 months  I spent 35  minutes caring for Jeana on this date of service. This time includes time spent by me in the following activities: preparing for the visit, reviewing tests, performing a medically appropriate examination and/or evaluation, counseling and educating the patient/family/caregiver, ordering medications, tests, or procedures, referring and communicating with other health care professionals, documenting information in the medical record and care coordination    Patient was seen and examined with Dr. Gonzalez,  resident, who performed portions of the examination and documentation for this patient's care under my direct supervision.  I agree with the above documentation and plan.    Celine Rubio MD  09/08/22  09:52 EDT

## 2022-09-07 NOTE — PROGRESS NOTES
Palliative Clinic Note      Name: Jeana Jaki Chung RN  Age: 47 y.o.  Sex: female  : 1975  MRN: [id number]  Date of Service: 22  Medical Oncologist: Joanne    Subjective:    Chief Complaint: Neck pain    History of Present Illness: Jeana Jaki Chung RN is a 47 y.o. female with past medical history significant for HTN, KIKA, T2DM, CKD stage III, cirrhosis, endometrial cancer, mood disorder  who presents to the palliative clinic today as a follow up for pain and symptom management.     Treatment summary: Patient initially diagnosed with endometrial cancer in 2020. She underwent an exploratory laparotomy, total abdominal hysterectomy, bilateral salpingo-oophorectomy with optimal debulking on 2/10/20. She completed 3 cycles of chemotherapy in 2020. Progression found in 2021 and she restarted chemotherapy in 2021 with Keytruda and Lenvima.  Progression seen on imaging in 2022. Patient uninterested in additional radiation. Followed up with Dr. Rubio today. Treatment held due to thrombocytopenia.     Pain: Patient complains of pain in her neck and back today that waxes and wanes  She has been taking approximately 2-3 tablets of oxycodone 5 mg per day. Patient feels she is able to maintain her daily function and quality of life with this regimen. She also takes gabapentin 600 mg 3 times a day for neuropathy. She does not take acetaminophen or ibuprofen.      Other symptoms: The patient complains of intermittent nausea and vomiting secondary to motion. She normally takes Meclizine before car rides but forgot to take it today. She also takes Zofran and Compazine for the nausea. Her appetite has improved since moving back in with her mother. Patient reports an irregular sleep pattern. She often sleeps during the day.      Pyschosocial: The patient has moved back to [place]. She recently found out her  was cheating. They have no children. She is a retired nurse. The patient enjoys  "crafting and laying out by the pool. She endorses a strong support system and great friends. The patient admits to uncontrolled anxiety and depression. The patient reports trying several SSRIs and SNRIs in the past however unable to tolerate due to feeling like a zombie. The patient requested a refill for Lorazepam. She plans to schedule a follow-up with psychiatry Angelica WARD in the near future.      Spiritual: Spiritual rather than Taoist.     Goals: Travel while she feels up to it.    The following portions of the patient's history were reviewed and updated as appropriate: allergies, current medications, past family history, past medical history, past social history, past surgical history and problem list.    ORT-R: Low risk   Decisional capacity: Full  ECOG: (1) Restricted in physically strenuous activity, ambulatory and able to do work of light nature   Palliative Performance Scale Score: 70%     Objective:    /59   Pulse 111   Temp 97.5 °F (36.4 °C)   Ht 160 cm (62.99\")   Wt 67.6 kg (149 lb)   SpO2 99%   BMI 26.40 kg/m²     Constitutional: Awake, alert, normal gait, sitting up in exam chair, in no acute distress  Eyes: PERRLA, EOMS intact  HENT: NCAT, face symmetric  Neck: Supple, trachea midline  Respiratory: Nonlabored respirations  Cardiovascular: RR, tachycardic no edema observed  Gastrointestinal: Soft, no guarding  Musculoskeletal: Moves all extremities   Psychiatric: Appropriate affect, cooperative  Neurologic: Oriented x 3, Cranial Nerves grossly intact to confrontation, speech clear  Skin: Cool dry, no rashes or wounds appreciated     Medication Counts: Reviewed. See bottom of note for details. Brought medication.  No overuse or misuse evident.  [facility] Req #[phone].   UDS(Y): Last 3/15/22. Reviewed. Appropriate.    Assessment & Plan:    1. Endometrial cancer (HCC)  - Followed up with Dr. Rubio today. Treatment held due to " thrombocytopenia. Discussed benefit of hospice consult in the future. Patient not interested at this time.     2. Cancer related pain  - Side effects discussed at every visit. Patient is appropriate for opoid therapy due to cancer related pain. Daily function and quality of life maintained with current regimen of oxycodone 10 mg q4h PRN. Refill sent to pharmacy.      3. Neuropathy  - Continue gabapentin 600 mg TID. Prescription refilled on 8/22/22.    4. Anxiety  - Patient will schedule follow-up with psychiatrist. Lorazepam refilled by Dr. Rubio.     Code status: Full code  Medical interventions: Full  Advanced directives: Continue to discuss    Return in about 1 month (around 10/7/2022) for Office Visit.    I spent 30 minutes caring for Jeana Chung RN on this date of service. This time includes time spent by me in the following activities: preparing for the visit, reviewing tests, obtaining and/or reviewing a separately obtained history, performing a medically appropriate examination and/or evaluation , counseling and educating the patient/family/caregiver, ordering medications, tests, or procedures, documenting information in the medical record, independently interpreting results and communicating that information with the patient/family/caregiver, and care coordination    Enedina Guillen PA-C  09/07/2022    Medication Date Filled # Filled Count Used # Days  TAHIR   Oxycodone 5 8/29/22 84 5   2-3   Gabapentin 600 8/22/22 90 89   3

## 2022-09-08 PROBLEM — R63.4 UNINTENDED WEIGHT LOSS: Status: ACTIVE | Noted: 2022-09-08

## 2022-09-08 PROBLEM — Z71.89 COUNSELING REGARDING END OF LIFE DECISION MAKING: Status: ACTIVE | Noted: 2022-09-08

## 2022-09-09 ENCOUNTER — TELEPHONE (OUTPATIENT)
Dept: GYNECOLOGIC ONCOLOGY | Facility: CLINIC | Age: 47
End: 2022-09-09

## 2022-09-09 DIAGNOSIS — C54.1 ENDOMETRIAL CANCER: ICD-10-CM

## 2022-09-09 DIAGNOSIS — Z79.4 TYPE 2 DIABETES MELLITUS WITH DIABETIC POLYNEUROPATHY, WITH LONG-TERM CURRENT USE OF INSULIN: ICD-10-CM

## 2022-09-09 DIAGNOSIS — E11.42 TYPE 2 DIABETES MELLITUS WITH DIABETIC POLYNEUROPATHY, WITH LONG-TERM CURRENT USE OF INSULIN: ICD-10-CM

## 2022-09-09 RX ORDER — LORAZEPAM 0.5 MG/1
0.5 TABLET ORAL EVERY 6 HOURS PRN
Qty: 60 TABLET | Refills: 5 | Status: SHIPPED | OUTPATIENT
Start: 2022-09-09

## 2022-09-09 RX ORDER — FLASH GLUCOSE SENSOR
KIT MISCELLANEOUS
Qty: 2 EACH | Refills: 11 | Status: SHIPPED | OUTPATIENT
Start: 2022-09-09

## 2022-09-09 NOTE — TELEPHONE ENCOUNTER
Caller: Jose David Chung RN    Relationship: Self    Best call back number: [phone]    What was the call regarding: JOSE DAVID CALLED REGARDING HER ATIVAN. SHE SAYS HER PHARMACY HAS NOT RECEIVED ANYTHING. SHE WANTS TO MAKE SURE IT WAS SENT TO THE [facility] IN [place].    Do you require a callback: YES

## 2022-09-09 NOTE — TELEPHONE ENCOUNTER
Looks like it did not gets sent electronically. I have corrected and re-sent. Should be there for her today. Thanks!

## 2022-09-09 NOTE — TELEPHONE ENCOUNTER
Caller: Jeana Chung, RN    Relationship: Self    Best call back number: [phone]    Requested Prescriptions:   Requested Prescriptions     Pending Prescriptions Disp Refills   • Continuous Blood Gluc Sensor (FreeStyle Michelle Sensor System) 2 each 11     Sig: Every 14 (Fourteen) Days.        Pharmacy where request should be sent: [id number] [address] AT [facility] & [place] - [phone] [facility] [phone] FX     Additional details provided by patient: PLEASE SEND TO [facility] DOES NOT WANT TO DEAL WITH [facility]. PATIENT WILL CONTACT INSURANCE TO HAVE PHARMACY CHANGED TO [facility]    Does the patient have less than a 3 day supply:  [x] Yes  [] No    Luis Gomez Rep   09/09/22 09:24 EDT

## 2022-09-14 RX ORDER — URSODIOL 500 MG/1
500 TABLET, FILM COATED ORAL EVERY EVENING
Qty: 30 TABLET | Refills: 5 | Status: SHIPPED | OUTPATIENT
Start: 2022-09-14

## 2022-09-15 ENCOUNTER — TELEPHONE (OUTPATIENT)
Dept: FAMILY MEDICINE CLINIC | Facility: CLINIC | Age: 47
End: 2022-09-15

## 2022-09-15 DIAGNOSIS — E11.42 TYPE 2 DIABETES MELLITUS WITH DIABETIC POLYNEUROPATHY, WITH LONG-TERM CURRENT USE OF INSULIN: Primary | ICD-10-CM

## 2022-09-15 DIAGNOSIS — Z79.4 TYPE 2 DIABETES MELLITUS WITH DIABETIC POLYNEUROPATHY, WITH LONG-TERM CURRENT USE OF INSULIN: Primary | ICD-10-CM

## 2022-09-15 NOTE — TELEPHONE ENCOUNTER
Caller: Jeana Chung, RN    Relationship: Self    Best call back number: [phone]    What is the medical concern/diagnosis:  DIABETIC NEUROPATHY     What specialty or service is being requested: RETINA SPECIALIST    What is the provider, practice or medical service name [facility]    What is the office location: [place]    What is the office phone number: [phone] FAX NUMBER [phone]    Any additional details:

## 2022-09-15 NOTE — TELEPHONE ENCOUNTER
Contacted pt to get more information, pt states she is needing a referral ordered and faxed over to the below clinic.

## 2022-09-27 ENCOUNTER — TELEPHONE (OUTPATIENT)
Dept: FAMILY MEDICINE CLINIC | Facility: CLINIC | Age: 47
End: 2022-09-27

## 2022-09-27 NOTE — TELEPHONE ENCOUNTER
Pharmacy Name:  [facility]    Pharmacy representative name: [facility]    Pharmacy representative phone number: [phone]    What medication are you calling in regards to:  Continuous Blood Gluc Sensor (FreeStyle Michelle Sensor System)     What question does the pharmacy have: A FAX WAS SENT OVER FOR AN ORDER REQUESTING THE FREESTYLE MICHELLE SENSOR AND THEY WANTED TO MAKE SURE IT WAS RECEIVED.    Who is the provider that prescribed the medication: SILVERIO LOOMIS    Additional notes:

## 2022-10-04 NOTE — PROGRESS NOTES
Jeana Chung RN  [id number]  1975    Reason for visit:  Recurrent endometrial cancer    History of present illness:  The patient is a 47 y.o. year old female who presents today for treatment and evaluation of the above issues.     Patient was diagnosed with biopsy-proven recurrent endometrial cancer. Attempted Keytruda however pt showed imaging findings concerning for progression, discontinued Lenvima due to thrombocytopenia, transaminitis and elevated bilirubin.  Her clinical course has been complicated due to thrombocytopenia (could not undergo Port-A-Cath insertion) and COVID infection 12/20/2021.The plan had been to start Carboplatin/Docetaxol/Bevacizumab for treatment once labs stabilize. Started on Letrozole in the meantime.     Today, patient is dramatically improved.  She is eating better.  She does continue to have pain that includes back and neck pain as well as significant neuropathy.  She sees palliative care for symptom management.  Her bowel functions remarkable for constipation for which she takes laxatives and is even had to take lactulose.  She notes normal bladder function.  She complains of incisional hernia.  She had a recent cough for which she took a Z-Remy and the cough is resolved.  When she was coughing, she noticed protrusion at the hernia.  She reports that she is going to see her GI doctor for further evaluation.    OBGYN History:  She is a G0.  She does not use HRT. She has been taking letrozole 2.5 mg daily. She has never had a pap smear.    Oncologic History:  Oncology/Hematology History   Endometrial cancer (HCC)   12/14/2019 Imaging    Presented to ED in [place] due to progressively heavy vaginal bleeding and severe back pain. Ultrasound revealed uterine and cervical masses.     1/2020 Biopsy    ED follow-up with gynecologist in [place], told she likely has cervical cancer. Insufficient tissue on attempted cervical biopsy. Patient moved to [place], [place] to be closer  to mother and sister for work-up and treatment.      1/21/2020 Imaging    Gyn evaluation at [facility]. Repeat TVUS showed cervical mass, right adnexal mass, and large uterine fibroid vs mass. Referred to Gyn Oncology     1/23/2020 Initial Diagnosis    Endometrial cancer (CMS/HCC)  Cervical biopsy positive for infiltrating endometrioid adenocarcinoma     1/30/2020 Imaging    CT chest, abdomen, pelvis showed enlarged uterus with multiple masses and 4 cm cystic/solid mass at right ovary. No metastatic disease noted in abdomen or chest.     2/10/2020 Surgery    Exploratory laparotomy, total abdominal hysterectomy, bilateral salpingo-oophorectomy, with optimal debulking (R=0), and omentectomy.    Pelvic washing cytology positive for malignant cells, consistent with adenocarcinoma. Final pathology showed large grade 3 endometrioid tumor with lymphvascular invasion at the uterus. Cervical stromal involvement, vaginal margin negative. Right tube and ovary involved. Left tube and ovary negative, omentum negative. MSI normal. [id number], Stage IIIA grade 3       3/9/2020 - 7/8/2020 Chemotherapy    OP UTERINE PACLitaxel / CARBOplatin (Q21D)  Cycle #1 complicated by pancytopenia, appendicitis, appendectomy, appendiceal abscess  Cycle #2 remarkable for thrombocytopenia, worsening neuropathy.  Dose modification with cycle #3.       3/17/2020 - 3/23/2020 Other Event    Hospital admission with acute appendicitis     5/21/2020 - 5/21/2020 Chemotherapy    OP CENTRAL VENOUS ACCESS DEVICE ACCESS, CARE, AND MAINTENANCE (CVAD)     7/8/2020 - 7/28/2020 Chemotherapy    OP OVARIAN DOCEtaxel / CARBOplatin     12/5/2021 Progression    Complains of vaginal bleeding.  CT scan chest abdomen and pelvis:  IMPRESSION:  Abnormal soft tissue mass seen at the vaginal vault with likely  extension to involve the sigmoid colon adjacent to the mass on the left.  Multiple new lung lesions including 2.5 cm medial right lower lobe nodule, 7 mm right  peripheral lung nodule, to left small lung nodules measuring 1.2 cm together and smaller other lung nodules concerning for metastatic disease.  Office biopsy of vaginal mass performed 12/6/2021.  Final Diagnosis   VAGINA, BIOPSY:               Compatible with known endometrioid carcinoma.               See comment.          12/13/2021 - 3/16/2022 Chemotherapy    Course complicated by COVID infection diagnosed 12/20/2021.  Thrombocytopenia, unknown etiology.  OP ENDOMETRIAL Lenvatinib / Pembrolizumab 200 mg     1/25/2022 - 3/2/2022 Radiation    Radiation OncologyTreatment Course:  Jeana Chung RN received 4500 cGy in 25 fractions to pelvis via External Beam Radiation - EBRT.     4/6/2022 - 4/6/2022 Chemotherapy    OP OVARIAN DOCEtaxel / CARBOplatin AUC=6 / Bevacizumab 15 mg/kg           Past Medical History:   Diagnosis Date   • Anemia    • Anxiety and depression    • Back pain    • Bronchitis    • Diabetes (HCC)     DX 4-5 YRS AGO, CHECKS BS 4X/DAY, LAST A1C 7.1%   • Endometrial cancer (HCC)     STAGE II   • GERD (gastroesophageal reflux disease)    • History of radiation therapy 03/02/2022    pelvic mass   • Hypertension    • IBS (irritable bowel syndrome)    • MRSA (methicillin resistant staph aureus) culture positive 2018    S/P NECROTIZING FASCITIS IN BILATERAL FEET   • Necrotizing fasciitis (HCC)    • PCOS (polycystic ovarian syndrome)    • PTSD (post-traumatic stress disorder)    • Retinopathy 03/18/2020   • Sepsis (HCC)    • Stage 3 chronic kidney disease (HCC) 02/24/2020   • Subconjunctival hemorrhage        Past Surgical History:   Procedure Laterality Date   • APPENDECTOMY N/A 3/21/2020    Procedure: APPENDECTOMY LAPAROSCOPIC;  Surgeon: Praful Myers MD;  Location: [facility];  Service: General;  Laterality: N/A;   • EXPLORATORY LAPAROTOMY, TOTAL ABDOMINAL HYSTERECTOMY SALPINGO OOPHORECTOMY N/A 2/10/2020    Procedure: EXPLORATORY LAPAROTOMY, TOTAL ABDOMINAL HYSTERECTOMY, BILATERAL  SALPINGO-OOPHORECTOMY WITH OPTIMAL  STAGING (R-0), OMENTECTOMY;  Surgeon: Celine Rubio MD;  Location: [facility];  Service: Gynecology Oncology;  Laterality: N/A;   • EYE SURGERY Left     BLOOD VESSEL IN EYE REPAIR   • TOE AMPUTATION Left 2019    big toe - unhealing sore   • TOE AMPUTATION Right     big toe and second toe - Necrotizing fasciitis and MRSA        MEDICATIONS: The current medication list was reviewed with the patient and updated in the EMR this date per the Medical Assistant. Medication dosages and frequencies were confirmed to be accurate.      Allergies:  is allergic to bactrim [sulfamethoxazole-trimethoprim], promethazine, penicillins, and scopolamine.    Social History:   Social History     Socioeconomic History   • Marital status:    • Number of children: 1   Tobacco Use   • Smoking status: Former Smoker     Packs/day: 0.25     Years: 0.50     Pack years: 0.12     Types: Cigarettes     Start date: 1995     Quit date: 2000     Years since quittin.7   • Smokeless tobacco: Never Used   • Tobacco comment: social MAYBE 1 CIG/DAY   Vaping Use   • Vaping Use: Never used   Substance and Sexual Activity   • Alcohol use: Not Currently   • Drug use: Never   • Sexual activity: Not Currently       Family History:    Family History   Problem Relation Age of Onset   • Fibroids Mother    • Diabetes type II Mother    • Hypertension Mother    • Heart attack Mother    • Fibroids Sister         PCOS   • Diabetes type II Sister    • Dementia Maternal Grandmother    • Stroke Maternal Grandmother        Health Maintenance:    Health Maintenance   Topic Date Due   • COLORECTAL CANCER SCREENING  Never done   • Hepatitis B (1 of 3 - Risk 3-dose series) Never done   • TDAP/TD VACCINES (1 - Tdap) Never done   • ANNUAL WELLNESS VISIT  Never done   • Pneumococcal Vaccine 0-64 (2 - PCV) 2021   • DIABETIC FOOT EXAM  2021   • DIABETIC EYE EXAM  2021   • URINE MICROALBUMIN   "07/24/2021   • INFLUENZA VACCINE  08/01/2022   • COVID-19 Vaccine (2 - Smiley risk series) 09/20/2022   • HEMOGLOBIN A1C  11/24/2022   • MAMMOGRAM  09/15/2024   • HEPATITIS C SCREENING  Completed   • PAP SMEAR  Discontinued       Objective:  Arthralgias, back pain, myalgias, weakness, appetite suppression, fatigue, anxiety  Please refer to history of present illness.  Review of systems otherwise negative.    Physical exam:  Vitals:    10/05/22 1042   BP: 111/60  Comment: RUE   Pulse: 97   Resp: 16   Temp: 98 °F (36.7 °C)   TempSrc: Infrared   SpO2: 97%  Comment: RA   Weight: 70.8 kg (156 lb)   Height: 160 cm (63\")   PainSc: 6  Comment: \"all over\"       Body mass index is 27.63 kg/m².    Wt Readings from Last 3 Encounters:   10/05/22 70.8 kg (156 lb)   09/07/22 67.6 kg (149 lb)   09/07/22 67.6 kg (149 lb)       GENERAL: Alert,  female in moderate distress, tearful   HEENT:  Head normocephalic, atraumatic.  BACK: No tenderness on palpation  CARDIOVASCULAR: Regular rate and rhythm.  Grade 2 systolic ejection murmur.  No extremity edema.  RESPIRATORY: Normal effort, breathing comfortably on room air, Clear to ascultation bilaterally  GASTROINTESTINAL: Soft, no tenderness to palpation.  + Large incisional hernia which is easily reduced.  Scarring consistent with prior laparotomy and delayed wound healing.  PSYCHIATRIC: AO x3, with appropriate affect, normal thought processes.   NEUROLOGIC: No focal deficits. Moves extremities well.  MUSCULOSKELETAL: good mobility     PELVIC exam: deferred    ECOG PS 2    PROCEDURES: None    Imaging  ED CT HEAD NO CONTRAST    Result Date: 8/8/2022  No acute intracranial abnormality. Signed By: Edilma Barrientos MD on 8/8/2022 11:32 PM    CT ABD/PELVIS-NO ORAL/IV    Result Date: 8/8/2022  1. Numerous bibasilar lung nodules suspected to reflect pulmonary metastatic disease, new since September 2020. Consider metastatic endometrial carcinoma in this clinical setting. 2. Coronary artery and " aortic atherosclerotic disease. 3. Thickening of the ascending colon through mid transverse colon may reflect a regional colitis which may be infectious, inflammatory, or possibly ischemic. Clinical correlation suggested. 4. Status post hysterectomy. 5. Moderate splenomegaly. 6. Anterior abdominal wall ventral hernia containing loops of small bowel and fat without obstruction. 7. Other findings and description as above. Signed By: Damon Dudley MD on 8/8/2022 10:28 PM    CHEST PORTABLE    Result Date: 8/8/2022  A prior chest radiograph from 7/15/2020 is reviewed. There are low lung volumes with crowding of bronchovascular markings. No acute airspace disease or large pleural effusion. No pneumothorax. Stable cardiomediastinal silhouette is within normal limits. Mild atherosclerosis thoracic aorta. No acute osseous abnormalities. Signed By: Damon Dudley MD on 8/8/2022 10:21 PM             Lab Results   Component Value Date    WBC 6.10 05/31/2022    HGB 11.2 (L) 05/31/2022    HCT 31.3 (L) 05/31/2022    MCV 91.3 05/31/2022    PLT 70 (L) 05/31/2022    NEUTROABS 5.25 05/31/2022    GLUCOSE 239 (H) 05/31/2022    BUN 20 (H) 07/28/2022    CREATININE 1.26 (H) 07/28/2022    EGFRIFNONA 79 02/22/2022     07/28/2022    K 4.7 (H) 07/28/2022     07/28/2022    CO2 26 07/28/2022    MG 1.7 02/11/2022    PHOS 2.5 03/20/2020    CALCIUM 9.7 07/28/2022    ALBUMIN 3.5 07/28/2022    AST 22 07/28/2022    ALT 9 07/28/2022    BILITOT 3.6 (H) 07/28/2022     Lab Results   Component Value Date     30.0 05/23/2022     58.0 (H) 04/13/2022     8.5 07/08/2020         Assessment & Plan   This is a 47 y.o. woman with recurrent stage IIIA endometrial cancer presenting for discussion regarding treatment.     Encounter Diagnoses   Name Primary?   • Endometrial cancer (HCC) Yes   • Neuropathy      Endometrial cancer: Stage IIIa due to adnexal involvement, now with recurrence  -Discontinued Keytruda due to concern for  progression. Lenvima discontinued due to increases in bilirubin and liver function each time she tries to reinitiate despite dose reduction   -Options for treatment limited by patient's severe neuropathy as well as current thrombocytopenia. At earlier visits, had discussed the plan to consider starting Carboplatin/Docetaxol/Bevacizumab for treatment once platelet count > 100   -Continue letrozole due to intolerance of other treatment options, significant comorbidities  -Dramatically improved compared to previous visit.    Hyperbilirubinemia  Thrombocytopenia  - Continues to have thrombocytopenia, platelets 74 (5/23/22), AST and ALT normal;  Platelets 58 (8/8/22, outside lab)   - No repeat labs today as it would not      Neuropathy: Chronic diabetes related with superimposed chemotherapy-induced neuropathy, pain  - On gabapentin, now seeing palliative care for management of neuropathy, anxiety, and pain     Emotional distress related to cancer diagnosis, marital discourse  -Greatly improved, engaging in family, has left   -Following with palliative care  -Requesting Ativan Rx for anxiety, discussed with Palliative team and sent new Rx today     Complex social issues  - Staying with her family in [place] currently     Pain assessment was performed today as a part of patient’s care.  For patients with pain related to surgery, gynecologic malignancy or cancer treatment, the plan is as noted in the assessment/plan.  For patients with pain not related to these issues, they are to seek any further needed care from a more appropriate provider, such as PCP.      FOLLOW UP: Patient to call for follow-up  I spent 20 minutes caring for Jeana on this date of service. This time includes time spent by me in the following activities: preparing for the visit, reviewing tests, performing a medically appropriate examination and/or evaluation, counseling and educating the patient/family/caregiver, referring  and communicating with other health care professionals and documenting information in the medical record\  Patient was seen and examined with Dr. Gonzalez,  resident, who performed portions of the examination and documentation for this patient's care under my direct supervision.  I agree with the above documentation and plan.    Celine Rubio MD  10/05/22  11:04 EDT

## 2022-10-05 ENCOUNTER — OFFICE VISIT (OUTPATIENT)
Dept: PALLIATIVE CARE | Facility: CLINIC | Age: 47
End: 2022-10-05

## 2022-10-05 ENCOUNTER — OFFICE VISIT (OUTPATIENT)
Dept: GYNECOLOGIC ONCOLOGY | Facility: CLINIC | Age: 47
End: 2022-10-05

## 2022-10-05 ENCOUNTER — OFFICE VISIT (OUTPATIENT)
Dept: PSYCHIATRY | Facility: CLINIC | Age: 47
End: 2022-10-05

## 2022-10-05 VITALS
TEMPERATURE: 98 F | SYSTOLIC BLOOD PRESSURE: 111 MMHG | WEIGHT: 156 LBS | RESPIRATION RATE: 16 BRPM | HEIGHT: 63 IN | HEART RATE: 97 BPM | DIASTOLIC BLOOD PRESSURE: 60 MMHG | OXYGEN SATURATION: 97 % | BODY MASS INDEX: 27.64 KG/M2

## 2022-10-05 VITALS
HEIGHT: 63 IN | WEIGHT: 156 LBS | OXYGEN SATURATION: 97 % | SYSTOLIC BLOOD PRESSURE: 111 MMHG | BODY MASS INDEX: 27.64 KG/M2 | TEMPERATURE: 98 F | HEART RATE: 97 BPM | DIASTOLIC BLOOD PRESSURE: 60 MMHG | RESPIRATION RATE: 18 BRPM

## 2022-10-05 DIAGNOSIS — F41.0 PANIC DISORDER: ICD-10-CM

## 2022-10-05 DIAGNOSIS — G89.3 CANCER RELATED PAIN: ICD-10-CM

## 2022-10-05 DIAGNOSIS — C54.1 ENDOMETRIAL CANCER: Primary | ICD-10-CM

## 2022-10-05 DIAGNOSIS — F41.1 GENERALIZED ANXIETY DISORDER: Primary | ICD-10-CM

## 2022-10-05 DIAGNOSIS — G62.9 NEUROPATHY: ICD-10-CM

## 2022-10-05 PROBLEM — S31.109A WOUND OF ABDOMEN: Status: RESOLVED | Noted: 2022-01-14 | Resolved: 2022-10-05

## 2022-10-05 PROBLEM — K43.2 INCISIONAL HERNIA, WITHOUT OBSTRUCTION OR GANGRENE: Status: ACTIVE | Noted: 2022-10-05

## 2022-10-05 PROCEDURE — 99213 OFFICE O/P EST LOW 20 MIN: CPT | Performed by: OBSTETRICS & GYNECOLOGY

## 2022-10-05 PROCEDURE — 90832 PSYTX W PT 30 MINUTES: CPT | Performed by: NURSE PRACTITIONER

## 2022-10-05 PROCEDURE — 99214 OFFICE O/P EST MOD 30 MIN: CPT | Performed by: PHYSICIAN ASSISTANT

## 2022-10-05 RX ORDER — OXYCODONE HYDROCHLORIDE 5 MG/1
5 TABLET ORAL EVERY 6 HOURS PRN
Qty: 120 TABLET | Refills: 0 | Status: SHIPPED | OUTPATIENT
Start: 2022-10-05 | End: 2022-11-03 | Stop reason: SDUPTHER

## 2022-10-05 RX ORDER — GABAPENTIN 600 MG/1
600 TABLET ORAL 3 TIMES DAILY
Qty: 90 TABLET | Refills: 0 | Status: SHIPPED | OUTPATIENT
Start: 2022-10-05

## 2022-10-05 RX ORDER — BROMPHENIRAMINE MALEATE, PSEUDOEPHEDRINE HYDROCHLORIDE, AND DEXTROMETHORPHAN HYDROBROMIDE 2; 30; 10 MG/5ML; MG/5ML; MG/5ML
SYRUP ORAL
COMMUNITY
Start: 2022-10-01

## 2022-10-05 RX ORDER — AZITHROMYCIN 250 MG/1
TABLET, FILM COATED ORAL SEE ADMIN INSTRUCTIONS
COMMUNITY
Start: 2022-10-01

## 2022-10-05 RX ORDER — URSODIOL 250 MG/1
250 TABLET, FILM COATED ORAL EVERY MORNING
COMMUNITY
Start: 2022-09-09 | End: 2022-10-05 | Stop reason: DRUGHIGH

## 2022-10-05 NOTE — PATIENT INSTRUCTIONS
Check-out instructions:  Continue oxycodone 5 mg every 6-8 hours as needed.   Continue gabapentin 600 mg three times daily for neuropathy.   Scheduled to follow up in 3 months.     Medication Policy: We ask that you bring all of the medications prescribed by this clinic to every appointment. For telemedicine appointments, be prepared to give medication counts. This will assist us with managing your refill needs.      Refill Policy: You must notify us at least 3-5 business days in advance for routine refill requests. Call [phone] or send [place] message to the Palliative Pool. Some prescriptions will need to be signed by the physician and will take longer to be sent to the pharmacy. Please also be aware of additional insurance prior authorization processing time required for many medications. Try to communicate with your pharmacy first to look for scripts signed in advance.     Communication: The [facility]iative Clinic days are Monday-Friday 8:30-4:30 PM. Call [phone] or send [place] message to the Palliative Pool. You will not be routed to speak directly to the palliative provider during clinic hours, so that we may provide the best care and attention to our patients in the office. If you require immediate communication, please also consider contacting your primary care office or appropriate specialist office.

## 2022-10-05 NOTE — PROGRESS NOTES
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 Jeana lives in [place], [place] now with her parents. She is estranged from her . Patient has transportation through her mother. Bryan has stage IV endometrial cancer. Is patient of palliative care and Dr. Rubio GYN/ONC.    TIME IN:1000  TIME OUT:1035    I spent 35  minutes in patient care: reviewing records prior to the visit, assessing the patient, entering orders and documentation.    Chief Complaint: anxiety, panic      Therapy:  Start Time:1000    Stop Time:1035     (30 ) minutes was spent for psychotherapy. Assisted patient in processing patient's JACOBO, panic. Acknowledged and normalized patient's thoughts, feelings, and concerns. Utilized cognitive behavioral therapy to assist the patient in recognizing more appropriate coping mechanisms when she becomes agitated/anxious which are proven effective in reducing the severity of frequency of symptoms.     CLINICAL MANUEVERING/INTERVENTION:   Patient talked about current stressors, primarily  dealing with her stage IV cancer and liver cirrhosis and her father's health issues s/p CVA 2 years ago. 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 Venting of frustrations was conducted. Feelings were processed and validated, both negative and positive. Flushing out worries and concerns was conducted in order to diminish emotional tension. Discussed hospice. Processing current inability for treatment was conducted. Ways in which patient may utilize healthy coping skills and relaxation techniques in a purposeful manner was discussed. Patient was assisted in 'talking out' what she may do if health continues to be a challenge. Utilized motivational interviewing techniques including complex reflections to attempt to assist the patient and focusing on the positive and to maintain and encourage calm outlook. Discussed " her 's infidelity and how she is glad to be away from him since almost a year ago. She has been able to gain about 20lbs since being home, is sleeping, and involved with her 6 month old twin nieces. She hasn't had children so she enjoys her time with them. Her mother and sister are very supportive. The patient expressed gratitude for today's session and said that counseling helps her feel better.      The following portions of the patient's history were reviewed and updated as appropriate: allergies, current medications, past family history, past medical history, past social history, past surgical history and problem list.    Review of Systems  A 14 point review of systems was performed and is negative except as noted above.    Objective   Physical Exam  There were no vitals taken for this visit.    Allergies   Allergen Reactions   • Bactrim [Sulfamethoxazole-Trimethoprim] Anaphylaxis   • Promethazine Mental Status Change, Anaphylaxis, Anxiety and Other (See Comments)     IV causes anxiety, oral is fine  IV only   • Penicillins Hives and Rash   • Scopolamine Rash     Got a rash from the patch that goes behind your ear       Current Medications:   Current Outpatient Medications   Medication Sig Dispense Refill   • azithromycin (ZITHROMAX) 250 MG tablet See Admin Instructions.     • brompheniramine-pseudoephedrine-DM 30-2-10 MG/5ML syrup TAKE 10 ML BY MOUTH THREE TIMES DAILY AS NEEDED FOR COUGH FOR 7 DAYS     • Continuous Blood Gluc Sensor (CROSSROADS SYSTEMS Michelle Sensor System) Every 14 (Fourteen) Days. 2 each 11   • gabapentin (NEURONTIN) 600 MG tablet Take 1 tablet by mouth 3 (Three) Times a Day. 90 tablet 0   • HumaLOG KwikPen 100 UNIT/ML solution pen-injector INJECT 5 UNITS THREE TIMES DAILY BY SUBCUTANEOUS ROUTE BEFORE MEALS     • Insulin Glargine (LANTUS SOLOSTAR) 100 UNIT/ML injection pen Inject 6 Units under the skin into the appropriate area as directed Every Night for 30 days. 1 pen 1   • lactulose  (CHRONULAC) 10 GM/15ML solution Take 30 mL by mouth 2 (Two) Times a Day As Needed (constipation). 946 mL 2   • letrozole (FEMARA) 2.5 MG tablet Take 1 tablet by mouth Daily. 30 tablet 3   • LORazepam (ATIVAN) 0.5 MG tablet Take 1 tablet by mouth Every 6 (Six) Hours As Needed for Anxiety. 60 tablet 5   • meclizine (ANTIVERT) 25 MG tablet Take 1 tablet by mouth 3 (Three) Times a Day As Needed for Dizziness or Nausea. 30 tablet 3   • megestrol (MEGACE) 40 MG/ML suspension SHAKE LIQUID AND TAKE 20 ML BY MOUTH DAILY (Patient taking differently: Take  by mouth As Needed.) 1800 mL 1   • Multiple Vitamin (MULTIVITAMINS PO) Multivitamins Oral Tablet        active     • naloxone (NARCAN) 4 MG/0.1ML nasal spray 1 spray into the nostril(s) as directed by provider As Needed (opioid overdose). 1 each 0   • omeprazole (PrilOSEC) 20 MG capsule Take 1 capsule by mouth Daily. 90 capsule 1   • ondansetron (ZOFRAN) 8 MG tablet Take 8 mg by mouth Every 8 (Eight) Hours As Needed.     • oxyCODONE (ROXICODONE) 5 MG immediate release tablet Take 1 tablet by mouth Every 4 (Four) Hours As Needed for Moderate Pain or Severe Pain for up to 30 days. 180 tablet 0   • Polyethylene Glycol 3350 (MIRALAX PO) Take 17 g by mouth Daily.     • prochlorperazine (COMPAZINE) 10 MG tablet Take 10 mg by mouth Every 6 (Six) Hours As Needed.     • spironolactone (Aldactone) 50 MG tablet Take 3 tablets by mouth Daily. 90 tablet 5   • ursodiol (ACTIGALL) 500 MG tablet Take 1 tablet by mouth Every Evening. 30 tablet 5     No current facility-administered medications for this visit.       Lab Results:  Admission on 05/31/2022, Discharged on 05/31/2022   Component Date Value Ref Range Status   • QT Interval 05/31/2022 356  ms Final   • QTC Interval 05/31/2022 470  ms Final   • QT Interval 05/31/2022 370  ms Final   • QTC Interval 05/31/2022 477  ms Final   • Troponin T 05/31/2022 <0.010  0.000 - 0.030 ng/mL Final   • Glucose 05/31/2022 239 (A) 65 - 99 mg/dL Final    • BUN 05/31/2022 16  6 - 20 mg/dL Final   • Creatinine 05/31/2022 0.98  0.57 - 1.00 mg/dL Final   • Sodium 05/31/2022 138  136 - 145 mmol/L Final   • Potassium 05/31/2022 4.1  3.5 - 5.2 mmol/L Final   • Chloride 05/31/2022 101  98 - 107 mmol/L Final   • CO2 05/31/2022 22.0  22.0 - 29.0 mmol/L Final   • Calcium 05/31/2022 9.7  8.6 - 10.5 mg/dL Final   • Total Protein 05/31/2022 6.3  6.0 - 8.5 g/dL Final   • Albumin 05/31/2022 3.60  3.50 - 5.20 g/dL Final   • ALT (SGPT) 05/31/2022 16  1 - 33 U/L Final   • AST (SGOT) 05/31/2022 33 (A) 1 - 32 U/L Final   • Alkaline Phosphatase 05/31/2022 101  39 - 117 U/L Final   • Total Bilirubin 05/31/2022 3.9 (A) 0.0 - 1.2 mg/dL Final   • Globulin 05/31/2022 2.7  gm/dL Final    Calculated Result   • A/G Ratio 05/31/2022 1.3  g/dL Final   • BUN/Creatinine Ratio 05/31/2022 16.3  7.0 - 25.0 Final   • Anion Gap 05/31/2022 15.0  5.0 - 15.0 mmol/L Final   • eGFR 05/31/2022 70.9  >60.0 mL/min/1.73 Final    National Kidney Foundation and American Society of Nephrology (ASN) Task Force recommended calculation based on the Chronic Kidney Disease Epidemiology Collaboration (CKD-EPI) equation refit without adjustment for race.   • Lipase 05/31/2022 40  13 - 60 U/L Final   • proBNP 05/31/2022 393.3  0.0 - 450.0 pg/mL Final   • Extra Tube 05/31/2022 Hold for add-ons.   Final    Auto resulted.   • Extra Tube 05/31/2022 hold for add-on   Final    Auto resulted   • Extra Tube 05/31/2022 Hold for add-ons.   Final    Auto resulted.   • Extra Tube 05/31/2022 Hold for add-ons.   Final    Auto resulted.   • Extra Tube 05/31/2022 Hold for add-ons.   Final    Auto resulted   • WBC 05/31/2022 6.10  3.40 - 10.80 10*3/mm3 Final   • RBC 05/31/2022 3.43 (A) 3.77 - 5.28 10*6/mm3 Final   • Hemoglobin 05/31/2022 11.2 (A) 12.0 - 15.9 g/dL Final   • Hematocrit 05/31/2022 31.3 (A) 34.0 - 46.6 % Final   • MCV 05/31/2022 91.3  79.0 - 97.0 fL Final   • MCH 05/31/2022 32.7  26.6 - 33.0 pg Final   • MCHC 05/31/2022 35.8  (A) 31.5 - 35.7 g/dL Final   • RDW 05/31/2022 13.8  12.3 - 15.4 % Final   • RDW-SD 05/31/2022 46.3  37.0 - 54.0 fl Final   • MPV 05/31/2022 10.2  6.0 - 12.0 fL Final   • Platelets 05/31/2022 70 (A) 140 - 450 10*3/mm3 Final   • Neutrophil % 05/31/2022 86.0 (A) 42.7 - 76.0 % Final   • Lymphocyte % 05/31/2022 5.6 (A) 19.6 - 45.3 % Final   • Monocyte % 05/31/2022 5.4  5.0 - 12.0 % Final   • Eosinophil % 05/31/2022 2.1  0.3 - 6.2 % Final   • Basophil % 05/31/2022 0.2  0.0 - 1.5 % Final   • Immature Grans % 05/31/2022 0.7 (A) 0.0 - 0.5 % Final   • Neutrophils, Absolute 05/31/2022 5.25  1.70 - 7.00 10*3/mm3 Final   • Lymphocytes, Absolute 05/31/2022 0.34 (A) 0.70 - 3.10 10*3/mm3 Final   • Monocytes, Absolute 05/31/2022 0.33  0.10 - 0.90 10*3/mm3 Final   • Eosinophils, Absolute 05/31/2022 0.13  0.00 - 0.40 10*3/mm3 Final   • Basophils, Absolute 05/31/2022 0.01  0.00 - 0.20 10*3/mm3 Final   • Immature Grans, Absolute 05/31/2022 0.04  0.00 - 0.05 10*3/mm3 Final   • nRBC 05/31/2022 0.0  0.0 - 0.2 /100 WBC Final   • D-Dimer, Quantitative 05/31/2022 0.59 (A) 0.01 - 0.50 MCGFEU/mL Final   Office Visit on 05/24/2022   Component Date Value Ref Range Status   • Hemoglobin A1C 05/24/2022 8.2  % Final   • Lot Number 05/24/2022 [id number]   Final   • Expiration Date 05/24/2022 01/17/24   Final   Lab on 05/23/2022   Component Date Value Ref Range Status   •  05/23/2022 30.0  0.0 - 38.1 U/mL Final   • Glucose 05/23/2022 243 (A) 65 - 99 mg/dL Final   • BUN 05/23/2022 12  6 - 20 mg/dL Final   • Creatinine 05/23/2022 0.89  0.57 - 1.00 mg/dL Final   • Sodium 05/23/2022 135 (A) 136 - 145 mmol/L Final   • Potassium 05/23/2022 3.9  3.5 - 5.2 mmol/L Final   • Chloride 05/23/2022 102  98 - 107 mmol/L Final   • CO2 05/23/2022 23.0  22.0 - 29.0 mmol/L Final   • Calcium 05/23/2022 9.5  8.6 - 10.5 mg/dL Final   • Total Protein 05/23/2022 6.2  6.0 - 8.5 g/dL Final   • Albumin 05/23/2022 3.40 (A) 3.50 - 5.20 g/dL Final   • ALT (SGPT) 05/23/2022  13  1 - 33 U/L Final   • AST (SGOT) 05/23/2022 26  1 - 32 U/L Final   • Alkaline Phosphatase 05/23/2022 112  39 - 117 U/L Final   • Total Bilirubin 05/23/2022 2.8 (A) 0.0 - 1.2 mg/dL Final   • Globulin 05/23/2022 2.8  gm/dL Final    Calculated Result   • A/G Ratio 05/23/2022 1.2  g/dL Final   • BUN/Creatinine Ratio 05/23/2022 13.5  7.0 - 25.0 Final   • Anion Gap 05/23/2022 10.0  5.0 - 15.0 mmol/L Final   • eGFR 05/23/2022 79.6  >60.0 mL/min/1.73 Final    National Kidney Foundation and American Society of Nephrology (ASN) Task Force recommended calculation based on the Chronic Kidney Disease Epidemiology Collaboration (CKD-EPI) equation refit without adjustment for race.   • WBC 05/23/2022 6.10  3.40 - 10.80 10*3/mm3 Final   • RBC 05/23/2022 3.62 (A) 3.77 - 5.28 10*6/mm3 Final   • Hemoglobin 05/23/2022 11.0 (A) 12.0 - 15.9 g/dL Final   • Hematocrit 05/23/2022 35.1  34.0 - 46.6 % Final   • RDW 05/23/2022 14.6  12.3 - 15.4 % Final   • MCV 05/23/2022 97.0  79.0 - 97.0 fL Final   • MCH 05/23/2022 30.3  26.6 - 33.0 pg Final   • MCHC 05/23/2022 31.3 (A) 31.5 - 35.7 g/dL Final   • MPV 05/23/2022 7.4  6.0 - 12.0 fL Final   • Platelets 05/23/2022 74 (A) 140 - 450 10*3/mm3 Final   • Neutrophil % 05/23/2022 82.6 (A) 42.7 - 76.0 % Final   • Lymphocyte % 05/23/2022 11.2 (A) 19.6 - 45.3 % Final   • Monocyte % 05/23/2022 6.2  5.0 - 12.0 % Final   • Neutrophils, Absolute 05/23/2022 5.00  1.70 - 7.00 10*3/mm3 Final   • Lymphocytes, Absolute 05/23/2022 0.70  0.70 - 3.10 10*3/mm3 Final   • Monocytes, Absolute 05/23/2022 0.40  0.10 - 0.90 10*3/mm3 Final   Hospital Outpatient Visit on 04/13/2022   Component Date Value Ref Range Status   • Glucose 04/13/2022 446 (A) 65 - 99 mg/dL Final   • BUN 04/13/2022 15  6 - 20 mg/dL Final   • Creatinine 04/13/2022 1.00  0.57 - 1.00 mg/dL Final   • Sodium 04/13/2022 135 (A) 136 - 145 mmol/L Final   • Potassium 04/13/2022 4.3  3.5 - 5.2 mmol/L Final    Slight hemolysis detected by analyzer. Results may  be affected.   • Chloride 04/13/2022 99  98 - 107 mmol/L Final   • CO2 04/13/2022 25.0  22.0 - 29.0 mmol/L Final   • Calcium 04/13/2022 9.2  8.6 - 10.5 mg/dL Final   • Total Protein 04/13/2022 6.6  6.0 - 8.5 g/dL Final   • Albumin 04/13/2022 3.20 (A) 3.50 - 5.20 g/dL Final   • ALT (SGPT) 04/13/2022 12  1 - 33 U/L Final   • AST (SGOT) 04/13/2022 28  1 - 32 U/L Final   • Alkaline Phosphatase 04/13/2022 119 (A) 39 - 117 U/L Final   • Total Bilirubin 04/13/2022 3.2 (A) 0.0 - 1.2 mg/dL Final   • Globulin 04/13/2022 3.4  gm/dL Final    Calculated Result   • A/G Ratio 04/13/2022 0.9  g/dL Final   • BUN/Creatinine Ratio 04/13/2022 15.0  7.0 - 25.0 Final   • Anion Gap 04/13/2022 11.0  5.0 - 15.0 mmol/L Final   • eGFR 04/13/2022 69.6  >60.0 mL/min/1.73 Final    National Kidney Foundation and American Society of Nephrology (ASN) Task Force recommended calculation based on the Chronic Kidney Disease Epidemiology Collaboration (CKD-EPI) equation refit without adjustment for race.   • Color, UA 04/13/2022 Yellow  Yellow, Straw Final   • Appearance, UA 04/13/2022 Clear  Clear Final   • pH, UA 04/13/2022 6.5  5.0 - 8.0 Final   • Specific Gravity, UA 04/13/2022 1.015  1.005 - 1.030 Final   • Glucose, UA 04/13/2022 >=1000 mg/dL (3+) (A) Negative Final   • Ketones, UA 04/13/2022 Negative  Negative Final   • Bilirubin, UA 04/13/2022 Negative  Negative Final   • Blood, UA 04/13/2022 Negative  Negative Final   • Protein, UA 04/13/2022 Negative  Negative Final   • Leuk Esterase, UA 04/13/2022 Negative  Negative Final   • Nitrite, UA 04/13/2022 Negative  Negative Final   • Urobilinogen, UA 04/13/2022 1.0 E.U./dL  0.2 - 1.0 E.U./dL Final   •  04/13/2022 58.0 (A) 0.0 - 38.1 U/mL Final   • WBC 04/13/2022 5.80  3.40 - 10.80 10*3/mm3 Final   • RBC 04/13/2022 3.70 (A) 3.77 - 5.28 10*6/mm3 Final   • Hemoglobin 04/13/2022 11.6 (A) 12.0 - 15.9 g/dL Final   • Hematocrit 04/13/2022 37.0  34.0 - 46.6 % Final   • RDW 04/13/2022 14.8  12.3 - 15.4  % Final   • MCV 04/13/2022 99.9 (A) 79.0 - 97.0 fL Final   • MCH 04/13/2022 31.3  26.6 - 33.0 pg Final   • MCHC 04/13/2022 31.3 (A) 31.5 - 35.7 g/dL Final   • MPV 04/13/2022 7.8  6.0 - 12.0 fL Final   • Platelets 04/13/2022 61 (A) 140 - 450 10*3/mm3 Final   • Neutrophil % 04/13/2022 92.9 (A) 42.7 - 76.0 % Final   • Lymphocyte % 04/13/2022 5.8 (A) 19.6 - 45.3 % Final   • Monocyte % 04/13/2022 1.3 (A) 5.0 - 12.0 % Final   • Neutrophils, Absolute 04/13/2022 5.40  1.70 - 7.00 10*3/mm3 Final   • Lymphocytes, Absolute 04/13/2022 0.30 (A) 0.70 - 3.10 10*3/mm3 Final   • Monocytes, Absolute 04/13/2022 0.10  0.10 - 0.90 10*3/mm3 Final       Appearance: WNL  Hygiene: good  Cooperation:  Cooperative  Eye Contact:  direct  Psychomotor Behavior:  denies psychomotor agitation/retardation, No EPS, No motor tics  Mood:  within normal limits  Affect:  Congruent   Hopelessness: Denies  Speech:  Normal  Thought Process:  Linear  Thought Content:  Normal  Concentration: Normal   Suicidal: denies  Homicidal:  None  Hallucinations:  None  Delusion:  None  Memory:  Intact  Orientation:  Person, Place, Time and Situation  Reliability:  good  Insight:  Fair  Judgement: good  Impulse Control: good  Estimated Intelligence: average range    OLENA REVIEWED NO RED FLAGS    Assessment & Plan   Diagnoses and all orders for this visit:    1. Generalized anxiety disorder (Primary)    2. Panic disorder          IMPRESSION: utilizing healthy coping skills, adjusting well to living with parents    PLAN:    Provide Cognitive Behavioral Therapy and Solution Focused Therapy to improve functioning, maintain stability, and avoid decompensation and the need for higher level of care.    Counseled patient regarding multimodal approach with encouragement of healthy nutrition, healthy sleep, regular physical mobility, social involvement, counseling, and medication compliance.     Assisted patient in identifying risk factors which would indicate the need for  higher level of care including thoughts to harm self or others and/or self-harming behavior and encouraged patient to contact this office, call 911, or present to the nearest emergency room should any of these events occur. Discussed crisis intervention services and means to access.  Patient adamantly and convincingly denies current suicidal or homicidal ideation or perceptual disturbance.    Treatment Plan: stabilize mood, patient will stay out of psychiatric hospital and be at optimal level of functioning with therapy and take all medication as prescribed. Patient verbalized  understanding and agreement to plan.    Instructed to call for questions or concerns and return early if necessary.     Greater than 50% time was spent in coordination of care, and counseling the patient regarding current assessment, symptoms, plan of care going forward, supportive therapy.  Answered any questions patient had regarding medications and plan of care.    Return if symptoms worsen or fail to improve.

## 2022-10-05 NOTE — TELEPHONE ENCOUNTER
I have reviewed patient's OLENA report prior to prescribing Schedule II, III, and IV medications. Request # [id number]. Refill for oxycodone 5 mg tab q6h PRN #[id number] sent to [facility]. The patient will f/u in 3 months.

## 2022-10-05 NOTE — PROGRESS NOTES
Palliative Clinic Note      Name: Jeana Chung RN  Age: 47 y.o.  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     Treatment summary: Patient initially diagnosed with endometrial cancer in 2020. She underwent an exploratory laparotomy, total abdominal hysterectomy, bilateral salpingo-oophorectomy with optimal debulking on 2/10/20. She completed 3 cycles of chemotherapy in 2020. Progression found in 2021 and she restarted chemotherapy in 2021 with Keytruda and Lenvima.  Progression seen on imaging in 2022. Patient uninterested in additional radiation. Treatment held due to thrombocytopenia.     Pain: Patient complains of pain in her neck and back that waxes and wanes  She has been taking approximately 2-3 tablets of oxycodone 5 mg per day. Patient feels she is able to maintain her daily function and quality of life with this regimen. She also takes gabapentin 600 mg 3 times a day for neuropathy. Patient admits to missing several doses of this medication. She does not take acetaminophen or ibuprofen.      Other symptoms: The patient complains of intermittent nausea and vomiting secondary to motion. She is able to manage with Meclizine. She also takes Zofran and Compazine as needed. Her appetite has improved since moving back in with her mother and patient has gained several pounds back. Regular BMs on bowel regimen. Patient's sleep waxes and waves. Since she started limiting her naps during the day, she has been sleeping better at night.      Pyschosocial: The patient has moved back to [place]  "and is living with her parents. She recently found out her  was cheating. They have no children. She is a retired nurse. The patient enjoys crafting and laying out by the pool. She endorses a strong support system and great friends. The patient admits to anxiety and depression but reports much improvement since returning to [place]. She has tried several SSRIs and SNRIs in the past however unable to tolerate due to feeling like a zombie. She is prescribed Ativan for anxiety and panic attacks.  Patient follows with psychiatry Angelica WARD.     Spiritual: Spiritual rather than Zoroastrian.     Goals: Travel while she feels up to it.    The following portions of the patient's history were reviewed and updated as appropriate: allergies, current medications, past family history, past medical history, past social history, past surgical history and problem list.    ORT-R: Low risk   Decisional capacity: Full  ECOG: (1) Restricted in physically strenuous activity, ambulatory and able to do work of light nature   Palliative Performance Scale Score: 70%     Objective:    /60   Pulse 97   Temp 98 °F (36.7 °C)   Resp 18   Ht 160 cm (62.99\")   Wt 70.8 kg (156 lb)   SpO2 97%   BMI 27.64 kg/m²     Constitutional: Awake, alert, normal gait, sitting up in exam chair, in no acute distress  Eyes: PERRLA, EOMS intact  HENT: NCAT, face symmetric  Neck: Supple, trachea midline  Respiratory: Nonlabored respirations  Cardiovascular: RRR, no edema observed  Gastrointestinal: Soft, no guarding  Musculoskeletal: Moves all extremities   Psychiatric: Appropriate affect, cooperative  Neurologic: Oriented x 3, Cranial Nerves grossly intact to confrontation, speech clear  Skin: Cool dry, no rashes or wounds appreciated     Medication Counts: Reviewed. See bottom of note for details. Brought medication.  No overuse or misuse evident.  I have reviewed the patient's [place] PDMP. OLENA Req #[id number].   UDS(Y): Last 3/15/22. Reviewed. " Appropriate.    Assessment & Plan:    1. Endometrial cancer (HCC)  - Patient follows closely with GYN oncologist, Dr. Rubio. Treatment held due to thrombocytopenia.    2. Cancer related pain  - Side effects of the medication discussed at every visit. Patient is appropriate for opioid therapy due to cancer related pain. Daily function and quality of life improved with current regimen. Patient was encouraged to continue bowel regimen of daily stool softeners, prn laxatives, and diet modifications.    - Continue oxycodone 5 mg every 6 hours as needed for cancer pain. Refill sent to [facility].     3. Neuropathy  - Gabapentin (NEURONTIN) 600 MG tablet; Take 1 tablet by mouth 3 (Three) Times a Day.  Dispense: 90 tablet; Refill: 0    Code status: Full code  Medical interventions: Full  Advanced directives: Continue to discuss    Return in about 3 months (around 1/5/2023) for Office Visit.    I spent 30 minutes caring for Jeana Chung RN on this date of service. This time includes time spent by me in the following activities: preparing for the visit, reviewing tests, obtaining and/or reviewing a separately obtained history, performing a medically appropriate examination and/or evaluation , counseling and educating the patient/family/caregiver, ordering medications, tests, or procedures, documenting information in the medical record, independently interpreting results and communicating that information with the patient/family/caregiver, and care coordination    Enedina Guillen PA-C  10/05/2022    Medication Date Filled # Filled Count Used # Days  TAHIR   Oxycodone 5 8/29/22 86 6 80 37 2   Gabapentin 600 8/22/22 90 32 58 44 1-2

## 2022-11-03 DIAGNOSIS — G89.3 CANCER RELATED PAIN: ICD-10-CM

## 2022-11-03 RX ORDER — OXYCODONE HYDROCHLORIDE 5 MG/1
5 TABLET ORAL
Qty: 150 TABLET | Refills: 0 | Status: SHIPPED | OUTPATIENT
Start: 2022-11-03 | End: 2022-12-03

## 2022-11-03 NOTE — TELEPHONE ENCOUNTER
I have reviewed patient's OLENA report prior to prescribing Schedule II, III, and IV medications. Request # [id number]. Refill for oxycodone 5 mg tab increased to q5h PRN #[id number] sent to [facility] in [place]. The patient is scheduled to f/u in January.

## 2022-11-04 RX ORDER — SPIRONOLACTONE 50 MG/1
150 TABLET, FILM COATED ORAL DAILY
Qty: 90 TABLET | Refills: 5 | Status: SHIPPED | OUTPATIENT
Start: 2022-11-04

## 2022-11-04 NOTE — TELEPHONE ENCOUNTER
Rx Refill Note  Requested Prescriptions     Pending Prescriptions Disp Refills   • spironolactone (Aldactone) 50 MG tablet 90 tablet 5     Sig: Take 3 tablets by mouth Daily.      Last office visit with prescribing clinician: 5/23/2022      Next office visit with prescribing clinician: 11/23/2022            Arabella Arshad LPN  11/04/22, 10:13 EDT

## 2022-11-22 ENCOUNTER — TELEPHONE (OUTPATIENT)
Dept: PALLIATIVE CARE | Facility: CLINIC | Age: 47
End: 2022-11-22

## 2022-11-22 DIAGNOSIS — M54.30 BACK PAIN WITH SCIATICA: Primary | ICD-10-CM

## 2022-11-22 DIAGNOSIS — M54.9 BACK PAIN WITH SCIATICA: Primary | ICD-10-CM

## 2022-11-22 RX ORDER — CYCLOBENZAPRINE HCL 10 MG
10 TABLET ORAL 3 TIMES DAILY PRN
Qty: 21 TABLET | Refills: 0 | Status: SHIPPED | OUTPATIENT
Start: 2022-11-22 | End: 2022-11-29 | Stop reason: SDUPTHER

## 2022-11-22 NOTE — TELEPHONE ENCOUNTER
I called the patient back to get some more information about her pain.    Patient states that the pain started with no incident or trauma over the weekend. She states it goes down the right leg, and feels as if it comes from her back. We discussed muscle pain vs deep bone pain, she feels it is more muscle related with some burning shooting down mostly the right leg.    I let her know that I will get the information to Enedina and will call her back once she has an opportunity to review.

## 2022-11-22 NOTE — TELEPHONE ENCOUNTER
Patient has been notified of the instructions from [facility]. She will get the prescription picked up and start using a heating pad to the lower back. She has been informed that if this persist or worsens to reach out to her primary care provider or go back to the ER if its over the holiday.

## 2022-11-22 NOTE — TELEPHONE ENCOUNTER
Patient called at stated that she is having an increase in pain to her legs. She states she went to the ER at [facility] they ran test including MRI and they could not find anything causing the increase in pain. She was told it might be cancer related pain. They gave her a prescription for Oxycodone 10mg and she already has Oxycodone 5mg at home. She states she has been taking the higher dose and it still is not helping her pain.  I informed her [facility] was out of the office this week, but I would get a message in and see what recommendations she has. In the mean time she is taking the Oxycodone 10mg the ER gave.

## 2022-11-29 ENCOUNTER — TELEPHONE (OUTPATIENT)
Dept: GYNECOLOGIC ONCOLOGY | Facility: CLINIC | Age: 47
End: 2022-11-29

## 2022-11-29 ENCOUNTER — TELEPHONE (OUTPATIENT)
Dept: PALLIATIVE CARE | Facility: CLINIC | Age: 47
End: 2022-11-29

## 2022-11-29 DIAGNOSIS — M54.9 BACK PAIN WITH SCIATICA: ICD-10-CM

## 2022-11-29 DIAGNOSIS — M54.30 BACK PAIN WITH SCIATICA: ICD-10-CM

## 2022-11-29 RX ORDER — CYCLOBENZAPRINE HCL 10 MG
10 TABLET ORAL 3 TIMES DAILY PRN
Qty: 21 TABLET | Refills: 0 | Status: SHIPPED | OUTPATIENT
Start: 2022-11-29

## 2022-11-29 NOTE — TELEPHONE ENCOUNTER
Patients mother phoned. Patient is having muscle spasms, leg pains and has a high temp. They want to know what they are supposed to do.

## 2022-11-29 NOTE — TELEPHONE ENCOUNTER
Called and talked with Jeana. She has been advised to go to the ER for the fever per Dr. Rubio. I discussed with Jeana what Dr. Rubio would like for her to do regarding fever and she states she no longer has a fever she is just having the leg pain. She does not want to go to the ER. She went to [facility] last week and had x-rays along with MRI for the leg pain she is experiencing. She states that [facility] gave her muscle relaxers last week which did help, but now that medication is gone and pain is just as bad again. She feels since the fever is not the concern right now just the pain she has already gone to the ER for she does not want to go back. Will discuss with [facility] again regarding leg pain.

## 2022-11-29 NOTE — TELEPHONE ENCOUNTER
Patient is still having the leg pain she was having last week. She went to [facility] and had test that found nothing pertaining to her leg pain. Last week she was given Flexeril from [facility] to help, and the ER had given  her Baclofen. She does not have either of those medications left and pain is still going down the legs making it difficult to walk or get out of bed.   She was running a temp this morning and per Dr. Rubio she needed to go to the ER for evaluation but she states temp has come down and she no longer is concerned with the temperature just the leg pain.

## 2022-12-01 ENCOUNTER — TELEPHONE (OUTPATIENT)
Dept: GYNECOLOGIC ONCOLOGY | Facility: CLINIC | Age: 47
End: 2022-12-01

## 2022-12-01 NOTE — TELEPHONE ENCOUNTER
Patients sister called stating that the patient is at [facility] needing to be transferred to ICU. Doctors there are discussing sending her to [facility] and patients family wants her at [facility]. Family asked if there was anything Dr. Rubio or our clinic could do to get her a bed in the ICU at [facility]. I explained since we are not the admitting physician there is nothing we are able to do or request. Family can request [facility] but if there is no bed available the patient needs to go where she can get immediate care.

## 2022-12-28 ENCOUNTER — PATIENT MESSAGE (OUTPATIENT)
Dept: FAMILY MEDICINE CLINIC | Facility: CLINIC | Age: 47
End: 2022-12-28

## (undated) DEVICE — SUT MONOCRYL PLS ANTIB UND 3/0  PS1 27IN

## (undated) DEVICE — GLV SURG DERMASSURE GRN LF PF SZ 6.5

## (undated) DEVICE — SUT VIC 0 UR5 27IN VCP376H

## (undated) DEVICE — VISUALIZATION SYSTEM: Brand: CLEARIFY

## (undated) DEVICE — 3M™ STERI-DRAPE™ INSTRUMENT POUCH 1018: Brand: STERI-DRAPE™

## (undated) DEVICE — MARYLAND JAW LAPAROSCOPIC SEALER/DIVIDER COATED: Brand: LIGASURE

## (undated) DEVICE — TRY SKINPREP DRYPREP

## (undated) DEVICE — PROVIDES A STERILE INTERFACE BETWEEN THE OPERATING ROOM SURGICAL LAMPS (NON-STERILE) AND THE SURGEON OR NURSE (STERILE).: Brand: STERION®CLAMP COVER FABRIC

## (undated) DEVICE — MINI ENDOCUT SCISSOR TIP, DISPOSABLE: Brand: RENEW

## (undated) DEVICE — COVER,LIGHT HANDLE,FLX,1/PK: Brand: MEDLINE INDUSTRIES, INC.

## (undated) DEVICE — IRRIGATOR BULB ASEPTO 60CC STRL

## (undated) DEVICE — INTENDED FOR TISSUE SEPARATION, AND OTHER PROCEDURES THAT REQUIRE A SHARP SURGICAL BLADE TO PUNCTURE OR CUT.: Brand: BARD-PARKER ® STAINLESS STEEL BLADES

## (undated) DEVICE — GLV SURG PREMIERPRO MIC LTX PF SZ8 BRN

## (undated) DEVICE — DRAPE, LAVH, STERILE: Brand: MEDLINE

## (undated) DEVICE — ENDOPOUCH RETRIEVER SPECIMEN RETRIEVAL BAGS: Brand: ENDOPOUCH RETRIEVER

## (undated) DEVICE — SUT VIC 0 UR6 27IN VCP603H

## (undated) DEVICE — DRAPE,UNDERBUTTOCKS,STERILE: Brand: MEDLINE

## (undated) DEVICE — ENDOCUT SCISSOR TIP, DISPOSABLE: Brand: RENEW

## (undated) DEVICE — SCOPE WARMER THAT PRE-WARMS MULTIPLE LAPAROSCOPES.: Brand: JOSNOE MEDICAL INC

## (undated) DEVICE — BOWL UTIL STRL 32OZ

## (undated) DEVICE — ENDOPATH XCEL BLADELESS TROCARS WITH STABILITY SLEEVES: Brand: ENDOPATH XCEL

## (undated) DEVICE — ANTIBACTERIAL UNDYED BRAIDED (POLYGLACTIN 910), SYNTHETIC ABSORBABLE SUTURE: Brand: COATED VICRYL

## (undated) DEVICE — APPL CHLORAPREP W/TINT 26ML BLU

## (undated) DEVICE — JACKSON-PRATT 100CC BULB RESERVOIR: Brand: CARDINAL HEALTH

## (undated) DEVICE — SUT MNCRYL PLS ANTIB UD 4/0 PS2 18IN

## (undated) DEVICE — ECHELON FLEX  POWERED VASCULAR STAPLER WITH ADVANCED PLACEMENT TIP, 35MM: Brand: ECHELON FLEX

## (undated) DEVICE — HARMONIC HD 1000I SHEARS, 20CM SHAFT LENGTH: Brand: HARMONIC

## (undated) DEVICE — GLV SURG PREMIERPRO MIC LTX PF SZ7.5 BRN

## (undated) DEVICE — SUT ETHLN 2/0 PS 18IN 585H

## (undated) DEVICE — SKIN AFFIX SURG ADHESIVE 72/CS 0.55ML: Brand: MEDLINE

## (undated) DEVICE — GLV SURG SENSICARE MICRO PF LF 6 STRL

## (undated) DEVICE — [HIGH FLOW INSUFFLATOR,  DO NOT USE IF PACKAGE IS DAMAGED,  KEEP DRY,  KEEP AWAY FROM SUNLIGHT,  PROTECT FROM HEAT AND RADIOACTIVE SOURCES.]: Brand: PNEUMOSURE

## (undated) DEVICE — 1230 DOUBLE MAGNET NEEDLE COUNTER,BLACK: Brand: DEVON

## (undated) DEVICE — SUT VICYL PLS CTD ANTIB BR 1 27IN VIL

## (undated) DEVICE — SUT PDS LP 1 TP1 96IN VIO PDP880GA

## (undated) DEVICE — ENDOPATH XCEL BLUNT TIP TROCARS WITH SMOOTH SLEEVES: Brand: ENDOPATH XCEL

## (undated) DEVICE — SPNG LAP PREWSH SFTPK 18X18IN STRL PK/5

## (undated) DEVICE — BLAKE SILICONE DRAIN, 19 FR ROUND, HUBLESS: Brand: BLAKE

## (undated) DEVICE — STPLR SKIN SUBCUTICULAR INSORB 2030

## (undated) DEVICE — APPL DURAPREP IODOPHOR APL 26ML

## (undated) DEVICE — ENDOPATH XCEL UNIVERSAL TROCAR STABLILITY SLEEVES: Brand: ENDOPATH XCEL

## (undated) DEVICE — SUT VIC 12X27 D8116 BX/12

## (undated) DEVICE — PK LAP LASR CHOLE 10

## (undated) DEVICE — 3M™ IOBAN™ 2 ANTIMICROBIAL INCISE DRAPE 6640EZ: Brand: IOBAN™ 2

## (undated) DEVICE — LEX BASIC NO DRAPE: Brand: MEDLINE INDUSTRIES, INC.